# Patient Record
Sex: FEMALE | Race: WHITE | NOT HISPANIC OR LATINO
[De-identification: names, ages, dates, MRNs, and addresses within clinical notes are randomized per-mention and may not be internally consistent; named-entity substitution may affect disease eponyms.]

---

## 2017-02-28 ENCOUNTER — HOSPITAL ENCOUNTER (OUTPATIENT)
Dept: HOME HEALTH SERVICES | Facility: HOME HEALTHCARE | Age: 62
Setting detail: SPECIMEN
Discharge: HOME | End: 2017-02-28
Attending: INTERNAL MEDICINE | Admitting: INTERNAL MEDICINE

## 2017-02-28 LAB
CRP SERPL-MCNC: 0.58 MG/DL (ref 0–0.7)
ERYTHROCYTE [SEDIMENTATION RATE] IN BLOOD BY WESTERGREN METHOD: 54 MM/HR (ref 0–30)

## 2017-03-01 ENCOUNTER — CONVERSION ENCOUNTER (OUTPATIENT)
Dept: CARDIOLOGY | Facility: CLINIC | Age: 62
End: 2017-03-01

## 2017-03-01 LAB
ANA SER QL IA: NORMAL
CHROMATIN AB SERPL-ACNC: <20 [IU]/ML (ref 0–20)

## 2017-03-22 ENCOUNTER — HOSPITAL ENCOUNTER (OUTPATIENT)
Dept: NUCLEAR MEDICINE | Facility: HOSPITAL | Age: 62
Discharge: HOME | End: 2017-03-22
Attending: INTERNAL MEDICINE | Admitting: INTERNAL MEDICINE

## 2017-04-12 ENCOUNTER — HOSPITAL ENCOUNTER (OUTPATIENT)
Dept: SLEEP MEDICINE | Facility: HOSPITAL | Age: 62
Discharge: HOME | End: 2017-04-12
Attending: INTERNAL MEDICINE | Admitting: INTERNAL MEDICINE

## 2017-04-25 ENCOUNTER — HOSPITAL ENCOUNTER (OUTPATIENT)
Dept: SLEEP MEDICINE | Facility: HOSPITAL | Age: 62
Discharge: HOME | End: 2017-04-25
Attending: INTERNAL MEDICINE | Admitting: INTERNAL MEDICINE

## 2017-05-17 ENCOUNTER — CONVERSION ENCOUNTER (OUTPATIENT)
Dept: CARDIOLOGY | Facility: CLINIC | Age: 62
End: 2017-05-17

## 2017-06-18 ENCOUNTER — HOSPITAL ENCOUNTER (OUTPATIENT)
Dept: SLEEP MEDICINE | Facility: HOSPITAL | Age: 62
Discharge: HOME | End: 2017-06-18
Attending: INTERNAL MEDICINE | Admitting: INTERNAL MEDICINE

## 2018-01-04 ENCOUNTER — HOSPITAL ENCOUNTER (OUTPATIENT)
Dept: SLEEP MEDICINE | Facility: HOSPITAL | Age: 63
Discharge: HOME | End: 2018-01-04
Attending: INTERNAL MEDICINE | Admitting: INTERNAL MEDICINE

## 2018-02-22 ENCOUNTER — HOSPITAL ENCOUNTER (OUTPATIENT)
Dept: HOME HEALTH SERVICES | Facility: HOME HEALTHCARE | Age: 63
Setting detail: SPECIMEN
Discharge: HOME | End: 2018-02-22
Attending: INTERNAL MEDICINE | Admitting: INTERNAL MEDICINE

## 2018-06-19 ENCOUNTER — HOSPITAL ENCOUNTER (OUTPATIENT)
Dept: HOME HEALTH SERVICES | Facility: HOME HEALTHCARE | Age: 63
Setting detail: SPECIMEN
Discharge: HOME | End: 2018-06-19
Attending: PHYSICAL MEDICINE & REHABILITATION | Admitting: PHYSICAL MEDICINE & REHABILITATION

## 2018-06-19 LAB
ALBUMIN SERPL-MCNC: 3.9 G/DL (ref 3.5–4.8)
ALBUMIN/GLOB SERPL: 1.1 {RATIO} (ref 1–1.7)
ALP SERPL-CCNC: 59 IU/L (ref 32–91)
ALT SERPL-CCNC: 18 IU/L (ref 14–54)
ANION GAP SERPL CALC-SCNC: 12.1 MMOL/L (ref 10–20)
AST SERPL-CCNC: 18 IU/L (ref 15–41)
BASOPHILS # BLD AUTO: 0 10*3/UL (ref 0–0.2)
BASOPHILS NFR BLD AUTO: 1 % (ref 0–2)
BILIRUB SERPL-MCNC: 0.3 MG/DL (ref 0.3–1.2)
BUN SERPL-MCNC: 12 MG/DL (ref 8–20)
BUN/CREAT SERPL: 17.1 (ref 5.4–26.2)
CALCIUM SERPL-MCNC: 10 MG/DL (ref 8.9–10.3)
CHLORIDE SERPL-SCNC: 104 MMOL/L (ref 101–111)
CHOLEST SERPL-MCNC: 148 MG/DL
CHOLEST/HDLC SERPL: 4.1 {RATIO}
CONV CO2: 25 MMOL/L (ref 22–32)
CONV LDL CHOLESTEROL DIRECT: 86 MG/DL (ref 0–100)
CONV TOTAL PROTEIN: 7.5 G/DL (ref 6.1–7.9)
CREAT UR-MCNC: 0.7 MG/DL (ref 0.4–1)
DIFFERENTIAL METHOD BLD: (no result)
EOSINOPHIL # BLD AUTO: 0.1 10*3/UL (ref 0–0.3)
EOSINOPHIL # BLD AUTO: 2 % (ref 0–3)
ERYTHROCYTE [DISTWIDTH] IN BLOOD BY AUTOMATED COUNT: 13.1 % (ref 11.5–14.5)
GLOBULIN UR ELPH-MCNC: 3.6 G/DL (ref 2.5–3.8)
GLUCOSE SERPL-MCNC: 91 MG/DL (ref 65–99)
HCT VFR BLD AUTO: 39.9 % (ref 35–49)
HDLC SERPL-MCNC: 36 MG/DL
HGB BLD-MCNC: 13.2 G/DL (ref 12–15)
LDLC/HDLC SERPL: 2.4 {RATIO}
LIPID INTERPRETATION: ABNORMAL
LYMPHOCYTES # BLD AUTO: 2.5 10*3/UL (ref 0.8–4.8)
LYMPHOCYTES NFR BLD AUTO: 40 % (ref 18–42)
MCH RBC QN AUTO: 29.7 PG (ref 26–32)
MCHC RBC AUTO-ENTMCNC: 33 G/DL (ref 32–36)
MCV RBC AUTO: 89.8 FL (ref 80–94)
MONOCYTES # BLD AUTO: 0.7 10*3/UL (ref 0.1–1.3)
MONOCYTES NFR BLD AUTO: 12 % (ref 2–11)
NEUTROPHILS # BLD AUTO: 2.8 10*3/UL (ref 2.3–8.6)
NEUTROPHILS NFR BLD AUTO: 45 % (ref 50–75)
NRBC BLD AUTO-RTO: 0 /100{WBCS}
NRBC/RBC NFR BLD MANUAL: 0 10*3/UL
PLATELET # BLD AUTO: 192 10*3/UL (ref 150–450)
PMV BLD AUTO: 9.8 FL (ref 7.4–10.4)
POTASSIUM SERPL-SCNC: 4.1 MMOL/L (ref 3.6–5.1)
RBC # BLD AUTO: 4.44 10*6/UL (ref 4–5.4)
SODIUM SERPL-SCNC: 137 MMOL/L (ref 136–144)
TRIGL SERPL-MCNC: 150 MG/DL
VLDLC SERPL CALC-MCNC: 26 MG/DL
WBC # BLD AUTO: 6.2 10*3/UL (ref 4.5–11.5)

## 2019-06-03 VITALS
WEIGHT: 247 LBS | BODY MASS INDEX: 49.89 KG/M2 | BODY MASS INDEX: 49.8 KG/M2 | HEIGHT: 59 IN | DIASTOLIC BLOOD PRESSURE: 85 MMHG | HEART RATE: 60 BPM | SYSTOLIC BLOOD PRESSURE: 171 MMHG | HEIGHT: 59 IN | DIASTOLIC BLOOD PRESSURE: 82 MMHG | SYSTOLIC BLOOD PRESSURE: 187 MMHG | HEART RATE: 101 BPM

## 2023-10-17 ENCOUNTER — LAB REQUISITION (OUTPATIENT)
Dept: LAB | Facility: HOSPITAL | Age: 68
End: 2023-10-17
Payer: MEDICARE

## 2023-10-17 DIAGNOSIS — E11.51 TYPE 2 DIABETES MELLITUS WITH DIABETIC PERIPHERAL ANGIOPATHY WITHOUT GANGRENE: ICD-10-CM

## 2023-10-17 DIAGNOSIS — I50.23 ACUTE ON CHRONIC SYSTOLIC (CONGESTIVE) HEART FAILURE: ICD-10-CM

## 2023-10-17 DIAGNOSIS — I10 ESSENTIAL (PRIMARY) HYPERTENSION: ICD-10-CM

## 2023-10-17 LAB
ALBUMIN SERPL-MCNC: 3.6 G/DL (ref 3.5–5.2)
ALBUMIN/GLOB SERPL: 1.2 G/DL
ALP SERPL-CCNC: 92 U/L (ref 39–117)
ALT SERPL W P-5'-P-CCNC: 17 U/L (ref 1–33)
ANION GAP SERPL CALCULATED.3IONS-SCNC: 10 MMOL/L (ref 5–15)
AST SERPL-CCNC: 16 U/L (ref 1–32)
BILIRUB SERPL-MCNC: 0.5 MG/DL (ref 0–1.2)
BUN SERPL-MCNC: 15 MG/DL (ref 8–23)
BUN/CREAT SERPL: 14.3 (ref 7–25)
CALCIUM SPEC-SCNC: 9.3 MG/DL (ref 8.6–10.5)
CHLORIDE SERPL-SCNC: 103 MMOL/L (ref 98–107)
CO2 SERPL-SCNC: 28 MMOL/L (ref 22–29)
CREAT SERPL-MCNC: 1.05 MG/DL (ref 0.57–1)
EGFRCR SERPLBLD CKD-EPI 2021: 58 ML/MIN/1.73
GLOBULIN UR ELPH-MCNC: 3 GM/DL
GLUCOSE SERPL-MCNC: 132 MG/DL (ref 65–99)
POTASSIUM SERPL-SCNC: 4 MMOL/L (ref 3.5–5.2)
PROT SERPL-MCNC: 6.6 G/DL (ref 6–8.5)
SODIUM SERPL-SCNC: 141 MMOL/L (ref 136–145)

## 2023-10-17 PROCEDURE — 80053 COMPREHEN METABOLIC PANEL: CPT | Performed by: NURSE PRACTITIONER

## 2024-01-15 ENCOUNTER — LAB REQUISITION (OUTPATIENT)
Dept: LAB | Facility: HOSPITAL | Age: 69
End: 2024-01-15
Payer: MEDICARE

## 2024-01-15 DIAGNOSIS — Z00.00 ENCOUNTER FOR GENERAL ADULT MEDICAL EXAMINATION WITHOUT ABNORMAL FINDINGS: ICD-10-CM

## 2024-01-15 LAB
25(OH)D3 SERPL-MCNC: 18.6 NG/ML (ref 30–100)
ALBUMIN SERPL-MCNC: 3.4 G/DL (ref 3.5–5.2)
ALBUMIN/GLOB SERPL: 0.9 G/DL
ALP SERPL-CCNC: 99 U/L (ref 39–117)
ALT SERPL W P-5'-P-CCNC: 14 U/L (ref 1–33)
ANION GAP SERPL CALCULATED.3IONS-SCNC: 8 MMOL/L (ref 5–15)
AST SERPL-CCNC: 21 U/L (ref 1–32)
BILIRUB SERPL-MCNC: 0.7 MG/DL (ref 0–1.2)
BUN SERPL-MCNC: 16 MG/DL (ref 8–23)
BUN/CREAT SERPL: 20 (ref 7–25)
CALCIUM SPEC-SCNC: 10 MG/DL (ref 8.6–10.5)
CHLORIDE SERPL-SCNC: 106 MMOL/L (ref 98–107)
CO2 SERPL-SCNC: 32 MMOL/L (ref 22–29)
CREAT SERPL-MCNC: 0.8 MG/DL (ref 0.57–1)
DEPRECATED RDW RBC AUTO: 61.3 FL (ref 37–54)
EGFRCR SERPLBLD CKD-EPI 2021: 80.4 ML/MIN/1.73
ERYTHROCYTE [DISTWIDTH] IN BLOOD BY AUTOMATED COUNT: 17 % (ref 12.3–15.4)
FOLATE SERPL-MCNC: 2.69 NG/ML (ref 4.78–24.2)
GLOBULIN UR ELPH-MCNC: 3.6 GM/DL
GLUCOSE SERPL-MCNC: 93 MG/DL (ref 65–99)
HBA1C MFR BLD: 6 % (ref 4.8–5.6)
HCT VFR BLD AUTO: 43.6 % (ref 34–46.6)
HGB BLD-MCNC: 13.8 G/DL (ref 12–15.9)
MCH RBC QN AUTO: 30.6 PG (ref 26.6–33)
MCHC RBC AUTO-ENTMCNC: 31.7 G/DL (ref 31.5–35.7)
MCV RBC AUTO: 96.4 FL (ref 79–97)
PLATELET # BLD AUTO: 138 10*3/MM3 (ref 140–450)
PMV BLD AUTO: 9.3 FL (ref 6–12)
POTASSIUM SERPL-SCNC: 4.5 MMOL/L (ref 3.5–5.2)
PROT SERPL-MCNC: 7 G/DL (ref 6–8.5)
RBC # BLD AUTO: 4.52 10*6/MM3 (ref 3.77–5.28)
SODIUM SERPL-SCNC: 146 MMOL/L (ref 136–145)
WBC NRBC COR # BLD AUTO: 4.1 10*3/MM3 (ref 3.4–10.8)

## 2024-01-15 PROCEDURE — 82746 ASSAY OF FOLIC ACID SERUM: CPT | Performed by: NURSE PRACTITIONER

## 2024-01-15 PROCEDURE — 83036 HEMOGLOBIN GLYCOSYLATED A1C: CPT | Performed by: NURSE PRACTITIONER

## 2024-01-15 PROCEDURE — 85027 COMPLETE CBC AUTOMATED: CPT | Performed by: NURSE PRACTITIONER

## 2024-01-15 PROCEDURE — 80053 COMPREHEN METABOLIC PANEL: CPT | Performed by: NURSE PRACTITIONER

## 2024-01-15 PROCEDURE — 82306 VITAMIN D 25 HYDROXY: CPT | Performed by: NURSE PRACTITIONER

## 2024-06-30 ENCOUNTER — APPOINTMENT (OUTPATIENT)
Dept: GENERAL RADIOLOGY | Facility: HOSPITAL | Age: 69
End: 2024-06-30
Payer: MEDICARE

## 2024-06-30 ENCOUNTER — APPOINTMENT (OUTPATIENT)
Dept: CT IMAGING | Facility: HOSPITAL | Age: 69
End: 2024-06-30
Payer: MEDICARE

## 2024-06-30 ENCOUNTER — HOSPITAL ENCOUNTER (INPATIENT)
Facility: HOSPITAL | Age: 69
LOS: 23 days | Discharge: ANOTHER HOSPITAL | End: 2024-07-23
Attending: EMERGENCY MEDICINE | Admitting: INTERNAL MEDICINE
Payer: MEDICARE

## 2024-06-30 DIAGNOSIS — N17.9 ACUTE KIDNEY INJURY: ICD-10-CM

## 2024-06-30 DIAGNOSIS — J94.8 HYDROPNEUMOTHORAX: ICD-10-CM

## 2024-06-30 DIAGNOSIS — A41.9 SEPSIS, DUE TO UNSPECIFIED ORGANISM, UNSPECIFIED WHETHER ACUTE ORGAN DYSFUNCTION PRESENT: ICD-10-CM

## 2024-06-30 DIAGNOSIS — I48.91 ATRIAL FIBRILLATION WITH RAPID VENTRICULAR RESPONSE: Primary | ICD-10-CM

## 2024-06-30 DIAGNOSIS — N39.0 URINARY TRACT INFECTION WITHOUT HEMATURIA, SITE UNSPECIFIED: ICD-10-CM

## 2024-06-30 DIAGNOSIS — I50.9 ACUTE CONGESTIVE HEART FAILURE, UNSPECIFIED HEART FAILURE TYPE: ICD-10-CM

## 2024-06-30 DIAGNOSIS — J18.9 MULTIFOCAL PNEUMONIA: ICD-10-CM

## 2024-06-30 DIAGNOSIS — I82.413 ACUTE DEEP VEIN THROMBOSIS (DVT) OF FEMORAL VEIN OF BOTH LOWER EXTREMITIES: ICD-10-CM

## 2024-06-30 DIAGNOSIS — B34.8 RHINOVIRUS INFECTION: ICD-10-CM

## 2024-06-30 DIAGNOSIS — J44.9 CHRONIC OBSTRUCTIVE PULMONARY DISEASE, UNSPECIFIED COPD TYPE: Chronic | ICD-10-CM

## 2024-06-30 DIAGNOSIS — L03.116 BILATERAL LOWER LEG CELLULITIS: ICD-10-CM

## 2024-06-30 DIAGNOSIS — R10.9 ABDOMINAL PAIN: ICD-10-CM

## 2024-06-30 DIAGNOSIS — E87.5 HYPERKALEMIA: ICD-10-CM

## 2024-06-30 DIAGNOSIS — L03.115 BILATERAL LOWER LEG CELLULITIS: ICD-10-CM

## 2024-06-30 PROBLEM — R00.0 WIDE-COMPLEX TACHYCARDIA: Status: ACTIVE | Noted: 2024-06-30

## 2024-06-30 PROBLEM — I10 HYPERTENSION: Status: ACTIVE | Noted: 2017-03-01

## 2024-06-30 LAB
ALBUMIN SERPL-MCNC: 2.5 G/DL (ref 3.5–5.2)
ALBUMIN/GLOB SERPL: 0.7 G/DL
ALP SERPL-CCNC: 126 U/L (ref 39–117)
ALT SERPL W P-5'-P-CCNC: 18 U/L (ref 1–33)
ANION GAP SERPL CALCULATED.3IONS-SCNC: 10 MMOL/L (ref 10–20)
ANION GAP SERPL CALCULATED.3IONS-SCNC: 5.6 MMOL/L (ref 5–15)
AST SERPL-CCNC: 42 U/L (ref 1–32)
BACTERIA UR QL AUTO: ABNORMAL /HPF
BASOPHILS # BLD AUTO: 0.04 10*3/MM3 (ref 0–0.2)
BASOPHILS NFR BLD AUTO: 0.2 % (ref 0–1.5)
BILIRUB SERPL-MCNC: 1.6 MG/DL (ref 0–1.2)
BILIRUB UR QL STRIP: ABNORMAL
BUN BLDA-MCNC: 77 MG/DL (ref 8–26)
BUN SERPL-MCNC: 50 MG/DL (ref 8–23)
BUN/CREAT SERPL: 36.2 (ref 7–25)
CA-I BLDA-SCNC: 1.08 MMOL/L (ref 1.12–1.32)
CALCIUM SPEC-SCNC: 10.1 MG/DL (ref 8.6–10.5)
CHLORIDE BLDA-SCNC: 99 MMOL/L (ref 98–109)
CHLORIDE SERPL-SCNC: 101 MMOL/L (ref 98–107)
CLARITY UR: ABNORMAL
CO2 BLDA-SCNC: 27 MMOL/L (ref 24–29)
CO2 SERPL-SCNC: 24.4 MMOL/L (ref 22–29)
COLOR UR: ABNORMAL
CREAT BLDA-MCNC: 1.4 MG/DL (ref 0.6–1.3)
CREAT SERPL-MCNC: 1.38 MG/DL (ref 0.57–1)
D-LACTATE SERPL-SCNC: 2.4 MMOL/L (ref 0.5–2)
D-LACTATE SERPL-SCNC: 3.1 MMOL/L (ref 0.3–2)
DEPRECATED RDW RBC AUTO: 52.5 FL (ref 37–54)
EGFRCR SERPLBLD CKD-EPI 2021: 40.8 ML/MIN/1.73
EGFRCR SERPLBLD CKD-EPI 2021: 41.5 ML/MIN/1.73
EOSINOPHIL # BLD AUTO: 0.01 10*3/MM3 (ref 0–0.4)
EOSINOPHIL NFR BLD AUTO: 0.1 % (ref 0.3–6.2)
ERYTHROCYTE [DISTWIDTH] IN BLOOD BY AUTOMATED COUNT: 15.2 % (ref 12.3–15.4)
GLOBULIN UR ELPH-MCNC: 3.7 GM/DL
GLUCOSE BLDC GLUCOMTR-MCNC: 142 MG/DL (ref 70–105)
GLUCOSE BLDC GLUCOMTR-MCNC: 189 MG/DL (ref 70–105)
GLUCOSE SERPL-MCNC: 194 MG/DL (ref 65–99)
GLUCOSE UR STRIP-MCNC: NEGATIVE MG/DL
HCT VFR BLD AUTO: 49 % (ref 34–46.6)
HCT VFR BLDA CALC: 50 % (ref 38–51)
HGB BLD-MCNC: 15.9 G/DL (ref 12–15.9)
HGB BLDA-MCNC: 17 G/DL (ref 12–17)
HGB UR QL STRIP.AUTO: ABNORMAL
HOLD SPECIMEN: NORMAL
HYALINE CASTS UR QL AUTO: ABNORMAL /LPF
IMM GRANULOCYTES # BLD AUTO: 0.17 10*3/MM3 (ref 0–0.05)
IMM GRANULOCYTES NFR BLD AUTO: 1 % (ref 0–0.5)
KETONES UR QL STRIP: ABNORMAL
LEUKOCYTE ESTERASE UR QL STRIP.AUTO: ABNORMAL
LYMPHOCYTES # BLD AUTO: 1.31 10*3/MM3 (ref 0.7–3.1)
LYMPHOCYTES NFR BLD AUTO: 7.8 % (ref 19.6–45.3)
MCH RBC QN AUTO: 30.8 PG (ref 26.6–33)
MCHC RBC AUTO-ENTMCNC: 32.4 G/DL (ref 31.5–35.7)
MCV RBC AUTO: 95 FL (ref 79–97)
MONOCYTES # BLD AUTO: 1.23 10*3/MM3 (ref 0.1–0.9)
MONOCYTES NFR BLD AUTO: 7.3 % (ref 5–12)
NEUTROPHILS NFR BLD AUTO: 14.04 10*3/MM3 (ref 1.7–7)
NEUTROPHILS NFR BLD AUTO: 83.6 % (ref 42.7–76)
NITRITE UR QL STRIP: POSITIVE
NRBC BLD AUTO-RTO: 0.8 /100 WBC (ref 0–0.2)
NT-PROBNP SERPL-MCNC: ABNORMAL PG/ML (ref 0–900)
PH UR STRIP.AUTO: <=5 [PH] (ref 5–8)
PLATELET # BLD AUTO: 119 10*3/MM3 (ref 140–450)
PMV BLD AUTO: 12 FL (ref 6–12)
POTASSIUM BLDA-SCNC: 7.8 MMOL/L (ref 3.5–4.9)
POTASSIUM SERPL-SCNC: 6.5 MMOL/L (ref 3.5–5.2)
PROT SERPL-MCNC: 6.2 G/DL (ref 6–8.5)
PROT UR QL STRIP: ABNORMAL
RBC # BLD AUTO: 5.16 10*6/MM3 (ref 3.77–5.28)
RBC # UR STRIP: ABNORMAL /HPF
RBC MORPH BLD: NORMAL
REF LAB TEST METHOD: ABNORMAL
RENAL EPI CELLS #/AREA URNS HPF: ABNORMAL /HPF
SMALL PLATELETS BLD QL SMEAR: NORMAL
SODIUM BLD-SCNC: 127 MMOL/L (ref 138–146)
SODIUM SERPL-SCNC: 131 MMOL/L (ref 136–145)
SP GR UR STRIP: 1.02 (ref 1–1.03)
SQUAMOUS #/AREA URNS HPF: ABNORMAL /HPF
TROPONIN T SERPL HS-MCNC: 40 NG/L
UROBILINOGEN UR QL STRIP: ABNORMAL
WBC # UR STRIP: ABNORMAL /HPF
WBC MORPH BLD: NORMAL
WBC NRBC COR # BLD AUTO: 16.8 10*3/MM3 (ref 3.4–10.8)

## 2024-06-30 PROCEDURE — 25010000002 ENOXAPARIN PER 10 MG: Performed by: EMERGENCY MEDICINE

## 2024-06-30 PROCEDURE — 80047 BASIC METABLC PNL IONIZED CA: CPT

## 2024-06-30 PROCEDURE — P9612 CATHETERIZE FOR URINE SPEC: HCPCS

## 2024-06-30 PROCEDURE — 84484 ASSAY OF TROPONIN QUANT: CPT | Performed by: EMERGENCY MEDICINE

## 2024-06-30 PROCEDURE — 93005 ELECTROCARDIOGRAM TRACING: CPT

## 2024-06-30 PROCEDURE — 87186 SC STD MICRODIL/AGAR DIL: CPT | Performed by: NURSE PRACTITIONER

## 2024-06-30 PROCEDURE — 80053 COMPREHEN METABOLIC PANEL: CPT | Performed by: EMERGENCY MEDICINE

## 2024-06-30 PROCEDURE — 83605 ASSAY OF LACTIC ACID: CPT | Performed by: EMERGENCY MEDICINE

## 2024-06-30 PROCEDURE — 87086 URINE CULTURE/COLONY COUNT: CPT | Performed by: NURSE PRACTITIONER

## 2024-06-30 PROCEDURE — 85007 BL SMEAR W/DIFF WBC COUNT: CPT | Performed by: EMERGENCY MEDICINE

## 2024-06-30 PROCEDURE — 71045 X-RAY EXAM CHEST 1 VIEW: CPT

## 2024-06-30 PROCEDURE — 85025 COMPLETE CBC W/AUTO DIFF WBC: CPT | Performed by: EMERGENCY MEDICINE

## 2024-06-30 PROCEDURE — 81001 URINALYSIS AUTO W/SCOPE: CPT | Performed by: EMERGENCY MEDICINE

## 2024-06-30 PROCEDURE — 0202U NFCT DS 22 TRGT SARS-COV-2: CPT | Performed by: NURSE PRACTITIONER

## 2024-06-30 PROCEDURE — 87641 MR-STAPH DNA AMP PROBE: CPT | Performed by: INTERNAL MEDICINE

## 2024-06-30 PROCEDURE — 83880 ASSAY OF NATRIURETIC PEPTIDE: CPT | Performed by: EMERGENCY MEDICINE

## 2024-06-30 PROCEDURE — 36415 COLL VENOUS BLD VENIPUNCTURE: CPT

## 2024-06-30 PROCEDURE — 25010000002 FUROSEMIDE PER 20 MG: Performed by: EMERGENCY MEDICINE

## 2024-06-30 PROCEDURE — 25010000002 VANCOMYCIN HCL IN NACL 1.5-0.9 GM/500ML-% SOLUTION: Performed by: EMERGENCY MEDICINE

## 2024-06-30 PROCEDURE — 99291 CRITICAL CARE FIRST HOUR: CPT

## 2024-06-30 PROCEDURE — 87040 BLOOD CULTURE FOR BACTERIA: CPT | Performed by: EMERGENCY MEDICINE

## 2024-06-30 PROCEDURE — 87077 CULTURE AEROBIC IDENTIFY: CPT | Performed by: NURSE PRACTITIONER

## 2024-06-30 PROCEDURE — 25010000002 CEFEPIME PER 500 MG: Performed by: EMERGENCY MEDICINE

## 2024-06-30 PROCEDURE — 73700 CT LOWER EXTREMITY W/O DYE: CPT

## 2024-06-30 PROCEDURE — 82948 REAGENT STRIP/BLOOD GLUCOSE: CPT | Performed by: EMERGENCY MEDICINE

## 2024-06-30 PROCEDURE — 25010000002 CALCIUM GLUCONATE-NACL 1-0.675 GM/50ML-% SOLUTION: Performed by: EMERGENCY MEDICINE

## 2024-06-30 PROCEDURE — 85014 HEMATOCRIT: CPT

## 2024-06-30 PROCEDURE — 63710000001 INSULIN REGULAR HUMAN PER 5 UNITS: Performed by: EMERGENCY MEDICINE

## 2024-06-30 RX ORDER — DILTIAZEM HYDROCHLORIDE 5 MG/ML
20 INJECTION INTRAVENOUS ONCE
Status: COMPLETED | OUTPATIENT
Start: 2024-06-30 | End: 2024-06-30

## 2024-06-30 RX ORDER — POTASSIUM CHLORIDE 750 MG/1
1 CAPSULE, EXTENDED RELEASE ORAL EVERY 12 HOURS SCHEDULED
COMMUNITY
Start: 2024-03-18 | End: 2024-07-23 | Stop reason: HOSPADM

## 2024-06-30 RX ORDER — DILTIAZEM HCL/D5W 125 MG/125
5-15 PLASTIC BAG, INJECTION (ML) INTRAVENOUS
Status: DISCONTINUED | OUTPATIENT
Start: 2024-06-30 | End: 2024-07-01

## 2024-06-30 RX ORDER — ACETAMINOPHEN 650 MG/1
650 SUPPOSITORY RECTAL EVERY 4 HOURS PRN
Status: DISCONTINUED | OUTPATIENT
Start: 2024-06-30 | End: 2024-07-04

## 2024-06-30 RX ORDER — ONDANSETRON 2 MG/ML
4 INJECTION INTRAMUSCULAR; INTRAVENOUS EVERY 6 HOURS PRN
Status: DISCONTINUED | OUTPATIENT
Start: 2024-06-30 | End: 2024-07-23 | Stop reason: HOSPADM

## 2024-06-30 RX ORDER — METOPROLOL TARTRATE 50 MG/1
50 TABLET, FILM COATED ORAL DAILY
COMMUNITY
End: 2024-07-23 | Stop reason: HOSPADM

## 2024-06-30 RX ORDER — ENOXAPARIN SODIUM 150 MG/ML
1 INJECTION SUBCUTANEOUS ONCE
Status: COMPLETED | OUTPATIENT
Start: 2024-06-30 | End: 2024-06-30

## 2024-06-30 RX ORDER — FEXOFENADINE HYDROCHLORIDE 180 MG/1
180 TABLET, FILM COATED ORAL DAILY
COMMUNITY
Start: 2024-05-30

## 2024-06-30 RX ORDER — FUROSEMIDE 10 MG/ML
80 INJECTION INTRAMUSCULAR; INTRAVENOUS ONCE
Status: COMPLETED | OUTPATIENT
Start: 2024-06-30 | End: 2024-06-30

## 2024-06-30 RX ORDER — FUROSEMIDE 20 MG/1
20 TABLET ORAL DAILY
COMMUNITY
End: 2024-07-23 | Stop reason: HOSPADM

## 2024-06-30 RX ORDER — SODIUM CHLORIDE 0.9 % (FLUSH) 0.9 %
10 SYRINGE (ML) INJECTION AS NEEDED
Status: DISCONTINUED | OUTPATIENT
Start: 2024-06-30 | End: 2024-07-23 | Stop reason: HOSPADM

## 2024-06-30 RX ORDER — NITROGLYCERIN 0.4 MG/1
0.4 TABLET SUBLINGUAL
Status: DISCONTINUED | OUTPATIENT
Start: 2024-06-30 | End: 2024-07-01

## 2024-06-30 RX ORDER — SODIUM CHLORIDE 0.9 % (FLUSH) 0.9 %
10 SYRINGE (ML) INJECTION EVERY 12 HOURS SCHEDULED
Status: DISCONTINUED | OUTPATIENT
Start: 2024-06-30 | End: 2024-07-23 | Stop reason: HOSPADM

## 2024-06-30 RX ORDER — BUMETANIDE 1 MG/1
1 TABLET ORAL 3 TIMES DAILY
Status: ON HOLD | COMMUNITY
Start: 2024-05-30 | End: 2024-07-23

## 2024-06-30 RX ORDER — OMEPRAZOLE 20 MG/1
1 CAPSULE, DELAYED RELEASE ORAL DAILY
COMMUNITY
Start: 2024-03-18

## 2024-06-30 RX ORDER — VANCOMYCIN/0.9 % SOD CHLORIDE 1.5G/250ML
20 PLASTIC BAG, INJECTION (ML) INTRAVENOUS ONCE
Status: COMPLETED | OUTPATIENT
Start: 2024-06-30 | End: 2024-07-01

## 2024-06-30 RX ORDER — ERGOCALCIFEROL 1.25 MG/1
50000 CAPSULE ORAL 2 TIMES WEEKLY
COMMUNITY
Start: 2024-05-30

## 2024-06-30 RX ORDER — DOCUSATE SODIUM 100 MG/1
1 CAPSULE, LIQUID FILLED ORAL EVERY 12 HOURS SCHEDULED
COMMUNITY
Start: 2024-05-30

## 2024-06-30 RX ORDER — ACETAMINOPHEN 325 MG/1
650 TABLET ORAL EVERY 4 HOURS PRN
Status: DISCONTINUED | OUTPATIENT
Start: 2024-06-30 | End: 2024-07-04

## 2024-06-30 RX ORDER — DEXTROSE MONOHYDRATE 25 G/50ML
25 INJECTION, SOLUTION INTRAVENOUS ONCE
Status: COMPLETED | OUTPATIENT
Start: 2024-06-30 | End: 2024-06-30

## 2024-06-30 RX ORDER — MELOXICAM 7.5 MG/1
1 TABLET ORAL DAILY PRN
COMMUNITY
Start: 2024-03-18 | End: 2024-07-23 | Stop reason: HOSPADM

## 2024-06-30 RX ORDER — HYDROCODONE BITARTRATE AND ACETAMINOPHEN 10; 325 MG/1; MG/1
1 TABLET ORAL 3 TIMES DAILY
Status: ON HOLD | COMMUNITY
Start: 2024-05-30 | End: 2024-07-23

## 2024-06-30 RX ORDER — LISINOPRIL 30 MG/1
1 TABLET ORAL DAILY
COMMUNITY
Start: 2024-03-18 | End: 2024-07-23 | Stop reason: HOSPADM

## 2024-06-30 RX ORDER — OXYBUTYNIN CHLORIDE 10 MG/1
2 TABLET, EXTENDED RELEASE ORAL DAILY
COMMUNITY
Start: 2024-03-18

## 2024-06-30 RX ORDER — SODIUM CHLORIDE 9 MG/ML
40 INJECTION, SOLUTION INTRAVENOUS AS NEEDED
Status: DISCONTINUED | OUTPATIENT
Start: 2024-06-30 | End: 2024-07-23 | Stop reason: HOSPADM

## 2024-06-30 RX ORDER — ONDANSETRON 4 MG/1
4 TABLET, ORALLY DISINTEGRATING ORAL EVERY 6 HOURS PRN
Status: DISCONTINUED | OUTPATIENT
Start: 2024-06-30 | End: 2024-07-23 | Stop reason: HOSPADM

## 2024-06-30 RX ORDER — MONTELUKAST SODIUM 10 MG/1
10 TABLET ORAL
COMMUNITY

## 2024-06-30 RX ORDER — CALCIUM GLUCONATE 20 MG/ML
1000 INJECTION, SOLUTION INTRAVENOUS ONCE
Status: COMPLETED | OUTPATIENT
Start: 2024-06-30 | End: 2024-06-30

## 2024-06-30 RX ADMIN — Medication 1500 MG: at 22:31

## 2024-06-30 RX ADMIN — INSULIN HUMAN 5 UNITS: 100 INJECTION, SOLUTION PARENTERAL at 21:20

## 2024-06-30 RX ADMIN — FUROSEMIDE 80 MG: 10 INJECTION, SOLUTION INTRAMUSCULAR; INTRAVENOUS at 21:18

## 2024-06-30 RX ADMIN — CALCIUM GLUCONATE 1000 MG: 20 INJECTION, SOLUTION INTRAVENOUS at 21:21

## 2024-06-30 RX ADMIN — Medication 5 MG/HR: at 17:32

## 2024-06-30 RX ADMIN — CEFEPIME 2000 MG: 2 INJECTION, POWDER, FOR SOLUTION INTRAVENOUS at 18:50

## 2024-06-30 RX ADMIN — Medication 10 ML: at 23:33

## 2024-06-30 RX ADMIN — ENOXAPARIN SODIUM 105 MG: 120 INJECTION SUBCUTANEOUS at 21:30

## 2024-06-30 RX ADMIN — DEXTROSE MONOHYDRATE 25 G: 25 INJECTION, SOLUTION INTRAVENOUS at 21:20

## 2024-06-30 RX ADMIN — DILTIAZEM HYDROCHLORIDE 20 MG: 5 INJECTION, SOLUTION INTRAVENOUS at 17:33

## 2024-06-30 NOTE — Clinical Note
Level of Care: Critical Care [6]   Admitting Physician: DARRELL KEVIN [[id number]]   Attending Physician: DARRELL KEVIN [[id number]]

## 2024-06-30 NOTE — LETTER
EMS Transport Request  For use at [facility], [place], [place], [place], and [place] only   Patient Name: Ban Brennan : 1955   Weight:87.6 kg (193 lb 2 oz) Pick-up Location: 2117 BLS/ALS: BLS/ALS: BLS   Insurance: [employer] REPLACEMENT Auth End Date:    Pre-Cert #: Approved (Valid -) D/C Summary complete:    Destination: Other [place]   Contact Precautions: Droplet/Spore   Equipment (O2, Fluids, etc.): O2, settings 3L   Arrive By Date/Time: 24 WILL CALL once dc orders are in Stretcher/WC: Stretcher   CM Requesting: Dea Erwin RN     Office Phone: [phone]  Office Cell: [phone]   Ext: [phone]   Notes/Medical Necessity: High falls risk, 3L O2, Contact and droplet precautions for CR pseudomonas infection and rhinovirus, wounds on toes, unstageable wound sacrum.      ______________________________________________________________________    *Only 2 patient bags OR 1 carry-on size bag are permitted.  Wheelchairs and walkers CANNOT transported with the patient. Acknowledge: Yes

## 2024-07-01 ENCOUNTER — APPOINTMENT (OUTPATIENT)
Dept: CARDIOLOGY | Facility: HOSPITAL | Age: 69
End: 2024-07-01
Payer: MEDICARE

## 2024-07-01 ENCOUNTER — APPOINTMENT (OUTPATIENT)
Dept: CT IMAGING | Facility: HOSPITAL | Age: 69
End: 2024-07-01
Payer: MEDICARE

## 2024-07-01 ENCOUNTER — APPOINTMENT (OUTPATIENT)
Dept: ULTRASOUND IMAGING | Facility: HOSPITAL | Age: 69
End: 2024-07-01
Payer: MEDICARE

## 2024-07-01 PROBLEM — I45.10 RIGHT BUNDLE BRANCH BLOCK: Status: ACTIVE | Noted: 2024-07-01

## 2024-07-01 PROBLEM — A41.9 SEPSIS: Status: ACTIVE | Noted: 2024-07-01

## 2024-07-01 PROBLEM — G47.33 OSA (OBSTRUCTIVE SLEEP APNEA): Chronic | Status: ACTIVE | Noted: 2024-07-01

## 2024-07-01 PROBLEM — J18.9 MULTIFOCAL PNEUMONIA: Status: ACTIVE | Noted: 2024-07-01

## 2024-07-01 PROBLEM — Z99.81 CHRONIC RESPIRATORY FAILURE WITH HYPOXIA, ON HOME O2 THERAPY: Chronic | Status: ACTIVE | Noted: 2024-07-01

## 2024-07-01 PROBLEM — E87.5 ACUTE HYPERKALEMIA: Status: ACTIVE | Noted: 2024-07-01

## 2024-07-01 PROBLEM — J96.11 CHRONIC RESPIRATORY FAILURE WITH HYPOXIA, ON HOME O2 THERAPY: Chronic | Status: ACTIVE | Noted: 2024-07-01

## 2024-07-01 PROBLEM — B34.8 RHINOVIRUS INFECTION: Status: ACTIVE | Noted: 2024-07-01

## 2024-07-01 PROBLEM — L97.519 SKIN ULCER OF RIGHT GREAT TOE: Status: ACTIVE | Noted: 2024-07-01

## 2024-07-01 PROBLEM — N17.9 AKI (ACUTE KIDNEY INJURY): Status: ACTIVE | Noted: 2024-07-01

## 2024-07-01 PROBLEM — L97.529 SKIN ULCER OF LEFT GREAT TOE: Status: ACTIVE | Noted: 2024-07-01

## 2024-07-01 PROBLEM — I10 PRIMARY HYPERTENSION: Chronic | Status: ACTIVE | Noted: 2017-03-01

## 2024-07-01 PROBLEM — I50.9 ACUTE CHF: Status: ACTIVE | Noted: 2024-07-01

## 2024-07-01 PROBLEM — I82.403 ACUTE DEEP VEIN THROMBOSIS (DVT) OF BOTH LOWER EXTREMITIES: Status: ACTIVE | Noted: 2024-07-01

## 2024-07-01 PROBLEM — N39.0 ACUTE UTI: Status: ACTIVE | Noted: 2024-07-01

## 2024-07-01 LAB
ALBUMIN SERPL-MCNC: 2.5 G/DL (ref 3.5–5.2)
ALBUMIN/GLOB SERPL: 0.7 G/DL
ALP SERPL-CCNC: 115 U/L (ref 39–117)
ALT SERPL W P-5'-P-CCNC: 17 U/L (ref 1–33)
ANION GAP SERPL CALCULATED.3IONS-SCNC: 5.6 MMOL/L (ref 5–15)
AORTIC DIMENSIONLESS INDEX: 0.36 (DI)
AST SERPL-CCNC: 39 U/L (ref 1–32)
B PARAPERT DNA SPEC QL NAA+PROBE: NOT DETECTED
B PERT DNA SPEC QL NAA+PROBE: NOT DETECTED
BASOPHILS # BLD AUTO: 0.02 10*3/MM3 (ref 0–0.2)
BASOPHILS NFR BLD AUTO: 0.1 % (ref 0–1.5)
BH CV ECHO MEAS - AO MAX PG: 23.8 MMHG
BH CV ECHO MEAS - AO MEAN PG: 14 MMHG
BH CV ECHO MEAS - AO V2 MAX: 244 CM/SEC
BH CV ECHO MEAS - AO V2 VTI: 45.5 CM
BH CV ECHO MEAS - AVA(I,D): 1.2 CM2
BH CV ECHO MEAS - EDV(CUBED): 103.8 ML
BH CV ECHO MEAS - EDV(MOD-SP4): 93.4 ML
BH CV ECHO MEAS - EF(MOD-SP4): 16.7 %
BH CV ECHO MEAS - ESV(CUBED): 74.1 ML
BH CV ECHO MEAS - ESV(MOD-SP4): 77.8 ML
BH CV ECHO MEAS - FS: 10.6 %
BH CV ECHO MEAS - IVS/LVPW: 1.13 CM
BH CV ECHO MEAS - IVSD: 0.9 CM
BH CV ECHO MEAS - LA DIMENSION: 6.1 CM
BH CV ECHO MEAS - LV MASS(C)D: 132.3 GRAMS
BH CV ECHO MEAS - LV MAX PG: 3 MMHG
BH CV ECHO MEAS - LV MEAN PG: 1 MMHG
BH CV ECHO MEAS - LV V1 MAX: 87 CM/SEC
BH CV ECHO MEAS - LV V1 VTI: 14.4 CM
BH CV ECHO MEAS - LVIDD: 4.7 CM
BH CV ECHO MEAS - LVIDS: 4.2 CM
BH CV ECHO MEAS - LVOT AREA: 3.8 CM2
BH CV ECHO MEAS - LVOT DIAM: 2.2 CM
BH CV ECHO MEAS - LVPWD: 0.8 CM
BH CV ECHO MEAS - MED PEAK E' VEL: 5.4 CM/SEC
BH CV ECHO MEAS - MR MAX PG: 80.6 MMHG
BH CV ECHO MEAS - MR MAX VEL: 449 CM/SEC
BH CV ECHO MEAS - MR MEAN PG: 51 MMHG
BH CV ECHO MEAS - MR MEAN VEL: 340 CM/SEC
BH CV ECHO MEAS - MR VTI: 125 CM
BH CV ECHO MEAS - MV DEC SLOPE: 198 CM/SEC2
BH CV ECHO MEAS - MV DEC TIME: 0.51 SEC
BH CV ECHO MEAS - MV E MAX VEL: 145 CM/SEC
BH CV ECHO MEAS - MV MAX PG: 12 MMHG
BH CV ECHO MEAS - MV MEAN PG: 7 MMHG
BH CV ECHO MEAS - MV V2 VTI: 24.5 CM
BH CV ECHO MEAS - MVA(VTI): 2.23 CM2
BH CV ECHO MEAS - PA V2 MAX: 134 CM/SEC
BH CV ECHO MEAS - PI END-D VEL: 148 CM/SEC
BH CV ECHO MEAS - QP/QS: 0.18
BH CV ECHO MEAS - RAP SYSTOLE: 3 MMHG
BH CV ECHO MEAS - RV MAX PG: 1.63 MMHG
BH CV ECHO MEAS - RV V1 MAX: 63.9 CM/SEC
BH CV ECHO MEAS - RV V1 VTI: 8.9 CM
BH CV ECHO MEAS - RVDD: 2.5 CM
BH CV ECHO MEAS - RVOT DIAM: 1.2 CM
BH CV ECHO MEAS - RVSP: 28.4 MMHG
BH CV ECHO MEAS - SV(LVOT): 54.7 ML
BH CV ECHO MEAS - SV(MOD-SP4): 15.6 ML
BH CV ECHO MEAS - SV(RVOT): 10.1 ML
BH CV ECHO MEAS - TAPSE (>1.6): 2.14 CM
BH CV ECHO MEAS - TR MAX PG: 25.4 MMHG
BH CV ECHO MEAS - TR MAX VEL: 252 CM/SEC
BH CV LOW VAS LEFT DISTAL FEMORAL SPONT: 1
BH CV LOW VAS LEFT MID FEMORAL SPONT: 1
BH CV LOW VAS LEFT POPLITEAL SPONT: 1
BH CV LOW VAS LEFT POSTERIOR TIBIAL VESSEL: 1
BH CV LOW VAS LEFT PROXIMAL FEMORAL SPONT: 1
BH CV LOW VAS RIGHT COMMON FEMORAL SPONT: 1
BH CV LOW VAS RIGHT DISTAL FEMORAL SPONT: 1
BH CV LOW VAS RIGHT GREATER SAPH BK VESSEL: 1
BH CV LOW VAS RIGHT MID FEMORAL SPONT: 1
BH CV LOW VAS RIGHT POPLITEAL SPONT: 1
BH CV LOW VAS RIGHT PROXIMAL FEMORAL SPONT: 1
BH CV LOW VAS RIGHT SAPHENOFEMORAL JUNCTION SPONT: 1
BH CV LOWER VASCULAR LEFT COMMON FEMORAL AUGMENT: NORMAL
BH CV LOWER VASCULAR LEFT COMMON FEMORAL COMPETENT: NORMAL
BH CV LOWER VASCULAR LEFT COMMON FEMORAL COMPRESS: NORMAL
BH CV LOWER VASCULAR LEFT COMMON FEMORAL PHASIC: NORMAL
BH CV LOWER VASCULAR LEFT COMMON FEMORAL SPONT: NORMAL
BH CV LOWER VASCULAR LEFT DISTAL FEMORAL AUGMENT: NORMAL
BH CV LOWER VASCULAR LEFT DISTAL FEMORAL COMPETENT: NORMAL
BH CV LOWER VASCULAR LEFT DISTAL FEMORAL COMPRESS: NORMAL
BH CV LOWER VASCULAR LEFT DISTAL FEMORAL PHASIC: NORMAL
BH CV LOWER VASCULAR LEFT DISTAL FEMORAL SPONT: NORMAL
BH CV LOWER VASCULAR LEFT DISTAL FEMORAL THROMBUS: NORMAL
BH CV LOWER VASCULAR LEFT GREATER SAPH AK COMPRESS: NORMAL
BH CV LOWER VASCULAR LEFT GREATER SAPH BK COMPRESS: NORMAL
BH CV LOWER VASCULAR LEFT MID FEMORAL AUGMENT: NORMAL
BH CV LOWER VASCULAR LEFT MID FEMORAL COMPETENT: NORMAL
BH CV LOWER VASCULAR LEFT MID FEMORAL COMPRESS: NORMAL
BH CV LOWER VASCULAR LEFT MID FEMORAL PHASIC: NORMAL
BH CV LOWER VASCULAR LEFT MID FEMORAL SPONT: NORMAL
BH CV LOWER VASCULAR LEFT MID FEMORAL THROMBUS: NORMAL
BH CV LOWER VASCULAR LEFT POPLITEAL AUGMENT: NORMAL
BH CV LOWER VASCULAR LEFT POPLITEAL COMPETENT: NORMAL
BH CV LOWER VASCULAR LEFT POPLITEAL COMPRESS: NORMAL
BH CV LOWER VASCULAR LEFT POPLITEAL PHASIC: NORMAL
BH CV LOWER VASCULAR LEFT POPLITEAL SPONT: NORMAL
BH CV LOWER VASCULAR LEFT POPLITEAL THROMBUS: NORMAL
BH CV LOWER VASCULAR LEFT POSTERIOR TIBIAL COMPRESS: NORMAL
BH CV LOWER VASCULAR LEFT POSTERIOR TIBIAL THROMBUS: NORMAL
BH CV LOWER VASCULAR LEFT PROXIMAL FEMORAL AUGMENT: NORMAL
BH CV LOWER VASCULAR LEFT PROXIMAL FEMORAL COMPETENT: NORMAL
BH CV LOWER VASCULAR LEFT PROXIMAL FEMORAL COMPRESS: NORMAL
BH CV LOWER VASCULAR LEFT PROXIMAL FEMORAL PHASIC: NORMAL
BH CV LOWER VASCULAR LEFT PROXIMAL FEMORAL SPONT: NORMAL
BH CV LOWER VASCULAR LEFT PROXIMAL FEMORAL THROMBUS: NORMAL
BH CV LOWER VASCULAR LEFT SAPHENOFEMORAL JUNCTION COMPRESS: NORMAL
BH CV LOWER VASCULAR RIGHT COMMON FEMORAL AUGMENT: NORMAL
BH CV LOWER VASCULAR RIGHT COMMON FEMORAL COMPETENT: NORMAL
BH CV LOWER VASCULAR RIGHT COMMON FEMORAL COMPRESS: NORMAL
BH CV LOWER VASCULAR RIGHT COMMON FEMORAL PHASIC: NORMAL
BH CV LOWER VASCULAR RIGHT COMMON FEMORAL SPONT: NORMAL
BH CV LOWER VASCULAR RIGHT COMMON FEMORAL THROMBUS: NORMAL
BH CV LOWER VASCULAR RIGHT DISTAL FEMORAL AUGMENT: NORMAL
BH CV LOWER VASCULAR RIGHT DISTAL FEMORAL COMPETENT: NORMAL
BH CV LOWER VASCULAR RIGHT DISTAL FEMORAL COMPRESS: NORMAL
BH CV LOWER VASCULAR RIGHT DISTAL FEMORAL PHASIC: NORMAL
BH CV LOWER VASCULAR RIGHT DISTAL FEMORAL SPONT: NORMAL
BH CV LOWER VASCULAR RIGHT DISTAL FEMORAL THROMBUS: NORMAL
BH CV LOWER VASCULAR RIGHT EXTERNAL ILIAC AUGMENT: NORMAL
BH CV LOWER VASCULAR RIGHT EXTERNAL ILIAC COMPETENT: NORMAL
BH CV LOWER VASCULAR RIGHT EXTERNAL ILIAC COMPRESS: NORMAL
BH CV LOWER VASCULAR RIGHT EXTERNAL ILIAC PHASIC: NORMAL
BH CV LOWER VASCULAR RIGHT EXTERNAL ILIAC SPONT: NORMAL
BH CV LOWER VASCULAR RIGHT GREATER SAPH AK COMPRESS: NORMAL
BH CV LOWER VASCULAR RIGHT GREATER SAPH BK COMPRESS: NORMAL
BH CV LOWER VASCULAR RIGHT GREATER SAPH BK THROMBUS: NORMAL
BH CV LOWER VASCULAR RIGHT MID FEMORAL AUGMENT: NORMAL
BH CV LOWER VASCULAR RIGHT MID FEMORAL COMPETENT: NORMAL
BH CV LOWER VASCULAR RIGHT MID FEMORAL COMPRESS: NORMAL
BH CV LOWER VASCULAR RIGHT MID FEMORAL PHASIC: NORMAL
BH CV LOWER VASCULAR RIGHT MID FEMORAL SPONT: NORMAL
BH CV LOWER VASCULAR RIGHT MID FEMORAL THROMBUS: NORMAL
BH CV LOWER VASCULAR RIGHT POPLITEAL AUGMENT: NORMAL
BH CV LOWER VASCULAR RIGHT POPLITEAL COMPETENT: NORMAL
BH CV LOWER VASCULAR RIGHT POPLITEAL COMPRESS: NORMAL
BH CV LOWER VASCULAR RIGHT POPLITEAL PHASIC: NORMAL
BH CV LOWER VASCULAR RIGHT POPLITEAL SPONT: NORMAL
BH CV LOWER VASCULAR RIGHT POPLITEAL THROMBUS: NORMAL
BH CV LOWER VASCULAR RIGHT POSTERIOR TIBIAL COMPRESS: NORMAL
BH CV LOWER VASCULAR RIGHT PROXIMAL FEMORAL AUGMENT: NORMAL
BH CV LOWER VASCULAR RIGHT PROXIMAL FEMORAL COMPETENT: NORMAL
BH CV LOWER VASCULAR RIGHT PROXIMAL FEMORAL COMPRESS: NORMAL
BH CV LOWER VASCULAR RIGHT PROXIMAL FEMORAL PHASIC: NORMAL
BH CV LOWER VASCULAR RIGHT PROXIMAL FEMORAL SPONT: NORMAL
BH CV LOWER VASCULAR RIGHT PROXIMAL FEMORAL THROMBUS: NORMAL
BH CV LOWER VASCULAR RIGHT SAPHENOFEMORAL JUNCTION COMPRESS: NORMAL
BH CV LOWER VASCULAR RIGHT SAPHENOFEMORAL JUNCTION THROMBUS: NORMAL
BH CV POP FLUID COLLECT LEFT: 1
BH CV VAS POP FLUID COLLECTED: 1
BH CV VAS PRELIMINARY FINDINGS SCRIPTING: 1
BH CV XLRA - TDI S': 11 CM/SEC
BILIRUB SERPL-MCNC: 1.3 MG/DL (ref 0–1.2)
BUN SERPL-MCNC: 46 MG/DL (ref 8–23)
BUN/CREAT SERPL: 34.6 (ref 7–25)
C PNEUM DNA NPH QL NAA+NON-PROBE: NOT DETECTED
CA-I SERPL ISE-MCNC: 1.34 MMOL/L (ref 1.2–1.3)
CALCIUM SPEC-SCNC: 10 MG/DL (ref 8.6–10.5)
CHLORIDE SERPL-SCNC: 97 MMOL/L (ref 98–107)
CHOLEST SERPL-MCNC: 58 MG/DL (ref 0–200)
CO2 SERPL-SCNC: 31.4 MMOL/L (ref 22–29)
CREAT SERPL-MCNC: 1.33 MG/DL (ref 0.57–1)
D-LACTATE SERPL-SCNC: 2.2 MMOL/L (ref 0.5–2)
D-LACTATE SERPL-SCNC: 2.9 MMOL/L (ref 0.5–2)
D-LACTATE SERPL-SCNC: 3.1 MMOL/L (ref 0.5–2)
DEPRECATED RDW RBC AUTO: 52.2 FL (ref 37–54)
EGFRCR SERPLBLD CKD-EPI 2021: 43.4 ML/MIN/1.73
EOSINOPHIL # BLD AUTO: 0.04 10*3/MM3 (ref 0–0.4)
EOSINOPHIL NFR BLD AUTO: 0.3 % (ref 0.3–6.2)
ERYTHROCYTE [DISTWIDTH] IN BLOOD BY AUTOMATED COUNT: 15.5 % (ref 12.3–15.4)
FLUAV SUBTYP SPEC NAA+PROBE: NOT DETECTED
FLUBV RNA ISLT QL NAA+PROBE: NOT DETECTED
GEN 5 2HR TROPONIN T REFLEX: 36 NG/L
GLOBULIN UR ELPH-MCNC: 3.4 GM/DL
GLUCOSE BLDC GLUCOMTR-MCNC: 101 MG/DL (ref 70–105)
GLUCOSE BLDC GLUCOMTR-MCNC: 103 MG/DL (ref 70–105)
GLUCOSE BLDC GLUCOMTR-MCNC: 77 MG/DL (ref 70–105)
GLUCOSE BLDC GLUCOMTR-MCNC: 94 MG/DL (ref 70–105)
GLUCOSE SERPL-MCNC: 102 MG/DL (ref 65–99)
HADV DNA SPEC NAA+PROBE: NOT DETECTED
HBA1C MFR BLD: 6.14 % (ref 4.8–5.6)
HCOV 229E RNA SPEC QL NAA+PROBE: NOT DETECTED
HCOV HKU1 RNA SPEC QL NAA+PROBE: NOT DETECTED
HCOV NL63 RNA SPEC QL NAA+PROBE: NOT DETECTED
HCOV OC43 RNA SPEC QL NAA+PROBE: NOT DETECTED
HCT VFR BLD AUTO: 43.8 % (ref 34–46.6)
HDLC SERPL-MCNC: 13 MG/DL (ref 40–60)
HGB BLD-MCNC: 14.2 G/DL (ref 12–15.9)
HMPV RNA NPH QL NAA+NON-PROBE: NOT DETECTED
HPIV1 RNA ISLT QL NAA+PROBE: NOT DETECTED
HPIV2 RNA SPEC QL NAA+PROBE: NOT DETECTED
HPIV3 RNA NPH QL NAA+PROBE: NOT DETECTED
HPIV4 P GENE NPH QL NAA+PROBE: NOT DETECTED
IMM GRANULOCYTES # BLD AUTO: 0.11 10*3/MM3 (ref 0–0.05)
IMM GRANULOCYTES NFR BLD AUTO: 0.8 % (ref 0–0.5)
LDLC SERPL CALC-MCNC: 27 MG/DL (ref 0–100)
LDLC/HDLC SERPL: 2.14 {RATIO}
LYMPHOCYTES # BLD AUTO: 1.94 10*3/MM3 (ref 0.7–3.1)
LYMPHOCYTES NFR BLD AUTO: 14.1 % (ref 19.6–45.3)
M PNEUMO IGG SER IA-ACNC: NOT DETECTED
MAGNESIUM SERPL-MCNC: 2 MG/DL (ref 1.6–2.4)
MCH RBC QN AUTO: 30.7 PG (ref 26.6–33)
MCHC RBC AUTO-ENTMCNC: 32.4 G/DL (ref 31.5–35.7)
MCV RBC AUTO: 94.8 FL (ref 79–97)
MONOCYTES # BLD AUTO: 0.73 10*3/MM3 (ref 0.1–0.9)
MONOCYTES NFR BLD AUTO: 5.3 % (ref 5–12)
MRSA DNA SPEC QL NAA+PROBE: ABNORMAL
NEUTROPHILS NFR BLD AUTO: 10.96 10*3/MM3 (ref 1.7–7)
NEUTROPHILS NFR BLD AUTO: 79.4 % (ref 42.7–76)
NRBC BLD AUTO-RTO: 0.2 /100 WBC (ref 0–0.2)
PHOSPHATE SERPL-MCNC: 3.4 MG/DL (ref 2.5–4.5)
PLATELET # BLD AUTO: 104 10*3/MM3 (ref 140–450)
PMV BLD AUTO: 10.6 FL (ref 6–12)
POTASSIUM SERPL-SCNC: 4.2 MMOL/L (ref 3.5–5.2)
POTASSIUM SERPL-SCNC: 5.4 MMOL/L (ref 3.5–5.2)
PROCALCITONIN SERPL-MCNC: 0.48 NG/ML (ref 0–0.25)
PROT SERPL-MCNC: 5.9 G/DL (ref 6–8.5)
QT INTERVAL: 342 MS
QT INTERVAL: 365 MS
QTC INTERVAL: 478 MS
QTC INTERVAL: 528 MS
RBC # BLD AUTO: 4.62 10*6/MM3 (ref 3.77–5.28)
RHINOVIRUS RNA SPEC NAA+PROBE: DETECTED
RSV RNA NPH QL NAA+NON-PROBE: NOT DETECTED
SARS-COV-2 RNA NPH QL NAA+NON-PROBE: NOT DETECTED
SINUS: 2.6 CM
SODIUM SERPL-SCNC: 134 MMOL/L (ref 136–145)
TRIGL SERPL-MCNC: 86 MG/DL (ref 0–150)
TROPONIN T DELTA: -4 NG/L
TSH SERPL DL<=0.05 MIU/L-ACNC: 2.08 UIU/ML (ref 0.27–4.2)
VANCOMYCIN SERPL-MCNC: 14.3 MCG/ML (ref 5–40)
VLDLC SERPL-MCNC: 18 MG/DL (ref 5–40)
WBC NRBC COR # BLD AUTO: 13.8 10*3/MM3 (ref 3.4–10.8)

## 2024-07-01 PROCEDURE — 25010000002 VANCOMYCIN HCL IN NACL 1.5-0.9 GM/500ML-% SOLUTION: Performed by: INTERNAL MEDICINE

## 2024-07-01 PROCEDURE — 81241 F5 GENE: CPT | Performed by: NURSE PRACTITIONER

## 2024-07-01 PROCEDURE — 80053 COMPREHEN METABOLIC PANEL: CPT | Performed by: NURSE PRACTITIONER

## 2024-07-01 PROCEDURE — 71275 CT ANGIOGRAPHY CHEST: CPT

## 2024-07-01 PROCEDURE — 25010000002 AMIODARONE IN DEXTROSE 5% 360-4.14 MG/200ML-% SOLUTION: Performed by: NURSE PRACTITIONER

## 2024-07-01 PROCEDURE — 94761 N-INVAS EAR/PLS OXIMETRY MLT: CPT

## 2024-07-01 PROCEDURE — 83036 HEMOGLOBIN GLYCOSYLATED A1C: CPT | Performed by: INTERNAL MEDICINE

## 2024-07-01 PROCEDURE — 84443 ASSAY THYROID STIM HORMONE: CPT | Performed by: INTERNAL MEDICINE

## 2024-07-01 PROCEDURE — 94640 AIRWAY INHALATION TREATMENT: CPT

## 2024-07-01 PROCEDURE — 84484 ASSAY OF TROPONIN QUANT: CPT | Performed by: EMERGENCY MEDICINE

## 2024-07-01 PROCEDURE — 99222 1ST HOSP IP/OBS MODERATE 55: CPT | Performed by: STUDENT IN AN ORGANIZED HEALTH CARE EDUCATION/TRAINING PROGRAM

## 2024-07-01 PROCEDURE — 81240 F2 GENE: CPT | Performed by: NURSE PRACTITIONER

## 2024-07-01 PROCEDURE — 82948 REAGENT STRIP/BLOOD GLUCOSE: CPT | Performed by: NURSE PRACTITIONER

## 2024-07-01 PROCEDURE — 83735 ASSAY OF MAGNESIUM: CPT | Performed by: NURSE PRACTITIONER

## 2024-07-01 PROCEDURE — 87070 CULTURE OTHR SPECIMN AEROBIC: CPT | Performed by: INTERNAL MEDICINE

## 2024-07-01 PROCEDURE — 94799 UNLISTED PULMONARY SVC/PX: CPT

## 2024-07-01 PROCEDURE — 82948 REAGENT STRIP/BLOOD GLUCOSE: CPT

## 2024-07-01 PROCEDURE — 25010000002 CEFTRIAXONE PER 250 MG: Performed by: NURSE PRACTITIONER

## 2024-07-01 PROCEDURE — 25010000002 ENOXAPARIN PER 10 MG: Performed by: NURSE PRACTITIONER

## 2024-07-01 PROCEDURE — 85025 COMPLETE CBC W/AUTO DIFF WBC: CPT | Performed by: INTERNAL MEDICINE

## 2024-07-01 PROCEDURE — 84145 PROCALCITONIN (PCT): CPT | Performed by: INTERNAL MEDICINE

## 2024-07-01 PROCEDURE — 99221 1ST HOSP IP/OBS SF/LOW 40: CPT | Performed by: PODIATRIST

## 2024-07-01 PROCEDURE — 93970 EXTREMITY STUDY: CPT | Performed by: SURGERY

## 2024-07-01 PROCEDURE — 76856 US EXAM PELVIC COMPLETE: CPT

## 2024-07-01 PROCEDURE — 84132 ASSAY OF SERUM POTASSIUM: CPT | Performed by: INTERNAL MEDICINE

## 2024-07-01 PROCEDURE — 25010000002 FUROSEMIDE PER 20 MG: Performed by: NURSE PRACTITIONER

## 2024-07-01 PROCEDURE — 25510000001 IOPAMIDOL PER 1 ML: Performed by: INTERNAL MEDICINE

## 2024-07-01 PROCEDURE — 84100 ASSAY OF PHOSPHORUS: CPT | Performed by: NURSE PRACTITIONER

## 2024-07-01 PROCEDURE — 99223 1ST HOSP IP/OBS HIGH 75: CPT | Performed by: INTERNAL MEDICINE

## 2024-07-01 PROCEDURE — 82330 ASSAY OF CALCIUM: CPT | Performed by: INTERNAL MEDICINE

## 2024-07-01 PROCEDURE — 83605 ASSAY OF LACTIC ACID: CPT | Performed by: EMERGENCY MEDICINE

## 2024-07-01 PROCEDURE — 87205 SMEAR GRAM STAIN: CPT | Performed by: INTERNAL MEDICINE

## 2024-07-01 PROCEDURE — 80202 ASSAY OF VANCOMYCIN: CPT | Performed by: NURSE PRACTITIONER

## 2024-07-01 PROCEDURE — 93306 TTE W/DOPPLER COMPLETE: CPT

## 2024-07-01 PROCEDURE — 94664 DEMO&/EVAL PT USE INHALER: CPT

## 2024-07-01 PROCEDURE — 93970 EXTREMITY STUDY: CPT

## 2024-07-01 PROCEDURE — 80061 LIPID PANEL: CPT | Performed by: NURSE PRACTITIONER

## 2024-07-01 PROCEDURE — 93306 TTE W/DOPPLER COMPLETE: CPT | Performed by: INTERNAL MEDICINE

## 2024-07-01 PROCEDURE — 93005 ELECTROCARDIOGRAM TRACING: CPT | Performed by: NURSE PRACTITIONER

## 2024-07-01 PROCEDURE — 25010000002 CEFEPIME PER 500 MG: Performed by: NURSE PRACTITIONER

## 2024-07-01 PROCEDURE — 25010000002 BUMETANIDE PER 0.5 MG: Performed by: INTERNAL MEDICINE

## 2024-07-01 PROCEDURE — 74176 CT ABD & PELVIS W/O CONTRAST: CPT

## 2024-07-01 RX ORDER — AMIODARONE HYDROCHLORIDE 200 MG/1
200 TABLET ORAL EVERY 8 HOURS
Qty: 20 TABLET | Refills: 0 | Status: DISCONTINUED | OUTPATIENT
Start: 2024-07-02 | End: 2024-07-04

## 2024-07-01 RX ORDER — MIDODRINE HYDROCHLORIDE 5 MG/1
10 TABLET ORAL DAILY PRN
Status: DISCONTINUED | OUTPATIENT
Start: 2024-07-01 | End: 2024-07-01

## 2024-07-01 RX ORDER — AMIODARONE HYDROCHLORIDE 200 MG/1
200 TABLET ORAL ONCE
Qty: 1 TABLET | Refills: 0 | Status: COMPLETED | OUTPATIENT
Start: 2024-07-02 | End: 2024-07-02

## 2024-07-01 RX ORDER — FUROSEMIDE 10 MG/ML
40 INJECTION INTRAMUSCULAR; INTRAVENOUS ONCE
Status: COMPLETED | OUTPATIENT
Start: 2024-07-01 | End: 2024-07-01

## 2024-07-01 RX ORDER — OXYCODONE HYDROCHLORIDE 5 MG/1
5 TABLET ORAL EVERY 6 HOURS PRN
Status: DISCONTINUED | OUTPATIENT
Start: 2024-07-01 | End: 2024-07-04

## 2024-07-01 RX ORDER — AMIODARONE HYDROCHLORIDE 200 MG/1
200 TABLET ORAL EVERY 12 HOURS
Qty: 28 TABLET | Refills: 0 | Status: DISCONTINUED | OUTPATIENT
Start: 2024-07-09 | End: 2024-07-04

## 2024-07-01 RX ORDER — DEXTROSE MONOHYDRATE 25 G/50ML
25 INJECTION, SOLUTION INTRAVENOUS ONCE
Status: DISCONTINUED | OUTPATIENT
Start: 2024-07-01 | End: 2024-07-08

## 2024-07-01 RX ORDER — CALCIUM GLUCONATE 20 MG/ML
1000 INJECTION, SOLUTION INTRAVENOUS ONCE
Status: DISCONTINUED | OUTPATIENT
Start: 2024-07-01 | End: 2024-07-04

## 2024-07-01 RX ORDER — ENOXAPARIN SODIUM 100 MG/ML
40 INJECTION SUBCUTANEOUS EVERY 12 HOURS
Status: DISCONTINUED | OUTPATIENT
Start: 2024-07-01 | End: 2024-07-01

## 2024-07-01 RX ORDER — NITROGLYCERIN 0.4 MG/1
0.4 TABLET SUBLINGUAL
Status: DISCONTINUED | OUTPATIENT
Start: 2024-07-01 | End: 2024-07-23 | Stop reason: HOSPADM

## 2024-07-01 RX ORDER — AMIODARONE HYDROCHLORIDE 200 MG/1
200 TABLET ORAL DAILY
Status: DISCONTINUED | OUTPATIENT
Start: 2024-07-23 | End: 2024-07-04

## 2024-07-01 RX ORDER — ACETAMINOPHEN 650 MG
TABLET, EXTENDED RELEASE ORAL DAILY
Status: DISCONTINUED | OUTPATIENT
Start: 2024-07-01 | End: 2024-07-23 | Stop reason: HOSPADM

## 2024-07-01 RX ORDER — BUMETANIDE 0.25 MG/ML
2 INJECTION INTRAMUSCULAR; INTRAVENOUS EVERY 8 HOURS SCHEDULED
Status: DISCONTINUED | OUTPATIENT
Start: 2024-07-01 | End: 2024-07-03

## 2024-07-01 RX ORDER — VANCOMYCIN/0.9 % SOD CHLORIDE 1.5G/250ML
1500 PLASTIC BAG, INJECTION (ML) INTRAVENOUS ONCE
Status: COMPLETED | OUTPATIENT
Start: 2024-07-01 | End: 2024-07-01

## 2024-07-01 RX ORDER — BUMETANIDE 0.25 MG/ML
1 INJECTION INTRAMUSCULAR; INTRAVENOUS EVERY 8 HOURS SCHEDULED
Status: DISCONTINUED | OUTPATIENT
Start: 2024-07-01 | End: 2024-07-01

## 2024-07-01 RX ORDER — MIDODRINE HYDROCHLORIDE 5 MG/1
5 TABLET ORAL EVERY 8 HOURS
Status: DISCONTINUED | OUTPATIENT
Start: 2024-07-01 | End: 2024-07-02

## 2024-07-01 RX ORDER — ENOXAPARIN SODIUM 100 MG/ML
1 INJECTION SUBCUTANEOUS EVERY 12 HOURS
Status: DISCONTINUED | OUTPATIENT
Start: 2024-07-01 | End: 2024-07-03

## 2024-07-01 RX ADMIN — ALBUTEROL SULFATE 10 MG: 2.5 SOLUTION RESPIRATORY (INHALATION) at 08:24

## 2024-07-01 RX ADMIN — ENOXAPARIN SODIUM 100 MG: 100 INJECTION SUBCUTANEOUS at 11:11

## 2024-07-01 RX ADMIN — ENOXAPARIN SODIUM 100 MG: 100 INJECTION SUBCUTANEOUS at 21:31

## 2024-07-01 RX ADMIN — BUMETANIDE 2 MG: 0.25 INJECTION INTRAMUSCULAR; INTRAVENOUS at 21:30

## 2024-07-01 RX ADMIN — CEFTRIAXONE 1000 MG: 1 INJECTION, POWDER, FOR SOLUTION INTRAMUSCULAR; INTRAVENOUS at 16:33

## 2024-07-01 RX ADMIN — Medication 1500 MG: at 17:45

## 2024-07-01 RX ADMIN — AMIODARONE HYDROCHLORIDE 0.5 MG/MIN: 1.8 INJECTION, SOLUTION INTRAVENOUS at 06:05

## 2024-07-01 RX ADMIN — OXYCODONE 5 MG: 5 TABLET ORAL at 12:07

## 2024-07-01 RX ADMIN — MIDODRINE HYDROCHLORIDE 5 MG: 5 TABLET ORAL at 14:43

## 2024-07-01 RX ADMIN — FUROSEMIDE 40 MG: 10 INJECTION, SOLUTION INTRAMUSCULAR; INTRAVENOUS at 12:07

## 2024-07-01 RX ADMIN — SODIUM BICARBONATE 25 MEQ: 84 INJECTION INTRAVENOUS at 12:07

## 2024-07-01 RX ADMIN — AMIODARONE HYDROCHLORIDE 1 MG/MIN: 1.8 INJECTION, SOLUTION INTRAVENOUS at 01:01

## 2024-07-01 RX ADMIN — IOPAMIDOL 100 ML: 755 INJECTION, SOLUTION INTRAVENOUS at 10:04

## 2024-07-01 RX ADMIN — POVIDONE-IODINE: 10 SOLUTION TOPICAL at 19:21

## 2024-07-01 RX ADMIN — MIDODRINE HYDROCHLORIDE 5 MG: 5 TABLET ORAL at 21:31

## 2024-07-01 RX ADMIN — AMIODARONE HYDROCHLORIDE 0.5 MG/MIN: 1.8 INJECTION, SOLUTION INTRAVENOUS at 17:31

## 2024-07-01 RX ADMIN — ANTI-FUNGAL POWDER MICONAZOLE NITRATE TALC FREE 1 APPLICATION: 1.42 POWDER TOPICAL at 21:00

## 2024-07-01 RX ADMIN — SODIUM ZIRCONIUM CYCLOSILICATE 10 G: 10 POWDER, FOR SUSPENSION ORAL at 12:06

## 2024-07-01 RX ADMIN — Medication 10 ML: at 21:00

## 2024-07-01 RX ADMIN — MIDODRINE HYDROCHLORIDE 5 MG: 5 TABLET ORAL at 05:54

## 2024-07-01 RX ADMIN — ANTI-FUNGAL POWDER MICONAZOLE NITRATE TALC FREE 1 APPLICATION: 1.42 POWDER TOPICAL at 16:34

## 2024-07-01 RX ADMIN — BUMETANIDE 2 MG: 0.25 INJECTION INTRAMUSCULAR; INTRAVENOUS at 15:08

## 2024-07-01 RX ADMIN — CEFEPIME 2000 MG: 2 INJECTION, POWDER, FOR SOLUTION INTRAVENOUS at 06:04

## 2024-07-01 RX ADMIN — Medication 10 ML: at 12:19

## 2024-07-01 NOTE — CONSULTS
"07/01/24   Foot and Ankle Surgery - Consult  Provider: Dr. Jerzy Talley DPM  Location: [facility]    Subjective:  Ban Brennan is a 69 y.o. female.     Chief Complaint   Patient presents with    Shortness of Breath       Consulting Physician: Primary service    Reason for Consult: Bilateral foot wounds    HPI: Patient is a 69-year-old female with history of heart failure, hypertension, chronic bilateral lower extremity edema that was brought to the ED by EMS for shortness of breath.  I have been asked to see the patient regarding bilateral foot wounds.  Patient is unsure how long the wounds have been present.  She states that she has persistent swelling involving both lower extremities but has noticed increased redness which is new.  No family at bedside.  Patient is able to provide limited history.  She states that she does feel mildly improved since admission.  Patient denies any history of open wounds or infections involving her feet.  Patient does not see a podiatrist.    No Known Allergies    Past Medical History:   Diagnosis Date    Bilateral leg edema     Chronic respiratory failure with hypoxia, on home O2 therapy 07/01/2024    COPD (chronic obstructive pulmonary disease)     Morbid obesity with BMI of 40.0-44.9, adult 11/08/2010    Primary hypertension 03/01/2017    Pulmonary embolism     \"many years ago, was on warfarin\"    Sleep apnea        History reviewed. No pertinent surgical history.    History reviewed. No pertinent family history.    Social History     Socioeconomic History    Marital status:    Tobacco Use    Smoking status: Never    Smokeless tobacco: Never   Vaping Use    Vaping status: Never Used   Substance and Sexual Activity    Alcohol use: Never    Drug use: Never    Sexual activity: Defer          Current Facility-Administered Medications:     acetaminophen (TYLENOL) tablet 650 mg, 650 mg, Oral, Q4H PRN **OR** acetaminophen (TYLENOL) suppository 650 mg, 650 mg, " Rectal, Q4H PRN, Enedina Molina APRN    [] amiodarone 360 mg in 200 mL D5W infusion, 1 mg/min, Intravenous, Continuous, Last Rate: 33.3 mL/hr at 24 0223, 1 mg/min at 24 0223 **FOLLOWED BY** amiodarone 360 mg in 200 mL D5W infusion, 0.5 mg/min, Intravenous, Continuous, Last Rate: 16.67 mL/hr at 24 0617, 0.5 mg/min at 24 0617 **FOLLOWED BY** [START ON 2024] amiodarone (PACERONE) tablet 200 mg, 200 mg, Oral, Once **FOLLOWED BY** [START ON 2024] amiodarone (PACERONE) tablet 200 mg, 200 mg, Oral, Q8H **FOLLOWED BY** [START ON 2024] amiodarone (PACERONE) tablet 200 mg, 200 mg, Oral, Q12H **FOLLOWED BY** [START ON 2024] amiodarone (PACERONE) tablet 200 mg, 200 mg, Oral, Daily, Enedina Molina APRN    bumetanide (BUMEX) injection 2 mg, 2 mg, Intravenous, Q8H, Sesar Solorzano DO    calcium gluconate 1000 Mg/50ml 0.675% NaCl IV SOLN, 1,000 mg, Intravenous, Once, Day, MANJU Patel, Held at 24 1000    cefepime 2000 mg IVPB in 100 mL NS (MBP), 2,000 mg, Intravenous, Q12H, Enedina Molina APRN, 2,000 mg at 24 0604    dextrose (D50W) (25 g/50 mL) IV injection 25 g, 25 g, Intravenous, Once, Day, Toya RAMIREZ APRN    Enoxaparin Sodium (LOVENOX) syringe 100 mg, 1 mg/kg, Subcutaneous, Q12H, Sobeida Navarrete APRN, 100 mg at 24 1111    insulin regular (humuLIN R,novoLIN R) injection 10 Units, 10 Units, Intravenous, Once, Day, Toya G, APRN    midodrine (PROAMATINE) tablet 5 mg, 5 mg, Oral, Q8H, Enedina Molina, APRN, 5 mg at 24 0554    nitroglycerin (NITROSTAT) SL tablet 0.4 mg, 0.4 mg, Sublingual, Q5 Min PRN, Day, Toya RAMIREZ, APRN    ondansetron ODT (ZOFRAN-ODT) disintegrating tablet 4 mg, 4 mg, Oral, Q6H PRN **OR** ondansetron (ZOFRAN) injection 4 mg, 4 mg, Intravenous, Q6H PRN, Enedina Molina APRN    oxyCODONE (ROXICODONE) immediate release tablet 5 mg, 5 mg, Oral, Q6H PRN, Day, Toya RAMIREZ, APRN, 5 mg at 24 1207    Pharmacy to  "Dose Cefepime, , Does not apply, Continuous PRN, Enedina Molina APRN    Pharmacy to Dose enoxaparin (LOVENOX), , Does not apply, Continuous PRASHU, Enedina Molina APRN    Pharmacy to dose vancomycin, , Does not apply, Continuous PRN, Enedina Molina APRN    [COMPLETED] Insert Peripheral IV, , , Once **AND** sodium chloride 0.9 % flush 10 mL, 10 mL, Intravenous, PRN, Galo Sloorzano MD    sodium chloride 0.9 % flush 10 mL, 10 mL, Intravenous, Q12H, Enedina Molina APRN, 10 mL at 07/01/24 1219    sodium chloride 0.9 % flush 10 mL, 10 mL, Intravenous, PRN, Enedina Molina APRN    sodium chloride 0.9 % infusion 40 mL, 40 mL, Intravenous, PRN, Enedina Molina APRN    vancomycin IVPB 1500 mg in 0.9% NaCl (Premix) 500 mL, 1,500 mg, Intravenous, Once, Sesar Solorzano DO    Vancomycin Pharmacy Intermittent/Pulse Dosing, , Does not apply, PRN, Enedina Campa APRN    Review of Systems:  General: Denies fever, chills, fatigue, and weakness.  Eyes: Denies vision loss, blurry vision, and excessive redness.  ENT: Denies hearing issues and difficulty swallowing.  Cardiovascular: Denies palpitations, chest pain, or syncopal episodes.  Respiratory: Denies shortness of breath, wheezing, and coughing.  GI: Denies abdominal pain, nausea, and vomiting.   : Denies frequency, hematuria, and urgency.  Musculoskeletal: Denies muscle cramps, joint pains, and stiffness.  Derm: + Bilateral foot wounds  Neuro: Denies headaches, numbness, loss of coordination, and tremors.  Psych: Denies anxiety and depression.  Endocrine: Denies temperature intolerance and changes in appetite.  Heme: Denies bleeding disorders or abnormal bruising.     Objective   BP 99/63   Pulse 101   Temp 96.8 °F (36 °C) (Axillary)   Resp 9   Ht 147.3 cm (58\")   Wt 96.2 kg (212 lb)   SpO2 98%   BMI 44.31 kg/m²     Foot/Ankle Exam    GENERAL  Appearance:  appears stated age and obese  Orientation:  AAOx3  Affect:  " appropriate    VASCULAR     Right Foot Vascularity   Dorsalis pedis:  2+  Posterior tibial:  2+  Skin temperature:  warm  Edema grading:  3+ and pitting  CFT:  3  Pedal hair growth:  Absent     Left Foot Vascularity   Dorsalis pedis:  2+  Posterior tibial:  2+  Skin temperature:  warm  Edema grading:  3+ and pitting  CFT:  3  Pedal hair growth:  Absent     NEUROLOGIC     Right Foot Neurologic   Light touch sensation: normal  Hot/Cold sensation: normal  Achilles reflex:  2+     Left Foot Neurologic   Light touch sensation: normal  Hot/Cold sensation:  normal  Achilles reflex:  2+    MUSCULOSKELETAL     Right Foot Musculoskeletal   Ecchymosis:  none  Arch:  Normal     Left Foot Musculoskeletal   Ecchymosis:  none  Arch:  Normal    DERMATOLOGIC      Right Foot Dermatologic   Skin  Positive for erythema and wound. Negative for drainage and gangrene.      Left Foot Dermatologic   Skin  Positive for cellulitis and wound. Negative for gangrene.      Right foot additional comments: Range of motion and muscle strength testing deferred secondary to guarding.  Diffuse discomfort with palpation involving both lower extremities, left greater than right    Image:             Results from last 7 days   Lab Units 07/01/24  0910   WBC 10*3/mm3 13.80*   HEMOGLOBIN g/dL 14.2   HEMATOCRIT % 43.8   PLATELETS 10*3/mm3 104*       Assessment & Plan     Atrial fibrillation with RVR    Primary hypertension    Morbid obesity with BMI of 40.0-44.9, adult    Right bundle branch block    Acute deep vein thrombosis (DVT) of both lower extremities    ARABELLA (acute kidney injury)    Acute hyperkalemia    Sepsis    Acute UTI    Acute CHF    Rhinovirus infection    Multifocal pneumonia    Chronic respiratory failure with hypoxia, on home O2 therapy    Skin ulcer of right great toe    Skin ulcer of left great toe    SEDRICK (obstructive sleep apnea)  Cellulitis, bilateral lower extremity  Chronic venous hypertension with inflammation, bilateral    Patient  has been admitted for shortness of breath and found to have acute DVTs to bilateral lower extremities.  She has significant erythema involving the left lower extremity with mild to moderate erythema involving the right.  Patient does have superficial wounds involving both great toes.  Given the distribution and a bilateral and symmetrical orientation, I do feel that this is likely due to friction irritation and her underlying swelling.  No concerning features of infection or deep extension is present.  CT imaging was reviewed showing no underlying abscess or other concerning issues. Will defer dressing care to wound nurse.  Infectious disease service has been consulted for antibiotic management.  Continue supportive care and patient may follow-up with my nurse practitioner in 1 to 2 weeks after discharge for wound management.    Thank you for the consultation and allowing me to participate in this patient's care. Please call with any additional questions or concerns.     Note is dictated utilizing voice recognition software. Unfortunately this leads to occasional typographical errors. I apologize in advance if the situation occurs. If questions occur please do not hesitate to call our office.

## 2024-07-01 NOTE — NURSING NOTE
69-year-old female has a history of knee edema, CHF, hypertension, reports increasing shortness of breath over the last several days.  Patient proBNP 19,816.  Patient complains of discomfort to her legs with the slightest of movement or touch.  Patient with numerous wounds.  She does have lymphedema that extends well up into her thighs which is where the majority of the edema is being noted at this time.  Patient states that she has been getting wraps done by off.  There is a large ulceration to the right foot.  She states that this area started as a blister.  She is unsure of when these areas started but have been going on for a while.                          There is a large to the right great toe.  It is covered in eschar at this time.  It started as a blister and is reabsorbed.  There is some mild erythema noted to this into the entirety of the leg.  No warmth or induration is noted.  No exudate is noted.  The area is approximately 4-1/2 x 3-1/2 cm.    Patient has 2 open wounds to the left upper thigh.  The ulcerations are likely due to the volume overload and they are saturating through dry flow pads with serous exudate.  No odor is noted to the wounds.  The wounds were cleansed.  I placed calcium alginate dressing and covered with a silicone border foam.  Hopefully we can hold these for 1 day as she is saturating the bed with the exudate from it.  The wounds appear to be mostly pink.  There is some yellow slough noted.    Additionally to the right posterior thigh there is a mid dermal wound also draining copious amounts of serous exudate.  No overt symptoms of infection are noted.  No warmth erythema induration.  The wound is pale but pink again cleansed with normal saline.  Maxorb and a silicone border foam dressing was placed.  Patient also presenting with moisture associated dermatitis.  She has a yeast rash to her abdominal folds perineal sacral buttocks area.  There appears to be an old area to the left  of the sacrum which is currently closed but she does have some areas that are nonblanchable erythema and a very faint area of ecchymosis noted to the sacrum.  This would be a deep tissue pressure injury that was present on admission.  Because of the patient's moisture issues.  I would recommend removing the preventative silicone border foam dressing and treating the entire area with some miconazole powder and zinc oxide-based barrier cream.  Also will recommend placing patient on an agility offloading surface.  This will help control the moisture as well as offload if she is not tolerating position changes well at this time.  I would not recommend wrapping her legs at the time the fluid seems to be holding more in her thighs and wrapping her lower extremities.  Will only push more fluid to the areas that she has open wounds.  Will recommend painting the toe ulcer with Betadine and leaving open to air.  Recommend implementing pressure injury prevention strategies will follow as needed

## 2024-07-01 NOTE — CONSULTS
Infectious Diseases Consult Note    Referring Provider: Sesar Solorzano DO    Reason for Consultation: Cellulitis/panniculitis    Patient Care Team:  Rut Pierce APRN as PCP - General (Nurse Practitioner)    Chief complaint bilateral leg edema, erythema, pain, wounds, shortness of breath, weakness, body aches at admission    Subjective     History of present illness:      This is a 69-year-old female presents to the hospital on 6/30/2024 with complaints of worsening bilateral leg pain, redness, swelling, superficial wounds, shortness of breath, weakness and body aches.  Patient apparently has not been to the doctor or hospital in quite some time.  Was found to be in A-fib with RVR at admission.  Denies fever and chills, productive cough, GI symptoms or urinary symptoms.    Review of Systems   Review of Systems   Constitutional: Negative.  Positive for fatigue.   HENT: Negative.     Eyes: Negative.    Respiratory: Negative.  Positive for shortness of breath.    Cardiovascular: Negative.  Positive for leg swelling.   Gastrointestinal: Negative.    Endocrine: Negative.    Genitourinary: Negative.    Musculoskeletal: Negative.  Positive for myalgias.   Skin: Negative.  Positive for color change and wound.   Neurological: Negative.  Positive for weakness.   Psychiatric/Behavioral: Negative.     All other systems reviewed and are negative.      Medications  Medications Prior to Admission   Medication Sig Dispense Refill Last Dose    Allergy Relief 180 MG tablet Take 1 tablet by mouth Daily.   6/30/2024 at 1330    bumetanide (BUMEX) 1 MG tablet Take 1 tablet by mouth 3 times a day.   6/30/2024 at 1330    docusate sodium (COLACE) 100 MG capsule Take 1 capsule by mouth Every 12 (Twelve) Hours.       furosemide (LASIX) 20 MG tablet Take 1 tablet by mouth Daily.   6/30/2024 at 1330    HYDROcodone-acetaminophen (NORCO)  MG per tablet Take 1 tablet by mouth 3 times a day.       lisinopril (PRINIVIL,ZESTRIL)  "30 MG tablet Take 1 tablet by mouth Daily.   6/30/2024 at 1330    meloxicam (MOBIC) 7.5 MG tablet Take 1 tablet by mouth Daily As Needed.       metoprolol tartrate (LOPRESSOR) 50 MG tablet Take 1 tablet by mouth Daily.   6/30/2024 at 1330    montelukast (SINGULAIR) 10 MG tablet Take 1 tablet by mouth every night at bedtime.   6/29/2024    omeprazole (priLOSEC) 20 MG capsule Take 1 capsule by mouth Daily.   6/30/2024 at 1330    oxybutynin XL (DITROPAN-XL) 10 MG 24 hr tablet Take 2 tablets by mouth Daily.   6/30/2024 at 1330    potassium chloride (MICRO-K) 10 MEQ CR capsule Take 1 capsule by mouth Every 12 (Twelve) Hours.   6/30/2024 at 1330    vitamin D (ERGOCALCIFEROL) 1.25 MG (22896 UT) capsule capsule Take 1 capsule by mouth 2 (Two) Times a Week. Monday and Thursday.   6/27/2024       History  Past Medical History:   Diagnosis Date    Bilateral leg edema     Chronic respiratory failure with hypoxia, on home O2 therapy 07/01/2024    COPD (chronic obstructive pulmonary disease)     Morbid obesity with BMI of 40.0-44.9, adult 11/08/2010    Primary hypertension 03/01/2017    Pulmonary embolism     \"many years ago, was on warfarin\"    Sleep apnea      History reviewed. No pertinent surgical history.    Family History  History reviewed. No pertinent family history.    Social History   reports that she has never smoked. She has never used smokeless tobacco. She reports that she does not drink alcohol and does not use drugs.    Allergies  Patient has no known allergies.    Objective     Vital Signs   Vital Signs (last 24 hours)         06/30 0700  07/01 0659 07/01 0700  07/01 1430   Most Recent      Temp (°F) 97.2 -  98.6    96.2 -  96.8     96.8 (36) 07/01 1200    Heart Rate 100 -  151    98 -  111     102 07/01 1300    Resp 15 -  22    9 -  18     14 07/01 1300    BP 88/54 -  157/67    89/59 -  104/60     101/57 07/01 1300    SpO2 (%) 95 -  99    95 -  98     98 07/01 1300    Flow (L/min)   3    2 -  3     2 07/01 0845 "            Physical Exam:  Physical Exam  Vitals and nursing note reviewed.   Constitutional:       General: She is not in acute distress.     Appearance: She is well-developed. She is obese. She is ill-appearing. She is not diaphoretic.   HENT:      Head: Normocephalic and atraumatic.   Eyes:      General: No scleral icterus.     Extraocular Movements: Extraocular movements intact.      Conjunctiva/sclera: Conjunctivae normal.      Pupils: Pupils are equal, round, and reactive to light.   Cardiovascular:      Rate and Rhythm: Regular rhythm. Tachycardia present.      Heart sounds: Normal heart sounds, S1 normal and S2 normal. No murmur heard.  Pulmonary:      Effort: Pulmonary effort is normal. No respiratory distress.      Breath sounds: Normal breath sounds. No stridor. No wheezing or rales.   Chest:      Chest wall: No tenderness.   Abdominal:      General: Bowel sounds are normal. There is no distension.      Palpations: Abdomen is soft. There is no mass.      Tenderness: There is no abdominal tenderness. There is no guarding.   Musculoskeletal:         General: No swelling, tenderness or deformity.      Cervical back: Neck supple.      Right lower leg: Edema present.      Left lower leg: Edema present.   Skin:     General: Skin is warm and dry.      Coloration: Skin is not pale.      Findings: Erythema present. No bruising or rash.      Comments: Significant erythema to the left leg with tenderness-venous stasis changes      Vague erythema and tenderness to the lower abdomen    Superficial wounds to bilateral great toes-no signs of active infection   Neurological:      Mental Status: She is alert and oriented to person, place, and time.     Left                Right              Microbiology  Microbiology Results (last 10 days)       Procedure Component Value - Date/Time    Respiratory Panel PCR w/COVID-19(SARS-CoV-2) NIKKI/CHRISTY/RAMIRO/PAD/COR/FRANCIA In-House, NP Swab in UTM/VTM, 2 HR TAT - Swab, Nasopharynx  [[id number]]  (Abnormal) Collected: 06/30/24 2340    Lab Status: Final result Specimen: Swab from Nasopharynx Updated: 07/01/24 0055     ADENOVIRUS, PCR Not Detected     Coronavirus 229E Not Detected     Coronavirus HKU1 Not Detected     Coronavirus NL63 Not Detected     Coronavirus OC43 Not Detected     COVID19 Not Detected     Human Metapneumovirus Not Detected     Human Rhinovirus/Enterovirus Detected     Influenza A PCR Not Detected     Influenza B PCR Not Detected     Parainfluenza Virus 1 Not Detected     Parainfluenza Virus 2 Not Detected     Parainfluenza Virus 3 Not Detected     Parainfluenza Virus 4 Not Detected     RSV, PCR Not Detected     Bordetella pertussis pcr Not Detected     Bordetella parapertussis PCR Not Detected     Chlamydophila pneumoniae PCR Not Detected     Mycoplasma pneumo by PCR Not Detected    Narrative:      In the setting of a positive respiratory panel with a viral infection PLUS a negative procalcitonin without other underlying concern for bacterial infection, consider observing off antibiotics or discontinuation of antibiotics and continue supportive care. If the respiratory panel is positive for atypical bacterial infection (Bordetella pertussis, Chlamydophila pneumoniae, or Mycoplasma pneumoniae), consider antibiotic de-escalation to target atypical bacterial infection.    MRSA Screen, PCR (Inpatient) - Swab, Nares [[id number]]  (Abnormal) Collected: 06/30/24 2340    Lab Status: Final result Specimen: Swab from Nares Updated: 07/01/24 0125     MRSA PCR MRSA Detected    Narrative:      The negative predictive value of this diagnostic test is high and should only be used to consider de-escalating anti-MRSA therapy. A positive result may indicate colonization with MRSA and must be correlated clinically.            Laboratory  Results from last 7 days   Lab Units 07/01/24  0910   WBC 10*3/mm3 13.80*   HEMOGLOBIN g/dL 14.2   HEMATOCRIT % 43.8   PLATELETS 10*3/mm3 104*     Results from  last 7 days   Lab Units 07/01/24  0910   SODIUM mmol/L 134*   POTASSIUM mmol/L 5.4*   CHLORIDE mmol/L 97*   CO2 mmol/L 31.4*   BUN mg/dL 46*   CREATININE mg/dL 1.33*   GLUCOSE mg/dL 102*   CALCIUM mg/dL 10.0     Results from last 7 days   Lab Units 07/01/24  0910   SODIUM mmol/L 134*   POTASSIUM mmol/L 5.4*   CHLORIDE mmol/L 97*   CO2 mmol/L 31.4*   BUN mg/dL 46*   CREATININE mg/dL 1.33*   GLUCOSE mg/dL 102*   CALCIUM mg/dL 10.0                   Radiology  Imaging Results (Last 72 Hours)       Procedure Component Value Units Date/Time    US Pelvis Complete [[id number]] Collected: 07/01/24 1341     Updated: 07/01/24 1345    Narrative:      US PELVIS COMPLETE    Date of Exam: 7/1/2024 12:48 PM EDT    Indication: Pt with BLE DVT's, abnormal appearing uterus on CT, eval for fibroids vs Ca.    Comparison: CT abdomen pelvis 7/1/2024.    Technique: Transabdominal and transvaginal ultrasound evaluation of the pelvis was performed utilizing grayscale and color Doppler technique.  Doppler spectral analysis was performed.      Findings:  Uterus measures 11.7 x 8.2 x 6.4 cm. The myometrium is heterogeneous. What is thought to represent a subserosal fibroid projects exophytically from the uterine fundal region measuring 6.8 x 7.7 x 6.6 cm, containing a few coarse calcifications.    Endometrial bilayer thickness is 3 mm, within normal limits for patient of this stated age. No suspicious endometrial lesion is identified. No gross pelvic free fluid is identified. Neither the right ovary nor the left ovary could be visualized, likely   obscured by bowel gas.      Impression:      Impression:  Sonographic features suggest the presence of a large subserosal uterine fundal fibroid.        Electronically Signed: Ann Marie Maloney MD    7/1/2024 1:43 PM EDT    Workstation ID: [id number]    CT Abdomen Pelvis Without Contrast [[id number]] Collected: 07/01/24 1135     Updated: 07/01/24 1141    Narrative:      CT ABDOMEN PELVIS WO CONTRAST    Date  of Exam: 7/1/2024 11:30 AM EDT    Indication: abdominal pain, possible panniculitis.    Comparison: None available.    Technique: Axial CT images were obtained of the abdomen and pelvis without the administration of contrast. Sagittal and coronal reconstructions were performed.  Automated exposure control and iterative reconstruction methods were used.      Findings:  There is severe cardiomegaly. There is a moderately large right pleural effusion. There is a small left pleural effusion. There are bibasilar lung infiltrates with areas of atelectasis. There is some contrast in the renal collecting systems from CT   angiogram performed on today's date. There is a small right lower pole parapelvic renal cyst. There is no hydronephrosis. There is bilateral flank edema, greatest on the right. Partially filled bladder appears normal. There is a lobulated mass protruding   from the uterine fundus measuring approximately 8.4 x 5.9 cm measured on image #60 of series 2. There is no large or small bowel dilatation. There is no bowel wall thickening. The appendix is not identified. The abdominal aorta is mildly calcified and   is normal in size. The pancreas is atrophic. The spleen, liver, and adrenal glands appear normal in size. The gallbladder and biliary tree are nondilated.      Impression:      Impression:  Moderately large right pleural effusion and small left pleural effusion. Bibasilar infiltrates suggest atelectasis although associated pneumonia is not excluded.    Severe cardiomegaly.    Bilateral flank edema, greatest on the right.    Probable fibroid uterus. This could be confirmed with ultrasound.            Electronically Signed: Krystal Lee MD    7/1/2024 11:39 AM EDT    Workstation ID: [id number]    CT Angiogram Chest Pulmonary Embolism [[id number]] Collected: 07/01/24 1007     Updated: 07/01/24 1016    Narrative:      CT ANGIOGRAM CHEST PULMONARY EMBOLISM    Date of Exam: 7/1/2024 10:03 AM EDT    Indication:  BLE DVT's, new cardiac arrhythmia, eval for PE.    Comparison: Chest x-ray 6/30/2024    Technique: Axial CT images were obtained of the chest after the uneventful intravenous administration of iodinated contrast utilizing pulmonary embolism protocol.  Sagittal and coronal reconstructions were performed.  Automated exposure control and   iterative reconstruction methods were used.      Findings:  PULMONARY VASCULATURE: Pulmonary arteries are widely patent without evidence of embolus.Main pulmonary artery is normal in size. No evidence of right heart strain.    MEDIASTINUM: The heart is prominent in size. There is biatrial and left ventricular enlargement. No significant pericardial effusion. No evidence for aortic enlargement or dissection.  CORONARY ARTERIES: Mild calcified atherosclerotic disease.  LUNGS: There is bibasilar airspace disease with early consolidation noted. Some focal infiltrate is present in the right middle lung as well with scattered groundglass densities throughout there is a subpleural nodule in the posterior right apical region   measuring 4 mm (image 27 of series 4).  PLEURAL SPACE: There are mild bilateral pleural effusions. No evidence for pneumothorax.  LYMPH NODES: There are no pathologically enlarged lymph nodes.    UPPER ABDOMEN: There is bilateral flank edema.    OSSEOUS STRUCTURES: Appropriate for age with no acute process identified. There is multilevel thoracic spondylosis.      Impression:      Impression:    1. No evidence for pulmonary embolus.    2. Bibasilar airspace disease and right middle lung opacity compatible likely multifocal pneumonia with bilateral pleural effusions.    3. Cardiomegaly.        Electronically Signed: Shari Kaur MD    7/1/2024 10:14 AM EDT    Workstation ID: [id number]    CT Lower Extremity Bilateral Without Contrast [[id number]] Collected: 07/01/24 0149     Updated: 07/01/24 0155    Narrative:      CT LOWER EXTREMITY BILATERAL WO CONTRAST    Date of  Exam: 6/30/2024 10:48 PM EDT    Indication: BLE edema L>R, multiple ulcerations, erythema.    Comparison: None available.    Technique: Axial CT images were obtained of the bilateral lower extremities without contrast administration.  Sagittal and coronal reconstructions were performed.  Automated exposure control and iterative reconstruction methods were used.      Findings:  There is bilateral lower extremity soft tissue swelling greater on the left than the right. There is diffuse skin thickening bilaterally involving the thighs and to a lesser degree the lower legs. There is no evidence of an acute osseous abnormality.   There are advanced degenerative changes of both knees. There is no definite enlargement of the venous structures. There is no soft tissue gas. There is no well-formed abscess.      Impression:      Impression:  Bilateral lower extremity soft tissue swelling greater on the left than the right. There is no soft tissue gas or well-formed abscess.        Electronically Signed: Obed Sidhu MD    7/1/2024 1:53 AM EDT    Workstation ID: [id number]    XR Chest 1 View [[id number]] Collected: 06/30/24 1927     Updated: 06/30/24 1929    Narrative:      XR CHEST 1 VW    Date of Exam: 6/30/2024 7:06 PM EDT    Indication: Shortness of breath    Comparison: 6/4/2009    Findings: Vascular congestion. Small effusions. No pneumothorax. The clavicles are intact. No rib fractures.      Impression:      Vascular congestion with small effusions      Electronically Signed: Eamon Brock MD    6/30/2024 7:27 PM EDT    Workstation ID: [id number]            Cardiology      Results Review:  I have reviewed all clinical data, test, lab, and imaging results.       Schedule Meds  [START ON 7/2/2024] amiodarone, 200 mg, Oral, Once   Followed by  [START ON 7/2/2024] amiodarone, 200 mg, Oral, Q8H   Followed by  [START ON 7/9/2024] amiodarone, 200 mg, Oral, Q12H   Followed by  [START ON 7/23/2024] amiodarone, 200 mg, Oral,  Daily  bumetanide, 2 mg, Intravenous, Q8H  calcium gluconate, 1,000 mg, Intravenous, Once  cefepime, 2,000 mg, Intravenous, Q12H  dextrose, 25 g, Intravenous, Once  enoxaparin, 1 mg/kg, Subcutaneous, Q12H  insulin regular, 10 Units, Intravenous, Once  miconazole, 1 Application, Topical, Q12H  midodrine, 5 mg, Oral, Q8H  povidone-iodine, , Topical, Daily  sodium chloride, 10 mL, Intravenous, Q12H  vancomycin, 1,500 mg, Intravenous, Once        Infusion Meds  amiodarone, 0.5 mg/min, Last Rate: 0.5 mg/min (07/01/24 0617)  Pharmacy to Dose Cefepime,   Pharmacy to Dose enoxaparin (LOVENOX),   Pharmacy to dose vancomycin,         PRN Meds    acetaminophen **OR** acetaminophen    nitroglycerin    ondansetron ODT **OR** ondansetron    oxyCODONE    Pharmacy to Dose Cefepime    Pharmacy to Dose enoxaparin (LOVENOX)    Pharmacy to dose vancomycin    [COMPLETED] Insert Peripheral IV **AND** sodium chloride    sodium chloride    sodium chloride    Vancomycin Pharmacy Intermittent/Pulse Dosing      Assessment & Plan       Assessment    Left lower extremity cellulitis with erythema involving the left leg on the top of chronic venous stasis changes.  Patient has superficial wounds mostly involving the left great toe.    Chronic bilateral leg edema with chronic skin changes and superficial wounds.  Concerning for lymphedema.  Wound care also following.  Current Doppler does show acute right and left leg DVT    Lower mild panniculitis with some erythema and tenderness to the lower pannus/suprapubic area    Abnormal urinalysis with some bacteria and pyuria    Mild leukocytosis-likely related to the above infections .    Mild hypoxia with large right pleural effusion and small left pleural effusion.  Patient is currently on 2 L of oxygen by nasal cannula rhinovirus infection.  CT PE protocol was negative for PE    Acute kidney injury    COPD/sleep apnea    History of pulmonary embolism    Plan    Discontinue IV cefepime  Start IV  Rocephin 1 g every 24 hours empirically for UTI treatment  Continue IV vancomycin-asked pharmacy to monitor and dose  Blood cultures pending  Waiting on urine culture  Continue supportive care  A.m. labs  Keep legs above heart level is much as possible    The above note was transcribed for Dr. Colbert-physical exam and review of systems were performed by him      Keyana Perez, APRN  07/01/24  14:30 EDT    Note is dictated utilizing voice recognition software/Dragon

## 2024-07-01 NOTE — PROGRESS NOTES
"Pharmacy Antimicrobial Dosing Service    Subjective:  Ban Brennan is a 69 y.o.female admitted with Afib w/RVR. Pharmacy has been consulted to dose Vancomycin and Cefepime for possible SSTI.    PMH: chronic leg edema, CHF      Assessment/Plan    1. Day #2 Vancomycin: Pulse dosing d/t renal dysfxn.   Pt received load dose of 1500 mg x1 (~22 mg/kg DBW) in ER ~2230. Obtain random level 7/1 ~noon and redose when level expected to be < 20 mcg/mL.    2. Day #2 Cefepime: 2gm IV q12h for estCrCl 30-59 mL/min.    Will continue to monitor drug levels, renal function, culture and sensitivities, and patient clinical status.       Objective:  Relevant clinical data and objective history reviewed:  147.3 cm (58\")   96.4 kg (212 lb 8.4 oz)   Ideal body weight: 47.1 kg (103 lb 14.5 oz)  Adjusted ideal body weight: 66.8 kg (147 lb 5.7 oz)  Body mass index is 44.42 kg/m².        Results from last 7 days   Lab Units 06/30/24  2043 06/30/24  1858   CREATININE mg/dL 1.38* 1.40*     Estimated Creatinine Clearance: 40.6 mL/min (A) (by C-G formula based on SCr of 1.38 mg/dL (H)).  No intake/output data recorded.    Results from last 7 days   Lab Units 06/30/24  1732   WBC 10*3/mm3 16.80*     Temperature    06/30/24 1650 07/01/24 0012   Temp: 98.6 °F (37 °C) 97.2 °F (36.2 °C)     Baseline culture/source/susceptibility:  Microbiology Results (last 10 days)       Procedure Component Value - Date/Time    Respiratory Panel PCR w/COVID-19(SARS-CoV-2) NIKKI/CHRISTY/RAMIRO/PAD/COR/FRANCIA In-House, NP Swab in UTM/VTM, 2 HR TAT - Swab, Nasopharynx [[id number]]  (Abnormal) Collected: 06/30/24 2340    Lab Status: Final result Specimen: Swab from Nasopharynx Updated: 07/01/24 0055     ADENOVIRUS, PCR Not Detected     Coronavirus 229E Not Detected     Coronavirus HKU1 Not Detected     Coronavirus NL63 Not Detected     Coronavirus OC43 Not Detected     COVID19 Not Detected     Human Metapneumovirus Not Detected     Human Rhinovirus/Enterovirus Detected     " Influenza A PCR Not Detected     Influenza B PCR Not Detected     Parainfluenza Virus 1 Not Detected     Parainfluenza Virus 2 Not Detected     Parainfluenza Virus 3 Not Detected     Parainfluenza Virus 4 Not Detected     RSV, PCR Not Detected     Bordetella pertussis pcr Not Detected     Bordetella parapertussis PCR Not Detected     Chlamydophila pneumoniae PCR Not Detected     Mycoplasma pneumo by PCR Not Detected    Narrative:      In the setting of a positive respiratory panel with a viral infection PLUS a negative procalcitonin without other underlying concern for bacterial infection, consider observing off antibiotics or discontinuation of antibiotics and continue supportive care. If the respiratory panel is positive for atypical bacterial infection (Bordetella pertussis, Chlamydophila pneumoniae, or Mycoplasma pneumoniae), consider antibiotic de-escalation to target atypical bacterial infection.    MRSA Screen, PCR (Inpatient) - Swab, Nares [[id number]]  (Abnormal) Collected: 06/30/24 2340    Lab Status: Final result Specimen: Swab from Nares Updated: 07/01/24 0125     MRSA PCR MRSA Detected    Narrative:      The negative predictive value of this diagnostic test is high and should only be used to consider de-escalating anti-MRSA therapy. A positive result may indicate colonization with MRSA and must be correlated clinically.            Lupe Gibson, PharmD, BCPS  07/01/24 01:43 EDT

## 2024-07-01 NOTE — CONSULTS
Hematology/Oncology Inpatient Consultation    Patient name: Ban Brennan  : 1955  MRN: [id number]  Referring Provider: Dr. Solorzano  Reason for Consultation: Bilateral lower extremity DVT    Chief complaint: Shortness of breath    History of present illness:    Ban Brennan is a 69 y.o. female who presented to [facility] on 2024 with complaints of shortness of breath.  Past medical history of hypertension, heart failure, chronic bilateral lower extremity edema, remote history of pulmonary embolism treated with warfarin.  The patient was brought to the ED via EMS.  The patient's daughter reported that the patient does not normally go to the doctor and had not been in the hospital for years.  She was unsure of her full medical history but stated that her legs have been swollen for a long time and that she had not been mobile for approximately 1 year.  She reported that a home health nurse comes to the house and wraps her mother's legs twice a week.  EMS was called due to the worsening shortness of breath and the patient was found to have an EKG showing a wide-complex tachycardia with a heart rate in the 190s.  She was given amiodarone by EMS with rate improvement to the 160s.  Follow-up EKG in the ED at [facility] showed atrial fibrillation with RVR with a heart rate of 145.  Chest x-ray showed fluid overload with bilateral effusions.  The patient had a elevated white blood cell count of 16.8 and 4+ bacteria in her urine.  She was also noted to be hyperkalemic with a potassium of 6.5.  She was diagnosed with cellulitis and a urinary tract infection and initiated on IV antibiotics with cefepime and vancomycin.  She was admitted for further evaluation and treatment.    2024 bilateral venous Doppler ultrasound  -Acute right lower extremity DVT in the common femoral, proximal femoral, mid femoral, distal femoral, and popliteal  -Acute right lower extremity superficial thrombophlebitis  in the saphenofemoral junction and great saphenous  -Acute left lower extremity DVT in the proximal femoral, mid femoral, distal femoral, popliteal, and posterior tibial    7/1/2020 four 2D echocardiogram  -Left ventricular ejection fraction 31-35%  -Left ventricular diastolic dysfunction  -Left atrial cavity dilated  -Moderate aortic valve stenosis  -Moderate to severe mitral valve regurgitation  -Estimated right ventricular systolic pressure from tricuspid regurgitation is normal    7/1/2024 CT chest PE protocol  -No evidence for pulmonary embolus  -Bibasilar airspace disease with right middle lung opacity compatible likely multifocal pneumonia with bilateral pleural effusions  -Cardiomegaly    7/1/2024 CT of the abdomen and pelvis without contrast  -Moderately large right pleural effusion and small left pleural effusion; bibasilar infiltrates suggest atelectasis although associated pneumonia not excluded  -Severe cardiomegaly  -Bilateral flank edema  -Probable fibroid uterus    07/01/24  Hematology/Oncology was consulted for bilateral lower extremity DVT.    He/She  has a past medical history of Bilateral leg edema, Chronic respiratory failure with hypoxia, on home O2 therapy (07/01/2024), COPD (chronic obstructive pulmonary disease), Morbid obesity with BMI of 40.0-44.9, adult (11/08/2010), Primary hypertension (03/01/2017), Pulmonary embolism, and Sleep apnea.    PCP: Rut Pierce APRN    INTERVAL HISTORY: patient seen for initial evaluation, accompanied by multiple family members. She is on O2 supplementation. Appear weak and frail at this time. Patient reported having chronic deconditioning and limited ambulation at home. Her functional status appears to have gradually deteriorated over the last few months. She lives with her son and is dependent on others for most ADLs at this time.    Patient reported having history of PE about 40 years ago for which she was on Warfarin for short duration.  "    History:  Past Medical History:   Diagnosis Date    Bilateral leg edema     Chronic respiratory failure with hypoxia, on home O2 therapy 07/01/2024    COPD (chronic obstructive pulmonary disease)     Morbid obesity with BMI of 40.0-44.9, adult 11/08/2010    Primary hypertension 03/01/2017    Pulmonary embolism     \"many years ago, was on warfarin\"    Sleep apnea    , History reviewed. No pertinent surgical history., History reviewed. No pertinent family history.,   Social History     Tobacco Use    Smoking status: Never    Smokeless tobacco: Never   Vaping Use    Vaping status: Never Used   Substance Use Topics    Alcohol use: Never    Drug use: Never   ,   Medications Prior to Admission   Medication Sig Dispense Refill Last Dose    Allergy Relief 180 MG tablet Take 1 tablet by mouth Daily.   6/30/2024 at 1330    bumetanide (BUMEX) 1 MG tablet Take 1 tablet by mouth 3 times a day.   6/30/2024 at 1330    docusate sodium (COLACE) 100 MG capsule Take 1 capsule by mouth Every 12 (Twelve) Hours.       furosemide (LASIX) 20 MG tablet Take 1 tablet by mouth Daily.   6/30/2024 at 1330    HYDROcodone-acetaminophen (NORCO)  MG per tablet Take 1 tablet by mouth 3 times a day.       lisinopril (PRINIVIL,ZESTRIL) 30 MG tablet Take 1 tablet by mouth Daily.   6/30/2024 at 1330    meloxicam (MOBIC) 7.5 MG tablet Take 1 tablet by mouth Daily As Needed.       metoprolol tartrate (LOPRESSOR) 50 MG tablet Take 1 tablet by mouth Daily.   6/30/2024 at 1330    montelukast (SINGULAIR) 10 MG tablet Take 1 tablet by mouth every night at bedtime.   6/29/2024    omeprazole (priLOSEC) 20 MG capsule Take 1 capsule by mouth Daily.   6/30/2024 at 1330    oxybutynin XL (DITROPAN-XL) 10 MG 24 hr tablet Take 2 tablets by mouth Daily.   6/30/2024 at 1330    potassium chloride (MICRO-K) 10 MEQ CR capsule Take 1 capsule by mouth Every 12 (Twelve) Hours.   6/30/2024 at 1330    vitamin D (ERGOCALCIFEROL) 1.25 MG (59355 UT) capsule capsule Take 1 " capsule by mouth 2 (Two) Times a Week. Monday and Thursday.   6/27/2024   , Scheduled Meds:  [START ON 7/2/2024] amiodarone, 200 mg, Oral, Once   Followed by  [START ON 7/2/2024] amiodarone, 200 mg, Oral, Q8H   Followed by  [START ON 7/9/2024] amiodarone, 200 mg, Oral, Q12H   Followed by  [START ON 7/23/2024] amiodarone, 200 mg, Oral, Daily  bumetanide, 2 mg, Intravenous, Q8H  calcium gluconate, 1,000 mg, Intravenous, Once  cefTRIAXone, 1,000 mg, Intravenous, Q24H  dextrose, 25 g, Intravenous, Once  enoxaparin, 1 mg/kg, Subcutaneous, Q12H  insulin regular, 10 Units, Intravenous, Once  miconazole, 1 Application, Topical, Q12H  midodrine, 5 mg, Oral, Q8H  povidone-iodine, , Topical, Daily  sodium chloride, 10 mL, Intravenous, Q12H  vancomycin, 1,500 mg, Intravenous, Once    , Continuous Infusions:  amiodarone, 0.5 mg/min, Last Rate: 0.5 mg/min (07/01/24 0617)  Pharmacy to Dose Cefepime,   Pharmacy to Dose enoxaparin (LOVENOX),   Pharmacy to dose vancomycin,     , PRN Meds:    acetaminophen **OR** acetaminophen    nitroglycerin    ondansetron ODT **OR** ondansetron    oxyCODONE    Pharmacy to Dose Cefepime    Pharmacy to Dose enoxaparin (LOVENOX)    Pharmacy to dose vancomycin    [COMPLETED] Insert Peripheral IV **AND** sodium chloride    sodium chloride    sodium chloride    Vancomycin Pharmacy Intermittent/Pulse Dosing   Allergies:  Patient has no known allergies.    Subjective     ROS:  Review of Systems   Constitutional: Negative.  Positive for fatigue.   HENT: Negative.     Eyes: Negative.    Respiratory: Negative.     Cardiovascular: Negative.    Gastrointestinal: Negative.    Endocrine: Negative.    Genitourinary: Negative.    Musculoskeletal: Negative.  Positive for arthralgias, back pain, gait problem and myalgias.   Skin: Negative.  Positive for wound (Clean decubitus ulcers noted on medial aspect of bilateral feet).   Allergic/Immunologic: Negative.    Neurological: Negative.  Positive for weakness  "(bilateral leg weakness).   Hematological: Negative.    Psychiatric/Behavioral: Negative.          Objective   Vital Signs:   /67   Pulse 104   Temp 96.8 °F (36 °C) (Axillary)   Resp 14   Ht 147.3 cm (58\")   Wt 96.2 kg (212 lb)   SpO2 98%   BMI 44.31 kg/m²     Physical Exam: (performed by MD)  Physical Exam  Constitutional:       Appearance: Normal appearance. She is obese. She is ill-appearing.   HENT:      Head: Normocephalic and atraumatic.      Right Ear: External ear normal.      Left Ear: External ear normal.      Nose: Nose normal.      Mouth/Throat:      Mouth: Mucous membranes are moist.      Pharynx: Oropharynx is clear.   Eyes:      Extraocular Movements: Extraocular movements intact.      Conjunctiva/sclera: Conjunctivae normal.      Pupils: Pupils are equal, round, and reactive to light.   Cardiovascular:      Rate and Rhythm: Normal rate.      Pulses: Normal pulses.   Pulmonary:      Effort: Pulmonary effort is normal.   Abdominal:      General: Abdomen is flat.      Palpations: Abdomen is soft.   Musculoskeletal:         General: Normal range of motion.      Cervical back: Normal range of motion and neck supple.      Right lower leg: Edema present.      Left lower leg: Edema present.   Skin:     General: Skin is warm.      Findings: Lesion (Clean decubitus ulcer on medial aspect of bilateral feet) present.   Neurological:      General: No focal deficit present.      Mental Status: She is alert and oriented to person, place, and time.   Psychiatric:         Mood and Affect: Mood is depressed.         Speech: Speech is delayed.         Behavior: Behavior is withdrawn.         Thought Content: Thought content normal.         Judgment: Judgment normal.         Results Review:  Lab Results (last 48 hours)       Procedure Component Value Units Date/Time    Procalcitonin [[id number]] Collected: 07/01/24 0910    Specimen: Blood Updated: 07/01/24 1243    POC Glucose Q1H [[id number]]  (Normal) " Collected: 07/01/24 1156    Specimen: Blood Updated: 07/01/24 1158     Glucose 101 mg/dL      Comment: Serial Number: [id number]:  [id number]       STAT Lactic Acid, Reflex [[id number]]  (Abnormal) Collected: 07/01/24 0910    Specimen: Blood Updated: 07/01/24 0959     Lactate 2.2 mmol/L     High Sensitivity Troponin T 2Hr [[id number]]  (Abnormal) Collected: 07/01/24 0910    Specimen: Blood Updated: 07/01/24 0956     HS Troponin T 36 ng/L      Comment: Specimen hemolyzed.  Results may be falsely decreased.        Troponin T Delta -4 ng/L     Narrative:      High Sensitive Troponin T Reference Range:  <14.0 ng/L- Negative Female for AMI  <22.0 ng/L- Negative Male for AMI  >=14 - Abnormal Female indicating possible myocardial injury.  >=22 - Abnormal Male indicating possible myocardial injury.   Clinicians would have to utilize clinical acumen, EKG, Troponin, and serial changes to determine if it is an Acute Myocardial Infarction or myocardial injury due to an underlying chronic condition.         Comprehensive Metabolic Panel [[id number]]  (Abnormal) Collected: 07/01/24 0910    Specimen: Blood Updated: 07/01/24 0956     Glucose 102 mg/dL      BUN 46 mg/dL      Creatinine 1.33 mg/dL      Sodium 134 mmol/L      Potassium 5.4 mmol/L      Comment: Specimen hemolyzed.  Result may be falsely elevated.        Chloride 97 mmol/L      CO2 31.4 mmol/L      Calcium 10.0 mg/dL      Total Protein 5.9 g/dL      Albumin 2.5 g/dL      ALT (SGPT) 17 U/L      Comment: Specimen hemolyzed.  Result may  be falsely elevated.        AST (SGOT) 39 U/L      Comment: Specimen hemolyzed.  Result may be falsely elevated.        Alkaline Phosphatase 115 U/L      Total Bilirubin 1.3 mg/dL      Globulin 3.4 gm/dL      A/G Ratio 0.7 g/dL      BUN/Creatinine Ratio 34.6     Anion Gap 5.6 mmol/L      eGFR 43.4 mL/min/1.73     Narrative:      GFR Normal >60  Chronic Kidney Disease <60  Kidney Failure <15      Magnesium [[id number]]  (Normal)  Collected: 07/01/24 0910    Specimen: Blood Updated: 07/01/24 0956     Magnesium 2.0 mg/dL     Vancomycin, Random [[id number]]  (Normal) Collected: 07/01/24 0910    Specimen: Blood Updated: 07/01/24 0953     Vancomycin Random 14.30 mcg/mL     Narrative:      Therapeutic Ranges for Vancomycin    Vancomycin Random   5.0-40.0 mcg/mL  Vancomycin Trough   5.0-20.0 mcg/mL  Vancomycin Peak     20.0-40.0 mcg/mL    Phosphorus [[id number]]  (Normal) Collected: 07/01/24 0910    Specimen: Blood Updated: 07/01/24 0953     Phosphorus 3.4 mg/dL     TSH Rfx On Abnormal To Free T4 [[id number]]  (Normal) Collected: 07/01/24 0910    Specimen: Blood Updated: 07/01/24 0953     TSH 2.080 uIU/mL     Calcium, Ionized [[id number]]  (Abnormal) Collected: 07/01/24 0910    Specimen: Blood Updated: 07/01/24 0935     Ionized Calcium 1.34 mmol/L     CBC & Differential [[id number]]  (Abnormal) Collected: 07/01/24 0910    Specimen: Blood Updated: 07/01/24 0917    Narrative:      The following orders were created for panel order CBC & Differential.  Procedure                               Abnormality         Status                     ---------                               -----------         ------                     CBC Auto Differential[[id number]]        Abnormal            Final result                 Please view results for these tests on the individual orders.    CBC Auto Differential [[id number]]  (Abnormal) Collected: 07/01/24 0910    Specimen: Blood Updated: 07/01/24 0917     WBC 13.80 10*3/mm3      RBC 4.62 10*6/mm3      Hemoglobin 14.2 g/dL      Hematocrit 43.8 %      MCV 94.8 fL      MCH 30.7 pg      MCHC 32.4 g/dL      RDW 15.5 %      RDW-SD 52.2 fl      MPV 10.6 fL      Platelets 104 10*3/mm3      Neutrophil % 79.4 %      Lymphocyte % 14.1 %      Monocyte % 5.3 %      Eosinophil % 0.3 %      Basophil % 0.1 %      Immature Grans % 0.8 %      Neutrophils, Absolute 10.96 10*3/mm3      Lymphocytes, Absolute 1.94 10*3/mm3      Monocytes,  Absolute 0.73 10*3/mm3      Eosinophils, Absolute 0.04 10*3/mm3      Basophils, Absolute 0.02 10*3/mm3      Immature Grans, Absolute 0.11 10*3/mm3      nRBC 0.2 /100 WBC     Urine Culture - Urine, Urine, Catheter [[id number]] Collected: 06/30/24 1749    Specimen: Urine, Catheter Updated: 07/01/24 0827    POC Glucose Once [[id number]]  (Normal) Collected: 07/01/24 0752    Specimen: Blood Updated: 07/01/24 0753     Glucose 103 mg/dL      Comment: Serial Number: [id number]:  [id number]       MRSA Screen, PCR (Inpatient) - Swab, Nares [[id number]]  (Abnormal) Collected: 06/30/24 2340    Specimen: Swab from Nares Updated: 07/01/24 0125     MRSA PCR MRSA Detected    Narrative:      The negative predictive value of this diagnostic test is high and should only be used to consider de-escalating anti-MRSA therapy. A positive result may indicate colonization with MRSA and must be correlated clinically.    Respiratory Panel PCR w/COVID-19(SARS-CoV-2) NIKKI/CHRISTY/RAMIRO/PAD/COR/FRANCIA In-House, NP Swab in UTM/VTM, 2 HR TAT - Swab, Nasopharynx [[id number]]  (Abnormal) Collected: 06/30/24 2340    Specimen: Swab from Nasopharynx Updated: 07/01/24 0055     ADENOVIRUS, PCR Not Detected     Coronavirus 229E Not Detected     Coronavirus HKU1 Not Detected     Coronavirus NL63 Not Detected     Coronavirus OC43 Not Detected     COVID19 Not Detected     Human Metapneumovirus Not Detected     Human Rhinovirus/Enterovirus Detected     Influenza A PCR Not Detected     Influenza B PCR Not Detected     Parainfluenza Virus 1 Not Detected     Parainfluenza Virus 2 Not Detected     Parainfluenza Virus 3 Not Detected     Parainfluenza Virus 4 Not Detected     RSV, PCR Not Detected     Bordetella pertussis pcr Not Detected     Bordetella parapertussis PCR Not Detected     Chlamydophila pneumoniae PCR Not Detected     Mycoplasma pneumo by PCR Not Detected    Narrative:      In the setting of a positive respiratory panel with a viral infection PLUS a negative  procalcitonin without other underlying concern for bacterial infection, consider observing off antibiotics or discontinuation of antibiotics and continue supportive care. If the respiratory panel is positive for atypical bacterial infection (Bordetella pertussis, Chlamydophila pneumoniae, or Mycoplasma pneumoniae), consider antibiotic de-escalation to target atypical bacterial infection.    POC Glucose Q1H [[id number]]  (Abnormal) Collected: 06/30/24 2323    Specimen: Blood Updated: 06/30/24 2324     Glucose 142 mg/dL      Comment: Serial Number: [id number]:  [id number]       Comprehensive Metabolic Panel [[id number]]  (Abnormal) Collected: 06/30/24 2043    Specimen: Blood from Arm, Right Updated: 06/30/24 2111     Glucose 194 mg/dL      BUN 50 mg/dL      Creatinine 1.38 mg/dL      Sodium 131 mmol/L      Potassium 6.5 mmol/L      Comment: Slight hemolysis detected by analyzer. Result may be falsely elevated.        Chloride 101 mmol/L      CO2 24.4 mmol/L      Calcium 10.1 mg/dL      Total Protein 6.2 g/dL      Albumin 2.5 g/dL      ALT (SGPT) 18 U/L      AST (SGOT) 42 U/L      Comment: Slight hemolysis detected by analyzer. Result may be falsely elevated.        Alkaline Phosphatase 126 U/L      Total Bilirubin 1.6 mg/dL      Globulin 3.7 gm/dL      A/G Ratio 0.7 g/dL      BUN/Creatinine Ratio 36.2     Anion Gap 5.6 mmol/L      eGFR 41.5 mL/min/1.73     Narrative:      GFR Normal >60  Chronic Kidney Disease <60  Kidney Failure <15      STAT Lactic Acid, Reflex [[id number]]  (Abnormal) Collected: 06/30/24 2043    Specimen: Blood from Arm, Right Updated: 06/30/24 2111     Lactate 2.4 mmol/L     High Sensitivity Troponin T [[id number]]  (Abnormal) Collected: 06/30/24 2043    Specimen: Blood from Arm, Right Updated: 06/30/24 2109     HS Troponin T 40 ng/L     Narrative:      High Sensitive Troponin T Reference Range:  <14.0 ng/L- Negative Female for AMI  <22.0 ng/L- Negative Male for AMI  >=14 - Abnormal Female  indicating possible myocardial injury.  >=22 - Abnormal Male indicating possible myocardial injury.   Clinicians would have to utilize clinical acumen, EKG, Troponin, and serial changes to determine if it is an Acute Myocardial Infarction or myocardial injury due to an underlying chronic condition.         Extra Tubes [[id number]] Collected: 06/30/24 2043    Specimen: Blood from Arm, Right Updated: 06/30/24 2101    Narrative:      The following orders were created for panel order Extra Tubes.  Procedure                               Abnormality         Status                     ---------                               -----------         ------                     Gold Top - SST[[id number]]                                   Final result                 Please view results for these tests on the individual orders.    Gold Top - SST [[id number]] Collected: 06/30/24 2043    Specimen: Blood from Arm, Right Updated: 06/30/24 2101     Extra Tube Hold for add-ons.     Comment: Auto resulted.       Blood Culture - Blood, Arm, Right [[id number]] Collected: 06/30/24 1844    Specimen: Blood from Arm, Right Updated: 06/30/24 1904    POC CHEM 8 [[id number]]  (Abnormal) Collected: 06/30/24 1858    Specimen: Venous Blood Updated: 06/30/24 1902     Glucose 189 mg/dL      BUN 77 mg/dL      Creatinine 1.40 mg/dL      Sodium 127 mmol/L      POC Potassium 7.8 mmol/L      Chloride 99 mmol/L      Total CO2 27 mmol/L      Anion Gap 10.0 mmol/L      Comment: Serial Number: [id number]:  [id number]        Hemoglobin 17.0 g/dL      Hematocrit 50 %      Ionized Calcium 1.08 mmol/L      eGFR 40.8 mL/min/1.73     Urinalysis, Microscopic Only - Urine, Catheter [[id number]]  (Abnormal) Collected: 06/30/24 1749    Specimen: Urine, Catheter Updated: 06/30/24 1836     RBC, UA 11-20 /HPF      WBC, UA 11-20 /HPF      Bacteria, UA 3+ /HPF      Squamous Epithelial Cells, UA 3-6 /HPF      Renal Epithelial Cells, UA 0-2 /HPF      Hyaline Casts, UA 0-2 /LPF       Methodology Manual Light Microscopy    CBC & Differential [[id number]]  (Abnormal) Collected: 06/30/24 1732    Specimen: Blood Updated: 06/30/24 1825    Narrative:      The following orders were created for panel order CBC & Differential.  Procedure                               Abnormality         Status                     ---------                               -----------         ------                     CBC Auto Differential[[id number]]        Abnormal            Final result               Scan Slide[[id number]]                                       Final result                 Please view results for these tests on the individual orders.    CBC Auto Differential [[id number]]  (Abnormal) Collected: 06/30/24 1732    Specimen: Blood Updated: 06/30/24 1825     WBC 16.80 10*3/mm3      RBC 5.16 10*6/mm3      Hemoglobin 15.9 g/dL      Hematocrit 49.0 %      MCV 95.0 fL      MCH 30.8 pg      MCHC 32.4 g/dL      RDW 15.2 %      RDW-SD 52.5 fl      MPV 12.0 fL      Platelets 119 10*3/mm3      Neutrophil % 83.6 %      Lymphocyte % 7.8 %      Monocyte % 7.3 %      Eosinophil % 0.1 %      Basophil % 0.2 %      Immature Grans % 1.0 %      Neutrophils, Absolute 14.04 10*3/mm3      Lymphocytes, Absolute 1.31 10*3/mm3      Monocytes, Absolute 1.23 10*3/mm3      Eosinophils, Absolute 0.01 10*3/mm3      Basophils, Absolute 0.04 10*3/mm3      Immature Grans, Absolute 0.17 10*3/mm3      nRBC 0.8 /100 WBC     Scan Slide [[id number]] Collected: 06/30/24 1732    Specimen: Blood Updated: 06/30/24 1825     RBC Morphology Normal     WBC Morphology Normal     Platelet Estimate Decreased    BNP [[id number]]  (Abnormal) Collected: 06/30/24 1732    Specimen: Blood Updated: 06/30/24 1804     proBNP 19,860.0 pg/mL     Narrative:      This assay is used as an aid in the diagnosis of individuals suspected of having heart failure. It can be used as an aid in the diagnosis of acute decompensated heart failure (ADHF) in patients presenting with signs  and symptoms of ADHF to the emergency department (ED). In addition, NT-proBNP of <300 pg/mL indicates ADHF is not likely.    Age Range Result Interpretation  NT-proBNP Concentration (pg/mL:      <50             Positive            >450                   Gray                 300-450                    Negative             <300    50-75           Positive            >900                  Gray                300-900                  Negative            <300      >75             Positive            >1800                  Gray                300-1800                  Negative            <300    Blood Culture - Blood, Arm, Right [[id number]] Collected: 06/30/24 1757    Specimen: Blood from Arm, Right Updated: 06/30/24 1803    Urinalysis With Microscopic If Indicated (No Culture) - Urine, Catheter [[id number]]  (Abnormal) Collected: 06/30/24 1749    Specimen: Urine, Catheter Updated: 06/30/24 1802     Color, UA [place]     Comment: Any Substance that causes an abnormal urine color can alter the accuracy of the chemical reactions.        Appearance, UA Turbid     pH, UA <=5.0     Specific Gravity, UA 1.020     Glucose, UA Negative     Ketones, UA Trace     Bilirubin, UA Small (1+)     Comment: Confirmation testing is unavailable.  A serum bilirubin is recommended for further assessment.        Blood, UA Moderate (2+)     Protein, UA 30 mg/dL (1+)     Leuk Esterase, UA Moderate (2+)     Nitrite, UA Positive     Urobilinogen, UA 1.0 E.U./dL    POC Lactate [[id number]]  (Abnormal) Collected: 06/30/24 1737    Specimen: Blood Updated: 06/30/24 1739     Lactate 3.1 mmol/L      Comment: Serial Number: [id number]:  [id number]                Pending Results: Prothrombin gene mutation, factor V Leiden    Imaging Reviewed:   US Pelvis Complete    Result Date: 7/1/2024  Impression: Sonographic features suggest the presence of a large subserosal uterine fundal fibroid. Electronically Signed: Ann Marie Maloney MD  7/1/2024 1:43 PM EDT   Workstation ID: [id number]    CT Abdomen Pelvis Without Contrast    Result Date: 7/1/2024  Impression: Moderately large right pleural effusion and small left pleural effusion. Bibasilar infiltrates suggest atelectasis although associated pneumonia is not excluded. Severe cardiomegaly. Bilateral flank edema, greatest on the right. Probable fibroid uterus. This could be confirmed with ultrasound. Electronically Signed: Krystal Lee MD  7/1/2024 11:39 AM EDT  Workstation ID: [id number]    Adult Transthoracic Echo Complete w/ Color, Spectral and Contrast if Necessary Per Protocol    Addendum Date: 7/1/2024      Left ventricular ejection fraction appears to be 31 - 35%.   Left ventricular diastolic dysfunction is noted.   The left atrial cavity is dilated.   Moderate aortic valve stenosis is present. Mean/peak gradient of 14/24 mmHg.  Dimensionless index 0.36   Moderate to severe mitral valve regurgitation is present.   Estimated right ventricular systolic pressure from tricuspid regurgitation is normal (<35 mmHg).     CT Angiogram Chest Pulmonary Embolism    Result Date: 7/1/2024  Impression: 1. No evidence for pulmonary embolus. 2. Bibasilar airspace disease and right middle lung opacity compatible likely multifocal pneumonia with bilateral pleural effusions. 3. Cardiomegaly. Electronically Signed: Shari Kaur MD  7/1/2024 10:14 AM EDT  Workstation ID: [id number]    CT Lower Extremity Bilateral Without Contrast    Result Date: 7/1/2024  Impression: Bilateral lower extremity soft tissue swelling greater on the left than the right. There is no soft tissue gas or well-formed abscess. Electronically Signed: Obed Sidhu MD  7/1/2024 1:53 AM EDT  Workstation ID: [id number]    XR Chest 1 View    Result Date: 6/30/2024  Vascular congestion with small effusions Electronically Signed: Eamon Brock MD  6/30/2024 7:27 PM EDT  Workstation ID: [id number]          Assessment & Plan   ASSESSMENT    Bilateral lower extremity  DVT:  Remote history of pulmonary embolism  -Extensive acute right and left lower extremity DVT noted on presentation  -patient's chronic deconditioning and immobility may have been the risk factor for DVT.   -Negative CTA Chest for PE.  -Reportedly treated with warfarin for prior history of PE  -On Lovenox 1 mg/kg every 12 hours. This can be continued during hospitalization, patient can be transitioned to oral anticoagulation closer to discharge.  Will recommend Eliquis 10mg BID X 1 week followed by 5mg BID thereafter.  -Will need lifelong anticoagulation in my view due to persistent risk factors and extensive DVT as well as history of prior pulmonary embolism.  -Cardiology consulted and evaluating for possible mechanical thrombectomy  -Given history of pulmonary embolism and now DVTs will check for factor V Leiden and prothrombin gene mutation      Uterine mass  -Transvaginal ultrasound suggestive of presence of a large subserosal uterine fundal fibroid.   -Patient should be considered for CT Abdomen/Pelvis and GYN Evaluation.    Thrombocytopenia  -104,000, 119,000 at admission  -Baseline from January 2020 138,000  -Acute drop likely secondary to sepsis, rhinovirus  -Continue to monitor closely given initiation of anticoagulation  -Will check Hemolysis labs.    Sepsis  -UA suggestive of Acute cystitis, Urine culture consistent with GNR UTI.  -Respiratory panel positive for Rhinovirus.  -On antibiotic therapy with IV vancomycin and cefepime, management per primary team    Atrial fibrillation with RVR/paroxysmal atrial fibrillation  -Acute on chronic systolic heart failure with diastolic dysfunction, moderate aortic stenosis, severe mitral valve regurgitation  -On amiodarone drip, diuresis.  Management per cardiology.  Patient  on Lovenox for anticoagulation, Likely candidate for lifelong anticoagulation given Afib and extensive DVT.    Acute kidney injury/hyperkalemia  -Creatinine 1.33, EGFR 43.4  -Potassium  improved to 5.4 from 6.5  -Management per primary team    Electronically signed by MANJU Rivera, 07/01/24, 12:57 PM EDT.      I have obtained complete history and perform physical exam along with review of labs, imaging.  I have completed the majority and substantive portion of medical decision making.       Thank you for this consult. We will be happy to follow along with you.

## 2024-07-01 NOTE — ED PROVIDER NOTES
Subjective   History of Present Illness  Chief complaint shortness of breath    History of present illness 69-year-old female who has a history of chronic leg edema CHF hypertension reports some increasing shortness of breath the last few days.  Denies any fever chills or sweats or cough or congestion.  Nothing really seem to trigger make it better or worse she is got ongoing chronic leg edema with worsening swelling and redness.  The patient denies any recent hospitalization or antibiotic use.  She states she has a home health nurse that comes out and wrapped her legs twice a week.  No recent long car ride or plane ride she is chronically immobilized.  No chest pain neck arm jaw pain.  EMS was called they found the patient to have a heart rate in the 190s.  It was a wide-complex tachycardia was unsure if this was atrial fibrillation with a bundle branch block versus an ventricular tachycardia.  She was given 150 mg of amiodarone by EMS.  She did had her heart rate did come down to the 160s.  No other current complaints or associated symptoms.      Review of Systems   Constitutional:  Negative for chills and fever.   Respiratory:  Positive for shortness of breath. Negative for cough and chest tightness.    Cardiovascular:  Positive for leg swelling. Negative for chest pain and palpitations.   Gastrointestinal:  Negative for abdominal pain and vomiting.   Genitourinary:  Negative for difficulty urinating and dysuria.   Musculoskeletal:  Positive for arthralgias.   Skin:  Positive for wound.   Neurological:  Positive for weakness. Negative for dizziness and light-headedness.   Psychiatric/Behavioral:  Negative for confusion.        No past medical history on file.  Congestive heart failure hypertension chronic leg edema  No Known Allergies    No past surgical history on file.    No family history on file.    Social History     Socioeconomic History    Marital status:        No tobacco alcohol or  drugs  Medications Tylenol Flexeril hydrocodone lisinopril meloxicam metoprolol Singulair omeprazole oxybutynin potassium vitamin D2.  Patient wears 3 L of oxygen at home    Objective   Physical Exam  Constitutional this is a 69-year-old female chronically ill-appearing triage vital signs reviewed but she is in atrial fibrillation with a rate of 160 on the monitor.  Sats are good in the mid 90s on 3 L of oxygen.  HEENT unremarkable extraocular muscles are intact pupils equal round reactive no photophobia mouth is clear otherwise neck supple no adenopathy no JV no bruits back no cervical rest lumbar spine tenderness noted lungs have diffuse rales throughout but no retractions no use of accessory heart irregular tachycardic without murmur abdomen soft nontender good bowel sounds no pulsatile masses.  Extremities patient has significant edema to both legs left worse than right the left leg is red and hot fevers all the way from the top of the thigh down to the foot she has a healing blister to the left dorsum of the foot the foot is warm and dry good cap refill good and equal status pedis and posterior tibialis pulses on my exam.  There is no pain on portion exam or pain with passive stretching compartments will be somewhat soft throughout.  There is no bullae no necrotic areas.  The right leg has some erythema and warmth but not as bad as the left 1.  There is a healing wound to the back of the right big toe.  Foot is warm and dry with good pulses.  There is no redness or swelling to the perineum or buttock area allysine abscess is noted.  There is no crepitus or subcutaneous air.  Toes under including big toe dorsiflex plantarflex at difficulty.  Neurologic she is awake alert follows commands no Paci symmetry speech normal no focal weakness patient has normal mentation and carries on a conversation without difficulty  Procedures           ED Course      Results for orders placed or performed during the hospital  encounter of 06/30/24   Comprehensive Metabolic Panel    Specimen: Arm, Right; Blood   Result Value Ref Range    Glucose 194 (H) 65 - 99 mg/dL    BUN 50 (H) 8 - 23 mg/dL    Creatinine 1.38 (H) 0.57 - 1.00 mg/dL    Sodium 131 (L) 136 - 145 mmol/L    Potassium 6.5 (C) 3.5 - 5.2 mmol/L    Chloride 101 98 - 107 mmol/L    CO2 24.4 22.0 - 29.0 mmol/L    Calcium 10.1 8.6 - 10.5 mg/dL    Total Protein 6.2 6.0 - 8.5 g/dL    Albumin 2.5 (L) 3.5 - 5.2 g/dL    ALT (SGPT) 18 1 - 33 U/L    AST (SGOT) 42 (H) 1 - 32 U/L    Alkaline Phosphatase 126 (H) 39 - 117 U/L    Total Bilirubin 1.6 (H) 0.0 - 1.2 mg/dL    Globulin 3.7 gm/dL    A/G Ratio 0.7 g/dL    BUN/Creatinine Ratio 36.2 (H) 7.0 - 25.0    Anion Gap 5.6 5.0 - 15.0 mmol/L    eGFR 41.5 (L) >60.0 mL/min/1.73   Urinalysis With Microscopic If Indicated (No Culture) - Urine, Catheter    Specimen: Urine, Catheter   Result Value Ref Range    Color, UA Orange (A) Yellow, Straw    Appearance, UA Turbid (A) Clear    pH, UA <=5.0 5.0 - 8.0    Specific Gravity, UA 1.020 1.005 - 1.030    Glucose, UA Negative Negative    Ketones, UA Trace (A) Negative    Bilirubin, UA Small (1+) (A) Negative    Blood, UA Moderate (2+) (A) Negative    Protein, UA 30 mg/dL (1+) (A) Negative    Leuk Esterase, UA Moderate (2+) (A) Negative    Nitrite, UA Positive (A) Negative    Urobilinogen, UA 1.0 E.U./dL 0.2 - 1.0 E.U./dL   High Sensitivity Troponin T    Specimen: Arm, Right; Blood   Result Value Ref Range    HS Troponin T 40 (H) <14 ng/L   BNP    Specimen: Blood   Result Value Ref Range    proBNP 19,860.0 (H) 0.0 - 900.0 pg/mL   CBC Auto Differential    Specimen: Blood   Result Value Ref Range    WBC 16.80 (H) 3.40 - 10.80 10*3/mm3    RBC 5.16 3.77 - 5.28 10*6/mm3    Hemoglobin 15.9 12.0 - 15.9 g/dL    Hematocrit 49.0 (H) 34.0 - 46.6 %    MCV 95.0 79.0 - 97.0 fL    MCH 30.8 26.6 - 33.0 pg    MCHC 32.4 31.5 - 35.7 g/dL    RDW 15.2 12.3 - 15.4 %    RDW-SD 52.5 37.0 - 54.0 fl    MPV 12.0 6.0 - 12.0 fL     Platelets 119 (L) 140 - 450 10*3/mm3    Neutrophil % 83.6 (H) 42.7 - 76.0 %    Lymphocyte % 7.8 (L) 19.6 - 45.3 %    Monocyte % 7.3 5.0 - 12.0 %    Eosinophil % 0.1 (L) 0.3 - 6.2 %    Basophil % 0.2 0.0 - 1.5 %    Immature Grans % 1.0 (H) 0.0 - 0.5 %    Neutrophils, Absolute 14.04 (H) 1.70 - 7.00 10*3/mm3    Lymphocytes, Absolute 1.31 0.70 - 3.10 10*3/mm3    Monocytes, Absolute 1.23 (H) 0.10 - 0.90 10*3/mm3    Eosinophils, Absolute 0.01 0.00 - 0.40 10*3/mm3    Basophils, Absolute 0.04 0.00 - 0.20 10*3/mm3    Immature Grans, Absolute 0.17 (H) 0.00 - 0.05 10*3/mm3    nRBC 0.8 (H) 0.0 - 0.2 /100 WBC   STAT Lactic Acid, Reflex    Specimen: Arm, Right; Blood   Result Value Ref Range    Lactate 2.4 (C) 0.5 - 2.0 mmol/L   Scan Slide    Specimen: Blood   Result Value Ref Range    RBC Morphology Normal Normal    WBC Morphology Normal Normal    Platelet Estimate Decreased Normal   Urinalysis, Microscopic Only - Urine, Catheter    Specimen: Urine, Catheter   Result Value Ref Range    RBC, UA 11-20 (A) None Seen, 0-2 /HPF    WBC, UA 11-20 (A) None Seen, 0-2 /HPF    Bacteria, UA 3+ (A) None Seen /HPF    Squamous Epithelial Cells, UA 3-6 (A) None Seen, 0-2 /HPF    Renal Epithelial Cells, UA 0-2 0 - 2 /HPF    Hyaline Casts, UA 0-2 None Seen /LPF    Methodology Manual Light Microscopy    POC Lactate    Specimen: Blood   Result Value Ref Range    Lactate 3.1 (C) 0.3 - 2.0 mmol/L   POC CHEM 8    Specimen: Venous Blood   Result Value Ref Range    Glucose 189 (H) 70 - 105 mg/dL    BUN 77 (H) 8 - 26 mg/dL    Creatinine 1.40 (H) 0.60 - 1.30 mg/dL    Sodium 127 (L) 138 - 146 mmol/L    POC Potassium 7.8 (H) 3.5 - 4.9 mmol/L    Chloride 99 98 - 109 mmol/L    Total CO2 27 24 - 29 mmol/L    Anion Gap 10.0 10.0 - 20.0 mmol/L    Hemoglobin 17.0 12.0 - 17.0 g/dL    Hematocrit 50 38 - 51 %    Ionized Calcium 1.08 (L) 1.12 - 1.32 mmol/L    eGFR 40.8 (L) >60.0 mL/min/1.73   ECG 12 Lead Dyspnea   Result Value Ref Range    QT Interval 342 ms    QTC  Interval 528 ms   [facility]   Result Value Ref Range    Extra Tube Hold for add-ons.      XR Chest 1 View    Result Date: 6/30/2024  Vascular congestion with small effusions Electronically Signed: Eamon Brock MD  6/30/2024 7:27 PM EDT  Workstation ID: [id number]   Medications   sodium chloride 0.9 % flush 10 mL (has no administration in time range)   dilTIAZem (CARDIZEM) 125 mg in 125 mL D5W infusion (5 mg/hr Intravenous Currently Infusing 6/30/24 2228)   vancomycin IVPB 1500 mg in 0.9% NaCl (Premix) 500 mL (has no administration in time range)   nitroglycerin (NITROSTAT) SL tablet 0.4 mg (has no administration in time range)   sodium chloride 0.9 % flush 10 mL (has no administration in time range)   sodium chloride 0.9 % flush 10 mL (has no administration in time range)   sodium chloride 0.9 % infusion 40 mL (has no administration in time range)   acetaminophen (TYLENOL) tablet 650 mg (has no administration in time range)     Or   acetaminophen (TYLENOL) suppository 650 mg (has no administration in time range)   ondansetron ODT (ZOFRAN-ODT) disintegrating tablet 4 mg (has no administration in time range)     Or   ondansetron (ZOFRAN) injection 4 mg (has no administration in time range)   Pharmacy to dose vancomycin (has no administration in time range)   Pharmacy to Dose Cefepime (has no administration in time range)   dilTIAZem (CARDIZEM) injection 20 mg (20 mg Intravenous Given 6/30/24 1733)   cefepime 2000 mg IVPB in 100 mL NS (MBP) (0 mg Intravenous Stopped 6/30/24 2009)   furosemide (LASIX) injection 80 mg (80 mg Intravenous Given 6/30/24 2118)   calcium gluconate 1000 Mg/50ml 0.675% NaCl IV SOLN (0 mg Intravenous Stopped 6/30/24 2141)   insulin regular (humuLIN R,novoLIN R) injection 5 Units (5 Units Intravenous Given 6/30/24 2120)   dextrose (D50W) (25 g/50 mL) IV injection 25 g (25 g Intravenous Given 6/30/24 2120)   Enoxaparin Sodium (LOVENOX) syringe 105 mg (105 mg Subcutaneous Given 6/30/24 2130)                                        KG my interpretation wide-complex tachycardia right bundle branch block rate of 145 consistent with atrial fibrillation with rapid ventricular response QTc of 520 abnormal EKG with nothing old to compare with       Medical Decision Making  Medical decision making.  IV established monitor placed my review atrial relation rate of 160 on the monitor.  EKG obtained interpretation wide-complex tachycardia right bundle branch block rate of 145 consistent with A-fib with rapid ventricular response abnormal EKG with nothing old to compare with.  Chest x-ray my independent review shows overload fluid effusions CHF..  Labs obtained my independent review cath urine sample moderate leukocyte positive nitrate 4+ bacteria 20 white cells that was cultured.  Lactic acid obtained was 3.1 proBNP was 19,860.  White count was 16.8 hemoglobin 15.9 urine and blood cultures pending.  Chemistries were drawn multiple times as a lab Telling us hemolysis initially potassium reported as 7.8 BUN is 77 creatinine 1.4 but eventually we were able to get a blood draw that only had slight hemolysis and the potassium was 6.5 BUN of 50 and a creatinine of 1.38.  The patient had triggered on the sepsis protocol unable to give her a fluid bolus because she is already in congestive heart failure but lactate was less than 4 and she has not really been hypotensive blood pressures been stable.  She had been given Cardizem 20 mg IV and placed on drip at 5 mg an hour and adjusted appropriately to bring her heart rate down to 90s still in atrial fibrillation.  She had been given cefepime and vancomycin for sepsis for cellulitis and UTI.  Hyperkalemia was treated as well with calcium gluconate insulin and D50 congestive heart failure treated with 80 Lasix IV.  Patient repeat exam is resting comfortably she is mentating well she is current on a conversation had some rales throughout the lungs heart rate in the 90s atrial  fibrillation abdomen was soft without tenderness good bowel sounds throughout good cap refill good skin turgor.  And in no distress.  We talked about the findings and she was made aware of 1.  She was given Lovenox 1 mg/kg subcutaneously for A-fib.  She will go the ICU I talked to the ICU nurse practitioner and case was discussed.  I do not see any evidence here that suggest pneumonia necrotizing fasciitis Frankie's gangrene intra-abdominal process meningitis encephalitis acute myocardial infarction DVT pulmonary embolism aortic dissection although not a complete list of all possibilities.  Stable otherwise unremarkable ER course improved critical care 30 minutes    Problems Addressed:  Acute congestive heart failure, unspecified heart failure type: complicated acute illness or injury  Acute kidney injury: complicated acute illness or injury  Atrial fibrillation with rapid ventricular response: complicated acute illness or injury  Bilateral lower leg cellulitis: complicated acute illness or injury  Hyperkalemia: complicated acute illness or injury  Sepsis, due to unspecified organism, unspecified whether acute organ dysfunction present: complicated acute illness or injury  Urinary tract infection without hematuria, site unspecified: complicated acute illness or injury    Amount and/or Complexity of Data Reviewed  Labs: ordered. Decision-making details documented in ED Course.  Radiology: ordered and independent interpretation performed. Decision-making details documented in ED Course.  ECG/medicine tests: ordered and independent interpretation performed. Decision-making details documented in ED Course.    Risk  Drug therapy requiring intensive monitoring for toxicity.  Decision regarding hospitalization.        Final diagnoses:   Atrial fibrillation with rapid ventricular response   Acute congestive heart failure, unspecified heart failure type   Sepsis, due to unspecified organism, unspecified whether acute organ  dysfunction present   Urinary tract infection without hematuria, site unspecified   Acute kidney injury   Bilateral lower leg cellulitis   Hyperkalemia       ED Disposition  ED Disposition       ED Disposition   Decision to Admit    Condition   --    Comment   Level of Care: Critical Care [6]   Diagnosis: Wide-complex tachycardia [[id number]]   Admitting Physician: DARRELL SOLORZANO [[id number]]   Attending Physician: DARRELL SOLORZANO [[id number]]   Certification: I Certify That Inpatient Hospital Services Are Medically Necessary For Greater Than 2 Midnights                 No follow-up provider specified.       Medication List      No changes were made to your prescriptions during this visit.            Galo Solorzano MD  06/30/24 [id number]

## 2024-07-01 NOTE — H&P
Critical Care History and Physical     Ban Brennan : 1955 MRN:[id number] LOS:0 ROOM:      Reason for admission: Atrial fibrillation with RVR     Assessment / Plan     Atrial fibrillation with RVR, new onset  Initial rhythm reported as wide-complex tachycardia with max heart rate 190s  Patient started on Cardizem in ED, switched to amiodarone due to borderline hypotension  Likely precipitated by hyperkalemia  Will check troponin  ECHO pending    Severe Sepsis: ( WBC>12 or <4 or >10% bands, HR>90, and Lactic acid>2 )  Urinary tract infection  Cellulitis bilateral lower extremities  Blood cultures pending  Ua + UTI; culture pending  RVP pending  MRSA swab pending  Fluid resuscitation contraindicated  Borderline hypotensive  Started on cefepime and vancomycin  CT bilateral lower extremities pending  Target MAP of 65     Fluid overload  Acute on chronic congestive heart failure, history unknown  Family reported history of CHF  BNP noted at 19,860  No history in chart or care everywhere, EF% unknown  Resume home furosemide  Echo pending  Avoid fluid overload    Acute kidney injury  Hyperkalemia  Creatinine 1.38, baseline unknown  Initial potassium 6.5; treated with sling/D50, calcium gluconate, and Lasix  IV fluids contraindicated  Avoid nephrotoxic agents  Avoid hypotension  Consider nephrology consult if no improvement    Essential hypertension  Hold home lisinopril and metoprolol as patient is borderline hypotensive  Resume when clinically appropriate    Nutrition:   NPO Diet NPO Type: Strict NPO     VTE Prophylaxis:  Pharmacologic VTE prophylaxis orders are present.         History of Present illness     Ban Brennan is a 69 y.o. female with known PMH of hypertension, heart failure, chronic bilateral lower extremity edema who presented to the hospital for creasing shortness of breath, and was admitted with a principal diagnosis of Atrial fibrillation with RVR.  Patient is alert however  lethargic and attempts to participate with HPI however she is a poor historian.  Supplemental information for HPI taken from conversation with daughter at bedside who states patient does not go to the doctor normally and has not been in the hospital for years.  She is unsure of what her full medical history is at this time but states that her legs have been swollen for a long time and that she has not been up and moving for approximately 1 year.  Patient has a home health nurse that comes out and wraps her legs twice a week.  On assessment patient's legs are both extremely red and swollen, left > right.  Patient's daughter states that redness of her legs is new however she is not sure how long it has been this bad.  EMS was called when she had worsening shortness of breath and found the patient with a wide complex tachycardia with heart rate of 190s.  She was given 150 mg of amiodarone by EMS and heart rate improved to 160s.  She is found to have a right bundle branch block however it is uncertain due to her limited history if this is chronic.    ED course: On arrival to emergency department EKG showed atrial fibrillation with RVR, heart rate 145.  CXR showed fluid overload with bilateral effusions.  Lactic acid was 3.1 and BNP 19,860.  Patient had a white count of 16.8 and 4+ bacteria in her urine.  Also noted to be hyperkalemic with a potassium of 6.5 in which she was treated with insulin D50, calcium gluconate, and Lasix.  She was started on cefepime and vancomycin for cellulitis of her legs and UTI.    ACP: Patient is alert with some intermittent confusion.  Her daughter is at bedside and is her decision-maker along with her brother.  Daughter states patient is a full code.    Patient was seen and examined on 06/30/24 at 21:22 EDT .    Subjective / Review of systems     Review of Systems   Constitutional:  Negative for chills and fever.   HENT:  Negative for congestion and sore throat.    Respiratory:  Positive  for shortness of breath. Negative for cough.    Cardiovascular:  Positive for leg swelling. Negative for chest pain and palpitations.   Gastrointestinal:  Negative for abdominal pain and nausea.   Endocrine: Negative for heat intolerance and polyuria.   Genitourinary:  Negative for dysuria and urgency.   Musculoskeletal:  Positive for myalgias. Negative for arthralgias.   Skin:  Negative for rash and wound.   Neurological:  Positive for weakness. Negative for numbness.   Psychiatric/Behavioral:  Negative for suicidal ideas. The patient is not nervous/anxious.         Past Medical/Surgical/Social/Family History & Allergies     No past medical history on file.   No past surgical history on file.   Social History     Socioeconomic History    Marital status:       No family history on file.   No Known Allergies   Social Determinants of Health     Tobacco Use: Not on file (12/17/2017)   Alcohol Use: Not on file   Financial Resource Strain: Not on file   Food Insecurity: Not on file   Transportation Needs: Not on file   Physical Activity: Not on file   Stress: Not on file   Social Connections: Unknown (10/11/2023)    Family and Community Support     Help with Day-to-Day Activities: Not on file     Lonely or Isolated: Not on file   Interpersonal Safety: Not At Risk (6/30/2024)    Abuse Screen     Unsafe at Home or Work/School: no     Feels Threatened by Someone?: no     Does Anyone Keep You from Contacting Others or Doint Things Outside the Home?: no     Physical Sign of Abuse Present: no   Depression: Not on file   Housing Stability: Unknown (10/11/2023)    Housing Stability     Current Living Arrangements: Not on file     Potentially Unsafe Housing Conditions: Not on file   Utilities: Not on file   Health Literacy: Unknown (10/11/2023)    Education     Help with school or training?: Not on file     Preferred Language: Not on file   Employment: Unknown (10/11/2023)    Employment     Do you want help finding or  keeping work or a job?: Not on file   Disabilities: Unknown (10/11/2023)    Disabilities     Concentrating, Remembering, or Making Decisions Difficulty: Not on file     Doing Errands Independently Difficulty: Not on file        Home Medications     Prior to Admission medications    Not on File        Objective / Physical Exam     Vital signs:  Temp: 98.6 °F (37 °C)  BP: 114/65  Heart Rate: 104  Resp: 22  SpO2: 95 %  Weight: 108 kg (239 lb)    Admission Weight: Weight: 108 kg (239 lb)    Physical Exam  Constitutional:       Appearance: Normal appearance. She is morbidly obese. She is toxic-appearing.   HENT:      Head: Normocephalic.      Nose: Nose normal.      Mouth/Throat:      Mouth: Mucous membranes are moist.   Eyes:      Extraocular Movements: Extraocular movements intact.      Pupils: Pupils are equal, round, and reactive to light.   Cardiovascular:      Rate and Rhythm: Tachycardia present. Rhythm irregular.      Pulses: Normal pulses.      Heart sounds: Normal heart sounds.   Pulmonary:      Effort: Pulmonary effort is normal.      Breath sounds: Normal breath sounds. Decreased air movement present.   Abdominal:      General: Bowel sounds are normal.      Palpations: Abdomen is soft.   Musculoskeletal:         General: Normal range of motion.      Right lower le+      Left lower le+   Skin:     General: Skin is warm.      Coloration: Skin is pale.      Findings: Erythema present.      Comments: Severe erythema to bilateral lower extremities   Neurological:      Mental Status: She is easily aroused. She is lethargic and disoriented.   Psychiatric:         Attention and Perception: She is inattentive.         Mood and Affect: Mood normal.         Behavior: Behavior normal.        Labs     Results from last 7 days   Lab Units 24  1858 24  1732   WBC 10*3/mm3  --  16.80*   HEMATOCRIT %  --  49.0*   HEMATOCRIT POC % 50  --    PLATELETS 10*3/mm3  --  119*      Results from last 7 days   Lab  Units 06/30/24 2043 06/30/24  1858   SODIUM mmol/L 131*  --    POTASSIUM mmol/L 6.5*  --    CHLORIDE mmol/L 101  --    CO2 mmol/L 24.4  --    BUN mg/dL 50*  --    CREATININE mg/dL 1.38* 1.40*        Imaging     Chest X ray: My independent assessment showed vascular congestion, bilateral small effusions    EKG: My independent evaluation showed atrial fibrillation, RBBB, no ST -T changes    Current Medications     Scheduled Meds:  calcium gluconate, 1,000 mg, Intravenous, Once  enoxaparin, 1 mg/kg, Subcutaneous, Once  sodium chloride, 10 mL, Intravenous, Q12H  vancomycin, 20 mg/kg (Adjusted), Intravenous, Once         Continuous Infusions:  dilTIAZem, 5-15 mg/hr, Last Rate: 5 mg/hr (06/30/24 2008)       Patient continues to be critically ill, remains at risk of clinical deterioration or death and needed high complexity decision making. I have spent a total of 45 minutes providing critical care services to this patient including but not limited to: review of labs/ microbiology/imaging/medications, serial monitoring of vital signs, review of other consultant's notes, review of events in the last 24 hrs, monitoring input/output, review of treatment plan with bedside nurse, RT and other treatment team, management of life support and nutrition needs. I also spoke with patient daughter about the plan of care and answered all questions.    Time spent in performing separately billable procedures and updating family is not included in the critical care time.       MANJU Isaacs   Critical Care  06/30/24   21:22 EDT

## 2024-07-01 NOTE — ED NOTES
Provider at bedside while unwrapping pt legs. Left leg red and warm, wound on great toe. Right lower leg has redness and a wound on the great toe.

## 2024-07-01 NOTE — CONSULTS
Initial spiritual care visit. Pt in room alone watching TV. She said to  that if this was the time for God to call her home, she was ready.  inquired if that was always the case, and she said no. Her greatest desire, at this time and according God's will, is that she lives long enough for her grandchildren to graduate high school. She understands that may not happen and seems to be content with whatever happens.  doesn't believe her to be in denial, but to be realistic about her health and all the implications concerning it. Cardiology practitioner arrived and  prayed before he left. She was thankful for the visit. No other needs at this time.

## 2024-07-01 NOTE — CONSULTS
Referring Provider: Sesar Solorzano DO    Reason for Consultation: A-fib with RVR      Patient Care Team:  Rut Pierce APRN as PCP - General (Nurse Practitioner)      SUBJECTIVE     Chief Complaint: Shortness of breath    History of present illness:  Ban Brennan is a 69 y.o. female with hypertension, bilateral leg edema, COPD, chronic hypoxemic respiratory failure and obstructive sleep apnea presented to the hospital with worsening shortness of breath.  In the ER she was noted to be in atrial fibrillation with rapid ventricular rate.  She also had elevated high-sensitivity troponin of 40 and elevated proBNP of 19,860.  Chest x-ray showed congestion.  She also had hyperkalemia with potassium 6.5, acute kidney injury, lactic acidosis, leukocytosis, thrombocytopenia with possible UTI and rhinovirus infection.  She was started on Cardizem drip, given IV Lasix, Lokelma.    The patient is a poor historian. She states she hasn't seen a cardiologist in several years. She states she had blood clots in her lungs several years ago. She states it was unprovoked, but she does not recall having a thrombophilia panel done. She states she was on warfarin, but taken off it after several months. She states she lives at home alone. She states family goes to the grocery store for her as she no longer drives. She is noted to  have wounds on both great toes. She states the right foot ulcer appeared approximately 1 month ago. She states the left toe ulcer started in the past week.       Review of systems:    Constitutional: + weakness.  No fever, rigors, chills   Eyes: No vision changes, eye pain   ENT/oropharynx: No difficulty swallowing, sore throat, epistaxis, changes in hearing   Cardiovascular: No chest pain, chest tightness, palpitations, paroxysmal nocturnal dyspnea, orthopnea, diaphoresis, dizziness / syncopal episode   Respiratory: + shortness of breath, dyspnea on exertion, cough.  No wheezing,  "hemoptysis   Gastrointestinal: No abdominal pain, nausea, vomiting, diarrhea, bloody stools   Genitourinary: No hematuria, dysuria   Neurological: No headache, tremors, numbness, one-sided weakness    Musculoskeletal: + myalgias.  No cramps, joint pain, joint swelling   Integument: + edema, bilateral great toe wounds        Personal History:      Past Medical History:   Diagnosis Date    Bilateral leg edema     Chronic respiratory failure with hypoxia, on home O2 therapy 07/01/2024    COPD (chronic obstructive pulmonary disease)     Morbid obesity with BMI of 40.0-44.9, adult 11/08/2010    Primary hypertension 03/01/2017    Pulmonary embolism     \"many years ago, was on warfarin\"    Sleep apnea        History reviewed. No pertinent surgical history.    History reviewed. No pertinent family history.    Social History     Tobacco Use    Smoking status: Never    Smokeless tobacco: Never   Vaping Use    Vaping status: Never Used   Substance Use Topics    Alcohol use: Never    Drug use: Never        Home meds:  Prior to Admission medications    Medication Sig Start Date End Date Taking? Authorizing Provider   Allergy Relief 180 MG tablet Take 1 tablet by mouth Daily. 5/30/24  Yes Chadwick Johnson MD   bumetanide (BUMEX) 1 MG tablet Take 1 tablet by mouth 3 times a day. 5/30/24  Yes Chadwick Johnson MD   docusate sodium (COLACE) 100 MG capsule Take 1 capsule by mouth Every 12 (Twelve) Hours. 5/30/24  Yes Chadwick Johnson MD   furosemide (LASIX) 20 MG tablet Take 1 tablet by mouth Daily.   Yes Chadwick Johnson MD   HYDROcodone-acetaminophen (NORCO)  MG per tablet Take 1 tablet by mouth 3 times a day. 5/30/24  Yes Chadwick Johnson MD   lisinopril (PRINIVIL,ZESTRIL) 30 MG tablet Take 1 tablet by mouth Daily. 3/18/24  Yes Chadwick Johnson MD   meloxicam (MOBIC) 7.5 MG tablet Take 1 tablet by mouth Daily As Needed. 3/18/24  Yes Chadwick Johnson MD   metoprolol tartrate (LOPRESSOR) 50 MG " tablet Take 1 tablet by mouth Daily.   Yes Chadwick Johnson MD   montelukast (SINGULAIR) 10 MG tablet Take 1 tablet by mouth every night at bedtime.   Yes Chadwick Johnson MD   omeprazole (priLOSEC) 20 MG capsule Take 1 capsule by mouth Daily. 3/18/24  Yes Chadwick Johnson MD   oxybutynin XL (DITROPAN-XL) 10 MG 24 hr tablet Take 2 tablets by mouth Daily. 3/18/24  Yes Chadwick Johnson MD   potassium chloride (MICRO-K) 10 MEQ CR capsule Take 1 capsule by mouth Every 12 (Twelve) Hours. 3/18/24  Yes Chadwick Johnson MD   vitamin D (ERGOCALCIFEROL) 1.25 MG (04092 UT) capsule capsule Take 1 capsule by mouth 2 (Two) Times a Week. Monday and Thursday. 5/30/24  Yes Chadwick Johnson MD       Allergies:     Patient has no known allergies.    Scheduled Meds:[START ON 7/2/2024] amiodarone, 200 mg, Oral, Once   Followed by  [START ON 7/2/2024] amiodarone, 200 mg, Oral, Q8H   Followed by  [START ON 7/9/2024] amiodarone, 200 mg, Oral, Q12H   Followed by  [START ON 7/23/2024] amiodarone, 200 mg, Oral, Daily  bumetanide, 2 mg, Intravenous, Q8H  calcium gluconate, 1,000 mg, Intravenous, Once  cefepime, 2,000 mg, Intravenous, Q12H  dextrose, 25 g, Intravenous, Once  enoxaparin, 1 mg/kg, Subcutaneous, Q12H  insulin regular, 10 Units, Intravenous, Once  midodrine, 5 mg, Oral, Q8H  sodium chloride, 10 mL, Intravenous, Q12H  vancomycin, 1,500 mg, Intravenous, Once      Continuous Infusions:amiodarone, 0.5 mg/min, Last Rate: 0.5 mg/min (07/01/24 0617)  Pharmacy to Dose Cefepime,   Pharmacy to Dose enoxaparin (LOVENOX),   Pharmacy to dose vancomycin,       PRN Meds:  acetaminophen **OR** acetaminophen    nitroglycerin    ondansetron ODT **OR** ondansetron    oxyCODONE    Pharmacy to Dose Cefepime    Pharmacy to Dose enoxaparin (LOVENOX)    Pharmacy to dose vancomycin    [COMPLETED] Insert Peripheral IV **AND** sodium chloride    sodium chloride    sodium chloride    Vancomycin Pharmacy Intermittent/Pulse  "Dosing      OBJECTIVE    Vital Signs  Vitals:    07/01/24 0824 07/01/24 0830 07/01/24 0832 07/01/24 1200   BP:   99/63    BP Location:       Patient Position:       Pulse: 98 101     Resp: 18 18  9   Temp:    96.8 °F (36 °C)   TempSrc:    Axillary   SpO2: 97% 98%     Weight:   96.2 kg (212 lb)    Height:   147.3 cm (58\")        Flowsheet Rows      Flowsheet Row First Filed Value   Admission Height 147.3 cm (58\") Documented at 06/30/2024 1650   Admission Weight 108 kg (239 lb) Documented at 06/30/2024 1650              Intake/Output Summary (Last 24 hours) at 7/1/2024 1250  Last data filed at 7/1/2024 0845  Gross per 24 hour   Intake 1062 ml   Output 1550 ml   Net -488 ml        Telemetry: Atrial fibrillation with rapid ventricular rate    Physical Exam:  The patient is alert, oriented and in no distress.  Morbidly obese  Vital signs as noted above.  Head and neck revealed no carotid bruits or jugular venous distention.  No thyromegaly or lymphadenopathy is present  Lungs with bibasilar Rales and diminished at bases.  No wheezing.  On 2 L of oxygen  Heart: Irregularly irregular rhythm.  Normal first and second heart sounds.  No precordial rub is present.  No gallop is present.  Abdomen: Soft and nontender.    Extremities with good peripheral pulses with bilateral lower extremity edema  Skin: Warm.  Right great toe ulceration with necrotic tissue.  Left great toe ulceration.    Musculoskeletal system is grossly normal.  CNS grossly normal.       Results Review:  I have personally reviewed the results from the time of this admission to 7/1/2024 12:50 EDT and agree with these findings:  [x]  Laboratory  [x]  Microbiology  [x]  Radiology  [x]  EKG/Telemetry   [x]  Cardiology/Vascular   []  Pathology  [x]  Old records  []  Other:    Most notable findings include:     Lab Results (last 24 hours)       Procedure Component Value Units Date/Time    Procalcitonin [[id number]] Collected: 07/01/24 0910    Specimen: Blood Updated: " 07/01/24 1243    POC Glucose Q1H [[id number]]  (Normal) Collected: 07/01/24 1156    Specimen: Blood Updated: 07/01/24 1158     Glucose 101 mg/dL      Comment: Serial Number: [id number]:  [id number]       STAT Lactic Acid, Reflex [[id number]]  (Abnormal) Collected: 07/01/24 0910    Specimen: Blood Updated: 07/01/24 0959     Lactate 2.2 mmol/L     High Sensitivity Troponin T 2Hr [[id number]]  (Abnormal) Collected: 07/01/24 0910    Specimen: Blood Updated: 07/01/24 0956     HS Troponin T 36 ng/L      Comment: Specimen hemolyzed.  Results may be falsely decreased.        Troponin T Delta -4 ng/L     Narrative:      High Sensitive Troponin T Reference Range:  <14.0 ng/L- Negative Female for AMI  <22.0 ng/L- Negative Male for AMI  >=14 - Abnormal Female indicating possible myocardial injury.  >=22 - Abnormal Male indicating possible myocardial injury.   Clinicians would have to utilize clinical acumen, EKG, Troponin, and serial changes to determine if it is an Acute Myocardial Infarction or myocardial injury due to an underlying chronic condition.         Comprehensive Metabolic Panel [[id number]]  (Abnormal) Collected: 07/01/24 0910    Specimen: Blood Updated: 07/01/24 0956     Glucose 102 mg/dL      BUN 46 mg/dL      Creatinine 1.33 mg/dL      Sodium 134 mmol/L      Potassium 5.4 mmol/L      Comment: Specimen hemolyzed.  Result may be falsely elevated.        Chloride 97 mmol/L      CO2 31.4 mmol/L      Calcium 10.0 mg/dL      Total Protein 5.9 g/dL      Albumin 2.5 g/dL      ALT (SGPT) 17 U/L      Comment: Specimen hemolyzed.  Result may  be falsely elevated.        AST (SGOT) 39 U/L      Comment: Specimen hemolyzed.  Result may be falsely elevated.        Alkaline Phosphatase 115 U/L      Total Bilirubin 1.3 mg/dL      Globulin 3.4 gm/dL      A/G Ratio 0.7 g/dL      BUN/Creatinine Ratio 34.6     Anion Gap 5.6 mmol/L      eGFR 43.4 mL/min/1.73     Narrative:      GFR Normal >60  Chronic Kidney Disease <60  Kidney  Failure <15      Magnesium [[id number]]  (Normal) Collected: 07/01/24 0910    Specimen: Blood Updated: 07/01/24 0956     Magnesium 2.0 mg/dL     Vancomycin, Random [[id number]]  (Normal) Collected: 07/01/24 0910    Specimen: Blood Updated: 07/01/24 0953     Vancomycin Random 14.30 mcg/mL     Narrative:      Therapeutic Ranges for Vancomycin    Vancomycin Random   5.0-40.0 mcg/mL  Vancomycin Trough   5.0-20.0 mcg/mL  Vancomycin Peak     20.0-40.0 mcg/mL    Phosphorus [[id number]]  (Normal) Collected: 07/01/24 0910    Specimen: Blood Updated: 07/01/24 0953     Phosphorus 3.4 mg/dL     TSH Rfx On Abnormal To Free T4 [[id number]]  (Normal) Collected: 07/01/24 0910    Specimen: Blood Updated: 07/01/24 0953     TSH 2.080 uIU/mL     Calcium, Ionized [[id number]]  (Abnormal) Collected: 07/01/24 0910    Specimen: Blood Updated: 07/01/24 0935     Ionized Calcium 1.34 mmol/L     CBC & Differential [[id number]]  (Abnormal) Collected: 07/01/24 0910    Specimen: Blood Updated: 07/01/24 0917    Narrative:      The following orders were created for panel order CBC & Differential.  Procedure                               Abnormality         Status                     ---------                               -----------         ------                     CBC Auto Differential[[id number]]        Abnormal            Final result                 Please view results for these tests on the individual orders.    CBC Auto Differential [[id number]]  (Abnormal) Collected: 07/01/24 0910    Specimen: Blood Updated: 07/01/24 0917     WBC 13.80 10*3/mm3      RBC 4.62 10*6/mm3      Hemoglobin 14.2 g/dL      Hematocrit 43.8 %      MCV 94.8 fL      MCH 30.7 pg      MCHC 32.4 g/dL      RDW 15.5 %      RDW-SD 52.2 fl      MPV 10.6 fL      Platelets 104 10*3/mm3      Neutrophil % 79.4 %      Lymphocyte % 14.1 %      Monocyte % 5.3 %      Eosinophil % 0.3 %      Basophil % 0.1 %      Immature Grans % 0.8 %      Neutrophils, Absolute 10.96 10*3/mm3       Lymphocytes, Absolute 1.94 10*3/mm3      Monocytes, Absolute 0.73 10*3/mm3      Eosinophils, Absolute 0.04 10*3/mm3      Basophils, Absolute 0.02 10*3/mm3      Immature Grans, Absolute 0.11 10*3/mm3      nRBC 0.2 /100 WBC     Urine Culture - Urine, Urine, Catheter [[id number]] Collected: 06/30/24 1749    Specimen: Urine, Catheter Updated: 07/01/24 0827    POC Glucose Once [[id number]]  (Normal) Collected: 07/01/24 0752    Specimen: Blood Updated: 07/01/24 0753     Glucose 103 mg/dL      Comment: Serial Number: [id number]:  [id number]       MRSA Screen, PCR (Inpatient) - Swab, Nares [[id number]]  (Abnormal) Collected: 06/30/24 2340    Specimen: Swab from Nares Updated: 07/01/24 0125     MRSA PCR MRSA Detected    Narrative:      The negative predictive value of this diagnostic test is high and should only be used to consider de-escalating anti-MRSA therapy. A positive result may indicate colonization with MRSA and must be correlated clinically.    Respiratory Panel PCR w/COVID-19(SARS-CoV-2) NIKKI/CHRISTY/RAMIRO/PAD/COR/FRANCIA In-House, NP Swab in UTM/VTM, 2 HR TAT - Swab, Nasopharynx [[id number]]  (Abnormal) Collected: 06/30/24 2340    Specimen: Swab from Nasopharynx Updated: 07/01/24 0055     ADENOVIRUS, PCR Not Detected     Coronavirus 229E Not Detected     Coronavirus HKU1 Not Detected     Coronavirus NL63 Not Detected     Coronavirus OC43 Not Detected     COVID19 Not Detected     Human Metapneumovirus Not Detected     Human Rhinovirus/Enterovirus Detected     Influenza A PCR Not Detected     Influenza B PCR Not Detected     Parainfluenza Virus 1 Not Detected     Parainfluenza Virus 2 Not Detected     Parainfluenza Virus 3 Not Detected     Parainfluenza Virus 4 Not Detected     RSV, PCR Not Detected     Bordetella pertussis pcr Not Detected     Bordetella parapertussis PCR Not Detected     Chlamydophila pneumoniae PCR Not Detected     Mycoplasma pneumo by PCR Not Detected    Narrative:      In the setting of a positive  respiratory panel with a viral infection PLUS a negative procalcitonin without other underlying concern for bacterial infection, consider observing off antibiotics or discontinuation of antibiotics and continue supportive care. If the respiratory panel is positive for atypical bacterial infection (Bordetella pertussis, Chlamydophila pneumoniae, or Mycoplasma pneumoniae), consider antibiotic de-escalation to target atypical bacterial infection.    POC Glucose Q1H [[id number]]  (Abnormal) Collected: 06/30/24 2323    Specimen: Blood Updated: 06/30/24 2324     Glucose 142 mg/dL      Comment: Serial Number: [id number]:  [id number]       Comprehensive Metabolic Panel [[id number]]  (Abnormal) Collected: 06/30/24 2043    Specimen: Blood from Arm, Right Updated: 06/30/24 2111     Glucose 194 mg/dL      BUN 50 mg/dL      Creatinine 1.38 mg/dL      Sodium 131 mmol/L      Potassium 6.5 mmol/L      Comment: Slight hemolysis detected by analyzer. Result may be falsely elevated.        Chloride 101 mmol/L      CO2 24.4 mmol/L      Calcium 10.1 mg/dL      Total Protein 6.2 g/dL      Albumin 2.5 g/dL      ALT (SGPT) 18 U/L      AST (SGOT) 42 U/L      Comment: Slight hemolysis detected by analyzer. Result may be falsely elevated.        Alkaline Phosphatase 126 U/L      Total Bilirubin 1.6 mg/dL      Globulin 3.7 gm/dL      A/G Ratio 0.7 g/dL      BUN/Creatinine Ratio 36.2     Anion Gap 5.6 mmol/L      eGFR 41.5 mL/min/1.73     Narrative:      GFR Normal >60  Chronic Kidney Disease <60  Kidney Failure <15      STAT Lactic Acid, Reflex [[id number]]  (Abnormal) Collected: 06/30/24 2043    Specimen: Blood from Arm, Right Updated: 06/30/24 2111     Lactate 2.4 mmol/L     High Sensitivity Troponin T [[id number]]  (Abnormal) Collected: 06/30/24 2043    Specimen: Blood from Arm, Right Updated: 06/30/24 2109     HS Troponin T 40 ng/L     Narrative:      High Sensitive Troponin T Reference Range:  <14.0 ng/L- Negative Female for AMI  <22.0  ng/L- Negative Male for AMI  >=14 - Abnormal Female indicating possible myocardial injury.  >=22 - Abnormal Male indicating possible myocardial injury.   Clinicians would have to utilize clinical acumen, EKG, Troponin, and serial changes to determine if it is an Acute Myocardial Infarction or myocardial injury due to an underlying chronic condition.         Extra Tubes [[id number]] Collected: 06/30/24 2043    Specimen: Blood from Arm, Right Updated: 06/30/24 2101    Narrative:      The following orders were created for panel order Extra Tubes.  Procedure                               Abnormality         Status                     ---------                               -----------         ------                     Gold Top - SST[[id number]]                                   Final result                 Please view results for these tests on the individual orders.    Gold Top - SST [[id number]] Collected: 06/30/24 2043    Specimen: Blood from Arm, Right Updated: 06/30/24 2101     Extra Tube Hold for add-ons.     Comment: Auto resulted.       Blood Culture - Blood, Arm, Right [[id number]] Collected: 06/30/24 1844    Specimen: Blood from Arm, Right Updated: 06/30/24 1904    POC CHEM 8 [[id number]]  (Abnormal) Collected: 06/30/24 1858    Specimen: Venous Blood Updated: 06/30/24 1902     Glucose 189 mg/dL      BUN 77 mg/dL      Creatinine 1.40 mg/dL      Sodium 127 mmol/L      POC Potassium 7.8 mmol/L      Chloride 99 mmol/L      Total CO2 27 mmol/L      Anion Gap 10.0 mmol/L      Comment: Serial Number: [id number]:  [id number]        Hemoglobin 17.0 g/dL      Hematocrit 50 %      Ionized Calcium 1.08 mmol/L      eGFR 40.8 mL/min/1.73     Urinalysis, Microscopic Only - Urine, Catheter [[id number]]  (Abnormal) Collected: 06/30/24 1749    Specimen: Urine, Catheter Updated: 06/30/24 1836     RBC, UA 11-20 /HPF      WBC, UA 11-20 /HPF      Bacteria, UA 3+ /HPF      Squamous Epithelial Cells, UA 3-6 /HPF      Renal Epithelial  Cells, UA 0-2 /HPF      Hyaline Casts, UA 0-2 /LPF      Methodology Manual Light Microscopy    CBC & Differential [[id number]]  (Abnormal) Collected: 06/30/24 1732    Specimen: Blood Updated: 06/30/24 1825    Narrative:      The following orders were created for panel order CBC & Differential.  Procedure                               Abnormality         Status                     ---------                               -----------         ------                     CBC Auto Differential[[id number]]        Abnormal            Final result               Scan Slide[[id number]]                                       Final result                 Please view results for these tests on the individual orders.    CBC Auto Differential [[id number]]  (Abnormal) Collected: 06/30/24 1732    Specimen: Blood Updated: 06/30/24 1825     WBC 16.80 10*3/mm3      RBC 5.16 10*6/mm3      Hemoglobin 15.9 g/dL      Hematocrit 49.0 %      MCV 95.0 fL      MCH 30.8 pg      MCHC 32.4 g/dL      RDW 15.2 %      RDW-SD 52.5 fl      MPV 12.0 fL      Platelets 119 10*3/mm3      Neutrophil % 83.6 %      Lymphocyte % 7.8 %      Monocyte % 7.3 %      Eosinophil % 0.1 %      Basophil % 0.2 %      Immature Grans % 1.0 %      Neutrophils, Absolute 14.04 10*3/mm3      Lymphocytes, Absolute 1.31 10*3/mm3      Monocytes, Absolute 1.23 10*3/mm3      Eosinophils, Absolute 0.01 10*3/mm3      Basophils, Absolute 0.04 10*3/mm3      Immature Grans, Absolute 0.17 10*3/mm3      nRBC 0.8 /100 WBC     Scan Slide [[id number]] Collected: 06/30/24 1732    Specimen: Blood Updated: 06/30/24 1825     RBC Morphology Normal     WBC Morphology Normal     Platelet Estimate Decreased    BNP [[id number]]  (Abnormal) Collected: 06/30/24 1732    Specimen: Blood Updated: 06/30/24 1804     proBNP 19,860.0 pg/mL     Narrative:      This assay is used as an aid in the diagnosis of individuals suspected of having heart failure. It can be used as an aid in the diagnosis of acute decompensated  heart failure (ADHF) in patients presenting with signs and symptoms of ADHF to the emergency department (ED). In addition, NT-proBNP of <300 pg/mL indicates ADHF is not likely.    Age Range Result Interpretation  NT-proBNP Concentration (pg/mL:      <50             Positive            >450                   Gray                 300-450                    Negative             <300    50-75           Positive            >900                  Gray                300-900                  Negative            <300      >75             Positive            >1800                  Gray                300-1800                  Negative            <300    Blood Culture - Blood, Arm, Right [[id number]] Collected: 06/30/24 1757    Specimen: Blood from Arm, Right Updated: 06/30/24 1803    Urinalysis With Microscopic If Indicated (No Culture) - Urine, Catheter [[id number]]  (Abnormal) Collected: 06/30/24 1749    Specimen: Urine, Catheter Updated: 06/30/24 1802     Color, UA [place]     Comment: Any Substance that causes an abnormal urine color can alter the accuracy of the chemical reactions.        Appearance, UA Turbid     pH, UA <=5.0     Specific Gravity, UA 1.020     Glucose, UA Negative     Ketones, UA Trace     Bilirubin, UA Small (1+)     Comment: Confirmation testing is unavailable.  A serum bilirubin is recommended for further assessment.        Blood, UA Moderate (2+)     Protein, UA 30 mg/dL (1+)     Leuk Esterase, UA Moderate (2+)     Nitrite, UA Positive     Urobilinogen, UA 1.0 E.U./dL    POC Lactate [[id number]]  (Abnormal) Collected: 06/30/24 1737    Specimen: Blood Updated: 06/30/24 1739     Lactate 3.1 mmol/L      Comment: Serial Number: [id number]:  [id number]               Imaging Results (Last 24 Hours)       Procedure Component Value Units Date/Time    US Non-ob Transvaginal [[id number]] Resulted: 07/01/24 1248     Updated: 07/01/24 1248    CT Abdomen Pelvis Without Contrast [[id number]] Collected: 07/01/24  1135     Updated: 07/01/24 1141    Narrative:      CT ABDOMEN PELVIS WO CONTRAST    Date of Exam: 7/1/2024 11:30 AM EDT    Indication: abdominal pain, possible panniculitis.    Comparison: None available.    Technique: Axial CT images were obtained of the abdomen and pelvis without the administration of contrast. Sagittal and coronal reconstructions were performed.  Automated exposure control and iterative reconstruction methods were used.      Findings:  There is severe cardiomegaly. There is a moderately large right pleural effusion. There is a small left pleural effusion. There are bibasilar lung infiltrates with areas of atelectasis. There is some contrast in the renal collecting systems from CT   angiogram performed on today's date. There is a small right lower pole parapelvic renal cyst. There is no hydronephrosis. There is bilateral flank edema, greatest on the right. Partially filled bladder appears normal. There is a lobulated mass protruding   from the uterine fundus measuring approximately 8.4 x 5.9 cm measured on image #60 of series 2. There is no large or small bowel dilatation. There is no bowel wall thickening. The appendix is not identified. The abdominal aorta is mildly calcified and   is normal in size. The pancreas is atrophic. The spleen, liver, and adrenal glands appear normal in size. The gallbladder and biliary tree are nondilated.      Impression:      Impression:  Moderately large right pleural effusion and small left pleural effusion. Bibasilar infiltrates suggest atelectasis although associated pneumonia is not excluded.    Severe cardiomegaly.    Bilateral flank edema, greatest on the right.    Probable fibroid uterus. This could be confirmed with ultrasound.            Electronically Signed: Krystal Lee MD    7/1/2024 11:39 AM EDT    Workstation ID: [id number]    CT Angiogram Chest Pulmonary Embolism [[id number]] Collected: 07/01/24 1007     Updated: 07/01/24 1016    Narrative:      CT  ANGIOGRAM CHEST PULMONARY EMBOLISM    Date of Exam: 7/1/2024 10:03 AM EDT    Indication: BLE DVT's, new cardiac arrhythmia, eval for PE.    Comparison: Chest x-ray 6/30/2024    Technique: Axial CT images were obtained of the chest after the uneventful intravenous administration of iodinated contrast utilizing pulmonary embolism protocol.  Sagittal and coronal reconstructions were performed.  Automated exposure control and   iterative reconstruction methods were used.      Findings:  PULMONARY VASCULATURE: Pulmonary arteries are widely patent without evidence of embolus.Main pulmonary artery is normal in size. No evidence of right heart strain.    MEDIASTINUM: The heart is prominent in size. There is biatrial and left ventricular enlargement. No significant pericardial effusion. No evidence for aortic enlargement or dissection.  CORONARY ARTERIES: Mild calcified atherosclerotic disease.  LUNGS: There is bibasilar airspace disease with early consolidation noted. Some focal infiltrate is present in the right middle lung as well with scattered groundglass densities throughout there is a subpleural nodule in the posterior right apical region   measuring 4 mm (image 27 of series 4).  PLEURAL SPACE: There are mild bilateral pleural effusions. No evidence for pneumothorax.  LYMPH NODES: There are no pathologically enlarged lymph nodes.    UPPER ABDOMEN: There is bilateral flank edema.    OSSEOUS STRUCTURES: Appropriate for age with no acute process identified. There is multilevel thoracic spondylosis.      Impression:      Impression:    1. No evidence for pulmonary embolus.    2. Bibasilar airspace disease and right middle lung opacity compatible likely multifocal pneumonia with bilateral pleural effusions.    3. Cardiomegaly.        Electronically Signed: Shari Kaur MD    7/1/2024 10:14 AM EDT    Workstation ID: [id number]    CT Lower Extremity Bilateral Without Contrast [[id number]] Collected: 07/01/24 0149      Updated: 07/01/24 0155    Narrative:      CT LOWER EXTREMITY BILATERAL WO CONTRAST    Date of Exam: 6/30/2024 10:48 PM EDT    Indication: BLE edema L>R, multiple ulcerations, erythema.    Comparison: None available.    Technique: Axial CT images were obtained of the bilateral lower extremities without contrast administration.  Sagittal and coronal reconstructions were performed.  Automated exposure control and iterative reconstruction methods were used.      Findings:  There is bilateral lower extremity soft tissue swelling greater on the left than the right. There is diffuse skin thickening bilaterally involving the thighs and to a lesser degree the lower legs. There is no evidence of an acute osseous abnormality.   There are advanced degenerative changes of both knees. There is no definite enlargement of the venous structures. There is no soft tissue gas. There is no well-formed abscess.      Impression:      Impression:  Bilateral lower extremity soft tissue swelling greater on the left than the right. There is no soft tissue gas or well-formed abscess.        Electronically Signed: Obed Sidhu MD    7/1/2024 1:53 AM EDT    Workstation ID: [id number]    XR Chest 1 View [[id number]] Collected: 06/30/24 1927     Updated: 06/30/24 1929    Narrative:      XR CHEST 1 VW    Date of Exam: 6/30/2024 7:06 PM EDT    Indication: Shortness of breath    Comparison: 6/4/2009    Findings: Vascular congestion. Small effusions. No pneumothorax. The clavicles are intact. No rib fractures.      Impression:      Vascular congestion with small effusions      Electronically Signed: Eamno Brock MD    6/30/2024 7:27 PM EDT    Workstation ID: [id number]            LAB RESULTS (LAST 7 DAYS)    CBC  Results from last 7 days   Lab Units 07/01/24  0910 06/30/24  1858 06/30/24  1732   WBC 10*3/mm3 13.80*  --  16.80*   RBC 10*6/mm3 4.62  --  5.16   HEMOGLOBIN g/dL 14.2  --  15.9   HEMOGLOBIN, POC g/dL  --  17.0  --    HEMATOCRIT % 43.8  --  49.0*    HEMATOCRIT POC %  --  50  --    MCV fL 94.8  --  95.0   PLATELETS 10*3/mm3 104*  --  119*       BMP  Results from last 7 days   Lab Units 07/01/24 0910 06/30/24 2043 06/30/24  1858   SODIUM mmol/L 134* 131*  --    POTASSIUM mmol/L 5.4* 6.5*  --    CHLORIDE mmol/L 97* 101  --    CO2 mmol/L 31.4* 24.4  --    BUN mg/dL 46* 50*  --    CREATININE mg/dL 1.33* 1.38* 1.40*   GLUCOSE mg/dL 102* 194*  --    MAGNESIUM mg/dL 2.0  --   --    PHOSPHORUS mg/dL 3.4  --   --        CMP   Results from last 7 days   Lab Units 07/01/24 0910 06/30/24 2043 06/30/24  1858   SODIUM mmol/L 134* 131*  --    POTASSIUM mmol/L 5.4* 6.5*  --    CHLORIDE mmol/L 97* 101  --    CO2 mmol/L 31.4* 24.4  --    BUN mg/dL 46* 50*  --    CREATININE mg/dL 1.33* 1.38* 1.40*   GLUCOSE mg/dL 102* 194*  --    ALBUMIN g/dL 2.5* 2.5*  --    BILIRUBIN mg/dL 1.3* 1.6*  --    ALK PHOS U/L 115 126*  --    AST (SGOT) U/L 39* 42*  --    ALT (SGPT) U/L 17 18  --        BNP        TROPONIN  Results from last 7 days   Lab Units 07/01/24  0910   HSTROP T ng/L 36*       CoAg        Creatinine Clearance  Estimated Creatinine Clearance: 42 mL/min (A) (by C-G formula based on SCr of 1.33 mg/dL (H)).    ABG          Radiology  CT Abdomen Pelvis Without Contrast    Result Date: 7/1/2024  Impression: Moderately large right pleural effusion and small left pleural effusion. Bibasilar infiltrates suggest atelectasis although associated pneumonia is not excluded. Severe cardiomegaly. Bilateral flank edema, greatest on the right. Probable fibroid uterus. This could be confirmed with ultrasound. Electronically Signed: Krystal Lee MD  7/1/2024 11:39 AM EDT  Workstation ID: [id number]    Adult Transthoracic Echo Complete w/ Color, Spectral and Contrast if Necessary Per Protocol    Addendum Date: 7/1/2024      Left ventricular ejection fraction appears to be 31 - 35%.   Left ventricular diastolic dysfunction is noted.   The left atrial cavity is dilated.   Moderate aortic valve  stenosis is present. Mean/peak gradient of 14/24 mmHg.  Dimensionless index 0.36   Moderate to severe mitral valve regurgitation is present.   Estimated right ventricular systolic pressure from tricuspid regurgitation is normal (<35 mmHg).     CT Angiogram Chest Pulmonary Embolism    Result Date: 7/1/2024  Impression: 1. No evidence for pulmonary embolus. 2. Bibasilar airspace disease and right middle lung opacity compatible likely multifocal pneumonia with bilateral pleural effusions. 3. Cardiomegaly. Electronically Signed: Shari Kaur MD  7/1/2024 10:14 AM EDT  Workstation ID: [id number]    CT Lower Extremity Bilateral Without Contrast    Result Date: 7/1/2024  Impression: Bilateral lower extremity soft tissue swelling greater on the left than the right. There is no soft tissue gas or well-formed abscess. Electronically Signed: Obed Sidhu MD  7/1/2024 1:53 AM EDT  Workstation ID: [id number]    XR Chest 1 View    Result Date: 6/30/2024  Vascular congestion with small effusions Electronically Signed: Eamon Brock MD  6/30/2024 7:27 PM EDT  Workstation ID: [id number]       EKG  I personally viewed and interpreted the patient's EKG/Telemetry data:  ECG 12 Lead Drug Monitoring; Amiodarone   Preliminary Result   HEART GBQY=116  bpm   RR Msljkfwt=961  ms   VA Interval=  ms   P Horizontal Axis=  deg   P Front Axis=  deg   QRSD Crecvepw=703  ms   QT Mxiksjqz=676  ms   QCvK=470  ms   QRS Axis=-80  deg   T Wave Axis=102  deg   - ABNORMAL ECG -   Atrial fibrillation   Right bundle branch block   Inferior infarct, old   Anterolateral infarct, age indeterminate   Date and Time of Study:2024-07-01 04:42:13      ECG 12 Lead Dyspnea   Final Result   HEART ARYB=415  bpm   RR Flzkupah=803  ms   VA Interval=  ms   P Horizontal Axis=  deg   P Front Axis=  deg   QRSD Afuxedav=379  ms   QT Ohmmlnkr=510  ms   YErF=440  ms   QRS Axis=-101  deg   T Wave Axis=43  deg   - ABNORMAL ECG -   Atrial fibrillation   Right bundle branch block   ST  elevation secondary to IVCD   Electronically Signed By: Jeffery Kwon (RAMIRO) 2024-07-01 07:37:02   Date and Time of Study:2024-06-30 16:58:43      Telemetry Scan   Final Result      Telemetry Scan   Final Result      Telemetry Scan   Final Result      Telemetry Scan   Final Result      ECG 12 Lead Electrolyte Imbalance    (Results Pending)         Echocardiogram:    Results for orders placed during the hospital encounter of 06/30/24    Adult Transthoracic Echo Complete w/ Color, Spectral and Contrast if Necessary Per Protocol    Interpretation Summary    Left ventricular ejection fraction appears to be 31 - 35%.    Left ventricular diastolic dysfunction is noted.    The left atrial cavity is dilated.    Moderate aortic valve stenosis is present. Mean/peak gradient of 14/24 mmHg.  Dimensionless index 0.36    Moderate to severe mitral valve regurgitation is present.    Estimated right ventricular systolic pressure from tricuspid regurgitation is normal (<35 mmHg).        Stress Test:        Cardiac Catheterization:  No results found for this or any previous visit.        Other:      ASSESSMENT & PLAN:    Principal Problem:    Atrial fibrillation with RVR  Active Problems:    Primary hypertension    Morbid obesity with BMI of 40.0-44.9, adult    Right bundle branch block    Acute deep vein thrombosis (DVT) of both lower extremities    ARABELLA (acute kidney injury)    Acute hyperkalemia    Sepsis    Acute UTI    Acute CHF    Rhinovirus infection    Multifocal pneumonia    Chronic respiratory failure with hypoxia, on home O2 therapy    Skin ulcer of right great toe    Skin ulcer of left great toe    SEDRICK (obstructive sleep apnea)      Atrial fibrillation with RVR  New onset A-fib RVR  We will correct hyperkalemia  Discontinue Cardizem drip  Start amiodarone  Start Toprol-XL  Echocardiogram shows EF of 31 to 35%  [id number] score is 7  Currently on Lovenox  Start oral anticoagulation with Xarelto or Eliquis    Acute systolic  CHF  Newly diagnosed  Echocardiogram shows EF of 31 to 35%, moderate aortic stenosis and moderate to severe mitral regurgitation.  Started on IV diuretics.  Monitor I's and O's and replace electrolytes as needed.  We will initiate and uptitrate GDMT as tolerated.  Currently blood pressure is low.  Plan to start Toprol-XL  Plan to repeat echocardiogram after 3 months of completing GDMT    Acute deep vein thrombosis (DVT) of both lower extremities  Extensive DVT in bilateral lower extremities involving femoral, popliteal and posterior tibial.  Continue anticoagulation  She will need lifelong anticoagulation    ARABELLA (acute kidney injury)  Presented with creatinine of 1.4.  Expected to improve with diuretics  Closely monitor renal function while on IV diuresis    Acute hyperkalemia  Hyperkalemia of 6.5  Received Lokelma  Potassium is down to 5.4  Closely monitor electrolytes while on diuretics    Sepsis / UTI / Multifocal pneumonia  Multifactorial secondary to UTI and pneumonia with possible cellulitis  Currently on antibiotics per primary team  Lactic acidosis is improving.    Rhinovirus infection  Provide supportive care    Chronic respiratory failure with hypoxia, on home O2 therapy  Currently requiring 2 L of oxygen via nasal cannula which is her baseline    Skin ulcer of right great toe  Skin ulcer of left great toe  Podiatry has been consulted  Check A1c    Primary hypertension, chronic  Currently hypotensive likely secondary to sepsis  Started on midodrine  We will try to initiate Toprol-XL    Morbid obesity with BMI of 40.0-44.9, adult  BMI is 44.  She weighs 212 pounds  Lifestyle modifications discussed with the patient  Obtain a lipid panel and A1c    SEDRICK (obstructive sleep apnea)  Encouraged compliance with CPAP      Austin Brooks MD  07/01/24  12:50 EDT

## 2024-07-01 NOTE — PROGRESS NOTES
"Pharmacy Antimicrobial Dosing Service    Subjective:  Ban Brennan is a 69 y.o.female admitted with Afib w/RVR. Pharmacy has been consulted to dose Vancomycin and Cefepime for possible SSTI.    PMH: chronic leg edema, CHF      Assessment/Plan    1. Day #2 Vancomycin: Pulse dosing d/t renal dysfxn. Random vancomycin level ~11 hours post vancomycin 1500 mg (~15 mg/kg) IV x1 = 14.3 mcg/mL. Will give another vancomycin 1500 mg IV x1 and obtain random with AM labs. Plan to re-dose when level is expected to be <20 mcg/mL. Uncertain of baseline renal function, but in January SCr was 0.8 mg/dL.     2. Day #2 Cefepime: 2gm IV q12h for estCrCl 30-59 mL/min.    Will continue to monitor drug levels, renal function, culture and sensitivities, and patient clinical status.       Objective:  Relevant clinical data and objective history reviewed:  147.3 cm (58\")   96.2 kg (212 lb)   Ideal body weight: 47.1 kg (103 lb 14.5 oz)  Adjusted ideal body weight: 66.7 kg (147 lb 2.3 oz)  Body mass index is 44.31 kg/m².    Results from last 7 days   Lab Units 07/01/24  0910   VANCOMYCIN RM mcg/mL 14.30     Results from last 7 days   Lab Units 07/01/24  0910 06/30/24  2043 06/30/24  1858   CREATININE mg/dL 1.33* 1.38* 1.40*     Estimated Creatinine Clearance: 42 mL/min (A) (by C-G formula based on SCr of 1.33 mg/dL (H)).  I/O last 3 completed shifts:  In: 898 [P.O.:40; I.V.:858]  Out: 1100 [Urine:1100]    Results from last 7 days   Lab Units 07/01/24  0910 06/30/24  1732   WBC 10*3/mm3 13.80* 16.80*     Temperature    07/01/24 0012 07/01/24 0343 07/01/24 0800   Temp: 97.2 °F (36.2 °C) 97.2 °F (36.2 °C) 96.2 °F (35.7 °C)     Baseline culture/source/susceptibility:  Microbiology Results (last 10 days)       Procedure Component Value - Date/Time    Respiratory Panel PCR w/COVID-19(SARS-CoV-2) NIKKI/CHRISTY/RAMIRO/PAD/COR/FRANCIA In-House, NP Swab in UTM/VTM, 2 HR TAT - Swab, Nasopharynx [[id number]]  (Abnormal) Collected: 06/30/24 3668    Lab Status: " Final result Specimen: Swab from Nasopharynx Updated: 07/01/24 0055     ADENOVIRUS, PCR Not Detected     Coronavirus 229E Not Detected     Coronavirus HKU1 Not Detected     Coronavirus NL63 Not Detected     Coronavirus OC43 Not Detected     COVID19 Not Detected     Human Metapneumovirus Not Detected     Human Rhinovirus/Enterovirus Detected     Influenza A PCR Not Detected     Influenza B PCR Not Detected     Parainfluenza Virus 1 Not Detected     Parainfluenza Virus 2 Not Detected     Parainfluenza Virus 3 Not Detected     Parainfluenza Virus 4 Not Detected     RSV, PCR Not Detected     Bordetella pertussis pcr Not Detected     Bordetella parapertussis PCR Not Detected     Chlamydophila pneumoniae PCR Not Detected     Mycoplasma pneumo by PCR Not Detected    Narrative:      In the setting of a positive respiratory panel with a viral infection PLUS a negative procalcitonin without other underlying concern for bacterial infection, consider observing off antibiotics or discontinuation of antibiotics and continue supportive care. If the respiratory panel is positive for atypical bacterial infection (Bordetella pertussis, Chlamydophila pneumoniae, or Mycoplasma pneumoniae), consider antibiotic de-escalation to target atypical bacterial infection.    MRSA Screen, PCR (Inpatient) - Swab, Nares [[id number]]  (Abnormal) Collected: 06/30/24 2340    Lab Status: Final result Specimen: Swab from Nares Updated: 07/01/24 0125     MRSA PCR MRSA Detected    Narrative:      The negative predictive value of this diagnostic test is high and should only be used to consider de-escalating anti-MRSA therapy. A positive result may indicate colonization with MRSA and must be correlated clinically.            Jes Harper, Brea  07/01/24 12:20 EDT

## 2024-07-01 NOTE — PLAN OF CARE
Goal Outcome Evaluation:                                      Patient remains on 2LNC. Amio gtt. AOx4, follows commands. External cath colected around 1625cc, multiple incont episdoes. No BM on shift.     Wound, Hem/Oc, Cardio, ID all consulted/visited. CTs performed.    Remains ICU level patient.

## 2024-07-01 NOTE — CASE MANAGEMENT/SOCIAL WORK
Discharge Planning Assessment   Vince     Patient Name: Ban Brennan  MRN: [id number]  Today's Date: 7/1/2024    Admit Date: 6/30/2024    Plan: DC Plan: Pending clinical course and PT/OT evals. From home with family. Watch for AC plan. Current with [facility] for Home O2@3L nc. Will need transport with Medicaid at DC.   Discharge Needs Assessment       Row Name 07/01/24 1559       Living Environment    People in Home child(samreen), adult    Name(s) of People in Home Son Jose Brennan    Current Living Arrangements home    Potentially Unsafe Housing Conditions none    In the past 12 months has the electric, gas, oil, or water company threatened to shut off services in your home? No    Primary Care Provided by child(samreen);self    Provides Primary Care For no one, unable/limited ability to care for self    Family Caregiver if Needed child(samreen), adult    Family Caregiver Names Son Jose Brennan and Daughter Chelsi Valle    Quality of Family Relationships helpful;involved;supportive    Able to Return to Prior Arrangements yes       Resource/Environmental Concerns    Resource/Environmental Concerns none    Transportation Concerns none       Transportation Needs    In the past 12 months, has lack of transportation kept you from medical appointments or from getting medications? no    In the past 12 months, has lack of transportation kept you from meetings, work, or from getting things needed for daily living? No       Food Insecurity    Within the past 12 months, you worried that your food would run out before you got the money to buy more. Never true    Within the past 12 months, the food you bought just didn't last and you didn't have money to get more. Never true       Transition Planning    Patient/Family Anticipates Transition to home with family;home with help/services    Patient/Family Anticipated Services at Transition home health care    Transportation Anticipated health plan transportation       Discharge  Needs Assessment    Readmission Within the Last 30 Days no previous admission in last 30 days    Current Outpatient/Agency/Support Group homecare agency    Equipment Currently Used at Home cpap;oxygen;walker, rolling    Concerns to be Addressed discharge planning    Anticipated Changes Related to Illness none    Equipment Needed After Discharge none    Outpatient/Agency/Support Group Needs homecare agency    Discharge Facility/Level of Care Needs home with home health    Provided Post Acute Provider List? N/A    Provided Post Acute Provider Quality & Resource List? N/A    Patient's Choice of Community Agency(s) awaiting recommendations from therapy    Current Discharge Risk chronically ill;physical impairment                   Discharge Plan       Row Name 07/01/24 1601       Plan    Plan DC Plan: Pending clinical course and PT/OT evals. From home with family. Watch for AC plan. Current with [facility] for Home O2@3L nc. [place] need transport with Medicaid at [place].    Patient/Family in Agreement with Plan yes    Provided Post Acute Provider List? N/A    Provided Post Acute Provider Quality & Resource List? N/A    Plan Comments CM spoke with patient at bedside to discuss admission assessment and discharge planning. Patient confirms PCP and pharmacy. Patient has declined meds to bed program at this time. Patient denies any difficulty affording medications at this time. Patient would like to have home health services at disharge if recommended. Patien states she has had HH in the past, but cannot remember services used. No record in Epic available. Patient reports she is Independent at baseline, but does not drive. Patient will require Medicaid transportation at [place].CM spoke with intensivist, nursing, and NP to obtain clinical upates in morning rounds. Dr. Solorzano reports patient has Bilateral occlusive DVT's in LE's. CT PE chest done and PE ruled out this morning. Hem/Onc consult placed.CM will continue to follow for any  further needs and adjust discharge plan accordingly. DC Barriers: Cardiac monitoring, O2@3L nc, Amiodarone gtt, IV abx/Electrolytes/Lasix/Insulin/Bicarb push, monitor K+ and Lactic acid levels.               Expected Discharge Date and Time       Expected Discharge Date Expected Discharge Time    Jul 5, 2024            Demographic Summary       Row Name 07/01/24 1558       General Information    Admission Type inpatient    Arrived From emergency department;home    Required Notices Provided Important Message from Medicare    Referral Source admission list    Reason for Consult discharge planning    Preferred Language English       Contact Information    Permission Granted to Share Info With                    Functional Status       Row Name 07/01/24 1558       Functional Status    Usual Activity Tolerance moderate    Current Activity Tolerance moderate       Physical Activity    On average, how many days per week do you engage in moderate to strenuous exercise (like a brisk walk)? 0 days    On average, how many minutes do you engage in exercise at this level? 0 min    Number of minutes of exercise per week 0       Functional Status, IADL    Medications independent    Meal Preparation independent    Housekeeping independent    Laundry independent    Shopping independent       Mental Status    General Appearance WDL WDL       Mental Status Summary    Recent Changes in Mental Status/Cognitive Functioning no changes       Employment/    Employment Status disabled           Current or Previous  Service none                 Met with patient in room wearing PPE: mask, gloves, gown.      Maintain distance greater than six feet and spent less than fifteen minutes in the room.      Maria Victoria Montero RN     Office Phone: [phone]  Office Cell:     [phone]

## 2024-07-01 NOTE — PROGRESS NOTES
Critical Care Progress Note   Ban Brennan : 1955 MRN:[id number] LOS:1     Principal Problem: Atrial fibrillation with RVR     Reason for follow up: All the medical problems listed below    Summary     Ban Brennan is a 69 y.o. female with known PMH of hypertension, heart failure, chronic bilateral lower extremity edema who presented to the hospital for creasing shortness of breath, and was admitted with a principal diagnosis of Atrial fibrillation with RVR.  Patient is alert however lethargic and attempts to participate with HPI however she is a poor historian.  Supplemental information for HPI taken from conversation with daughter at bedside who states patient does not go to the doctor normally and has not been in the hospital for years.  She is unsure of what her full medical history is at this time but states that her legs have been swollen for a long time and that she has not been up and moving for approximately 1 year.  Patient has a home health nurse that comes out and wraps her legs twice a week.  On assessment patient's legs are both extremely red and swollen, left > right.  Patient's daughter states that redness of her legs is new however she is not sure how long it has been this bad.  EMS was called when she had worsening shortness of breath and found the patient with a wide complex tachycardia with heart rate of 190s.  She was given 150 mg of amiodarone by EMS and heart rate improved to 160s.  She is found to have a right bundle branch block however it is uncertain due to her limited history if this is chronic.     ED course: On arrival to emergency department EKG showed atrial fibrillation with RVR, heart rate 145.  CXR showed fluid overload with bilateral effusions.  Lactic acid was 3.1 and BNP 19,860.  Patient had a white count of 16.8 and 4+ bacteria in her urine.  Also noted to be hyperkalemic with a potassium of 6.5 in which she was treated with insulin D50, calcium gluconate,  and Lasix.  She was started on cefepime and vancomycin for cellulitis of her legs and UTI.     ACP: Patient is alert with some intermittent confusion.  Her daughter is at bedside and is her decision-maker along with her brother.  Daughter states patient is a full code.       Significant events     07/01/24 : Patient afebrile, heart rates are now in the low 100s to 110s, remains in atrial fibrillation, respirations within normal limits, BP is borderline at 99/63 mmHg, O2 sats 98% on 3 L nasal cannula.  She has had a total of 1.5 L of urine output since arrival, net -488 mL.  Given the large pleural effusion on chest x-ray, elevated BNP, and evidence of significant systolic heart failure and valvulopathy and echocardiogram, have started Bumex 2 mg IV push every 8 hours.  If this does not improve the pleural effusion, will consider proceeding with thoracentesis in the next day or so.  Review of her most recent chemistry panel shows that the potassium has improved to 5.4 with pharmacotherapeutic treatment.  I expect this will decrease even more with the Bumex.  Will need to monitor her creatinine closely as we diurese her.  I am going to repeat her chemistry panel at 4 PM today and go from there.  Her lactate is down to 2.2.  White blood cell count has decreased from 16.8-13.8, left neutrophilic shift on differential.  Urinalysis is consistent with acute cystitis with hematuria.  MRSA PCR screen was positive.  Respiratory viral PCR panel was positive for rhinovirus.  Bilateral lower extremity venous Dopplers were noted for bilateral lower extremity significant DVTs.  Have consulted cardiology to consider NRA versus need for IVC filter given the clot burden.  CTA chest was able to rule out pulmonary embolism.  Patient does have a large right pleural effusion as noted.  CT abdomen pelvis noted for masslike change on the uterus.  Given this, her acute bilateral lower extremity DVTs which is fairly uncommon, need to rule  out cancerous mass and therefore will get a transvaginal ultrasound for better view.  Patient has definite bilateral lower extremity venous stasis disease and definite acute left lower extremity cellulitis.  There is also a wound with fluctuance on the left foot which appears to be infected.  Have ordered for consult with podiatry.  In addition to the cellulitis, she appears to also possibly have some early panniculitis.  Continue the vancomycin and cefepime for now.  Have consulted ID.  Have ordered a baseline procalcitonin and hemoglobin A1c.  The possible pneumonia changes on the CT may be secondary to just the rhinovirus; however, will get an NT suction sputum to rule out bacterial superinfection.    Assessment / Plan     BLE DVT  Hx of PE, unprovoked  On treatment-dose lovenox  Consult cardiology for possible [place] vs IVC filter given degree of clot burden  Given hx of previous unprovoked PE, will consult hematology  Given mass on uterus, get TV U/S to R/O Ca causing hypercoagulable state        Atrial fibrillation with RVR  PAF  Chest pain  Acute on chronic systolic heart failure  Diastolic dysfxn  Moderate AS  Severe MR  Chronic lower extremity lymphedema  Fluid overload  Large right pleural effusion  Rate somewhat better controlled  Cardiology has started amio gtt  TTE from 7/1/2024 shows an ejection fraction low at 30 to 35%, diastolic dysfunction noted, moderate aortic valve stenosis, severe mitral regurgitation  TSH wnl  Bumex 2 mg IVP q8h        Severe sepsis  Acute cystitis with hematuria  LLE cellulitis  L foot wound, appears infected  Putative panniculitis  MRSA PCR +  Rhinovirus +  Putative multifocal PNA  Source of sepsis is multiple  Continue IV vanc and cefepime for now  Consult ID  Consult podiatry  Check HbA1c  Check baseline PCT  NT suctioned sputum ordered      Uterine mass  TV U/S ordered to eval for possible Ca, given BLE DVT's       Acute kidney injury  Hyperkalemia, improved  Creatinine  1.38, baseline unknown  Initial potassium 6.5; treated with sling/D50, calcium gluconate, and Lasix  IV fluids contraindicated  Avoid nephrotoxic agents  Avoid hypotension  Consider nephrology consult if no improvement       Essential hypertension  Hold home lisinopril and metoprolol as patient is borderline hypotensive  Resume when clinically appropriate        Critical Care Time:  45 mins.        Code status:   Code Status (Patient has no pulse and is not breathing): CPR (Attempt to Resuscitate)  Medical Interventions (Patient has pulse or is breathing): Full Support       Nutrition: NPO Diet NPO Type: Strict NPO   Patient isn't on Tube Feeding    VTE Prophylaxis:  Pharmacologic VTE prophylaxis orders are present.       Subjective / Review of systems     Review of Systems   Patient complains of chest pain, abdominal pain, some shortness of breath, bilateral lower extremity leg pain.  She denies any fevers, chills, sweats, nausea, vomiting, or diarrhea.  Objective / Physical Exam   Vital signs:  Temp: 96.2 °F (35.7 °C)  BP: 99/63  Heart Rate: 101  Resp: 18  SpO2: 98 %  Weight: 96.2 kg (212 lb)    Admission Weight: Weight: 108 kg (239 lb)  Current Weight: Weight: 96.2 kg (212 lb)    Input/Output in last 24 hours:    Intake/Output Summary (Last 24 hours) at 7/1/2024 1157  Last data filed at 7/1/2024 0845  Gross per 24 hour   Intake 1062 ml   Output 1550 ml   Net -488 ml      Physical Exam     GEN: Middle-aged, morbidly obese woman who appears much older than stated age.  Acute on chronically ill-appearing.  Malnourished appearing.  Sitting up in bed on nasal cannula.  NEURO:  Brainstem reflexes intact.  No obvious focal deficit.  Moves all 4 ext.  HEENT:  N/AT.  PERRL.  MMM.  Oropharynx non-erythematous.  No drainage from the eyes/ears/nose.  No conjunctival petechiae.  No oral thrush.  Auditory and visual acuity grossly wnl.  Voice normal.  NECK:  Supple, NT, trachea midline.  No meningismus.  No ROM limitation.  No  torticollis.  No JVD.  No thyromegaly.    CHEST/LUNGS:  Breath sounds are diminished but clear and equal bilaterally.  No w/r/r.  Chest excursion equal bilaterally.    CARDIOVASCULAR: Tachycardic, irregularly irregular rhythm w/ systolic ejection and holosystolic murmur noted.  GI:  Abdomen is obese, exquisitely tender to even light palpation in the suprapubic area and bilateral lower quadrants.  +BS.    :  Normal external genitalia.  No trimble cath in place.  EXTREMITIES: Bilateral lower extremities are noted for chronic venous stasis changes, left lower extremity is hyperemic with increased warmth and much tender to palpation on the right.  The right great toe has a old wound that appears to be not currently infected.  The left foot has a fluctuant area that does appear to be infected.  SKIN: As per above.  LYMPHATICS/HEME:  No overt LAD or abnormal bruising.  + lymphedema.  MSK:  Normal ROM.  No joint abnormalities noted.  Strength is 5/5 and equal in BUE and BLE's.  PSYCH:  Pleasant.  A&Ox 3.  Normal mood, flat affect.  Responds appropriately to commands and appears to comprehend instructions.        Radiology and Labs     Results from last 7 days   Lab Units 07/01/24  0910 06/30/24  1858 06/30/24  1732   WBC 10*3/mm3 13.80*  --  16.80*   HEMATOCRIT % 43.8  --  49.0*   HEMATOCRIT POC %  --  50  --    PLATELETS 10*3/mm3 104*  --  119*      Results from last 7 days   Lab Units 07/01/24  0910 06/30/24 2043 06/30/24  1858   SODIUM mmol/L 134* 131*  --    POTASSIUM mmol/L 5.4* 6.5*  --    CHLORIDE mmol/L 97* 101  --    CO2 mmol/L 31.4* 24.4  --    BUN mg/dL 46* 50*  --    CREATININE mg/dL 1.33* 1.38* 1.40*      Current medications   Scheduled Meds: [START ON 7/2/2024] amiodarone, 200 mg, Oral, Once   Followed by  [START ON 7/2/2024] amiodarone, 200 mg, Oral, Q8H   Followed by  [START ON 7/9/2024] amiodarone, 200 mg, Oral, Q12H   Followed by  [START ON 7/23/2024] amiodarone, 200 mg, Oral, Daily  calcium gluconate,  1,000 mg, Intravenous, Once  cefepime, 2,000 mg, Intravenous, Q12H  dextrose, 25 g, Intravenous, Once  enoxaparin, 1 mg/kg, Subcutaneous, Q12H  furosemide, 40 mg, Intravenous, Once  insulin regular, 10 Units, Intravenous, Once  midodrine, 5 mg, Oral, Q8H  sodium bicarbonate, 50 mEq, Intravenous, Once  sodium chloride, 10 mL, Intravenous, Q12H  sodium zirconium cyclosilicate, 10 g, Oral, Once      Continuous Infusions: amiodarone, 0.5 mg/min, Last Rate: 0.5 mg/min (07/01/24 0617)  dilTIAZem, 5-15 mg/hr, Last Rate: 5 mg/hr (06/30/24 2771)  Pharmacy to Dose Cefepime,   Pharmacy to Dose enoxaparin (LOVENOX),   Pharmacy to dose vancomycin,         Plan discussed with RN. Reviewed all other data in the last 24 hours, including but not limited to vitals, labs, microbiology, imaging and pertinent notes from other providers.     Sesar Solorzano DO   Critical Care  07/01/24   11:57 EDT

## 2024-07-02 ENCOUNTER — APPOINTMENT (OUTPATIENT)
Dept: GENERAL RADIOLOGY | Facility: HOSPITAL | Age: 69
End: 2024-07-02
Payer: MEDICARE

## 2024-07-02 LAB
ALBUMIN SERPL-MCNC: 2.3 G/DL (ref 3.5–5.2)
ALBUMIN/GLOB SERPL: 0.7 G/DL
ALP SERPL-CCNC: 109 U/L (ref 39–117)
ALT SERPL W P-5'-P-CCNC: 14 U/L (ref 1–33)
ANION GAP SERPL CALCULATED.3IONS-SCNC: 7.5 MMOL/L (ref 5–15)
AST SERPL-CCNC: 24 U/L (ref 1–32)
BASOPHILS # BLD AUTO: 0.01 10*3/MM3 (ref 0–0.2)
BASOPHILS NFR BLD AUTO: 0.1 % (ref 0–1.5)
BILIRUB SERPL-MCNC: 0.9 MG/DL (ref 0–1.2)
BUN SERPL-MCNC: 43 MG/DL (ref 8–23)
BUN/CREAT SERPL: 37.7 (ref 7–25)
CALCIUM SPEC-SCNC: 9.4 MG/DL (ref 8.6–10.5)
CHLORIDE SERPL-SCNC: 96 MMOL/L (ref 98–107)
CO2 SERPL-SCNC: 34.5 MMOL/L (ref 22–29)
CREAT SERPL-MCNC: 1.14 MG/DL (ref 0.57–1)
D-LACTATE SERPL-SCNC: 2.3 MMOL/L (ref 0.5–2)
DEPRECATED RDW RBC AUTO: 53.2 FL (ref 37–54)
EGFRCR SERPLBLD CKD-EPI 2021: 52.2 ML/MIN/1.73
EOSINOPHIL # BLD AUTO: 0.04 10*3/MM3 (ref 0–0.4)
EOSINOPHIL NFR BLD AUTO: 0.4 % (ref 0.3–6.2)
ERYTHROCYTE [DISTWIDTH] IN BLOOD BY AUTOMATED COUNT: 15.6 % (ref 12.3–15.4)
GLOBULIN UR ELPH-MCNC: 3.3 GM/DL
GLUCOSE SERPL-MCNC: 82 MG/DL (ref 65–99)
HCT VFR BLD AUTO: 42.9 % (ref 34–46.6)
HGB BLD-MCNC: 13.8 G/DL (ref 12–15.9)
IMM GRANULOCYTES # BLD AUTO: 0.05 10*3/MM3 (ref 0–0.05)
IMM GRANULOCYTES NFR BLD AUTO: 0.5 % (ref 0–0.5)
LDH SERPL-CCNC: 306 U/L (ref 135–214)
LYMPHOCYTES # BLD AUTO: 0.88 10*3/MM3 (ref 0.7–3.1)
LYMPHOCYTES NFR BLD AUTO: 8.4 % (ref 19.6–45.3)
MAGNESIUM SERPL-MCNC: 1.7 MG/DL (ref 1.6–2.4)
MCH RBC QN AUTO: 30.8 PG (ref 26.6–33)
MCHC RBC AUTO-ENTMCNC: 32.2 G/DL (ref 31.5–35.7)
MCV RBC AUTO: 95.8 FL (ref 79–97)
MONOCYTES # BLD AUTO: 0.85 10*3/MM3 (ref 0.1–0.9)
MONOCYTES NFR BLD AUTO: 8.1 % (ref 5–12)
NEUTROPHILS NFR BLD AUTO: 8.7 10*3/MM3 (ref 1.7–7)
NEUTROPHILS NFR BLD AUTO: 82.5 % (ref 42.7–76)
NRBC BLD AUTO-RTO: 0 /100 WBC (ref 0–0.2)
PHOSPHATE SERPL-MCNC: 4.6 MG/DL (ref 2.5–4.5)
PLATELET # BLD AUTO: 92 10*3/MM3 (ref 140–450)
PMV BLD AUTO: 10.5 FL (ref 6–12)
POTASSIUM SERPL-SCNC: 3.3 MMOL/L (ref 3.5–5.2)
POTASSIUM SERPL-SCNC: 3.5 MMOL/L (ref 3.5–5.2)
PROT SERPL-MCNC: 5.6 G/DL (ref 6–8.5)
RBC # BLD AUTO: 4.48 10*6/MM3 (ref 3.77–5.28)
RETICS # AUTO: 0.12 10*6/MM3 (ref 0.02–0.13)
RETICS/RBC NFR AUTO: 2.7 % (ref 0.7–1.9)
SODIUM SERPL-SCNC: 138 MMOL/L (ref 136–145)
VANCOMYCIN SERPL-MCNC: 22.4 MCG/ML (ref 5–40)
WBC NRBC COR # BLD AUTO: 10.53 10*3/MM3 (ref 3.4–10.8)

## 2024-07-02 PROCEDURE — 25010000002 ENOXAPARIN PER 10 MG: Performed by: NURSE PRACTITIONER

## 2024-07-02 PROCEDURE — 85045 AUTOMATED RETICULOCYTE COUNT: CPT | Performed by: NURSE PRACTITIONER

## 2024-07-02 PROCEDURE — 83735 ASSAY OF MAGNESIUM: CPT | Performed by: NURSE PRACTITIONER

## 2024-07-02 PROCEDURE — 84100 ASSAY OF PHOSPHORUS: CPT | Performed by: NURSE PRACTITIONER

## 2024-07-02 PROCEDURE — 99232 SBSQ HOSP IP/OBS MODERATE 35: CPT | Performed by: PODIATRIST

## 2024-07-02 PROCEDURE — 83615 LACTATE (LD) (LDH) ENZYME: CPT | Performed by: NURSE PRACTITIONER

## 2024-07-02 PROCEDURE — 25010000002 VANCOMYCIN 1 G RECONSTITUTED SOLUTION 1 EACH VIAL: Performed by: INTERNAL MEDICINE

## 2024-07-02 PROCEDURE — 83605 ASSAY OF LACTIC ACID: CPT | Performed by: EMERGENCY MEDICINE

## 2024-07-02 PROCEDURE — 25010000002 BUMETANIDE PER 0.5 MG: Performed by: INTERNAL MEDICINE

## 2024-07-02 PROCEDURE — 83010 ASSAY OF HAPTOGLOBIN QUANT: CPT | Performed by: INTERNAL MEDICINE

## 2024-07-02 PROCEDURE — 80202 ASSAY OF VANCOMYCIN: CPT | Performed by: INTERNAL MEDICINE

## 2024-07-02 PROCEDURE — 84132 ASSAY OF SERUM POTASSIUM: CPT | Performed by: NURSE PRACTITIONER

## 2024-07-02 PROCEDURE — 25010000002 CEFTRIAXONE PER 250 MG: Performed by: NURSE PRACTITIONER

## 2024-07-02 PROCEDURE — 97165 OT EVAL LOW COMPLEX 30 MIN: CPT

## 2024-07-02 PROCEDURE — 93005 ELECTROCARDIOGRAM TRACING: CPT | Performed by: NURSE PRACTITIONER

## 2024-07-02 PROCEDURE — 25810000003 SODIUM CHLORIDE 0.9 % SOLUTION 250 ML FLEX CONT: Performed by: INTERNAL MEDICINE

## 2024-07-02 PROCEDURE — 97530 THERAPEUTIC ACTIVITIES: CPT

## 2024-07-02 PROCEDURE — 80053 COMPREHEN METABOLIC PANEL: CPT | Performed by: NURSE PRACTITIONER

## 2024-07-02 PROCEDURE — 93010 ELECTROCARDIOGRAM REPORT: CPT | Performed by: INTERNAL MEDICINE

## 2024-07-02 PROCEDURE — 71045 X-RAY EXAM CHEST 1 VIEW: CPT

## 2024-07-02 PROCEDURE — 99233 SBSQ HOSP IP/OBS HIGH 50: CPT | Performed by: INTERNAL MEDICINE

## 2024-07-02 PROCEDURE — 85025 COMPLETE CBC W/AUTO DIFF WBC: CPT | Performed by: NURSE PRACTITIONER

## 2024-07-02 PROCEDURE — 99232 SBSQ HOSP IP/OBS MODERATE 35: CPT | Performed by: STUDENT IN AN ORGANIZED HEALTH CARE EDUCATION/TRAINING PROGRAM

## 2024-07-02 RX ORDER — POTASSIUM CHLORIDE 20 MEQ/1
40 TABLET, EXTENDED RELEASE ORAL EVERY 4 HOURS
Status: COMPLETED | OUTPATIENT
Start: 2024-07-02 | End: 2024-07-03

## 2024-07-02 RX ORDER — OXYCODONE HYDROCHLORIDE 5 MG/1
10 TABLET ORAL 3 TIMES DAILY
Status: DISCONTINUED | OUTPATIENT
Start: 2024-07-02 | End: 2024-07-04

## 2024-07-02 RX ORDER — PANTOPRAZOLE SODIUM 40 MG/1
40 TABLET, DELAYED RELEASE ORAL
Status: DISCONTINUED | OUTPATIENT
Start: 2024-07-03 | End: 2024-07-04

## 2024-07-02 RX ORDER — MIDODRINE HYDROCHLORIDE 5 MG/1
10 TABLET ORAL EVERY 8 HOURS
Status: DISCONTINUED | OUTPATIENT
Start: 2024-07-02 | End: 2024-07-04

## 2024-07-02 RX ORDER — METOPROLOL SUCCINATE 25 MG/1
25 TABLET, EXTENDED RELEASE ORAL
Status: DISCONTINUED | OUTPATIENT
Start: 2024-07-02 | End: 2024-07-05

## 2024-07-02 RX ORDER — DOCUSATE SODIUM 100 MG/1
100 CAPSULE, LIQUID FILLED ORAL 2 TIMES DAILY
Status: DISCONTINUED | OUTPATIENT
Start: 2024-07-02 | End: 2024-07-04

## 2024-07-02 RX ORDER — MIDODRINE HYDROCHLORIDE 5 MG/1
10 TABLET ORAL ONCE
Status: COMPLETED | OUTPATIENT
Start: 2024-07-02 | End: 2024-07-02

## 2024-07-02 RX ADMIN — BUMETANIDE 2 MG: 0.25 INJECTION INTRAMUSCULAR; INTRAVENOUS at 23:21

## 2024-07-02 RX ADMIN — Medication 10 ML: at 08:58

## 2024-07-02 RX ADMIN — MIDODRINE HYDROCHLORIDE 10 MG: 5 TABLET ORAL at 23:21

## 2024-07-02 RX ADMIN — BUMETANIDE 2 MG: 0.25 INJECTION INTRAMUSCULAR; INTRAVENOUS at 13:44

## 2024-07-02 RX ADMIN — AMIODARONE HYDROCHLORIDE 200 MG: 200 TABLET ORAL at 16:54

## 2024-07-02 RX ADMIN — ANTI-FUNGAL POWDER MICONAZOLE NITRATE TALC FREE 1 APPLICATION: 1.42 POWDER TOPICAL at 23:22

## 2024-07-02 RX ADMIN — OXYCODONE 10 MG: 5 TABLET ORAL at 12:18

## 2024-07-02 RX ADMIN — POTASSIUM CHLORIDE 40 MEQ: 1500 TABLET, EXTENDED RELEASE ORAL at 23:21

## 2024-07-02 RX ADMIN — ENOXAPARIN SODIUM 100 MG: 100 INJECTION SUBCUTANEOUS at 12:35

## 2024-07-02 RX ADMIN — CEFTRIAXONE 1000 MG: 1 INJECTION, POWDER, FOR SOLUTION INTRAMUSCULAR; INTRAVENOUS at 14:50

## 2024-07-02 RX ADMIN — BUMETANIDE 2 MG: 0.25 INJECTION INTRAMUSCULAR; INTRAVENOUS at 05:43

## 2024-07-02 RX ADMIN — OXYCODONE 5 MG: 5 TABLET ORAL at 00:02

## 2024-07-02 RX ADMIN — OXYCODONE 10 MG: 5 TABLET ORAL at 20:33

## 2024-07-02 RX ADMIN — MIDODRINE HYDROCHLORIDE 10 MG: 5 TABLET ORAL at 05:43

## 2024-07-02 RX ADMIN — MIDODRINE HYDROCHLORIDE 10 MG: 5 TABLET ORAL at 01:30

## 2024-07-02 RX ADMIN — VANCOMYCIN HYDROCHLORIDE 1000 MG: 1 INJECTION, POWDER, LYOPHILIZED, FOR SOLUTION INTRAVENOUS at 16:53

## 2024-07-02 RX ADMIN — ANTI-FUNGAL POWDER MICONAZOLE NITRATE TALC FREE 1 APPLICATION: 1.42 POWDER TOPICAL at 09:01

## 2024-07-02 RX ADMIN — MIDODRINE HYDROCHLORIDE 10 MG: 5 TABLET ORAL at 13:44

## 2024-07-02 RX ADMIN — AMIODARONE HYDROCHLORIDE 200 MG: 200 TABLET ORAL at 00:02

## 2024-07-02 RX ADMIN — AMIODARONE HYDROCHLORIDE 200 MG: 200 TABLET ORAL at 23:32

## 2024-07-02 RX ADMIN — DOCUSATE SODIUM 100 MG: 100 CAPSULE, LIQUID FILLED ORAL at 23:21

## 2024-07-02 RX ADMIN — POVIDONE-IODINE: 10 SOLUTION TOPICAL at 09:01

## 2024-07-02 RX ADMIN — METOPROLOL SUCCINATE 25 MG: 25 TABLET, EXTENDED RELEASE ORAL at 12:18

## 2024-07-02 RX ADMIN — Medication 10 ML: at 23:22

## 2024-07-02 RX ADMIN — ENOXAPARIN SODIUM 100 MG: 100 INJECTION SUBCUTANEOUS at 23:21

## 2024-07-02 RX ADMIN — AMIODARONE HYDROCHLORIDE 200 MG: 200 TABLET ORAL at 08:57

## 2024-07-02 NOTE — PROGRESS NOTES
"Referring Provider: Sesar Solorzano DO    Reason for follow-up: Atrial fibrillation     Patient Care Team:  Rut Pierce APRN as PCP - General (Nurse Practitioner)      SUBJECTIVE  Laying comfortably in bed.  Denies any chest pain or shortness of breath.     ROS  Review of all systems negative except as indicated.    Since I have last seen, the patient has been without any chest discomfort, shortness of breath, palpitations, dizziness or syncope.  Denies having any headache, abdominal pain, nausea, vomiting, diarrhea, constipation, loss of weight or loss of appetite.  Denies having any excessive bruising, hematuria or blood in the stool.        Personal History:    Past Medical History:   Diagnosis Date    Bilateral leg edema     Chronic respiratory failure with hypoxia, on home O2 therapy 07/01/2024    COPD (chronic obstructive pulmonary disease)     Morbid obesity with BMI of 40.0-44.9, adult 11/08/2010    Primary hypertension 03/01/2017    Pulmonary embolism     \"many years ago, was on warfarin\"    Sleep apnea        History reviewed. No pertinent surgical history.    History reviewed. No pertinent family history.    Social History     Tobacco Use    Smoking status: Never    Smokeless tobacco: Never   Vaping Use    Vaping status: Never Used   Substance Use Topics    Alcohol use: Never    Drug use: Never        Home meds:  Prior to Admission medications    Medication Sig Start Date End Date Taking? Authorizing Provider   Allergy Relief 180 MG tablet Take 1 tablet by mouth Daily. 5/30/24  Yes ProviderChadwick MD   bumetanide (BUMEX) 1 MG tablet Take 1 tablet by mouth 3 times a day. 5/30/24  Yes ProviderChadwick MD   docusate sodium (COLACE) 100 MG capsule Take 1 capsule by mouth Every 12 (Twelve) Hours. 5/30/24  Yes ProviderChadwick MD   furosemide (LASIX) 20 MG tablet Take 1 tablet by mouth Daily.   Yes ProviderChadwick MD   HYDROcodone-acetaminophen (NORCO)  MG per tablet " Take 1 tablet by mouth 3 times a day. 5/30/24  Yes Chadwick Johnson MD   lisinopril (PRINIVIL,ZESTRIL) 30 MG tablet Take 1 tablet by mouth Daily. 3/18/24  Yes Chadwick Johnson MD   meloxicam (MOBIC) 7.5 MG tablet Take 1 tablet by mouth Daily As Needed. 3/18/24  Yes Chadwick Johnson MD   metoprolol tartrate (LOPRESSOR) 50 MG tablet Take 1 tablet by mouth Daily.   Yes Chadwick Johnson MD   montelukast (SINGULAIR) 10 MG tablet Take 1 tablet by mouth every night at bedtime.   Yes Chadwick Johnson MD   omeprazole (priLOSEC) 20 MG capsule Take 1 capsule by mouth Daily. 3/18/24  Yes Chadwick Johnson MD   oxybutynin XL (DITROPAN-XL) 10 MG 24 hr tablet Take 2 tablets by mouth Daily. 3/18/24  Yes Chadwick Johnson MD   potassium chloride (MICRO-K) 10 MEQ CR capsule Take 1 capsule by mouth Every 12 (Twelve) Hours. 3/18/24  Yes Chadwick Johnson MD   vitamin D (ERGOCALCIFEROL) 1.25 MG (78654 UT) capsule capsule Take 1 capsule by mouth 2 (Two) Times a Week. Monday and Thursday. 5/30/24  Yes Chadwick Johnson MD       Allergies:  Patient has no known allergies.    Scheduled Meds:amiodarone, 200 mg, Oral, Q8H   Followed by  [START ON 7/9/2024] amiodarone, 200 mg, Oral, Q12H   Followed by  [START ON 7/23/2024] amiodarone, 200 mg, Oral, Daily  bumetanide, 2 mg, Intravenous, Q8H  calcium gluconate, 1,000 mg, Intravenous, Once  cefTRIAXone, 1,000 mg, Intravenous, Q24H  dextrose, 25 g, Intravenous, Once  enoxaparin, 1 mg/kg, Subcutaneous, Q12H  insulin regular, 10 Units, Intravenous, Once  miconazole, 1 Application, Topical, Q12H  midodrine, 10 mg, Oral, Q8H  povidone-iodine, , Topical, Daily  sodium chloride, 10 mL, Intravenous, Q12H      Continuous Infusions:Pharmacy to Dose Cefepime,   Pharmacy to Dose enoxaparin (LOVENOX),   Pharmacy to dose vancomycin,       PRN Meds:.  acetaminophen **OR** acetaminophen    nitroglycerin    ondansetron ODT **OR** ondansetron    oxyCODONE    Pharmacy to  "Dose Cefepime    Pharmacy to Dose enoxaparin (LOVENOX)    Pharmacy to dose vancomycin    [COMPLETED] Insert Peripheral IV **AND** sodium chloride    sodium chloride    sodium chloride    Vancomycin Pharmacy Intermittent/Pulse Dosing      OBJECTIVE    Vital Signs  Vitals:    07/02/24 0400 07/02/24 0500 07/02/24 0543 07/02/24 0600   BP: 101/51 102/51 102/51 107/57   BP Location: Right arm      Patient Position: Lying      Pulse: 91 97  96   Resp: 10      Temp: 96.2 °F (35.7 °C)      TempSrc: Axillary      SpO2: 97% 96%  94%   Weight:    92.3 kg (203 lb 7.8 oz)   Height:           Flowsheet Rows      Flowsheet Row First Filed Value   Admission Height 147.3 cm (58\") Documented at 06/30/2024 1650   Admission Weight 108 kg (239 lb) Documented at 06/30/2024 1650              Intake/Output Summary (Last 24 hours) at 7/2/2024 0712  Last data filed at 7/2/2024 0600  Gross per 24 hour   Intake 1218 ml   Output 3075 ml   Net -1857 ml          Telemetry: Atrial fibrillation with controlled heart rate in the 80s and 90s    Physical Exam:  The patient is alert, oriented and in no distress.  Morbidly obese  Vital signs as noted above.  Head and neck revealed no carotid bruits or jugular venous distention.  No thyromegaly or lymphadenopathy is present  Lungs with bibasilar Rales and diminished at bases.  No wheezing.  On 2 L of oxygen  Heart: Irregularly irregular rhythm.  Normal first and second heart sounds.  No precordial rub is present.  No gallop is present.  Abdomen: Soft and nontender.    Extremities with good peripheral pulses with bilateral lower extremity edema  Skin: Warm.  Right great toe ulceration with necrotic tissue.  Left great toe ulceration.    Musculoskeletal system is grossly normal.  CNS grossly normal.          Results Review:  I have personally reviewed the results from the time of this admission to 7/2/2024 07:12 EDT and agree with these findings:  []  Laboratory  []  Microbiology  []  Radiology  []  " EKG/Telemetry   []  Cardiology/Vascular   []  Pathology  []  Old records  []  Other:    Most notable findings include:    Lab Results (last 24 hours)       Procedure Component Value Units Date/Time    Respiratory Culture - Sputum, NT Suction [[id number]] Collected: 07/01/24 1507    Specimen: Sputum from NT Suction Updated: 07/02/24 0635     Gram Stain Few (2+) Epithelial cells per low power field      Rare (1+) WBCs per low power field      Few (2+) Mixed bacterial morphotypes seen on Gram Stain    Phosphorus [[id number]]  (Abnormal) Collected: 07/02/24 0329    Specimen: Blood Updated: 07/02/24 0411     Phosphorus 4.6 mg/dL     Magnesium [[id number]]  (Normal) Collected: 07/02/24 0329    Specimen: Blood Updated: 07/02/24 0410     Magnesium 1.7 mg/dL     Comprehensive Metabolic Panel [[id number]]  (Abnormal) Collected: 07/02/24 0329    Specimen: Blood Updated: 07/02/24 0410     Glucose 82 mg/dL      BUN 43 mg/dL      Creatinine 1.14 mg/dL      Sodium 138 mmol/L      Potassium 3.5 mmol/L      Comment: Slight hemolysis detected by analyzer. Result may be falsely elevated.        Chloride 96 mmol/L      CO2 34.5 mmol/L      Calcium 9.4 mg/dL      Total Protein 5.6 g/dL      Albumin 2.3 g/dL      ALT (SGPT) 14 U/L      AST (SGOT) 24 U/L      Alkaline Phosphatase 109 U/L      Total Bilirubin 0.9 mg/dL      Globulin 3.3 gm/dL      A/G Ratio 0.7 g/dL      BUN/Creatinine Ratio 37.7     Anion Gap 7.5 mmol/L      eGFR 52.2 mL/min/1.73     Narrative:      GFR Normal >60  Chronic Kidney Disease <60  Kidney Failure <15      STAT Lactic Acid, Reflex [[id number]]  (Abnormal) Collected: 07/02/24 0329    Specimen: Blood Updated: 07/02/24 0409     Lactate 2.3 mmol/L     Vancomycin, Random [[id number]]  (Normal) Collected: 07/02/24 0329    Specimen: Blood Updated: 07/02/24 0407     Vancomycin Random 22.40 mcg/mL     Narrative:      Therapeutic Ranges for Vancomycin    Vancomycin Random   5.0-40.0 mcg/mL  Vancomycin Trough   5.0-20.0  mcg/mL  Vancomycin Peak     20.0-40.0 mcg/mL    CBC & Differential [[id number]]  (Abnormal) Collected: 07/02/24 0329    Specimen: Blood Updated: 07/02/24 0340    Narrative:      The following orders were created for panel order CBC & Differential.  Procedure                               Abnormality         Status                     ---------                               -----------         ------                     CBC Auto Differential[[id number]]        Abnormal            Final result               Scan Slide[[id number]]                                                                    Please view results for these tests on the individual orders.    CBC Auto Differential [[id number]]  (Abnormal) Collected: 07/02/24 0329    Specimen: Blood Updated: 07/02/24 0340     WBC 10.53 10*3/mm3      RBC 4.48 10*6/mm3      Hemoglobin 13.8 g/dL      Hematocrit 42.9 %      MCV 95.8 fL      MCH 30.8 pg      MCHC 32.2 g/dL      RDW 15.6 %      RDW-SD 53.2 fl      MPV 10.5 fL      Platelets 92 10*3/mm3      Neutrophil % 82.5 %      Lymphocyte % 8.4 %      Monocyte % 8.1 %      Eosinophil % 0.4 %      Basophil % 0.1 %      Immature Grans % 0.5 %      Neutrophils, Absolute 8.70 10*3/mm3      Lymphocytes, Absolute 0.88 10*3/mm3      Monocytes, Absolute 0.85 10*3/mm3      Eosinophils, Absolute 0.04 10*3/mm3      Basophils, Absolute 0.01 10*3/mm3      Immature Grans, Absolute 0.05 10*3/mm3      nRBC 0.0 /100 WBC     STAT Lactic Acid, Reflex [[id number]]  (Abnormal) Collected: 07/01/24 2222    Specimen: Blood Updated: 07/01/24 2307     Lactate 2.9 mmol/L     POC Glucose Once [[id number]]  (Normal) Collected: 07/01/24 2227    Specimen: Blood Updated: 07/01/24 2229     Glucose 94 mg/dL      Comment: Serial Number: [id number]:  [id number]       Blood Culture - Blood, Arm, Right [[id number]]  (Normal) Collected: 06/30/24 1844    Specimen: Blood from Arm, Right Updated: 07/01/24 1915     Blood Culture No growth at 24 hours    Narrative:       Less than seven (7) mL's of blood was collected.  Insufficient quantity may yield false negative results.    Blood Culture - Blood, Arm, Right [[id number]]  (Normal) Collected: 06/30/24 1757    Specimen: Blood from Arm, Right Updated: 07/01/24 1815     Blood Culture No growth at 24 hours    Narrative:      Less than seven (7) mL's of blood was collected.  Insufficient quantity may yield false negative results.    Potassium [[id number]]  (Normal) Collected: 07/01/24 1633    Specimen: Blood Updated: 07/01/24 1715     Potassium 4.2 mmol/L      Comment: Specimen hemolyzed.  Result may be falsely elevated.       STAT Lactic Acid, Reflex [[id number]]  (Abnormal) Collected: 07/01/24 1633    Specimen: Blood Updated: 07/01/24 1714     Lactate 3.1 mmol/L     Factor 5 Leiden [[id number]] Collected: 07/01/24 1646    Specimen: Blood Updated: 07/01/24 1649    Factor II, DNA Analysis [[id number]] Collected: 07/01/24 1646    Specimen: Blood Updated: 07/01/24 1649    POC Glucose Once [[id number]]  (Normal) Collected: 07/01/24 1624    Specimen: Blood Updated: 07/01/24 1629     Glucose 77 mg/dL      Comment: Serial Number: [id number]:  [id number]       Hemoglobin A1c [[id number]]  (Abnormal) Collected: 07/01/24 0910    Specimen: Blood Updated: 07/01/24 1325     Hemoglobin A1C 6.14 %     Lipid Panel [[id number]]  (Abnormal) Collected: 07/01/24 0910    Specimen: Blood Updated: 07/01/24 1324     Total Cholesterol 58 mg/dL      Triglycerides 86 mg/dL      HDL Cholesterol 13 mg/dL      LDL Cholesterol  27 mg/dL      VLDL Cholesterol 18 mg/dL      LDL/HDL Ratio 2.14    Narrative:      Cholesterol Reference Ranges  (U.S. Department of Health and Human Services ATP III Classifications)    Desirable          <200 mg/dL  Borderline High    200-239 mg/dL  High Risk          >240 mg/dL      Triglyceride Reference Ranges  (U.S. Department of Health and Human Services ATP III Classifications)    Normal           <150 mg/dL  Borderline High   "150-199 mg/dL  High             200-499 mg/dL  Very High        >500 mg/dL    HDL Reference Ranges  (U.S. Department of Health and Human Services ATP III Classifications)    Low     <40 mg/dl (major risk factor for CHD)  High    >60 mg/dl ('negative' risk factor for CHD)        LDL Reference Ranges  (U.S. Department of Health and Human Services ATP III Classifications)    Optimal          <100 mg/dL  Near Optimal     100-129 mg/dL  Borderline High  130-159 mg/dL  High             160-189 mg/dL  Very High        >189 mg/dL    Procalcitonin [[id number]]  (Abnormal) Collected: 07/01/24 0910    Specimen: Blood Updated: 07/01/24 1308     Procalcitonin 0.48 ng/mL     Narrative:      As a Marker for Sepsis (Non-Neonates):    1. <0.5 ng/mL represents a low risk of severe sepsis and/or septic shock.  2. >2 ng/mL represents a high risk of severe sepsis and/or septic shock.    As a Marker for Lower Respiratory Tract Infections that require antibiotic therapy:    PCT on Admission    Antibiotic Therapy       6-12 Hrs later    >0.5                Strongly Recommended  >0.25 - <0.5        Recommended   0.1 - 0.25          Discouraged              Remeasure/reassess PCT  <0.1                Strongly Discouraged     Remeasure/reassess PCT    As 28 day mortality risk marker: \"Change in Procalcitonin Result\" (>80% or <=80%) if Day 0 (or Day 1) and Day 4 values are available. Refer to http://www.[employer]-pct-calculator.com    Change in PCT <=80%  A decrease of PCT levels below or equal to 80% defines a positive change in PCT test result representing a higher risk for 28-day all-cause mortality of patients diagnosed with severe sepsis for septic shock.    Change in PCT >80%  A decrease of PCT levels of more than 80% defines a negative change in PCT result representing a lower risk for 28-day all-cause mortality of patients diagnosed with severe sepsis or septic shock.       POC Glucose Q1H [[id number]]  (Normal) Collected: 07/01/24 1156    " Specimen: Blood Updated: 07/01/24 1158     Glucose 101 mg/dL      Comment: Serial Number: [id number]:  [id number]       STAT Lactic Acid, Reflex [[id number]]  (Abnormal) Collected: 07/01/24 0910    Specimen: Blood Updated: 07/01/24 0959     Lactate 2.2 mmol/L     High Sensitivity Troponin T 2Hr [[id number]]  (Abnormal) Collected: 07/01/24 0910    Specimen: Blood Updated: 07/01/24 0956     HS Troponin T 36 ng/L      Comment: Specimen hemolyzed.  Results may be falsely decreased.        Troponin T Delta -4 ng/L     Narrative:      High Sensitive Troponin T Reference Range:  <14.0 ng/L- Negative Female for AMI  <22.0 ng/L- Negative Male for AMI  >=14 - Abnormal Female indicating possible myocardial injury.  >=22 - Abnormal Male indicating possible myocardial injury.   Clinicians would have to utilize clinical acumen, EKG, Troponin, and serial changes to determine if it is an Acute Myocardial Infarction or myocardial injury due to an underlying chronic condition.         Comprehensive Metabolic Panel [[id number]]  (Abnormal) Collected: 07/01/24 0910    Specimen: Blood Updated: 07/01/24 0956     Glucose 102 mg/dL      BUN 46 mg/dL      Creatinine 1.33 mg/dL      Sodium 134 mmol/L      Potassium 5.4 mmol/L      Comment: Specimen hemolyzed.  Result may be falsely elevated.        Chloride 97 mmol/L      CO2 31.4 mmol/L      Calcium 10.0 mg/dL      Total Protein 5.9 g/dL      Albumin 2.5 g/dL      ALT (SGPT) 17 U/L      Comment: Specimen hemolyzed.  Result may  be falsely elevated.        AST (SGOT) 39 U/L      Comment: Specimen hemolyzed.  Result may be falsely elevated.        Alkaline Phosphatase 115 U/L      Total Bilirubin 1.3 mg/dL      Globulin 3.4 gm/dL      A/G Ratio 0.7 g/dL      BUN/Creatinine Ratio 34.6     Anion Gap 5.6 mmol/L      eGFR 43.4 mL/min/1.73     Narrative:      GFR Normal >60  Chronic Kidney Disease <60  Kidney Failure <15      Magnesium [[id number]]  (Normal) Collected: 07/01/24 0910    Specimen:  Blood Updated: 07/01/24 0956     Magnesium 2.0 mg/dL     Vancomycin, Random [[id number]]  (Normal) Collected: 07/01/24 0910    Specimen: Blood Updated: 07/01/24 0953     Vancomycin Random 14.30 mcg/mL     Narrative:      Therapeutic Ranges for Vancomycin    Vancomycin Random   5.0-40.0 mcg/mL  Vancomycin Trough   5.0-20.0 mcg/mL  Vancomycin Peak     20.0-40.0 mcg/mL    Phosphorus [[id number]]  (Normal) Collected: 07/01/24 0910    Specimen: Blood Updated: 07/01/24 0953     Phosphorus 3.4 mg/dL     TSH Rfx On Abnormal To Free T4 [[id number]]  (Normal) Collected: 07/01/24 0910    Specimen: Blood Updated: 07/01/24 0953     TSH 2.080 uIU/mL     Calcium, Ionized [[id number]]  (Abnormal) Collected: 07/01/24 0910    Specimen: Blood Updated: 07/01/24 0935     Ionized Calcium 1.34 mmol/L     CBC & Differential [[id number]]  (Abnormal) Collected: 07/01/24 0910    Specimen: Blood Updated: 07/01/24 0917    Narrative:      The following orders were created for panel order CBC & Differential.  Procedure                               Abnormality         Status                     ---------                               -----------         ------                     CBC Auto Differential[[id number]]        Abnormal            Final result                 Please view results for these tests on the individual orders.    CBC Auto Differential [[id number]]  (Abnormal) Collected: 07/01/24 0910    Specimen: Blood Updated: 07/01/24 0917     WBC 13.80 10*3/mm3      RBC 4.62 10*6/mm3      Hemoglobin 14.2 g/dL      Hematocrit 43.8 %      MCV 94.8 fL      MCH 30.7 pg      MCHC 32.4 g/dL      RDW 15.5 %      RDW-SD 52.2 fl      MPV 10.6 fL      Platelets 104 10*3/mm3      Neutrophil % 79.4 %      Lymphocyte % 14.1 %      Monocyte % 5.3 %      Eosinophil % 0.3 %      Basophil % 0.1 %      Immature Grans % 0.8 %      Neutrophils, Absolute 10.96 10*3/mm3      Lymphocytes, Absolute 1.94 10*3/mm3      Monocytes, Absolute 0.73 10*3/mm3      Eosinophils,  Absolute 0.04 10*3/mm3      Basophils, Absolute 0.02 10*3/mm3      Immature Grans, Absolute 0.11 10*3/mm3      nRBC 0.2 /100 WBC     Urine Culture - Urine, Urine, Catheter [[id number]] Collected: 06/30/24 1749    Specimen: Urine, Catheter Updated: 07/01/24 0827    POC Glucose Once [[id number]]  (Normal) Collected: 07/01/24 0752    Specimen: Blood Updated: 07/01/24 0753     Glucose 103 mg/dL      Comment: Serial Number: [id number]:  [id number]               Imaging Results (Last 24 Hours)       Procedure Component Value Units Date/Time    US Pelvis Complete [[id number]] Collected: 07/01/24 1341     Updated: 07/01/24 1345    Narrative:      US PELVIS COMPLETE    Date of Exam: 7/1/2024 12:48 PM EDT    Indication: Pt with BLE DVT's, abnormal appearing uterus on CT, eval for fibroids vs Ca.    Comparison: CT abdomen pelvis 7/1/2024.    Technique: Transabdominal and transvaginal ultrasound evaluation of the pelvis was performed utilizing grayscale and color Doppler technique.  Doppler spectral analysis was performed.      Findings:  Uterus measures 11.7 x 8.2 x 6.4 cm. The myometrium is heterogeneous. What is thought to represent a subserosal fibroid projects exophytically from the uterine fundal region measuring 6.8 x 7.7 x 6.6 cm, containing a few coarse calcifications.    Endometrial bilayer thickness is 3 mm, within normal limits for patient of this stated age. No suspicious endometrial lesion is identified. No gross pelvic free fluid is identified. Neither the right ovary nor the left ovary could be visualized, likely   obscured by bowel gas.      Impression:      Impression:  Sonographic features suggest the presence of a large subserosal uterine fundal fibroid.        Electronically Signed: Ann Marie Maloney MD    7/1/2024 1:43 PM EDT    Workstation ID: [id number]    CT Abdomen Pelvis Without Contrast [[id number]] Collected: 07/01/24 1135     Updated: 07/01/24 1141    Narrative:      CT ABDOMEN PELVIS WO  CONTRAST    Date of Exam: 7/1/2024 11:30 AM EDT    Indication: abdominal pain, possible panniculitis.    Comparison: None available.    Technique: Axial CT images were obtained of the abdomen and pelvis without the administration of contrast. Sagittal and coronal reconstructions were performed.  Automated exposure control and iterative reconstruction methods were used.      Findings:  There is severe cardiomegaly. There is a moderately large right pleural effusion. There is a small left pleural effusion. There are bibasilar lung infiltrates with areas of atelectasis. There is some contrast in the renal collecting systems from CT   angiogram performed on today's date. There is a small right lower pole parapelvic renal cyst. There is no hydronephrosis. There is bilateral flank edema, greatest on the right. Partially filled bladder appears normal. There is a lobulated mass protruding   from the uterine fundus measuring approximately 8.4 x 5.9 cm measured on image #60 of series 2. There is no large or small bowel dilatation. There is no bowel wall thickening. The appendix is not identified. The abdominal aorta is mildly calcified and   is normal in size. The pancreas is atrophic. The spleen, liver, and adrenal glands appear normal in size. The gallbladder and biliary tree are nondilated.      Impression:      Impression:  Moderately large right pleural effusion and small left pleural effusion. Bibasilar infiltrates suggest atelectasis although associated pneumonia is not excluded.    Severe cardiomegaly.    Bilateral flank edema, greatest on the right.    Probable fibroid uterus. This could be confirmed with ultrasound.            Electronically Signed: Krystal Lee MD    7/1/2024 11:39 AM EDT    Workstation ID: [id number]    CT Angiogram Chest Pulmonary Embolism [[id number]] Collected: 07/01/24 1007     Updated: 07/01/24 1016    Narrative:      CT ANGIOGRAM CHEST PULMONARY EMBOLISM    Date of Exam: 7/1/2024 10:03 AM  EDT    Indication: BLE DVT's, new cardiac arrhythmia, eval for PE.    Comparison: Chest x-ray 6/30/2024    Technique: Axial CT images were obtained of the chest after the uneventful intravenous administration of iodinated contrast utilizing pulmonary embolism protocol.  Sagittal and coronal reconstructions were performed.  Automated exposure control and   iterative reconstruction methods were used.      Findings:  PULMONARY VASCULATURE: Pulmonary arteries are widely patent without evidence of embolus.Main pulmonary artery is normal in size. No evidence of right heart strain.    MEDIASTINUM: The heart is prominent in size. There is biatrial and left ventricular enlargement. No significant pericardial effusion. No evidence for aortic enlargement or dissection.  CORONARY ARTERIES: Mild calcified atherosclerotic disease.  LUNGS: There is bibasilar airspace disease with early consolidation noted. Some focal infiltrate is present in the right middle lung as well with scattered groundglass densities throughout there is a subpleural nodule in the posterior right apical region   measuring 4 mm (image 27 of series 4).  PLEURAL SPACE: There are mild bilateral pleural effusions. No evidence for pneumothorax.  LYMPH NODES: There are no pathologically enlarged lymph nodes.    UPPER ABDOMEN: There is bilateral flank edema.    OSSEOUS STRUCTURES: Appropriate for age with no acute process identified. There is multilevel thoracic spondylosis.      Impression:      Impression:    1. No evidence for pulmonary embolus.    2. Bibasilar airspace disease and right middle lung opacity compatible likely multifocal pneumonia with bilateral pleural effusions.    3. Cardiomegaly.        Electronically Signed: Shari Kaur MD    7/1/2024 10:14 AM EDT    Workstation ID: [id number]            LAB RESULTS (LAST 7 DAYS)    CBC  Results from last 7 days   Lab Units 07/02/24  0329 07/01/24  0910 06/30/24  1858 06/30/24  1732   WBC 10*3/mm3 10.53  13.80*  --  16.80*   RBC 10*6/mm3 4.48 4.62  --  5.16   HEMOGLOBIN g/dL 13.8 14.2  --  15.9   HEMOGLOBIN, POC g/dL  --   --  17.0  --    HEMATOCRIT % 42.9 43.8  --  49.0*   HEMATOCRIT POC %  --   --  50  --    MCV fL 95.8 94.8  --  95.0   PLATELETS 10*3/mm3 92* 104*  --  119*       BMP  Results from last 7 days   Lab Units 07/02/24 0329 07/01/24 1633 07/01/24 0910 06/30/24 2043 06/30/24  1858   SODIUM mmol/L 138  --  134* 131*  --    POTASSIUM mmol/L 3.5 4.2 5.4* 6.5*  --    CHLORIDE mmol/L 96*  --  97* 101  --    CO2 mmol/L 34.5*  --  31.4* 24.4  --    BUN mg/dL 43*  --  46* 50*  --    CREATININE mg/dL 1.14*  --  1.33* 1.38* 1.40*   GLUCOSE mg/dL 82  --  102* 194*  --    MAGNESIUM mg/dL 1.7  --  2.0  --   --    PHOSPHORUS mg/dL 4.6*  --  3.4  --   --        CMP   Results from last 7 days   Lab Units 07/02/24 0329 07/01/24 1633 07/01/24 0910 06/30/24 2043 06/30/24  1858   SODIUM mmol/L 138  --  134* 131*  --    POTASSIUM mmol/L 3.5 4.2 5.4* 6.5*  --    CHLORIDE mmol/L 96*  --  97* 101  --    CO2 mmol/L 34.5*  --  31.4* 24.4  --    BUN mg/dL 43*  --  46* 50*  --    CREATININE mg/dL 1.14*  --  1.33* 1.38* 1.40*   GLUCOSE mg/dL 82  --  102* 194*  --    ALBUMIN g/dL 2.3*  --  2.5* 2.5*  --    BILIRUBIN mg/dL 0.9  --  1.3* 1.6*  --    ALK PHOS U/L 109  --  115 126*  --    AST (SGOT) U/L 24  --  39* 42*  --    ALT (SGPT) U/L 14  --  17 18  --        BNP        TROPONIN  Results from last 7 days   Lab Units 07/01/24  0910   HSTROP T ng/L 36*       CoAg        Creatinine Clearance  Estimated Creatinine Clearance: 47.9 mL/min (A) (by C-G formula based on SCr of 1.14 mg/dL (H)).    ABG        Radiology  US Pelvis Complete    Result Date: 7/1/2024  Impression: Sonographic features suggest the presence of a large subserosal uterine fundal fibroid. Electronically Signed: Ann Marie Maloney MD  7/1/2024 1:43 PM EDT  Workstation ID: [id number]    CT Abdomen Pelvis Without Contrast    Result Date: 7/1/2024  Impression: Moderately  large right pleural effusion and small left pleural effusion. Bibasilar infiltrates suggest atelectasis although associated pneumonia is not excluded. Severe cardiomegaly. Bilateral flank edema, greatest on the right. Probable fibroid uterus. This could be confirmed with ultrasound. Electronically Signed: Krystal Lee MD  7/1/2024 11:39 AM EDT  Workstation ID: [id number]    Adult Transthoracic Echo Complete w/ Color, Spectral and Contrast if Necessary Per Protocol    Addendum Date: 7/1/2024      Left ventricular ejection fraction appears to be 31 - 35%.   Left ventricular diastolic dysfunction is noted.   The left atrial cavity is dilated.   Moderate aortic valve stenosis is present. Mean/peak gradient of 14/24 mmHg.  Dimensionless index 0.36   Moderate to severe mitral valve regurgitation is present.   Estimated right ventricular systolic pressure from tricuspid regurgitation is normal (<35 mmHg).     CT Angiogram Chest Pulmonary Embolism    Result Date: 7/1/2024  Impression: 1. No evidence for pulmonary embolus. 2. Bibasilar airspace disease and right middle lung opacity compatible likely multifocal pneumonia with bilateral pleural effusions. 3. Cardiomegaly. Electronically Signed: Shari Kaur MD  7/1/2024 10:14 AM EDT  Workstation ID: [id number]    CT Lower Extremity Bilateral Without Contrast    Result Date: 7/1/2024  Impression: Bilateral lower extremity soft tissue swelling greater on the left than the right. There is no soft tissue gas or well-formed abscess. Electronically Signed: Obed Sidhu MD  7/1/2024 1:53 AM EDT  Workstation ID: [id number]    XR Chest 1 View    Result Date: 6/30/2024  Vascular congestion with small effusions Electronically Signed: Eamon Brock MD  6/30/2024 7:27 PM EDT  Workstation ID: [id number]       EKG  I personally viewed and interpreted the patient's EKG/Telemetry data:  ECG 12 Lead Drug Monitoring; Amiodarone   Final Result   HEART OELH=196  bpm   RR Qidlahvb=648  ms   ND  Interval=  ms   P Horizontal Axis=  deg   P Front Axis=  deg   QRSD Hgtzqrvo=024  ms   QT Mmigqapy=178  ms   QRpP=776  ms   QRS Axis=-80  deg   T Wave Axis=102  deg   - ABNORMAL ECG -   Atrial fibrillation   Right bundle branch block   Inferior  infarct, old   Anterolateral  infarct, age indeterminate   When compared with ECG of 30-Jun-2024 16:58:43,   Significant rate decrease   New or worsened ischemia or infarction   Electronically Signed By: Austin Brooks ([facility]) 2024-07-01 13:11:59   Date and Time of Study:2024-07-01 04:42:13      ECG 12 Lead Dyspnea   Final Result   HEART SOXU=952  bpm   RR Hpwukhae=451  ms   MN Interval=  ms   P Horizontal Axis=  deg   P Front Axis=  deg   QRSD Cwgzciyx=813  ms   QT Mpzylscd=070  ms   ULcN=330  ms   QRS Axis=-101  deg   T Wave Axis=43  deg   - ABNORMAL ECG -   Atrial fibrillation   Right bundle branch block   ST elevation secondary to IVCD   Electronically Signed By: Jeffery Kwon ([facility]) 2024-07-01 07:37:02   Date and Time of Study:2024-06-30 16:58:43      Telemetry Scan   Final Result      Telemetry Scan   Final Result      Telemetry Scan   Final Result      Telemetry Scan   Final Result      Telemetry Scan   Final Result      Telemetry Scan   Final Result      Telemetry Scan   Final Result      Telemetry Scan   Final Result      Telemetry Scan   Final Result      ECG 12 Lead Electrolyte Imbalance    (Results Pending)   ECG 12 Lead Drug Monitoring; Amiodarone    (Results Pending)         Echocardiogram:    Results for orders placed during the hospital encounter of 06/30/24    Adult Transthoracic Echo Complete w/ Color, Spectral and Contrast if Necessary Per Protocol    Interpretation Summary    Left ventricular ejection fraction appears to be 31 - 35%.    Left ventricular diastolic dysfunction is noted.    The left atrial cavity is dilated.    Moderate aortic valve stenosis is present. Mean/peak gradient of 14/24 mmHg.  Dimensionless index 0.36    Moderate to severe mitral  valve regurgitation is present.    Estimated right ventricular systolic pressure from tricuspid regurgitation is normal (<35 mmHg).        Stress Test:         Cardiac Catheterization:  No results found for this or any previous visit.         Other:         ASSESSMENT & PLAN:    Principal Problem:    Atrial fibrillation with RVR  Active Problems:    Primary hypertension    Morbid obesity with BMI of 40.0-44.9, adult    Right bundle branch block    Acute deep vein thrombosis (DVT) of both lower extremities    ARABELLA (acute kidney injury)    Acute hyperkalemia    Sepsis    Acute UTI    Acute CHF    Rhinovirus infection    Multifocal pneumonia    Chronic respiratory failure with hypoxia, on home O2 therapy    Skin ulcer of right great toe    Skin ulcer of left great toe    SEDRICK (obstructive sleep apnea)    Atrial fibrillation with RVR  New onset A-fib RVR  Electrolytes have been corrected  Cardizem drip has been discontinued  On amiodarone protocol  Start Toprol-XL  Echocardiogram shows EF of 31 to 35%  [id number] score is 7  Currently on Lovenox  Start oral anticoagulation with Xarelto or Eliquis     Acute systolic CHF  Newly diagnosed  Echocardiogram shows EF of 31 to 35%, moderate aortic stenosis and moderate to severe mitral regurgitation.  Continue IV diuretics  Monitor I's and O's and replace electrolytes as needed.  We will initiate and uptitrate GDMT as tolerated.  Currently blood pressure is low.  Starting Toprol-XL today  Plan to repeat echocardiogram after 3 months of completing GDMT     Acute deep vein thrombosis (DVT) of both lower extremities  Extensive DVT in bilateral lower extremities involving femoral, popliteal and posterior tibial.  Continue anticoagulation  She will need lifelong anticoagulation    Thrombocytopenia  Platelets are 92  Closely monitor while on anticoagulation     ARABELLA (acute kidney injury)  Presented with creatinine of 1.4.  Creatinine is 1.14 today  Closely monitor renal function while  on IV diuresis     Acute hyperkalemia  Hyperkalemia of 6.5  Received Lokelma  Potassium is down to 3.5 now  Closely monitor electrolytes while on diuretics     Sepsis / UTI / Multifocal pneumonia  Multifactorial secondary to UTI and pneumonia with possible cellulitis  Currently on antibiotics per primary team  Lactic acidosis is improving.  2.3 now     Rhinovirus infection  Provide supportive care     Chronic respiratory failure with hypoxia, on home O2 therapy  Currently requiring 2 L of oxygen via nasal cannula which is her baseline     Skin ulcer of right great toe  Skin ulcer of left great toe  Podiatry has been consulted  A1c is 6.2     Primary hypertension, chronic  Currently hypotensive likely secondary to sepsis  Started on midodrine  We will try to initiate Toprol-XL     Morbid obesity with BMI of 40.0-44.9, adult  BMI is 44.  She weighs 212 pounds  Lifestyle modifications discussed with the patient  LDL 27, HDL 13, triglyceride 86 and total cholesterol 58.  No indication for statin     SEDRICK (obstructive sleep apnea)  Encouraged compliance with CPAP      Austin Brooks MD  07/02/24  07:12 EDT

## 2024-07-02 NOTE — PLAN OF CARE
Goal Outcome Evaluation:      Patient a&ox4, pleasant, VSS on 3L NC, HR low 100s in afib.  Tolerating diet with good urine output.  PCU overflow.

## 2024-07-02 NOTE — CASE MANAGEMENT/SOCIAL WORK
Continued Stay Note   Vince     Patient Name: Ban Brennan  MRN: [id number]  Today's Date: 7/2/2024    Admit Date: 6/30/2024    Plan: DC Plan: Pending clinical course and PT/OT evals. From home with family. Watch for AC plan. Current with [place] for Home O2@3L nc. Will need transport with Medicaid at DC.   Discharge Plan       Row Name 07/02/24 1401       Plan    Plan DC Plan: Pending clinical course and PT/OT evals. From home with family. Watch for AC plan. Current with [facility] for Home O2@3L nc. Will need transport with Medicaid at [place].    Patient/Family in Agreement with Plan yes    Provided Post Acute Provider List? N/A    Provided Post Acute Provider Quality & Resource List? N/A    Plan Comments CM spoke with intensivist, nursing, and NP to obtain clinical upates in morning rounds. Currently no plan for intervention by Cardiology for DVT's but lifelong anticoagulation. CM reached out to OP pharmacy to obtain cost analysis for Xarelto and Eliquis. Technician Chong reports patient will have $0 copay for either medication. CM informed intensivist, cardiologist, and Hem/Onc of cost analysis. Nursing reports soft blood pressures and patient reporting uncontrolled pain. Medication regimen adjusted per intensivist.CM will continue to follow for any further needs and adjust discharge plan accordingly. DC Barriers: Cardiac monitoring, O2@3L nc, Amiodarone gtt, IV abx/Bumex, elevated Lactic, pain control, and monitor labs.               Expected Discharge Date and Time       Expected Discharge Date Expected Discharge Time    Jul 4, 2024           Phone communication or documentation only- no physical contact with patient or family.      Maria Victoria Montero RN     Office Phone: [phone]  Office Cell:     [phone]

## 2024-07-02 NOTE — CONSULTS
"Heart Failure Program  Nurse Navigator  Discharge Planning    Patient Name:Ban Brennan  :1955  Cardiologist:Todd  Current Admission Date: 2024   Previous Admission:    Admission frequency:   admissions in 6 months    Heart Failure history per record:    Symptoms on admission:c/co soa, swelling to legs past month, soa , orthopnea. Pt states sleeps in a recliner.        Admission Weight:  Flowsheet Rows      Flowsheet Row First Filed Value   Admission Height 147.3 cm (58\") Documented at 2024 1650   Admission Weight 108 kg (239 lb) Documented at 2024 1650              Current Home Medications:  Prior to Admission medications    Medication Sig Start Date End Date Taking? Authorizing Provider   Allergy Relief 180 MG tablet Take 1 tablet by mouth Daily. 24  Yes ProviderChadwick MD   bumetanide (BUMEX) 1 MG tablet Take 1 tablet by mouth 3 times a day. 24  Yes ProviderChadwick MD   docusate sodium (COLACE) 100 MG capsule Take 1 capsule by mouth Every 12 (Twelve) Hours. 24  Yes Provider, MD Chadwick   furosemide (LASIX) 20 MG tablet Take 1 tablet by mouth Daily.   Yes Provider, Historical, MD   HYDROcodone-acetaminophen (NORCO)  MG per tablet Take 1 tablet by mouth 3 times a day. 24  Yes Provider, MD Chadwick   lisinopril (PRINIVIL,ZESTRIL) 30 MG tablet Take 1 tablet by mouth Daily. 3/18/24  Yes ProviderChadwick MD   meloxicam (MOBIC) 7.5 MG tablet Take 1 tablet by mouth Daily As Needed. 3/18/24  Yes Provider, MD Chadwick   metoprolol tartrate (LOPRESSOR) 50 MG tablet Take 1 tablet by mouth Daily.   Yes Provider, Historical, MD   montelukast (SINGULAIR) 10 MG tablet Take 1 tablet by mouth every night at bedtime.   Yes Provider, Historical, MD   omeprazole (priLOSEC) 20 MG capsule Take 1 capsule by mouth Daily. 3/18/24  Yes Chadwick Johnson MD   oxybutynin XL (DITROPAN-XL) 10 MG 24 hr tablet Take 2 tablets by mouth Daily. 3/18/24  Yes " Provider, MD Chadwick   potassium chloride (MICRO-K) 10 MEQ CR capsule Take 1 capsule by mouth Every 12 (Twelve) Hours. 3/18/24  Yes Chadwick Johnson MD   vitamin D (ERGOCALCIFEROL) 1.25 MG (22879 UT) capsule capsule Take 1 capsule by mouth 2 (Two) Times a Week. Monday and Thursday. 5/30/24  Yes ProviderChadwick MD       Social history:   Pt advises lives at home, pt daughter checks on her. Pt request her daughter to listen to education via speaker phone.     Smoking status:     Diagnostics Testing:  proBNP level: 19860    Echocardiogram:Results for orders placed during the hospital encounter of 06/30/24    Adult Transthoracic Echo Complete w/ Color, Spectral and Contrast if Necessary Per Protocol    Interpretation Summary    Left ventricular ejection fraction appears to be 31 - 35%.    Left ventricular diastolic dysfunction is noted.    The left atrial cavity is dilated.    Moderate aortic valve stenosis is present. Mean/peak gradient of 14/24 mmHg.  Dimensionless index 0.36    Moderate to severe mitral valve regurgitation is present.    Estimated right ventricular systolic pressure from tricuspid regurgitation is normal (<35 mmHg).        Patient Assessment:   Pt lying in bed, resp even and unlabored, noted edema lower legs 1-2+ bilaterally. Pt no soa with conversation.     Current O2: 2L NC  Home O2:      Education provided to patient:  yes- Heart Failure disease education  ye -Symptom identification/management  yes -Daily Weights  yes- Diet education  yes- Fluid restriction (if ordered)  yes- Activity education  yes- Medication education  na- Smoking cessation  yes- Follow-up Appointments  yes-Provided information on how to access [facility] My HF Guide/Heart Failure Interactive workbook    Acceptance of learning: acceptance, cooperative  Pt daughter on speaker phone with education per pt request    Heart Failure education interactive teaching session time: 45 minutes    GWTG: EF 31-35%    Identified  needs/barriers:   Volume status, GDMT - toprol xl, needs 7 day follow=up and cardiology follow-up. Pending further with sepsis and afib dx    Intervention:   Education, HF clinic information provided

## 2024-07-02 NOTE — PROGRESS NOTES
Hematology/Oncology Inpatient Progress Note    PATIENT NAME: Ban Brennan  : 1955  MRN: [id number]    CHIEF COMPLAINT: Shortness of breath    HISTORY OF PRESENT ILLNESS:    Ban Brennan is a 69 y.o. female who presented to [facility] on 2024 with complaints of shortness of breath.  Past medical history of hypertension, heart failure, chronic bilateral lower extremity edema, remote history of pulmonary embolism treated with warfarin.  The patient was brought to the ED via EMS.  The patient's daughter reported that the patient does not normally go to the doctor and had not been in the hospital for years.  She was unsure of her full medical history but stated that her legs have been swollen for a long time and that she had not been mobile for approximately 1 year.  She reported that a home health nurse comes to the house and wraps her mother's legs twice a week.  EMS was called due to the worsening shortness of breath and the patient was found to have an EKG showing a wide-complex tachycardia with a heart rate in the 190s.  She was given amiodarone by EMS with rate improvement to the 160s.  Follow-up EKG in the ED at [facility] showed atrial fibrillation with RVR with a heart rate of 145.  Chest x-ray showed fluid overload with bilateral effusions.  The patient had a elevated white blood cell count of 16.8 and 4+ bacteria in her urine.  She was also noted to be hyperkalemic with a potassium of 6.5.  She was diagnosed with cellulitis and a urinary tract infection and initiated on IV antibiotics with cefepime and vancomycin.  She was admitted for further evaluation and treatment.     2024 bilateral venous Doppler ultrasound  -Acute right lower extremity DVT in the common femoral, proximal femoral, mid femoral, distal femoral, and popliteal  -Acute right lower extremity superficial thrombophlebitis in the saphenofemoral junction and great saphenous  -Acute left lower extremity DVT in  the proximal femoral, mid femoral, distal femoral, popliteal, and posterior tibial     7/1/2020 four 2D echocardiogram  -Left ventricular ejection fraction 31-35%  -Left ventricular diastolic dysfunction  -Left atrial cavity dilated  -Moderate aortic valve stenosis  -Moderate to severe mitral valve regurgitation  -Estimated right ventricular systolic pressure from tricuspid regurgitation is normal     7/1/2024 CT chest PE protocol  -No evidence for pulmonary embolus  -Bibasilar airspace disease with right middle lung opacity compatible likely multifocal pneumonia with bilateral pleural effusions  -Cardiomegaly     7/1/2024 CT of the abdomen and pelvis without contrast  -Moderately large right pleural effusion and small left pleural effusion; bibasilar infiltrates suggest atelectasis although associated pneumonia not excluded  -Severe cardiomegaly  -Bilateral flank edema  -Probable fibroid uterus     07/01/24  Hematology/Oncology was consulted for bilateral lower extremity DVT.     He/She  has a past medical history of Bilateral leg edema, Chronic respiratory failure with hypoxia, on home O2 therapy (07/01/2024), COPD (chronic obstructive pulmonary disease), Morbid obesity with BMI of 40.0-44.9, adult (11/08/2010), Primary hypertension (03/01/2017), Pulmonary embolism, and Sleep apnea.     PCP: Rut Pierce APRN  Subjective   Patient seen today. Clinically stable. No new complaints reported.      ROS:  Review of Systems   Constitutional:  Positive for fatigue.   HENT: Negative.     Eyes: Negative.    Respiratory: Negative.     Cardiovascular: Negative.    Gastrointestinal: Negative.    Endocrine: Negative.    Genitourinary: Negative.    Musculoskeletal:  Positive for arthralgias, back pain, gait problem and myalgias.   Skin:  Positive for wound (Clean decubitus ulcers noted on medial aspect of bilateral feet).   Allergic/Immunologic: Negative.    Neurological:  Positive for weakness (Bilateral leg weakness).  "  Hematological: Negative.    Psychiatric/Behavioral: Negative.          MEDICATIONS:    Scheduled Meds:  amiodarone, 200 mg, Oral, Q8H   Followed by  [START ON 7/9/2024] amiodarone, 200 mg, Oral, Q12H   Followed by  [START ON 7/23/2024] amiodarone, 200 mg, Oral, Daily  bumetanide, 2 mg, Intravenous, Q8H  calcium gluconate, 1,000 mg, Intravenous, Once  cefTRIAXone, 1,000 mg, Intravenous, Q24H  dextrose, 25 g, Intravenous, Once  enoxaparin, 1 mg/kg, Subcutaneous, Q12H  insulin regular, 10 Units, Intravenous, Once  metoprolol succinate XL, 25 mg, Oral, Q24H  miconazole, 1 Application, Topical, Q12H  midodrine, 10 mg, Oral, Q8H  oxyCODONE, 10 mg, Oral, TID  povidone-iodine, , Topical, Daily  sodium chloride, 10 mL, Intravenous, Q12H  vancomycin, 1,000 mg, Intravenous, Once       Continuous Infusions:  Pharmacy to Dose Cefepime,   Pharmacy to Dose enoxaparin (LOVENOX),   Pharmacy to dose vancomycin,        PRN Meds:    acetaminophen **OR** acetaminophen    nitroglycerin    ondansetron ODT **OR** ondansetron    oxyCODONE    Pharmacy to Dose Cefepime    Pharmacy to Dose enoxaparin (LOVENOX)    Pharmacy to dose vancomycin    [COMPLETED] Insert Peripheral IV **AND** sodium chloride    sodium chloride    sodium chloride    Vancomycin Pharmacy Intermittent/Pulse Dosing     ALLERGIES:  No Known Allergies    Objective    VITALS:   BP 90/56   Pulse 100   Temp 97.6 °F (36.4 °C) (Axillary)   Resp 27   Ht 147.3 cm (58\")   Wt 92.3 kg (203 lb 7.8 oz)   SpO2 98%   BMI 42.53 kg/m²     PHYSICAL EXAM: (performed by MD)  Physical Exam  Constitutional:       Appearance: She is normal weight. She is ill-appearing.   HENT:      Head: Normocephalic and atraumatic.      Right Ear: External ear normal.      Left Ear: External ear normal.      Nose: Nose normal.      Mouth/Throat:      Mouth: Mucous membranes are moist.      Pharynx: Oropharynx is clear.   Eyes:      Extraocular Movements: Extraocular movements intact.      " Conjunctiva/sclera: Conjunctivae normal.      Pupils: Pupils are equal, round, and reactive to light.   Cardiovascular:      Rate and Rhythm: Normal rate.      Pulses: Normal pulses.   Pulmonary:      Effort: Pulmonary effort is normal.   Abdominal:      General: Abdomen is flat.      Palpations: Abdomen is soft.   Musculoskeletal:         General: Normal range of motion.      Cervical back: Normal range of motion and neck supple.      Right lower leg: Edema present.      Left lower leg: Edema present.   Skin:     General: Skin is warm.   Neurological:      Mental Status: She is alert.   Psychiatric:         Mood and Affect: Mood normal. Mood is depressed.         Speech: Speech is delayed.         Behavior: Behavior normal.         Thought Content: Thought content normal.         Judgment: Judgment normal.           RECENT LABS:  Lab Results (last 24 hours)       Procedure Component Value Units Date/Time    Reticulocytes [[id number]]  (Abnormal) Collected: 07/02/24 1458    Specimen: Blood Updated: 07/02/24 1504     Reticulocyte % 2.70 %      Reticulocyte Absolute 0.1237 10*6/mm3     Haptoglobin [[id number]] Collected: 07/02/24 1458    Specimen: Blood Updated: 07/02/24 1501    Lactate Dehydrogenase [[id number]] Collected: 07/02/24 1458    Specimen: Blood Updated: 07/02/24 1501    Urine Culture - Urine, Urine, Catheter [[id number]]  (Abnormal) Collected: 06/30/24 1749    Specimen: Urine, Catheter Updated: 07/02/24 1000     Urine Culture >100,000 CFU/mL Gram Negative Bacilli    Narrative:      Colonization of the urinary tract without infection is common. Treatment is discouraged unless the patient is symptomatic, pregnant, or undergoing an invasive urologic procedure.    Respiratory Culture - Sputum, NT Suction [[id number]] Collected: 07/01/24 1507    Specimen: Sputum from NT Suction Updated: 07/02/24 0950     Respiratory Culture Scant growth (1+) The culture consists of normal respiratory shorty. This is a preliminary  report; final report to follow.     Gram Stain Few (2+) Epithelial cells per low power field      Rare (1+) WBCs per low power field      Few (2+) Mixed bacterial morphotypes seen on Gram Stain    Phosphorus [[id number]]  (Abnormal) Collected: 07/02/24 0329    Specimen: Blood Updated: 07/02/24 0411     Phosphorus 4.6 mg/dL     Magnesium [[id number]]  (Normal) Collected: 07/02/24 0329    Specimen: Blood Updated: 07/02/24 0410     Magnesium 1.7 mg/dL     Comprehensive Metabolic Panel [[id number]]  (Abnormal) Collected: 07/02/24 0329    Specimen: Blood Updated: 07/02/24 0410     Glucose 82 mg/dL      BUN 43 mg/dL      Creatinine 1.14 mg/dL      Sodium 138 mmol/L      Potassium 3.5 mmol/L      Comment: Slight hemolysis detected by analyzer. Result may be falsely elevated.        Chloride 96 mmol/L      CO2 34.5 mmol/L      Calcium 9.4 mg/dL      Total Protein 5.6 g/dL      Albumin 2.3 g/dL      ALT (SGPT) 14 U/L      AST (SGOT) 24 U/L      Alkaline Phosphatase 109 U/L      Total Bilirubin 0.9 mg/dL      Globulin 3.3 gm/dL      A/G Ratio 0.7 g/dL      BUN/Creatinine Ratio 37.7     Anion Gap 7.5 mmol/L      eGFR 52.2 mL/min/1.73     Narrative:      GFR Normal >60  Chronic Kidney Disease <60  Kidney Failure <15      STAT Lactic Acid, Reflex [[id number]]  (Abnormal) Collected: 07/02/24 0329    Specimen: Blood Updated: 07/02/24 0409     Lactate 2.3 mmol/L     Vancomycin, Random [[id number]]  (Normal) Collected: 07/02/24 0329    Specimen: Blood Updated: 07/02/24 0407     Vancomycin Random 22.40 mcg/mL     Narrative:      Therapeutic Ranges for Vancomycin    Vancomycin Random   5.0-40.0 mcg/mL  Vancomycin Trough   5.0-20.0 mcg/mL  Vancomycin Peak     20.0-40.0 mcg/mL    CBC & Differential [[id number]]  (Abnormal) Collected: 07/02/24 0329    Specimen: Blood Updated: 07/02/24 0340    Narrative:      The following orders were created for panel order CBC & Differential.  Procedure                               Abnormality         Status                      ---------                               -----------         ------                     CBC Auto Differential[[id number]]        Abnormal            Final result               Scan Slide[[id number]]                                                                    Please view results for these tests on the individual orders.    CBC Auto Differential [[id number]]  (Abnormal) Collected: 07/02/24 0329    Specimen: Blood Updated: 07/02/24 0340     WBC 10.53 10*3/mm3      RBC 4.48 10*6/mm3      Hemoglobin 13.8 g/dL      Hematocrit 42.9 %      MCV 95.8 fL      MCH 30.8 pg      MCHC 32.2 g/dL      RDW 15.6 %      RDW-SD 53.2 fl      MPV 10.5 fL      Platelets 92 10*3/mm3      Neutrophil % 82.5 %      Lymphocyte % 8.4 %      Monocyte % 8.1 %      Eosinophil % 0.4 %      Basophil % 0.1 %      Immature Grans % 0.5 %      Neutrophils, Absolute 8.70 10*3/mm3      Lymphocytes, Absolute 0.88 10*3/mm3      Monocytes, Absolute 0.85 10*3/mm3      Eosinophils, Absolute 0.04 10*3/mm3      Basophils, Absolute 0.01 10*3/mm3      Immature Grans, Absolute 0.05 10*3/mm3      nRBC 0.0 /100 WBC     STAT Lactic Acid, Reflex [[id number]]  (Abnormal) Collected: 07/01/24 2222    Specimen: Blood Updated: 07/01/24 2307     Lactate 2.9 mmol/L     POC Glucose Once [[id number]]  (Normal) Collected: 07/01/24 2227    Specimen: Blood Updated: 07/01/24 2229     Glucose 94 mg/dL      Comment: Serial Number: [id number]:  [id number]       Blood Culture - Blood, Arm, Right [[id number]]  (Normal) Collected: 06/30/24 1844    Specimen: Blood from Arm, Right Updated: 07/01/24 1915     Blood Culture No growth at 24 hours    Narrative:      Less than seven (7) mL's of blood was collected.  Insufficient quantity may yield false negative results.    Blood Culture - Blood, Arm, Right [[id number]]  (Normal) Collected: 06/30/24 1757    Specimen: Blood from Arm, Right Updated: 07/01/24 1815     Blood Culture No growth at 24 hours    Narrative:       Less than seven (7) mL's of blood was collected.  Insufficient quantity may yield false negative results.    Potassium [[id number]]  (Normal) Collected: 07/01/24 1633    Specimen: Blood Updated: 07/01/24 1715     Potassium 4.2 mmol/L      Comment: Specimen hemolyzed.  Result may be falsely elevated.       STAT Lactic Acid, Reflex [[id number]]  (Abnormal) Collected: 07/01/24 1633    Specimen: Blood Updated: 07/01/24 1714     Lactate 3.1 mmol/L     Factor 5 Leiden [[id number]] Collected: 07/01/24 1646    Specimen: Blood Updated: 07/01/24 1649    Factor II, DNA Analysis [[id number]] Collected: 07/01/24 1646    Specimen: Blood Updated: 07/01/24 1649    POC Glucose Once [[id number]]  (Normal) Collected: 07/01/24 1624    Specimen: Blood Updated: 07/01/24 1629     Glucose 77 mg/dL      Comment: Serial Number: [id number]:  [id number]               PENDING RESULTS: PTG, FVL, LDH, Retic, Hapto    IMAGING REVIEWED:  XR Chest 1 View    Result Date: 7/2/2024  Impression: Some improvement in bibasilar lung infiltrates with small effusions. Continued cardiomegaly. Electronically Signed: Krystal Lee MD  7/2/2024 12:46 PM EDT  Workstation ID: [id number]    US Pelvis Complete    Result Date: 7/1/2024  Impression: Sonographic features suggest the presence of a large subserosal uterine fundal fibroid. Electronically Signed: Ann Marie Maloney MD  7/1/2024 1:43 PM EDT  Workstation ID: [id number]    CT Abdomen Pelvis Without Contrast    Result Date: 7/1/2024  Impression: Moderately large right pleural effusion and small left pleural effusion. Bibasilar infiltrates suggest atelectasis although associated pneumonia is not excluded. Severe cardiomegaly. Bilateral flank edema, greatest on the right. Probable fibroid uterus. This could be confirmed with ultrasound. Electronically Signed: Krystal Lee MD  7/1/2024 11:39 AM EDT  Workstation ID: [id number]    Adult Transthoracic Echo Complete w/ Color, Spectral and Contrast if Necessary Per  Protocol    Addendum Date: 7/1/2024      Left ventricular ejection fraction appears to be 31 - 35%.   Left ventricular diastolic dysfunction is noted.   The left atrial cavity is dilated.   Moderate aortic valve stenosis is present. Mean/peak gradient of 14/24 mmHg.  Dimensionless index 0.36   Moderate to severe mitral valve regurgitation is present.   Estimated right ventricular systolic pressure from tricuspid regurgitation is normal (<35 mmHg).     CT Angiogram Chest Pulmonary Embolism    Result Date: 7/1/2024  Impression: 1. No evidence for pulmonary embolus. 2. Bibasilar airspace disease and right middle lung opacity compatible likely multifocal pneumonia with bilateral pleural effusions. 3. Cardiomegaly. Electronically Signed: Shari Kaur MD  7/1/2024 10:14 AM EDT  Workstation ID: [id number]    CT Lower Extremity Bilateral Without Contrast    Result Date: 7/1/2024  Impression: Bilateral lower extremity soft tissue swelling greater on the left than the right. There is no soft tissue gas or well-formed abscess. Electronically Signed: Obed Sidhu MD  7/1/2024 1:53 AM EDT  Workstation ID: [id number]    XR Chest 1 View    Result Date: 6/30/2024  Vascular congestion with small effusions Electronically Signed: Eamon Brock MD  6/30/2024 7:27 PM EDT  Workstation ID: [id number]     Assessment & Plan     Bilateral lower extremity DVT:  Remote history of pulmonary embolism  -Extensive acute right and left lower extremity DVT noted on presentation  -patient's chronic deconditioning and immobility may have been the risk factor for DVT.   -Negative CTA Chest for PE.  -Reportedly treated with warfarin for prior history of PE  -On Lovenox 1 mg/kg every 12 hours. This can be continued during hospitalization, patient can be transitioned to oral anticoagulation closer to discharge.  Will recommend Eliquis 10mg BID X 1 week followed by 5mg BID thereafter.  -Will need lifelong anticoagulation in my view due to persistent risk  factors and extensive DVT as well as history of prior pulmonary embolism.  -Cardiology consulted and evaluating for possible mechanical thrombectomy  -Given history of pulmonary embolism and now DVTs will check for factor V Leiden and prothrombin gene mutation, this is pending.        Uterine mass  -Transvaginal ultrasound suggestive of presence of a large subserosal uterine fundal fibroid.   -CT abdomen/pelvis showed presence of a lobulated mass protruding   from the uterine fundus measuring approximately 8.4 x 5.9 cm  -this is concerning for fibroid vs malignancy.  Will recommend GYN Evaluation for the same.  -No vaginal bleeding reported.     Thrombocytopenia  - 92,000, 119,000 at admission  -Baseline from January 2020 138,000  -Acute drop likely secondary to sepsis, rhinovirus  -Continue to monitor closely given initiation of anticoagulation  -Will check Hemolysis labs.     Sepsis  -UA suggestive of Acute cystitis, Urine culture consistent with GNR UTI.  -Respiratory panel positive for Rhinovirus.  -On antibiotic therapy with IV vancomycin and cefepime, management per primary team     Atrial fibrillation with RVR/paroxysmal atrial fibrillation  Acute on chronic systolic heart failure with diastolic dysfunction,   moderate aortic stenosis, severe mitral valve regurgitation  -On amiodarone drip, diuresis.  Management per cardiology.  Patient  on Lovenox for anticoagulation, Likely candidate for lifelong anticoagulation given Afib and extensive DVT.     Acute kidney injury/hyperkalemia  -Creatinine 1.33, EGFR 43.4.  Continues to improve EGFR 52 point  -Potassium improved to 5.4 from 6.5.  Now normalized at 3.5.  -Management per primary team      Pleural Effusion:  Likely secondary to CHF and suspected pneumonia.  -management per primary.

## 2024-07-02 NOTE — THERAPY EVALUATION
"Patient Name: Ban Brennan  : 1955    MRN: [id number]                              Today's Date: 2024       Admit Date: 2024    Visit Dx:     ICD-10-CM ICD-9-CM   1. Atrial fibrillation with rapid ventricular response  I48.91 427.31   2. Acute congestive heart failure, unspecified heart failure type  I50.9 428.0   3. Sepsis, due to unspecified organism, unspecified whether acute organ dysfunction present  A41.9 038.9     995.91   4. Urinary tract infection without hematuria, site unspecified  N39.0 599.0   5. Acute kidney injury  N17.9 584.9   6. Bilateral lower leg cellulitis  L03.116 682.6    L03.115    7. Hyperkalemia  E87.5 276.7     Patient Active Problem List   Diagnosis    Primary hypertension    Morbid obesity with BMI of 40.0-44.9, adult    Atrial fibrillation with RVR    Right bundle branch block    Acute deep vein thrombosis (DVT) of both lower extremities    ARABELLA (acute kidney injury)    Acute hyperkalemia    Sepsis    Acute UTI    Acute CHF    Rhinovirus infection    Multifocal pneumonia    Chronic respiratory failure with hypoxia, on home O2 therapy    Skin ulcer of right great toe    Skin ulcer of left great toe    SEDRICK (obstructive sleep apnea)     Past Medical History:   Diagnosis Date    Bilateral leg edema     Chronic respiratory failure with hypoxia, on home O2 therapy 2024    COPD (chronic obstructive pulmonary disease)     Morbid obesity with BMI of 40.0-44.9, adult 2010    Primary hypertension 2017    Pulmonary embolism     \"many years ago, was on warfarin\"    Sleep apnea      History reviewed. No pertinent surgical history.   General Information       Row Name 24 1715          OT Time and Intention    Document Type evaluation  -MP     Mode of Treatment individual therapy  -MP       Row Name 24 1715          General Information    Patient Profile Reviewed yes  -MP     Prior Level of Function independent:;ADL's  -MP       Row Name 24 1715 "          Living Environment    People in Home child(samreen), adult  -MP       Row Name 07/02/24 1715          Cognition    Orientation Status (Cognition) oriented x 3  -MP       Row Name 07/02/24 1715          Safety Issues, Functional Mobility    Impairments Affecting Function (Mobility) balance;endurance/activity tolerance;strength;pain  -MP               User Key  (r) = Recorded By, (t) = Taken By, (c) = Cosigned By      Initials Name Provider Type    Antolin Shelby OT Occupational Therapist                     Mobility/ADL's       Row Name 07/02/24 1716          Bed Mobility    Bed Mobility bed mobility (all) activities  -MP     All Activities, [place] (Bed Mobility) maximum assist (25% patient effort);1 person assist  -MP       Row Name 07/02/24 1716          Activities of Daily Living    BADL Assessment/Intervention toileting  -MP       Row Name 07/02/24 1716          Toileting Assessment/Training    [place] Level (Toileting) toileting skills;maximum assist (25% patient effort)  -MP               User Key  (r) = Recorded By, (t) = Taken By, (c) = Cosigned By      Initials Name Provider Type    Antolin Shelby OT Occupational Therapist                   Obj/Interventions       Row Name 07/02/24 1716          Range of Motion Comprehensive    Comment, General Range of Motion B shoulder AROM impacted 25-50%  -MP       Row Name 07/02/24 1716          Strength Comprehensive (MMT)    Comment, General Manual Muscle Testing (MMT) Assessment BUE 4-/5  -MP               User Key  (r) = Recorded By, (t) = Taken By, (c) = Cosigned By      Initials Name Provider Type    Antolin Shelby OT Occupational Therapist                   Goals/Plan       Row Name 07/02/24 1720          Bed Mobility Goal 1 (OT)    Activity/Assistive Device (Bed Mobility Goal 1, OT) bed mobility activities, all  -MP     [place] Level/Cues Needed (Bed Mobility Goal 1, OT) minimum assist (75% or more patient effort)   -MP     Time Frame (Bed Mobility Goal 1, OT) long term goal (LTG);2 weeks  -MP       Row Name 07/02/24 1720          Transfer Goal 1 (OT)    Activity/Assistive Device (Transfer Goal 1, OT) sit-to-stand/stand-to-sit;toilet  -MP     [place] Level/Cues Needed (Transfer Goal 1, OT) moderate assist (50-74% patient effort)  -MP     Time Frame (Transfer Goal 1, OT) long term goal (LTG);2 weeks  -MP       Row Name 07/02/24 1720          Dressing Goal 1 (OT)    Activity/Device (Dressing Goal 1, OT) lower body dressing  -MP     [place]/Cues Needed (Dressing Goal 1, OT) moderate assist (50-74% patient effort)  -MP     Time Frame (Dressing Goal 1, OT) long term goal (LTG);2 weeks  -MP               User Key  (r) = Recorded By, (t) = Taken By, (c) = Cosigned By      Initials Name Provider Type    MP Antolin Lopez, OT Occupational Therapist                   Clinical Impression       Row Name 07/02/24 1717          Pain Assessment    Pretreatment Pain Rating 3/10  -MP     Posttreatment Pain Rating 5/10  -MP     Pain Location - Side/Orientation Bilateral  -MP     Pain Location - foot  -MP       Row Name 07/02/24 1717          Plan of Care Review    Plan of Care Reviewed With patient  -MP     Progress no change  -MP     Outcome Evaluation 69 y.o. female with known PMH of hypertension, heart failure, chronic bilateral lower extremity edema who presented to the hospital for creasing shortness of breath, and was admitted with a principal diagnosis of Atrial fibrillation with RVR.She was started on cefepime and vancomycin for cellulitis of her legs and UTI. Pt. states she lives at home w/ son and requires ADL support/assist, has had decline in mobility and ADL independence in the last few months. Pt. typically able to ambulate to and from bathroom and toilet self unassisted. Pt. 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 Pt. w/ painful BLEs, unable  to WB this date. Pt. not safe for d/c home at this time and will require SNF placement at d/c to address aforementioned deficits.  -MP       Row Name 07/02/24 1717          Therapy Assessment/Plan (OT)    Rehab Potential (OT) good, to achieve stated therapy goals  -MP     Criteria for Skilled Therapeutic Interventions Met (OT) yes;meets criteria;skilled treatment is necessary  -MP     Therapy Frequency (OT) 3 times/wk  -MP       Row Name 07/02/24 1717          Therapy Plan Review/Discharge Plan (OT)    Anticipated Discharge Disposition (OT) skilled nursing facility  -MP       Row Name 07/02/24 1717          Vital Signs    Pre Patient Position Supine  -MP     Intra Patient Position Side Lying  -MP     Post Patient Position Supine  -MP       Row Name 07/02/24 1717          Positioning and Restraints    Pre-Treatment Position in bed  -MP     Post Treatment Position bed  -MP     In Bed supine;call light within reach;encouraged to call for assist;exit alarm on  -MP               User Key  (r) = Recorded By, (t) = Taken By, (c) = Cosigned By      Initials Name Provider Type    Antolin Shelby OT Occupational Therapist                   Outcome Measures    No documentation.                   Occupational Therapy Education       Title: PT OT SLP Therapies (In Progress)       Topic: Occupational Therapy (In Progress)       Point: ADL training (Not Started)       Description:   Instruct learner(s) on proper safety adaptation and remediation techniques during self care or transfers.   Instruct in proper use of assistive devices.                  Learner Progress:  Not documented in this visit.              Point: Home exercise program (Not Started)       Description:   Instruct learner(s) on appropriate technique for monitoring, assisting and/or progressing therapeutic exercises/activities.                  Learner Progress:  Not documented in this visit.              Point: Precautions (Not Started)       Description:    Instruct learner(s) on prescribed precautions during self-care and functional transfers.                  Learner Progress:  Not documented in this visit.              Point: Body mechanics (Done)       Description:   Instruct learner(s) on proper positioning and spine alignment during self-care, functional mobility activities and/or exercises.                  Learning Progress Summary             Patient Acceptance, E,TB, VU by  at 7/2/2024 1720                                         User Key       Initials Effective Dates Name Provider Type Discipline     06/16/21 -  Antolin Lopez OT Occupational Therapist OT                  OT Recommendation and Plan  Therapy Frequency (OT): 3 times/wk  Plan of Care Review  Plan of Care Reviewed With: patient  Progress: no change  Outcome Evaluation: 69 y.o. female with known PMH of hypertension, heart failure, chronic bilateral lower extremity edema who presented to the hospital for creasing shortness of breath, and was admitted with a principal diagnosis of Atrial fibrillation with RVR.She was started on cefepime and vancomycin for cellulitis of her legs and UTI. Pt. states she lives at home w/ son and requires ADL support/assist, has had decline in mobility and ADL independence in the last few months. Pt. typically able to ambulate to and from bathroom and toilet self unassisted. Pt. 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 Pt. w/ painful BLEs, unable to WB this date. Pt. not safe for d/c home at this time and will require SNF placement at d/c to address aforementioned deficits.     Time Calculation:         Time Calculation- OT       Row Name 07/02/24 1721             Time Calculation- OT    OT Start Time 1555  -      OT Stop Time 1627  -      OT Time Calculation (min) 32 min  -      Total Timed Code Minutes- OT 8 minute(s)  -      OT Received On 07/02/24  -      OT - Next  Appointment 07/04/24  -DEMIAN      OT Goal Re-Cert Due Date 07/16/24  -DEMIAN                User Key  (r) = Recorded By, (t) = Taken By, (c) = Cosigned By      Initials Name Provider Type    Antolin Shelby OT Occupational Therapist                  Therapy Charges for Today       Code Description Service Date Service Provider Modifiers Qty    59687508234  OT EVAL LOW COMPLEXITY 4 7/2/2024 Antolin Lopez OT GO 1    [id number]  OT THERAPEUTIC ACT EA 15 MIN 7/2/2024 Antolin Lopez OT GO 1                 Antolin Lopez OT  7/2/2024

## 2024-07-02 NOTE — PROGRESS NOTES
"07/02/24   Foot and Ankle Surgery - Inpatient Follow-up  Provider: Dr. Jerzy Talley DPM  Location: [facility]    Chief Complaint: Bilateral lower extremity swelling; left lower extremity cellulitis    Subjective:  Ban Brennan is a 69 y.o. female.     Patient appears to be doing well.  She has noticed improvement to her legs since yesterday.  No family at bedside but patient has her daughter on the phone during encounter    No Known Allergies    No current facility-administered medications on file prior to encounter.     Current Outpatient Medications on File Prior to Encounter   Medication Sig Dispense Refill    Allergy Relief 180 MG tablet Take 1 tablet by mouth Daily.      bumetanide (BUMEX) 1 MG tablet Take 1 tablet by mouth 3 times a day.      docusate sodium (COLACE) 100 MG capsule Take 1 capsule by mouth Every 12 (Twelve) Hours.      furosemide (LASIX) 20 MG tablet Take 1 tablet by mouth Daily.      HYDROcodone-acetaminophen (NORCO)  MG per tablet Take 1 tablet by mouth 3 times a day.      lisinopril (PRINIVIL,ZESTRIL) 30 MG tablet Take 1 tablet by mouth Daily.      meloxicam (MOBIC) 7.5 MG tablet Take 1 tablet by mouth Daily As Needed.      metoprolol tartrate (LOPRESSOR) 50 MG tablet Take 1 tablet by mouth Daily.      montelukast (SINGULAIR) 10 MG tablet Take 1 tablet by mouth every night at bedtime.      omeprazole (priLOSEC) 20 MG capsule Take 1 capsule by mouth Daily.      oxybutynin XL (DITROPAN-XL) 10 MG 24 hr tablet Take 2 tablets by mouth Daily.      potassium chloride (MICRO-K) 10 MEQ CR capsule Take 1 capsule by mouth Every 12 (Twelve) Hours.      vitamin D (ERGOCALCIFEROL) 1.25 MG (91235 UT) capsule capsule Take 1 capsule by mouth 2 (Two) Times a Week. Monday and Thursday.         Objective   /60   Pulse 108   Temp 97.6 °F (36.4 °C) (Axillary)   Resp 27   Ht 147.3 cm (58\")   Wt 92.3 kg (203 lb 7.8 oz)   SpO2 95%   BMI 42.53 kg/m²     GENERAL  Appearance:  " appears stated age and obese  Orientation:  AAOx3  Affect:  appropriate     VASCULAR      Right Foot Vascularity   Dorsalis pedis:  2+  Posterior tibial:  2+  Skin temperature:  warm  Edema grading:  3+ and pitting  CFT:  3  Pedal hair growth:  Absent      Left Foot Vascularity   Dorsalis pedis:  2+  Posterior tibial:  2+  Skin temperature:  warm  Edema grading:  3+ and pitting  CFT:  3  Pedal hair growth:  Absent     NEUROLOGIC      Right Foot Neurologic   Light touch sensation: normal  Hot/Cold sensation: normal  Achilles reflex:  2+      Left Foot Neurologic   Light touch sensation: normal  Hot/Cold sensation:  normal  Achilles reflex:  2+     MUSCULOSKELETAL      Right Foot Musculoskeletal   Ecchymosis:  none  Arch:  Normal      Left Foot Musculoskeletal   Ecchymosis:  none  Arch:  Normal     DERMATOLOGIC       Right Foot Dermatologic   Skin  Positive for erythema and wound. Negative for drainage and gangrene.       Left Foot Dermatologic   Skin  Positive for cellulitis and wound. Negative for gangrene.      Right foot additional comments: Range of motion and muscle strength testing deferred secondary to guarding.  Diffuse discomfort with palpation involving both lower extremities, left greater than right    7/2/24: Decreased erythema and edema to both lower extremities left greater than right.  Wounds to the great toes are stable and superficial.      Results from last 7 days   Lab Units 07/02/24  0329   WBC 10*3/mm3 10.53   HEMOGLOBIN g/dL 13.8   HEMATOCRIT % 42.9   PLATELETS 10*3/mm3 92*       Assessment & Plan       Atrial fibrillation with RVR    Primary hypertension    Morbid obesity with BMI of 40.0-44.9, adult    Right bundle branch block    Acute deep vein thrombosis (DVT) of both lower extremities    ARABELLA (acute kidney injury)    Acute hyperkalemia    Sepsis    Acute UTI    Acute CHF    Rhinovirus infection    Multifocal pneumonia    Chronic respiratory failure with hypoxia, on home O2 therapy    Skin  ulcer of right great toe    Skin ulcer of left great toe    SEDRICK (obstructive sleep apnea)      I reviewed the patient's situation with the daughter over the phone.  Symptoms have improved since yesterday.  No operative plans or further workup required regarding her toes.  I will defer wound care to wound nurse.  Case reviewed with infectious disease service.  Patient is to follow-up in the wound care center or with my nurse practitioner 1 to 2 weeks after discharge.  Will sign off at this time.    Note is dictated utilizing voice recognition software. Unfortunately this leads to occasional typographical errors. I apologize in advance if the situation occurs. If questions occur please do not hesitate to call our office.

## 2024-07-02 NOTE — PLAN OF CARE
Goal Outcome Evaluation:  Plan of Care Reviewed With: patient        Progress: no change  Outcome Evaluation: 69 y.o. female with known PMH of hypertension, heart failure, chronic bilateral lower extremity edema who presented to the hospital for creasing shortness of breath, and was admitted with a principal diagnosis of Atrial fibrillation with RVR.She was started on cefepime and vancomycin for cellulitis of her legs and UTI. Pt. states she lives at home w/ son and requires ADL support/assist, has had decline in mobility and ADL independence in the last few months. Pt. typically able to ambulate to and from bathroom and toilet self unassisted. Pt. 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 Pt. w/ painful BLEs, unable to WB this date. Pt. not safe for d/c home at this time and will require SNF placement at d/c to address aforementioned deficits.      Anticipated Discharge Disposition (OT): skilled nursing facility

## 2024-07-02 NOTE — PROGRESS NOTES
"Pharmacy Antimicrobial Dosing Service    Subjective:  Ban Brennan is a 69 y.o.female admitted with Afib w/RVR. Pharmacy has been consulted to dose Vancomycin and Cefepime for possible SSTI.    PMH: chronic leg edema, CHF      Assessment/Plan    1. Day #3 Vancomycin: Pulse dosing d/t renal dysfxn. Random vancomycin level ~10 hours post vancomycin 1500 mg (~16 mg/kg TBW) IV x1 = 22.4 mcg/mL. Patient continues to have adequate UOP while on Bumex. Scr is improving, will continue with pulse dosing for another 24 hrs. Will re-dose with vanc 1 g (~15 mg/kg AdjBW) IV x1 at 16:00. Obtain random level with AM labs. Potentially switch to scheduled regimen if renal function has stabilized.     2. Day #2 Ceftriaxone: 1 g IV q24h.    Will continue to monitor drug levels, renal function, culture and sensitivities, and patient clinical status.       Objective:  Relevant clinical data and objective history reviewed:  147.3 cm (58\")   92.3 kg (203 lb 7.8 oz)   Ideal body weight: 47.1 kg (103 lb 14.5 oz)  Adjusted ideal body weight: 65.2 kg (143 lb 11.8 oz)  Body mass index is 42.53 kg/m².    Results from last 7 days   Lab Units 07/02/24  0329 07/01/24  0910   VANCOMYCIN RM mcg/mL 22.40 14.30     Results from last 7 days   Lab Units 07/02/24  0329 07/01/24  0910 06/30/24  2043   CREATININE mg/dL 1.14* 1.33* 1.38*     Estimated Creatinine Clearance: 47.9 mL/min (A) (by C-G formula based on SCr of 1.14 mg/dL (H)).  I/O last 3 completed shifts:  In: 2116 [P.O.:160; I.V.:1956]  Out: 4175 [Urine:4175]    Results from last 7 days   Lab Units 07/02/24  0329 07/01/24  0910 06/30/24  1732   WBC 10*3/mm3 10.53 13.80* 16.80*     Temperature    07/02/24 0000 07/02/24 0400 07/02/24 0755   Temp: 96.3 °F (35.7 °C) 96.2 °F (35.7 °C) 96.3 °F (35.7 °C)     Baseline culture/source/susceptibility:  Microbiology Results (last 10 days)       Procedure Component Value - Date/Time    Respiratory Culture - Sputum, NT Suction [[id number]] Collected: " 07/01/24 1507    Lab Status: Preliminary result Specimen: Sputum from NT Suction Updated: 07/02/24 0950     Respiratory Culture Scant growth (1+) The culture consists of normal respiratory shorty. This is a preliminary report; final report to follow.     Gram Stain Few (2+) Epithelial cells per low power field      Rare (1+) WBCs per low power field      Few (2+) Mixed bacterial morphotypes seen on Gram Stain    Respiratory Panel PCR w/COVID-19(SARS-CoV-2) NIKKI/CHRISTY/RAMIRO/PAD/COR/FRANCIA In-House, NP Swab in UTM/VTM, 2 HR TAT - Swab, Nasopharynx [[id number]]  (Abnormal) Collected: 06/30/24 2340    Lab Status: Final result Specimen: Swab from Nasopharynx Updated: 07/01/24 0055     ADENOVIRUS, PCR Not Detected     Coronavirus 229E Not Detected     Coronavirus HKU1 Not Detected     Coronavirus NL63 Not Detected     Coronavirus OC43 Not Detected     COVID19 Not Detected     Human Metapneumovirus Not Detected     Human Rhinovirus/Enterovirus Detected     Influenza A PCR Not Detected     Influenza B PCR Not Detected     Parainfluenza Virus 1 Not Detected     Parainfluenza Virus 2 Not Detected     Parainfluenza Virus 3 Not Detected     Parainfluenza Virus 4 Not Detected     RSV, PCR Not Detected     Bordetella pertussis pcr Not Detected     Bordetella parapertussis PCR Not Detected     Chlamydophila pneumoniae PCR Not Detected     Mycoplasma pneumo by PCR Not Detected    Narrative:      In the setting of a positive respiratory panel with a viral infection PLUS a negative procalcitonin without other underlying concern for bacterial infection, consider observing off antibiotics or discontinuation of antibiotics and continue supportive care. If the respiratory panel is positive for atypical bacterial infection (Bordetella pertussis, Chlamydophila pneumoniae, or Mycoplasma pneumoniae), consider antibiotic de-escalation to target atypical bacterial infection.    MRSA Screen, PCR (Inpatient) - Swab, Nares [[id number]]  (Abnormal)  Collected: 06/30/24 2340    Lab Status: Final result Specimen: Swab from Nares Updated: 07/01/24 0125     MRSA PCR MRSA Detected    Narrative:      The negative predictive value of this diagnostic test is high and should only be used to consider de-escalating anti-MRSA therapy. A positive result may indicate colonization with MRSA and must be correlated clinically.    Blood Culture - Blood, Arm, Right [[id number]]  (Normal) Collected: 06/30/24 1844    Lab Status: Preliminary result Specimen: Blood from Arm, Right Updated: 07/01/24 1915     Blood Culture No growth at 24 hours    Narrative:      Less than seven (7) mL's of blood was collected.  Insufficient quantity may yield false negative results.    Blood Culture - Blood, Arm, Right [[id number]]  (Normal) Collected: 06/30/24 1757    Lab Status: Preliminary result Specimen: Blood from Arm, Right Updated: 07/01/24 1815     Blood Culture No growth at 24 hours    Narrative:      Less than seven (7) mL's of blood was collected.  Insufficient quantity may yield false negative results.    Urine Culture - Urine, Urine, Catheter [[id number]]  (Abnormal) Collected: 06/30/24 1749    Lab Status: Preliminary result Specimen: Urine, Catheter Updated: 07/02/24 1000     Urine Culture >100,000 CFU/mL Gram Negative Bacilli    Narrative:      Colonization of the urinary tract without infection is common. Treatment is discouraged unless the patient is symptomatic, pregnant, or undergoing an invasive urologic procedure.            Ning Loyola RPH  07/02/24 10:07 EDT

## 2024-07-02 NOTE — PROGRESS NOTES
Critical Care Progress Note   Ban Brennan : 1955 MRN:[id number] LOS:2     Principal Problem: Atrial fibrillation with RVR     Reason for follow up: All the medical problems listed below    Summary     Ban Brennan is a 69 y.o. female with known PMH of hypertension, heart failure, chronic bilateral lower extremity edema who presented to the hospital for creasing shortness of breath, and was admitted with a principal diagnosis of Atrial fibrillation with RVR.  Patient is alert however lethargic and attempts to participate with HPI however she is a poor historian.  Supplemental information for HPI taken from conversation with daughter at bedside who states patient does not go to the doctor normally and has not been in the hospital for years.  She is unsure of what her full medical history is at this time but states that her legs have been swollen for a long time and that she has not been up and moving for approximately 1 year.  Patient has a home health nurse that comes out and wraps her legs twice a week.  On assessment patient's legs are both extremely red and swollen, left > right.  Patient's daughter states that redness of her legs is new however she is not sure how long it has been this bad.  EMS was called when she had worsening shortness of breath and found the patient with a wide complex tachycardia with heart rate of 190s.  She was given 150 mg of amiodarone by EMS and heart rate improved to 160s.  She is found to have a right bundle branch block however it is uncertain due to her limited history if this is chronic.     ED course: On arrival to emergency department EKG showed atrial fibrillation with RVR, heart rate 145.  CXR showed fluid overload with bilateral effusions.  Lactic acid was 3.1 and BNP 19,860.  Patient had a white count of 16.8 and 4+ bacteria in her urine.  Also noted to be hyperkalemic with a potassium of 6.5 in which she was treated with insulin D50, calcium gluconate,  and Lasix.  She was started on cefepime and vancomycin for cellulitis of her legs and UTI.     ACP: Patient is alert with some intermittent confusion.  Her daughter is at bedside and is her decision-maker along with her brother.  Daughter states patient is a full code.       Significant events     07/02/24 : Patient remains afebrile, heart rates continue in the low 100s, remainder vital signs are within normal limits and are stable.  Patient has had 3.1 L of urine output over the last 24 hours and is net -1.9 L for the admission. Chemistry panel shows the creatinine has improved to 1.14, albumin remains very low at 2.3.  Lactate has nearly normalized down to 2.3.  CBC is unremarkable.  Pelvic ultrasound suggested that the possible mass on the uterus was suggestive of large subserosal uterine fibroid. PCT was not significantly elevated.  TSH was wnl.  A1c was minimally elevated at 6.14.  Pt stable to downgrade to PCU at this time.    Additionally:  CXR reviewed.  Pleural effusions improving.  No indication for thoracentesis at this time.    Assessment / Plan     BLE DVT  Hx of PE, unprovoked  On treatment-dose lovenox  Cardiology following  Hematology following        Atrial fibrillation with RVR  PAF  Chest pain  Acute on chronic systolic heart failure  Diastolic dysfxn  Moderate AS  Severe MR  Chronic lower extremity lymphedema  Fluid overload  Large right pleural effusion  Rate somewhat better controlled  Cardiology has started amio gtt  TTE from 7/1/2024 shows an ejection fraction low at 30 to 35%, diastolic dysfunction noted, moderate aortic valve stenosis, severe mitral regurgitation  TSH wnl  Continue Bumex 2 mg IVP q8h  Repeat CXR pending        Severe sepsis  Acute cystitis with hematuria  LLE cellulitis  L foot wound, appears infected  Putative panniculitis  MRSA PCR +  Rhinovirus +  Putative multifocal PNA  Source of sepsis is multiple  ID following--vanc and Rocephin  Consult podiatry  HbA1c  6%  PCT not  "significantly elevated  NT suctioned sputum Cx only with normal shorty      Uterine mass  Pelvic U/S suggesting fibroid       Acute kidney injury  Hyperkalemia, improved  Creatinine 1.38, baseline unknown  Initial potassium 6.5; treated with sling/D50, calcium gluconate, and Lasix  IV fluids contraindicated  Avoid nephrotoxic agents  Avoid hypotension  Consider nephrology consult if no improvement       Essential hypertension  Hold home lisinopril and metoprolol as patient is borderline hypotensive  Resume when clinically appropriate      Other:  PT, OT consulted      Critical Care Time:  34 mins.        Code status:   Code Status (Patient has no pulse and is not breathing): CPR (Attempt to Resuscitate)  Medical Interventions (Patient has pulse or is breathing): Full Support       Nutrition: Diet: Cardiac, Diabetic, Fluid Restriction (240 mL/tray); Healthy Heart (2-3 Na+); Consistent Carbohydrate; 1500 mL/day; Fluid Consistency: Thin (IDDSI 0)   Patient isn't on Tube Feeding    VTE Prophylaxis:  Pharmacologic VTE prophylaxis orders are present.       Subjective / Review of systems     Review of Systems   Patient states she feels \"a little better\" today.  She still has pain in her legs and lower abdomen.  Would like to get out of bed and sit in the chair, possibly do some exercises.  No CP.  SOB is improved today.  She denies any fevers, chills, sweats, nausea, vomiting, or diarrhea.  Objective / Physical Exam   Vital signs:  Temp: 96.3 °F (35.7 °C)  BP: 102/70  Heart Rate: 106  Resp: 16  SpO2: 95 %  Weight: 92.3 kg (203 lb 7.8 oz)    Admission Weight: Weight: 108 kg (239 lb)  Current Weight: Weight: 92.3 kg (203 lb 7.8 oz)    Input/Output in last 24 hours:    Intake/Output Summary (Last 24 hours) at 7/2/2024 1029  Last data filed at 7/2/2024 0948  Gross per 24 hour   Intake 1174 ml   Output 2625 ml   Net -1451 ml      Physical Exam     GEN: Very pleasant woman.  Sitting up in bed on nasal cannula. NAD.  NEURO:  " Brainstem reflexes intact.  No obvious focal deficit.  Moves all 4 ext.  HEENT:  N/AT.  PERRL.  MMM.  Oropharynx non-erythematous.  No drainage from the eyes/ears/nose.  No conjunctival petechiae.  No oral thrush.  Auditory and visual acuity grossly wnl.  Voice normal.  NECK:  Supple, NT, trachea midline.  No meningismus.  No ROM limitation.  No torticollis.  No JVD.  No thyromegaly.    CHEST/LUNGS:  Breath sounds are diminished but clear and equal bilaterally.  No w/r/r.  Chest excursion equal bilaterally.    CARDIOVASCULAR: Tachycardic, irregularly irregular rhythm w/ systolic ejection and holosystolic murmur noted.  GI:  Abdomen is obese, exquisitely tender to even light palpation in the suprapubic area and bilateral lower quadrants.  +BS.    :  Normal external genitalia.  No trimble cath in place.  EXTREMITIES: Bilateral lower extremities are noted for chronic venous stasis changes, left lower extremity is hyperemic with increased warmth and much tender to palpation on the right.  The right great toe has a old wound that appears to be not currently infected.  The left foot has a fluctuant area.  SKIN: As per above.  LYMPHATICS/HEME:  No overt LAD or abnormal bruising.  + lymphedema.  MSK:  Normal ROM.  No joint abnormalities noted.  Strength is 5/5 and equal in BUE and BLE's.  PSYCH:  Pleasant.  A&Ox 3.  Normal mood, flat affect.  Responds appropriately to commands and appears to comprehend instructions.        Radiology and Labs     Results from last 7 days   Lab Units 07/02/24  0329 07/01/24  0910 06/30/24  1858 06/30/24  1732   WBC 10*3/mm3 10.53 13.80*  --  16.80*   HEMATOCRIT % 42.9 43.8  --  49.0*   HEMATOCRIT POC %  --   --  50  --    PLATELETS 10*3/mm3 92* 104*  --  119*      Results from last 7 days   Lab Units 07/02/24  0329 07/01/24  1633 07/01/24  0910 06/30/24  2043 06/30/24  1858   SODIUM mmol/L 138  --  134* 131*  --    POTASSIUM mmol/L 3.5 4.2 5.4* 6.5*  --    CHLORIDE mmol/L 96*  --  97* 101  --     CO2 mmol/L 34.5*  --  31.4* 24.4  --    BUN mg/dL 43*  --  46* 50*  --    CREATININE mg/dL 1.14*  --  1.33* 1.38* 1.40*      Current medications   Scheduled Meds: amiodarone, 200 mg, Oral, Q8H   Followed by  [START ON 7/9/2024] amiodarone, 200 mg, Oral, Q12H   Followed by  [START ON 7/23/2024] amiodarone, 200 mg, Oral, Daily  bumetanide, 2 mg, Intravenous, Q8H  calcium gluconate, 1,000 mg, Intravenous, Once  cefTRIAXone, 1,000 mg, Intravenous, Q24H  dextrose, 25 g, Intravenous, Once  enoxaparin, 1 mg/kg, Subcutaneous, Q12H  insulin regular, 10 Units, Intravenous, Once  metoprolol succinate XL, 25 mg, Oral, Q24H  miconazole, 1 Application, Topical, Q12H  midodrine, 10 mg, Oral, Q8H  oxyCODONE, 10 mg, Oral, TID  povidone-iodine, , Topical, Daily  sodium chloride, 10 mL, Intravenous, Q12H      Continuous Infusions: Pharmacy to Dose Cefepime,   Pharmacy to Dose enoxaparin (LOVENOX),   Pharmacy to dose vancomycin,         Plan discussed with RN. Reviewed all other data in the last 24 hours, including but not limited to vitals, labs, microbiology, imaging and pertinent notes from other providers.     Sesar Solorzano DO   Critical Care  07/02/24   10:29 EDT

## 2024-07-02 NOTE — PROGRESS NOTES
I have reviewed the whole chart.  Patient was admitted with A-fib with RVR and also pneumonia UTI DVT, cellulitis, acute on chronic systolic CHF with EF of 35%, ARABELLA, RBBB, rhinovirus infection, multifocal pneumonia, morbid obesity and possible SEDRICK, chronic lower extremity edema and ulceration which is infected, thrombocytopenia and acute on chronic respiratory failure.  Will follow this patient closely in hospital

## 2024-07-02 NOTE — PROGRESS NOTES
Infectious Diseases Progress Note      LOS: 2 days   Patient Care Team:  Rut Pierce APRN as PCP - General (Nurse Practitioner)    Chief Complaint: Bilateral leg edema, erythema, pain and wounds.  Shortness of breath, weakness    Subjective       The patient has been afebrile for the last 24 hours.  The patient is on 3 L of oxygen by nasal cannula, hemodynamically stable, and is tolerating antimicrobial therapy.      Review of Systems:   Review of Systems   Constitutional:  Positive for fatigue.   HENT: Negative.     Eyes: Negative.    Respiratory:  Positive for shortness of breath.    Cardiovascular:  Positive for leg swelling.   Gastrointestinal: Negative.    Endocrine: Negative.    Genitourinary: Negative.    Musculoskeletal: Negative.    Skin:  Positive for color change and wound.   Neurological:  Positive for weakness.   Psychiatric/Behavioral: Negative.     All other systems reviewed and are negative.       Objective     Vital Signs  Temp:  [96.2 °F (35.7 °C)-97.6 °F (36.4 °C)] 97 °F (36.1 °C)  Heart Rate:  [] 102  Resp:  [10-27] 15  BP: ()/(41-72) 92/60    Physical Exam:  Physical Exam  Vitals and nursing note reviewed.   Constitutional:       General: She is not in acute distress.     Appearance: She is well-developed. She is obese. She is ill-appearing. She is not diaphoretic.   HENT:      Head: Normocephalic and atraumatic.   Eyes:      General: No scleral icterus.     Extraocular Movements: Extraocular movements intact.      Conjunctiva/sclera: Conjunctivae normal.      Pupils: Pupils are equal, round, and reactive to light.   Cardiovascular:      Rate and Rhythm: Regular rhythm. Tachycardia present.      Heart sounds: Normal heart sounds, S1 normal and S2 normal. No murmur heard.  Pulmonary:      Effort: Pulmonary effort is normal. No respiratory distress.      Breath sounds: Normal breath sounds. No stridor. No wheezing or rales.   Chest:      Chest wall: No tenderness.   Abdominal:       General: Bowel sounds are normal. There is no distension.      Palpations: Abdomen is soft. There is no mass.      Tenderness: There is no abdominal tenderness. There is no guarding.   Musculoskeletal:         General: No swelling, tenderness or deformity. Normal range of motion.      Cervical back: Neck supple.      Right lower leg: Edema present.      Left lower leg: Edema present.   Skin:     General: Skin is warm and dry.      Coloration: Skin is not pale.      Findings: No bruising, erythema or rash.      Comments: ignificant erythema to the left leg with tenderness-venous stasis changes        Vague erythema and tenderness to the lower abdomen     Superficial wounds to bilateral great toes-no signs of active infection      Neurological:      Mental Status: She is alert and oriented to person, place, and time.          Results Review:    I have reviewed all clinical data, test, lab, and imaging results.     Radiology  XR Chest 1 View    Result Date: 7/2/2024  XR CHEST 1 VW Date of Exam: 7/2/2024 11:57 AM EDT Indication: Pleural effusion, hypoxia Comparison: 6/30/2024. Findings: There is continued cardiomegaly with pulmonary vascular congestion. Bibasilar lung infiltrates with small effusions appear somewhat improved. Exam is somewhat limited by portable technique and obesity.     Impression: Some improvement in bibasilar lung infiltrates with small effusions. Continued cardiomegaly. Electronically Signed: Krystal Lee MD  7/2/2024 12:46 PM EDT  Workstation ID: [id number]     Cardiology    Laboratory    Results from last 7 days   Lab Units 07/02/24  0329 07/01/24  0910 06/30/24  1858 06/30/24  1732   WBC 10*3/mm3 10.53 13.80*  --  16.80*   HEMOGLOBIN g/dL 13.8 14.2  --  15.9   HEMOGLOBIN, POC g/dL  --   --  17.0  --    HEMATOCRIT % 42.9 43.8  --  49.0*   HEMATOCRIT POC %  --   --  50  --    PLATELETS 10*3/mm3 92* 104*  --  119*     Results from last 7 days   Lab Units 07/02/24  0329 07/01/24  3445  07/01/24  0910 06/30/24  2043 06/30/24  1858   SODIUM mmol/L 138  --  134* 131*  --    POTASSIUM mmol/L 3.5 4.2 5.4* 6.5*  --    CHLORIDE mmol/L 96*  --  97* 101  --    CO2 mmol/L 34.5*  --  31.4* 24.4  --    BUN mg/dL 43*  --  46* 50*  --    CREATININE mg/dL 1.14*  --  1.33* 1.38* 1.40*   GLUCOSE mg/dL 82  --  102* 194*  --    ALBUMIN g/dL 2.3*  --  2.5* 2.5*  --    BILIRUBIN mg/dL 0.9  --  1.3* 1.6*  --    ALK PHOS U/L 109  --  115 126*  --    AST (SGOT) U/L 24  --  39* 42*  --    ALT (SGPT) U/L 14  --  17 18  --    CALCIUM mg/dL 9.4  --  10.0 10.1  --                  Microbiology   Microbiology Results (last 10 days)       Procedure Component Value - Date/Time    Respiratory Culture - Sputum, NT Suction [[id number]] Collected: 07/01/24 1507    Lab Status: Preliminary result Specimen: Sputum from NT Suction Updated: 07/02/24 0950     Respiratory Culture Scant growth (1+) The culture consists of normal respiratory shorty. This is a preliminary report; final report to follow.     Gram Stain Few (2+) Epithelial cells per low power field      Rare (1+) WBCs per low power field      Few (2+) Mixed bacterial morphotypes seen on Gram Stain    Respiratory Panel PCR w/COVID-19(SARS-CoV-2) NIKKI/CHRISTY/RAMIRO/PAD/COR/FRANCIA In-House, NP Swab in UTM/VTM, 2 HR TAT - Swab, Nasopharynx [[id number]]  (Abnormal) Collected: 06/30/24 2340    Lab Status: Final result Specimen: Swab from Nasopharynx Updated: 07/01/24 0055     ADENOVIRUS, PCR Not Detected     Coronavirus 229E Not Detected     Coronavirus HKU1 Not Detected     Coronavirus NL63 Not Detected     Coronavirus OC43 Not Detected     COVID19 Not Detected     Human Metapneumovirus Not Detected     Human Rhinovirus/Enterovirus Detected     Influenza A PCR Not Detected     Influenza B PCR Not Detected     Parainfluenza Virus 1 Not Detected     Parainfluenza Virus 2 Not Detected     Parainfluenza Virus 3 Not Detected     Parainfluenza Virus 4 Not Detected     RSV, PCR Not Detected      Bordetella pertussis pcr Not Detected     Bordetella parapertussis PCR Not Detected     Chlamydophila pneumoniae PCR Not Detected     Mycoplasma pneumo by PCR Not Detected    Narrative:      In the setting of a positive respiratory panel with a viral infection PLUS a negative procalcitonin without other underlying concern for bacterial infection, consider observing off antibiotics or discontinuation of antibiotics and continue supportive care. If the respiratory panel is positive for atypical bacterial infection (Bordetella pertussis, Chlamydophila pneumoniae, or Mycoplasma pneumoniae), consider antibiotic de-escalation to target atypical bacterial infection.    MRSA Screen, PCR (Inpatient) - Swab, Nares [[id number]]  (Abnormal) Collected: 06/30/24 2340    Lab Status: Final result Specimen: Swab from Nares Updated: 07/01/24 0125     MRSA PCR MRSA Detected    Narrative:      The negative predictive value of this diagnostic test is high and should only be used to consider de-escalating anti-MRSA therapy. A positive result may indicate colonization with MRSA and must be correlated clinically.    Blood Culture - Blood, Arm, Right [[id number]]  (Normal) Collected: 06/30/24 1844    Lab Status: Preliminary result Specimen: Blood from Arm, Right Updated: 07/01/24 1915     Blood Culture No growth at 24 hours    Narrative:      Less than seven (7) mL's of blood was collected.  Insufficient quantity may yield false negative results.    Blood Culture - Blood, Arm, Right [[id number]]  (Normal) Collected: 06/30/24 1757    Lab Status: Preliminary result Specimen: Blood from Arm, Right Updated: 07/01/24 1815     Blood Culture No growth at 24 hours    Narrative:      Less than seven (7) mL's of blood was collected.  Insufficient quantity may yield false negative results.    Urine Culture - Urine, Urine, Catheter [[id number]]  (Abnormal) Collected: 06/30/24 1749    Lab Status: Preliminary result Specimen: Urine, Catheter Updated:  07/02/24 1000     Urine Culture >100,000 CFU/mL Gram Negative Bacilli    Narrative:      Colonization of the urinary tract without infection is common. Treatment is discouraged unless the patient is symptomatic, pregnant, or undergoing an invasive urologic procedure.            Medication Review:       Schedule Meds  amiodarone, 200 mg, Oral, Q8H   Followed by  [START ON 7/9/2024] amiodarone, 200 mg, Oral, Q12H   Followed by  [START ON 7/23/2024] amiodarone, 200 mg, Oral, Daily  bumetanide, 2 mg, Intravenous, Q8H  calcium gluconate, 1,000 mg, Intravenous, Once  cefTRIAXone, 1,000 mg, Intravenous, Q24H  dextrose, 25 g, Intravenous, Once  enoxaparin, 1 mg/kg, Subcutaneous, Q12H  insulin regular, 10 Units, Intravenous, Once  metoprolol succinate XL, 25 mg, Oral, Q24H  miconazole, 1 Application, Topical, Q12H  midodrine, 10 mg, Oral, Q8H  oxyCODONE, 10 mg, Oral, TID  [START ON 7/3/2024] pantoprazole, 40 mg, Oral, Q AM  povidone-iodine, , Topical, Daily  sodium chloride, 10 mL, Intravenous, Q12H  vancomycin, 1,000 mg, Intravenous, Once        Infusion Meds  Pharmacy to Dose Cefepime,   Pharmacy to Dose enoxaparin (LOVENOX),   Pharmacy to dose vancomycin,         PRN Meds    acetaminophen **OR** acetaminophen    nitroglycerin    ondansetron ODT **OR** ondansetron    oxyCODONE    Pharmacy to Dose Cefepime    Pharmacy to Dose enoxaparin (LOVENOX)    Pharmacy to dose vancomycin    [COMPLETED] Insert Peripheral IV **AND** sodium chloride    sodium chloride    sodium chloride    Vancomycin Pharmacy Intermittent/Pulse Dosing        Assessment & Plan       Antimicrobial Therapy   1.  IV Rocephin        2.  IV vancomycin        3.        4.        5.             Assessment     Left lower extremity cellulitis with erythema involving the left leg on the top of chronic venous stasis changes.  Patient has superficial wounds mostly involving the left great toe.     Chronic bilateral leg edema with chronic skin changes and superficial  wounds.  Concerning for lymphedema.  Wound care also following.  Current Doppler does show acute right and left leg DVT     Lower mild panniculitis with some erythema and tenderness to the lower pannus/suprapubic area     Abnormal urinalysis with some bacteria and pyuria.  Cultures growing gram-negative bacilli     Mild leukocytosis-likely related to the above infections .     Mild hypoxia with large right pleural effusion and small left pleural effusion.  Patient is currently on 2 L of oxygen by nasal cannula rhinovirus infection.  CT PE protocol was negative for PE     Acute kidney injury     COPD/sleep apnea     History of pulmonary embolism     Plan     Continue IV Rocephin 1 g every 24 hours empirically for UTI treatment waiting on culture results  Continue IV vancomycin-asked pharmacy to monitor and dose  Waiting on urine culture  Continue supportive care       The above note was transcribed for Dr. Colbert-physical exam and review of systems were performed by him        Keyana Perez, APRN  07/02/24  16:34 EDT    Note is dictated utilizing voice recognition software/Dragon

## 2024-07-03 ENCOUNTER — APPOINTMENT (OUTPATIENT)
Dept: GENERAL RADIOLOGY | Facility: HOSPITAL | Age: 69
End: 2024-07-03
Payer: MEDICARE

## 2024-07-03 PROBLEM — I50.21 ACUTE SYSTOLIC CONGESTIVE HEART FAILURE: Status: ACTIVE | Noted: 2024-07-01

## 2024-07-03 PROBLEM — I50.21 ACUTE HFREF (HEART FAILURE WITH REDUCED EJECTION FRACTION): Status: ACTIVE | Noted: 2024-07-03

## 2024-07-03 PROBLEM — I34.0 MITRAL REGURGITATION: Status: ACTIVE | Noted: 2024-07-03

## 2024-07-03 PROBLEM — I35.0 AORTIC STENOSIS: Status: ACTIVE | Noted: 2024-07-03

## 2024-07-03 LAB
ALBUMIN SERPL-MCNC: 2.5 G/DL (ref 3.5–5.2)
ALBUMIN/GLOB SERPL: 0.6 G/DL
ALP SERPL-CCNC: 130 U/L (ref 39–117)
ALT SERPL W P-5'-P-CCNC: 16 U/L (ref 1–33)
ANION GAP SERPL CALCULATED.3IONS-SCNC: 7.1 MMOL/L (ref 5–15)
AST SERPL-CCNC: 30 U/L (ref 1–32)
BACTERIA SPEC AEROBE CULT: ABNORMAL
BACTERIA SPEC RESP CULT: NORMAL
BASOPHILS # BLD AUTO: 0.02 10*3/MM3 (ref 0–0.2)
BASOPHILS NFR BLD AUTO: 0.2 % (ref 0–1.5)
BILIRUB SERPL-MCNC: 1 MG/DL (ref 0–1.2)
BUN SERPL-MCNC: 45 MG/DL (ref 8–23)
BUN/CREAT SERPL: 34.4 (ref 7–25)
CALCIUM SPEC-SCNC: 9.2 MG/DL (ref 8.6–10.5)
CHLORIDE SERPL-SCNC: 95 MMOL/L (ref 98–107)
CO2 SERPL-SCNC: 36.9 MMOL/L (ref 22–29)
CREAT SERPL-MCNC: 1.31 MG/DL (ref 0.57–1)
DEPRECATED RDW RBC AUTO: 56.4 FL (ref 37–54)
EGFRCR SERPLBLD CKD-EPI 2021: 44.2 ML/MIN/1.73
EOSINOPHIL # BLD AUTO: 0.05 10*3/MM3 (ref 0–0.4)
EOSINOPHIL NFR BLD AUTO: 0.5 % (ref 0.3–6.2)
ERYTHROCYTE [DISTWIDTH] IN BLOOD BY AUTOMATED COUNT: 15.8 % (ref 12.3–15.4)
F5 GENE MUT ANL BLD/T: NORMAL
FACTOR II, DNA ANALYSIS: NORMAL
GLOBULIN UR ELPH-MCNC: 3.9 GM/DL
GLUCOSE SERPL-MCNC: 107 MG/DL (ref 65–99)
GRAM STN SPEC: NORMAL
HAPTOGLOB SERPL-MCNC: 110 MG/DL (ref 30–200)
HCT VFR BLD AUTO: 46 % (ref 34–46.6)
HGB BLD-MCNC: 14.4 G/DL (ref 12–15.9)
IMM GRANULOCYTES # BLD AUTO: 0.06 10*3/MM3 (ref 0–0.05)
IMM GRANULOCYTES NFR BLD AUTO: 0.6 % (ref 0–0.5)
LYMPHOCYTES # BLD AUTO: 1.19 10*3/MM3 (ref 0.7–3.1)
LYMPHOCYTES NFR BLD AUTO: 11.3 % (ref 19.6–45.3)
MAGNESIUM SERPL-MCNC: 1.8 MG/DL (ref 1.6–2.4)
MCH RBC QN AUTO: 31.1 PG (ref 26.6–33)
MCHC RBC AUTO-ENTMCNC: 31.3 G/DL (ref 31.5–35.7)
MCV RBC AUTO: 99.4 FL (ref 79–97)
MONOCYTES # BLD AUTO: 1.12 10*3/MM3 (ref 0.1–0.9)
MONOCYTES NFR BLD AUTO: 10.6 % (ref 5–12)
NEUTROPHILS NFR BLD AUTO: 76.8 % (ref 42.7–76)
NEUTROPHILS NFR BLD AUTO: 8.09 10*3/MM3 (ref 1.7–7)
NRBC BLD AUTO-RTO: 0.2 /100 WBC (ref 0–0.2)
PHOSPHATE SERPL-MCNC: 4.8 MG/DL (ref 2.5–4.5)
PLATELET # BLD AUTO: 104 10*3/MM3 (ref 140–450)
PMV BLD AUTO: 10.1 FL (ref 6–12)
POTASSIUM SERPL-SCNC: 3.5 MMOL/L (ref 3.5–5.2)
POTASSIUM SERPL-SCNC: 5.2 MMOL/L (ref 3.5–5.2)
PROT SERPL-MCNC: 6.4 G/DL (ref 6–8.5)
RBC # BLD AUTO: 4.63 10*6/MM3 (ref 3.77–5.28)
SODIUM SERPL-SCNC: 139 MMOL/L (ref 136–145)
VANCOMYCIN SERPL-MCNC: 22.4 MCG/ML (ref 5–40)
WBC NRBC COR # BLD AUTO: 10.53 10*3/MM3 (ref 3.4–10.8)

## 2024-07-03 PROCEDURE — 97112 NEUROMUSCULAR REEDUCATION: CPT

## 2024-07-03 PROCEDURE — 93005 ELECTROCARDIOGRAM TRACING: CPT | Performed by: NURSE PRACTITIONER

## 2024-07-03 PROCEDURE — 97535 SELF CARE MNGMENT TRAINING: CPT

## 2024-07-03 PROCEDURE — 93005 ELECTROCARDIOGRAM TRACING: CPT | Performed by: INTERNAL MEDICINE

## 2024-07-03 PROCEDURE — 71045 X-RAY EXAM CHEST 1 VIEW: CPT

## 2024-07-03 PROCEDURE — 80202 ASSAY OF VANCOMYCIN: CPT | Performed by: INTERNAL MEDICINE

## 2024-07-03 PROCEDURE — 99233 SBSQ HOSP IP/OBS HIGH 50: CPT | Performed by: INTERNAL MEDICINE

## 2024-07-03 PROCEDURE — 97162 PT EVAL MOD COMPLEX 30 MIN: CPT

## 2024-07-03 PROCEDURE — 80053 COMPREHEN METABOLIC PANEL: CPT | Performed by: NURSE PRACTITIONER

## 2024-07-03 PROCEDURE — 84100 ASSAY OF PHOSPHORUS: CPT | Performed by: NURSE PRACTITIONER

## 2024-07-03 PROCEDURE — 97530 THERAPEUTIC ACTIVITIES: CPT

## 2024-07-03 PROCEDURE — 83735 ASSAY OF MAGNESIUM: CPT | Performed by: NURSE PRACTITIONER

## 2024-07-03 PROCEDURE — 99232 SBSQ HOSP IP/OBS MODERATE 35: CPT | Performed by: STUDENT IN AN ORGANIZED HEALTH CARE EDUCATION/TRAINING PROGRAM

## 2024-07-03 PROCEDURE — 85025 COMPLETE CBC W/AUTO DIFF WBC: CPT | Performed by: NURSE PRACTITIONER

## 2024-07-03 PROCEDURE — 25010000002 BUMETANIDE PER 0.5 MG: Performed by: INTERNAL MEDICINE

## 2024-07-03 PROCEDURE — 97110 THERAPEUTIC EXERCISES: CPT

## 2024-07-03 PROCEDURE — 84132 ASSAY OF SERUM POTASSIUM: CPT | Performed by: STUDENT IN AN ORGANIZED HEALTH CARE EDUCATION/TRAINING PROGRAM

## 2024-07-03 RX ORDER — BUMETANIDE 1 MG/1
1 TABLET ORAL
Status: DISCONTINUED | OUTPATIENT
Start: 2024-07-04 | End: 2024-07-04

## 2024-07-03 RX ORDER — DOXYCYCLINE 100 MG/1
100 CAPSULE ORAL EVERY 12 HOURS SCHEDULED
Status: DISCONTINUED | OUTPATIENT
Start: 2024-07-03 | End: 2024-07-04

## 2024-07-03 RX ORDER — POTASSIUM CHLORIDE 20 MEQ/1
40 TABLET, EXTENDED RELEASE ORAL EVERY 4 HOURS
Qty: 4 TABLET | Refills: 0 | Status: COMPLETED | OUTPATIENT
Start: 2024-07-03 | End: 2024-07-03

## 2024-07-03 RX ORDER — CEPHALEXIN 500 MG/1
500 CAPSULE ORAL EVERY 8 HOURS SCHEDULED
Status: DISCONTINUED | OUTPATIENT
Start: 2024-07-03 | End: 2024-07-04

## 2024-07-03 RX ADMIN — Medication 10 ML: at 21:14

## 2024-07-03 RX ADMIN — ANTI-FUNGAL POWDER MICONAZOLE NITRATE TALC FREE 1 APPLICATION: 1.42 POWDER TOPICAL at 09:30

## 2024-07-03 RX ADMIN — DOCUSATE SODIUM 100 MG: 100 CAPSULE, LIQUID FILLED ORAL at 09:29

## 2024-07-03 RX ADMIN — POVIDONE-IODINE: 10 SOLUTION TOPICAL at 09:30

## 2024-07-03 RX ADMIN — MIDODRINE HYDROCHLORIDE 10 MG: 5 TABLET ORAL at 14:06

## 2024-07-03 RX ADMIN — POTASSIUM CHLORIDE 40 MEQ: 1500 TABLET, EXTENDED RELEASE ORAL at 09:29

## 2024-07-03 RX ADMIN — OXYCODONE 10 MG: 5 TABLET ORAL at 09:29

## 2024-07-03 RX ADMIN — DOCUSATE SODIUM 100 MG: 100 CAPSULE, LIQUID FILLED ORAL at 21:11

## 2024-07-03 RX ADMIN — AMIODARONE HYDROCHLORIDE 200 MG: 200 TABLET ORAL at 17:48

## 2024-07-03 RX ADMIN — BUMETANIDE 2 MG: 0.25 INJECTION INTRAMUSCULAR; INTRAVENOUS at 06:27

## 2024-07-03 RX ADMIN — RIVAROXABAN 15 MG: 15 TABLET, FILM COATED ORAL at 17:48

## 2024-07-03 RX ADMIN — RIVAROXABAN 15 MG: 15 TABLET, FILM COATED ORAL at 09:37

## 2024-07-03 RX ADMIN — CEPHALEXIN 500 MG: 500 CAPSULE ORAL at 21:10

## 2024-07-03 RX ADMIN — PANTOPRAZOLE SODIUM 40 MG: 40 TABLET, DELAYED RELEASE ORAL at 06:28

## 2024-07-03 RX ADMIN — MIDODRINE HYDROCHLORIDE 10 MG: 5 TABLET ORAL at 06:28

## 2024-07-03 RX ADMIN — METOPROLOL SUCCINATE 25 MG: 25 TABLET, EXTENDED RELEASE ORAL at 09:29

## 2024-07-03 RX ADMIN — AMIODARONE HYDROCHLORIDE 200 MG: 200 TABLET ORAL at 09:29

## 2024-07-03 RX ADMIN — CEPHALEXIN 500 MG: 500 CAPSULE ORAL at 14:06

## 2024-07-03 RX ADMIN — POTASSIUM CHLORIDE 40 MEQ: 1500 TABLET, EXTENDED RELEASE ORAL at 03:55

## 2024-07-03 RX ADMIN — Medication 10 ML: at 09:31

## 2024-07-03 RX ADMIN — OXYCODONE 10 MG: 5 TABLET ORAL at 21:11

## 2024-07-03 RX ADMIN — OXYCODONE 10 MG: 5 TABLET ORAL at 14:06

## 2024-07-03 RX ADMIN — ANTI-FUNGAL POWDER MICONAZOLE NITRATE TALC FREE 1 APPLICATION: 1.42 POWDER TOPICAL at 21:15

## 2024-07-03 RX ADMIN — POTASSIUM CHLORIDE 40 MEQ: 1500 TABLET, EXTENDED RELEASE ORAL at 14:13

## 2024-07-03 RX ADMIN — DOXYCYCLINE 100 MG: 100 CAPSULE ORAL at 21:17

## 2024-07-03 RX ADMIN — MIDODRINE HYDROCHLORIDE 10 MG: 5 TABLET ORAL at 21:11

## 2024-07-03 RX ADMIN — DOXYCYCLINE 100 MG: 100 CAPSULE ORAL at 14:06

## 2024-07-03 NOTE — PROGRESS NOTES
Infectious Diseases Progress Note      LOS: 3 days   Patient Care Team:  Rut Pierce APRN as PCP - General (Nurse Practitioner)  Austin Brooks MD as Cardiologist (Cardiology)    Chief Complaint: Bilateral leg edema, erythema, pain and wounds.  Shortness of breath, weakness    Subjective       The patient has been afebrile for the last 24 hours.  The patient is on 3 L of oxygen by nasal cannula, hemodynamically stable, and is tolerating antimicrobial therapy.  No new complaints      Review of Systems:   Review of Systems   Constitutional:  Positive for fatigue.   HENT: Negative.     Eyes: Negative.    Respiratory:  Positive for shortness of breath.    Cardiovascular:  Positive for leg swelling.   Gastrointestinal: Negative.    Endocrine: Negative.    Genitourinary: Negative.    Musculoskeletal: Negative.    Skin:  Positive for color change and wound.   Neurological:  Positive for weakness.   Psychiatric/Behavioral: Negative.     All other systems reviewed and are negative.       Objective     Vital Signs  Temp:  [97.5 °F (36.4 °C)-98.1 °F (36.7 °C)] 98.1 °F (36.7 °C)  Heart Rate:  [] 104  Resp:  [10-21] 21  BP: ()/(51-92) 106/52    Physical Exam:  Physical Exam  Vitals and nursing note reviewed.   Constitutional:       General: She is not in acute distress.     Appearance: She is well-developed. She is obese. She is ill-appearing. She is not diaphoretic.   HENT:      Head: Normocephalic and atraumatic.   Eyes:      General: No scleral icterus.     Extraocular Movements: Extraocular movements intact.      Conjunctiva/sclera: Conjunctivae normal.      Pupils: Pupils are equal, round, and reactive to light.   Cardiovascular:      Rate and Rhythm: Regular rhythm. Tachycardia present.      Heart sounds: Normal heart sounds, S1 normal and S2 normal. No murmur heard.  Pulmonary:      Effort: Pulmonary effort is normal. No respiratory distress.      Breath sounds: Normal breath sounds. No stridor. No  wheezing or rales.   Chest:      Chest wall: No tenderness.   Abdominal:      General: Bowel sounds are normal. There is no distension.      Palpations: Abdomen is soft. There is no mass.      Tenderness: There is no abdominal tenderness. There is no guarding.   Musculoskeletal:         General: No swelling, tenderness or deformity. Normal range of motion.      Cervical back: Neck supple.      Right lower leg: Edema present.      Left lower leg: Edema present.   Skin:     General: Skin is warm and dry.      Coloration: Skin is not pale.      Findings: No bruising, erythema or rash.      Comments: ignificant erythema to the left leg with tenderness-venous stasis changes        Vague erythema and tenderness to the lower abdomen     Superficial wounds to bilateral great toes-no signs of active infection      Neurological:      Mental Status: She is alert and oriented to person, place, and time.          Results Review:    I have reviewed all clinical data, test, lab, and imaging results.     Radiology  XR Chest 1 View    Result Date: 7/3/2024  XR CHEST 1 VW Date of Exam: 7/3/2024 5:58 AM EDT Indication: Pleural effusion, hypoxia Comparison: 7/2/2024 Findings: There is continued severe cardiomegaly with pulmonary vascular congestion. Bibasilar lung infiltrates with small effusions appear similar. Exam is somewhat limited by portable technique and obesity.     Impression: No appreciable change in bibasilar lung infiltrates with small effusions. Continued cardiomegaly. Electronically Signed: Krystal Lee MD  7/3/2024 7:57 AM EDT  Workstation ID: [id number]     Cardiology    Laboratory    Results from last 7 days   Lab Units 07/03/24  0635 07/02/24  0329 07/01/24  0910 06/30/24  1858 06/30/24  1732   WBC 10*3/mm3 10.53 10.53 13.80*  --  16.80*   HEMOGLOBIN g/dL 14.4 13.8 14.2  --  15.9   HEMOGLOBIN, POC g/dL  --   --   --  17.0  --    HEMATOCRIT % 46.0 42.9 43.8  --  49.0*   HEMATOCRIT POC %  --   --   --  50  --     PLATELETS 10*3/mm3 104* 92* 104*  --  119*     Results from last 7 days   Lab Units 07/03/24  0635 07/02/24  2042 07/02/24  0329 07/01/24  1633 07/01/24  0910 06/30/24  2043 06/30/24  1858   SODIUM mmol/L 139  --  138  --  134* 131*  --    POTASSIUM mmol/L 3.5 3.3* 3.5 4.2 5.4* 6.5*  --    CHLORIDE mmol/L 95*  --  96*  --  97* 101  --    CO2 mmol/L 36.9*  --  34.5*  --  31.4* 24.4  --    BUN mg/dL 45*  --  43*  --  46* 50*  --    CREATININE mg/dL 1.31*  --  1.14*  --  1.33* 1.38* 1.40*   GLUCOSE mg/dL 107*  --  82  --  102* 194*  --    ALBUMIN g/dL 2.5*  --  2.3*  --  2.5* 2.5*  --    BILIRUBIN mg/dL 1.0  --  0.9  --  1.3* 1.6*  --    ALK PHOS U/L 130*  --  109  --  115 126*  --    AST (SGOT) U/L 30  --  24  --  39* 42*  --    ALT (SGPT) U/L 16  --  14  --  17 18  --    CALCIUM mg/dL 9.2  --  9.4  --  10.0 10.1  --                  Microbiology   Microbiology Results (last 10 days)       Procedure Component Value - Date/Time    Respiratory Culture - Sputum, NT Suction [[id number]] Collected: 07/01/24 1507    Lab Status: Final result Specimen: Sputum from NT Suction Updated: 07/03/24 1047     Respiratory Culture Light growth (2+) Normal respiratory shorty. No S. aureus or Pseudomonas aeruginosa detected. Final report.     Gram Stain Few (2+) Epithelial cells per low power field      Rare (1+) WBCs per low power field      Few (2+) Mixed bacterial morphotypes seen on Gram Stain    Respiratory Panel PCR w/COVID-19(SARS-CoV-2) NIKKI/CHRISTY/RAMIRO/PAD/COR/FRANCIA In-House, NP Swab in UTM/VTM, 2 HR TAT - Swab, Nasopharynx [[id number]]  (Abnormal) Collected: 06/30/24 2340    Lab Status: Final result Specimen: Swab from Nasopharynx Updated: 07/01/24 0055     ADENOVIRUS, PCR Not Detected     Coronavirus 229E Not Detected     Coronavirus HKU1 Not Detected     Coronavirus NL63 Not Detected     Coronavirus OC43 Not Detected     COVID19 Not Detected     Human Metapneumovirus Not Detected     Human Rhinovirus/Enterovirus Detected      Influenza A PCR Not Detected     Influenza B PCR Not Detected     Parainfluenza Virus 1 Not Detected     Parainfluenza Virus 2 Not Detected     Parainfluenza Virus 3 Not Detected     Parainfluenza Virus 4 Not Detected     RSV, PCR Not Detected     Bordetella pertussis pcr Not Detected     Bordetella parapertussis PCR Not Detected     Chlamydophila pneumoniae PCR Not Detected     Mycoplasma pneumo by PCR Not Detected    Narrative:      In the setting of a positive respiratory panel with a viral infection PLUS a negative procalcitonin without other underlying concern for bacterial infection, consider observing off antibiotics or discontinuation of antibiotics and continue supportive care. If the respiratory panel is positive for atypical bacterial infection (Bordetella pertussis, Chlamydophila pneumoniae, or Mycoplasma pneumoniae), consider antibiotic de-escalation to target atypical bacterial infection.    MRSA Screen, PCR (Inpatient) - Swab, Nares [[id number]]  (Abnormal) Collected: 06/30/24 2340    Lab Status: Final result Specimen: Swab from Nares Updated: 07/01/24 0125     MRSA PCR MRSA Detected    Narrative:      The negative predictive value of this diagnostic test is high and should only be used to consider de-escalating anti-MRSA therapy. A positive result may indicate colonization with MRSA and must be correlated clinically.    Blood Culture - Blood, Arm, Right [[id number]]  (Normal) Collected: 06/30/24 1844    Lab Status: Preliminary result Specimen: Blood from Arm, Right Updated: 07/02/24 1915     Blood Culture No growth at 2 days    Narrative:      Less than seven (7) mL's of blood was collected.  Insufficient quantity may yield false negative results.    Blood Culture - Blood, Arm, Right [[id number]]  (Normal) Collected: 06/30/24 1757    Lab Status: Preliminary result Specimen: Blood from Arm, Right Updated: 07/02/24 1815     Blood Culture No growth at 2 days    Narrative:      Less than seven (7) mL's of  blood was collected.  Insufficient quantity may yield false negative results.    Urine Culture - Urine, Urine, Catheter [[id number]]  (Abnormal)  (Susceptibility) Collected: 06/30/24 1533    Lab Status: Final result Specimen: Urine, Catheter Updated: 07/03/24 0916     Urine Culture >100,000 CFU/mL Escherichia coli    Narrative:      Colonization of the urinary tract without infection is common. Treatment is discouraged unless the patient is symptomatic, pregnant, or undergoing an invasive urologic procedure.    Susceptibility        Escherichia coli      SIL      Amoxicillin + Clavulanate Susceptible      Ampicillin Resistant      Ampicillin + Sulbactam Susceptible      Cefazolin Susceptible      Cefepime Susceptible      Ceftazidime Susceptible      Ceftriaxone Susceptible      Gentamicin Susceptible      Levofloxacin Susceptible      Nitrofurantoin Intermediate      Piperacillin + Tazobactam Susceptible      Trimethoprim + Sulfamethoxazole Susceptible                                   Medication Review:       Schedule Meds  amiodarone, 200 mg, Oral, Q8H   Followed by  [START ON 7/9/2024] amiodarone, 200 mg, Oral, Q12H   Followed by  [START ON 7/23/2024] amiodarone, 200 mg, Oral, Daily  [START ON 7/4/2024] bumetanide, 1 mg, Oral, BID  calcium gluconate, 1,000 mg, Intravenous, Once  cephalexin, 500 mg, Oral, Q8H  dextrose, 25 g, Intravenous, Once  docusate sodium, 100 mg, Oral, BID  doxycycline, 100 mg, Oral, Q12H  insulin regular, 10 Units, Intravenous, Once  metoprolol succinate XL, 25 mg, Oral, Q24H  miconazole, 1 Application, Topical, Q12H  midodrine, 10 mg, Oral, Q8H  oxyCODONE, 10 mg, Oral, TID  pantoprazole, 40 mg, Oral, Q AM  povidone-iodine, , Topical, Daily  rivaroxaban, 15 mg, Oral, BID With Meals   Followed by  [START ON 7/24/2024] rivaroxaban, 20 mg, Oral, Daily With Dinner  sodium chloride, 10 mL, Intravenous, Q12H        Infusion Meds         PRN Meds    acetaminophen **OR** acetaminophen    Calcium  Replacement - Follow Nurse / BPA Driven Protocol    Magnesium Standard Dose Replacement - Follow Nurse / BPA Driven Protocol    nitroglycerin    ondansetron ODT **OR** ondansetron    oxyCODONE    Phosphorus Replacement - Follow Nurse / BPA Driven Protocol    Potassium Replacement - Follow Nurse / BPA Driven Protocol    [COMPLETED] Insert Peripheral IV **AND** sodium chloride    sodium chloride    sodium chloride        Assessment & Plan       Antimicrobial Therapy   1.  IV Rocephin        2.  IV vancomycin        3.  P.o. Keflex        4.  P.o. doxycycline        5.             Assessment     Left lower extremity cellulitis with erythema involving the left leg on the top of chronic venous stasis changes.  Patient has superficial wounds mostly involving the left great toe.     Chronic bilateral leg edema with chronic skin changes and superficial wounds.  Concerning for lymphedema.  Wound care also following.  Current Doppler does show acute right and left leg DVT     Lower mild panniculitis with some erythema and tenderness to the lower pannus/suprapubic area     Abnormal urinalysis with some bacteria and pyuria.  Cultures growing E. coli    Rhinovirus infection     Mild leukocytosis-likely related to the above infections .     Mild hypoxia with large right pleural effusion and small left pleural effusion.  Patient is currently on 2 L of oxygen by nasal cannula rhinovirus infection.  CT PE protocol was negative for PE     Acute kidney injury     COPD/sleep apnea     History of pulmonary embolism     Plan     Discontinue IV Rocephin and IV vancomycin  Start p.o. doxycycline 100 mg twice daily for 7 days  Start p.o. Keflex 500 mg 3 times daily for 7 days  Continue supportive care  Not much more to add from infectious disease standpoint-we will sign off at this time-please call with any questions.     The above note was transcribed for Dr. Colbert-physical exam and review of systems were performed by him        Keyana PETER  MANJU Perez  07/03/24  16:08 EDT    Note is dictated utilizing voice recognition software/Dragon

## 2024-07-03 NOTE — THERAPY TREATMENT NOTE
Subjective: Pt agreeable to therapeutic plan of care.  Cognition: oriented to Person, Place, Time, and Situation     Objective:      Bed Mobility: Mod-A and Assist x 2 sit to supine, Mod-Max Ax2 for sit to supine.  Functional Transfers: N/A or Not attempted.     Balance: sitting EOB, supported, static, and with UE support Mod-A  Functional Ambulation: N/A or Not attempted.     Grooming: Dependent   ADL Position: edge of bed sitting  ADL Comments: Pt with weeping on BLEs noted to worsen when sitting EOB. She was dependent for hygiene of lower legs, cleansing them from the seated position.     Seated EOB, pt noted to have posterior lean causing her to slide forward into an unsafe position. Pt verbally cued to lean forward at the waist to improved weight displacement over JOE for increased safety. Noted at times to go completely limp for a very brief moment of time prior to spontaneous recovery, however VSS and patient never lost consciousness. Pt cued for hand placement and weight shifting throughout sitting EOB.     Vitals: Tachycardic - HR elevated to 128 with sitting EOB     Pain: 5 VAS  Location: BLEs  Interventions for pain: Repositioned, Increased Activity, and Therapeutic Presence  Education: Provided education on the importance of mobility in the acute care setting, Verbal/Tactile Cues, ADL training, and Transfer Training        Assessment: Ban Brennan presents with ADL impairments affecting function including balance, endurance / activity tolerance, pain, and strength. Demonstrated functioning below baseline abilities indicate the need for continued skilled intervention while inpatient. Tolerating session today without incident. Pt remains far from reported baseline, with assist x2 required for bed mobility. Unable to progress to standing nor mobilizing this date. Will continue to follow and progress as tolerated.      Plan/Recommendations:   Moderate Intensity Therapy recommended post-acute care. This  "is recommended as therapy feels the patient would require 3-4 days per week and wouldn't tolerate \"3 hour daily\" rehab intensity. SNF would be the preferred choice. If the patient does not agree to SNF, arrange HH or OP depending on home bound status. If patient is medically complex, consider LTACH.. Pt requires no DME at discharge.      Pt desires Skilled Rehab placement at discharge. Pt cooperative; agreeable to therapeutic recommendations and plan of care.      Modified Zach: N/A = No pre-op stroke/TIA     Post-Tx Position: Supine with HOB Elevated, Alarms activated, and Call light and personal items within reach  PPE: gloves and surgical mask      "

## 2024-07-03 NOTE — CASE MANAGEMENT/SOCIAL WORK
Social Work Assessment  [facility]     Patient Name: Ban Brennan  MRN: [id number]  Today's Date: 7/3/2024    Admit Date: 6/30/2024             Demographic Summary       Row Name 07/03/24 1221       General Information    Reason for Consult health care directive    General Information Comments LSW met with Pt at bedside. Pt wants to make her daughter, Chelsi Angeles HCR. Pt stated even while A&Ox4 it was ok to call daughter regarding medical treatments/care. LSW and pt completed ACP. LSW faxed to HIM and put a copy in hard chart. LSW provided originial and a copy for pt's daughter at bedside.             Chele Bauer, LUZMARIAW, MSW    Phone: [phone]  Fax: [phone]  Email: [email or web address]

## 2024-07-03 NOTE — SIGNIFICANT NOTE
Case Management/Social Work    Patient Name:  Ban Brennan  YOB: 1955  MRN: [id number]  Admit Date:  6/30/2024 07/03/24 1342   Post Acute Pre-Cert Documentation   Request Submitted by Facility - Type: Post Acute   Post-Acute Authorization Type Submitted: SNF   Date Post Acute Pre-Cert Inititated per Facility 07/03/24   Verification from Payer Yes   Date Post Acute Pre-Cert Completed 07/03/24   Accepting Facility [facility]   Hospital Discharge Date Requested 07/04/24   All Clinicals Submitted? Yes   Had Accepting Facility at Time of Submission Yes   Response Received from Insurance? Approval   Response Communicated to: Accepting Facility Liaison   Authorization Number: [id number]   Post Acute Pre-Cert Initiated Comment MARIA LUISA sifuentested precert for [facility] via [employer] on 7/3/2024. Pre Auth ID #  [id number] and was auto approved for 7/4/24 - 7/10/2024. CM informed Ronni RUBI CMA, MD, and nursing of approval.   New Pre-Cert Needed? No           Electronically signed by:  Maria Victoria Montero RN  07/03/24 13:42 EDT    Office Phone: [phone]  Office Cell:     [phone]

## 2024-07-03 NOTE — PLAN OF CARE
Goal Outcome Evaluation:  Plan of Care Reviewed With: patient           Outcome Evaluation: Ban Brennan is a 69 y.o. female with PMH of HTN, Heart failure and chronic BLE edema who presents with Afib and RVR.  Pt reports that her son lives with her in a H with no RTU.  She reports that she is typically independent with mobility for short distances and sleeps in a recliner. She states she is normally independent with ADLs.  At the time of PT evaluation, she is A&O x 4. She has weeping edema in BLEs with open areas on RLE.  Her BLE ROM is limited by edema. She performs bed mobility with mod A 2.  She requires mod A 1 to maintain sitting EOB with episodes of weakness throughout session resulting in decreased ability to hold arms and trunk against gravity. She exhibits forward head and decreased lumbar lordosis.  She was instructed in and performed cervicoaxial extension.  She has increased pain with BLE weight bearing and is not safe to attempt standing at this time.  She is well below her baseline and would benefit from SNF at discharge.      Anticipated Discharge Disposition (PT): skilled nursing facility

## 2024-07-03 NOTE — PROGRESS NOTES
"Pharmacy Antimicrobial Dosing Service    Subjective:  Ban Brennan is a 69 y.o.female admitted with Afib w/RVR. Pharmacy has been consulted to dose Vancomycin and Cefepime for possible SSTI.    PMH: chronic leg edema, CHF      Assessment/Plan    1. Day #4 Vancomycin: Pulse dosing d/t renal dysfxn. Random vancomycin level ~13 hours post vancomycin 1,000 mg (~16 mg/kg AdjBW) IV x1 = 22.4 mcg/mL. Patient continues to have adequate UOP. Scr increased from yesterday, will continue with pulse dosing for another 24 hrs. Will re-dose with vanc 750 mg (~12 mg/kg AdjBW) IV x1 at 17:00. Obtain random level with AM labs. Potentially switch to scheduled regimen if renal function has stabilized.     2. Day #3 Ceftriaxone: 1 g IV q24h.    Will continue to monitor drug levels, renal function, culture and sensitivities, and patient clinical status.       Objective:  Relevant clinical data and objective history reviewed:  147.3 cm (58\")   90.2 kg (198 lb 13.7 oz)   Ideal body weight: 47.1 kg (103 lb 14.5 oz)  Adjusted ideal body weight: 64.4 kg (141 lb 14.2 oz)  Body mass index is 41.56 kg/m².    Results from last 7 days   Lab Units 07/03/24  0635 07/02/24  0329 07/01/24  0910   VANCOMYCIN RM mcg/mL 22.40 22.40 14.30     Results from last 7 days   Lab Units 07/03/24  0635 07/02/24  0329 07/01/24  0910   CREATININE mg/dL 1.31* 1.14* 1.33*     Estimated Creatinine Clearance: 41.1 mL/min (A) (by C-G formula based on SCr of 1.31 mg/dL (H)).  I/O last 3 completed shifts:  In: 2988 [P.O.:1780; I.V.:1208]  Out: 3250 [Urine:3250]    Results from last 7 days   Lab Units 07/03/24  0635 07/02/24  0329 07/01/24  0910   WBC 10*3/mm3 10.53 10.53 13.80*     Temperature    07/03/24 0032 07/03/24 0400 07/03/24 0747   Temp: 97.5 °F (36.4 °C) 97.5 °F (36.4 °C) 97.8 °F (36.6 °C)     Baseline culture/source/susceptibility:  Microbiology Results (last 10 days)       Procedure Component Value - Date/Time    Respiratory Culture - Sputum, NT Suction " [[id number]] Collected: 07/01/24 1507    Lab Status: Final result Specimen: Sputum from NT Suction Updated: 07/03/24 1047     Respiratory Culture Light growth (2+) Normal respiratory shorty. No S. aureus or Pseudomonas aeruginosa detected. Final report.     Gram Stain Few (2+) Epithelial cells per low power field      Rare (1+) WBCs per low power field      Few (2+) Mixed bacterial morphotypes seen on Gram Stain    Respiratory Panel PCR w/COVID-19(SARS-CoV-2) NIKKI/CHRISTY/RAMIRO/PAD/COR/FRANCIA In-House, NP Swab in UTM/VTM, 2 HR TAT - Swab, Nasopharynx [[id number]]  (Abnormal) Collected: 06/30/24 2340    Lab Status: Final result Specimen: Swab from Nasopharynx Updated: 07/01/24 0055     ADENOVIRUS, PCR Not Detected     Coronavirus 229E Not Detected     Coronavirus HKU1 Not Detected     Coronavirus NL63 Not Detected     Coronavirus OC43 Not Detected     COVID19 Not Detected     Human Metapneumovirus Not Detected     Human Rhinovirus/Enterovirus Detected     Influenza A PCR Not Detected     Influenza B PCR Not Detected     Parainfluenza Virus 1 Not Detected     Parainfluenza Virus 2 Not Detected     Parainfluenza Virus 3 Not Detected     Parainfluenza Virus 4 Not Detected     RSV, PCR Not Detected     Bordetella pertussis pcr Not Detected     Bordetella parapertussis PCR Not Detected     Chlamydophila pneumoniae PCR Not Detected     Mycoplasma pneumo by PCR Not Detected    Narrative:      In the setting of a positive respiratory panel with a viral infection PLUS a negative procalcitonin without other underlying concern for bacterial infection, consider observing off antibiotics or discontinuation of antibiotics and continue supportive care. If the respiratory panel is positive for atypical bacterial infection (Bordetella pertussis, Chlamydophila pneumoniae, or Mycoplasma pneumoniae), consider antibiotic de-escalation to target atypical bacterial infection.    MRSA Screen, PCR (Inpatient) - Swab, Nares [[id number]]  (Abnormal)  Collected: 06/30/24 2340    Lab Status: Final result Specimen: Swab from Nares Updated: 07/01/24 0125     MRSA PCR MRSA Detected    Narrative:      The negative predictive value of this diagnostic test is high and should only be used to consider de-escalating anti-MRSA therapy. A positive result may indicate colonization with MRSA and must be correlated clinically.    Blood Culture - Blood, Arm, Right [[id number]]  (Normal) Collected: 06/30/24 1844    Lab Status: Preliminary result Specimen: Blood from Arm, Right Updated: 07/02/24 1915     Blood Culture No growth at 2 days    Narrative:      Less than seven (7) mL's of blood was collected.  Insufficient quantity may yield false negative results.    Blood Culture - Blood, Arm, Right [[id number]]  (Normal) Collected: 06/30/24 1757    Lab Status: Preliminary result Specimen: Blood from Arm, Right Updated: 07/02/24 1815     Blood Culture No growth at 2 days    Narrative:      Less than seven (7) mL's of blood was collected.  Insufficient quantity may yield false negative results.    Urine Culture - Urine, Urine, Catheter [[id number]]  (Abnormal)  (Susceptibility) Collected: 06/30/24 1749    Lab Status: Final result Specimen: Urine, Catheter Updated: 07/03/24 0916     Urine Culture >100,000 CFU/mL Escherichia coli    Narrative:      Colonization of the urinary tract without infection is common. Treatment is discouraged unless the patient is symptomatic, pregnant, or undergoing an invasive urologic procedure.    Susceptibility        Escherichia coli      SIL      Amoxicillin + Clavulanate 4 ug/ml Susceptible      Ampicillin >=32 ug/ml Resistant      Ampicillin + Sulbactam 4 ug/ml Susceptible      Cefazolin <=4 ug/ml Susceptible      Cefepime <=1 ug/ml Susceptible      Ceftazidime <=1 ug/ml Susceptible      Ceftriaxone <=1 ug/ml Susceptible      Gentamicin <=1 ug/ml Susceptible      Levofloxacin <=0.12 ug/ml Susceptible      Nitrofurantoin 64 ug/ml Intermediate       Piperacillin + Tazobactam <=4 ug/ml Susceptible      Trimethoprim + Sulfamethoxazole <=20 ug/ml Susceptible                                   Richie Carlin, Pharmacy Intern  07/03/24 12:00 EDT

## 2024-07-03 NOTE — PROGRESS NOTES
Hematology/Oncology Inpatient Progress Note    PATIENT NAME: Ban Brennan  : 1955  MRN: [id number]    CHIEF COMPLAINT: Shortness of breath    HISTORY OF PRESENT ILLNESS:    Ban Brennan is a 69 y.o. female who presented to [facility] on 2024 with complaints of shortness of breath.  Past medical history of hypertension, heart failure, chronic bilateral lower extremity edema, remote history of pulmonary embolism treated with warfarin.  The patient was brought to the ED via EMS.  The patient's daughter reported that the patient does not normally go to the doctor and had not been in the hospital for years.  She was unsure of her full medical history but stated that her legs have been swollen for a long time and that she had not been mobile for approximately 1 year.  She reported that a home health nurse comes to the house and wraps her mother's legs twice a week.  EMS was called due to the worsening shortness of breath and the patient was found to have an EKG showing a wide-complex tachycardia with a heart rate in the 190s.  She was given amiodarone by EMS with rate improvement to the 160s.  Follow-up EKG in the ED at [facility] showed atrial fibrillation with RVR with a heart rate of 145.  Chest x-ray showed fluid overload with bilateral effusions.  The patient had a elevated white blood cell count of 16.8 and 4+ bacteria in her urine.  She was also noted to be hyperkalemic with a potassium of 6.5.  She was diagnosed with cellulitis and a urinary tract infection and initiated on IV antibiotics with cefepime and vancomycin.  She was admitted for further evaluation and treatment.     2024 bilateral venous Doppler ultrasound  -Acute right lower extremity DVT in the common femoral, proximal femoral, mid femoral, distal femoral, and popliteal  -Acute right lower extremity superficial thrombophlebitis in the saphenofemoral junction and great saphenous  -Acute left lower extremity DVT in  the proximal femoral, mid femoral, distal femoral, popliteal, and posterior tibial     7/1/2020 four 2D echocardiogram  -Left ventricular ejection fraction 31-35%  -Left ventricular diastolic dysfunction  -Left atrial cavity dilated  -Moderate aortic valve stenosis  -Moderate to severe mitral valve regurgitation  -Estimated right ventricular systolic pressure from tricuspid regurgitation is normal     7/1/2024 CT chest PE protocol  -No evidence for pulmonary embolus  -Bibasilar airspace disease with right middle lung opacity compatible likely multifocal pneumonia with bilateral pleural effusions  -Cardiomegaly     7/1/2024 CT of the abdomen and pelvis without contrast  -Moderately large right pleural effusion and small left pleural effusion; bibasilar infiltrates suggest atelectasis although associated pneumonia not excluded  -Severe cardiomegaly  -Bilateral flank edema  -Probable fibroid uterus     07/01/24  Hematology/Oncology was consulted for bilateral lower extremity DVT.     He/She  has a past medical history of Bilateral leg edema, Chronic respiratory failure with hypoxia, on home O2 therapy (07/01/2024), COPD (chronic obstructive pulmonary disease), Morbid obesity with BMI of 40.0-44.9, adult (11/08/2010), Primary hypertension (03/01/2017), Pulmonary embolism, and Sleep apnea.     PCP: Rut Pierce APRN  Subjective   Patient seen today. No new complaints reported. Tentative plan for discharge to rehab.      ROS:  Review of Systems   Constitutional:  Positive for fatigue.   HENT: Negative.     Eyes: Negative.    Respiratory: Negative.     Cardiovascular: Negative.    Gastrointestinal: Negative.    Endocrine: Negative.    Genitourinary: Negative.    Musculoskeletal:  Positive for arthralgias, back pain, gait problem and myalgias.   Skin:  Positive for wound (Clean decubitus ulcers noted on medial aspect of bilateral feet).   Allergic/Immunologic: Negative.    Neurological:  Positive for weakness  "(Bilateral leg weakness).   Hematological: Negative.    Psychiatric/Behavioral: Negative.          MEDICATIONS:    Scheduled Meds:  amiodarone, 200 mg, Oral, Q8H   Followed by  [START ON 7/9/2024] amiodarone, 200 mg, Oral, Q12H   Followed by  [START ON 7/23/2024] amiodarone, 200 mg, Oral, Daily  [START ON 7/4/2024] bumetanide, 1 mg, Oral, BID  calcium gluconate, 1,000 mg, Intravenous, Once  cefTRIAXone, 1,000 mg, Intravenous, Q24H  dextrose, 25 g, Intravenous, Once  docusate sodium, 100 mg, Oral, BID  insulin regular, 10 Units, Intravenous, Once  metoprolol succinate XL, 25 mg, Oral, Q24H  miconazole, 1 Application, Topical, Q12H  midodrine, 10 mg, Oral, Q8H  oxyCODONE, 10 mg, Oral, TID  pantoprazole, 40 mg, Oral, Q AM  potassium chloride ER, 40 mEq, Oral, Q4H  povidone-iodine, , Topical, Daily  rivaroxaban, 15 mg, Oral, BID With Meals   Followed by  [START ON 7/24/2024] rivaroxaban, 20 mg, Oral, Daily With Dinner  sodium chloride, 10 mL, Intravenous, Q12H       Continuous Infusions:  Pharmacy to Dose Cefepime,   Pharmacy to Dose enoxaparin (LOVENOX),   Pharmacy to dose vancomycin,        PRN Meds:    acetaminophen **OR** acetaminophen    Calcium Replacement - Follow Nurse / BPA Driven Protocol    Magnesium Standard Dose Replacement - Follow Nurse / BPA Driven Protocol    nitroglycerin    ondansetron ODT **OR** ondansetron    oxyCODONE    Pharmacy to Dose Cefepime    Pharmacy to Dose enoxaparin (LOVENOX)    Pharmacy to dose vancomycin    Phosphorus Replacement - Follow Nurse / BPA Driven Protocol    Potassium Replacement - Follow Nurse / BPA Driven Protocol    [COMPLETED] Insert Peripheral IV **AND** sodium chloride    sodium chloride    sodium chloride    Vancomycin Pharmacy Intermittent/Pulse Dosing     ALLERGIES:  No Known Allergies    Objective    VITALS:   BP 95/69   Pulse 102   Temp 97.8 °F (36.6 °C) (Oral)   Resp 10   Ht 147.3 cm (58\")   Wt 90.2 kg (198 lb 13.7 oz)   SpO2 96%   BMI 41.56 kg/m² "     PHYSICAL EXAM: (performed by MD)  Physical Exam  Constitutional:       Appearance: She is normal weight. She is ill-appearing.   HENT:      Head: Normocephalic and atraumatic.      Right Ear: External ear normal.      Left Ear: External ear normal.      Nose: Nose normal.      Mouth/Throat:      Mouth: Mucous membranes are moist.      Pharynx: Oropharynx is clear.   Eyes:      Extraocular Movements: Extraocular movements intact.      Conjunctiva/sclera: Conjunctivae normal.      Pupils: Pupils are equal, round, and reactive to light.   Cardiovascular:      Rate and Rhythm: Normal rate.      Pulses: Normal pulses.   Pulmonary:      Effort: Pulmonary effort is normal.   Abdominal:      General: Abdomen is flat.      Palpations: Abdomen is soft.   Musculoskeletal:         General: Normal range of motion.      Cervical back: Normal range of motion and neck supple.      Right lower leg: Edema present.      Left lower leg: Edema present.   Skin:     General: Skin is warm.   Neurological:      Mental Status: She is alert.   Psychiatric:         Mood and Affect: Mood normal. Mood is depressed.         Speech: Speech is delayed.         Behavior: Behavior normal.         Thought Content: Thought content normal.         Judgment: Judgment normal.           RECENT LABS:  Lab Results (last 24 hours)       Procedure Component Value Units Date/Time    Haptoglobin [[id number]]  (Normal) Collected: 07/02/24 2328    Specimen: Blood Updated: 07/03/24 1058     Haptoglobin 110 mg/dL      Comment: Specimen hemolyzed. Results may be affected.       Respiratory Culture - Sputum, NT Suction [[id number]] Collected: 07/01/24 1507    Specimen: Sputum from NT Suction Updated: 07/03/24 1047     Respiratory Culture Light growth (2+) Normal respiratory shorty. No S. aureus or Pseudomonas aeruginosa detected. Final report.     Gram Stain Few (2+) Epithelial cells per low power field      Rare (1+) WBCs per low power field      Few (2+) Mixed  bacterial morphotypes seen on Gram Stain    Urine Culture - Urine, Urine, Catheter [[id number]]  (Abnormal)  (Susceptibility) Collected: 06/30/24 1749    Specimen: Urine, Catheter Updated: 07/03/24 0916     Urine Culture >100,000 CFU/mL Escherichia coli    Narrative:      Colonization of the urinary tract without infection is common. Treatment is discouraged unless the patient is symptomatic, pregnant, or undergoing an invasive urologic procedure.    Susceptibility        Escherichia coli      SIL      Amoxicillin + Clavulanate Susceptible      Ampicillin Resistant      Ampicillin + Sulbactam Susceptible      Cefazolin Susceptible      Cefepime Susceptible      Ceftazidime Susceptible      Ceftriaxone Susceptible      Gentamicin Susceptible      Levofloxacin Susceptible      Nitrofurantoin Intermediate      Piperacillin + Tazobactam Susceptible      Trimethoprim + Sulfamethoxazole Susceptible                           Magnesium [[id number]]  (Normal) Collected: 07/03/24 0635    Specimen: Blood Updated: 07/03/24 0716     Magnesium 1.8 mg/dL     Comprehensive Metabolic Panel [[id number]]  (Abnormal) Collected: 07/03/24 0635    Specimen: Blood Updated: 07/03/24 0716     Glucose 107 mg/dL      BUN 45 mg/dL      Creatinine 1.31 mg/dL      Sodium 139 mmol/L      Potassium 3.5 mmol/L      Comment: Slight hemolysis detected by analyzer. Result may be falsely elevated.        Chloride 95 mmol/L      CO2 36.9 mmol/L      Calcium 9.2 mg/dL      Total Protein 6.4 g/dL      Albumin 2.5 g/dL      ALT (SGPT) 16 U/L      AST (SGOT) 30 U/L      Alkaline Phosphatase 130 U/L      Total Bilirubin 1.0 mg/dL      Globulin 3.9 gm/dL      A/G Ratio 0.6 g/dL      BUN/Creatinine Ratio 34.4     Anion Gap 7.1 mmol/L      eGFR 44.2 mL/min/1.73     Narrative:      GFR Normal >60  Chronic Kidney Disease <60  Kidney Failure <15      Factor II, DNA Analysis [[id number]]  (Normal) Collected: 07/01/24 1646    Specimen: Blood Updated: 07/03/24 0715      Factor II, DNA Analysis Normal    Narrative:      A point mutation ([id number]) in the factor II (prothrombin) gene is the second most common cause of inherited thrombophilia.  The Factor II or Prothrombin mutation refers to the G to A transition at nucleotide in the untranslated region of the gene and is associated with increased plasma levels of prothrombin.    The incidence of this mutation in the [place]  population is about 2% and in the  population it is approximately 0.5%. This mutation is rare in the  and  population. Being heterozygous for a prothrombin mutation increases the risk for developing venous thrombosis about 2 to 3 times above the general population risk. Being homozygous for the prothrombin gene mutation increases the relative risk for venous thrombosis further, although it is not yet known how much further the risk is increased. In women heterozygous for the prothrombin gene mutation, the use of estrogen containing oral contraceptives increases the relative risk of venous thrombosis about 16 times and the risk of developing cerebral thrombosis is also significantly increased. In pregnancy, the prothrombin gene mutation increases risk for venous thrombosis and may increase risk for stillbirth, placental abruption, pre-eclampsia and fetal growth restriction.     The expression of Factor II thrombophilia is impacted by coexisting genetic thrombophilic disorders, acquired thrombophilic disorders (eg, malignancy, hyperhomocysteinemia, high factor VIII levels), and circumstances including: pregnancy, oral contraceptive use, hormone replacement therapy, selective estrogen receptor modulators, travel, central venous catheters, surgery, and organ transplantation.    In order to derive the most meaningful benefit from this testing, it may be necessary that the results and subsequent options from these complex genetic tests be discussed with patients by a trained  genetic professional.    Factor 5 Leiden [[id number]]  (Normal) Collected: 07/01/24 1646    Specimen: Blood Updated: 07/03/24 0715     Factor V Leiden Normal    Narrative:      Factor V Leiden is a specific mutation (R506Q) in the factor V gene that is associated with an increased risk of venous thrombosis.  Factor V Leiden is more resistant to inactivation by activated protein C.  Factor V Leiden refers to the G to A transition at nucleotide position 1691 of the Factor V gene, resulting in the substitution of the amino acid arginine by glutamine in the Factor V protein, causing resistance to cleavage by Activated Protein C (APC).    As a result, factor V persists in the circulation leading to a mild hyper-coagulable state.  The Leiden mutation accounts for 90% - 95% of APC resistance.  Factor V Leiden has been reported in patients with deep vein thrombosis, pulmonary embolus, central retinal vein occlusion, cerebral sinus thrombosis and hepatic vein thrombosis.  Other risk factors to be considered in the workup for venous thrombosis include the [id number] mutation in the factor II (prothrombin) gene, protein S and C deficiency, and antithrombin deficiencies. Anticardiolipin antibody and lupus anticoagulant analysis may be appropriate for certain patients, as well as homocysteine levels.    The expression of Factor V Leiden thrombophilia is impacted by coexisting genetic thrombophilic disorders, acquired thrombophilic disorders (malignancy, hyperhomocysteinemia, high factor VIII levels), and circumstances including: pregnancy, oral contraceptive use, hormone replacement therapy, selective estrogen receptor modulators, travel, central venous catheters, surgery, transplantation and advanced age.    In order to derive the most meaningful benefit from this testing, it may be necessary that the results and subsequent options from these complex genetic tests be discussed with patients by a trained genetic professional.     Phosphorus [[id number]]  (Abnormal) Collected: 07/03/24 0635    Specimen: Blood Updated: 07/03/24 0705     Phosphorus 4.8 mg/dL     Vancomycin, Random [[id number]]  (Normal) Collected: 07/03/24 0635    Specimen: Blood Updated: 07/03/24 0705     Vancomycin Random 22.40 mcg/mL     Narrative:      Therapeutic Ranges for Vancomycin    Vancomycin Random   5.0-40.0 mcg/mL  Vancomycin Trough   5.0-20.0 mcg/mL  Vancomycin Peak     20.0-40.0 mcg/mL    CBC & Differential [[id number]]  (Abnormal) Collected: 07/03/24 0635    Specimen: Blood Updated: 07/03/24 0640    Narrative:      The following orders were created for panel order CBC & Differential.  Procedure                               Abnormality         Status                     ---------                               -----------         ------                     CBC Auto Differential[[id number]]        Abnormal            Final result                 Please view results for these tests on the individual orders.    CBC Auto Differential [[id number]]  (Abnormal) Collected: 07/03/24 0635    Specimen: Blood Updated: 07/03/24 0640     WBC 10.53 10*3/mm3      RBC 4.63 10*6/mm3      Hemoglobin 14.4 g/dL      Hematocrit 46.0 %      MCV 99.4 fL      MCH 31.1 pg      MCHC 31.3 g/dL      RDW 15.8 %      RDW-SD 56.4 fl      MPV 10.1 fL      Platelets 104 10*3/mm3      Neutrophil % 76.8 %      Lymphocyte % 11.3 %      Monocyte % 10.6 %      Eosinophil % 0.5 %      Basophil % 0.2 %      Immature Grans % 0.6 %      Neutrophils, Absolute 8.09 10*3/mm3      Lymphocytes, Absolute 1.19 10*3/mm3      Monocytes, Absolute 1.12 10*3/mm3      Eosinophils, Absolute 0.05 10*3/mm3      Basophils, Absolute 0.02 10*3/mm3      Immature Grans, Absolute 0.06 10*3/mm3      nRBC 0.2 /100 WBC     Potassium [[id number]]  (Abnormal) Collected: 07/02/24 2042    Specimen: Blood Updated: 07/02/24 2110     Potassium 3.3 mmol/L      Comment: Specimen hemolyzed.  Result may be falsely elevated.       Blood Culture -  Blood, Arm, Right [[id number]]  (Normal) Collected: 06/30/24 1844    Specimen: Blood from Arm, Right Updated: 07/02/24 1915     Blood Culture No growth at 2 days    Narrative:      Less than seven (7) mL's of blood was collected.  Insufficient quantity may yield false negative results.    Blood Culture - Blood, Arm, Right [[id number]]  (Normal) Collected: 06/30/24 1757    Specimen: Blood from Arm, Right Updated: 07/02/24 1815     Blood Culture No growth at 2 days    Narrative:      Less than seven (7) mL's of blood was collected.  Insufficient quantity may yield false negative results.    Lactate Dehydrogenase [[id number]]  (Abnormal) Collected: 07/02/24 1458    Specimen: Blood Updated: 07/02/24 1527      U/L      Comment: Specimen hemolyzed.  Results may be affected.       Reticulocytes [[id number]]  (Abnormal) Collected: 07/02/24 1458    Specimen: Blood Updated: 07/02/24 1504     Reticulocyte % 2.70 %      Reticulocyte Absolute 0.1237 10*6/mm3             PENDING RESULTS: PTG, FVL, LDH, Retic, Hapto    IMAGING REVIEWED:  XR Chest 1 View    Result Date: 7/3/2024  Impression: No appreciable change in bibasilar lung infiltrates with small effusions. Continued cardiomegaly. Electronically Signed: Krystal Lee MD  7/3/2024 7:57 AM EDT  Workstation ID: [id number]    XR Chest 1 View    Result Date: 7/2/2024  Impression: Some improvement in bibasilar lung infiltrates with small effusions. Continued cardiomegaly. Electronically Signed: Krystal Lee MD  7/2/2024 12:46 PM EDT  Workstation ID: [id number]    US Pelvis Complete    Result Date: 7/1/2024  Impression: Sonographic features suggest the presence of a large subserosal uterine fundal fibroid. Electronically Signed: Ann Marie Maloney MD  7/1/2024 1:43 PM EDT  Workstation ID: [id number]     Assessment & Plan     Bilateral lower extremity DVT:  Remote history of pulmonary embolism  -Extensive acute right and left lower extremity DVT noted on presentation  -patient's  chronic deconditioning and immobility may have been the risk factor for DVT.   -Negative CTA Chest for PE.  -Reportedly treated with warfarin for prior history of PE  -On Lovenox 1 mg/kg every 12 hours. This can be continued during hospitalization, patient can be transitioned to oral anticoagulation closer to discharge.  Will recommend Eliquis 10mg BID X 1 week followed by 5mg BID thereafter.  -Will need lifelong anticoagulation in my view due to persistent risk factors and extensive DVT as well as history of prior pulmonary embolism.  -Cardiology consulted and evaluating for possible mechanical thrombectomy  -factor V Leiden and prothrombin gene mutation reported negative. Will defer remainder of thrombophilia workup.      Uterine mass  -Transvaginal ultrasound suggestive of presence of a large subserosal uterine fundal fibroid.   -CT abdomen/pelvis showed presence of a lobulated mass protruding   from the uterine fundus measuring approximately 8.4 x 5.9 cm  -this is concerning for fibroid vs malignancy.  Will recommend GYN Evaluation for the same.  -No vaginal bleeding reported.     Thrombocytopenia  - 92,000, 119,000 at admission  -Baseline from January 2020 138,000  -Acute drop likely secondary to sepsis, rhinovirus  -Continue to monitor closely given initiation of anticoagulation  -Hemolysis labs reported negative.     Sepsis  -UA suggestive of Acute cystitis, Urine culture consistent with GNR UTI.  -Respiratory panel positive for Rhinovirus.  -On antibiotic therapy with IV vancomycin and cefepime, management per primary team     Atrial fibrillation with RVR/paroxysmal atrial fibrillation  Acute on chronic systolic heart failure with diastolic dysfunction,   moderate aortic stenosis, severe mitral valve regurgitation  -On amiodarone drip, diuresis.  Management per cardiology.  Patient  on Lovenox for anticoagulation, Likely candidate for lifelong anticoagulation given Afib and extensive DVT.     Acute kidney  injury/hyperkalemia  -Creatinine 1.33, EGFR 43.4.  Continues to improve EGFR 52 point  -Potassium improved to 5.4 from 6.5.  Now normalized at 3.5.  -Management per primary team      Pleural Effusion:  Likely secondary to CHF and suspected pneumonia.  -management per primary.    Patient was offered a followup appt at [facility] in 3-4 weeks after discharge. She Is agreeable to It.     Thanks for this consult, Please reach out for any further clinical questions/concerns.

## 2024-07-03 NOTE — DISCHARGE PLACEMENT REQUEST
"Ban Jones (69 y.o. Female)       Date of Birth   1955    Social Security Number       Address   [address] [place] [address] [place] [address]    Home Phone   [phone]    MRN   [id number]       Tenriism   None    Marital Status                               Admission Date   6/30/24    Admission Type   Emergency    Admitting Provider   Sesar Solorzano DO    Attending Provider   Boom Figueroa MD    Department, Room/Bed   [facility]ARD[facility] UNIT, 3120/1       Discharge Date       Discharge Disposition       Discharge Destination                                 Attending Provider: Boom Figueroa MD    Allergies: No Known Allergies    Isolation: Droplet, Contact   Infection: Rhinovirus  (07/01/24), MRSA (07/01/24)   Code Status: CPR    Ht: 147.3 cm (58\")   Wt: 90.2 kg (198 lb 13.7 oz)    Admission Cmt: None   Principal Problem: Atrial fibrillation with RVR [I48.91]                   Active Insurance as of 6/30/2024       Primary Coverage       Payor Plan Insurance Group Employer/Plan Group    ANTHEM MEDICARE REPLACEMENT ANTHEM MEDICARE ADVANTAGE [id number]       Payor Plan Address Payor Plan Phone Number Payor Plan Fax Number Effective Dates    [address] [phone]  5/1/2024 - None Entered    [facility] [phone]         Subscriber Name Subscriber Birth Date Member ID       BAN JONES 1955 [id number]               Secondary Coverage       Payor Plan Insurance Group Employer/Plan Group    [place]        Payor Plan Address Payor Plan Phone Number Payor Plan Fax Number Effective Dates    [address]   10/15/2023 - None Entered    [place] [address]         Subscriber Name Subscriber Birth Date Member ID       BAN JONES 1955 [id number]                     Emergency Contacts            No emergency contacts on file.                 History & Physical        Enedina Molina, APRASHU at " 24       Attestation signed by Sesar Solorzano DO at [phone]    I have reviewed this documentation and agree.                    Critical Care History and Physical     Ban Brennan : 1955 MRN:[id number] LOS:0 ROOM:      Reason for admission: Atrial fibrillation with RVR     Assessment / Plan     Atrial fibrillation with RVR, new onset  Initial rhythm reported as wide-complex tachycardia with max heart rate 190s  Patient started on Cardizem in ED, switched to amiodarone due to borderline hypotension  Likely precipitated by hyperkalemia  Will check troponin  ECHO pending    Severe Sepsis: ( WBC>12 or <4 or >10% bands, HR>90, and Lactic acid>2 )  Urinary tract infection  Cellulitis bilateral lower extremities  Blood cultures pending  Ua + UTI; culture pending  RVP pending  MRSA swab pending  Fluid resuscitation contraindicated  Borderline hypotensive  Started on cefepime and vancomycin  CT bilateral lower extremities pending  Target MAP of 65     Fluid overload  Acute on chronic congestive heart failure, history unknown  Family reported history of CHF  BNP noted at 19,860  No history in chart or care everywhere, EF% unknown  Resume home furosemide  Echo pending  Avoid fluid overload    Acute kidney injury  Hyperkalemia  Creatinine 1.38, baseline unknown  Initial potassium 6.5; treated with sling/D50, calcium gluconate, and Lasix  IV fluids contraindicated  Avoid nephrotoxic agents  Avoid hypotension  Consider nephrology consult if no improvement    Essential hypertension  Hold home lisinopril and metoprolol as patient is borderline hypotensive  Resume when clinically appropriate    Nutrition:   NPO Diet NPO Type: Strict NPO     VTE Prophylaxis:  Pharmacologic VTE prophylaxis orders are present.         History of Present illness     Ban Brennan is a 69 y.o. female with known PMH of hypertension, heart failure, chronic bilateral lower extremity edema who presented to the  hospital for creasing shortness of breath, and was admitted with a principal diagnosis of Atrial fibrillation with RVR.  Patient is alert however lethargic and attempts to participate with HPI however she is a poor historian.  Supplemental information for HPI taken from conversation with daughter at bedside who states patient does not go to the doctor normally and has not been in the hospital for years.  She is unsure of what her full medical history is at this time but states that her legs have been swollen for a long time and that she has not been up and moving for approximately 1 year.  Patient has a home health nurse that comes out and wraps her legs twice a week.  On assessment patient's legs are both extremely red and swollen, left > right.  Patient's daughter states that redness of her legs is new however she is not sure how long it has been this bad.  EMS was called when she had worsening shortness of breath and found the patient with a wide complex tachycardia with heart rate of 190s.  She was given 150 mg of amiodarone by EMS and heart rate improved to 160s.  She is found to have a right bundle branch block however it is uncertain due to her limited history if this is chronic.    ED course: On arrival to emergency department EKG showed atrial fibrillation with RVR, heart rate 145.  CXR showed fluid overload with bilateral effusions.  Lactic acid was 3.1 and BNP 19,860.  Patient had a white count of 16.8 and 4+ bacteria in her urine.  Also noted to be hyperkalemic with a potassium of 6.5 in which she was treated with insulin D50, calcium gluconate, and Lasix.  She was started on cefepime and vancomycin for cellulitis of her legs and UTI.    ACP: Patient is alert with some intermittent confusion.  Her daughter is at bedside and is her decision-maker along with her brother.  Daughter states patient is a full code.    Patient was seen and examined on 06/30/24 at 21:22 EDT .    Subjective / Review of systems      Review of Systems   Constitutional:  Negative for chills and fever.   HENT:  Negative for congestion and sore throat.    Respiratory:  Positive for shortness of breath. Negative for cough.    Cardiovascular:  Positive for leg swelling. Negative for chest pain and palpitations.   Gastrointestinal:  Negative for abdominal pain and nausea.   Endocrine: Negative for heat intolerance and polyuria.   Genitourinary:  Negative for dysuria and urgency.   Musculoskeletal:  Positive for myalgias. Negative for arthralgias.   Skin:  Negative for rash and wound.   Neurological:  Positive for weakness. Negative for numbness.   Psychiatric/Behavioral:  Negative for suicidal ideas. The patient is not nervous/anxious.         Past Medical/Surgical/Social/Family History & Allergies     No past medical history on file.   No past surgical history on file.   Social History     Socioeconomic History    Marital status:       No family history on file.   No Known Allergies   Social Determinants of Health     Tobacco Use: Not on file (12/17/2017)   Alcohol Use: Not on file   Financial Resource Strain: Not on file   Food Insecurity: Not on file   Transportation Needs: Not on file   Physical Activity: Not on file   Stress: Not on file   Social Connections: Unknown (10/11/2023)    Family and Community Support     Help with Day-to-Day Activities: Not on file     Lonely or Isolated: Not on file   Interpersonal Safety: Not At Risk (6/30/2024)    Abuse Screen     Unsafe at Home or Work/School: no     Feels Threatened by Someone?: no     Does Anyone Keep You from Contacting Others or Doint Things Outside the Home?: no     Physical Sign of Abuse Present: no   Depression: Not on file   Housing Stability: Unknown (10/11/2023)    Housing Stability     Current Living Arrangements: Not on file     Potentially Unsafe Housing Conditions: Not on file   Utilities: Not on file   Health Literacy: Unknown (10/11/2023)    Education     Help with  school or training?: Not on file     Preferred Language: Not on file   Employment: Unknown (10/11/2023)    Employment     Do you want help finding or keeping work or a job?: Not on file   Disabilities: Unknown (10/11/2023)    Disabilities     Concentrating, Remembering, or Making Decisions Difficulty: Not on file     Doing Errands Independently Difficulty: Not on file        Home Medications     Prior to Admission medications    Not on File        Objective / Physical Exam     Vital signs:  Temp: 98.6 °F (37 °C)  BP: 114/65  Heart Rate: 104  Resp: 22  SpO2: 95 %  Weight: 108 kg (239 lb)    Admission Weight: Weight: 108 kg (239 lb)    Physical Exam  Constitutional:       Appearance: Normal appearance. She is morbidly obese. She is toxic-appearing.   HENT:      Head: Normocephalic.      Nose: Nose normal.      Mouth/Throat:      Mouth: Mucous membranes are moist.   Eyes:      Extraocular Movements: Extraocular movements intact.      Pupils: Pupils are equal, round, and reactive to light.   Cardiovascular:      Rate and Rhythm: Tachycardia present. Rhythm irregular.      Pulses: Normal pulses.      Heart sounds: Normal heart sounds.   Pulmonary:      Effort: Pulmonary effort is normal.      Breath sounds: Normal breath sounds. Decreased air movement present.   Abdominal:      General: Bowel sounds are normal.      Palpations: Abdomen is soft.   Musculoskeletal:         General: Normal range of motion.      Right lower le+      Left lower le+   Skin:     General: Skin is warm.      Coloration: Skin is pale.      Findings: Erythema present.      Comments: Severe erythema to bilateral lower extremities   Neurological:      Mental Status: She is easily aroused. She is lethargic and disoriented.   Psychiatric:         Attention and Perception: She is inattentive.         Mood and Affect: Mood normal.         Behavior: Behavior normal.        Labs     Results from last 7 days   Lab Units 24  3598 24  8373    WBC 10*3/mm3  --  16.80*   HEMATOCRIT %  --  49.0*   HEMATOCRIT POC % 50  --    PLATELETS 10*3/mm3  --  119*      Results from last 7 days   Lab Units 06/30/24 2043 06/30/24  1858   SODIUM mmol/L 131*  --    POTASSIUM mmol/L 6.5*  --    CHLORIDE mmol/L 101  --    CO2 mmol/L 24.4  --    BUN mg/dL 50*  --    CREATININE mg/dL 1.38* 1.40*        Imaging     Chest X ray: My independent assessment showed vascular congestion, bilateral small effusions    EKG: My independent evaluation showed atrial fibrillation, RBBB, no ST -T changes    Current Medications     Scheduled Meds:  calcium gluconate, 1,000 mg, Intravenous, Once  enoxaparin, 1 mg/kg, Subcutaneous, Once  sodium chloride, 10 mL, Intravenous, Q12H  vancomycin, 20 mg/kg (Adjusted), Intravenous, Once         Continuous Infusions:  dilTIAZem, 5-15 mg/hr, Last Rate: 5 mg/hr (06/30/24 2008)       Patient continues to be critically ill, remains at risk of clinical deterioration or death and needed high complexity decision making. I have spent a total of 45 minutes providing critical care services to this patient including but not limited to: review of labs/ microbiology/imaging/medications, serial monitoring of vital signs, review of other consultant's notes, review of events in the last 24 hrs, monitoring input/output, review of treatment plan with bedside nurse, RT and other treatment team, management of life support and nutrition needs. I also spoke with patient daughter about the plan of care and answered all questions.    Time spent in performing separately billable procedures and updating family is not included in the critical care time.       MANJU Isaacs   Critical Care  06/30/24   21:22 EDT      Electronically signed by Sesar Solorzano DO at 07/01/24 0268

## 2024-07-03 NOTE — PROGRESS NOTES
[facility] MEDICINE SERVICE  DAILY PROGRESS NOTE    NAME: Ban Brennan  : 1955  MRN: [id number]      LOS: 3 days     PROVIDER OF SERVICE: Boom Figueroa MD    Chief Complaint: Atrial fibrillation with RVR  Ban Brennan is a 69 y.o. female with known PMH of hypertension, heart failure, chronic bilateral lower extremity edema who presented to the hospital for creasing shortness of breath, and was admitted with a principal diagnosis of Atrial fibrillation with RVR.   Subjective:     Interval History:  History taken from: patient  Patient Complaints: Feeling better but has occasional generalized body Pain  Patient Denies: Nausea or vomiting    Review of Systems:   Review of Systems  14 point review of system unremarkable except mentioned above  Objective:     Vital Signs  Temp:  [97 °F (36.1 °C)-97.8 °F (36.6 °C)] 97.8 °F (36.6 °C)  Heart Rate:  [] 96  Resp:  [10-27] 10  BP: ()/(51-82) 122/67  Flow (L/min):  [3] 3   Body mass index is 41.56 kg/m².    Physical Exam  Physical Exam  HENT:      Head: Atraumatic.      Mouth/Throat:      Mouth: Mucous membranes are moist.   Eyes:      Pupils: Pupils are equal, round, and reactive to light.   Cardiovascular:      Rate and Rhythm: Normal rate and regular rhythm.   Pulmonary:      Effort: Pulmonary effort is normal.   Abdominal:      General: Bowel sounds are normal.      Palpations: Abdomen is soft.   Musculoskeletal:      Cervical back: Neck supple.      Right lower leg: Edema present.      Left lower leg: Edema present.   Skin:     Comments: Rt foot ulceration    Neurological:      General: No focal deficit present.      Mental Status: She is alert and oriented to person, place, and time.   Psychiatric:         Mood and Affect: Mood normal.         Scheduled Meds   amiodarone, 200 mg, Oral, Q8H   Followed by  [START ON 2024] amiodarone, 200 mg, Oral, Q12H   Followed by  [START ON 2024] amiodarone, 200 mg, Oral, Daily  [START ON  7/4/2024] bumetanide, 1 mg, Oral, BID  calcium gluconate, 1,000 mg, Intravenous, Once  cefTRIAXone, 1,000 mg, Intravenous, Q24H  dextrose, 25 g, Intravenous, Once  docusate sodium, 100 mg, Oral, BID  insulin regular, 10 Units, Intravenous, Once  metoprolol succinate XL, 25 mg, Oral, Q24H  miconazole, 1 Application, Topical, Q12H  midodrine, 10 mg, Oral, Q8H  oxyCODONE, 10 mg, Oral, TID  pantoprazole, 40 mg, Oral, Q AM  potassium chloride ER, 40 mEq, Oral, Q4H  povidone-iodine, , Topical, Daily  rivaroxaban, 15 mg, Oral, BID With Meals   Followed by  [START ON 7/24/2024] rivaroxaban, 20 mg, Oral, Daily With Dinner  sodium chloride, 10 mL, Intravenous, Q12H       PRN Meds     acetaminophen **OR** acetaminophen    Calcium Replacement - Follow Nurse / BPA Driven Protocol    Magnesium Standard Dose Replacement - Follow Nurse / BPA Driven Protocol    nitroglycerin    ondansetron ODT **OR** ondansetron    oxyCODONE    Pharmacy to Dose Cefepime    Pharmacy to Dose enoxaparin (LOVENOX)    Pharmacy to dose vancomycin    Phosphorus Replacement - Follow Nurse / BPA Driven Protocol    Potassium Replacement - Follow Nurse / BPA Driven Protocol    [COMPLETED] Insert Peripheral IV **AND** sodium chloride    sodium chloride    sodium chloride    Vancomycin Pharmacy Intermittent/Pulse Dosing   Infusions  Pharmacy to Dose Cefepime,   Pharmacy to Dose enoxaparin (LOVENOX),   Pharmacy to dose vancomycin,           Diagnostic Data    Results from last 7 days   Lab Units 07/03/24  0635   WBC 10*3/mm3 10.53   HEMOGLOBIN g/dL 14.4   HEMATOCRIT % 46.0   PLATELETS 10*3/mm3 104*   GLUCOSE mg/dL 107*   CREATININE mg/dL 1.31*   BUN mg/dL 45*   SODIUM mmol/L 139   POTASSIUM mmol/L 3.5   AST (SGOT) U/L 30   ALT (SGPT) U/L 16   ALK PHOS U/L 130*   BILIRUBIN mg/dL 1.0   ANION GAP mmol/L 7.1       XR Chest 1 View    Result Date: 7/3/2024  Impression: No appreciable change in bibasilar lung infiltrates with small effusions. Continued cardiomegaly.  Electronically Signed: Krystal Lee MD  7/3/2024 7:57 AM EDT  Workstation ID: [id number]    XR Chest 1 View    Result Date: 7/2/2024  Impression: Some improvement in bibasilar lung infiltrates with small effusions. Continued cardiomegaly. Electronically Signed: Krystal Lee MD  7/2/2024 12:46 PM EDT  Workstation ID: [id number]    US Pelvis Complete    Result Date: 7/1/2024  Impression: Sonographic features suggest the presence of a large subserosal uterine fundal fibroid. Electronically Signed: Ann Marie Maloney MD  7/1/2024 1:43 PM EDT  Workstation ID: [id number]    CT Abdomen Pelvis Without Contrast    Result Date: 7/1/2024  Impression: Moderately large right pleural effusion and small left pleural effusion. Bibasilar infiltrates suggest atelectasis although associated pneumonia is not excluded. Severe cardiomegaly. Bilateral flank edema, greatest on the right. Probable fibroid uterus. This could be confirmed with ultrasound. Electronically Signed: Krystal eLe MD  7/1/2024 11:39 AM EDT  Workstation ID: [id number]    CT Angiogram Chest Pulmonary Embolism    Result Date: 7/1/2024  Impression: 1. No evidence for pulmonary embolus. 2. Bibasilar airspace disease and right middle lung opacity compatible likely multifocal pneumonia with bilateral pleural effusions. 3. Cardiomegaly. Electronically Signed: Shari Kaur MD  7/1/2024 10:14 AM EDT  Workstation ID: [id number]       I reviewed the patient's new clinical results.    Assessment/Plan:     Active and Resolved Problems  BLE DVT  Hx of PE, unprovoked  On treatment-dose lovenox  Cardiology following  Hematology following           Atrial fibrillation with RVR  PAF  Chest pain  Acute on chronic systolic heart failure  Diastolic dysfxn  Moderate AS  Severe MR  Chronic lower extremity lymphedema  Fluid overload  Large right pleural effusion  Rate somewhat better controlled  Cardiology has started amio gtt  TTE from 7/1/2024 shows an ejection fraction low at 30 to 35%,  diastolic dysfunction noted, moderate aortic valve stenosis, severe mitral regurgitation  TSH wnl  Continue Bumex 2 mg IVP q8h  Repeat CXR pending           Severe sepsis  Acute cystitis with hematuria  LLE cellulitis  L foot wound, appears infected  Putative panniculitis  MRSA PCR +  Rhinovirus +  Putative multifocal PNA  Source of sepsis is multiple  ID following--vanc and Rocephin, awaiting final culture results  Consult podiatry  HbA1c  6%  PCT not significantly elevated  NT suctioned sputum Cx only with normal shorty        Uterine mass  Pelvic U/S suggesting fibroid        Acute kidney injury  Hyperkalemia, improved  Creatinine 1.38, baseline unknown  Initial potassium 6.5; treated with sling/D50, calcium gluconate, and Lasix  IV fluids contraindicated  Avoid nephrotoxic agents  Avoid hypotension  Consider nephrology consult if no improvement        Essential hypertension  Hold home lisinopril and metoprolol as patient is borderline hypotensive  Resume when clinically appropriate           VTE Prophylaxis:  Pharmacologic VTE prophylaxis orders are present.         Code status is   Code Status and Medical Interventions:   Ordered at: 06/30/24 2120     Code Status (Patient has no pulse and is not breathing):    CPR (Attempt to Resuscitate)     Medical Interventions (Patient has pulse or is breathing):    Full Support       Plan for disposition:SNF  in 1-2 days final culture result    Time: 35 minutes    Signature: Electronically signed by Boom Figueroa MD, 07/03/24, 09:26 EDT.  [facility] Hospitalist Team

## 2024-07-03 NOTE — THERAPY EVALUATION
"Patient Name: Ban Brennan  : 1955    MRN: [id number]                              Today's Date: 7/3/2024       Admit Date: 2024    Visit Dx:     ICD-10-CM ICD-9-CM   1. Atrial fibrillation with rapid ventricular response  I48.91 427.31   2. Acute congestive heart failure, unspecified heart failure type  I50.9 428.0   3. Sepsis, due to unspecified organism, unspecified whether acute organ dysfunction present  A41.9 038.9     995.91   4. Urinary tract infection without hematuria, site unspecified  N39.0 599.0   5. Acute kidney injury  N17.9 584.9   6. Bilateral lower leg cellulitis  L03.116 682.6    L03.115    7. Hyperkalemia  E87.5 276.7     Patient Active Problem List   Diagnosis    Primary hypertension    Morbid obesity with BMI of 40.0-44.9, adult    Atrial fibrillation with RVR    Right bundle branch block    Acute deep vein thrombosis (DVT) of both lower extremities    ARABELLA (acute kidney injury)    Acute hyperkalemia    Sepsis    Acute UTI    Acute CHF    Rhinovirus infection    Multifocal pneumonia    Chronic respiratory failure with hypoxia, on home O2 therapy    Skin ulcer of right great toe    Skin ulcer of left great toe    SEDRICK (obstructive sleep apnea)     Past Medical History:   Diagnosis Date    Bilateral leg edema     Chronic respiratory failure with hypoxia, on home O2 therapy 2024    COPD (chronic obstructive pulmonary disease)     Morbid obesity with BMI of 40.0-44.9, adult 2010    Primary hypertension 2017    Pulmonary embolism     \"many years ago, was on warfarin\"    Sleep apnea      History reviewed. No pertinent surgical history.   General Information       Row Name 24 1041          Physical Therapy Time and Intention    Document Type evaluation  -CL     Mode of Treatment physical therapy  -CL       Row Name 24 1041          General Information    Patient Profile Reviewed yes  -CL     Prior Level of Function independent:;all household mobility  " Sleeps in recliner  -CL       Row Name 07/03/24 1041          Living Environment    People in Home child(samreen), adult  -CL     Name(s) of People in Home Son: Jose  -CL       Row Name 07/03/24 1041          Home Main Entrance    Number of Stairs, Main Entrance none  -CL       Row Name 07/03/24 1041          Stairs Within Home, Primary    Number of Stairs, Within Home, Primary none  -CL       Row Name 07/03/24 1041          Cognition    Orientation Status (Cognition) oriented x 4  -CL       Row Name 07/03/24 1041          Safety Issues, Functional Mobility    Impairments Affecting Function (Mobility) endurance/activity tolerance;pain;strength;range of motion (ROM)  -CL               User Key  (r) = Recorded By, (t) = Taken By, (c) = Cosigned By      Initials Name Provider Type    Jerica Vargas, PT Physical Therapist                   Mobility       Row Name 07/03/24 1043          Bed Mobility    Bed Mobility bed mobility (all) activities  -CL     All Activities, [place] (Bed Mobility) 2 person assist;moderate assist (50% patient effort)  -CL     Assistive Device (Bed Mobility) draw sheet  -CL     Comment, (Bed Mobility) Sat EOB with mod A 1 to maintain balance  -CL       Row Name 07/03/24 1043          Bed-Chair Transfer    Bed-Chair [place] (Transfers) not tested  -CL     Comment, (Bed-Chair Transfer) Pt with decreased trunk control and pain with wt bearing BLEs  -CL       Row Name 07/03/24 1043          Sit-Stand Transfer    Sit-Stand [place] (Transfers) not tested  -CL               User Key  (r) = Recorded By, (t) = Taken By, (c) = Cosigned By      Initials Name Provider Type    Jerica Vargas, PT Physical Therapist                   Obj/Interventions       Row Name 07/03/24 1043          Range of Motion Comprehensive    General Range of Motion lower extremity range of motion deficits identified  -CL     Comment, General Range of Motion BLE ROM limited by edema and pain. Pt with open  area R thigh, blackened discoloration R great toe; weeping edema BLEs and edema in pannus  -CL       Row Name 07/03/24 1043          Strength Comprehensive (MMT)    Comment, General Manual Muscle Testing (MMT) Assessment Unable to fully assess BLEs; pt exhibits periods of weakness where arms, legs and trunk become limp/unable to sustain control against gravity  -CL       Row Name 07/03/24 1043          Motor Skills    Motor Skills functional endurance  -       Row Name 07/03/24 1043          Balance    Balance Assessment sitting static balance;sitting dynamic balance  -CL     Static Sitting Balance moderate assist  -CL     Dynamic Sitting Balance moderate assist;2-person assist  -CL       Row Name 07/03/24 1043          Sensory Assessment (Somatosensory)    Sensory Assessment (Somatosensory) sensation intact  -CL               User Key  (r) = Recorded By, (t) = Taken By, (c) = Cosigned By      Initials Name Provider Type    CL Jerica Parada, PT Physical Therapist                   Goals/Plan       [facility] Name 07/03/24 1054          Bed Mobility Goal 1 (PT)    Activity/Assistive Device (Bed Mobility Goal 1, PT) bed mobility activities, all  -CL     [place] Level/Cues Needed (Bed Mobility Goal 1, PT) minimum assist (75% or more patient effort)  -CL     Time Frame (Bed Mobility Goal 1, PT) long term goal (LTG);2 weeks  -CL       Row Name 07/03/24 1054          Transfer Goal 1 (PT)    Activity/Assistive Device (Transfer Goal 1, PT) sit-to-stand/stand-to-sit;bed-to-chair/chair-to-bed  -CL     [place] Level/Cues Needed (Transfer Goal 1, PT) minimum assist (75% or more patient effort)  -CL     Time Frame (Transfer Goal 1, PT) long term goal (LTG);2 weeks  -CL       Row Name 07/03/24 1054          Gait Training Goal 1 (PT)    Activity/Assistive Device (Gait Training Goal 1, PT) gait (walking locomotion);assistive device use  -CL     [place] Level (Gait Training Goal 1, PT) minimum assist (75% or more  patient effort)  -CL     Distance (Gait Training Goal 1, PT) 25  -CL     Time Frame (Gait Training Goal 1, PT) long term goal (LTG);2 weeks  -CL       Row Name 07/03/24 1054          Therapy Assessment/Plan (PT)    Planned Therapy Interventions (PT) balance training;neuromuscular re-education;bed mobility training;gait training;patient/family education;postural re-education;ROM (range of motion);strengthening;transfer training  -CL               User Key  (r) = Recorded By, (t) = Taken By, (c) = Cosigned By      Initials Name Provider Type    CL Jerica Parada, PT Physical Therapist                   Clinical Impression       Row Name 07/03/24 1046          Pain    Pretreatment Pain Rating 4/10  -CL     Posttreatment Pain Rating 4/10  -CL     Pain Location - Side/Orientation Bilateral  -CL     Pain Intervention(s) Repositioned  -CL       Row Name 07/03/24 1046          Plan of Care Review    Plan of Care Reviewed With patient  -CL     Outcome Evaluation Ban Brennan is a 69 y.o. female with PMH of HTN, Heart failure and chronic BLE edema who presents with Afib and RVR.  Pt reports that her son lives with her in a [facility] with no TRU.  She reports that she is typically independent with mobility for short distances and sleeps in a recliner. She states she is normally independent with ADLs.  At the time of PT evaluation, she is A&O x 4. She has weeping edema in BLEs with open areas on RLE.  Her BLE ROM is limited by edema. She performs bed mobility with mod A 2.  She requires mod A 1 to maintain sitting EOB with episodes of weakness throughout session resulting in decreased ability to hold arms and trunk against gravity. She exhibits forward head and decreased lumbar lordosis.  She was instructed in and performed cervicoaxial extension.  She has increased pain with BLE weight bearing and is not safe to attempt standing at this time.  She is well below her baseline and would benefit from SNF at discharge.  -CL        Row Name 07/03/24 1046          Therapy Assessment/Plan (PT)    Rehab Potential (PT) good, to achieve stated therapy goals  -CL     Criteria for Skilled Interventions Met (PT) yes;skilled treatment is necessary  -CL     Therapy Frequency (PT) 3 times/wk  -CL     Predicted Duration of Therapy Intervention (PT) Until discharge  -CL       Row Name 07/03/24 1046          Vital Signs    Pre Systolic BP Rehab 113  -CL     Pre Treatment Diastolic BP 86  -CL     Pretreatment Heart Rate (beats/min) 98  -CL     Pretreatment Resp Rate (breaths/min) 11  -CL     Pre SpO2 (%) 88  -CL     O2 Delivery Pre Treatment supplemental O2  2L  -CL       Row Name 07/03/24 1046          Positioning and Restraints    Pre-Treatment Position in bed  -CL     Post Treatment Position bed  -CL     In Bed supine;notified nsg;call light within reach;exit alarm on  -CL               User Key  (r) = Recorded By, (t) = Taken By, (c) = Cosigned By      Initials Name Provider Type    Jerica Vargas PT Physical Therapist                   Outcome Measures       Row Name 07/03/24 1055          How much help from another person do you currently need...    Turning from your back to your side while in flat bed without using bedrails? 2  -CL     Moving from lying on back to sitting on the side of a flat bed without bedrails? 2  -CL     Moving to and from a bed to a chair (including a wheelchair)? 1  -CL     Standing up from a chair using your arms (e.g., wheelchair, bedside chair)? 1  -CL     Climbing 3-5 steps with a railing? 1  -CL     To walk in hospital room? 1  -CL     AM-PAC 6 Clicks Score (PT) 8  -CL     Highest Level of Mobility Goal 3 --> Sit at edge of bed  -CL               User Key  (r) = Recorded By, (t) = Taken By, (c) = Cosigned By      Initials Name Provider Type    Jerica Vargas PT Physical Therapist                                 Physical Therapy Education       Title: PT OT SLP Therapies (In Progress)       Topic:  Physical Therapy (Done)       Point: Mobility training (Done)       Learning Progress Summary             Patient Acceptance, E,TB, VU by CL at 7/3/2024 1056                         Point: Home exercise program (Done)       Learning Progress Summary             Patient Acceptance, E,TB, VU by CL at 7/3/2024 1056                         Point: Body mechanics (Done)       Learning Progress Summary             Patient Acceptance, E,TB, VU by CL at 7/3/2024 1056                                         User Key       Initials Effective Dates Name Provider Type Discipline     03/02/22 -  Jerica Parada, PT Physical Therapist PT                  PT Recommendation and Plan  Planned Therapy Interventions (PT): balance training, neuromuscular re-education, bed mobility training, gait training, patient/family education, postural re-education, ROM (range of motion), strengthening, transfer training  Plan of Care Reviewed With: patient  Outcome Evaluation: Ban Brennan is a 69 y.o. female with PMH of HTN, Heart failure and chronic BLE edema who presents with Afib and RVR.  Pt reports that her son lives with her in a [facility] with no TRU.  She reports that she is typically independent with mobility for short distances and sleeps in a recliner. She states she is normally independent with ADLs.  At the time of PT evaluation, she is A&O x 4. She has weeping edema in BLEs with open areas on RLE.  Her BLE ROM is limited by edema. She performs bed mobility with mod A 2.  She requires mod A 1 to maintain sitting EOB with episodes of weakness throughout session resulting in decreased ability to hold arms and trunk against gravity. She exhibits forward head and decreased lumbar lordosis.  She was instructed in and performed cervicoaxial extension.  She has increased pain with BLE weight bearing and is not safe to attempt standing at this time.  She is well below her baseline and would benefit from SNF at discharge.     Time  Calculation:   PT Evaluation Complexity  History, PT Evaluation Complexity: 3 or more personal factors and/or comorbidities  Examination of Body Systems (PT Eval Complexity): total of 3 or more elements  Clinical Presentation (PT Evaluation Complexity): evolving  Clinical Decision Making (PT Evaluation Complexity): moderate complexity  Overall Complexity (PT Evaluation Complexity): moderate complexity     PT Charges       Row Name 07/03/24 1056             Time Calculation    Start Time 0825  -CL      Stop Time 0856  -CL      Time Calculation (min) 31 min  -CL      PT Received On 07/03/24  -CL      PT - Next Appointment 07/05/24  -CL      PT Goal Re-Cert Due Date 07/17/24  -CL         Time Calculation- PT    Total Timed Code Minutes- PT 10 minute(s)  -CL                User Key  (r) = Recorded By, (t) = Taken By, (c) = Cosigned By      Initials Name Provider Type    CL Jerica Parada, PT Physical Therapist                  Therapy Charges for Today       Code Description Service Date Service Provider Modifiers Qty    25498820527 HC PT EVAL MOD COMPLEXITY 4 7/3/2024 Jerica Parada, PT GP 1    06637843987 HC PT THER PROC EA 15 MIN 7/3/2024 Jerica Parada, PT GP 1            PT G-Codes  AM-PAC 6 Clicks Score (PT): 8  PT Discharge Summary  Anticipated Discharge Disposition (PT): skilled nursing facility    Jerica Parada PT  7/3/2024

## 2024-07-03 NOTE — PROGRESS NOTES
"Referring Provider: Boom Figueroa MD    Reason for follow-up: Atrial fibrillation     Patient Care Team:  Rut Pierce APRN as PCP - General (Nurse Practitioner)      SUBJECTIVE  Laying comfortably in bed.  Denies any chest pain or shortness of breath.     ROS  Review of all systems negative except as indicated.    Since I have last seen, the patient has been without any chest discomfort, shortness of breath, palpitations, dizziness or syncope.  Denies having any headache, abdominal pain, nausea, vomiting, diarrhea, constipation, loss of weight or loss of appetite.  Denies having any excessive bruising, hematuria or blood in the stool.        Personal History:    Past Medical History:   Diagnosis Date    Bilateral leg edema     Chronic respiratory failure with hypoxia, on home O2 therapy 07/01/2024    COPD (chronic obstructive pulmonary disease)     Morbid obesity with BMI of 40.0-44.9, adult 11/08/2010    Primary hypertension 03/01/2017    Pulmonary embolism     \"many years ago, was on warfarin\"    Sleep apnea        History reviewed. No pertinent surgical history.    History reviewed. No pertinent family history.    Social History     Tobacco Use    Smoking status: Never    Smokeless tobacco: Never   Vaping Use    Vaping status: Never Used   Substance Use Topics    Alcohol use: Never    Drug use: Never        Home meds:  Prior to Admission medications    Medication Sig Start Date End Date Taking? Authorizing Provider   Allergy Relief 180 MG tablet Take 1 tablet by mouth Daily. 5/30/24  Yes Chadwick Johnson MD   bumetanide (BUMEX) 1 MG tablet Take 1 tablet by mouth 3 times a day. 5/30/24  Yes Chadwick Johnson MD   docusate sodium (COLACE) 100 MG capsule Take 1 capsule by mouth Every 12 (Twelve) Hours. 5/30/24  Yes Chadwick Johnson MD   furosemide (LASIX) 20 MG tablet Take 1 tablet by mouth Daily.   Yes Chadwick Johnson MD   HYDROcodone-acetaminophen (NORCO)  MG per tablet Take 1 " tablet by mouth 3 times a day. 5/30/24  Yes Chadwick Johnson MD   lisinopril (PRINIVIL,ZESTRIL) 30 MG tablet Take 1 tablet by mouth Daily. 3/18/24  Yes Chadwick Johnson MD   meloxicam (MOBIC) 7.5 MG tablet Take 1 tablet by mouth Daily As Needed. 3/18/24  Yes Chadwick Johnson MD   metoprolol tartrate (LOPRESSOR) 50 MG tablet Take 1 tablet by mouth Daily.   Yes Chadwick Johnson MD   montelukast (SINGULAIR) 10 MG tablet Take 1 tablet by mouth every night at bedtime.   Yes Chadwick Johnson MD   omeprazole (priLOSEC) 20 MG capsule Take 1 capsule by mouth Daily. 3/18/24  Yes Chadwick Johnson MD   oxybutynin XL (DITROPAN-XL) 10 MG 24 hr tablet Take 2 tablets by mouth Daily. 3/18/24  Yes Chadwick Johnson MD   potassium chloride (MICRO-K) 10 MEQ CR capsule Take 1 capsule by mouth Every 12 (Twelve) Hours. 3/18/24  Yes Chadwick Johnson MD   vitamin D (ERGOCALCIFEROL) 1.25 MG (55954 UT) capsule capsule Take 1 capsule by mouth 2 (Two) Times a Week. Monday and Thursday. 5/30/24  Yes Chadwick Johnson MD       Allergies:  Patient has no known allergies.    Scheduled Meds:amiodarone, 200 mg, Oral, Q8H   Followed by  [START ON 7/9/2024] amiodarone, 200 mg, Oral, Q12H   Followed by  [START ON 7/23/2024] amiodarone, 200 mg, Oral, Daily  bumetanide, 2 mg, Intravenous, Q8H  calcium gluconate, 1,000 mg, Intravenous, Once  cefTRIAXone, 1,000 mg, Intravenous, Q24H  dextrose, 25 g, Intravenous, Once  docusate sodium, 100 mg, Oral, BID  enoxaparin, 1 mg/kg, Subcutaneous, Q12H  insulin regular, 10 Units, Intravenous, Once  metoprolol succinate XL, 25 mg, Oral, Q24H  miconazole, 1 Application, Topical, Q12H  midodrine, 10 mg, Oral, Q8H  oxyCODONE, 10 mg, Oral, TID  pantoprazole, 40 mg, Oral, Q AM  povidone-iodine, , Topical, Daily  sodium chloride, 10 mL, Intravenous, Q12H      Continuous Infusions:Pharmacy to Dose Cefepime,   Pharmacy to Dose enoxaparin (LOVENOX),   Pharmacy to dose vancomycin,  "      PRN Meds:.  acetaminophen **OR** acetaminophen    Calcium Replacement - Follow Nurse / BPA Driven Protocol    Magnesium Standard Dose Replacement - Follow Nurse / BPA Driven Protocol    nitroglycerin    ondansetron ODT **OR** ondansetron    oxyCODONE    Pharmacy to Dose Cefepime    Pharmacy to Dose enoxaparin (LOVENOX)    Pharmacy to dose vancomycin    Phosphorus Replacement - Follow Nurse / BPA Driven Protocol    Potassium Replacement - Follow Nurse / BPA Driven Protocol    [COMPLETED] Insert Peripheral IV **AND** sodium chloride    sodium chloride    sodium chloride    Vancomycin Pharmacy Intermittent/Pulse Dosing      OBJECTIVE    Vital Signs  Vitals:    07/03/24 0400 07/03/24 0500 07/03/24 0544 07/03/24 0627   BP: 112/76 (!) 89/67  133/82   BP Location: Right arm      Patient Position: Lying      Pulse: 99 104  99   Resp: 13      Temp: 97.5 °F (36.4 °C)      TempSrc: Oral      SpO2: 91% 96%     Weight:   90.2 kg (198 lb 13.7 oz)    Height:           Flowsheet Rows      Flowsheet Row First Filed Value   Admission Height 147.3 cm (58\") Documented at 06/30/2024 1650   Admission Weight 108 kg (239 lb) Documented at 06/30/2024 1650              Intake/Output Summary (Last 24 hours) at 7/3/2024 0715  Last data filed at 7/3/2024 0600  Gross per 24 hour   Intake 1934 ml   Output 1850 ml   Net 84 ml          Telemetry: Atrial fibrillation with controlled heart rate in the 80s and 90s    Physical Exam:  The patient is alert, oriented and in no distress.  Morbidly obese  Vital signs as noted above.  Head and neck revealed no carotid bruits or jugular venous distention.  No thyromegaly or lymphadenopathy is present  Lungs with bibasilar Rales and diminished at bases.  No wheezing.  On 2 L of oxygen  Heart: Irregularly irregular rhythm.  Normal first and second heart sounds.  No precordial rub is present.  No gallop is present.  Abdomen: Soft and nontender.    Extremities with good peripheral pulses with bilateral " lower extremity edema  Skin: Warm.  Right great toe ulceration with necrotic tissue.  Left great toe ulceration.    Musculoskeletal system is grossly normal.  CNS grossly normal.          Results Review:  I have personally reviewed the results from the time of this admission to 7/3/2024 07:15 EDT and agree with these findings:  []  Laboratory  []  Microbiology  []  Radiology  []  EKG/Telemetry   []  Cardiology/Vascular   []  Pathology  []  Old records  []  Other:    Most notable findings include:    Lab Results (last 24 hours)       Procedure Component Value Units Date/Time    Factor 5 Leiden [[id number]]  (Normal) Collected: 07/01/24 1646    Specimen: Blood Updated: 07/03/24 0715     Factor V Leiden Normal    Narrative:      Factor V Leiden is a specific mutation (R506Q) in the factor V gene that is associated with an increased risk of venous thrombosis.  Factor V Leiden is more resistant to inactivation by activated protein C.  Factor V Leiden refers to the G to A transition at nucleotide position 1691 of the Factor V gene, resulting in the substitution of the amino acid arginine by glutamine in the Factor V protein, causing resistance to cleavage by Activated Protein C (APC).    As a result, factor V persists in the circulation leading to a mild hyper-coagulable state.  The Leiden mutation accounts for 90% - 95% of APC resistance.  Factor V Leiden has been reported in patients with deep vein thrombosis, pulmonary embolus, central retinal vein occlusion, cerebral sinus thrombosis and hepatic vein thrombosis.  Other risk factors to be considered in the workup for venous thrombosis include the [id number] mutation in the factor II (prothrombin) gene, protein S and C deficiency, and antithrombin deficiencies. Anticardiolipin antibody and lupus anticoagulant analysis may be appropriate for certain patients, as well as homocysteine levels.    The expression of Factor V Leiden thrombophilia is impacted by coexisting genetic  thrombophilic disorders, acquired thrombophilic disorders (malignancy, hyperhomocysteinemia, high factor VIII levels), and circumstances including: pregnancy, oral contraceptive use, hormone replacement therapy, selective estrogen receptor modulators, travel, central venous catheters, surgery, transplantation and advanced age.    In order to derive the most meaningful benefit from this testing, it may be necessary that the results and subsequent options from these complex genetic tests be discussed with patients by a trained genetic professional.    Phosphorus [[id number]]  (Abnormal) Collected: 07/03/24 0635    Specimen: Blood Updated: 07/03/24 0705     Phosphorus 4.8 mg/dL     Vancomycin, Random [[id number]]  (Normal) Collected: 07/03/24 0635    Specimen: Blood Updated: 07/03/24 0705     Vancomycin Random 22.40 mcg/mL     Narrative:      Therapeutic Ranges for Vancomycin    Vancomycin Random   5.0-40.0 mcg/mL  Vancomycin Trough   5.0-20.0 mcg/mL  Vancomycin Peak     20.0-40.0 mcg/mL    CBC & Differential [[id number]]  (Abnormal) Collected: 07/03/24 0635    Specimen: Blood Updated: 07/03/24 0640    Narrative:      The following orders were created for panel order CBC & Differential.  Procedure                               Abnormality         Status                     ---------                               -----------         ------                     CBC Auto Differential[[id number]]        Abnormal            Final result                 Please view results for these tests on the individual orders.    CBC Auto Differential [[id number]]  (Abnormal) Collected: 07/03/24 0635    Specimen: Blood Updated: 07/03/24 0640     WBC 10.53 10*3/mm3      RBC 4.63 10*6/mm3      Hemoglobin 14.4 g/dL      Hematocrit 46.0 %      MCV 99.4 fL      MCH 31.1 pg      MCHC 31.3 g/dL      RDW 15.8 %      RDW-SD 56.4 fl      MPV 10.1 fL      Platelets 104 10*3/mm3      Neutrophil % 76.8 %      Lymphocyte % 11.3 %      Monocyte % 10.6 %       Eosinophil % 0.5 %      Basophil % 0.2 %      Immature Grans % 0.6 %      Neutrophils, Absolute 8.09 10*3/mm3      Lymphocytes, Absolute 1.19 10*3/mm3      Monocytes, Absolute 1.12 10*3/mm3      Eosinophils, Absolute 0.05 10*3/mm3      Basophils, Absolute 0.02 10*3/mm3      Immature Grans, Absolute 0.06 10*3/mm3      nRBC 0.2 /100 WBC     Magnesium [[id number]] Collected: 07/03/24 0635    Specimen: Blood Updated: 07/03/24 0638    Comprehensive Metabolic Panel [[id number]] Collected: 07/03/24 0635    Specimen: Blood Updated: 07/03/24 0638    Haptoglobin [[id number]] Collected: 07/02/24 2328    Specimen: Blood Updated: 07/02/24 2332    Potassium [[id number]]  (Abnormal) Collected: 07/02/24 2042    Specimen: Blood Updated: 07/02/24 2110     Potassium 3.3 mmol/L      Comment: Specimen hemolyzed.  Result may be falsely elevated.       Blood Culture - Blood, Arm, Right [[id number]]  (Normal) Collected: 06/30/24 1844    Specimen: Blood from Arm, Right Updated: 07/02/24 1915     Blood Culture No growth at 2 days    Narrative:      Less than seven (7) mL's of blood was collected.  Insufficient quantity may yield false negative results.    Blood Culture - Blood, Arm, Right [[id number]]  (Normal) Collected: 06/30/24 1757    Specimen: Blood from Arm, Right Updated: 07/02/24 1815     Blood Culture No growth at 2 days    Narrative:      Less than seven (7) mL's of blood was collected.  Insufficient quantity may yield false negative results.    Lactate Dehydrogenase [[id number]]  (Abnormal) Collected: 07/02/24 1458    Specimen: Blood Updated: 07/02/24 1527      U/L      Comment: Specimen hemolyzed.  Results may be affected.       Reticulocytes [[id number]]  (Abnormal) Collected: 07/02/24 1458    Specimen: Blood Updated: 07/02/24 1504     Reticulocyte % 2.70 %      Reticulocyte Absolute 0.1237 10*6/mm3     Urine Culture - Urine, Urine, Catheter [[id number]]  (Abnormal) Collected: 06/30/24 1479    Specimen: Urine, Catheter Updated:  07/02/24 1000     Urine Culture >100,000 CFU/mL Gram Negative Bacilli    Narrative:      Colonization of the urinary tract without infection is common. Treatment is discouraged unless the patient is symptomatic, pregnant, or undergoing an invasive urologic procedure.    Respiratory Culture - Sputum, NT Suction [[id number]] Collected: 07/01/24 1507    Specimen: Sputum from NT Suction Updated: 07/02/24 0950     Respiratory Culture Scant growth (1+) The culture consists of normal respiratory shorty. This is a preliminary report; final report to follow.     Gram Stain Few (2+) Epithelial cells per low power field      Rare (1+) WBCs per low power field      Few (2+) Mixed bacterial morphotypes seen on Gram Stain            Imaging Results (Last 24 Hours)       Procedure Component Value Units Date/Time    XR Chest 1 View [[id number]] Resulted: 07/03/24 0610     Updated: 07/03/24 0611    XR Chest 1 View [[id number]] Collected: 07/02/24 1244     Updated: 07/02/24 1248    Narrative:      XR CHEST 1 VW    Date of Exam: 7/2/2024 11:57 AM EDT    Indication: Pleural effusion, hypoxia    Comparison: 6/30/2024.    Findings:  There is continued cardiomegaly with pulmonary vascular congestion. Bibasilar lung infiltrates with small effusions appear somewhat improved. Exam is somewhat limited by portable technique and obesity.      Impression:      Impression:  Some improvement in bibasilar lung infiltrates with small effusions. Continued cardiomegaly.      Electronically Signed: Krystal Lee MD    7/2/2024 12:46 PM EDT    Workstation ID: [id number]            LAB RESULTS (LAST 7 DAYS)    CBC  Results from last 7 days   Lab Units 07/03/24  0635 07/02/24  0329 07/01/24  0910 06/30/24  1858 06/30/24  1732   WBC 10*3/mm3 10.53 10.53 13.80*  --  16.80*   RBC 10*6/mm3 4.63 4.48 4.62  --  5.16   HEMOGLOBIN g/dL 14.4 13.8 14.2  --  15.9   HEMOGLOBIN, POC g/dL  --   --   --  17.0  --    HEMATOCRIT % 46.0 42.9 43.8  --  49.0*   HEMATOCRIT POC %   --   --   --  50  --    MCV fL 99.4* 95.8 94.8  --  95.0   PLATELETS 10*3/mm3 104* 92* 104*  --  119*       BMP  Results from last 7 days   Lab Units 07/03/24 0635 07/02/24 2042 07/02/24 0329 07/01/24 1633 07/01/24 0910 06/30/24 2043 06/30/24  1858   SODIUM mmol/L  --   --  138  --  134* 131*  --    POTASSIUM mmol/L  --  3.3* 3.5 4.2 5.4* 6.5*  --    CHLORIDE mmol/L  --   --  96*  --  97* 101  --    CO2 mmol/L  --   --  34.5*  --  31.4* 24.4  --    BUN mg/dL  --   --  43*  --  46* 50*  --    CREATININE mg/dL  --   --  1.14*  --  1.33* 1.38* 1.40*   GLUCOSE mg/dL  --   --  82  --  102* 194*  --    MAGNESIUM mg/dL  --   --  1.7  --  2.0  --   --    PHOSPHORUS mg/dL 4.8*  --  4.6*  --  3.4  --   --        CMP   Results from last 7 days   Lab Units 07/02/24 2042 07/02/24 0329 07/01/24 1633 07/01/24 0910 06/30/24 2043 06/30/24  1858   SODIUM mmol/L  --  138  --  134* 131*  --    POTASSIUM mmol/L 3.3* 3.5 4.2 5.4* 6.5*  --    CHLORIDE mmol/L  --  96*  --  97* 101  --    CO2 mmol/L  --  34.5*  --  31.4* 24.4  --    BUN mg/dL  --  43*  --  46* 50*  --    CREATININE mg/dL  --  1.14*  --  1.33* 1.38* 1.40*   GLUCOSE mg/dL  --  82  --  102* 194*  --    ALBUMIN g/dL  --  2.3*  --  2.5* 2.5*  --    BILIRUBIN mg/dL  --  0.9  --  1.3* 1.6*  --    ALK PHOS U/L  --  109  --  115 126*  --    AST (SGOT) U/L  --  24  --  39* 42*  --    ALT (SGPT) U/L  --  14  --  17 18  --        BNP        TROPONIN  Results from last 7 days   Lab Units 07/01/24  0910   HSTROP T ng/L 36*       CoAg        Creatinine Clearance  Estimated Creatinine Clearance: 47.3 mL/min (A) (by C-G formula based on SCr of 1.14 mg/dL (H)).    ABG        Radiology  XR Chest 1 View    Result Date: 7/2/2024  Impression: Some improvement in bibasilar lung infiltrates with small effusions. Continued cardiomegaly. Electronically Signed: Krystal Lee MD  7/2/2024 12:46 PM EDT  Workstation ID: [id number]    US Pelvis Complete    Result Date: 7/1/2024  Impression:  Sonographic features suggest the presence of a large subserosal uterine fundal fibroid. Electronically Signed: Ann Marie Maloney MD  7/1/2024 1:43 PM EDT  Workstation ID: [id number]    CT Abdomen Pelvis Without Contrast    Result Date: 7/1/2024  Impression: Moderately large right pleural effusion and small left pleural effusion. Bibasilar infiltrates suggest atelectasis although associated pneumonia is not excluded. Severe cardiomegaly. Bilateral flank edema, greatest on the right. Probable fibroid uterus. This could be confirmed with ultrasound. Electronically Signed: Krystal Lee MD  7/1/2024 11:39 AM EDT  Workstation ID: [id number]    Adult Transthoracic Echo Complete w/ Color, Spectral and Contrast if Necessary Per Protocol    Addendum Date: 7/1/2024      Left ventricular ejection fraction appears to be 31 - 35%.   Left ventricular diastolic dysfunction is noted.   The left atrial cavity is dilated.   Moderate aortic valve stenosis is present. Mean/peak gradient of 14/24 mmHg.  Dimensionless index 0.36   Moderate to severe mitral valve regurgitation is present.   Estimated right ventricular systolic pressure from tricuspid regurgitation is normal (<35 mmHg).     CT Angiogram Chest Pulmonary Embolism    Result Date: 7/1/2024  Impression: 1. No evidence for pulmonary embolus. 2. Bibasilar airspace disease and right middle lung opacity compatible likely multifocal pneumonia with bilateral pleural effusions. 3. Cardiomegaly. Electronically Signed: Shari Kaur MD  7/1/2024 10:14 AM EDT  Workstation ID: [id number]       EKG  I personally viewed and interpreted the patient's EKG/Telemetry data:  ECG 12 Lead Drug Monitoring; Amiodarone   Preliminary Result   HEART RMTD=748  bpm   RR Podlccbk=138  ms   PA Interval=  ms   P Horizontal Axis=  deg   P Front Axis=  deg   QRSD Qatitars=768  ms   QT Dlschkjs=753  ms   VXxI=755  ms   QRS Axis=-90  deg   T Wave Axis=74  deg   - ABNORMAL ECG -   Atrial fibrillation   Right bundle  branch block   ST elevation secondary to IVCD   Date and Time of Study:2024-07-03 04:02:32      ECG 12 Lead Drug Monitoring; Amiodarone   Preliminary Result   HEART MXCW=176  bpm   RR Wewxymdu=738  ms   MN Interval=  ms   P Horizontal Axis=  deg   P Front Axis=  deg   QRSD Aoztnrdc=082  ms   QT Rwcreisq=384  ms   DBxI=860  ms   QRS Axis=-93  deg   T Wave Axis=45  deg   - ABNORMAL ECG -   Atrial fibrillation   Right bundle branch block   Inferior infarct, old   When compared with ECG of 01-Jul-2024 04:42:13,   Nonspecific significant change   Date and Time of Study:2024-07-02 15:12:56      ECG 12 Lead Drug Monitoring; Amiodarone   Final Result   HEART PETA=589  bpm   RR Ykqoosbk=098  ms   MN Interval=  ms   P Horizontal Axis=  deg   P Front Axis=  deg   QRSD Kievnjei=388  ms   QT Pwxsasoe=413  ms   GMwI=242  ms   QRS Axis=-80  deg   T Wave Axis=102  deg   - ABNORMAL ECG -   Atrial fibrillation   Right bundle branch block   Inferior  infarct, old   Anterolateral  infarct, age indeterminate   When compared with ECG of 30-Jun-2024 16:58:43,   Significant rate decrease   New or worsened ischemia or infarction   Electronically Signed By: Austin Brooks ([facility]) 2024-07-01 13:11:59   Date and Time of Study:2024-07-01 04:42:13      ECG 12 Lead Dyspnea   Final Result   HEART BLGL=754  bpm   RR Gpvtxitb=954  ms   MN Interval=  ms   P Horizontal Axis=  deg   P Front Axis=  deg   QRSD Aivtrnth=063  ms   QT Onxkkpkx=241  ms   NMcF=848  ms   QRS Axis=-101  deg   T Wave Axis=43  deg   - ABNORMAL ECG -   Atrial fibrillation   Right bundle branch block   ST elevation secondary to IVCD   Electronically Signed By: Jeffery Kwon ([facility]) 2024-07-01 07:37:02   Date and Time of Study:2024-06-30 16:58:43      Telemetry Scan   Final Result      Telemetry Scan   Final Result      Telemetry Scan   Final Result      Telemetry Scan   Final Result      Telemetry Scan   Final Result      Telemetry Scan   Final Result      Telemetry Scan   Final Result       Telemetry Scan   Final Result      Telemetry Scan   Final Result      Telemetry Scan   Final Result      Telemetry Scan   Final Result      Telemetry Scan   Final Result      Telemetry Scan   Final Result      Telemetry Scan   Final Result      Telemetry Scan   Final Result      ECG 12 Lead Electrolyte Imbalance    (Results Pending)         Echocardiogram:    Results for orders placed during the hospital encounter of 06/30/24    Adult Transthoracic Echo Complete w/ Color, Spectral and Contrast if Necessary Per Protocol    Interpretation Summary    Left ventricular ejection fraction appears to be 31 - 35%.    Left ventricular diastolic dysfunction is noted.    The left atrial cavity is dilated.    Moderate aortic valve stenosis is present. Mean/peak gradient of 14/24 mmHg.  Dimensionless index 0.36    Moderate to severe mitral valve regurgitation is present.    Estimated right ventricular systolic pressure from tricuspid regurgitation is normal (<35 mmHg).        Stress Test:         Cardiac Catheterization:  No results found for this or any previous visit.         Other:         ASSESSMENT & PLAN:    Principal Problem:    Atrial fibrillation with RVR  Active Problems:    Primary hypertension    Morbid obesity with BMI of 40.0-44.9, adult    Right bundle branch block    Acute deep vein thrombosis (DVT) of both lower extremities    ARABELLA (acute kidney injury)    Acute hyperkalemia    Sepsis    Acute UTI    Acute CHF    Rhinovirus infection    Multifocal pneumonia    Chronic respiratory failure with hypoxia, on home O2 therapy    Skin ulcer of right great toe    Skin ulcer of left great toe    SEDRICK (obstructive sleep apnea)    Atrial fibrillation with RVR  New onset /newly diagnosed A-fib RVR  Electrolytes have been corrected  Cardizem drip has been discontinued  Continue amiodarone  Added Toprol-XL  Echocardiogram shows EF of 31 to 35%  [id number] score is 7  Currently on Lovenox  Will start Xarelto today      Acute systolic CHF  Newly diagnosed  Echocardiogram shows EF of 31 to 35%, moderate aortic stenosis and moderate to severe mitral regurgitation.  Currently on IV diuretics.  Renal function is worsening.  We will switch to oral diuretics  Monitor I's and O's and replace electrolytes as needed.  We will initiate and uptitrate GDMT as tolerated.  Currently blood pressure is low requiring midodrine  Continue Toprol-XL  Plan to repeat echocardiogram after 3 months of completing GDMT     Acute deep vein thrombosis (DVT) of both lower extremities  Extensive DVT in bilateral lower extremities involving femoral, popliteal and posterior tibial.  Continue anticoagulation  She will need lifelong anticoagulation    Thrombocytopenia  Platelets are 104 now  Closely monitor while on anticoagulation     ARABELLA (acute kidney injury)  Presented with creatinine of 1.4.  Creatinine is worsening again 1.3 now  Closely monitor renal function while on diuretics     Acute hyperkalemia  Hyperkalemia of 6.5  Received Lokelma  Potassium is down to 3.5 now  Closely monitor electrolytes while on diuretics     Sepsis / UTI / Multifocal pneumonia  Multifactorial secondary to UTI and pneumonia with possible cellulitis  Currently on antibiotics per primary team  Lactic acidosis has improved     Rhinovirus infection  Provide supportive care     Chronic respiratory failure with hypoxia, on home O2 therapy  Currently requiring 2 L of oxygen via nasal cannula which is her baseline     Skin ulcer of right great toe  Skin ulcer of left great toe  Podiatry has been consulted  A1c is 6.2     Primary hypertension, chronic  Currently hypotensive likely secondary to sepsis  Started on midodrine  We will try to initiate Toprol-XL     Morbid obesity with BMI of 40.0-44.9, adult  BMI is 41.5.  She weighs 198 pounds  Lifestyle modifications discussed with the patient  LDL 27, HDL 13, triglyceride 86 and total cholesterol 58.  No indication for statin     SEDRICK  (obstructive sleep apnea)  Encouraged compliance with CPAP      Austin Brooks MD  07/03/24  07:15 EDT

## 2024-07-03 NOTE — PLAN OF CARE
"Assessment: Ban Brennan presents with ADL impairments affecting function including balance, endurance / activity tolerance, pain, and strength. Demonstrated functioning below baseline abilities indicate the need for continued skilled intervention while inpatient. Tolerating session today without incident. Pt remains far from reported baseline, with assist x2 required for bed mobility. Unable to progress to standing nor mobilizing this date. Will continue to follow and progress as tolerated.      Plan/Recommendations:   Moderate Intensity Therapy recommended post-acute care. This is recommended as therapy feels the patient would require 3-4 days per week and wouldn't tolerate \"3 hour daily\" rehab intensity. SNF would be the preferred choice. If the patient does not agree to SNF, arrange HH or OP depending on home bound status. If patient is medically complex, consider LTACH.. Pt requires no DME at discharge.      Pt desires Skilled Rehab placement at discharge. Pt cooperative; agreeable to therapeutic recommendations and plan of care.        "

## 2024-07-03 NOTE — DISCHARGE PLACEMENT REQUEST
"Ban Jones (69 y.o. Female)       Date of Birth   1955    Social Security Number       Address   [address] [place] [address] [place] [address]    Home Phone   [phone]    MRN   [id number]       Confucianist   None    Marital Status                               Admission Date   6/30/24    Admission Type   Emergency    Admitting Provider   Sesar Solorzano DO    Attending Provider   Boom Figueroa MD    Department, Room/Bed   [facility] CARDIOVASCULAR CARE UNIT, 3120/1       Discharge Date       Discharge Disposition       Discharge Destination                                 Attending Provider: Boom Figueroa MD    Allergies: No Known Allergies    Isolation: Droplet, Contact   Infection: Rhinovirus  (07/01/24), MRSA (07/01/24)   Code Status: CPR    Ht: 147.3 cm (58\")   Wt: 90.2 kg (198 lb 13.7 oz)    Admission Cmt: None   Principal Problem: Atrial fibrillation with RVR [I48.91]                   Active Insurance as of 6/30/2024       Primary Coverage       Payor Plan Insurance Group Employer/Plan Group    ANTHEM MEDICARE REPLACEMENT ANTHEM MEDICARE ADVANTAGE [id number]       Payor Plan Address Payor Plan Phone Number Payor Plan Fax Number Effective Dates    [address] [phone]  5/1/2024 - None Entered    [facility] [address]         Subscriber Name Subscriber Birth Date Member ID       BAN JONES 1955 [id number]               Secondary Coverage       Payor Plan Insurance Group Employer/Plan Group    [place]        Payor Plan Address Payor Plan Phone Number Payor Plan Fax Number Effective Dates    [address]   10/15/2023 - None Entered    [place] [address]         Subscriber Name Subscriber Birth Date Member ID       BAN JONES 1955 [id number]                     Emergency Contacts        (Rel.) Home Phone Work Phone Mobile Phone    Chelsi Valle (Daughter) -- -- [phone]    " Jose Brennan (Son) -- -- [phone]                 Physical Therapy Notes (all)        Jerica Parada, PT at 24 1056  Version 1 of          Goal Outcome Evaluation:  Plan of Care Reviewed With: patient           Outcome Evaluation: Ban Brennan is a 69 y.o. female with PMH of HTN, Heart failure and chronic BLE edema who presents with Afib and RVR.  Pt reports that her son lives with her in a H with no TRU.  She reports that she is typically independent with mobility for short distances and sleeps in a recliner. She states she is normally independent with ADLs.  At the time of PT evaluation, she is A&O x 4. She has weeping edema in BLEs with open areas on RLE.  Her BLE ROM is limited by edema. She performs bed mobility with mod A 2.  She requires mod A 1 to maintain sitting EOB with episodes of weakness throughout session resulting in decreased ability to hold arms and trunk against gravity. She exhibits forward head and decreased lumbar lordosis.  She was instructed in and performed cervicoaxial extension.  She has increased pain with BLE weight bearing and is not safe to attempt standing at this time.  She is well below her baseline and would benefit from SNF at discharge.      Anticipated Discharge Disposition (PT): skilled nursing facility                          Electronically signed by Jerica Parada, PT at 24 1056       Jerica Parada, PT at 24 1058  Version 1 of          Patient Name: Ban Brennan  : 1955    MRN: [id number]                              Today's Date: 7/3/2024       Admit Date: 2024    Visit Dx:     ICD-10-CM ICD-9-CM   1. Atrial fibrillation with rapid ventricular response  I48.91 427.31   2. Acute congestive heart failure, unspecified heart failure type  I50.9 428.0   3. Sepsis, due to unspecified organism, unspecified whether acute organ dysfunction present  A41.9 038.9     995.91   4. Urinary tract infection without  "hematuria, site unspecified  N39.0 599.0   5. Acute kidney injury  N17.9 584.9   6. Bilateral lower leg cellulitis  L03.116 682.6    L03.115    7. Hyperkalemia  E87.5 276.7     Patient Active Problem List   Diagnosis    Primary hypertension    Morbid obesity with BMI of 40.0-44.9, adult    Atrial fibrillation with RVR    Right bundle branch block    Acute deep vein thrombosis (DVT) of both lower extremities    ARABELLA (acute kidney injury)    Acute hyperkalemia    Sepsis    Acute UTI    Acute CHF    Rhinovirus infection    Multifocal pneumonia    Chronic respiratory failure with hypoxia, on home O2 therapy    Skin ulcer of right great toe    Skin ulcer of left great toe    SEDRICK (obstructive sleep apnea)     Past Medical History:   Diagnosis Date    Bilateral leg edema     Chronic respiratory failure with hypoxia, on home O2 therapy 07/01/2024    COPD (chronic obstructive pulmonary disease)     Morbid obesity with BMI of 40.0-44.9, adult 11/08/2010    Primary hypertension 03/01/2017    Pulmonary embolism     \"many years ago, was on warfarin\"    Sleep apnea      History reviewed. No pertinent surgical history.   General Information       Row Name 07/03/24 1041          Physical Therapy Time and Intention    Document Type evaluation  -CL     Mode of Treatment physical therapy  -CL       Row Name 07/03/24 1041          General Information    Patient Profile Reviewed yes  -CL     Prior Level of Function independent:;all household mobility  Sleeps in recliner  -CL       Row Name 07/03/24 1041          Living Environment    People in Home child(samreen), adult  -CL     Name(s) of People in Home Son: Jose  -CL       Row Name 07/03/24 1041          Home Main Entrance    Number of Stairs, Main Entrance none  -CL       Row Name 07/03/24 1041          Stairs Within Home, Primary    Number of Stairs, Within Home, Primary none  -CL       Row Name 07/03/24 1041          Cognition    Orientation Status (Cognition) oriented x 4  -CL       " Row Name 07/03/24 1041          Safety Issues, Functional Mobility    Impairments Affecting Function (Mobility) endurance/activity tolerance;pain;strength;range of motion (ROM)  -CL               User Key  (r) = Recorded By, (t) = Taken By, (c) = Cosigned By      Initials Name Provider Type    Jerica Vargas, PT Physical Therapist                   Mobility       Row Name 07/03/24 1043          Bed Mobility    Bed Mobility bed mobility (all) activities  -CL     All Activities, [place] (Bed Mobility) 2 person assist;moderate assist (50% patient effort)  -CL     Assistive Device (Bed Mobility) draw sheet  -CL     Comment, (Bed Mobility) Sat EOB with mod A 1 to maintain balance  -CL       Row Name 07/03/24 1043          Bed-Chair Transfer    Bed-Chair [place] (Transfers) not tested  -CL     Comment, (Bed-Chair Transfer) Pt with decreased trunk control and pain with wt bearing BLEs  -CL       Row Name 07/03/24 1043          Sit-Stand Transfer    Sit-Stand [place] (Transfers) not tested  -CL               User Key  (r) = Recorded By, (t) = Taken By, (c) = Cosigned By      Initials Name Provider Type    CL Jerica Parada, PT Physical Therapist                   Obj/Interventions       Row Name 07/03/24 1043          Range of Motion Comprehensive    General Range of Motion lower extremity range of motion deficits identified  -CL     Comment, General Range of Motion BLE ROM limited by edema and pain. Pt with open area R thigh, blackened discoloration R great toe; weeping edema BLEs and edema in pannus  -CL       Row Name 07/03/24 1043          Strength Comprehensive (MMT)    Comment, General Manual Muscle Testing (MMT) Assessment Unable to fully assess BLEs; pt exhibits periods of weakness where arms, legs and trunk become limp/unable to sustain control against gravity  -CL       Row Name 07/03/24 1043          Motor Skills    Motor Skills functional endurance  -CL       Row Name 07/03/24 1043           Balance    Balance Assessment sitting static balance;sitting dynamic balance  -CL     Static Sitting Balance moderate assist  -CL     Dynamic Sitting Balance moderate assist;2-person assist  -CL       Row Name 07/03/24 1043          Sensory Assessment (Somatosensory)    Sensory Assessment (Somatosensory) sensation intact  -CL               User Key  (r) = Recorded By, (t) = Taken By, (c) = Cosigned By      Initials Name Provider Type    CL Jerica Parada, PT Physical Therapist                   Goals/Plan       Row Name 07/03/24 1054          Bed Mobility Goal 1 (PT)    Activity/Assistive Device (Bed Mobility Goal 1, PT) bed mobility activities, all  -CL     Bath Level/Cues Needed (Bed Mobility Goal 1, PT) minimum assist (75% or more patient effort)  -CL     Time Frame (Bed Mobility Goal 1, PT) long term goal (LTG);2 weeks  -CL       Row Name 07/03/24 1054          Transfer Goal 1 (PT)    Activity/Assistive Device (Transfer Goal 1, PT) sit-to-stand/stand-to-sit;bed-to-chair/chair-to-bed  -CL     [place]ath Level/Cues Needed (Transfer Goal 1, PT) minimum assist (75% or more patient effort)  -CL     Time Frame (Transfer Goal 1, PT) long term goal (LTG);2 weeks  -CL       Row Name 07/03/24 1054          Gait Training Goal 1 (PT)    Activity/Assistive Device (Gait Training Goal 1, PT) gait (walking locomotion);assistive device use  -CL     Bath Level (Gait Training Goal 1, PT) minimum assist (75% or more patient effort)  -CL     Distance (Gait Training Goal 1, PT) 25  -CL     Time Frame (Gait Training Goal 1, PT) long term goal (LTG);2 weeks  -CL       Row Name 07/03/24 1054          Therapy Assessment/Plan (PT)    Planned Therapy Interventions (PT) balance training;neuromuscular re-education;bed mobility training;gait training;patient/family education;postural re-education;ROM (range of motion);strengthening;transfer training  -CL               User Key  (r) = Recorded By, (t) = Taken By,  (c) = Cosigned By      Initials Name Provider Type    CL Jerica Parada, PT Physical Therapist                   Clinical Impression       Row Name 07/03/24 1046          Pain    Pretreatment Pain Rating 4/10  -CL     Posttreatment Pain Rating 4/10  -CL     Pain Location - Side/Orientation Bilateral  -CL     Pain Intervention(s) Repositioned  -CL       Row Name 07/03/24 1046          Plan of Care Review    Plan of Care Reviewed With patient  -CL     Outcome Evaluation Ban Brennan is a 69 y.o. female with PMH of HTN, Heart failure and chronic BLE edema who presents with Afib and RVR.  Pt reports that her son lives with her in a H with no TRU.  She reports that she is typically independent with mobility for short distances and sleeps in a recliner. She states she is normally independent with ADLs.  At the time of PT evaluation, she is A&O x 4. She has weeping edema in BLEs with open areas on RLE.  Her BLE ROM is limited by edema. She performs bed mobility with mod A 2.  She requires mod A 1 to maintain sitting EOB with episodes of weakness throughout session resulting in decreased ability to hold arms and trunk against gravity. She exhibits forward head and decreased lumbar lordosis.  She was instructed in and performed cervicoaxial extension.  She has increased pain with BLE weight bearing and is not safe to attempt standing at this time.  She is well below her baseline and would benefit from SNF at discharge.  -CL       Row Name 07/03/24 1046          Therapy Assessment/Plan (PT)    Rehab Potential (PT) good, to achieve stated therapy goals  -CL     Criteria for Skilled Interventions Met (PT) yes;skilled treatment is necessary  -CL     Therapy Frequency (PT) 3 times/wk  -CL     Predicted Duration of Therapy Intervention (PT) Until discharge  -CL       Row Name 07/03/24 1046          Vital Signs    Pre Systolic BP Rehab 113  -CL     Pre Treatment Diastolic BP 86  -CL     Pretreatment Heart Rate  (beats/min) 98  -CL     Pretreatment Resp Rate (breaths/min) 11  -CL     Pre SpO2 (%) 88  -CL     O2 Delivery Pre Treatment supplemental O2  2L  -CL       Row Name 07/03/24 1046          Positioning and Restraints    Pre-Treatment Position in bed  -CL     Post Treatment Position bed  -CL     In Bed supine;notified nsg;call light within reach;exit alarm on  -CL               User Key  (r) = Recorded By, (t) = Taken By, (c) = Cosigned By      Initials Name Provider Type    Jerica Vargas PT Physical Therapist                   Outcome Measures       Row Name 07/03/24 1055          How much help from another person do you currently need...    Turning from your back to your side while in flat bed without using bedrails? 2  -CL     Moving from lying on back to sitting on the side of a flat bed without bedrails? 2  -CL     Moving to and from a bed to a chair (including a wheelchair)? 1  -CL     Standing up from a chair using your arms (e.g., wheelchair, bedside chair)? 1  -CL     Climbing 3-5 steps with a railing? 1  -CL     To walk in hospital room? 1  -CL     AM-PAC 6 Clicks Score (PT) 8  -CL     Highest Level of Mobility Goal 3 --> Sit at edge of bed  -CL               User Key  (r) = Recorded By, (t) = Taken By, (c) = Cosigned By      Initials Name Provider Type    Jerica Vargas PT Physical Therapist                                 Physical Therapy Education       Title: PT OT SLP Therapies (In Progress)       Topic: Physical Therapy (Done)       Point: Mobility training (Done)       Learning Progress Summary             Patient Acceptance, E,TB, VU by CL at 7/3/2024 1056                         Point: Home exercise program (Done)       Learning Progress Summary             Patient Acceptance, E,TB, VU by CL at 7/3/2024 1056                         Point: Body mechanics (Done)       Learning Progress Summary             Patient Acceptance, E,TB, VU by CL at 7/3/2024 1056                                          User Key       Initials Effective Dates Name Provider Type Discipline    CL 03/02/22 -  Jerica Parada, PT Physical Therapist PT                  PT Recommendation and Plan  Planned Therapy Interventions (PT): balance training, neuromuscular re-education, bed mobility training, gait training, patient/family education, postural re-education, ROM (range of motion), strengthening, transfer training  Plan of Care Reviewed With: patient  Outcome Evaluation: Ban Brennan is a 69 y.o. female with PMH of HTN, Heart failure and chronic BLE edema who presents with Afib and RVR.  Pt reports that her son lives with her in a [facility] with no TRU.  She reports that she is typically independent with mobility for short distances and sleeps in a recliner. She states she is normally independent with ADLs.  At the time of PT evaluation, she is A&O x 4. She has weeping edema in BLEs with open areas on RLE.  Her BLE ROM is limited by edema. She performs bed mobility with mod A 2.  She requires mod A 1 to maintain sitting EOB with episodes of weakness throughout session resulting in decreased ability to hold arms and trunk against gravity. She exhibits forward head and decreased lumbar lordosis.  She was instructed in and performed cervicoaxial extension.  She has increased pain with BLE weight bearing and is not safe to attempt standing at this time.  She is well below her baseline and would benefit from SNF at discharge.     Time Calculation:   PT Evaluation Complexity  History, PT Evaluation Complexity: 3 or more personal factors and/or comorbidities  Examination of Body Systems (PT Eval Complexity): total of 3 or more elements  Clinical Presentation (PT Evaluation Complexity): evolving  Clinical Decision Making (PT Evaluation Complexity): moderate complexity  Overall Complexity (PT Evaluation Complexity): moderate complexity     PT Charges       Row Name 07/03/24 1058             Time Calculation    Start Time 0168   "-CL      Stop Time 0856  -CL      Time Calculation (min) 31 min  -CL      PT Received On 07/03/24  -CL      PT - Next Appointment 07/05/24  -CL      PT Goal Re-Cert Due Date 07/17/24  -CL         Time Calculation- PT    Total Timed Code Minutes- PT 10 minute(s)  -CL                User Key  (r) = Recorded By, (t) = Taken By, (c) = Cosigned By      Initials Name Provider Type    CL Jerica Parada, PT Physical Therapist                  Therapy Charges for Today       Code Description Service Date Service Provider Modifiers Qty    60797222536 HC PT EVAL MOD COMPLEXITY 4 7/3/2024 Jerica Parada, PT GP 1    44652271659 HC PT THER PROC EA 15 MIN 7/3/2024 Jerica Parada, PT GP 1            PT G-Codes  AM-PAC 6 Clicks Score (PT): 8  PT Discharge Summary  Anticipated Discharge Disposition (PT): skilled nursing facility    Jerica Parada PT  7/3/2024      Electronically signed by Jerica Parada PT at 07/03/24 1058          Occupational Therapy Notes (all)        Ke Lee, OT at 07/03/24 1303          Assessment: Ban Brennan presents with ADL impairments affecting function including balance, endurance / activity tolerance, pain, and strength. Demonstrated functioning below baseline abilities indicate the need for continued skilled intervention while inpatient. Tolerating session today without incident. Pt remains far from reported baseline, with assist x2 required for bed mobility. Unable to progress to standing nor mobilizing this date. Will continue to follow and progress as tolerated.      Plan/Recommendations:   Moderate Intensity Therapy recommended post-acute care. This is recommended as therapy feels the patient would require 3-4 days per week and wouldn't tolerate \"3 hour daily\" rehab intensity. SNF would be the preferred choice. If the patient does not agree to SNF, arrange HH or OP depending on home bound status. If patient is medically complex, consider LTACH.. Pt requires no DME at " discharge.      Pt desires Skilled Rehab placement at discharge. Pt cooperative; agreeable to therapeutic recommendations and plan of care.          Electronically signed by Ke Lee OT at 07/03/24 1303       Ke Lee OT at 07/03/24 1033          Subjective: Pt agreeable to therapeutic plan of care.  Cognition: oriented to Person, Place, Time, and Situation     Objective:      Bed Mobility: Mod-A and Assist x 2 sit to supine, Mod-Max Ax2 for sit to supine.  Functional Transfers: N/A or Not attempted.     Balance: sitting EOB, supported, static, and with UE support Mod-A  Functional Ambulation: N/A or Not attempted.     Grooming: Dependent   ADL Position: edge of bed sitting  ADL Comments: Pt with weeping on BLEs noted to worsen when sitting EOB. She was dependent for hygiene of lower legs, cleansing them from the seated position.     Seated EOB, pt noted to have posterior lean causing her to slide forward into an unsafe position. Pt verbally cued to lean forward at the waist to improved weight displacement over JOE for increased safety. Noted at times to go completely limp for a very brief moment of time prior to spontaneous recovery, however VSS and patient never lost consciousness. Pt cued for hand placement and weight shifting throughout sitting EOB.     Vitals: Tachycardic - HR elevated to 128 with sitting EOB     Pain: 5 VAS  Location: BLEs  Interventions for pain: Repositioned, Increased Activity, and Therapeutic Presence  Education: Provided education on the importance of mobility in the acute care setting, Verbal/Tactile Cues, ADL training, and Transfer Training        Assessment: Ban Brennan presents with ADL impairments affecting function including balance, endurance / activity tolerance, pain, and strength. Demonstrated functioning below baseline abilities indicate the need for continued skilled intervention while inpatient. Tolerating session today without incident. Pt remains far from  "reported baseline, with assist x2 required for bed mobility. Unable to progress to standing nor mobilizing this date. Will continue to follow and progress as tolerated.      Plan/Recommendations:   Moderate Intensity Therapy recommended post-acute care. This is recommended as therapy feels the patient would require 3-4 days per week and wouldn't tolerate \"3 hour daily\" rehab intensity. SNF would be the preferred choice. If the patient does not agree to SNF, arrange HH or OP depending on home bound status. If patient is medically complex, consider LTACH.. Pt requires no DME at discharge.      Pt desires Skilled Rehab placement at discharge. Pt cooperative; agreeable to therapeutic recommendations and plan of care.      Modified [place]: N/A = No pre-op stroke/TIA     Post-Tx Position: Supine with HOB Elevated, Alarms activated, and Call light and personal items within reach  PPE: gloves and surgical mask        Electronically signed by Ke Lee, OT at 24 1302       Antolin Lopez, OT at 24 1722          Patient Name: Ban Brennan  : 1955    MRN: [id number]                              Today's Date: 2024       Admit Date: 2024    Visit Dx:     ICD-10-CM ICD-9-CM   1. Atrial fibrillation with rapid ventricular response  I48.91 427.31   2. Acute congestive heart failure, unspecified heart failure type  I50.9 428.0   3. Sepsis, due to unspecified organism, unspecified whether acute organ dysfunction present  A41.9 038.9     995.91   4. Urinary tract infection without hematuria, site unspecified  N39.0 599.0   5. Acute kidney injury  N17.9 584.9   6. Bilateral lower leg cellulitis  L03.116 682.6    L03.115    7. Hyperkalemia  E87.5 276.7     Patient Active Problem List   Diagnosis    Primary hypertension    Morbid obesity with BMI of 40.0-44.9, adult    Atrial fibrillation with RVR    Right bundle branch block    Acute deep vein thrombosis (DVT) of both lower extremities    ARABELLA " "(acute kidney injury)    Acute hyperkalemia    Sepsis    Acute UTI    Acute CHF    Rhinovirus infection    Multifocal pneumonia    Chronic respiratory failure with hypoxia, on home O2 therapy    Skin ulcer of right great toe    Skin ulcer of left great toe    SEDRICK (obstructive sleep apnea)     Past Medical History:   Diagnosis Date    Bilateral leg edema     Chronic respiratory failure with hypoxia, on home O2 therapy 07/01/2024    COPD (chronic obstructive pulmonary disease)     Morbid obesity with BMI of 40.0-44.9, adult 11/08/2010    Primary hypertension 03/01/2017    Pulmonary embolism     \"many years ago, was on warfarin\"    Sleep apnea      History reviewed. No pertinent surgical history.   General Information       Row Name 07/02/24 1715          OT Time and Intention    Document Type evaluation  -MP     Mode of Treatment individual therapy  -MP       Row Name 07/02/24 1715          General Information    Patient Profile Reviewed yes  -MP     Prior Level of Function independent:;ADL's  -       Row Name 07/02/24 1715          Living Environment    People in Home child(samreen), adult  -       Row Name 07/02/24 1715          Cognition    Orientation Status (Cognition) oriented x 3  -MP       Row Name 07/02/24 1715          Safety Issues, Functional Mobility    Impairments Affecting Function (Mobility) balance;endurance/activity tolerance;strength;pain  -MP               User Key  (r) = Recorded By, (t) = Taken By, (c) = Cosigned By      Initials Name Provider Type    MP Antolin Lopez OT Occupational Therapist                     Mobility/ADL's       Row Name 07/02/24 1716          Bed Mobility    Bed Mobility bed mobility (all) activities  -MP     All Activities, [place] (Bed Mobility) maximum assist (25% patient effort);1 person assist  -MP       Row Name 07/02/24 1716          Activities of Daily Living    BADL Assessment/Intervention toileting  -       Row Name 07/02/24 1716          Toileting " Assessment/Training    [place] Level (Toileting) toileting skills;maximum assist (25% patient effort)  -MP               User Key  (r) = Recorded By, (t) = Taken By, (c) = Cosigned By      Initials Name Provider Type    Antolin Shelby OT Occupational Therapist                   Obj/Interventions       Row Name 07/02/24 1716          Range of Motion Comprehensive    Comment, General Range of Motion B shoulder AROM impacted 25-50%  -MP       Row Name 07/02/24 1716          Strength Comprehensive (MMT)    Comment, General Manual Muscle Testing (MMT) Assessment BUE 4-/5  -MP               User Key  (r) = Recorded By, (t) = Taken By, (c) = Cosigned By      Initials Name Provider Type    Antolin Shelby OT Occupational Therapist                   Goals/Plan       Row Name 07/02/24 1720          Bed Mobility Goal 1 (OT)    Activity/Assistive Device (Bed Mobility Goal 1, OT) bed mobility activities, all  -MP     Arnett Level/Cues Needed (Bed Mobility Goal 1, OT) minimum assist (75% or more patient effort)  -MP     Time Frame (Bed Mobility Goal 1, OT) long term goal (LTG);2 weeks  -MP       Row Name 07/02/24 1720          Transfer Goal 1 (OT)    Activity/Assistive Device (Transfer Goal 1, OT) sit-to-stand/stand-to-sit;toilet  -MP     Arnett Level/Cues Needed (Transfer Goal 1, OT) moderate assist (50-74% patient effort)  -MP     Time Frame (Transfer Goal 1, OT) long term goal (LTG);2 weeks  -MP       Row Name 07/02/24 1720          Dressing Goal 1 (OT)    Activity/Device (Dressing Goal 1, OT) lower body dressing  -MP     Arnett/Cues Needed (Dressing Goal 1, OT) moderate assist (50-74% patient effort)  -MP     Time Frame (Dressing Goal 1, OT) long term goal (LTG);2 weeks  -MP               User Key  (r) = Recorded By, (t) = Taken By, (c) = Cosigned By      Initials Name Provider Type    Antolin Shelby OT Occupational Therapist                   Clinical Impression       Row Name  07/02/24 1717          Pain Assessment    Pretreatment Pain Rating 3/10  -MP     Posttreatment Pain Rating 5/10  -MP     Pain Location - Side/Orientation Bilateral  -MP     Pain Location - foot  -MP       Row Name 07/02/24 1717          Plan of Care Review    Plan of Care Reviewed With patient  -MP     Progress no change  -MP     Outcome Evaluation 69 y.o. female with known PMH of hypertension, heart failure, chronic bilateral lower extremity edema who presented to the hospital for creasing shortness of breath, and was admitted with a principal diagnosis of Atrial fibrillation with RVR.She was started on cefepime and vancomycin for cellulitis of her legs and UTI. Pt. states she lives at home w/ son and requires ADL support/assist, has had decline in mobility and ADL independence in the last few months. Pt. typically able to ambulate to and from bathroom and toilet self unassisted. Pt. 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 Pt. w/ painful BLEs, unable to WB this date. Pt. not safe for d/c home at this time and will require SNF placement at d/c to address aforementioned deficits.  -MP       Row Name 07/02/24 1717          Therapy Assessment/Plan (OT)    Rehab Potential (OT) good, to achieve stated therapy goals  -     Criteria for Skilled Therapeutic Interventions Met (OT) yes;meets criteria;skilled treatment is necessary  -MP     Therapy Frequency (OT) 3 times/wk  -MP       Row Name 07/02/24 1717          Therapy Plan Review/Discharge Plan (OT)    Anticipated Discharge Disposition (OT) skilled nursing facility  -MP       Row Name 07/02/24 1717          Vital Signs    Pre Patient Position Supine  -MP     Intra Patient Position Side Lying  -MP     Post Patient Position Supine  -MP       Row Name 07/02/24 1717          Positioning and Restraints    Pre-Treatment Position in bed  -MP     Post Treatment Position bed  -MP     In Bed supine;call  light within reach;encouraged to call for assist;exit alarm on  -MP               User Key  (r) = Recorded By, (t) = Taken By, (c) = Cosigned By      Initials Name Provider Type    Antolin Shelby OT Occupational Therapist                   Outcome Measures    No documentation.                   Occupational Therapy Education       Title: PT OT SLP Therapies (In Progress)       Topic: Occupational Therapy (In Progress)       Point: ADL training (Not Started)       Description:   Instruct learner(s) on proper safety adaptation and remediation techniques during self care or transfers.   Instruct in proper use of assistive devices.                  Learner Progress:  Not documented in this visit.              Point: Home exercise program (Not Started)       Description:   Instruct learner(s) on appropriate technique for monitoring, assisting and/or progressing therapeutic exercises/activities.                  Learner Progress:  Not documented in this visit.              Point: Precautions (Not Started)       Description:   Instruct learner(s) on prescribed precautions during self-care and functional transfers.                  Learner Progress:  Not documented in this visit.              Point: Body mechanics (Done)       Description:   Instruct learner(s) on proper positioning and spine alignment during self-care, functional mobility activities and/or exercises.                  Learning Progress Summary             Patient Acceptance, E,TB, VU by DEMIAN at 7/2/2024 1720                                         User Key       Initials Effective Dates Name Provider Type Discipline     06/16/21 -  Antolin Lopez OT Occupational Therapist OT                  OT Recommendation and Plan  Therapy Frequency (OT): 3 times/wk  Plan of Care Review  Plan of Care Reviewed With: patient  Progress: no change  Outcome Evaluation: 69 y.o. female with known PMH of hypertension, heart failure, chronic bilateral lower  extremity edema who presented to the hospital for creasing shortness of breath, and was admitted with a principal diagnosis of Atrial fibrillation with RVR.She was started on cefepime and vancomycin for cellulitis of her legs and UTI. Pt. states she lives at home w/ son and requires ADL support/assist, has had decline in mobility and ADL independence in the last few months. Pt. typically able to ambulate to and from bathroom and toilet self unassisted. Pt. 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 Pt. w/ painful BLEs, unable to WB this date. Pt. not safe for d/c home at this time and will require SNF placement at d/c to address aforementioned deficits.     Time Calculation:         Time Calculation- OT       Row Name 07/02/24 1721             Time Calculation- OT    OT Start Time 1555  -MP      OT Stop Time 1627  -      OT Time Calculation (min) 32 min  -      Total Timed Code Minutes- OT 8 minute(s)  -      OT Received On 07/02/24  -      OT - Next Appointment 07/04/24  -      OT Goal Re-Cert Due Date 07/16/24  -                User Key  (r) = Recorded By, (t) = Taken By, (c) = Cosigned By      Initials Name Provider Type    Antolin Shelby OT Occupational Therapist                  Therapy Charges for Today       Code Description Service Date Service Provider Modifiers Qty    [id number]  OT EVAL LOW COMPLEXITY 4 7/2/2024 Antolin Lopez OT GO 1    [id number]  OT THERAPEUTIC ACT EA 15 MIN 7/2/2024 Antolin Lopez OT GO 1                 Antolin Lopez OT  7/2/2024    Electronically signed by Antolin Lopez OT at 07/02/24 1722       Antolin Lopez OT at 07/02/24 1721          Goal Outcome Evaluation:  Plan of Care Reviewed With: patient        Progress: no change  Outcome Evaluation: 69 y.o. female with known PMH of hypertension, heart failure, chronic bilateral lower extremity edema who presented  to the hospital for creasing shortness of breath, and was admitted with a principal diagnosis of Atrial fibrillation with RVR.She was started on cefepime and vancomycin for cellulitis of her legs and UTI. Pt. states she lives at home w/ son and requires ADL support/assist, has had decline in mobility and ADL independence in the last few months. Pt. typically able to ambulate to and from bathroom and toilet self unassisted. Pt. 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 Pt. w/ painful BLEs, unable to WB this date. Pt. not safe for d/c home at this time and will require SNF placement at d/c to address aforementioned deficits.      Anticipated Discharge Disposition (OT): skilled nursing facility                          Electronically signed by Antolin Lopez OT at 07/02/24 7189       Speech Language Pathology Notes (all)    No notes exist for this encounter.

## 2024-07-03 NOTE — DISCHARGE PLACEMENT REQUEST
"Ban Jones (69 y.o. Female)       Date of Birth   1955    Social Security Number       Address   [address] [place] [address] [place] [address]    Home Phone   [phone]    MRN   [id number]       Rastafari   None    Marital Status                               Admission Date   6/30/24    Admission Type   Emergency    Admitting Provider   Sesar Solorzano DO    Attending Provider   Boom Figueroa MD    Department, Room/Bed   [facility] CARDIOVASCULAR CARE UNIT, 3120/1       Discharge Date       Discharge Disposition       Discharge Destination                                 Attending Provider: Boom Figueroa MD    Allergies: No Known Allergies    Isolation: Droplet, Contact   Infection: Rhinovirus  (07/01/24), MRSA (07/01/24)   Code Status: CPR    Ht: 147.3 cm (58\")   Wt: 90.2 kg (198 lb 13.7 oz)    Admission Cmt: None   Principal Problem: Atrial fibrillation with RVR [I48.91]                   Active Insurance as of 6/30/2024       Primary Coverage       Payor Plan Insurance Group Employer/Plan Group    ANTHEM MEDICARE REPLACEMENT ANTHEM MEDICARE ADVANTAGE [id number]       Payor Plan Address Payor Plan Phone Number Payor Plan Fax Number Effective Dates    [address] [phone]  5/1/2024 - None Entered    [facility] [id number]         Subscriber Name Subscriber Birth Date Member ID       BAN JONES 1955 [id number]               Secondary Coverage       Payor Plan Insurance Group Employer/Plan Group    [place]        Payor Plan Address Payor Plan Phone Number Payor Plan Fax Number Effective Dates    [address]   10/15/2023 - None Entered    [place] [address]         Subscriber Name Subscriber Birth Date Member ID       BAN JONES 1955 [id number]                     Emergency Contacts        (Rel.) Home Phone Work Phone Mobile Phone    Chelsi Valle (Daughter) -- -- [phone]    " Jose Brennan (Son) -- -- [phone]                 History & Physical        Enedina Molina APRN at 24       Attestation signed by Sesar Solorzano DO at [phone]    I have reviewed this documentation and agree.                    Critical Care History and Physical     Ban Brennan : 1955 MRN:[id number] LOS:0 ROOM:      Reason for admission: Atrial fibrillation with RVR     Assessment / Plan     Atrial fibrillation with RVR, new onset  Initial rhythm reported as wide-complex tachycardia with max heart rate 190s  Patient started on Cardizem in ED, switched to amiodarone due to borderline hypotension  Likely precipitated by hyperkalemia  Will check troponin  ECHO pending    Severe Sepsis: ( WBC>12 or <4 or >10% bands, HR>90, and Lactic acid>2 )  Urinary tract infection  Cellulitis bilateral lower extremities  Blood cultures pending  Ua + UTI; culture pending  RVP pending  MRSA swab pending  Fluid resuscitation contraindicated  Borderline hypotensive  Started on cefepime and vancomycin  CT bilateral lower extremities pending  Target MAP of 65     Fluid overload  Acute on chronic congestive heart failure, history unknown  Family reported history of CHF  BNP noted at 19,860  No history in chart or care everywhere, EF% unknown  Resume home furosemide  Echo pending  Avoid fluid overload    Acute kidney injury  Hyperkalemia  Creatinine 1.38, baseline unknown  Initial potassium 6.5; treated with sling/D50, calcium gluconate, and Lasix  IV fluids contraindicated  Avoid nephrotoxic agents  Avoid hypotension  Consider nephrology consult if no improvement    Essential hypertension  Hold home lisinopril and metoprolol as patient is borderline hypotensive  Resume when clinically appropriate    Nutrition:   NPO Diet NPO Type: Strict NPO     VTE Prophylaxis:  Pharmacologic VTE prophylaxis orders are present.         History of Present illness     Ban Brennan is a 69 y.o.  female with known PMH of hypertension, heart failure, chronic bilateral lower extremity edema who presented to the hospital for creasing shortness of breath, and was admitted with a principal diagnosis of Atrial fibrillation with RVR.  Patient is alert however lethargic and attempts to participate with HPI however she is a poor historian.  Supplemental information for HPI taken from conversation with daughter at bedside who states patient does not go to the doctor normally and has not been in the hospital for years.  She is unsure of what her full medical history is at this time but states that her legs have been swollen for a long time and that she has not been up and moving for approximately 1 year.  Patient has a home health nurse that comes out and wraps her legs twice a week.  On assessment patient's legs are both extremely red and swollen, left > right.  Patient's daughter states that redness of her legs is new however she is not sure how long it has been this bad.  EMS was called when she had worsening shortness of breath and found the patient with a wide complex tachycardia with heart rate of 190s.  She was given 150 mg of amiodarone by EMS and heart rate improved to 160s.  She is found to have a right bundle branch block however it is uncertain due to her limited history if this is chronic.    ED course: On arrival to emergency department EKG showed atrial fibrillation with RVR, heart rate 145.  CXR showed fluid overload with bilateral effusions.  Lactic acid was 3.1 and BNP 19,860.  Patient had a white count of 16.8 and 4+ bacteria in her urine.  Also noted to be hyperkalemic with a potassium of 6.5 in which she was treated with insulin D50, calcium gluconate, and Lasix.  She was started on cefepime and vancomycin for cellulitis of her legs and UTI.    ACP: Patient is alert with some intermittent confusion.  Her daughter is at bedside and is her decision-maker along with her brother.  Daughter states  patient is a full code.    Patient was seen and examined on 06/30/24 at 21:22 EDT .    Subjective / Review of systems     Review of Systems   Constitutional:  Negative for chills and fever.   HENT:  Negative for congestion and sore throat.    Respiratory:  Positive for shortness of breath. Negative for cough.    Cardiovascular:  Positive for leg swelling. Negative for chest pain and palpitations.   Gastrointestinal:  Negative for abdominal pain and nausea.   Endocrine: Negative for heat intolerance and polyuria.   Genitourinary:  Negative for dysuria and urgency.   Musculoskeletal:  Positive for myalgias. Negative for arthralgias.   Skin:  Negative for rash and wound.   Neurological:  Positive for weakness. Negative for numbness.   Psychiatric/Behavioral:  Negative for suicidal ideas. The patient is not nervous/anxious.         Past Medical/Surgical/Social/Family History & Allergies     No past medical history on file.   No past surgical history on file.   Social History     Socioeconomic History    Marital status:       No family history on file.   No Known Allergies   Social Determinants of Health     Tobacco Use: Not on file (12/17/2017)   Alcohol Use: Not on file   Financial Resource Strain: Not on file   Food Insecurity: Not on file   Transportation Needs: Not on file   Physical Activity: Not on file   Stress: Not on file   Social Connections: Unknown (10/11/2023)    Family and Community Support     Help with Day-to-Day Activities: Not on file     Lonely or Isolated: Not on file   Interpersonal Safety: Not At Risk (6/30/2024)    Abuse Screen     Unsafe at Home or Work/School: no     Feels Threatened by Someone?: no     Does Anyone Keep You from Contacting Others or Doint Things Outside the Home?: no     Physical Sign of Abuse Present: no   Depression: Not on file   Housing Stability: Unknown (10/11/2023)    Housing Stability     Current Living Arrangements: Not on file     Potentially Unsafe Housing  Conditions: Not on file   Utilities: Not on file   Health Literacy: Unknown (10/11/2023)    Education     Help with school or training?: Not on file     Preferred Language: Not on file   Employment: Unknown (10/11/2023)    Employment     Do you want help finding or keeping work or a job?: Not on file   Disabilities: Unknown (10/11/2023)    Disabilities     Concentrating, Remembering, or Making Decisions Difficulty: Not on file     Doing Errands Independently Difficulty: Not on file        Home Medications     Prior to Admission medications    Not on File        Objective / Physical Exam     Vital signs:  Temp: 98.6 °F (37 °C)  BP: 114/65  Heart Rate: 104  Resp: 22  SpO2: 95 %  Weight: 108 kg (239 lb)    Admission Weight: Weight: 108 kg (239 lb)    Physical Exam  Constitutional:       Appearance: Normal appearance. She is morbidly obese. She is toxic-appearing.   HENT:      Head: Normocephalic.      Nose: Nose normal.      Mouth/Throat:      Mouth: Mucous membranes are moist.   Eyes:      Extraocular Movements: Extraocular movements intact.      Pupils: Pupils are equal, round, and reactive to light.   Cardiovascular:      Rate and Rhythm: Tachycardia present. Rhythm irregular.      Pulses: Normal pulses.      Heart sounds: Normal heart sounds.   Pulmonary:      Effort: Pulmonary effort is normal.      Breath sounds: Normal breath sounds. Decreased air movement present.   Abdominal:      General: Bowel sounds are normal.      Palpations: Abdomen is soft.   Musculoskeletal:         General: Normal range of motion.      Right lower le+      Left lower le+   Skin:     General: Skin is warm.      Coloration: Skin is pale.      Findings: Erythema present.      Comments: Severe erythema to bilateral lower extremities   Neurological:      Mental Status: She is easily aroused. She is lethargic and disoriented.   Psychiatric:         Attention and Perception: She is inattentive.         Mood and Affect: Mood normal.          Behavior: Behavior normal.        Labs     Results from last 7 days   Lab Units 06/30/24  1858 06/30/24  1732   WBC 10*3/mm3  --  16.80*   HEMATOCRIT %  --  49.0*   HEMATOCRIT POC % 50  --    PLATELETS 10*3/mm3  --  119*      Results from last 7 days   Lab Units 06/30/24  2043 06/30/24  1858   SODIUM mmol/L 131*  --    POTASSIUM mmol/L 6.5*  --    CHLORIDE mmol/L 101  --    CO2 mmol/L 24.4  --    BUN mg/dL 50*  --    CREATININE mg/dL 1.38* 1.40*        Imaging     Chest X ray: My independent assessment showed vascular congestion, bilateral small effusions    EKG: My independent evaluation showed atrial fibrillation, RBBB, no ST -T changes    Current Medications     Scheduled Meds:  calcium gluconate, 1,000 mg, Intravenous, Once  enoxaparin, 1 mg/kg, Subcutaneous, Once  sodium chloride, 10 mL, Intravenous, Q12H  vancomycin, 20 mg/kg (Adjusted), Intravenous, Once         Continuous Infusions:  dilTIAZem, 5-15 mg/hr, Last Rate: 5 mg/hr (06/30/24 2008)       Patient continues to be critically ill, remains at risk of clinical deterioration or death and needed high complexity decision making. I have spent a total of 45 minutes providing critical care services to this patient including but not limited to: review of labs/ microbiology/imaging/medications, serial monitoring of vital signs, review of other consultant's notes, review of events in the last 24 hrs, monitoring input/output, review of treatment plan with bedside nurse, RT and other treatment team, management of life support and nutrition needs. I also spoke with patient daughter about the plan of care and answered all questions.    Time spent in performing separately billable procedures and updating family is not included in the critical care time.       MANJU Isaacs   Critical Care  06/30/24   21:22 EDT      Electronically signed by Sesar Solorzano DO at 07/01/24 5400       Operative/Procedure Notes (all)    No notes of this type exist  "for this encounter.          Physician Progress Notes (last 48 hours)        Austin Brooks MD at 07/03/24 0715          Referring Provider: Boom Figueroa MD    Reason for follow-up: Atrial fibrillation     Patient Care Team:  Rut Pierce APRN as PCP - General (Nurse Practitioner)      SUBJECTIVE  Laying comfortably in bed.  Denies any chest pain or shortness of breath.     ROS  Review of all systems negative except as indicated.    Since I have last seen, the patient has been without any chest discomfort, shortness of breath, palpitations, dizziness or syncope.  Denies having any headache, abdominal pain, nausea, vomiting, diarrhea, constipation, loss of weight or loss of appetite.  Denies having any excessive bruising, hematuria or blood in the stool.        Personal History:    Past Medical History:   Diagnosis Date    Bilateral leg edema     Chronic respiratory failure with hypoxia, on home O2 therapy 07/01/2024    COPD (chronic obstructive pulmonary disease)     Morbid obesity with BMI of 40.0-44.9, adult 11/08/2010    Primary hypertension 03/01/2017    Pulmonary embolism     \"many years ago, was on warfarin\"    Sleep apnea        History reviewed. No pertinent surgical history.    History reviewed. No pertinent family history.    Social History     Tobacco Use    Smoking status: Never    Smokeless tobacco: Never   Vaping Use    Vaping status: Never Used   Substance Use Topics    Alcohol use: Never    Drug use: Never        Home meds:  Prior to Admission medications    Medication Sig Start Date End Date Taking? Authorizing Provider   Allergy Relief 180 MG tablet Take 1 tablet by mouth Daily. 5/30/24  Yes Chadwick Johnson MD   bumetanide (BUMEX) 1 MG tablet Take 1 tablet by mouth 3 times a day. 5/30/24  Yes Chadwick Johnson MD   docusate sodium (COLACE) 100 MG capsule Take 1 capsule by mouth Every 12 (Twelve) Hours. 5/30/24  Yes Chadwick Johnson MD   furosemide (LASIX) 20 MG tablet Take 1 " tablet by mouth Daily.   Yes Chadwick Johnson MD   HYDROcodone-acetaminophen (NORCO)  MG per tablet Take 1 tablet by mouth 3 times a day. 5/30/24  Yes Chadwick Johnson MD   lisinopril (PRINIVIL,ZESTRIL) 30 MG tablet Take 1 tablet by mouth Daily. 3/18/24  Yes Provider, Historical, MD   meloxicam (MOBIC) 7.5 MG tablet Take 1 tablet by mouth Daily As Needed. 3/18/24  Yes Chadwick Johnson MD   metoprolol tartrate (LOPRESSOR) 50 MG tablet Take 1 tablet by mouth Daily.   Yes Chadwick Johnson MD   montelukast (SINGULAIR) 10 MG tablet Take 1 tablet by mouth every night at bedtime.   Yes Chadwick Johnson MD   omeprazole (priLOSEC) 20 MG capsule Take 1 capsule by mouth Daily. 3/18/24  Yes Provider, Historical, MD   oxybutynin XL (DITROPAN-XL) 10 MG 24 hr tablet Take 2 tablets by mouth Daily. 3/18/24  Yes Chadwick Johnson MD   potassium chloride (MICRO-K) 10 MEQ CR capsule Take 1 capsule by mouth Every 12 (Twelve) Hours. 3/18/24  Yes Chadwick Johnson MD   vitamin D (ERGOCALCIFEROL) 1.25 MG (79150 UT) capsule capsule Take 1 capsule by mouth 2 (Two) Times a Week. Monday and Thursday. 5/30/24  Yes Chadwcik Johnson MD       Allergies:  Patient has no known allergies.    Scheduled Meds:amiodarone, 200 mg, Oral, Q8H   Followed by  [START ON 7/9/2024] amiodarone, 200 mg, Oral, Q12H   Followed by  [START ON 7/23/2024] amiodarone, 200 mg, Oral, Daily  bumetanide, 2 mg, Intravenous, Q8H  calcium gluconate, 1,000 mg, Intravenous, Once  cefTRIAXone, 1,000 mg, Intravenous, Q24H  dextrose, 25 g, Intravenous, Once  docusate sodium, 100 mg, Oral, BID  enoxaparin, 1 mg/kg, Subcutaneous, Q12H  insulin regular, 10 Units, Intravenous, Once  metoprolol succinate XL, 25 mg, Oral, Q24H  miconazole, 1 Application, Topical, Q12H  midodrine, 10 mg, Oral, Q8H  oxyCODONE, 10 mg, Oral, TID  pantoprazole, 40 mg, Oral, Q AM  povidone-iodine, , Topical, Daily  sodium chloride, 10 mL, Intravenous,  "Q12H      Continuous Infusions:Pharmacy to Dose Cefepime,   Pharmacy to Dose enoxaparin (LOVENOX),   Pharmacy to dose vancomycin,       PRN Meds:.  acetaminophen **OR** acetaminophen    Calcium Replacement - Follow Nurse / BPA Driven Protocol    Magnesium Standard Dose Replacement - Follow Nurse / BPA Driven Protocol    nitroglycerin    ondansetron ODT **OR** ondansetron    oxyCODONE    Pharmacy to Dose Cefepime    Pharmacy to Dose enoxaparin (LOVENOX)    Pharmacy to dose vancomycin    Phosphorus Replacement - Follow Nurse / BPA Driven Protocol    Potassium Replacement - Follow Nurse / BPA Driven Protocol    [COMPLETED] Insert Peripheral IV **AND** sodium chloride    sodium chloride    sodium chloride    Vancomycin Pharmacy Intermittent/Pulse Dosing      OBJECTIVE    Vital Signs  Vitals:    07/03/24 0400 07/03/24 0500 07/03/24 0544 07/03/24 0627   BP: 112/76 (!) 89/67  133/82   BP Location: Right arm      Patient Position: Lying      Pulse: 99 104  99   Resp: 13      Temp: 97.5 °F (36.4 °C)      TempSrc: Oral      SpO2: 91% 96%     Weight:   90.2 kg (198 lb 13.7 oz)    Height:           Flowsheet Rows      Flowsheet Row First Filed Value   Admission Height 147.3 cm (58\") Documented at 06/30/2024 1650   Admission Weight 108 kg (239 lb) Documented at 06/30/2024 1650              Intake/Output Summary (Last 24 hours) at 7/3/2024 0715  Last data filed at 7/3/2024 0600  Gross per 24 hour   Intake 1934 ml   Output 1850 ml   Net 84 ml          Telemetry: Atrial fibrillation with controlled heart rate in the 80s and 90s    Physical Exam:  The patient is alert, oriented and in no distress.  Morbidly obese  Vital signs as noted above.  Head and neck revealed no carotid bruits or jugular venous distention.  No thyromegaly or lymphadenopathy is present  Lungs with bibasilar Rales and diminished at bases.  No wheezing.  On 2 L of oxygen  Heart: Irregularly irregular rhythm.  Normal first and second heart sounds.  No precordial " rub is present.  No gallop is present.  Abdomen: Soft and nontender.    Extremities with good peripheral pulses with bilateral lower extremity edema  Skin: Warm.  Right great toe ulceration with necrotic tissue.  Left great toe ulceration.    Musculoskeletal system is grossly normal.  CNS grossly normal.          Results Review:  I have personally reviewed the results from the time of this admission to 7/3/2024 07:15 EDT and agree with these findings:  []  Laboratory  []  Microbiology  []  Radiology  []  EKG/Telemetry   []  Cardiology/Vascular   []  Pathology  []  Old records  []  Other:    Most notable findings include:    Lab Results (last 24 hours)       Procedure Component Value Units Date/Time    Factor 5 Leiden [[id number]]  (Normal) Collected: 07/01/24 1646    Specimen: Blood Updated: 07/03/24 0715     Factor V Leiden Normal    Narrative:      Factor V Leiden is a specific mutation (R506Q) in the factor V gene that is associated with an increased risk of venous thrombosis.  Factor V Leiden is more resistant to inactivation by activated protein C.  Factor V Leiden refers to the G to A transition at nucleotide position 1691 of the Factor V gene, resulting in the substitution of the amino acid arginine by glutamine in the Factor V protein, causing resistance to cleavage by Activated Protein C (APC).    As a result, factor V persists in the circulation leading to a mild hyper-coagulable state.  The Leiden mutation accounts for 90% - 95% of APC resistance.  Factor V Leiden has been reported in patients with deep vein thrombosis, pulmonary embolus, central retinal vein occlusion, cerebral sinus thrombosis and hepatic vein thrombosis.  Other risk factors to be considered in the workup for venous thrombosis include the [id number] mutation in the factor II (prothrombin) gene, protein S and C deficiency, and antithrombin deficiencies. Anticardiolipin antibody and lupus anticoagulant analysis may be appropriate for certain  patients, as well as homocysteine levels.    The expression of Factor V Leiden thrombophilia is impacted by coexisting genetic thrombophilic disorders, acquired thrombophilic disorders (malignancy, hyperhomocysteinemia, high factor VIII levels), and circumstances including: pregnancy, oral contraceptive use, hormone replacement therapy, selective estrogen receptor modulators, travel, central venous catheters, surgery, transplantation and advanced age.    In order to derive the most meaningful benefit from this testing, it may be necessary that the results and subsequent options from these complex genetic tests be discussed with patients by a trained genetic professional.    Phosphorus [[id number]]  (Abnormal) Collected: 07/03/24 0635    Specimen: Blood Updated: 07/03/24 0705     Phosphorus 4.8 mg/dL     Vancomycin, Random [[id number]]  (Normal) Collected: 07/03/24 0635    Specimen: Blood Updated: 07/03/24 0705     Vancomycin Random 22.40 mcg/mL     Narrative:      Therapeutic Ranges for Vancomycin    Vancomycin Random   5.0-40.0 mcg/mL  Vancomycin Trough   5.0-20.0 mcg/mL  Vancomycin Peak     20.0-40.0 mcg/mL    CBC & Differential [[id number]]  (Abnormal) Collected: 07/03/24 0635    Specimen: Blood Updated: 07/03/24 0640    Narrative:      The following orders were created for panel order CBC & Differential.  Procedure                               Abnormality         Status                     ---------                               -----------         ------                     CBC Auto Differential[[id number]]        Abnormal            Final result                 Please view results for these tests on the individual orders.    CBC Auto Differential [[id number]]  (Abnormal) Collected: 07/03/24 0635    Specimen: Blood Updated: 07/03/24 0640     WBC 10.53 10*3/mm3      RBC 4.63 10*6/mm3      Hemoglobin 14.4 g/dL      Hematocrit 46.0 %      MCV 99.4 fL      MCH 31.1 pg      MCHC 31.3 g/dL      RDW 15.8 %      RDW-SD  56.4 fl      MPV 10.1 fL      Platelets 104 10*3/mm3      Neutrophil % 76.8 %      Lymphocyte % 11.3 %      Monocyte % 10.6 %      Eosinophil % 0.5 %      Basophil % 0.2 %      Immature Grans % 0.6 %      Neutrophils, Absolute 8.09 10*3/mm3      Lymphocytes, Absolute 1.19 10*3/mm3      Monocytes, Absolute 1.12 10*3/mm3      Eosinophils, Absolute 0.05 10*3/mm3      Basophils, Absolute 0.02 10*3/mm3      Immature Grans, Absolute 0.06 10*3/mm3      nRBC 0.2 /100 WBC     Magnesium [[id number]] Collected: 07/03/24 0635    Specimen: Blood Updated: 07/03/24 0638    Comprehensive Metabolic Panel [[id number]] Collected: 07/03/24 0635    Specimen: Blood Updated: 07/03/24 0638    Haptoglobin [[id number]] Collected: 07/02/24 2328    Specimen: Blood Updated: 07/02/24 2332    Potassium [[id number]]  (Abnormal) Collected: 07/02/24 2042    Specimen: Blood Updated: 07/02/24 2110     Potassium 3.3 mmol/L      Comment: Specimen hemolyzed.  Result may be falsely elevated.       Blood Culture - Blood, Arm, Right [[id number]]  (Normal) Collected: 06/30/24 1844    Specimen: Blood from Arm, Right Updated: 07/02/24 1915     Blood Culture No growth at 2 days    Narrative:      Less than seven (7) mL's of blood was collected.  Insufficient quantity may yield false negative results.    Blood Culture - Blood, Arm, Right [[id number]]  (Normal) Collected: 06/30/24 1757    Specimen: Blood from Arm, Right Updated: 07/02/24 1815     Blood Culture No growth at 2 days    Narrative:      Less than seven (7) mL's of blood was collected.  Insufficient quantity may yield false negative results.    Lactate Dehydrogenase [[id number]]  (Abnormal) Collected: 07/02/24 1458    Specimen: Blood Updated: 07/02/24 1527      U/L      Comment: Specimen hemolyzed.  Results may be affected.       Reticulocytes [[id number]]  (Abnormal) Collected: 07/02/24 1458    Specimen: Blood Updated: 07/02/24 1504     Reticulocyte % 2.70 %      Reticulocyte Absolute 0.1237 10*6/mm3      Urine Culture - Urine, Urine, Catheter [[id number]]  (Abnormal) Collected: 06/30/24 1749    Specimen: Urine, Catheter Updated: 07/02/24 1000     Urine Culture >100,000 CFU/mL Gram Negative Bacilli    Narrative:      Colonization of the urinary tract without infection is common. Treatment is discouraged unless the patient is symptomatic, pregnant, or undergoing an invasive urologic procedure.    Respiratory Culture - Sputum, NT Suction [[id number]] Collected: 07/01/24 1507    Specimen: Sputum from NT Suction Updated: 07/02/24 0950     Respiratory Culture Scant growth (1+) The culture consists of normal respiratory shorty. This is a preliminary report; final report to follow.     Gram Stain Few (2+) Epithelial cells per low power field      Rare (1+) WBCs per low power field      Few (2+) Mixed bacterial morphotypes seen on Gram Stain            Imaging Results (Last 24 Hours)       Procedure Component Value Units Date/Time    XR Chest 1 View [[id number]] Resulted: 07/03/24 0610     Updated: 07/03/24 0611    XR Chest 1 View [[id number]] Collected: 07/02/24 1244     Updated: 07/02/24 1248    Narrative:      XR CHEST 1 VW    Date of Exam: 7/2/2024 11:57 AM EDT    Indication: Pleural effusion, hypoxia    Comparison: 6/30/2024.    Findings:  There is continued cardiomegaly with pulmonary vascular congestion. Bibasilar lung infiltrates with small effusions appear somewhat improved. Exam is somewhat limited by portable technique and obesity.      Impression:      Impression:  Some improvement in bibasilar lung infiltrates with small effusions. Continued cardiomegaly.      Electronically Signed: Krystal Lee MD    7/2/2024 12:46 PM EDT    Workstation ID: [id number]            LAB RESULTS (LAST 7 DAYS)    CBC  Results from last 7 days   Lab Units 07/03/24  0635 07/02/24  0329 07/01/24  0910 06/30/24  1858 06/30/24  1732   WBC 10*3/mm3 10.53 10.53 13.80*  --  16.80*   RBC 10*6/mm3 4.63 4.48 4.62  --  5.16   HEMOGLOBIN g/dL 14.4  13.8 14.2  --  15.9   HEMOGLOBIN, POC g/dL  --   --   --  17.0  --    HEMATOCRIT % 46.0 42.9 43.8  --  49.0*   HEMATOCRIT POC %  --   --   --  50  --    MCV fL 99.4* 95.8 94.8  --  95.0   PLATELETS 10*3/mm3 104* 92* 104*  --  119*       BMP  Results from last 7 days   Lab Units 07/03/24 0635 07/02/24 2042 07/02/24 0329 07/01/24 1633 07/01/24 0910 06/30/24 2043 06/30/24  1858   SODIUM mmol/L  --   --  138  --  134* 131*  --    POTASSIUM mmol/L  --  3.3* 3.5 4.2 5.4* 6.5*  --    CHLORIDE mmol/L  --   --  96*  --  97* 101  --    CO2 mmol/L  --   --  34.5*  --  31.4* 24.4  --    BUN mg/dL  --   --  43*  --  46* 50*  --    CREATININE mg/dL  --   --  1.14*  --  1.33* 1.38* 1.40*   GLUCOSE mg/dL  --   --  82  --  102* 194*  --    MAGNESIUM mg/dL  --   --  1.7  --  2.0  --   --    PHOSPHORUS mg/dL 4.8*  --  4.6*  --  3.4  --   --        CMP   Results from last 7 days   Lab Units 07/02/24 2042 07/02/24 0329 07/01/24 1633 07/01/24 0910 06/30/24 2043 06/30/24  1858   SODIUM mmol/L  --  138  --  134* 131*  --    POTASSIUM mmol/L 3.3* 3.5 4.2 5.4* 6.5*  --    CHLORIDE mmol/L  --  96*  --  97* 101  --    CO2 mmol/L  --  34.5*  --  31.4* 24.4  --    BUN mg/dL  --  43*  --  46* 50*  --    CREATININE mg/dL  --  1.14*  --  1.33* 1.38* 1.40*   GLUCOSE mg/dL  --  82  --  102* 194*  --    ALBUMIN g/dL  --  2.3*  --  2.5* 2.5*  --    BILIRUBIN mg/dL  --  0.9  --  1.3* 1.6*  --    ALK PHOS U/L  --  109  --  115 126*  --    AST (SGOT) U/L  --  24  --  39* 42*  --    ALT (SGPT) U/L  --  14  --  17 18  --        BNP        TROPONIN  Results from last 7 days   Lab Units 07/01/24  0910   HSTROP T ng/L 36*       CoAg        Creatinine Clearance  Estimated Creatinine Clearance: 47.3 mL/min (A) (by C-G formula based on SCr of 1.14 mg/dL (H)).    ABG        Radiology  XR Chest 1 View    Result Date: 7/2/2024  Impression: Some improvement in bibasilar lung infiltrates with small effusions. Continued cardiomegaly. Electronically Signed:  Krystal Lee MD  7/2/2024 12:46 PM EDT  Workstation ID: [id number]    US Pelvis Complete    Result Date: 7/1/2024  Impression: Sonographic features suggest the presence of a large subserosal uterine fundal fibroid. Electronically Signed: Ann Marie Maloney MD  7/1/2024 1:43 PM EDT  Workstation ID: [id number]    CT Abdomen Pelvis Without Contrast    Result Date: 7/1/2024  Impression: Moderately large right pleural effusion and small left pleural effusion. Bibasilar infiltrates suggest atelectasis although associated pneumonia is not excluded. Severe cardiomegaly. Bilateral flank edema, greatest on the right. Probable fibroid uterus. This could be confirmed with ultrasound. Electronically Signed: Krystal Lee MD  7/1/2024 11:39 AM EDT  Workstation ID: [id number]    Adult Transthoracic Echo Complete w/ Color, Spectral and Contrast if Necessary Per Protocol    Addendum Date: 7/1/2024      Left ventricular ejection fraction appears to be 31 - 35%.   Left ventricular diastolic dysfunction is noted.   The left atrial cavity is dilated.   Moderate aortic valve stenosis is present. Mean/peak gradient of 14/24 mmHg.  Dimensionless index 0.36   Moderate to severe mitral valve regurgitation is present.   Estimated right ventricular systolic pressure from tricuspid regurgitation is normal (<35 mmHg).     CT Angiogram Chest Pulmonary Embolism    Result Date: 7/1/2024  Impression: 1. No evidence for pulmonary embolus. 2. Bibasilar airspace disease and right middle lung opacity compatible likely multifocal pneumonia with bilateral pleural effusions. 3. Cardiomegaly. Electronically Signed: Shari Kaur MD  7/1/2024 10:14 AM EDT  Workstation ID: [id number]       EKG  I personally viewed and interpreted the patient's EKG/Telemetry data:  ECG 12 Lead Drug Monitoring; Amiodarone   Preliminary Result   HEART ZBWA=138  bpm   RR Akogwktd=385  ms   WV Interval=  ms   P Horizontal Axis=  deg   P Front Axis=  deg   QRSD Qgqrsfna=217  ms   QT  Ntqdhhta=337  ms   SZdW=534  ms   QRS Axis=-90  deg   T Wave Axis=74  deg   - ABNORMAL ECG -   Atrial fibrillation   Right bundle branch block   ST elevation secondary to IVCD   Date and Time of Study:2024-07-03 04:02:32      ECG 12 Lead Drug Monitoring; Amiodarone   Preliminary Result   HEART YCAQ=013  bpm   RR Ttsqtcvf=331  ms   OK Interval=  ms   P Horizontal Axis=  deg   P Front Axis=  deg   QRSD Sxcmbmtl=640  ms   QT Gpysgons=360  ms   ZZaI=415  ms   QRS Axis=-93  deg   T Wave Axis=45  deg   - ABNORMAL ECG -   Atrial fibrillation   Right bundle branch block   Inferior infarct, old   When compared with ECG of 01-Jul-2024 04:42:13,   Nonspecific significant change   Date and Time of Study:2024-07-02 15:12:56      ECG 12 Lead Drug Monitoring; Amiodarone   Final Result   HEART ZPKH=691  bpm   RR Nyexgidr=267  ms   OK Interval=  ms   P Horizontal Axis=  deg   P Front Axis=  deg   QRSD Ybirqker=377  ms   QT Dhygrcsg=078  ms   TXqF=436  ms   QRS Axis=-80  deg   T Wave Axis=102  deg   - ABNORMAL ECG -   Atrial fibrillation   Right bundle branch block   Inferior  infarct, old   Anterolateral  infarct, age indeterminate   When compared with ECG of 30-Jun-2024 16:58:43,   Significant rate decrease   New or worsened ischemia or infarction   Electronically Signed By: Austin Brooks ([facility]) 2024-07-01 13:11:59   Date and Time of Study:2024-07-01 04:42:13      ECG 12 Lead Dyspnea   Final Result   HEART TRWY=146  bpm   RR Tbcmfetq=042  ms   OK Interval=  ms   P Horizontal Axis=  deg   P Front Axis=  deg   QRSD Bfrvumfc=946  ms   QT Bpcezcbd=753  ms   HGjB=797  ms   QRS Axis=-101  deg   T Wave Axis=43  deg   - ABNORMAL ECG -   Atrial fibrillation   Right bundle branch block   ST elevation secondary to IVCD   Electronically Signed By: Jeffery Kwon ([facility]) 2024-07-01 07:37:02   Date and Time of Study:2024-06-30 16:58:43      Telemetry Scan   Final Result      Telemetry Scan   Final Result      Telemetry Scan   Final Result       Telemetry Scan   Final Result      Telemetry Scan   Final Result      Telemetry Scan   Final Result      Telemetry Scan   Final Result      Telemetry Scan   Final Result      Telemetry Scan   Final Result      Telemetry Scan   Final Result      Telemetry Scan   Final Result      Telemetry Scan   Final Result      Telemetry Scan   Final Result      Telemetry Scan   Final Result      Telemetry Scan   Final Result      ECG 12 Lead Electrolyte Imbalance    (Results Pending)         Echocardiogram:    Results for orders placed during the hospital encounter of 06/30/24    Adult Transthoracic Echo Complete w/ Color, Spectral and Contrast if Necessary Per Protocol    Interpretation Summary    Left ventricular ejection fraction appears to be 31 - 35%.    Left ventricular diastolic dysfunction is noted.    The left atrial cavity is dilated.    Moderate aortic valve stenosis is present. Mean/peak gradient of 14/24 mmHg.  Dimensionless index 0.36    Moderate to severe mitral valve regurgitation is present.    Estimated right ventricular systolic pressure from tricuspid regurgitation is normal (<35 mmHg).        Stress Test:         Cardiac Catheterization:  No results found for this or any previous visit.         Other:         ASSESSMENT & PLAN:    Principal Problem:    Atrial fibrillation with RVR  Active Problems:    Primary hypertension    Morbid obesity with BMI of 40.0-44.9, adult    Right bundle branch block    Acute deep vein thrombosis (DVT) of both lower extremities    ARABELLA (acute kidney injury)    Acute hyperkalemia    Sepsis    Acute UTI    Acute CHF    Rhinovirus infection    Multifocal pneumonia    Chronic respiratory failure with hypoxia, on home O2 therapy    Skin ulcer of right great toe    Skin ulcer of left great toe    SEDRICK (obstructive sleep apnea)    Atrial fibrillation with RVR  New onset /newly diagnosed A-fib RVR  Electrolytes have been corrected  Cardizem drip has been discontinued  Continue  amiodarone  Added Toprol-XL  Echocardiogram shows EF of 31 to 35%  [id number] score is 7  Currently on Lovenox  Will start Xarelto today     Acute systolic CHF  Newly diagnosed  Echocardiogram shows EF of 31 to 35%, moderate aortic stenosis and moderate to severe mitral regurgitation.  Currently on IV diuretics.  Renal function is worsening.  We will switch to oral diuretics  Monitor I's and O's and replace electrolytes as needed.  We will initiate and uptitrate GDMT as tolerated.  Currently blood pressure is low requiring midodrine  Continue Toprol-XL  Plan to repeat echocardiogram after 3 months of completing GDMT     Acute deep vein thrombosis (DVT) of both lower extremities  Extensive DVT in bilateral lower extremities involving femoral, popliteal and posterior tibial.  Continue anticoagulation  She will need lifelong anticoagulation    Thrombocytopenia  Platelets are 104 now  Closely monitor while on anticoagulation     ARABELLA (acute kidney injury)  Presented with creatinine of 1.4.  Creatinine is worsening again 1.3 now  Closely monitor renal function while on diuretics     Acute hyperkalemia  Hyperkalemia of 6.5  Received Lokelma  Potassium is down to 3.5 now  Closely monitor electrolytes while on diuretics     Sepsis / UTI / Multifocal pneumonia  Multifactorial secondary to UTI and pneumonia with possible cellulitis  Currently on antibiotics per primary team  Lactic acidosis has improved     Rhinovirus infection  Provide supportive care     Chronic respiratory failure with hypoxia, on home O2 therapy  Currently requiring 2 L of oxygen via nasal cannula which is her baseline     Skin ulcer of right great toe  Skin ulcer of left great toe  Podiatry has been consulted  A1c is 6.2     Primary hypertension, chronic  Currently hypotensive likely secondary to sepsis  Started on midodrine  We will try to initiate Toprol-XL     Morbid obesity with BMI of 40.0-44.9, adult  BMI is 41.5.  She weighs 198 pounds  Lifestyle  modifications discussed with the patient  LDL 27, HDL 13, triglyceride 86 and total cholesterol 58.  No indication for statin     SEDRICK (obstructive sleep apnea)  Encouraged compliance with CPAP      Austin Brooks MD  07/03/24  07:15 EDT                  Electronically signed by Austin Brooks MD at 07/03/24 0818       Romeo Colbert MD at 07/02/24 5514          Infectious Diseases Progress Note      LOS: 2 days   Patient Care Team:  Rut Pierce APRN as PCP - General (Nurse Practitioner)    Chief Complaint: Bilateral leg edema, erythema, pain and wounds.  Shortness of breath, weakness    Subjective       The patient has been afebrile for the last 24 hours.  The patient is on 3 L of oxygen by nasal cannula, hemodynamically stable, and is tolerating antimicrobial therapy.      Review of Systems:   Review of Systems   Constitutional:  Positive for fatigue.   HENT: Negative.     Eyes: Negative.    Respiratory:  Positive for shortness of breath.    Cardiovascular:  Positive for leg swelling.   Gastrointestinal: Negative.    Endocrine: Negative.    Genitourinary: Negative.    Musculoskeletal: Negative.    Skin:  Positive for color change and wound.   Neurological:  Positive for weakness.   Psychiatric/Behavioral: Negative.     All other systems reviewed and are negative.       Objective     Vital Signs  Temp:  [96.2 °F (35.7 °C)-97.6 °F (36.4 °C)] 97 °F (36.1 °C)  Heart Rate:  [] 102  Resp:  [10-27] 15  BP: ()/(41-72) 92/60    Physical Exam:  Physical Exam  Vitals and nursing note reviewed.   Constitutional:       General: She is not in acute distress.     Appearance: She is well-developed. She is obese. She is ill-appearing. She is not diaphoretic.   HENT:      Head: Normocephalic and atraumatic.   Eyes:      General: No scleral icterus.     Extraocular Movements: Extraocular movements intact.      Conjunctiva/sclera: Conjunctivae normal.      Pupils: Pupils are equal, round, and reactive to light.    Cardiovascular:      Rate and Rhythm: Regular rhythm. Tachycardia present.      Heart sounds: Normal heart sounds, S1 normal and S2 normal. No murmur heard.  Pulmonary:      Effort: Pulmonary effort is normal. No respiratory distress.      Breath sounds: Normal breath sounds. No stridor. No wheezing or rales.   Chest:      Chest wall: No tenderness.   Abdominal:      General: Bowel sounds are normal. There is no distension.      Palpations: Abdomen is soft. There is no mass.      Tenderness: There is no abdominal tenderness. There is no guarding.   Musculoskeletal:         General: No swelling, tenderness or deformity. Normal range of motion.      Cervical back: Neck supple.      Right lower leg: Edema present.      Left lower leg: Edema present.   Skin:     General: Skin is warm and dry.      Coloration: Skin is not pale.      Findings: No bruising, erythema or rash.      Comments: ignificant erythema to the left leg with tenderness-venous stasis changes        Vague erythema and tenderness to the lower abdomen     Superficial wounds to bilateral great toes-no signs of active infection      Neurological:      Mental Status: She is alert and oriented to person, place, and time.          Results Review:    I have reviewed all clinical data, test, lab, and imaging results.     Radiology  XR Chest 1 View    Result Date: 7/2/2024  XR CHEST 1 VW Date of Exam: 7/2/2024 11:57 AM EDT Indication: Pleural effusion, hypoxia Comparison: 6/30/2024. Findings: There is continued cardiomegaly with pulmonary vascular congestion. Bibasilar lung infiltrates with small effusions appear somewhat improved. Exam is somewhat limited by portable technique and obesity.     Impression: Some improvement in bibasilar lung infiltrates with small effusions. Continued cardiomegaly. Electronically Signed: Krystal Lee MD  7/2/2024 12:46 PM EDT  Workstation ID: [id number]     Cardiology    Laboratory    Results from last 7 days   Lab Units  07/02/24  0329 07/01/24  0910 06/30/24  1858 06/30/24  1732   WBC 10*3/mm3 10.53 13.80*  --  16.80*   HEMOGLOBIN g/dL 13.8 14.2  --  15.9   HEMOGLOBIN, POC g/dL  --   --  17.0  --    HEMATOCRIT % 42.9 43.8  --  49.0*   HEMATOCRIT POC %  --   --  50  --    PLATELETS 10*3/mm3 92* 104*  --  119*     Results from last 7 days   Lab Units 07/02/24  0329 07/01/24  1633 07/01/24  0910 06/30/24  2043 06/30/24  1858   SODIUM mmol/L 138  --  134* 131*  --    POTASSIUM mmol/L 3.5 4.2 5.4* 6.5*  --    CHLORIDE mmol/L 96*  --  97* 101  --    CO2 mmol/L 34.5*  --  31.4* 24.4  --    BUN mg/dL 43*  --  46* 50*  --    CREATININE mg/dL 1.14*  --  1.33* 1.38* 1.40*   GLUCOSE mg/dL 82  --  102* 194*  --    ALBUMIN g/dL 2.3*  --  2.5* 2.5*  --    BILIRUBIN mg/dL 0.9  --  1.3* 1.6*  --    ALK PHOS U/L 109  --  115 126*  --    AST (SGOT) U/L 24  --  39* 42*  --    ALT (SGPT) U/L 14  --  17 18  --    CALCIUM mg/dL 9.4  --  10.0 10.1  --                  Microbiology   Microbiology Results (last 10 days)       Procedure Component Value - Date/Time    Respiratory Culture - Sputum, NT Suction [[id number]] Collected: 07/01/24 1507    Lab Status: Preliminary result Specimen: Sputum from NT Suction Updated: 07/02/24 0950     Respiratory Culture Scant growth (1+) The culture consists of normal respiratory shorty. This is a preliminary report; final report to follow.     Gram Stain Few (2+) Epithelial cells per low power field      Rare (1+) WBCs per low power field      Few (2+) Mixed bacterial morphotypes seen on Gram Stain    Respiratory Panel PCR w/COVID-19(SARS-CoV-2) NIKKI/CHRISTY/RAMIRO/PAD/COR/FRANCIA In-House, NP Swab in UTM/VTM, 2 HR TAT - Swab, Nasopharynx [[id number]]  (Abnormal) Collected: 06/30/24 2340    Lab Status: Final result Specimen: Swab from Nasopharynx Updated: 07/01/24 0055     ADENOVIRUS, PCR Not Detected     Coronavirus 229E Not Detected     Coronavirus HKU1 Not Detected     Coronavirus NL63 Not Detected     Coronavirus OC43 Not Detected      COVID19 Not Detected     Human Metapneumovirus Not Detected     Human Rhinovirus/Enterovirus Detected     Influenza A PCR Not Detected     Influenza B PCR Not Detected     Parainfluenza Virus 1 Not Detected     Parainfluenza Virus 2 Not Detected     Parainfluenza Virus 3 Not Detected     Parainfluenza Virus 4 Not Detected     RSV, PCR Not Detected     Bordetella pertussis pcr Not Detected     Bordetella parapertussis PCR Not Detected     Chlamydophila pneumoniae PCR Not Detected     Mycoplasma pneumo by PCR Not Detected    Narrative:      In the setting of a positive respiratory panel with a viral infection PLUS a negative procalcitonin without other underlying concern for bacterial infection, consider observing off antibiotics or discontinuation of antibiotics and continue supportive care. If the respiratory panel is positive for atypical bacterial infection (Bordetella pertussis, Chlamydophila pneumoniae, or Mycoplasma pneumoniae), consider antibiotic de-escalation to target atypical bacterial infection.    MRSA Screen, PCR (Inpatient) - Swab, Nares [[id number]]  (Abnormal) Collected: 06/30/24 2340    Lab Status: Final result Specimen: Swab from Nares Updated: 07/01/24 0125     MRSA PCR MRSA Detected    Narrative:      The negative predictive value of this diagnostic test is high and should only be used to consider de-escalating anti-MRSA therapy. A positive result may indicate colonization with MRSA and must be correlated clinically.    Blood Culture - Blood, Arm, Right [[id number]]  (Normal) Collected: 06/30/24 1844    Lab Status: Preliminary result Specimen: Blood from Arm, Right Updated: 07/01/24 1915     Blood Culture No growth at 24 hours    Narrative:      Less than seven (7) mL's of blood was collected.  Insufficient quantity may yield false negative results.    Blood Culture - Blood, Arm, Right [[id number]]  (Normal) Collected: 06/30/24 1757    Lab Status: Preliminary result Specimen: Blood from Arm,  Right Updated: 07/01/24 1815     Blood Culture No growth at 24 hours    Narrative:      Less than seven (7) mL's of blood was collected.  Insufficient quantity may yield false negative results.    Urine Culture - Urine, Urine, Catheter [[id number]]  (Abnormal) Collected: 06/30/24 1749    Lab Status: Preliminary result Specimen: Urine, Catheter Updated: 07/02/24 1000     Urine Culture >100,000 CFU/mL Gram Negative Bacilli    Narrative:      Colonization of the urinary tract without infection is common. Treatment is discouraged unless the patient is symptomatic, pregnant, or undergoing an invasive urologic procedure.            Medication Review:       Schedule Meds  amiodarone, 200 mg, Oral, Q8H   Followed by  [START ON 7/9/2024] amiodarone, 200 mg, Oral, Q12H   Followed by  [START ON 7/23/2024] amiodarone, 200 mg, Oral, Daily  bumetanide, 2 mg, Intravenous, Q8H  calcium gluconate, 1,000 mg, Intravenous, Once  cefTRIAXone, 1,000 mg, Intravenous, Q24H  dextrose, 25 g, Intravenous, Once  enoxaparin, 1 mg/kg, Subcutaneous, Q12H  insulin regular, 10 Units, Intravenous, Once  metoprolol succinate XL, 25 mg, Oral, Q24H  miconazole, 1 Application, Topical, Q12H  midodrine, 10 mg, Oral, Q8H  oxyCODONE, 10 mg, Oral, TID  [START ON 7/3/2024] pantoprazole, 40 mg, Oral, Q AM  povidone-iodine, , Topical, Daily  sodium chloride, 10 mL, Intravenous, Q12H  vancomycin, 1,000 mg, Intravenous, Once        Infusion Meds  Pharmacy to Dose Cefepime,   Pharmacy to Dose enoxaparin (LOVENOX),   Pharmacy to dose vancomycin,         PRN Meds    acetaminophen **OR** acetaminophen    nitroglycerin    ondansetron ODT **OR** ondansetron    oxyCODONE    Pharmacy to Dose Cefepime    Pharmacy to Dose enoxaparin (LOVENOX)    Pharmacy to dose vancomycin    [COMPLETED] Insert Peripheral IV **AND** sodium chloride    sodium chloride    sodium chloride    Vancomycin Pharmacy Intermittent/Pulse Dosing        Assessment & Plan       Antimicrobial Therapy    1.  IV Rocephin        2.  IV vancomycin        3.        4.        5.             Assessment     Left lower extremity cellulitis with erythema involving the left leg on the top of chronic venous stasis changes.  Patient has superficial wounds mostly involving the left great toe.     Chronic bilateral leg edema with chronic skin changes and superficial wounds.  Concerning for lymphedema.  Wound care also following.  Current Doppler does show acute right and left leg DVT     Lower mild panniculitis with some erythema and tenderness to the lower pannus/suprapubic area     Abnormal urinalysis with some bacteria and pyuria.  Cultures growing gram-negative bacilli     Mild leukocytosis-likely related to the above infections .     Mild hypoxia with large right pleural effusion and small left pleural effusion.  Patient is currently on 2 L of oxygen by nasal cannula rhinovirus infection.  CT PE protocol was negative for PE     Acute kidney injury     COPD/sleep apnea     History of pulmonary embolism     Plan     Continue IV Rocephin 1 g every 24 hours empirically for UTI treatment waiting on culture results  Continue IV vancomycin-asked pharmacy to monitor and dose  Waiting on urine culture  Continue supportive care       The above note was transcribed for Dr. Colbert-physical exam and review of systems were performed by him        MANJU Goodrich  07/02/24  16:34 EDT    Note is dictated utilizing voice recognition software/Dragon    Electronically signed by Romeo Colbert MD at 07/03/24 0887       Dahlia Baez MD at 07/02/24 4028          I have reviewed the whole chart.  Patient was admitted with A-fib with RVR and also pneumonia UTI DVT, cellulitis, acute on chronic systolic CHF with EF of 35%, ARABELLA, RBBB, rhinovirus infection, multifocal pneumonia, morbid obesity and possible SEDRICK, chronic lower extremity edema and ulceration which is infected, thrombocytopenia and acute on chronic respiratory failure.   Will follow this patient closely in hospital    Electronically signed by Dahlia Baez MD at 07/02/24 1340       ALEKSANDRA Talley DPM at 07/02/24 1241          07/02/24   Foot and Ankle Surgery - Inpatient Follow-up  Provider: Dr. Jerzy Talley DPM  Location: [facility]    Chief Complaint: Bilateral lower extremity swelling; left lower extremity cellulitis    Subjective:  Ban Brennan is a 69 y.o. female.     Patient appears to be doing well.  She has noticed improvement to her legs since yesterday.  No family at bedside but patient has her daughter on the phone during encounter    No Known Allergies    No current facility-administered medications on file prior to encounter.     Current Outpatient Medications on File Prior to Encounter   Medication Sig Dispense Refill    Allergy Relief 180 MG tablet Take 1 tablet by mouth Daily.      bumetanide (BUMEX) 1 MG tablet Take 1 tablet by mouth 3 times a day.      docusate sodium (COLACE) 100 MG capsule Take 1 capsule by mouth Every 12 (Twelve) Hours.      furosemide (LASIX) 20 MG tablet Take 1 tablet by mouth Daily.      HYDROcodone-acetaminophen (NORCO)  MG per tablet Take 1 tablet by mouth 3 times a day.      lisinopril (PRINIVIL,ZESTRIL) 30 MG tablet Take 1 tablet by mouth Daily.      meloxicam (MOBIC) 7.5 MG tablet Take 1 tablet by mouth Daily As Needed.      metoprolol tartrate (LOPRESSOR) 50 MG tablet Take 1 tablet by mouth Daily.      montelukast (SINGULAIR) 10 MG tablet Take 1 tablet by mouth every night at bedtime.      omeprazole (priLOSEC) 20 MG capsule Take 1 capsule by mouth Daily.      oxybutynin XL (DITROPAN-XL) 10 MG 24 hr tablet Take 2 tablets by mouth Daily.      potassium chloride (MICRO-K) 10 MEQ CR capsule Take 1 capsule by mouth Every 12 (Twelve) Hours.      vitamin D (ERGOCALCIFEROL) 1.25 MG (19890 UT) capsule capsule Take 1 capsule by mouth 2 (Two) Times a Week. Monday and Thursday.         Objective   /60    "Pulse 108   Temp 97.6 °F (36.4 °C) (Axillary)   Resp 27   Ht 147.3 cm (58\")   Wt 92.3 kg (203 lb 7.8 oz)   SpO2 95%   BMI 42.53 kg/m²     GENERAL  Appearance:  appears stated age and obese  Orientation:  AAOx3  Affect:  appropriate     VASCULAR      Right Foot Vascularity   Dorsalis pedis:  2+  Posterior tibial:  2+  Skin temperature:  warm  Edema grading:  3+ and pitting  CFT:  3  Pedal hair growth:  Absent      Left Foot Vascularity   Dorsalis pedis:  2+  Posterior tibial:  2+  Skin temperature:  warm  Edema grading:  3+ and pitting  CFT:  3  Pedal hair growth:  Absent     NEUROLOGIC      Right Foot Neurologic   Light touch sensation: normal  Hot/Cold sensation: normal  Achilles reflex:  2+      Left Foot Neurologic   Light touch sensation: normal  Hot/Cold sensation:  normal  Achilles reflex:  2+     MUSCULOSKELETAL      Right Foot Musculoskeletal   Ecchymosis:  none  Arch:  Normal      Left Foot Musculoskeletal   Ecchymosis:  none  Arch:  Normal     DERMATOLOGIC       Right Foot Dermatologic   Skin  Positive for erythema and wound. Negative for drainage and gangrene.       Left Foot Dermatologic   Skin  Positive for cellulitis and wound. Negative for gangrene.      Right foot additional comments: Range of motion and muscle strength testing deferred secondary to guarding.  Diffuse discomfort with palpation involving both lower extremities, left greater than right    7/2/24: Decreased erythema and edema to both lower extremities left greater than right.  Wounds to the great toes are stable and superficial.      Results from last 7 days   Lab Units 07/02/24  0329   WBC 10*3/mm3 10.53   HEMOGLOBIN g/dL 13.8   HEMATOCRIT % 42.9   PLATELETS 10*3/mm3 92*       Assessment & Plan       Atrial fibrillation with RVR    Primary hypertension    Morbid obesity with BMI of 40.0-44.9, adult    Right bundle branch block    Acute deep vein thrombosis (DVT) of both lower extremities    ARABELLA (acute kidney injury)    Acute " hyperkalemia    Sepsis    Acute UTI    Acute CHF    Rhinovirus infection    Multifocal pneumonia    Chronic respiratory failure with hypoxia, on home O2 therapy    Skin ulcer of right great toe    Skin ulcer of left great toe    SEDRICK (obstructive sleep apnea)      I reviewed the patient's situation with the daughter over the phone.  Symptoms have improved since yesterday.  No operative plans or further workup required regarding her toes.  I will defer wound care to wound nurse.  Case reviewed with infectious disease service.  Patient is to follow-up in the wound care center or with my nurse practitioner 1 to 2 weeks after discharge.  Will sign off at this time.    Note is dictated utilizing voice recognition software. Unfortunately this leads to occasional typographical errors. I apologize in advance if the situation occurs. If questions occur please do not hesitate to call our office.      Electronically signed by ALEKSANDRA Talley DPM at 24 0631       Sesar Solorzano DO at 24 1029            Critical Care Progress Note   Ban Brennan : 1955 MRN:[id number] LOS:2     Principal Problem: Atrial fibrillation with RVR     Reason for follow up: All the medical problems listed below    Summary     Ban Brennan is a 69 y.o. female with known PMH of hypertension, heart failure, chronic bilateral lower extremity edema who presented to the hospital for creasing shortness of breath, and was admitted with a principal diagnosis of Atrial fibrillation with RVR.  Patient is alert however lethargic and attempts to participate with HPI however she is a poor historian.  Supplemental information for HPI taken from conversation with daughter at bedside who states patient does not go to the doctor normally and has not been in the hospital for years.  She is unsure of what her full medical history is at this time but states that her legs have been swollen for a long time and that she has not been up  and moving for approximately 1 year.  Patient has a home health nurse that comes out and wraps her legs twice a week.  On assessment patient's legs are both extremely red and swollen, left > right.  Patient's daughter states that redness of her legs is new however she is not sure how long it has been this bad.  EMS was called when she had worsening shortness of breath and found the patient with a wide complex tachycardia with heart rate of 190s.  She was given 150 mg of amiodarone by EMS and heart rate improved to 160s.  She is found to have a right bundle branch block however it is uncertain due to her limited history if this is chronic.     ED course: On arrival to emergency department EKG showed atrial fibrillation with RVR, heart rate 145.  CXR showed fluid overload with bilateral effusions.  Lactic acid was 3.1 and BNP 19,860.  Patient had a white count of 16.8 and 4+ bacteria in her urine.  Also noted to be hyperkalemic with a potassium of 6.5 in which she was treated with insulin D50, calcium gluconate, and Lasix.  She was started on cefepime and vancomycin for cellulitis of her legs and UTI.     ACP: Patient is alert with some intermittent confusion.  Her daughter is at bedside and is her decision-maker along with her brother.  Daughter states patient is a full code.       Significant events     07/02/24 : Patient remains afebrile, heart rates continue in the low 100s, remainder vital signs are within normal limits and are stable.  Patient has had 3.1 L of urine output over the last 24 hours and is net -1.9 L for the admission. Chemistry panel shows the creatinine has improved to 1.14, albumin remains very low at 2.3.  Lactate has nearly normalized down to 2.3.  CBC is unremarkable.  Pelvic ultrasound suggested that the possible mass on the uterus was suggestive of large subserosal uterine fibroid. PCT was not significantly elevated.  TSH was wnl.  A1c was minimally elevated at 6.14.  Pt stable to downgrade  to PCU at this time.    Additionally:  CXR reviewed.  Pleural effusions improving.  No indication for thoracentesis at this time.    Assessment / Plan     BLE DVT  Hx of PE, unprovoked  On treatment-dose lovenox  Cardiology following  Hematology following        Atrial fibrillation with RVR  PAF  Chest pain  Acute on chronic systolic heart failure  Diastolic dysfxn  Moderate AS  Severe MR  Chronic lower extremity lymphedema  Fluid overload  Large right pleural effusion  Rate somewhat better controlled  Cardiology has started amio gtt  TTE from 7/1/2024 shows an ejection fraction low at 30 to 35%, diastolic dysfunction noted, moderate aortic valve stenosis, severe mitral regurgitation  TSH wnl  Continue Bumex 2 mg IVP q8h  Repeat CXR pending        Severe sepsis  Acute cystitis with hematuria  LLE cellulitis  L foot wound, appears infected  Putative panniculitis  MRSA PCR +  Rhinovirus +  Putative multifocal PNA  Source of sepsis is multiple  ID following--vanc and Rocephin  Consult podiatry  HbA1c  6%  PCT not significantly elevated  NT suctioned sputum Cx only with normal shorty      Uterine mass  Pelvic U/S suggesting fibroid       Acute kidney injury  Hyperkalemia, improved  Creatinine 1.38, baseline unknown  Initial potassium 6.5; treated with sling/D50, calcium gluconate, and Lasix  IV fluids contraindicated  Avoid nephrotoxic agents  Avoid hypotension  Consider nephrology consult if no improvement       Essential hypertension  Hold home lisinopril and metoprolol as patient is borderline hypotensive  Resume when clinically appropriate      Other:  PT, OT consulted      Critical Care Time:  34 mins.        Code status:   Code Status (Patient has no pulse and is not breathing): CPR (Attempt to Resuscitate)  Medical Interventions (Patient has pulse or is breathing): Full Support       Nutrition: Diet: Cardiac, Diabetic, Fluid Restriction (240 mL/tray); Healthy Heart (2-3 Na+); Consistent Carbohydrate; 1500 mL/day;  "Fluid Consistency: Thin (IDDSI 0)   Patient isn't on Tube Feeding    VTE Prophylaxis:  Pharmacologic VTE prophylaxis orders are present.       Subjective / Review of systems     Review of Systems   Patient states she feels \"a little better\" today.  She still has pain in her legs and lower abdomen.  Would like to get out of bed and sit in the chair, possibly do some exercises.  No CP.  SOB is improved today.  She denies any fevers, chills, sweats, nausea, vomiting, or diarrhea.  Objective / Physical Exam   Vital signs:  Temp: 96.3 °F (35.7 °C)  BP: 102/70  Heart Rate: 106  Resp: 16  SpO2: 95 %  Weight: 92.3 kg (203 lb 7.8 oz)    Admission Weight: Weight: 108 kg (239 lb)  Current Weight: Weight: 92.3 kg (203 lb 7.8 oz)    Input/Output in last 24 hours:    Intake/Output Summary (Last 24 hours) at 7/2/2024 1029  Last data filed at 7/2/2024 0948  Gross per 24 hour   Intake 1174 ml   Output 2625 ml   Net -1451 ml      Physical Exam     GEN: Very pleasant woman.  Sitting up in bed on nasal cannula. NAD.  NEURO:  Brainstem reflexes intact.  No obvious focal deficit.  Moves all 4 ext.  HEENT:  N/AT.  PERRL.  MMM.  Oropharynx non-erythematous.  No drainage from the eyes/ears/nose.  No conjunctival petechiae.  No oral thrush.  Auditory and visual acuity grossly wnl.  Voice normal.  NECK:  Supple, NT, trachea midline.  No meningismus.  No ROM limitation.  No torticollis.  No JVD.  No thyromegaly.    CHEST/LUNGS:  Breath sounds are diminished but clear and equal bilaterally.  No w/r/r.  Chest excursion equal bilaterally.    CARDIOVASCULAR: Tachycardic, irregularly irregular rhythm w/ systolic ejection and holosystolic murmur noted.  GI:  Abdomen is obese, exquisitely tender to even light palpation in the suprapubic area and bilateral lower quadrants.  +BS.    :  Normal external genitalia.  No trimble cath in place.  EXTREMITIES: Bilateral lower extremities are noted for chronic venous stasis changes, left lower extremity is " hyperemic with increased warmth and much tender to palpation on the right.  The right great toe has a old wound that appears to be not currently infected.  The left foot has a fluctuant area.  SKIN: As per above.  LYMPHATICS/HEME:  No overt LAD or abnormal bruising.  + lymphedema.  MSK:  Normal ROM.  No joint abnormalities noted.  Strength is 5/5 and equal in BUE and BLE's.  PSYCH:  Pleasant.  A&Ox 3.  Normal mood, flat affect.  Responds appropriately to commands and appears to comprehend instructions.        Radiology and Labs     Results from last 7 days   Lab Units 07/02/24  0329 07/01/24  0910 06/30/24  1858 06/30/24  1732   WBC 10*3/mm3 10.53 13.80*  --  16.80*   HEMATOCRIT % 42.9 43.8  --  49.0*   HEMATOCRIT POC %  --   --  50  --    PLATELETS 10*3/mm3 92* 104*  --  119*      Results from last 7 days   Lab Units 07/02/24  0329 07/01/24  1633 07/01/24  0910 06/30/24  2043 06/30/24  1858   SODIUM mmol/L 138  --  134* 131*  --    POTASSIUM mmol/L 3.5 4.2 5.4* 6.5*  --    CHLORIDE mmol/L 96*  --  97* 101  --    CO2 mmol/L 34.5*  --  31.4* 24.4  --    BUN mg/dL 43*  --  46* 50*  --    CREATININE mg/dL 1.14*  --  1.33* 1.38* 1.40*      Current medications   Scheduled Meds: amiodarone, 200 mg, Oral, Q8H   Followed by  [START ON 7/9/2024] amiodarone, 200 mg, Oral, Q12H   Followed by  [START ON 7/23/2024] amiodarone, 200 mg, Oral, Daily  bumetanide, 2 mg, Intravenous, Q8H  calcium gluconate, 1,000 mg, Intravenous, Once  cefTRIAXone, 1,000 mg, Intravenous, Q24H  dextrose, 25 g, Intravenous, Once  enoxaparin, 1 mg/kg, Subcutaneous, Q12H  insulin regular, 10 Units, Intravenous, Once  metoprolol succinate XL, 25 mg, Oral, Q24H  miconazole, 1 Application, Topical, Q12H  midodrine, 10 mg, Oral, Q8H  oxyCODONE, 10 mg, Oral, TID  povidone-iodine, , Topical, Daily  sodium chloride, 10 mL, Intravenous, Q12H      Continuous Infusions: Pharmacy to Dose Cefepime,   Pharmacy to Dose enoxaparin (LOVENOX),   Pharmacy to dose  vancomycin,         Plan discussed with RN. Reviewed all other data in the last 24 hours, including but not limited to vitals, labs, microbiology, imaging and pertinent notes from other providers.     Sesar Solorzano DO   Critical Care  24   10:29 EDT       Electronically signed by Sesar Solorzano DO at 24 1237       Pk Macdonald MD at 24 0825                Hematology/Oncology Inpatient Progress Note    PATIENT NAME: Ban Brennan  : 1955  MRN: [id number]    CHIEF COMPLAINT: Shortness of breath    HISTORY OF PRESENT ILLNESS:    Ban Brennan is a 69 y.o. female who presented to [facility] on 2024 with complaints of shortness of breath.  Past medical history of hypertension, heart failure, chronic bilateral lower extremity edema, remote history of pulmonary embolism treated with warfarin.  The patient was brought to the ED via EMS.  The patient's daughter reported that the patient does not normally go to the doctor and had not been in the hospital for years.  She was unsure of her full medical history but stated that her legs have been swollen for a long time and that she had not been mobile for approximately 1 year.  She reported that a home health nurse comes to the house and wraps her mother's legs twice a week.  EMS was called due to the worsening shortness of breath and the patient was found to have an EKG showing a wide-complex tachycardia with a heart rate in the 190s.  She was given amiodarone by EMS with rate improvement to the 160s.  Follow-up EKG in the ED at [facility] showed atrial fibrillation with RVR with a heart rate of 145.  Chest x-ray showed fluid overload with bilateral effusions.  The patient had a elevated white blood cell count of 16.8 and 4+ bacteria in her urine.  She was also noted to be hyperkalemic with a potassium of 6.5.  She was diagnosed with cellulitis and a urinary tract infection and initiated on IV antibiotics with  cefepime and vancomycin.  She was admitted for further evaluation and treatment.     7/1/2024 bilateral venous Doppler ultrasound  -Acute right lower extremity DVT in the common femoral, proximal femoral, mid femoral, distal femoral, and popliteal  -Acute right lower extremity superficial thrombophlebitis in the saphenofemoral junction and great saphenous  -Acute left lower extremity DVT in the proximal femoral, mid femoral, distal femoral, popliteal, and posterior tibial     7/1/2020 four 2D echocardiogram  -Left ventricular ejection fraction 31-35%  -Left ventricular diastolic dysfunction  -Left atrial cavity dilated  -Moderate aortic valve stenosis  -Moderate to severe mitral valve regurgitation  -Estimated right ventricular systolic pressure from tricuspid regurgitation is normal     7/1/2024 CT chest PE protocol  -No evidence for pulmonary embolus  -Bibasilar airspace disease with right middle lung opacity compatible likely multifocal pneumonia with bilateral pleural effusions  -Cardiomegaly     7/1/2024 CT of the abdomen and pelvis without contrast  -Moderately large right pleural effusion and small left pleural effusion; bibasilar infiltrates suggest atelectasis although associated pneumonia not excluded  -Severe cardiomegaly  -Bilateral flank edema  -Probable fibroid uterus     07/01/24  Hematology/Oncology was consulted for bilateral lower extremity DVT.     He/She  has a past medical history of Bilateral leg edema, Chronic respiratory failure with hypoxia, on home O2 therapy (07/01/2024), COPD (chronic obstructive pulmonary disease), Morbid obesity with BMI of 40.0-44.9, adult (11/08/2010), Primary hypertension (03/01/2017), Pulmonary embolism, and Sleep apnea.     PCP: Rut Pierce APRN  Subjective   Patient seen today. Clinically stable. No new complaints reported.      ROS:  Review of Systems   Constitutional:  Positive for fatigue.   HENT: Negative.     Eyes: Negative.    Respiratory: Negative.  "    Cardiovascular: Negative.    Gastrointestinal: Negative.    Endocrine: Negative.    Genitourinary: Negative.    Musculoskeletal:  Positive for arthralgias, back pain, gait problem and myalgias.   Skin:  Positive for wound (Clean decubitus ulcers noted on medial aspect of bilateral feet).   Allergic/Immunologic: Negative.    Neurological:  Positive for weakness (Bilateral leg weakness).   Hematological: Negative.    Psychiatric/Behavioral: Negative.          MEDICATIONS:    Scheduled Meds:  amiodarone, 200 mg, Oral, Q8H   Followed by  [START ON 7/9/2024] amiodarone, 200 mg, Oral, Q12H   Followed by  [START ON 7/23/2024] amiodarone, 200 mg, Oral, Daily  bumetanide, 2 mg, Intravenous, Q8H  calcium gluconate, 1,000 mg, Intravenous, Once  cefTRIAXone, 1,000 mg, Intravenous, Q24H  dextrose, 25 g, Intravenous, Once  enoxaparin, 1 mg/kg, Subcutaneous, Q12H  insulin regular, 10 Units, Intravenous, Once  metoprolol succinate XL, 25 mg, Oral, Q24H  miconazole, 1 Application, Topical, Q12H  midodrine, 10 mg, Oral, Q8H  oxyCODONE, 10 mg, Oral, TID  povidone-iodine, , Topical, Daily  sodium chloride, 10 mL, Intravenous, Q12H  vancomycin, 1,000 mg, Intravenous, Once       Continuous Infusions:  Pharmacy to Dose Cefepime,   Pharmacy to Dose enoxaparin (LOVENOX),   Pharmacy to dose vancomycin,        PRN Meds:    acetaminophen **OR** acetaminophen    nitroglycerin    ondansetron ODT **OR** ondansetron    oxyCODONE    Pharmacy to Dose Cefepime    Pharmacy to Dose enoxaparin (LOVENOX)    Pharmacy to dose vancomycin    [COMPLETED] Insert Peripheral IV **AND** sodium chloride    sodium chloride    sodium chloride    Vancomycin Pharmacy Intermittent/Pulse Dosing     ALLERGIES:  No Known Allergies    Objective    VITALS:   BP 90/56   Pulse 100   Temp 97.6 °F (36.4 °C) (Axillary)   Resp 27   Ht 147.3 cm (58\")   Wt 92.3 kg (203 lb 7.8 oz)   SpO2 98%   BMI 42.53 kg/m²     PHYSICAL EXAM: (performed by MD)  Physical " Exam  Constitutional:       Appearance: She is normal weight. She is ill-appearing.   HENT:      Head: Normocephalic and atraumatic.      Right Ear: External ear normal.      Left Ear: External ear normal.      Nose: Nose normal.      Mouth/Throat:      Mouth: Mucous membranes are moist.      Pharynx: Oropharynx is clear.   Eyes:      Extraocular Movements: Extraocular movements intact.      Conjunctiva/sclera: Conjunctivae normal.      Pupils: Pupils are equal, round, and reactive to light.   Cardiovascular:      Rate and Rhythm: Normal rate.      Pulses: Normal pulses.   Pulmonary:      Effort: Pulmonary effort is normal.   Abdominal:      General: Abdomen is flat.      Palpations: Abdomen is soft.   Musculoskeletal:         General: Normal range of motion.      Cervical back: Normal range of motion and neck supple.      Right lower leg: Edema present.      Left lower leg: Edema present.   Skin:     General: Skin is warm.   Neurological:      Mental Status: She is alert.   Psychiatric:         Mood and Affect: Mood normal. Mood is depressed.         Speech: Speech is delayed.         Behavior: Behavior normal.         Thought Content: Thought content normal.         Judgment: Judgment normal.           RECENT LABS:  Lab Results (last 24 hours)       Procedure Component Value Units Date/Time    Reticulocytes [[id number]]  (Abnormal) Collected: 07/02/24 1458    Specimen: Blood Updated: 07/02/24 1504     Reticulocyte % 2.70 %      Reticulocyte Absolute 0.1237 10*6/mm3     Haptoglobin [[id number]] Collected: 07/02/24 1458    Specimen: Blood Updated: 07/02/24 1501    Lactate Dehydrogenase [[id number]] Collected: 07/02/24 1458    Specimen: Blood Updated: 07/02/24 1501    Urine Culture - Urine, Urine, Catheter [[id number]]  (Abnormal) Collected: 06/30/24 1749    Specimen: Urine, Catheter Updated: 07/02/24 1000     Urine Culture >100,000 CFU/mL Gram Negative Bacilli    Narrative:      Colonization of the urinary tract without  infection is common. Treatment is discouraged unless the patient is symptomatic, pregnant, or undergoing an invasive urologic procedure.    Respiratory Culture - Sputum, NT Suction [[id number]] Collected: 07/01/24 1507    Specimen: Sputum from NT Suction Updated: 07/02/24 0950     Respiratory Culture Scant growth (1+) The culture consists of normal respiratory shorty. This is a preliminary report; final report to follow.     Gram Stain Few (2+) Epithelial cells per low power field      Rare (1+) WBCs per low power field      Few (2+) Mixed bacterial morphotypes seen on Gram Stain    Phosphorus [[id number]]  (Abnormal) Collected: 07/02/24 0329    Specimen: Blood Updated: 07/02/24 0411     Phosphorus 4.6 mg/dL     Magnesium [[id number]]  (Normal) Collected: 07/02/24 0329    Specimen: Blood Updated: 07/02/24 0410     Magnesium 1.7 mg/dL     Comprehensive Metabolic Panel [[id number]]  (Abnormal) Collected: 07/02/24 0329    Specimen: Blood Updated: 07/02/24 0410     Glucose 82 mg/dL      BUN 43 mg/dL      Creatinine 1.14 mg/dL      Sodium 138 mmol/L      Potassium 3.5 mmol/L      Comment: Slight hemolysis detected by analyzer. Result may be falsely elevated.        Chloride 96 mmol/L      CO2 34.5 mmol/L      Calcium 9.4 mg/dL      Total Protein 5.6 g/dL      Albumin 2.3 g/dL      ALT (SGPT) 14 U/L      AST (SGOT) 24 U/L      Alkaline Phosphatase 109 U/L      Total Bilirubin 0.9 mg/dL      Globulin 3.3 gm/dL      A/G Ratio 0.7 g/dL      BUN/Creatinine Ratio 37.7     Anion Gap 7.5 mmol/L      eGFR 52.2 mL/min/1.73     Narrative:      GFR Normal >60  Chronic Kidney Disease <60  Kidney Failure <15      STAT Lactic Acid, Reflex [[id number]]  (Abnormal) Collected: 07/02/24 0329    Specimen: Blood Updated: 07/02/24 0409     Lactate 2.3 mmol/L     Vancomycin, Random [[id number]]  (Normal) Collected: 07/02/24 0329    Specimen: Blood Updated: 07/02/24 0407     Vancomycin Random 22.40 mcg/mL     Narrative:      Therapeutic Ranges for  Vancomycin    Vancomycin Random   5.0-40.0 mcg/mL  Vancomycin Trough   5.0-20.0 mcg/mL  Vancomycin Peak     20.0-40.0 mcg/mL    CBC & Differential [[id number]]  (Abnormal) Collected: 07/02/24 0329    Specimen: Blood Updated: 07/02/24 0340    Narrative:      The following orders were created for panel order CBC & Differential.  Procedure                               Abnormality         Status                     ---------                               -----------         ------                     CBC Auto Differential[[id number]]        Abnormal            Final result               Scan Slide[[id number]]                                                                    Please view results for these tests on the individual orders.    CBC Auto Differential [[id number]]  (Abnormal) Collected: 07/02/24 0329    Specimen: Blood Updated: 07/02/24 0340     WBC 10.53 10*3/mm3      RBC 4.48 10*6/mm3      Hemoglobin 13.8 g/dL      Hematocrit 42.9 %      MCV 95.8 fL      MCH 30.8 pg      MCHC 32.2 g/dL      RDW 15.6 %      RDW-SD 53.2 fl      MPV 10.5 fL      Platelets 92 10*3/mm3      Neutrophil % 82.5 %      Lymphocyte % 8.4 %      Monocyte % 8.1 %      Eosinophil % 0.4 %      Basophil % 0.1 %      Immature Grans % 0.5 %      Neutrophils, Absolute 8.70 10*3/mm3      Lymphocytes, Absolute 0.88 10*3/mm3      Monocytes, Absolute 0.85 10*3/mm3      Eosinophils, Absolute 0.04 10*3/mm3      Basophils, Absolute 0.01 10*3/mm3      Immature Grans, Absolute 0.05 10*3/mm3      nRBC 0.0 /100 WBC     STAT Lactic Acid, Reflex [[id number]]  (Abnormal) Collected: 07/01/24 2222    Specimen: Blood Updated: 07/01/24 2307     Lactate 2.9 mmol/L     POC Glucose Once [[id number]]  (Normal) Collected: 07/01/24 2227    Specimen: Blood Updated: 07/01/24 2229     Glucose 94 mg/dL      Comment: Serial Number: [id number]:  [id number]       Blood Culture - Blood, Arm, Right [[id number]]  (Normal) Collected: 06/30/24 1844    Specimen: Blood from Arm,  Right Updated: 07/01/24 1915     Blood Culture No growth at 24 hours    Narrative:      Less than seven (7) mL's of blood was collected.  Insufficient quantity may yield false negative results.    Blood Culture - Blood, Arm, Right [[id number]]  (Normal) Collected: 06/30/24 1757    Specimen: Blood from Arm, Right Updated: 07/01/24 1815     Blood Culture No growth at 24 hours    Narrative:      Less than seven (7) mL's of blood was collected.  Insufficient quantity may yield false negative results.    Potassium [[id number]]  (Normal) Collected: 07/01/24 1633    Specimen: Blood Updated: 07/01/24 1715     Potassium 4.2 mmol/L      Comment: Specimen hemolyzed.  Result may be falsely elevated.       STAT Lactic Acid, Reflex [[id number]]  (Abnormal) Collected: 07/01/24 1633    Specimen: Blood Updated: 07/01/24 1714     Lactate 3.1 mmol/L     Factor 5 Leiden [[id number]] Collected: 07/01/24 1646    Specimen: Blood Updated: 07/01/24 1649    Factor II, DNA Analysis [[id number]] Collected: 07/01/24 1646    Specimen: Blood Updated: 07/01/24 1649    POC Glucose Once [[id number]]  (Normal) Collected: 07/01/24 1624    Specimen: Blood Updated: 07/01/24 1629     Glucose 77 mg/dL      Comment: Serial Number: [id number]:  [id number]               PENDING RESULTS: PTG, FVL, LDH, Retic, Hapto    IMAGING REVIEWED:  XR Chest 1 View    Result Date: 7/2/2024  Impression: Some improvement in bibasilar lung infiltrates with small effusions. Continued cardiomegaly. Electronically Signed: Krystal Lee MD  7/2/2024 12:46 PM EDT  Workstation ID: [id number]    US Pelvis Complete    Result Date: 7/1/2024  Impression: Sonographic features suggest the presence of a large subserosal uterine fundal fibroid. Electronically Signed: Ann Marie Maloney MD  7/1/2024 1:43 PM EDT  Workstation ID: [id number]    CT Abdomen Pelvis Without Contrast    Result Date: 7/1/2024  Impression: Moderately large right pleural effusion and small left pleural effusion. Bibasilar  infiltrates suggest atelectasis although associated pneumonia is not excluded. Severe cardiomegaly. Bilateral flank edema, greatest on the right. Probable fibroid uterus. This could be confirmed with ultrasound. Electronically Signed: Krystal Lee MD  7/1/2024 11:39 AM EDT  Workstation ID: [id number]    Adult Transthoracic Echo Complete w/ Color, Spectral and Contrast if Necessary Per Protocol    Addendum Date: 7/1/2024      Left ventricular ejection fraction appears to be 31 - 35%.   Left ventricular diastolic dysfunction is noted.   The left atrial cavity is dilated.   Moderate aortic valve stenosis is present. Mean/peak gradient of 14/24 mmHg.  Dimensionless index 0.36   Moderate to severe mitral valve regurgitation is present.   Estimated right ventricular systolic pressure from tricuspid regurgitation is normal (<35 mmHg).     CT Angiogram Chest Pulmonary Embolism    Result Date: 7/1/2024  Impression: 1. No evidence for pulmonary embolus. 2. Bibasilar airspace disease and right middle lung opacity compatible likely multifocal pneumonia with bilateral pleural effusions. 3. Cardiomegaly. Electronically Signed: Shari Kaur MD  7/1/2024 10:14 AM EDT  Workstation ID: [id number]    CT Lower Extremity Bilateral Without Contrast    Result Date: 7/1/2024  Impression: Bilateral lower extremity soft tissue swelling greater on the left than the right. There is no soft tissue gas or well-formed abscess. Electronically Signed: Obed Siduh MD  7/1/2024 1:53 AM EDT  Workstation ID: [id number]    XR Chest 1 View    Result Date: 6/30/2024  Vascular congestion with small effusions Electronically Signed: Eamon rBock MD  6/30/2024 7:27 PM EDT  Workstation ID: [id number]     Assessment & Plan     Bilateral lower extremity DVT:  Remote history of pulmonary embolism  -Extensive acute right and left lower extremity DVT noted on presentation  -patient's chronic deconditioning and immobility may have been the risk factor for DVT.    -Negative CTA Chest for PE.  -Reportedly treated with warfarin for prior history of PE  -On Lovenox 1 mg/kg every 12 hours. This can be continued during hospitalization, patient can be transitioned to oral anticoagulation closer to discharge.  Will recommend Eliquis 10mg BID X 1 week followed by 5mg BID thereafter.  -Will need lifelong anticoagulation in my view due to persistent risk factors and extensive DVT as well as history of prior pulmonary embolism.  -Cardiology consulted and evaluating for possible mechanical thrombectomy  -Given history of pulmonary embolism and now DVTs will check for factor V Leiden and prothrombin gene mutation, this is pending.        Uterine mass  -Transvaginal ultrasound suggestive of presence of a large subserosal uterine fundal fibroid.   -CT abdomen/pelvis showed presence of a lobulated mass protruding   from the uterine fundus measuring approximately 8.4 x 5.9 cm  -this is concerning for fibroid vs malignancy.  Will recommend GYN Evaluation for the same.  -No vaginal bleeding reported.     Thrombocytopenia  - 92,000, 119,000 at admission  -Baseline from January 2020 138,000  -Acute drop likely secondary to sepsis, rhinovirus  -Continue to monitor closely given initiation of anticoagulation  -Will check Hemolysis labs.     Sepsis  -UA suggestive of Acute cystitis, Urine culture consistent with GNR UTI.  -Respiratory panel positive for Rhinovirus.  -On antibiotic therapy with IV vancomycin and cefepime, management per primary team     Atrial fibrillation with RVR/paroxysmal atrial fibrillation  Acute on chronic systolic heart failure with diastolic dysfunction,   moderate aortic stenosis, severe mitral valve regurgitation  -On amiodarone drip, diuresis.  Management per cardiology.  Patient  on Lovenox for anticoagulation, Likely candidate for lifelong anticoagulation given Afib and extensive DVT.     Acute kidney injury/hyperkalemia  -Creatinine 1.33, EGFR 43.4.  Continues to  "improve EGFR 52 point  -Potassium improved to 5.4 from 6.5.  Now normalized at 3.5.  -Management per primary team     Pleural Effusion:  Likely secondary to CHF and suspected pneumonia.  -management per primary.          Electronically signed by Pk Macdonald MD at 07/03/24 0809       Austin Brooks MD at 07/02/24 0711          Referring Provider: Sesar Solorzano DO    Reason for follow-up: Atrial fibrillation     Patient Care Team:  Rut Pierce APRN as PCP - General (Nurse Practitioner)      SUBJECTIVE  Laying comfortably in bed.  Denies any chest pain or shortness of breath.     ROS  Review of all systems negative except as indicated.    Since I have last seen, the patient has been without any chest discomfort, shortness of breath, palpitations, dizziness or syncope.  Denies having any headache, abdominal pain, nausea, vomiting, diarrhea, constipation, loss of weight or loss of appetite.  Denies having any excessive bruising, hematuria or blood in the stool.        Personal History:    Past Medical History:   Diagnosis Date    Bilateral leg edema     Chronic respiratory failure with hypoxia, on home O2 therapy 07/01/2024    COPD (chronic obstructive pulmonary disease)     Morbid obesity with BMI of 40.0-44.9, adult 11/08/2010    Primary hypertension 03/01/2017    Pulmonary embolism     \"many years ago, was on warfarin\"    Sleep apnea        History reviewed. No pertinent surgical history.    History reviewed. No pertinent family history.    Social History     Tobacco Use    Smoking status: Never    Smokeless tobacco: Never   Vaping Use    Vaping status: Never Used   Substance Use Topics    Alcohol use: Never    Drug use: Never        Home meds:  Prior to Admission medications    Medication Sig Start Date End Date Taking? Authorizing Provider   Allergy Relief 180 MG tablet Take 1 tablet by mouth Daily. 5/30/24  Yes Provider, MD Chadwick   bumetanide (BUMEX) 1 MG tablet Take 1 tablet by " mouth 3 times a day. 5/30/24  Yes Chadwick Johnson MD   docusate sodium (COLACE) 100 MG capsule Take 1 capsule by mouth Every 12 (Twelve) Hours. 5/30/24  Yes Provider, Historical, MD   furosemide (LASIX) 20 MG tablet Take 1 tablet by mouth Daily.   Yes Chadwick Johnson MD   HYDROcodone-acetaminophen (NORCO)  MG per tablet Take 1 tablet by mouth 3 times a day. 5/30/24  Yes Provider, Historical, MD   lisinopril (PRINIVIL,ZESTRIL) 30 MG tablet Take 1 tablet by mouth Daily. 3/18/24  Yes Provider, Historical, MD   meloxicam (MOBIC) 7.5 MG tablet Take 1 tablet by mouth Daily As Needed. 3/18/24  Yes Provider, Historical, MD   metoprolol tartrate (LOPRESSOR) 50 MG tablet Take 1 tablet by mouth Daily.   Yes Chadwick Johnson MD   montelukast (SINGULAIR) 10 MG tablet Take 1 tablet by mouth every night at bedtime.   Yes Chadwick Johnson MD   omeprazole (priLOSEC) 20 MG capsule Take 1 capsule by mouth Daily. 3/18/24  Yes Provider, Historical, MD   oxybutynin XL (DITROPAN-XL) 10 MG 24 hr tablet Take 2 tablets by mouth Daily. 3/18/24  Yes Provider, Historical, MD   potassium chloride (MICRO-K) 10 MEQ CR capsule Take 1 capsule by mouth Every 12 (Twelve) Hours. 3/18/24  Yes Provider, Historical, MD   vitamin D (ERGOCALCIFEROL) 1.25 MG (80113 UT) capsule capsule Take 1 capsule by mouth 2 (Two) Times a Week. Monday and Thursday. 5/30/24  Yes Chadwick Johnson MD       Allergies:  Patient has no known allergies.    Scheduled Meds:amiodarone, 200 mg, Oral, Q8H   Followed by  [START ON 7/9/2024] amiodarone, 200 mg, Oral, Q12H   Followed by  [START ON 7/23/2024] amiodarone, 200 mg, Oral, Daily  bumetanide, 2 mg, Intravenous, Q8H  calcium gluconate, 1,000 mg, Intravenous, Once  cefTRIAXone, 1,000 mg, Intravenous, Q24H  dextrose, 25 g, Intravenous, Once  enoxaparin, 1 mg/kg, Subcutaneous, Q12H  insulin regular, 10 Units, Intravenous, Once  miconazole, 1 Application, Topical, Q12H  midodrine, 10 mg, Oral,  "Q8H  povidone-iodine, , Topical, Daily  sodium chloride, 10 mL, Intravenous, Q12H      Continuous Infusions:Pharmacy to Dose Cefepime,   Pharmacy to Dose enoxaparin (LOVENOX),   Pharmacy to dose vancomycin,       PRN Meds:.  acetaminophen **OR** acetaminophen    nitroglycerin    ondansetron ODT **OR** ondansetron    oxyCODONE    Pharmacy to Dose Cefepime    Pharmacy to Dose enoxaparin (LOVENOX)    Pharmacy to dose vancomycin    [COMPLETED] Insert Peripheral IV **AND** sodium chloride    sodium chloride    sodium chloride    Vancomycin Pharmacy Intermittent/Pulse Dosing      OBJECTIVE    Vital Signs  Vitals:    07/02/24 0400 07/02/24 0500 07/02/24 0543 07/02/24 0600   BP: 101/51 102/51 102/51 107/57   BP Location: Right arm      Patient Position: Lying      Pulse: 91 97  96   Resp: 10      Temp: 96.2 °F (35.7 °C)      TempSrc: Axillary      SpO2: 97% 96%  94%   Weight:    92.3 kg (203 lb 7.8 oz)   Height:           Flowsheet Rows      Flowsheet Row First Filed Value   Admission Height 147.3 cm (58\") Documented at 06/30/2024 1650   Admission Weight 108 kg (239 lb) Documented at 06/30/2024 1650              Intake/Output Summary (Last 24 hours) at 7/2/2024 0712  Last data filed at 7/2/2024 0600  Gross per 24 hour   Intake 1218 ml   Output 3075 ml   Net -1857 ml          Telemetry: Atrial fibrillation with controlled heart rate in the 80s and 90s    Physical Exam:  The patient is alert, oriented and in no distress.  Morbidly obese  Vital signs as noted above.  Head and neck revealed no carotid bruits or jugular venous distention.  No thyromegaly or lymphadenopathy is present  Lungs with bibasilar Rales and diminished at bases.  No wheezing.  On 2 L of oxygen  Heart: Irregularly irregular rhythm.  Normal first and second heart sounds.  No precordial rub is present.  No gallop is present.  Abdomen: Soft and nontender.    Extremities with good peripheral pulses with bilateral lower extremity edema  Skin: Warm.  Right great " toe ulceration with necrotic tissue.  Left great toe ulceration.    Musculoskeletal system is grossly normal.  CNS grossly normal.          Results Review:  I have personally reviewed the results from the time of this admission to 7/2/2024 07:12 EDT and agree with these findings:  []  Laboratory  []  Microbiology  []  Radiology  []  EKG/Telemetry   []  Cardiology/Vascular   []  Pathology  []  Old records  []  Other:    Most notable findings include:    Lab Results (last 24 hours)       Procedure Component Value Units Date/Time    Respiratory Culture - Sputum, NT Suction [[id number]] Collected: 07/01/24 1507    Specimen: Sputum from NT Suction Updated: 07/02/24 0635     Gram Stain Few (2+) Epithelial cells per low power field      Rare (1+) WBCs per low power field      Few (2+) Mixed bacterial morphotypes seen on Gram Stain    Phosphorus [[id number]]  (Abnormal) Collected: 07/02/24 0329    Specimen: Blood Updated: 07/02/24 0411     Phosphorus 4.6 mg/dL     Magnesium [[id number]]  (Normal) Collected: 07/02/24 0329    Specimen: Blood Updated: 07/02/24 0410     Magnesium 1.7 mg/dL     Comprehensive Metabolic Panel [[id number]]  (Abnormal) Collected: 07/02/24 0329    Specimen: Blood Updated: 07/02/24 0410     Glucose 82 mg/dL      BUN 43 mg/dL      Creatinine 1.14 mg/dL      Sodium 138 mmol/L      Potassium 3.5 mmol/L      Comment: Slight hemolysis detected by analyzer. Result may be falsely elevated.        Chloride 96 mmol/L      CO2 34.5 mmol/L      Calcium 9.4 mg/dL      Total Protein 5.6 g/dL      Albumin 2.3 g/dL      ALT (SGPT) 14 U/L      AST (SGOT) 24 U/L      Alkaline Phosphatase 109 U/L      Total Bilirubin 0.9 mg/dL      Globulin 3.3 gm/dL      A/G Ratio 0.7 g/dL      BUN/Creatinine Ratio 37.7     Anion Gap 7.5 mmol/L      eGFR 52.2 mL/min/1.73     Narrative:      GFR Normal >60  Chronic Kidney Disease <60  Kidney Failure <15      STAT Lactic Acid, Reflex [[id number]]  (Abnormal) Collected: 07/02/24 0329     Specimen: Blood Updated: 07/02/24 0409     Lactate 2.3 mmol/L     Vancomycin, Random [[id number]]  (Normal) Collected: 07/02/24 0329    Specimen: Blood Updated: 07/02/24 0407     Vancomycin Random 22.40 mcg/mL     Narrative:      Therapeutic Ranges for Vancomycin    Vancomycin Random   5.0-40.0 mcg/mL  Vancomycin Trough   5.0-20.0 mcg/mL  Vancomycin Peak     20.0-40.0 mcg/mL    CBC & Differential [[id number]]  (Abnormal) Collected: 07/02/24 0329    Specimen: Blood Updated: 07/02/24 0340    Narrative:      The following orders were created for panel order CBC & Differential.  Procedure                               Abnormality         Status                     ---------                               -----------         ------                     CBC Auto Differential[[id number]]        Abnormal            Final result               Scan Slide[[id number]]                                                                    Please view results for these tests on the individual orders.    CBC Auto Differential [[id number]]  (Abnormal) Collected: 07/02/24 0329    Specimen: Blood Updated: 07/02/24 0340     WBC 10.53 10*3/mm3      RBC 4.48 10*6/mm3      Hemoglobin 13.8 g/dL      Hematocrit 42.9 %      MCV 95.8 fL      MCH 30.8 pg      MCHC 32.2 g/dL      RDW 15.6 %      RDW-SD 53.2 fl      MPV 10.5 fL      Platelets 92 10*3/mm3      Neutrophil % 82.5 %      Lymphocyte % 8.4 %      Monocyte % 8.1 %      Eosinophil % 0.4 %      Basophil % 0.1 %      Immature Grans % 0.5 %      Neutrophils, Absolute 8.70 10*3/mm3      Lymphocytes, Absolute 0.88 10*3/mm3      Monocytes, Absolute 0.85 10*3/mm3      Eosinophils, Absolute 0.04 10*3/mm3      Basophils, Absolute 0.01 10*3/mm3      Immature Grans, Absolute 0.05 10*3/mm3      nRBC 0.0 /100 WBC     STAT Lactic Acid, Reflex [[id number]]  (Abnormal) Collected: 07/01/24 2222    Specimen: Blood Updated: 07/01/24 2307     Lactate 2.9 mmol/L     POC Glucose Once [[id number]]  (Normal) Collected:  07/01/24 2227    Specimen: Blood Updated: 07/01/24 2229     Glucose 94 mg/dL      Comment: Serial Number: [id number]:  [id number]       Blood Culture - Blood, Arm, Right [[id number]]  (Normal) Collected: 06/30/24 1844    Specimen: Blood from Arm, Right Updated: 07/01/24 1915     Blood Culture No growth at 24 hours    Narrative:      Less than seven (7) mL's of blood was collected.  Insufficient quantity may yield false negative results.    Blood Culture - Blood, Arm, Right [[id number]]  (Normal) Collected: 06/30/24 1757    Specimen: Blood from Arm, Right Updated: 07/01/24 1815     Blood Culture No growth at 24 hours    Narrative:      Less than seven (7) mL's of blood was collected.  Insufficient quantity may yield false negative results.    Potassium [[id number]]  (Normal) Collected: 07/01/24 1633    Specimen: Blood Updated: 07/01/24 1715     Potassium 4.2 mmol/L      Comment: Specimen hemolyzed.  Result may be falsely elevated.       STAT Lactic Acid, Reflex [[id number]]  (Abnormal) Collected: 07/01/24 1633    Specimen: Blood Updated: 07/01/24 1714     Lactate 3.1 mmol/L     Factor 5 Leiden [[id number]] Collected: 07/01/24 1646    Specimen: Blood Updated: 07/01/24 1649    Factor II, DNA Analysis [[id number]] Collected: 07/01/24 1646    Specimen: Blood Updated: 07/01/24 1649    POC Glucose Once [[id number]]  (Normal) Collected: 07/01/24 1624    Specimen: Blood Updated: 07/01/24 1629     Glucose 77 mg/dL      Comment: Serial Number: [id number]:  [id number]       Hemoglobin A1c [[id number]]  (Abnormal) Collected: 07/01/24 0910    Specimen: Blood Updated: 07/01/24 1325     Hemoglobin A1C 6.14 %     Lipid Panel [[id number]]  (Abnormal) Collected: 07/01/24 0910    Specimen: Blood Updated: 07/01/24 1324     Total Cholesterol 58 mg/dL      Triglycerides 86 mg/dL      HDL Cholesterol 13 mg/dL      LDL Cholesterol  27 mg/dL      VLDL Cholesterol 18 mg/dL      LDL/HDL Ratio 2.14    Narrative:      Cholesterol Reference  "Ranges  ([place] Department of Health and Human Services ATP III Classifications)    Desirable          <200 mg/dL  Borderline High    200-239 mg/dL  High Risk          >240 mg/dL      Triglyceride Reference Ranges  (U.S. Department of Health and Human Services ATP III Classifications)    Normal           <150 mg/dL  Borderline High  150-199 mg/dL  High             200-499 mg/dL  Very High        >500 mg/dL    HDL Reference Ranges  (U.S. Department of Health and Human Services ATP III Classifications)    Low     <40 mg/dl (major risk factor for CHD)  High    >60 mg/dl ('negative' risk factor for CHD)        LDL Reference Ranges  (U.S. Department of Health and Human Services ATP III Classifications)    Optimal          <100 mg/dL  Near Optimal     100-129 mg/dL  Borderline High  130-159 mg/dL  High             160-189 mg/dL  Very High        >189 mg/dL    Procalcitonin [[id number]]  (Abnormal) Collected: 07/01/24 0910    Specimen: Blood Updated: 07/01/24 1308     Procalcitonin 0.48 ng/mL     Narrative:      As a Marker for Sepsis (Non-Neonates):    1. <0.5 ng/mL represents a low risk of severe sepsis and/or septic shock.  2. >2 ng/mL represents a high risk of severe sepsis and/or septic shock.    As a Marker for Lower Respiratory Tract Infections that require antibiotic therapy:    PCT on Admission    Antibiotic Therapy       6-12 Hrs later    >0.5                Strongly Recommended  >0.25 - <0.5        Recommended   0.1 - 0.25          Discouraged              Remeasure/reassess PCT  <0.1                Strongly Discouraged     Remeasure/reassess PCT    As 28 day mortality risk marker: \"Change in Procalcitonin Result\" (>80% or <=80%) if Day 0 (or Day 1) and Day 4 values are available. Refer to http://www.[employer]-pct-calculator.com    Change in PCT <=80%  A decrease of PCT levels below or equal to 80% defines a positive change in PCT test result representing a higher risk for 28-day all-cause mortality of patients " diagnosed with severe sepsis for septic shock.    Change in PCT >80%  A decrease of PCT levels of more than 80% defines a negative change in PCT result representing a lower risk for 28-day all-cause mortality of patients diagnosed with severe sepsis or septic shock.       POC Glucose Q1H [[id number]]  (Normal) Collected: 07/01/24 1156    Specimen: Blood Updated: 07/01/24 1158     Glucose 101 mg/dL      Comment: Serial Number: [id number]:  [id number]       STAT Lactic Acid, Reflex [[id number]]  (Abnormal) Collected: 07/01/24 0910    Specimen: Blood Updated: 07/01/24 0959     Lactate 2.2 mmol/L     High Sensitivity Troponin T 2Hr [[id number]]  (Abnormal) Collected: 07/01/24 0910    Specimen: Blood Updated: 07/01/24 0956     HS Troponin T 36 ng/L      Comment: Specimen hemolyzed.  Results may be falsely decreased.        Troponin T Delta -4 ng/L     Narrative:      High Sensitive Troponin T Reference Range:  <14.0 ng/L- Negative Female for AMI  <22.0 ng/L- Negative Male for AMI  >=14 - Abnormal Female indicating possible myocardial injury.  >=22 - Abnormal Male indicating possible myocardial injury.   Clinicians would have to utilize clinical acumen, EKG, Troponin, and serial changes to determine if it is an Acute Myocardial Infarction or myocardial injury due to an underlying chronic condition.         Comprehensive Metabolic Panel [[id number]]  (Abnormal) Collected: 07/01/24 0910    Specimen: Blood Updated: 07/01/24 0956     Glucose 102 mg/dL      BUN 46 mg/dL      Creatinine 1.33 mg/dL      Sodium 134 mmol/L      Potassium 5.4 mmol/L      Comment: Specimen hemolyzed.  Result may be falsely elevated.        Chloride 97 mmol/L      CO2 31.4 mmol/L      Calcium 10.0 mg/dL      Total Protein 5.9 g/dL      Albumin 2.5 g/dL      ALT (SGPT) 17 U/L      Comment: Specimen hemolyzed.  Result may  be falsely elevated.        AST (SGOT) 39 U/L      Comment: Specimen hemolyzed.  Result may be falsely elevated.        Alkaline  Phosphatase 115 U/L      Total Bilirubin 1.3 mg/dL      Globulin 3.4 gm/dL      A/G Ratio 0.7 g/dL      BUN/Creatinine Ratio 34.6     Anion Gap 5.6 mmol/L      eGFR 43.4 mL/min/1.73     Narrative:      GFR Normal >60  Chronic Kidney Disease <60  Kidney Failure <15      Magnesium [[id number]]  (Normal) Collected: 07/01/24 0910    Specimen: Blood Updated: 07/01/24 0956     Magnesium 2.0 mg/dL     Vancomycin, Random [[id number]]  (Normal) Collected: 07/01/24 0910    Specimen: Blood Updated: 07/01/24 0953     Vancomycin Random 14.30 mcg/mL     Narrative:      Therapeutic Ranges for Vancomycin    Vancomycin Random   5.0-40.0 mcg/mL  Vancomycin Trough   5.0-20.0 mcg/mL  Vancomycin Peak     20.0-40.0 mcg/mL    Phosphorus [[id number]]  (Normal) Collected: 07/01/24 0910    Specimen: Blood Updated: 07/01/24 0953     Phosphorus 3.4 mg/dL     TSH Rfx On Abnormal To Free T4 [[id number]]  (Normal) Collected: 07/01/24 0910    Specimen: Blood Updated: 07/01/24 0953     TSH 2.080 uIU/mL     Calcium, Ionized [[id number]]  (Abnormal) Collected: 07/01/24 0910    Specimen: Blood Updated: 07/01/24 0935     Ionized Calcium 1.34 mmol/L     CBC & Differential [[id number]]  (Abnormal) Collected: 07/01/24 0910    Specimen: Blood Updated: 07/01/24 0917    Narrative:      The following orders were created for panel order CBC & Differential.  Procedure                               Abnormality         Status                     ---------                               -----------         ------                     CBC Auto Differential[[id number]]        Abnormal            Final result                 Please view results for these tests on the individual orders.    CBC Auto Differential [[id number]]  (Abnormal) Collected: 07/01/24 0910    Specimen: Blood Updated: 07/01/24 0917     WBC 13.80 10*3/mm3      RBC 4.62 10*6/mm3      Hemoglobin 14.2 g/dL      Hematocrit 43.8 %      MCV 94.8 fL      MCH 30.7 pg      MCHC 32.4 g/dL      RDW 15.5 %      RDW-SD  52.2 fl      MPV 10.6 fL      Platelets 104 10*3/mm3      Neutrophil % 79.4 %      Lymphocyte % 14.1 %      Monocyte % 5.3 %      Eosinophil % 0.3 %      Basophil % 0.1 %      Immature Grans % 0.8 %      Neutrophils, Absolute 10.96 10*3/mm3      Lymphocytes, Absolute 1.94 10*3/mm3      Monocytes, Absolute 0.73 10*3/mm3      Eosinophils, Absolute 0.04 10*3/mm3      Basophils, Absolute 0.02 10*3/mm3      Immature Grans, Absolute 0.11 10*3/mm3      nRBC 0.2 /100 WBC     Urine Culture - Urine, Urine, Catheter [[id number]] Collected: 06/30/24 1749    Specimen: Urine, Catheter Updated: 07/01/24 0827    POC Glucose Once [[id number]]  (Normal) Collected: 07/01/24 0752    Specimen: Blood Updated: 07/01/24 0753     Glucose 103 mg/dL      Comment: Serial Number: [id number]:  [id number]               Imaging Results (Last 24 Hours)       Procedure Component Value Units Date/Time    US Pelvis Complete [[id number]] Collected: 07/01/24 1341     Updated: 07/01/24 1345    Narrative:      US PELVIS COMPLETE    Date of Exam: 7/1/2024 12:48 PM EDT    Indication: Pt with BLE DVT's, abnormal appearing uterus on CT, eval for fibroids vs Ca.    Comparison: CT abdomen pelvis 7/1/2024.    Technique: Transabdominal and transvaginal ultrasound evaluation of the pelvis was performed utilizing grayscale and color Doppler technique.  Doppler spectral analysis was performed.      Findings:  Uterus measures 11.7 x 8.2 x 6.4 cm. The myometrium is heterogeneous. What is thought to represent a subserosal fibroid projects exophytically from the uterine fundal region measuring 6.8 x 7.7 x 6.6 cm, containing a few coarse calcifications.    Endometrial bilayer thickness is 3 mm, within normal limits for patient of this stated age. No suspicious endometrial lesion is identified. No gross pelvic free fluid is identified. Neither the right ovary nor the left ovary could be visualized, likely   obscured by bowel gas.      Impression:       Impression:  Sonographic features suggest the presence of a large subserosal uterine fundal fibroid.        Electronically Signed: Ann Marie Maloney MD    7/1/2024 1:43 PM EDT    Workstation ID: [id number]    CT Abdomen Pelvis Without Contrast [[id number]] Collected: 07/01/24 1135     Updated: 07/01/24 1141    Narrative:      CT ABDOMEN PELVIS WO CONTRAST    Date of Exam: 7/1/2024 11:30 AM EDT    Indication: abdominal pain, possible panniculitis.    Comparison: None available.    Technique: Axial CT images were obtained of the abdomen and pelvis without the administration of contrast. Sagittal and coronal reconstructions were performed.  Automated exposure control and iterative reconstruction methods were used.      Findings:  There is severe cardiomegaly. There is a moderately large right pleural effusion. There is a small left pleural effusion. There are bibasilar lung infiltrates with areas of atelectasis. There is some contrast in the renal collecting systems from CT   angiogram performed on today's date. There is a small right lower pole parapelvic renal cyst. There is no hydronephrosis. There is bilateral flank edema, greatest on the right. Partially filled bladder appears normal. There is a lobulated mass protruding   from the uterine fundus measuring approximately 8.4 x 5.9 cm measured on image #60 of series 2. There is no large or small bowel dilatation. There is no bowel wall thickening. The appendix is not identified. The abdominal aorta is mildly calcified and   is normal in size. The pancreas is atrophic. The spleen, liver, and adrenal glands appear normal in size. The gallbladder and biliary tree are nondilated.      Impression:      Impression:  Moderately large right pleural effusion and small left pleural effusion. Bibasilar infiltrates suggest atelectasis although associated pneumonia is not excluded.    Severe cardiomegaly.    Bilateral flank edema, greatest on the right.    Probable fibroid uterus.  This could be confirmed with ultrasound.            Electronically Signed: Krystal Lee MD    7/1/2024 11:39 AM EDT    Workstation ID: [id number]    CT Angiogram Chest Pulmonary Embolism [[id number]] Collected: 07/01/24 1007     Updated: 07/01/24 1016    Narrative:      CT ANGIOGRAM CHEST PULMONARY EMBOLISM    Date of Exam: 7/1/2024 10:03 AM EDT    Indication: BLE DVT's, new cardiac arrhythmia, eval for PE.    Comparison: Chest x-ray 6/30/2024    Technique: Axial CT images were obtained of the chest after the uneventful intravenous administration of iodinated contrast utilizing pulmonary embolism protocol.  Sagittal and coronal reconstructions were performed.  Automated exposure control and   iterative reconstruction methods were used.      Findings:  PULMONARY VASCULATURE: Pulmonary arteries are widely patent without evidence of embolus.Main pulmonary artery is normal in size. No evidence of right heart strain.    MEDIASTINUM: The heart is prominent in size. There is biatrial and left ventricular enlargement. No significant pericardial effusion. No evidence for aortic enlargement or dissection.  CORONARY ARTERIES: Mild calcified atherosclerotic disease.  LUNGS: There is bibasilar airspace disease with early consolidation noted. Some focal infiltrate is present in the right middle lung as well with scattered groundglass densities throughout there is a subpleural nodule in the posterior right apical region   measuring 4 mm (image 27 of series 4).  PLEURAL SPACE: There are mild bilateral pleural effusions. No evidence for pneumothorax.  LYMPH NODES: There are no pathologically enlarged lymph nodes.    UPPER ABDOMEN: There is bilateral flank edema.    OSSEOUS STRUCTURES: Appropriate for age with no acute process identified. There is multilevel thoracic spondylosis.      Impression:      Impression:    1. No evidence for pulmonary embolus.    2. Bibasilar airspace disease and right middle lung opacity compatible likely  multifocal pneumonia with bilateral pleural effusions.    3. Cardiomegaly.        Electronically Signed: Shari Kaur MD    7/1/2024 10:14 AM EDT    Workstation ID: [id number]            LAB RESULTS (LAST 7 DAYS)    CBC  Results from last 7 days   Lab Units 07/02/24 0329 07/01/24 0910 06/30/24 1858 06/30/24  1732   WBC 10*3/mm3 10.53 13.80*  --  16.80*   RBC 10*6/mm3 4.48 4.62  --  5.16   HEMOGLOBIN g/dL 13.8 14.2  --  15.9   HEMOGLOBIN, POC g/dL  --   --  17.0  --    HEMATOCRIT % 42.9 43.8  --  49.0*   HEMATOCRIT POC %  --   --  50  --    MCV fL 95.8 94.8  --  95.0   PLATELETS 10*3/mm3 92* 104*  --  119*       BMP  Results from last 7 days   Lab Units 07/02/24 0329 07/01/24 1633 07/01/24 0910 06/30/24 2043 06/30/24 1858   SODIUM mmol/L 138  --  134* 131*  --    POTASSIUM mmol/L 3.5 4.2 5.4* 6.5*  --    CHLORIDE mmol/L 96*  --  97* 101  --    CO2 mmol/L 34.5*  --  31.4* 24.4  --    BUN mg/dL 43*  --  46* 50*  --    CREATININE mg/dL 1.14*  --  1.33* 1.38* 1.40*   GLUCOSE mg/dL 82  --  102* 194*  --    MAGNESIUM mg/dL 1.7  --  2.0  --   --    PHOSPHORUS mg/dL 4.6*  --  3.4  --   --        CMP   Results from last 7 days   Lab Units 07/02/24 0329 07/01/24 1633 07/01/24 0910 06/30/24 2043 06/30/24  1858   SODIUM mmol/L 138  --  134* 131*  --    POTASSIUM mmol/L 3.5 4.2 5.4* 6.5*  --    CHLORIDE mmol/L 96*  --  97* 101  --    CO2 mmol/L 34.5*  --  31.4* 24.4  --    BUN mg/dL 43*  --  46* 50*  --    CREATININE mg/dL 1.14*  --  1.33* 1.38* 1.40*   GLUCOSE mg/dL 82  --  102* 194*  --    ALBUMIN g/dL 2.3*  --  2.5* 2.5*  --    BILIRUBIN mg/dL 0.9  --  1.3* 1.6*  --    ALK PHOS U/L 109  --  115 126*  --    AST (SGOT) U/L 24  --  39* 42*  --    ALT (SGPT) U/L 14  --  17 18  --        BNP        TROPONIN  Results from last 7 days   Lab Units 07/01/24  0910   HSTROP T ng/L 36*       CoAg        Creatinine Clearance  Estimated Creatinine Clearance: 47.9 mL/min (A) (by C-G formula based on SCr of 1.14 mg/dL  (H)).    ABG        Radiology  US Pelvis Complete    Result Date: 7/1/2024  Impression: Sonographic features suggest the presence of a large subserosal uterine fundal fibroid. Electronically Signed: Ann Marie Maloney MD  7/1/2024 1:43 PM EDT  Workstation ID: [id number]    CT Abdomen Pelvis Without Contrast    Result Date: 7/1/2024  Impression: Moderately large right pleural effusion and small left pleural effusion. Bibasilar infiltrates suggest atelectasis although associated pneumonia is not excluded. Severe cardiomegaly. Bilateral flank edema, greatest on the right. Probable fibroid uterus. This could be confirmed with ultrasound. Electronically Signed: Krystal Lee MD  7/1/2024 11:39 AM EDT  Workstation ID: [id number]    Adult Transthoracic Echo Complete w/ Color, Spectral and Contrast if Necessary Per Protocol    Addendum Date: 7/1/2024      Left ventricular ejection fraction appears to be 31 - 35%.   Left ventricular diastolic dysfunction is noted.   The left atrial cavity is dilated.   Moderate aortic valve stenosis is present. Mean/peak gradient of 14/24 mmHg.  Dimensionless index 0.36   Moderate to severe mitral valve regurgitation is present.   Estimated right ventricular systolic pressure from tricuspid regurgitation is normal (<35 mmHg).     CT Angiogram Chest Pulmonary Embolism    Result Date: 7/1/2024  Impression: 1. No evidence for pulmonary embolus. 2. Bibasilar airspace disease and right middle lung opacity compatible likely multifocal pneumonia with bilateral pleural effusions. 3. Cardiomegaly. Electronically Signed: Shari Kaur MD  7/1/2024 10:14 AM EDT  Workstation ID: [id number]    CT Lower Extremity Bilateral Without Contrast    Result Date: 7/1/2024  Impression: Bilateral lower extremity soft tissue swelling greater on the left than the right. There is no soft tissue gas or well-formed abscess. Electronically Signed: Obed Sidhu MD  7/1/2024 1:53 AM EDT  Workstation ID: [id number]    XR Chest 1  View    Result Date: 6/30/2024  Vascular congestion with small effusions Electronically Signed: Eamon Brock MD  6/30/2024 7:27 PM EDT  Workstation ID: [id number]       EKG  I personally viewed and interpreted the patient's EKG/Telemetry data:  ECG 12 Lead Drug Monitoring; Amiodarone   Final Result   HEART ZFUH=552  bpm   RR Abzzagct=057  ms   CO Interval=  ms   P Horizontal Axis=  deg   P Front Axis=  deg   QRSD Xutmdzmr=983  ms   QT Wcrarhmf=060  ms   CFtP=397  ms   QRS Axis=-80  deg   T Wave Axis=102  deg   - ABNORMAL ECG -   Atrial fibrillation   Right bundle branch block   Inferior  infarct, old   Anterolateral  infarct, age indeterminate   When compared with ECG of 30-Jun-2024 16:58:43,   Significant rate decrease   New or worsened ischemia or infarction   Electronically Signed By: Austin Brooks ([facility]) 2024-07-01 13:11:59   Date and Time of Study:2024-07-01 04:42:13      ECG 12 Lead Dyspnea   Final Result   HEART AQRO=942  bpm   RR Vhcofvdc=847  ms   CO Interval=  ms   P Horizontal Axis=  deg   P Front Axis=  deg   QRSD Gkkrxsyt=031  ms   QT Kfafmlpw=301  ms   XLhI=398  ms   QRS Axis=-101  deg   T Wave Axis=43  deg   - ABNORMAL ECG -   Atrial fibrillation   Right bundle branch block   ST elevation secondary to IVCD   Electronically Signed By: Jeffery Kwon ([facility]) 2024-07-01 07:37:02   Date and Time of Study:2024-06-30 16:58:43      Telemetry Scan   Final Result      Telemetry Scan   Final Result      Telemetry Scan   Final Result      Telemetry Scan   Final Result      Telemetry Scan   Final Result      Telemetry Scan   Final Result      Telemetry Scan   Final Result      Telemetry Scan   Final Result      Telemetry Scan   Final Result      ECG 12 Lead Electrolyte Imbalance    (Results Pending)   ECG 12 Lead Drug Monitoring; Amiodarone    (Results Pending)         Echocardiogram:    Results for orders placed during the hospital encounter of 06/30/24    Adult Transthoracic Echo Complete w/ Color, Spectral and  Contrast if Necessary Per Protocol    Interpretation Summary    Left ventricular ejection fraction appears to be 31 - 35%.    Left ventricular diastolic dysfunction is noted.    The left atrial cavity is dilated.    Moderate aortic valve stenosis is present. Mean/peak gradient of 14/24 mmHg.  Dimensionless index 0.36    Moderate to severe mitral valve regurgitation is present.    Estimated right ventricular systolic pressure from tricuspid regurgitation is normal (<35 mmHg).        Stress Test:         Cardiac Catheterization:  No results found for this or any previous visit.         Other:         ASSESSMENT & PLAN:    Principal Problem:    Atrial fibrillation with RVR  Active Problems:    Primary hypertension    Morbid obesity with BMI of 40.0-44.9, adult    Right bundle branch block    Acute deep vein thrombosis (DVT) of both lower extremities    ARABELLA (acute kidney injury)    Acute hyperkalemia    Sepsis    Acute UTI    Acute CHF    Rhinovirus infection    Multifocal pneumonia    Chronic respiratory failure with hypoxia, on home O2 therapy    Skin ulcer of right great toe    Skin ulcer of left great toe    SEDRICK (obstructive sleep apnea)    Atrial fibrillation with RVR  New onset A-fib RVR  Electrolytes have been corrected  Cardizem drip has been discontinued  On amiodarone protocol  Start Toprol-XL  Echocardiogram shows EF of 31 to 35%  IVY0AQ1-QCZn score is 7  Currently on Lovenox  Start oral anticoagulation with Xarelto or Eliquis     Acute systolic CHF  Newly diagnosed  Echocardiogram shows EF of 31 to 35%, moderate aortic stenosis and moderate to severe mitral regurgitation.  Continue IV diuretics  Monitor I's and O's and replace electrolytes as needed.  We will initiate and uptitrate GDMT as tolerated.  Currently blood pressure is low.  Starting Toprol-XL today  Plan to repeat echocardiogram after 3 months of completing GDMT     Acute deep vein thrombosis (DVT) of both lower extremities  Extensive DVT in  bilateral lower extremities involving femoral, popliteal and posterior tibial.  Continue anticoagulation  She will need lifelong anticoagulation    Thrombocytopenia  Platelets are 92  Closely monitor while on anticoagulation     ARABELLA (acute kidney injury)  Presented with creatinine of 1.4.  Creatinine is 1.14 today  Closely monitor renal function while on IV diuresis     Acute hyperkalemia  Hyperkalemia of 6.5  Received Lokelma  Potassium is down to 3.5 now  Closely monitor electrolytes while on diuretics     Sepsis / UTI / Multifocal pneumonia  Multifactorial secondary to UTI and pneumonia with possible cellulitis  Currently on antibiotics per primary team  Lactic acidosis is improving.  2.3 now     Rhinovirus infection  Provide supportive care     Chronic respiratory failure with hypoxia, on home O2 therapy  Currently requiring 2 L of oxygen via nasal cannula which is her baseline     Skin ulcer of right great toe  Skin ulcer of left great toe  Podiatry has been consulted  A1c is 6.2     Primary hypertension, chronic  Currently hypotensive likely secondary to sepsis  Started on midodrine  We will try to initiate Toprol-XL     Morbid obesity with BMI of 40.0-44.9, adult  BMI is 44.  She weighs 212 pounds  Lifestyle modifications discussed with the patient  LDL 27, HDL 13, triglyceride 86 and total cholesterol 58.  No indication for statin     SEDRICK (obstructive sleep apnea)  Encouraged compliance with CPAP      Austin Brooks MD  07/02/24  07:12 EDT                  Electronically signed by Austin Brooks MD at 07/02/24 0952          Consult Notes (last 48 hours)        Gonzales Balderrama at 07/01/24 1521        Consult Orders    1. Inpatient Spiritual Care Consult [[id number]] ordered by Sesar Solorzano DO at 06/30/24 2338                 Initial spiritual care visit. Pt in room alone watching TV. She said to  that if this was the time for God to call her home, she was ready.  inquired if that was  always the case, and she said no. Her greatest desire, at this time and according God's will, is that she lives long enough for her grandchildren to graduate high school. She understands that may not happen and seems to be content with whatever happens.  doesn't believe her to be in denial, but to be realistic about her health and all the implications concerning it. Cardiology practitioner arrived and  prayed before he left. She was thankful for the visit. No other needs at this time.       Electronically signed by Gonzales Balderrama at 07/01/24 1529       Romeo Colbert MD at 07/01/24 1429        Consult Orders    1. Inpatient Infectious Diseases Consult [[id number]] ordered by Toya Pradhan APRN at 07/01/24 1048                 Infectious Diseases Consult Note    Referring Provider: Sesar Solorzano DO    Reason for Consultation: Cellulitis/panniculitis    Patient Care Team:  Rut Pierce APRN as PCP - General (Nurse Practitioner)    Chief complaint bilateral leg edema, erythema, pain, wounds, shortness of breath, weakness, body aches at admission    Subjective     History of present illness:      This is a 69-year-old female presents to the hospital on 6/30/2024 with complaints of worsening bilateral leg pain, redness, swelling, superficial wounds, shortness of breath, weakness and body aches.  Patient apparently has not been to the doctor or hospital in quite some time.  Was found to be in A-fib with RVR at admission.  Denies fever and chills, productive cough, GI symptoms or urinary symptoms.    Review of Systems   Review of Systems   Constitutional: Negative.  Positive for fatigue.   HENT: Negative.     Eyes: Negative.    Respiratory: Negative.  Positive for shortness of breath.    Cardiovascular: Negative.  Positive for leg swelling.   Gastrointestinal: Negative.    Endocrine: Negative.    Genitourinary: Negative.    Musculoskeletal: Negative.  Positive for myalgias.   Skin:  "Negative.  Positive for color change and wound.   Neurological: Negative.  Positive for weakness.   Psychiatric/Behavioral: Negative.     All other systems reviewed and are negative.      Medications  Medications Prior to Admission   Medication Sig Dispense Refill Last Dose    Allergy Relief 180 MG tablet Take 1 tablet by mouth Daily.   6/30/2024 at 1330    bumetanide (BUMEX) 1 MG tablet Take 1 tablet by mouth 3 times a day.   6/30/2024 at 1330    docusate sodium (COLACE) 100 MG capsule Take 1 capsule by mouth Every 12 (Twelve) Hours.       furosemide (LASIX) 20 MG tablet Take 1 tablet by mouth Daily.   6/30/2024 at 1330    HYDROcodone-acetaminophen (NORCO)  MG per tablet Take 1 tablet by mouth 3 times a day.       lisinopril (PRINIVIL,ZESTRIL) 30 MG tablet Take 1 tablet by mouth Daily.   6/30/2024 at 1330    meloxicam (MOBIC) 7.5 MG tablet Take 1 tablet by mouth Daily As Needed.       metoprolol tartrate (LOPRESSOR) 50 MG tablet Take 1 tablet by mouth Daily.   6/30/2024 at 1330    montelukast (SINGULAIR) 10 MG tablet Take 1 tablet by mouth every night at bedtime.   6/29/2024    omeprazole (priLOSEC) 20 MG capsule Take 1 capsule by mouth Daily.   6/30/2024 at 1330    oxybutynin XL (DITROPAN-XL) 10 MG 24 hr tablet Take 2 tablets by mouth Daily.   6/30/2024 at 1330    potassium chloride (MICRO-K) 10 MEQ CR capsule Take 1 capsule by mouth Every 12 (Twelve) Hours.   6/30/2024 at 1330    vitamin D (ERGOCALCIFEROL) 1.25 MG (25512 UT) capsule capsule Take 1 capsule by mouth 2 (Two) Times a Week. Monday and Thursday.   6/27/2024       History  Past Medical History:   Diagnosis Date    Bilateral leg edema     Chronic respiratory failure with hypoxia, on home O2 therapy 07/01/2024    COPD (chronic obstructive pulmonary disease)     Morbid obesity with BMI of 40.0-44.9, adult 11/08/2010    Primary hypertension 03/01/2017    Pulmonary embolism     \"many years ago, was on warfarin\"    Sleep apnea      History reviewed. No " pertinent surgical history.    Family History  History reviewed. No pertinent family history.    Social History   reports that she has never smoked. She has never used smokeless tobacco. She reports that she does not drink alcohol and does not use drugs.    Allergies  Patient has no known allergies.    Objective     Vital Signs   Vital Signs (last 24 hours)         06/30 0700  07/01 0659 07/01 0700  07/01 1430   Most Recent      Temp (°F) 97.2 -  98.6    96.2 -  96.8     96.8 (36) 07/01 1200    Heart Rate 100 -  151    98 -  111     102 07/01 1300    Resp 15 -  22    9 -  18     14 07/01 1300    BP 88/54 -  157/67    89/59 -  104/60     101/57 07/01 1300    SpO2 (%) 95 -  99    95 -  98     98 07/01 1300    Flow (L/min)   3    2 -  3     2 07/01 0845            Physical Exam:  Physical Exam  Vitals and nursing note reviewed.   Constitutional:       General: She is not in acute distress.     Appearance: She is well-developed. She is obese. She is ill-appearing. She is not diaphoretic.   HENT:      Head: Normocephalic and atraumatic.   Eyes:      General: No scleral icterus.     Extraocular Movements: Extraocular movements intact.      Conjunctiva/sclera: Conjunctivae normal.      Pupils: Pupils are equal, round, and reactive to light.   Cardiovascular:      Rate and Rhythm: Regular rhythm. Tachycardia present.      Heart sounds: Normal heart sounds, S1 normal and S2 normal. No murmur heard.  Pulmonary:      Effort: Pulmonary effort is normal. No respiratory distress.      Breath sounds: Normal breath sounds. No stridor. No wheezing or rales.   Chest:      Chest wall: No tenderness.   Abdominal:      General: Bowel sounds are normal. There is no distension.      Palpations: Abdomen is soft. There is no mass.      Tenderness: There is no abdominal tenderness. There is no guarding.   Musculoskeletal:         General: No swelling, tenderness or deformity.      Cervical back: Neck supple.      Right lower leg: Edema  present.      Left lower leg: Edema present.   Skin:     General: Skin is warm and dry.      Coloration: Skin is not pale.      Findings: Erythema present. No bruising or rash.      Comments: Significant erythema to the left leg with tenderness-venous stasis changes      Vague erythema and tenderness to the lower abdomen    Superficial wounds to bilateral great toes-no signs of active infection   Neurological:      Mental Status: She is alert and oriented to person, place, and time.     Left                Right              Microbiology  Microbiology Results (last 10 days)       Procedure Component Value - Date/Time    Respiratory Panel PCR w/COVID-19(SARS-CoV-2) NIKKI/CHRISTY/RAMIRO/PAD/COR/FRANCIA In-House, NP Swab in UTM/VTM, 2 HR TAT - Swab, Nasopharynx [[id number]]  (Abnormal) Collected: 06/30/24 2340    Lab Status: Final result Specimen: Swab from Nasopharynx Updated: 07/01/24 0055     ADENOVIRUS, PCR Not Detected     Coronavirus 229E Not Detected     Coronavirus HKU1 Not Detected     Coronavirus NL63 Not Detected     Coronavirus OC43 Not Detected     COVID19 Not Detected     Human Metapneumovirus Not Detected     Human Rhinovirus/Enterovirus Detected     Influenza A PCR Not Detected     Influenza B PCR Not Detected     Parainfluenza Virus 1 Not Detected     Parainfluenza Virus 2 Not Detected     Parainfluenza Virus 3 Not Detected     Parainfluenza Virus 4 Not Detected     RSV, PCR Not Detected     Bordetella pertussis pcr Not Detected     Bordetella parapertussis PCR Not Detected     Chlamydophila pneumoniae PCR Not Detected     Mycoplasma pneumo by PCR Not Detected    Narrative:      In the setting of a positive respiratory panel with a viral infection PLUS a negative procalcitonin without other underlying concern for bacterial infection, consider observing off antibiotics or discontinuation of antibiotics and continue supportive care. If the respiratory panel is positive for atypical bacterial infection (Bordetella  pertussis, Chlamydophila pneumoniae, or Mycoplasma pneumoniae), consider antibiotic de-escalation to target atypical bacterial infection.    MRSA Screen, PCR (Inpatient) - Swab, Nares [[id number]]  (Abnormal) Collected: 06/30/24 2340    Lab Status: Final result Specimen: Swab from Nares Updated: 07/01/24 0125     MRSA PCR MRSA Detected    Narrative:      The negative predictive value of this diagnostic test is high and should only be used to consider de-escalating anti-MRSA therapy. A positive result may indicate colonization with MRSA and must be correlated clinically.            Laboratory  Results from last 7 days   Lab Units 07/01/24  0910   WBC 10*3/mm3 13.80*   HEMOGLOBIN g/dL 14.2   HEMATOCRIT % 43.8   PLATELETS 10*3/mm3 104*     Results from last 7 days   Lab Units 07/01/24  0910   SODIUM mmol/L 134*   POTASSIUM mmol/L 5.4*   CHLORIDE mmol/L 97*   CO2 mmol/L 31.4*   BUN mg/dL 46*   CREATININE mg/dL 1.33*   GLUCOSE mg/dL 102*   CALCIUM mg/dL 10.0     Results from last 7 days   Lab Units 07/01/24  0910   SODIUM mmol/L 134*   POTASSIUM mmol/L 5.4*   CHLORIDE mmol/L 97*   CO2 mmol/L 31.4*   BUN mg/dL 46*   CREATININE mg/dL 1.33*   GLUCOSE mg/dL 102*   CALCIUM mg/dL 10.0                   Radiology  Imaging Results (Last 72 Hours)       Procedure Component Value Units Date/Time    US Pelvis Complete [[id number]] Collected: 07/01/24 1341     Updated: 07/01/24 1345    Narrative:      US PELVIS COMPLETE    Date of Exam: 7/1/2024 12:48 PM EDT    Indication: Pt with BLE DVT's, abnormal appearing uterus on CT, eval for fibroids vs Ca.    Comparison: CT abdomen pelvis 7/1/2024.    Technique: Transabdominal and transvaginal ultrasound evaluation of the pelvis was performed utilizing grayscale and color Doppler technique.  Doppler spectral analysis was performed.      Findings:  Uterus measures 11.7 x 8.2 x 6.4 cm. The myometrium is heterogeneous. What is thought to represent a subserosal fibroid projects exophytically from  the uterine fundal region measuring 6.8 x 7.7 x 6.6 cm, containing a few coarse calcifications.    Endometrial bilayer thickness is 3 mm, within normal limits for patient of this stated age. No suspicious endometrial lesion is identified. No gross pelvic free fluid is identified. Neither the right ovary nor the left ovary could be visualized, likely   obscured by bowel gas.      Impression:      Impression:  Sonographic features suggest the presence of a large subserosal uterine fundal fibroid.        Electronically Signed: Ann Marie Maloney MD    7/1/2024 1:43 PM EDT    Workstation ID: [id number]    CT Abdomen Pelvis Without Contrast [[id number]] Collected: 07/01/24 1135     Updated: 07/01/24 1141    Narrative:      CT ABDOMEN PELVIS WO CONTRAST    Date of Exam: 7/1/2024 11:30 AM EDT    Indication: abdominal pain, possible panniculitis.    Comparison: None available.    Technique: Axial CT images were obtained of the abdomen and pelvis without the administration of contrast. Sagittal and coronal reconstructions were performed.  Automated exposure control and iterative reconstruction methods were used.      Findings:  There is severe cardiomegaly. There is a moderately large right pleural effusion. There is a small left pleural effusion. There are bibasilar lung infiltrates with areas of atelectasis. There is some contrast in the renal collecting systems from CT   angiogram performed on today's date. There is a small right lower pole parapelvic renal cyst. There is no hydronephrosis. There is bilateral flank edema, greatest on the right. Partially filled bladder appears normal. There is a lobulated mass protruding   from the uterine fundus measuring approximately 8.4 x 5.9 cm measured on image #60 of series 2. There is no large or small bowel dilatation. There is no bowel wall thickening. The appendix is not identified. The abdominal aorta is mildly calcified and   is normal in size. The pancreas is atrophic. The spleen,  liver, and adrenal glands appear normal in size. The gallbladder and biliary tree are nondilated.      Impression:      Impression:  Moderately large right pleural effusion and small left pleural effusion. Bibasilar infiltrates suggest atelectasis although associated pneumonia is not excluded.    Severe cardiomegaly.    Bilateral flank edema, greatest on the right.    Probable fibroid uterus. This could be confirmed with ultrasound.            Electronically Signed: Krystal Lee MD    7/1/2024 11:39 AM EDT    Workstation ID: [id number]    CT Angiogram Chest Pulmonary Embolism [[id number]] Collected: 07/01/24 1007     Updated: 07/01/24 1016    Narrative:      CT ANGIOGRAM CHEST PULMONARY EMBOLISM    Date of Exam: 7/1/2024 10:03 AM EDT    Indication: BLE DVT's, new cardiac arrhythmia, eval for PE.    Comparison: Chest x-ray 6/30/2024    Technique: Axial CT images were obtained of the chest after the uneventful intravenous administration of iodinated contrast utilizing pulmonary embolism protocol.  Sagittal and coronal reconstructions were performed.  Automated exposure control and   iterative reconstruction methods were used.      Findings:  PULMONARY VASCULATURE: Pulmonary arteries are widely patent without evidence of embolus.Main pulmonary artery is normal in size. No evidence of right heart strain.    MEDIASTINUM: The heart is prominent in size. There is biatrial and left ventricular enlargement. No significant pericardial effusion. No evidence for aortic enlargement or dissection.  CORONARY ARTERIES: Mild calcified atherosclerotic disease.  LUNGS: There is bibasilar airspace disease with early consolidation noted. Some focal infiltrate is present in the right middle lung as well with scattered groundglass densities throughout there is a subpleural nodule in the posterior right apical region   measuring 4 mm (image 27 of series 4).  PLEURAL SPACE: There are mild bilateral pleural effusions. No evidence for  pneumothorax.  LYMPH NODES: There are no pathologically enlarged lymph nodes.    UPPER ABDOMEN: There is bilateral flank edema.    OSSEOUS STRUCTURES: Appropriate for age with no acute process identified. There is multilevel thoracic spondylosis.      Impression:      Impression:    1. No evidence for pulmonary embolus.    2. Bibasilar airspace disease and right middle lung opacity compatible likely multifocal pneumonia with bilateral pleural effusions.    3. Cardiomegaly.        Electronically Signed: Shari Kaur MD    7/1/2024 10:14 AM EDT    Workstation ID: [id number]    CT Lower Extremity Bilateral Without Contrast [[id number]] Collected: 07/01/24 0149     Updated: 07/01/24 0155    Narrative:      CT LOWER EXTREMITY BILATERAL WO CONTRAST    Date of Exam: 6/30/2024 10:48 PM EDT    Indication: BLE edema L>R, multiple ulcerations, erythema.    Comparison: None available.    Technique: Axial CT images were obtained of the bilateral lower extremities without contrast administration.  Sagittal and coronal reconstructions were performed.  Automated exposure control and iterative reconstruction methods were used.      Findings:  There is bilateral lower extremity soft tissue swelling greater on the left than the right. There is diffuse skin thickening bilaterally involving the thighs and to a lesser degree the lower legs. There is no evidence of an acute osseous abnormality.   There are advanced degenerative changes of both knees. There is no definite enlargement of the venous structures. There is no soft tissue gas. There is no well-formed abscess.      Impression:      Impression:  Bilateral lower extremity soft tissue swelling greater on the left than the right. There is no soft tissue gas or well-formed abscess.        Electronically Signed: Obed Sidhu MD    7/1/2024 1:53 AM EDT    Workstation ID: [id number]    XR Chest 1 View [[id number]] Collected: 06/30/24 1927     Updated: 06/30/24 1929    Narrative:      XR  CHEST 1 VW    Date of Exam: 6/30/2024 7:06 PM EDT    Indication: Shortness of breath    Comparison: 6/4/2009    Findings: Vascular congestion. Small effusions. No pneumothorax. The clavicles are intact. No rib fractures.      Impression:      Vascular congestion with small effusions      Electronically Signed: Eamon Brock MD    6/30/2024 7:27 PM EDT    Workstation ID: [id number]            Cardiology      Results Review:  I have reviewed all clinical data, test, lab, and imaging results.       Schedule Meds  [START ON 7/2/2024] amiodarone, 200 mg, Oral, Once   Followed by  [START ON 7/2/2024] amiodarone, 200 mg, Oral, Q8H   Followed by  [START ON 7/9/2024] amiodarone, 200 mg, Oral, Q12H   Followed by  [START ON 7/23/2024] amiodarone, 200 mg, Oral, Daily  bumetanide, 2 mg, Intravenous, Q8H  calcium gluconate, 1,000 mg, Intravenous, Once  cefepime, 2,000 mg, Intravenous, Q12H  dextrose, 25 g, Intravenous, Once  enoxaparin, 1 mg/kg, Subcutaneous, Q12H  insulin regular, 10 Units, Intravenous, Once  miconazole, 1 Application, Topical, Q12H  midodrine, 5 mg, Oral, Q8H  povidone-iodine, , Topical, Daily  sodium chloride, 10 mL, Intravenous, Q12H  vancomycin, 1,500 mg, Intravenous, Once        Infusion Meds  amiodarone, 0.5 mg/min, Last Rate: 0.5 mg/min (07/01/24 0617)  Pharmacy to Dose Cefepime,   Pharmacy to Dose enoxaparin (LOVENOX),   Pharmacy to dose vancomycin,         PRN Meds    acetaminophen **OR** acetaminophen    nitroglycerin    ondansetron ODT **OR** ondansetron    oxyCODONE    Pharmacy to Dose Cefepime    Pharmacy to Dose enoxaparin (LOVENOX)    Pharmacy to dose vancomycin    [COMPLETED] Insert Peripheral IV **AND** sodium chloride    sodium chloride    sodium chloride    Vancomycin Pharmacy Intermittent/Pulse Dosing      Assessment & Plan       Assessment    Left lower extremity cellulitis with erythema involving the left leg on the top of chronic venous stasis changes.  Patient has superficial wounds mostly  involving the left great toe.    Chronic bilateral leg edema with chronic skin changes and superficial wounds.  Concerning for lymphedema.  Wound care also following.  Current Doppler does show acute right and left leg DVT    Lower mild panniculitis with some erythema and tenderness to the lower pannus/suprapubic area    Abnormal urinalysis with some bacteria and pyuria    Mild leukocytosis-likely related to the above infections .    Mild hypoxia with large right pleural effusion and small left pleural effusion.  Patient is currently on 2 L of oxygen by nasal cannula rhinovirus infection.  CT PE protocol was negative for PE    Acute kidney injury    COPD/sleep apnea    History of pulmonary embolism    Plan    Discontinue IV cefepime  Start IV Rocephin 1 g every 24 hours empirically for UTI treatment  Continue IV vancomycin-asked pharmacy to monitor and dose  Blood cultures pending  Waiting on urine culture  Continue supportive care  A.m. labs  Keep legs above heart level is much as possible    The above note was transcribed for Dr. Colbert-physical exam and review of systems were performed by him      Keyana Perez, MANJU  07/01/24  14:30 EDT    Note is dictated utilizing voice recognition software/Dragon    Electronically signed by Romeo Colbert MD at 07/02/24 3392

## 2024-07-03 NOTE — CASE MANAGEMENT/SOCIAL WORK
Continued Stay Note   Vince     Patient Name: Ban Brennan  MRN: [id number]  Today's Date: 7/3/2024    Admit Date: 6/30/2024    Plan: DC Plan: [facility] accepted and following. Precert auto approved 7/3/2024. Good 7/4/2024 through 7/10/2024. PASRR approved. Will need EMS transport at DC.   Discharge Plan       Row Name 07/03/24 1343       Plan    Plan DC Plan: [facility] accepted and following. Precert auto approved 7/3/2024. Good 7/4/2024 through 7/10/2024. PASRR approved. Will need EMS transport at DC.    Patient/Family in Agreement with Plan yes    Provided Post Acute Provider List? Yes    Post Acute Provider List Inpatient Rehab    Provided Post Acute Provider Quality & Resource List? Yes    Post Acute Provider Quality and Resource List Inpatient Rehab    Delivered To Patient;Support Person    Support Person Lillian Valle    Method of Delivery In person;Telephone    Plan Comments CM spoke with Hospitalist and nursing for morning rounds and reviewed chart for clinical updates. Plan for patien to DC in 1-2 days. PT/OT recommeding SNF at DC. CM spoke with patient at bedside to discuss DC planning. CM provided options for SNF via [facility] in [facility]. Patient contacted daughter and placed her on speaker phone for decision. Both agreed on [facility] for placement. CM obtained daughter Chelsi [employer] contact information and updated in Epic. Patient also expressed that her daughter should be her decision maker if needed. CM requested  to see for HCS completion. CM placed referral in [facility] for [facility] and elsi Bañuelos notified. Edda confirmed acceptance and bed availability. CM requested PASRR completion from  and it was approved.CM sumbitted precert for [facility] via [employer] on 7/3/2024. Pre Auth ID #  [id number] and was auto approved for 7/4/24 - 7/10/2024. CM informed Ronni RUBI CMA, MD, and nursing of approval.CM will continue to follow for any further  needs and adjust discharge plan accordingly. DC Barriers: Cardiac monitoring, IV Electrolytes, and monitor Renal Function.               Expected Discharge Date and Time       Expected Discharge Date Expected Discharge Time    Jul 4, 2024           Met with patient in room wearing PPE: mask,  gloves, gown.      Maintain distance greater than six feet and spent less than fifteen minutes in the room.      Maria Victoria Montero RN     Office Phone: [phone]  Office Cell:     [phone]

## 2024-07-03 NOTE — THERAPY EVALUATION
Subjective: Pt agreeable to therapeutic plan of care.  Cognition: oriented to Person, Place, Time, and Situation    Objective:     Bed Mobility: Mod-A and Assist x 2 sit to supine, Mod-Max Ax2 for sit to supine.  Functional Transfers: N/A or Not attempted.     Balance: sitting EOB, supported, static, and with UE support Mod-A  Functional Ambulation: N/A or Not attempted.    Grooming: Dependent   ADL Position: edge of bed sitting  ADL Comments: Pt with weeping on BLEs noted to worsen when sitting EOB. She was dependent for hygiene of lower legs, cleansing them from the seated position.    Seated EOB, pt noted to have posterior lean causing her to slide forward into an unsafe position. Pt verbally cued to lean forward at the waist to improved weight displacement over JOE for increased safety. Noted at times to go completely limp for a very brief moment of time prior to spontaneous recovery, however VSS and patient never lost consciousness. Pt cued for hand placement and weight shifting throughout sitting EOB.    Vitals: Tachycardic - HR elevated to 128 with sitting EOB    Pain: 5 VAS  Location: BLEs  Interventions for pain: Repositioned, Increased Activity, and Therapeutic Presence  Education: Provided education on the importance of mobility in the acute care setting, Verbal/Tactile Cues, ADL training, and Transfer Training      Assessment: Ban Brennan presents with ADL impairments affecting function including balance, endurance / activity tolerance, pain, and strength. Demonstrated functioning below baseline abilities indicate the need for continued skilled intervention while inpatient. Tolerating session today without incident. Pt remains far from reported baseline, with assist x2 required for bed mobility. Unable to progress to standing nor mobilizing this date. Will continue to follow and progress as tolerated.     Plan/Recommendations:   Moderate Intensity Therapy recommended post-acute care. This is  "recommended as therapy feels the patient would require 3-4 days per week and wouldn't tolerate \"3 hour daily\" rehab intensity. SNF would be the preferred choice. If the patient does not agree to SNF, arrange HH or OP depending on home bound status. If patient is medically complex, consider LTACH.. Pt requires no DME at discharge.     Pt desires Skilled Rehab placement at discharge. Pt cooperative; agreeable to therapeutic recommendations and plan of care.     Modified Carver: N/A = No pre-op stroke/TIA    Post-Tx Position: Supine with HOB Elevated, Alarms activated, and Call light and personal items within reach  PPE: gloves and surgical mask    "

## 2024-07-04 ENCOUNTER — APPOINTMENT (OUTPATIENT)
Dept: GENERAL RADIOLOGY | Facility: HOSPITAL | Age: 69
End: 2024-07-04
Payer: MEDICARE

## 2024-07-04 ENCOUNTER — APPOINTMENT (OUTPATIENT)
Dept: ULTRASOUND IMAGING | Facility: HOSPITAL | Age: 69
End: 2024-07-04
Payer: MEDICARE

## 2024-07-04 LAB
ALBUMIN SERPL-MCNC: 2.6 G/DL (ref 3.5–5.2)
ALBUMIN/GLOB SERPL: 0.7 G/DL
ALP SERPL-CCNC: 138 U/L (ref 39–117)
ALT SERPL W P-5'-P-CCNC: 17 U/L (ref 1–33)
ANION GAP SERPL CALCULATED.3IONS-SCNC: 8.1 MMOL/L (ref 5–15)
ANION GAP SERPL CALCULATED.3IONS-SCNC: 8.6 MMOL/L (ref 5–15)
AST SERPL-CCNC: 38 U/L (ref 1–32)
BASOPHILS # BLD AUTO: 0.02 10*3/MM3 (ref 0–0.2)
BASOPHILS NFR BLD AUTO: 0.2 % (ref 0–1.5)
BILIRUB SERPL-MCNC: 1.3 MG/DL (ref 0–1.2)
BUN SERPL-MCNC: 54 MG/DL (ref 8–23)
BUN SERPL-MCNC: 60 MG/DL (ref 8–23)
BUN/CREAT SERPL: 30.5 (ref 7–25)
BUN/CREAT SERPL: 30.8 (ref 7–25)
CALCIUM SPEC-SCNC: 9.5 MG/DL (ref 8.6–10.5)
CALCIUM SPEC-SCNC: 9.7 MG/DL (ref 8.6–10.5)
CHLORIDE SERPL-SCNC: 94 MMOL/L (ref 98–107)
CHLORIDE SERPL-SCNC: 95 MMOL/L (ref 98–107)
CO2 SERPL-SCNC: 33.4 MMOL/L (ref 22–29)
CO2 SERPL-SCNC: 33.9 MMOL/L (ref 22–29)
CREAT SERPL-MCNC: 1.77 MG/DL (ref 0.57–1)
CREAT SERPL-MCNC: 1.95 MG/DL (ref 0.57–1)
DEPRECATED RDW RBC AUTO: 56.4 FL (ref 37–54)
EGFRCR SERPLBLD CKD-EPI 2021: 27.4 ML/MIN/1.73
EGFRCR SERPLBLD CKD-EPI 2021: 30.8 ML/MIN/1.73
EOSINOPHIL # BLD AUTO: 0.01 10*3/MM3 (ref 0–0.4)
EOSINOPHIL NFR BLD AUTO: 0.1 % (ref 0.3–6.2)
ERYTHROCYTE [DISTWIDTH] IN BLOOD BY AUTOMATED COUNT: 15.9 % (ref 12.3–15.4)
GLOBULIN UR ELPH-MCNC: 4 GM/DL
GLUCOSE BLDC GLUCOMTR-MCNC: 150 MG/DL (ref 70–105)
GLUCOSE BLDC GLUCOMTR-MCNC: 81 MG/DL (ref 70–105)
GLUCOSE SERPL-MCNC: 101 MG/DL (ref 65–99)
GLUCOSE SERPL-MCNC: 108 MG/DL (ref 65–99)
HCT VFR BLD AUTO: 47.3 % (ref 34–46.6)
HGB BLD-MCNC: 14.7 G/DL (ref 12–15.9)
IMM GRANULOCYTES # BLD AUTO: 0.09 10*3/MM3 (ref 0–0.05)
IMM GRANULOCYTES NFR BLD AUTO: 0.9 % (ref 0–0.5)
LYMPHOCYTES # BLD AUTO: 0.93 10*3/MM3 (ref 0.7–3.1)
LYMPHOCYTES NFR BLD AUTO: 9.5 % (ref 19.6–45.3)
MAGNESIUM SERPL-MCNC: 1.9 MG/DL (ref 1.6–2.4)
MCH RBC QN AUTO: 30.6 PG (ref 26.6–33)
MCHC RBC AUTO-ENTMCNC: 31.1 G/DL (ref 31.5–35.7)
MCV RBC AUTO: 98.5 FL (ref 79–97)
MONOCYTES # BLD AUTO: 1.16 10*3/MM3 (ref 0.1–0.9)
MONOCYTES NFR BLD AUTO: 11.9 % (ref 5–12)
NEUTROPHILS NFR BLD AUTO: 7.55 10*3/MM3 (ref 1.7–7)
NEUTROPHILS NFR BLD AUTO: 77.4 % (ref 42.7–76)
NRBC BLD AUTO-RTO: 0.8 /100 WBC (ref 0–0.2)
NT-PROBNP SERPL-MCNC: ABNORMAL PG/ML (ref 0–900)
PHOSPHATE SERPL-MCNC: 5.8 MG/DL (ref 2.5–4.5)
PLATELET # BLD AUTO: 123 10*3/MM3 (ref 140–450)
PMV BLD AUTO: 10.7 FL (ref 6–12)
POTASSIUM SERPL-SCNC: 5.4 MMOL/L (ref 3.5–5.2)
POTASSIUM SERPL-SCNC: 5.9 MMOL/L (ref 3.5–5.2)
POTASSIUM SERPL-SCNC: 6 MMOL/L (ref 3.5–5.2)
PROT ?TM UR-MCNC: 25.9 MG/DL
PROT SERPL-MCNC: 6.6 G/DL (ref 6–8.5)
RBC # BLD AUTO: 4.8 10*6/MM3 (ref 3.77–5.28)
SODIUM SERPL-SCNC: 136 MMOL/L (ref 136–145)
SODIUM SERPL-SCNC: 137 MMOL/L (ref 136–145)
SODIUM UR-SCNC: <20 MMOL/L
WBC NRBC COR # BLD AUTO: 9.76 10*3/MM3 (ref 3.4–10.8)

## 2024-07-04 PROCEDURE — 25010000002 CALCIUM GLUCONATE-NACL 1-0.675 GM/50ML-% SOLUTION: Performed by: INTERNAL MEDICINE

## 2024-07-04 PROCEDURE — 93010 ELECTROCARDIOGRAM REPORT: CPT | Performed by: INTERNAL MEDICINE

## 2024-07-04 PROCEDURE — 74018 RADEX ABDOMEN 1 VIEW: CPT

## 2024-07-04 PROCEDURE — 82948 REAGENT STRIP/BLOOD GLUCOSE: CPT

## 2024-07-04 PROCEDURE — 83880 ASSAY OF NATRIURETIC PEPTIDE: CPT | Performed by: INTERNAL MEDICINE

## 2024-07-04 PROCEDURE — 93005 ELECTROCARDIOGRAM TRACING: CPT | Performed by: NURSE PRACTITIONER

## 2024-07-04 PROCEDURE — 25010000002 DIGOXIN PER 500 MCG: Performed by: NURSE PRACTITIONER

## 2024-07-04 PROCEDURE — 84132 ASSAY OF SERUM POTASSIUM: CPT | Performed by: INTERNAL MEDICINE

## 2024-07-04 PROCEDURE — 76775 US EXAM ABDO BACK WALL LIM: CPT

## 2024-07-04 PROCEDURE — 84100 ASSAY OF PHOSPHORUS: CPT | Performed by: NURSE PRACTITIONER

## 2024-07-04 PROCEDURE — 94799 UNLISTED PULMONARY SVC/PX: CPT

## 2024-07-04 PROCEDURE — 94761 N-INVAS EAR/PLS OXIMETRY MLT: CPT

## 2024-07-04 PROCEDURE — 80053 COMPREHEN METABOLIC PANEL: CPT | Performed by: NURSE PRACTITIONER

## 2024-07-04 PROCEDURE — 25010000002 BUMETANIDE PER 0.5 MG: Performed by: INTERNAL MEDICINE

## 2024-07-04 PROCEDURE — 83735 ASSAY OF MAGNESIUM: CPT | Performed by: NURSE PRACTITIONER

## 2024-07-04 PROCEDURE — 99233 SBSQ HOSP IP/OBS HIGH 50: CPT | Performed by: NURSE PRACTITIONER

## 2024-07-04 PROCEDURE — 85025 COMPLETE CBC W/AUTO DIFF WBC: CPT | Performed by: NURSE PRACTITIONER

## 2024-07-04 PROCEDURE — 84300 ASSAY OF URINE SODIUM: CPT | Performed by: INTERNAL MEDICINE

## 2024-07-04 PROCEDURE — 93005 ELECTROCARDIOGRAM TRACING: CPT | Performed by: STUDENT IN AN ORGANIZED HEALTH CARE EDUCATION/TRAINING PROGRAM

## 2024-07-04 PROCEDURE — 63710000001 INSULIN REGULAR HUMAN PER 5 UNITS: Performed by: INTERNAL MEDICINE

## 2024-07-04 PROCEDURE — 84156 ASSAY OF PROTEIN URINE: CPT | Performed by: INTERNAL MEDICINE

## 2024-07-04 PROCEDURE — 71045 X-RAY EXAM CHEST 1 VIEW: CPT

## 2024-07-04 PROCEDURE — 82570 ASSAY OF URINE CREATININE: CPT | Performed by: INTERNAL MEDICINE

## 2024-07-04 RX ORDER — ACETAMINOPHEN 325 MG/1
650 TABLET ORAL EVERY 4 HOURS PRN
Status: DISCONTINUED | OUTPATIENT
Start: 2024-07-04 | End: 2024-07-22

## 2024-07-04 RX ORDER — IPRATROPIUM BROMIDE AND ALBUTEROL SULFATE 2.5; .5 MG/3ML; MG/3ML
3 SOLUTION RESPIRATORY (INHALATION) EVERY 4 HOURS PRN
Status: DISCONTINUED | OUTPATIENT
Start: 2024-07-04 | End: 2024-07-23 | Stop reason: HOSPADM

## 2024-07-04 RX ORDER — DIGOXIN 0.25 MG/ML
250 INJECTION INTRAMUSCULAR; INTRAVENOUS ONCE
Status: COMPLETED | OUTPATIENT
Start: 2024-07-04 | End: 2024-07-04

## 2024-07-04 RX ORDER — MIDODRINE HYDROCHLORIDE 5 MG/1
10 TABLET ORAL EVERY 8 HOURS
Status: DISCONTINUED | OUTPATIENT
Start: 2024-07-05 | End: 2024-07-08

## 2024-07-04 RX ORDER — OXYCODONE HYDROCHLORIDE 5 MG/1
5 TABLET ORAL EVERY 6 HOURS PRN
Status: DISCONTINUED | OUTPATIENT
Start: 2024-07-04 | End: 2024-07-05

## 2024-07-04 RX ORDER — AMIODARONE HYDROCHLORIDE 200 MG/1
200 TABLET ORAL EVERY 8 HOURS SCHEDULED
Qty: 12 TABLET | Refills: 0 | Status: COMPLETED | OUTPATIENT
Start: 2024-07-05 | End: 2024-07-08

## 2024-07-04 RX ORDER — BUMETANIDE 0.25 MG/ML
2 INJECTION INTRAMUSCULAR; INTRAVENOUS ONCE
Status: COMPLETED | OUTPATIENT
Start: 2024-07-04 | End: 2024-07-04

## 2024-07-04 RX ORDER — DOXYCYCLINE 100 MG/1
100 CAPSULE ORAL EVERY 12 HOURS SCHEDULED
Status: COMPLETED | OUTPATIENT
Start: 2024-07-05 | End: 2024-07-09

## 2024-07-04 RX ORDER — ACETAMINOPHEN 650 MG/1
650 SUPPOSITORY RECTAL EVERY 4 HOURS PRN
Status: DISCONTINUED | OUTPATIENT
Start: 2024-07-04 | End: 2024-07-22

## 2024-07-04 RX ORDER — AMIODARONE HYDROCHLORIDE 200 MG/1
200 TABLET ORAL EVERY 12 HOURS
Qty: 28 TABLET | Refills: 0 | Status: COMPLETED | OUTPATIENT
Start: 2024-07-09 | End: 2024-07-22

## 2024-07-04 RX ORDER — BUMETANIDE 1 MG/1
1 TABLET ORAL
Status: DISCONTINUED | OUTPATIENT
Start: 2024-07-05 | End: 2024-07-05

## 2024-07-04 RX ORDER — METHYLPREDNISOLONE SODIUM SUCCINATE 125 MG/2ML
125 INJECTION, POWDER, LYOPHILIZED, FOR SOLUTION INTRAMUSCULAR; INTRAVENOUS ONCE
Status: COMPLETED | OUTPATIENT
Start: 2024-07-05 | End: 2024-07-05

## 2024-07-04 RX ORDER — OXYCODONE HYDROCHLORIDE 5 MG/1
10 TABLET ORAL 3 TIMES DAILY
Status: DISCONTINUED | OUTPATIENT
Start: 2024-07-05 | End: 2024-07-05

## 2024-07-04 RX ORDER — DEXTROSE MONOHYDRATE 25 G/50ML
50 INJECTION, SOLUTION INTRAVENOUS ONCE
Status: COMPLETED | OUTPATIENT
Start: 2024-07-04 | End: 2024-07-04

## 2024-07-04 RX ORDER — CALCIUM GLUCONATE 20 MG/ML
1000 INJECTION, SOLUTION INTRAVENOUS ONCE
Status: COMPLETED | OUTPATIENT
Start: 2024-07-04 | End: 2024-07-04

## 2024-07-04 RX ORDER — AMIODARONE HYDROCHLORIDE 200 MG/1
200 TABLET ORAL DAILY
Status: DISCONTINUED | OUTPATIENT
Start: 2024-07-23 | End: 2024-07-23 | Stop reason: HOSPADM

## 2024-07-04 RX ORDER — CEPHALEXIN 500 MG/1
500 CAPSULE ORAL EVERY 8 HOURS SCHEDULED
Status: COMPLETED | OUTPATIENT
Start: 2024-07-05 | End: 2024-07-10

## 2024-07-04 RX ORDER — NOREPINEPHRINE BITARTRATE 0.03 MG/ML
.02-.3 INJECTION, SOLUTION INTRAVENOUS
Status: DISCONTINUED | OUTPATIENT
Start: 2024-07-04 | End: 2024-07-04

## 2024-07-04 RX ORDER — PANTOPRAZOLE SODIUM 40 MG/10ML
40 INJECTION, POWDER, LYOPHILIZED, FOR SOLUTION INTRAVENOUS
Status: DISCONTINUED | OUTPATIENT
Start: 2024-07-05 | End: 2024-07-06

## 2024-07-04 RX ADMIN — BUMETANIDE 2 MG: 0.25 INJECTION INTRAMUSCULAR; INTRAVENOUS at 20:09

## 2024-07-04 RX ADMIN — DOXYCYCLINE 100 MG: 100 CAPSULE ORAL at 20:10

## 2024-07-04 RX ADMIN — SODIUM ZIRCONIUM CYCLOSILICATE 10 G: 10 POWDER, FOR SUSPENSION ORAL at 20:09

## 2024-07-04 RX ADMIN — MIDODRINE HYDROCHLORIDE 10 MG: 5 TABLET ORAL at 06:17

## 2024-07-04 RX ADMIN — Medication 10 ML: at 20:11

## 2024-07-04 RX ADMIN — AMIODARONE HYDROCHLORIDE 200 MG: 200 TABLET ORAL at 09:59

## 2024-07-04 RX ADMIN — Medication 10 ML: at 10:51

## 2024-07-04 RX ADMIN — MIDODRINE HYDROCHLORIDE 10 MG: 5 TABLET ORAL at 22:27

## 2024-07-04 RX ADMIN — BUMETANIDE 1 MG: 1 TABLET ORAL at 18:42

## 2024-07-04 RX ADMIN — AMIODARONE HYDROCHLORIDE 200 MG: 200 TABLET ORAL at 15:12

## 2024-07-04 RX ADMIN — ANTI-FUNGAL POWDER MICONAZOLE NITRATE TALC FREE 1 APPLICATION: 1.42 POWDER TOPICAL at 20:14

## 2024-07-04 RX ADMIN — CEPHALEXIN 500 MG: 500 CAPSULE ORAL at 22:27

## 2024-07-04 RX ADMIN — CALCIUM GLUCONATE 1000 MG: 20 INJECTION, SOLUTION INTRAVENOUS at 20:09

## 2024-07-04 RX ADMIN — RIVAROXABAN 15 MG: 15 TABLET, FILM COATED ORAL at 18:41

## 2024-07-04 RX ADMIN — PANTOPRAZOLE SODIUM 40 MG: 40 TABLET, DELAYED RELEASE ORAL at 06:17

## 2024-07-04 RX ADMIN — OXYCODONE 10 MG: 5 TABLET ORAL at 15:12

## 2024-07-04 RX ADMIN — RIVAROXABAN 15 MG: 15 TABLET, FILM COATED ORAL at 09:59

## 2024-07-04 RX ADMIN — DOXYCYCLINE 100 MG: 100 CAPSULE ORAL at 10:21

## 2024-07-04 RX ADMIN — OXYCODONE 10 MG: 5 TABLET ORAL at 10:21

## 2024-07-04 RX ADMIN — ANTI-FUNGAL POWDER MICONAZOLE NITRATE TALC FREE 1 APPLICATION: 1.42 POWDER TOPICAL at 15:14

## 2024-07-04 RX ADMIN — SODIUM ZIRCONIUM CYCLOSILICATE 10 G: 10 POWDER, FOR SUSPENSION ORAL at 10:22

## 2024-07-04 RX ADMIN — POVIDONE-IODINE: 10 SOLUTION TOPICAL at 10:44

## 2024-07-04 RX ADMIN — DOCUSATE SODIUM 100 MG: 100 CAPSULE, LIQUID FILLED ORAL at 10:21

## 2024-07-04 RX ADMIN — CEPHALEXIN 500 MG: 500 CAPSULE ORAL at 15:12

## 2024-07-04 RX ADMIN — CEPHALEXIN 500 MG: 500 CAPSULE ORAL at 06:17

## 2024-07-04 RX ADMIN — BUMETANIDE 1 MG: 1 TABLET ORAL at 10:00

## 2024-07-04 RX ADMIN — DIGOXIN 250 MCG: 0.25 INJECTION INTRAMUSCULAR; INTRAVENOUS at 10:06

## 2024-07-04 RX ADMIN — INSULIN HUMAN 10 UNITS: 100 INJECTION, SOLUTION PARENTERAL at 20:09

## 2024-07-04 RX ADMIN — SODIUM ZIRCONIUM CYCLOSILICATE 10 G: 10 POWDER, FOR SUSPENSION ORAL at 15:12

## 2024-07-04 RX ADMIN — METOPROLOL SUCCINATE 25 MG: 25 TABLET, EXTENDED RELEASE ORAL at 09:59

## 2024-07-04 RX ADMIN — MIDODRINE HYDROCHLORIDE 10 MG: 5 TABLET ORAL at 15:12

## 2024-07-04 RX ADMIN — DEXTROSE MONOHYDRATE 50 ML: 25 INJECTION, SOLUTION INTRAVENOUS at 20:09

## 2024-07-04 RX ADMIN — DOCUSATE SODIUM 100 MG: 100 CAPSULE, LIQUID FILLED ORAL at 20:12

## 2024-07-04 NOTE — PROGRESS NOTES
"Referring Provider: Boom Figueroa MD    Reason for follow-up: atrial fibrillation     Patient Care Team:  Rut Pierce APRN as PCP - General (Nurse Practitioner)  Austin Brooks MD as Cardiologist (Cardiology)      SUBJECTIVE    The patient is drowsy, but easily aroused and oriented. She denies any current complaints. She is currently on oxygen at 5 liters per nasal cannula. Her amiodarone was reportedly held yesterday due to prolonged QT interval. Her telemetry shows atrial fibrillation with heart rate 110s-120s.      ROS  Review of all systems negative except as indicated.    Since I have last seen, the patient has been without any chest discomfort, shortness of breath, palpitations, dizziness or syncope.  Denies having any headache, abdominal pain, nausea, vomiting, diarrhea, constipation, loss of weight or loss of appetite.  Denies having any excessive bruising, hematuria or blood in the stool.        Personal History:    Past Medical History:   Diagnosis Date    Bilateral leg edema     Chronic respiratory failure with hypoxia, on home O2 therapy 07/01/2024    COPD (chronic obstructive pulmonary disease)     Morbid obesity with BMI of 40.0-44.9, adult 11/08/2010    Primary hypertension 03/01/2017    Pulmonary embolism     \"many years ago, was on warfarin\"    Sleep apnea        History reviewed. No pertinent surgical history.    History reviewed. No pertinent family history.    Social History     Tobacco Use    Smoking status: Never    Smokeless tobacco: Never   Vaping Use    Vaping status: Never Used   Substance Use Topics    Alcohol use: Never    Drug use: Never        Home meds:  Prior to Admission medications    Medication Sig Start Date End Date Taking? Authorizing Provider   Allergy Relief 180 MG tablet Take 1 tablet by mouth Daily. 5/30/24  Yes Chadwick Johnson MD   bumetanide (BUMEX) 1 MG tablet Take 1 tablet by mouth 3 times a day. 5/30/24  Yes ProviderChadwick MD   docusate sodium " (COLACE) 100 MG capsule Take 1 capsule by mouth Every 12 (Twelve) Hours. 5/30/24  Yes Chadwick Johnson MD   furosemide (LASIX) 20 MG tablet Take 1 tablet by mouth Daily.   Yes Chadwick Johnson MD   HYDROcodone-acetaminophen (NORCO)  MG per tablet Take 1 tablet by mouth 3 times a day. 5/30/24  Yes Chadwick Johnson MD   lisinopril (PRINIVIL,ZESTRIL) 30 MG tablet Take 1 tablet by mouth Daily. 3/18/24  Yes Chadwick Johnson MD   meloxicam (MOBIC) 7.5 MG tablet Take 1 tablet by mouth Daily As Needed. 3/18/24  Yes Chadwick Johnson MD   metoprolol tartrate (LOPRESSOR) 50 MG tablet Take 1 tablet by mouth Daily.   Yes Chadwick Johnson MD   montelukast (SINGULAIR) 10 MG tablet Take 1 tablet by mouth every night at bedtime.   Yes Chadwick Johnson MD   omeprazole (priLOSEC) 20 MG capsule Take 1 capsule by mouth Daily. 3/18/24  Yes Chadwick Johnson MD   oxybutynin XL (DITROPAN-XL) 10 MG 24 hr tablet Take 2 tablets by mouth Daily. 3/18/24  Yes Chadwick Johnson MD   potassium chloride (MICRO-K) 10 MEQ CR capsule Take 1 capsule by mouth Every 12 (Twelve) Hours. 3/18/24  Yes Chadwick Johnson MD   vitamin D (ERGOCALCIFEROL) 1.25 MG (68865 UT) capsule capsule Take 1 capsule by mouth 2 (Two) Times a Week. Monday and Thursday. 5/30/24  Yes Chadwick Johnson MD       Allergies:  Patient has no known allergies.    Scheduled Meds:amiodarone, 200 mg, Oral, Q8H   Followed by  [START ON 7/9/2024] amiodarone, 200 mg, Oral, Q12H   Followed by  [START ON 7/23/2024] amiodarone, 200 mg, Oral, Daily  bumetanide, 1 mg, Oral, BID  calcium gluconate, 1,000 mg, Intravenous, Once  cephalexin, 500 mg, Oral, Q8H  dextrose, 25 g, Intravenous, Once  docusate sodium, 100 mg, Oral, BID  doxycycline, 100 mg, Oral, Q12H  insulin regular, 10 Units, Intravenous, Once  metoprolol succinate XL, 25 mg, Oral, Q24H  miconazole, 1 Application, Topical, Q12H  midodrine, 10 mg, Oral, Q8H  oxyCODONE, 10 mg, Oral,  "TID  pantoprazole, 40 mg, Oral, Q AM  povidone-iodine, , Topical, Daily  rivaroxaban, 15 mg, Oral, BID With Meals   Followed by  [START ON 7/24/2024] rivaroxaban, 20 mg, Oral, Daily With Dinner  sodium chloride, 10 mL, Intravenous, Q12H  sodium zirconium cyclosilicate, 10 g, Oral, TID      Continuous Infusions:   PRN Meds:.  acetaminophen **OR** acetaminophen    Calcium Replacement - Follow Nurse / BPA Driven Protocol    Magnesium Standard Dose Replacement - Follow Nurse / BPA Driven Protocol    nitroglycerin    ondansetron ODT **OR** ondansetron    oxyCODONE    Phosphorus Replacement - Follow Nurse / BPA Driven Protocol    Potassium Replacement - Follow Nurse / BPA Driven Protocol    [COMPLETED] Insert Peripheral IV **AND** sodium chloride    sodium chloride    sodium chloride      OBJECTIVE    Vital Signs  Vitals:    07/04/24 0500 07/04/24 0532 07/04/24 0600 07/04/24 0746   BP: 117/70   120/68   BP Location:    Right arm   Patient Position:    Lying   Pulse: 118 112 114 (!) 122   Resp:    14   Temp:    97.8 °F (36.6 °C)   TempSrc:    Oral   SpO2: 94% 96% 95% 96%   Weight:  90.9 kg (200 lb 6.4 oz)     Height:           Flowsheet Rows      Flowsheet Row First Filed Value   Admission Height 147.3 cm (58\") Documented at 06/30/2024 1650   Admission Weight 108 kg (239 lb) Documented at 06/30/2024 1650              Intake/Output Summary (Last 24 hours) at 7/4/2024 0757  Last data filed at 7/4/2024 0700  Gross per 24 hour   Intake 720 ml   Output 300 ml   Net 420 ml          Telemetry:  atrial fibrillation    Physical Exam:  The patient is drowsy, but easily aroused and oriented. Morbidly obese.   Vital signs as noted above.  Head and neck revealed no carotid bruits or jugular venous distention.    Lungs with coarse rhonchi and diminished bases. On oxygen at 5 liters per nasal cannula.   Heart:  rhythm irregularly irregular.  Normal first and second heart sounds.  No precordial rub is present.  No gallop is " present.  Abdomen soft and nontender.   Extremities with good peripheral pulses with BLE edema.  Skin warm. BLE erythema.  Ulcerations on both great toes.   Musculoskeletal system is grossly normal.  CNS grossly normal.       Results Review:  I have personally reviewed the results from the time of this admission to 7/4/2024 07:57 EDT and agree with these findings:  [x]  Laboratory  [x]  Microbiology  [x]  Radiology  [x]  EKG/Telemetry   [x]  Cardiology/Vascular   []  Pathology  [x]  Old records  []  Other:    Most notable findings include:    Lab Results (last 24 hours)       Procedure Component Value Units Date/Time    Magnesium [[id number]]  (Normal) Collected: 07/04/24 0414    Specimen: Blood Updated: 07/04/24 0458     Magnesium 1.9 mg/dL     Comprehensive Metabolic Panel [[id number]]  (Abnormal) Collected: 07/04/24 0414    Specimen: Blood Updated: 07/04/24 0458     Glucose 108 mg/dL      BUN 54 mg/dL      Creatinine 1.77 mg/dL      Sodium 136 mmol/L      Potassium 5.9 mmol/L      Comment: Slight hemolysis detected by analyzer. Result may be falsely elevated.        Chloride 94 mmol/L      CO2 33.4 mmol/L      Calcium 9.7 mg/dL      Total Protein 6.6 g/dL      Albumin 2.6 g/dL      ALT (SGPT) 17 U/L      AST (SGOT) 38 U/L      Comment: Slight hemolysis detected by analyzer. Result may be falsely elevated.        Alkaline Phosphatase 138 U/L      Total Bilirubin 1.3 mg/dL      Globulin 4.0 gm/dL      A/G Ratio 0.7 g/dL      BUN/Creatinine Ratio 30.5     Anion Gap 8.6 mmol/L      eGFR 30.8 mL/min/1.73     Narrative:      GFR Normal >60  Chronic Kidney Disease <60  Kidney Failure <15      Phosphorus [[id number]]  (Abnormal) Collected: 07/04/24 0414    Specimen: Blood Updated: 07/04/24 0453     Phosphorus 5.8 mg/dL     CBC & Differential [[id number]]  (Abnormal) Collected: 07/04/24 0414    Specimen: Blood Updated: 07/04/24 0426    Narrative:      The following orders were created for panel order CBC &  Differential.  Procedure                               Abnormality         Status                     ---------                               -----------         ------                     CBC Auto Differential[[id number]]        Abnormal            Final result               Scan Slide[[id number]]                                                                    Please view results for these tests on the individual orders.    CBC Auto Differential [[id number]]  (Abnormal) Collected: 07/04/24 0414    Specimen: Blood Updated: 07/04/24 0426     WBC 9.76 10*3/mm3      RBC 4.80 10*6/mm3      Hemoglobin 14.7 g/dL      Hematocrit 47.3 %      MCV 98.5 fL      MCH 30.6 pg      MCHC 31.1 g/dL      RDW 15.9 %      RDW-SD 56.4 fl      MPV 10.7 fL      Platelets 123 10*3/mm3      Neutrophil % 77.4 %      Lymphocyte % 9.5 %      Monocyte % 11.9 %      Eosinophil % 0.1 %      Basophil % 0.2 %      Immature Grans % 0.9 %      Neutrophils, Absolute 7.55 10*3/mm3      Lymphocytes, Absolute 0.93 10*3/mm3      Monocytes, Absolute 1.16 10*3/mm3      Eosinophils, Absolute 0.01 10*3/mm3      Basophils, Absolute 0.02 10*3/mm3      Immature Grans, Absolute 0.09 10*3/mm3      nRBC 0.8 /100 WBC     Potassium [[id number]]  (Normal) Collected: 07/03/24 2001    Specimen: Blood Updated: 07/03/24 2034     Potassium 5.2 mmol/L      Comment: Specimen hemolyzed.  Result may be falsely elevated.       Blood Culture - Blood, Arm, Right [[id number]]  (Normal) Collected: 06/30/24 1844    Specimen: Blood from Arm, Right Updated: 07/03/24 1915     Blood Culture No growth at 3 days    Narrative:      Less than seven (7) mL's of blood was collected.  Insufficient quantity may yield false negative results.    Blood Culture - Blood, Arm, Right [[id number]]  (Normal) Collected: 06/30/24 1757    Specimen: Blood from Arm, Right Updated: 07/03/24 1815     Blood Culture No growth at 3 days    Narrative:      Less than seven (7) mL's of blood was collected.   Insufficient quantity may yield false negative results.    Haptoglobin [[id number]]  (Normal) Collected: 07/02/24 2328    Specimen: Blood Updated: 07/03/24 1058     Haptoglobin 110 mg/dL      Comment: Specimen hemolyzed. Results may be affected.       Respiratory Culture - Sputum, NT Suction [[id number]] Collected: 07/01/24 1507    Specimen: Sputum from NT Suction Updated: 07/03/24 1047     Respiratory Culture Light growth (2+) Normal respiratory shorty. No S. aureus or Pseudomonas aeruginosa detected. Final report.     Gram Stain Few (2+) Epithelial cells per low power field      Rare (1+) WBCs per low power field      Few (2+) Mixed bacterial morphotypes seen on Gram Stain    Urine Culture - Urine, Urine, Catheter [[id number]]  (Abnormal)  (Susceptibility) Collected: 06/30/24 1749    Specimen: Urine, Catheter Updated: 07/03/24 0916     Urine Culture >100,000 CFU/mL Escherichia coli    Narrative:      Colonization of the urinary tract without infection is common. Treatment is discouraged unless the patient is symptomatic, pregnant, or undergoing an invasive urologic procedure.    Susceptibility        Escherichia coli      SIL      Amoxicillin + Clavulanate Susceptible      Ampicillin Resistant      Ampicillin + Sulbactam Susceptible      Cefazolin Susceptible      Cefepime Susceptible      Ceftazidime Susceptible      Ceftriaxone Susceptible      Gentamicin Susceptible      Levofloxacin Susceptible      Nitrofurantoin Intermediate      Piperacillin + Tazobactam Susceptible      Trimethoprim + Sulfamethoxazole Susceptible                                   Imaging Results (Last 24 Hours)       Procedure Component Value Units Date/Time    XR Chest 1 View [[id number]] Collected: 07/04/24 0233     Updated: 07/04/24 0236    Narrative:      XR CHEST 1 VW    Date of Exam: 7/4/2024 1:31 AM EDT    Indication: Pleural effusion, hypoxia    Comparison: Chest radiograph 7/3/2024    Findings:  There is marked cardiomegaly. There is  moderate pulmonary vascular congestion. There are moderate size bilateral pleural effusions greater on the left than the right. There is bilateral basilar airspace disease which could be atelectasis or pneumonia.   There is no evidence of pneumothorax.      Impression:      Impression:  Cardiomegaly with pulmonary vascular congestion and bilateral pleural effusions. Bibasilar airspace disease could be atelectasis or pneumonia.        Electronically Signed: Obed Sidhu MD    7/4/2024 2:34 AM EDT    Workstation ID: [id number]    XR Chest 1 View [[id number]] Collected: 07/03/24 0756     Updated: 07/03/24 0759    Narrative:      XR CHEST 1 VW    Date of Exam: 7/3/2024 5:58 AM EDT    Indication: Pleural effusion, hypoxia    Comparison: 7/2/2024  Findings:  There is continued severe cardiomegaly with pulmonary vascular congestion. Bibasilar lung infiltrates with small effusions appear similar. Exam is somewhat limited by portable technique and obesity.      Impression:      Impression:  No appreciable change in bibasilar lung infiltrates with small effusions. Continued cardiomegaly.      Electronically Signed: Krystal Lee MD    7/3/2024 7:57 AM EDT    Workstation ID: [id number]            LAB RESULTS (LAST 7 DAYS)    CBC  Results from last 7 days   Lab Units 07/04/24 0414 07/03/24  0635 07/02/24  0329 07/01/24  0910 06/30/24  1858 06/30/24  1732   WBC 10*3/mm3 9.76 10.53 10.53 13.80*  --  16.80*   RBC 10*6/mm3 4.80 4.63 4.48 4.62  --  5.16   HEMOGLOBIN g/dL 14.7 14.4 13.8 14.2  --  15.9   HEMOGLOBIN, POC g/dL  --   --   --   --  17.0  --    HEMATOCRIT % 47.3* 46.0 42.9 43.8  --  49.0*   HEMATOCRIT POC %  --   --   --   --  50  --    MCV fL 98.5* 99.4* 95.8 94.8  --  95.0   PLATELETS 10*3/mm3 123* 104* 92* 104*  --  119*       BMP  Results from last 7 days   Lab Units 07/04/24 0414 07/03/24 2001 07/03/24  0635 07/02/24  2042 07/02/24  0329 07/01/24  1633 07/01/24  0910 06/30/24  2043 06/30/24  2043 06/30/24  1858    SODIUM mmol/L 136  --  139  --  138  --  134*  --  131*  --    POTASSIUM mmol/L 5.9* 5.2 3.5 3.3* 3.5 4.2 5.4*   < > 6.5*  --    CHLORIDE mmol/L 94*  --  95*  --  96*  --  97*  --  101  --    CO2 mmol/L 33.4*  --  36.9*  --  34.5*  --  31.4*  --  24.4  --    BUN mg/dL 54*  --  45*  --  43*  --  46*  --  50*  --    CREATININE mg/dL 1.77*  --  1.31*  --  1.14*  --  1.33*  --  1.38* 1.40*   GLUCOSE mg/dL 108*  --  107*  --  82  --  102*  --  194*  --    MAGNESIUM mg/dL 1.9  --  1.8  --  1.7  --  2.0  --   --   --    PHOSPHORUS mg/dL 5.8*  --  4.8*  --  4.6*  --  3.4  --   --   --     < > = values in this interval not displayed.       CMP   Results from last 7 days   Lab Units 07/04/24  0414 07/03/24 2001 07/03/24  0635 07/02/24 2042 07/02/24  0329 07/01/24  1633 07/01/24  0910 06/30/24 2043 06/30/24  2043 06/30/24  1858   SODIUM mmol/L 136  --  139  --  138  --  134*  --  131*  --    POTASSIUM mmol/L 5.9* 5.2 3.5 3.3* 3.5 4.2 5.4*   < > 6.5*  --    CHLORIDE mmol/L 94*  --  95*  --  96*  --  97*  --  101  --    CO2 mmol/L 33.4*  --  36.9*  --  34.5*  --  31.4*  --  24.4  --    BUN mg/dL 54*  --  45*  --  43*  --  46*  --  50*  --    CREATININE mg/dL 1.77*  --  1.31*  --  1.14*  --  1.33*  --  1.38* 1.40*   GLUCOSE mg/dL 108*  --  107*  --  82  --  102*  --  194*  --    ALBUMIN g/dL 2.6*  --  2.5*  --  2.3*  --  2.5*  --  2.5*  --    BILIRUBIN mg/dL 1.3*  --  1.0  --  0.9  --  1.3*  --  1.6*  --    ALK PHOS U/L 138*  --  130*  --  109  --  115  --  126*  --    AST (SGOT) U/L 38*  --  30  --  24  --  39*  --  42*  --    ALT (SGPT) U/L 17  --  16  --  14  --  17  --  18  --     < > = values in this interval not displayed.       BNP        TROPONIN  Results from last 7 days   Lab Units 07/01/24  0910   HSTROP T ng/L 36*       CoAg        Creatinine Clearance  Estimated Creatinine Clearance: 30.6 mL/min (A) (by C-G formula based on SCr of 1.77 mg/dL (H)).    ABG        Radiology  XR Chest 1 View    Result Date:  7/4/2024  Impression: Cardiomegaly with pulmonary vascular congestion and bilateral pleural effusions. Bibasilar airspace disease could be atelectasis or pneumonia. Electronically Signed: Obed Sidhu MD  7/4/2024 2:34 AM EDT  Workstation ID: [id number]    XR Chest 1 View    Result Date: 7/3/2024  Impression: No appreciable change in bibasilar lung infiltrates with small effusions. Continued cardiomegaly. Electronically Signed: Krystal Lee MD  7/3/2024 7:57 AM EDT  Workstation ID: [id number]    XR Chest 1 View    Result Date: 7/2/2024  Impression: Some improvement in bibasilar lung infiltrates with small effusions. Continued cardiomegaly. Electronically Signed: Krystal Lee MD  7/2/2024 12:46 PM EDT  Workstation ID: [id number]       EKG  I personally viewed and interpreted the patient's EKG/Telemetry data:  ECG 12 Lead Drug Monitoring; Amiodarone   Preliminary Result   HEART EGRN=859  bpm   RR Ylrconms=031  ms   HI Interval=  ms   P Horizontal Axis=  deg   P Front Axis=  deg   QRSD Upcfwytm=808  ms   QT Fogybrcb=213  ms   PHrB=338  ms   QRS Axis=-90  deg   T Wave Axis=36  deg   - ABNORMAL ECG -   Atrial fibrillation   Right bundle branch block   ST elevation secondary to IVCD   Date and Time of Study:2024-07-04 04:20:51      ECG 12 Lead Chest Pain   Preliminary Result   HEART CUGK=474  bpm   RR Tejdleej=996  ms   HI Interval=  ms   P Horizontal Axis=  deg   P Front Axis=  deg   QRSD Bgjgqfqa=115  ms   QT Xevsfgkj=674  ms   YChD=690  ms   QRS Axis=-98  deg   T Wave Axis=37  deg   - ABNORMAL ECG -   Atrial fibrillation   Right bundle branch block   Anterolateral infarct, old   Date and Time of Study:2024-07-03 17:38:52      ECG 12 Lead Drug Monitoring; Amiodarone   Preliminary Result   HEART YCED=425  bpm   RR Ngqbqoeb=760  ms   HI Interval=  ms   P Horizontal Axis=  deg   P Front Axis=  deg   QRSD Pztcmhqf=399  ms   QT Djdzgitm=118  ms   PUcH=203  ms   QRS Axis=-90  deg   T Wave Axis=74  deg   - ABNORMAL ECG -    Atrial fibrillation   Right bundle branch block   ST elevation secondary to IVCD   Date and Time of Study:2024-07-03 04:02:32      ECG 12 Lead Drug Monitoring; Amiodarone   Preliminary Result   HEART AKVD=219  bpm   RR Ienunznd=829  ms   OH Interval=  ms   P Horizontal Axis=  deg   P Front Axis=  deg   QRSD Pshqcsil=806  ms   QT Eczjxcej=925  ms   YGzP=432  ms   QRS Axis=-93  deg   T Wave Axis=45  deg   - ABNORMAL ECG -   Atrial fibrillation   Right bundle branch block   Inferior infarct, old   When compared with ECG of 01-Jul-2024 04:42:13,   Nonspecific significant change   Date and Time of Study:2024-07-02 15:12:56      ECG 12 Lead Drug Monitoring; Amiodarone   Final Result   HEART WKTQ=708  bpm   RR Ltjdwknd=601  ms   OH Interval=  ms   P Horizontal Axis=  deg   P Front Axis=  deg   QRSD Hzsqblpx=015  ms   QT Byaxufqe=480  ms   DAfN=821  ms   QRS Axis=-80  deg   T Wave Axis=102  deg   - ABNORMAL ECG -   Atrial fibrillation   Right bundle branch block   Inferior  infarct, old   Anterolateral  infarct, age indeterminate   When compared with ECG of 30-Jun-2024 16:58:43,   Significant rate decrease   New or worsened ischemia or infarction   Electronically Signed By: Austin Brooks ([facility]) 2024-07-01 13:11:59   Date and Time of Study:2024-07-01 04:42:13      ECG 12 Lead Dyspnea   Final Result   HEART DFZU=594  bpm   RR Smanmsea=123  ms   OH Interval=  ms   P Horizontal Axis=  deg   P Front Axis=  deg   QRSD Jlnlsgrz=596  ms   QT Tpbcrpxb=517  ms   ECmL=622  ms   QRS Axis=-101  deg   T Wave Axis=43  deg   - ABNORMAL ECG -   Atrial fibrillation   Right bundle branch block   ST elevation secondary to IVCD   Electronically Signed By: Jeffery Kwon ([facility]) 2024-07-01 07:37:02   Date and Time of Study:2024-06-30 16:58:43      Telemetry Scan   Final Result      Telemetry Scan   Final Result      Telemetry Scan   Final Result      Telemetry Scan   Final Result      Telemetry Scan   Final Result      Telemetry Scan   Final  Result      Telemetry Scan   Final Result      Telemetry Scan   Final Result      Telemetry Scan   Final Result      Telemetry Scan   Final Result      Telemetry Scan   Final Result      Telemetry Scan   Final Result      Telemetry Scan   Final Result      Telemetry Scan   Final Result      Telemetry Scan   Final Result      Telemetry Scan   Final Result      Telemetry Scan   Final Result      Telemetry Scan   Final Result      Telemetry Scan   Final Result      Telemetry Scan   Final Result      Telemetry Scan   Final Result      ECG 12 Lead Electrolyte Imbalance    (Results Pending)   ECG 12 Lead Electrolyte Imbalance    (Results Pending)         Echocardiogram:    Results for orders placed during the hospital encounter of 06/30/24    Adult Transthoracic Echo Complete w/ Color, Spectral and Contrast if Necessary Per Protocol    Interpretation Summary    Left ventricular ejection fraction appears to be 31 - 35%.    Left ventricular diastolic dysfunction is noted.    The left atrial cavity is dilated.    Moderate aortic valve stenosis is present. Mean/peak gradient of 14/24 mmHg.  Dimensionless index 0.36    Moderate to severe mitral valve regurgitation is present.    Estimated right ventricular systolic pressure from tricuspid regurgitation is normal (<35 mmHg).        Stress Test:         Cardiac Catheterization:  No results found for this or any previous visit.         Other:         ASSESSMENT & PLAN:    Principal Problem:    Atrial fibrillation with RVR  Active Problems:    Primary hypertension    Morbid obesity with BMI of 40.0-44.9, adult    Right bundle branch block    Acute deep vein thrombosis (DVT) of both lower extremities    ARABELLA (acute kidney injury)    Acute hyperkalemia    Sepsis    Acute UTI    Acute systolic congestive heart failure    Rhinovirus infection    Multifocal pneumonia    Chronic respiratory failure with hypoxia, on home O2 therapy    Skin ulcer of right great toe    Skin ulcer of left  great toe    SEDRICK (obstructive sleep apnea)    Aortic stenosis    Mitral regurgitation    Acute HFrEF (heart failure with reduced ejection fraction)      Atrial fibrillation with RVR  Newly diagnosed  Electrolytes have been corrected  Rate was controlled with amiodarone and Toprol XL, but amiodarone held yesterday due to prolonged QT interval of 547.  Patient is now tachycardic with heart rate 110s-120s.  ECG today shows QTc of 525, which is back to baseline of 528 prior to starting amiodarone.   Tachycardia also likely being driven by midodrine.   Will give digoxin 250 mcg IV x 1 dose  Restart oral amiodarone  Continue Toprol-XL  [id number] score is 7  Continue Xarelto      Acute systolic CHF  Newly diagnosed  Echocardiogram shows EF of 31 to 35%, moderate aortic stenosis and moderate to severe mitral regurgitation.  Renal function worsened with IV diuresis, so switching to oral   Continue Bumex and Toprol XL  Monitor I's and O's and replace electrolytes as needed.  We will initiate and uptitrate GDMT as tolerated.  Plan to repeat echocardiogram after 3 months of completing GDMT     Acute deep vein thrombosis (DVT) of both lower extremities  Extensive DVT in bilateral lower extremities involving femoral, popliteal and posterior tibial.  Continue anticoagulation as above  She will need lifelong anticoagulation      Thrombocytopenia  Platelets improving   Closely monitor while on anticoagulation     ARABELLA (acute kidney injury)  Presented with creatinine of 1.4.  Creatinine 1.77 today   Closely monitor renal function while on diuretics  Nephrology consulted      Acute hyperkalemia  Treated and resolved, but potassium back up to 5.9 today  Receiving [facility]  Nephrology consulted      Sepsis / UTI / Multifocal pneumonia / Rhinovirus infection  Multifactorial secondary to UTI and pneumonia with possible cellulitis  Continue empiric antibiotics      Acute on Chronic respiratory failure with hypoxia, on home O2  therapy  Baseline 2 L of oxygen via nasal cannula   Currently on oxygen at 5 liters per nasal cannula  Diuresis and antibiotics as above     Skin ulcer of right great toe  Skin ulcer of left great toe  Podiatry following   A1c is 6.2%     Primary hypertension, chronic  Currently hypotensive likely secondary to sepsis  Requiring midodrine 10 mg every 8 hours  Closely monitor blood pressure      Morbid obesity with BMI of 40.0-44.9, adult  BMI is 41.88  Lifestyle modifications recommended   LDL 27, HDL 13, triglyceride 86 and total cholesterol 58.  No indication for statin     SEDRICK (obstructive sleep apnea)  Encouraged compliance with CPAP      Electronically signed by MANJU Kennedy, 07/04/24, 11:10 AM EDT.

## 2024-07-04 NOTE — CONSULTS
INITIAL CONSULT NOTE      Patient Name: Ban Brennan  : 1955  MRN: [id number]  Primary Care Physician: Rut Pierce APRN  Date of admission: 2024    Patient Care Team:  Rut Pierce APRN as PCP - General (Nurse Practitioner)  Austin Brooks MD as Cardiologist (Cardiology)        Reason for Consult:       Acute kidney injury, hyperkalemia    Subjective   History of Present Illness:   Chief Complaint:   Chief Complaint   Patient presents with    Shortness of Breath     HISTORY:  Ban Brennan is a 69 y.o. female with past medical history of hypertension, leg edema and CHF.  She originally presented to the ED on  with worsening lower extremity edema and redness, as well as some worsening shortness of breath.  On arrival, she was noted to be in AFib with RVR, with heart rate 160.  Initial workup includes chest x-ray, which showed vascular congestion and small effusions.  The patient was started on Cardizem drip.  She is also started on IV antibiotics for sepsis, cellulitis and UTI.  She was admitted to the ICU, and Infectious Disease, Podiatry and Cardiology are following.  Oncology has also been consulted for bilateral lower extremity DVT and thrombocytopenia. Echocardiogram from 2024 revealed ejection fraction of 31 35% and diastolic dysfunction, moderate AS and moderate to severe MR.     Additional complication includes worsening renal function and hyperkalemia.  She has not previously been followed by Nephrology, and baseline creatinine seems to be around 0.8-1.0.  Initial creatinine 1.4. At that time, she was on IV diuretics, Bumex 2mg IV every 8 hours. Her creatinine improved and was as low as 1.1 on . However, by 7/3, her creatinine increased to 1.31 and today it is 1.77. IV Bumex was discontinued yesterday with last dose yesterday morning. She has been started on Bumex 1mg PO BID with first dose this am. Blood pressure has been on the soft side, and she is  "on Midodrine.     She is also noted to be hyperkalemic with initial potassium 6.5.  It then improved and was actually mildly low to where she received some supplementation on 7/2 and 7/3. Today's potassium is 5.9. The patient has been started on Lokelma 10g TID as of this am. Of note, home medications include Bumex 1 mg 3 times daily, also on Lasix, lisinopril, Meloxicam and potassium twice daily.  Urinalysis is grossly abnormal.  Current antibiotic regimen includes cephalexin and p.o. doxycycline. Had been on Vanc; noted random Vanc level on 7/3 was 22.4.  Renal services are requested for acute kidney injury and electrolyte abnormality       Review of systems:    ROS was otherwise negative except as mentioned in the [place].       Personal History:     Past Medical History:   Past Medical History:   Diagnosis Date    Bilateral leg edema     Chronic respiratory failure with hypoxia, on home O2 therapy 07/01/2024    COPD (chronic obstructive pulmonary disease)     Morbid obesity with BMI of 40.0-44.9, adult 11/08/2010    Primary hypertension 03/01/2017    Pulmonary embolism     \"many years ago, was on warfarin\"    Sleep apnea        Surgical History:    History reviewed. No pertinent surgical history.    Family History: family history is not on file. Otherwise pertinent FHx was reviewed and unremarkable.     Social History:  reports that she has never smoked. She has never used smokeless tobacco. She reports that she does not drink alcohol and does not use drugs.    Medications:  Prior to Admission medications    Medication Sig Start Date End Date Taking? Authorizing Provider   Allergy Relief 180 MG tablet Take 1 tablet by mouth Daily. 5/30/24  Yes Chadwick Johnson MD   bumetanide (BUMEX) 1 MG tablet Take 1 tablet by mouth 3 times a day. 5/30/24  Yes Chadwick Johnson MD   docusate sodium (COLACE) 100 MG capsule Take 1 capsule by mouth Every 12 (Twelve) Hours. 5/30/24  Yes Chadwick Johnson MD   furosemide " (LASIX) 20 MG tablet Take 1 tablet by mouth Daily.   Yes Chadwick Johnson MD   HYDROcodone-acetaminophen (NORCO)  MG per tablet Take 1 tablet by mouth 3 times a day. 5/30/24  Yes Chadwick Johnson MD   lisinopril (PRINIVIL,ZESTRIL) 30 MG tablet Take 1 tablet by mouth Daily. 3/18/24  Yes Chadwick Johnson MD   meloxicam (MOBIC) 7.5 MG tablet Take 1 tablet by mouth Daily As Needed. 3/18/24  Yes Chadwick Johnson MD   metoprolol tartrate (LOPRESSOR) 50 MG tablet Take 1 tablet by mouth Daily.   Yes Chadwick Johnson MD   montelukast (SINGULAIR) 10 MG tablet Take 1 tablet by mouth every night at bedtime.   Yes Chadwick Johnson MD   omeprazole (priLOSEC) 20 MG capsule Take 1 capsule by mouth Daily. 3/18/24  Yes Provider, Historical, MD   oxybutynin XL (DITROPAN-XL) 10 MG 24 hr tablet Take 2 tablets by mouth Daily. 3/18/24  Yes Chadwick Johnson MD   potassium chloride (MICRO-K) 10 MEQ CR capsule Take 1 capsule by mouth Every 12 (Twelve) Hours. 3/18/24  Yes Chadwick Johnson MD   vitamin D (ERGOCALCIFEROL) 1.25 MG (99220 UT) capsule capsule Take 1 capsule by mouth 2 (Two) Times a Week. Monday and Thursday. 5/30/24  Yes Chadwick Johnson MD     Scheduled Meds:amiodarone, 200 mg, Oral, Q8H   Followed by  [START ON 7/9/2024] amiodarone, 200 mg, Oral, Q12H   Followed by  [START ON 7/23/2024] amiodarone, 200 mg, Oral, Daily  bumetanide, 1 mg, Oral, BID  calcium gluconate, 1,000 mg, Intravenous, Once  cephalexin, 500 mg, Oral, Q8H  dextrose, 25 g, Intravenous, Once  docusate sodium, 100 mg, Oral, BID  doxycycline, 100 mg, Oral, Q12H  insulin regular, 10 Units, Intravenous, Once  metoprolol succinate XL, 25 mg, Oral, Q24H  miconazole, 1 Application, Topical, Q12H  midodrine, 10 mg, Oral, Q8H  oxyCODONE, 10 mg, Oral, TID  pantoprazole, 40 mg, Oral, Q AM  povidone-iodine, , Topical, Daily  rivaroxaban, 15 mg, Oral, BID With Meals   Followed by  [START ON 7/24/2024] rivaroxaban, 20 mg,  Oral, Daily With Dinner  sodium chloride, 10 mL, Intravenous, Q12H  sodium zirconium cyclosilicate, 10 g, Oral, TID      Continuous Infusions:   PRN Meds:  acetaminophen **OR** acetaminophen    Calcium Replacement - Follow Nurse / BPA Driven Protocol    Magnesium Standard Dose Replacement - Follow Nurse / BPA Driven Protocol    nitroglycerin    ondansetron ODT **OR** ondansetron    oxyCODONE    Phosphorus Replacement - Follow Nurse / BPA Driven Protocol    Potassium Replacement - Follow Nurse / BPA Driven Protocol    [COMPLETED] Insert Peripheral IV **AND** sodium chloride    sodium chloride    sodium chloride  Allergies:  No Known Allergies    Objective   Exam:     Vital Signs  Temp:  [97.5 °F (36.4 °C)-98.1 °F (36.7 °C)] 97.8 °F (36.6 °C)  Heart Rate:  [100-122] 122  Resp:  [9-21] 14  BP: ()/(52-92) 120/68  SpO2:  [91 %-97 %] 96 %  on  Flow (L/min):  [3-5] 4;   Device (Oxygen Therapy): nasal cannula  Body mass index is 41.88 kg/m².  EXAM  General:  69 yr old  female in no acute distress.    Head:      Normocephalic and atraumatic.    Eyes:      PERRL/EOM intact, conjunctivae and sclerae clear without nystagmus.    Neck:      No masses, thyromegaly,  trachea central   Lungs:    Clear bilaterally to auscultation.    Heart:      Regular rate and rhythm, no murmur no gallop  Abd:        Soft, nontender, not distended, bowel sounds positive, no shifting dullness.  Msk:        No deformity or scoliosis noted of thoracic or lumbar spine.    Pulses:   Pulses normal in all 4 extremities.    Extremities:        No cyanosis or clubbing--+ + edema.    Neuro:    No focal deficits.   alert oriented x3  Skin:       Intact without lesions or rashes.    Psych:    Alert and cooperative; normal mood and affect; normal attention span       Results Review:  I have personally reviewed most recent Data :  BMP @LABRCNTIP(creatinine:10)  CBC    Results from last 7 days   Lab Units 07/04/24  0414 07/03/24  0635 07/02/24  0329  07/01/24 0910 06/30/24 1858 06/30/24  1732   WBC 10*3/mm3 9.76 10.53 10.53 13.80*  --  16.80*   HEMOGLOBIN g/dL 14.7 14.4 13.8 14.2  --  15.9   HEMOGLOBIN, POC g/dL  --   --   --   --  17.0  --    PLATELETS 10*3/mm3 123* 104* 92* 104*  --  119*     CMP   Results from last 7 days   Lab Units 07/04/24  0414 07/03/24 2001 07/03/24 0635 07/02/24 2042 07/02/24 0329 07/01/24  1633 07/01/24 0910 06/30/24 2043 06/30/24 2043 06/30/24  1858   SODIUM mmol/L 136  --  139  --  138  --  134*  --  131*  --    POTASSIUM mmol/L 5.9* 5.2 3.5 3.3* 3.5 4.2 5.4*   < > 6.5*  --    CHLORIDE mmol/L 94*  --  95*  --  96*  --  97*  --  101  --    CO2 mmol/L 33.4*  --  36.9*  --  34.5*  --  31.4*  --  24.4  --    BUN mg/dL 54*  --  45*  --  43*  --  46*  --  50*  --    CREATININE mg/dL 1.77*  --  1.31*  --  1.14*  --  1.33*  --  1.38* 1.40*   GLUCOSE mg/dL 108*  --  107*  --  82  --  102*  --  194*  --    ALBUMIN g/dL 2.6*  --  2.5*  --  2.3*  --  2.5*  --  2.5*  --    BILIRUBIN mg/dL 1.3*  --  1.0  --  0.9  --  1.3*  --  1.6*  --    ALK PHOS U/L 138*  --  130*  --  109  --  115  --  126*  --    AST (SGOT) U/L 38*  --  30  --  24  --  39*  --  42*  --    ALT (SGPT) U/L 17  --  16  --  14  --  17  --  18  --     < > = values in this interval not displayed.     ABG      XR Chest 1 View    Result Date: 7/4/2024  Impression: Cardiomegaly with pulmonary vascular congestion and bilateral pleural effusions. Bibasilar airspace disease could be atelectasis or pneumonia. Electronically Signed: Obed Sidhu MD  7/4/2024 2:34 AM EDT  Workstation ID: [id number]    XR Chest 1 View    Result Date: 7/3/2024  Impression: No appreciable change in bibasilar lung infiltrates with small effusions. Continued cardiomegaly. Electronically Signed: Krystal Lee MD  7/3/2024 7:57 AM EDT  Workstation ID: [id number]    XR Chest 1 View    Result Date: 7/2/2024  Impression: Some improvement in bibasilar lung infiltrates with small effusions. Continued cardiomegaly.  Electronically Signed: Krystal Lee MD  7/2/2024 12:46 PM EDT  Workstation ID: [id number]    US Pelvis Complete    Result Date: 7/1/2024  Impression: Sonographic features suggest the presence of a large subserosal uterine fundal fibroid. Electronically Signed: Ann Marie Maloney MD  7/1/2024 1:43 PM EDT  Workstation ID: [id number]    CT Abdomen Pelvis Without Contrast    Result Date: 7/1/2024  Impression: Moderately large right pleural effusion and small left pleural effusion. Bibasilar infiltrates suggest atelectasis although associated pneumonia is not excluded. Severe cardiomegaly. Bilateral flank edema, greatest on the right. Probable fibroid uterus. This could be confirmed with ultrasound. Electronically Signed: Krystal Lee MD  7/1/2024 11:39 AM EDT  Workstation ID: [id number]    Adult Transthoracic Echo Complete w/ Color, Spectral and Contrast if Necessary Per Protocol    Addendum Date: 7/1/2024      Left ventricular ejection fraction appears to be 31 - 35%.   Left ventricular diastolic dysfunction is noted.   The left atrial cavity is dilated.   Moderate aortic valve stenosis is present. Mean/peak gradient of 14/24 mmHg.  Dimensionless index 0.36   Moderate to severe mitral valve regurgitation is present.   Estimated right ventricular systolic pressure from tricuspid regurgitation is normal (<35 mmHg).     CT Angiogram Chest Pulmonary Embolism    Result Date: 7/1/2024  Impression: 1. No evidence for pulmonary embolus. 2. Bibasilar airspace disease and right middle lung opacity compatible likely multifocal pneumonia with bilateral pleural effusions. 3. Cardiomegaly. Electronically Signed: Shari Kaur MD  7/1/2024 10:14 AM EDT  Workstation ID: [id number]    CT Lower Extremity Bilateral Without Contrast    Result Date: 7/1/2024  Impression: Bilateral lower extremity soft tissue swelling greater on the left than the right. There is no soft tissue gas or well-formed abscess. Electronically Signed: Obed Sidhu MD   7/1/2024 1:53 AM EDT  Workstation ID: [id number]    XR Chest 1 View    Result Date: 6/30/2024  Vascular congestion with small effusions Electronically Signed: Eamon Brock MD  6/30/2024 7:27 PM EDT  Workstation ID: [id number]     Results for orders placed during the hospital encounter of 06/30/24    Adult Transthoracic Echo Complete w/ Color, Spectral and Contrast if Necessary Per Protocol    Interpretation Summary    Left ventricular ejection fraction appears to be 31 - 35%.    Left ventricular diastolic dysfunction is noted.    The left atrial cavity is dilated.    Moderate aortic valve stenosis is present. Mean/peak gradient of 14/24 mmHg.  Dimensionless index 0.36    Moderate to severe mitral valve regurgitation is present.    Estimated right ventricular systolic pressure from tricuspid regurgitation is normal (<35 mmHg).        Assessment & Plan   Assessment and Plan:         Atrial fibrillation with RVR    Primary hypertension    Morbid obesity with BMI of 40.0-44.9, adult    Right bundle branch block    Acute deep vein thrombosis (DVT) of both lower extremities    ARABELLA (acute kidney injury)    Acute hyperkalemia    Sepsis    Acute UTI    Acute systolic congestive heart failure    Rhinovirus infection    Multifocal pneumonia    Chronic respiratory failure with hypoxia, on home O2 therapy    Skin ulcer of right great toe    Skin ulcer of left great toe    SEDRICK (obstructive sleep apnea)    Aortic stenosis    Mitral regurgitation    Acute HFrEF (heart failure with reduced ejection fraction)    ASSESSMENT:  Acute kidney injury, with baseline creatinine roughly 0.8-1.0mg/dL  Hyperkalemia  A-fib with RVR  Acute on chronic heart failure, with EF 31-35%, diastolic dysfunction, moderate AS and moderate to severe MR as per echo from 6/30/24  Bilateral DVT  Severe sepsis/LLE cellulitis, acute cystitis  Multifocal pneumonia    PLAN :     Renal function worsened today. Suspect she may initially have had some acute cardiorenal  component, now most likely has some ATN secondary to sepsis, elevated Vanc level (was 22.4 on 7/3), and some prolonged prerenal state with hemodynamic insults, arrhythmias.  Will check comprehensive urine studies and renal US  Request nurse to place Chun for strict I/Os  Hyperkalemia, secondary to worsening ARABELLA and recent potassium supplements  Agree with Lokelma 10g TID. Repeat labs this afternoon. Nursing to call if K>5  Adjust to low K diet  Volume reviewed. IV Bumex stopped yesterday. Starting on Bumex 1mg PO BID with first dose this am, chest xray this am with moderate pulmonary vascular congestion and bilateral pleural effsions.   Repeat chest xray, check BNP  Continue current diuretic regimen for now. Reassess/readjust diuretics in the am pending above workup  Continue Midodrine. Start Levophed if MAP <70  Antibiotics as per ID. Now off Vanc, agree. On PO Doxy and Cephalexin  Check labs daily. Will follow closely    Thank you for this consultation      MANJU [facility]  7/4/2024  08:11 EDT

## 2024-07-04 NOTE — PROGRESS NOTES
[facility] MEDICINE SERVICE  DAILY PROGRESS NOTE    NAME: Ban Brennan  : 1955  MRN: [id number]      LOS: 4 days     PROVIDER OF SERVICE: Boom Figueroa MD    Chief Complaint: Atrial fibrillation with RVR  Ban Brennan is a 69 y.o. female with known PMH of hypertension, heart failure, chronic bilateral lower extremity edema who presented to the hospital for creasing shortness of breath, and was admitted with a principal diagnosis of Atrial fibrillation with RVR.   Subjective:     Interval History:  History taken from: patient  Patient Complaints: Feels weak and tired, elevated potassium noted given Lokelma antibiotic transition to p.o.  Patient Denies: Nausea or vomiting    Review of Systems:   Review of Systems  14 point review of system unremarkable except mentioned above  Objective:     Vital Signs  Temp:  [97.2 °F (36.2 °C)-98.1 °F (36.7 °C)] 97.2 °F (36.2 °C)  Heart Rate:  [101-122] 111  Resp:  [9-21] 11  BP: ()/(52-81) 101/62  Flow (L/min):  [3-5] 4   Body mass index is 41.88 kg/m².    Physical Exam  Physical Exam  HENT:      Head: Atraumatic.      Mouth/Throat:      Mouth: Mucous membranes are moist.   Eyes:      Pupils: Pupils are equal, round, and reactive to light.   Cardiovascular:      Rate and Rhythm: Normal rate and regular rhythm.   Pulmonary:      Effort: Pulmonary effort is normal.   Abdominal:      General: Bowel sounds are normal.      Palpations: Abdomen is soft.   Musculoskeletal:      Cervical back: Neck supple.      Right lower leg: Edema present.      Left lower leg: Edema present.   Skin:     Comments: Rt foot ulceration    Neurological:      General: No focal deficit present.      Mental Status: She is alert and oriented to person, place, and time.   Psychiatric:         Mood and Affect: Mood normal.         Scheduled Meds   amiodarone, 200 mg, Oral, Q8H   Followed by  [START ON 2024] amiodarone, 200 mg, Oral, Q12H   Followed by  [START ON 2024]  amiodarone, 200 mg, Oral, Daily  bumetanide, 1 mg, Oral, BID  calcium gluconate, 1,000 mg, Intravenous, Once  cephalexin, 500 mg, Oral, Q8H  dextrose, 25 g, Intravenous, Once  docusate sodium, 100 mg, Oral, BID  doxycycline, 100 mg, Oral, Q12H  insulin regular, 10 Units, Intravenous, Once  metoprolol succinate XL, 25 mg, Oral, Q24H  miconazole, 1 Application, Topical, Q12H  midodrine, 10 mg, Oral, Q8H  oxyCODONE, 10 mg, Oral, TID  pantoprazole, 40 mg, Oral, Q AM  povidone-iodine, , Topical, Daily  rivaroxaban, 15 mg, Oral, BID With Meals   Followed by  [START ON 7/24/2024] rivaroxaban, 20 mg, Oral, Daily With Dinner  sodium chloride, 10 mL, Intravenous, Q12H  sodium zirconium cyclosilicate, 10 g, Oral, TID       PRN Meds     acetaminophen **OR** acetaminophen    Calcium Replacement - Follow Nurse / BPA Driven Protocol    Magnesium Standard Dose Replacement - Follow Nurse / BPA Driven Protocol    nitroglycerin    ondansetron ODT **OR** ondansetron    oxyCODONE    Phosphorus Replacement - Follow Nurse / BPA Driven Protocol    Potassium Replacement - Follow Nurse / BPA Driven Protocol    [COMPLETED] Insert Peripheral IV **AND** sodium chloride    sodium chloride    sodium chloride   Infusions           Diagnostic Data    Results from last 7 days   Lab Units 07/04/24  0414   WBC 10*3/mm3 9.76   HEMOGLOBIN g/dL 14.7   HEMATOCRIT % 47.3*   PLATELETS 10*3/mm3 123*   GLUCOSE mg/dL 108*   CREATININE mg/dL 1.77*   BUN mg/dL 54*   SODIUM mmol/L 136   POTASSIUM mmol/L 5.9*   AST (SGOT) U/L 38*   ALT (SGPT) U/L 17   ALK PHOS U/L 138*   BILIRUBIN mg/dL 1.3*   ANION GAP mmol/L 8.6       XR Chest 1 View    Result Date: 7/4/2024  Impression: Cardiomegaly with pulmonary vascular congestion and bilateral pleural effusions. Bibasilar airspace disease could be atelectasis or pneumonia. Electronically Signed: Obed Sidhu MD  7/4/2024 2:34 AM EDT  Workstation ID: [id number]    XR Chest 1 View    Result Date: 7/3/2024  Impression: No  appreciable change in bibasilar lung infiltrates with small effusions. Continued cardiomegaly. Electronically Signed: Krystal Lee MD  7/3/2024 7:57 AM EDT  Workstation ID: [id number]       I reviewed the patient's new clinical results.    Assessment/Plan:     Active and Resolved Problems  BLE DVT  Hx of PE, unprovoked  On Xarelto  Cardiology following  Hematology following           Atrial fibrillation with RVR  PAF  Chest pain  Acute on chronic systolic heart failure  Diastolic dysfxn  Moderate AS  Severe MR  Chronic lower extremity lymphedema  Fluid overload  Large right pleural effusion  Cardiology has started amio gtt addition to p.o.  -Continue to monitor QTc now to baseline  Status post IV digoxin x 1  Continue Xarelto  TTE from 7/1/2024 shows an ejection fraction low at 30 to 35%, diastolic dysfunction noted, moderate aortic valve stenosis, severe mitral regurgitation         Severe sepsis  Acute cystitis with hematuria  LLE cellulitis  L foot wound, appears infected  Putative panniculitis  MRSA PCR +  Rhinovirus +  Putative multifocal PNA  Source of sepsis is multiple  ID following--s/p vanc and Rocephin, now on Keflex and doxycycline    Dietary consult appreciated continue wound care  HbA1c  6%          Uterine mass  Pelvic U/S suggesting fibroid        Acute kidney injury  Hyperkalemia, improved  Creatinine 1.38, baseline unknown  Initial potassium 6.5; treated with sling/D50, calcium gluconate, and Lasix  IV fluids contraindicated  Avoid nephrotoxic agents  Avoid hypotension  Consider nephrology consult if no improvement        Essential hypertension  Hold home lisinopril and metoprolol as patient is borderline hypotensive  Resume when clinically appropriate           VTE Prophylaxis:  Pharmacologic VTE prophylaxis orders are present.         Code status is   Code Status and Medical Interventions:   Ordered at: 06/30/24 2120     Code Status (Patient has no pulse and is not breathing):    CPR (Attempt to  Resuscitate)     Medical Interventions (Patient has pulse or is breathing):    Full Support       Plan for disposition:SNF  in 1-2 days pending improvement in heart rate    Time: 35 minutes    Signature: Electronically signed by Boom Figueroa MD, 07/04/24, 13:58 EDT.  [facility] Hospitalist Team

## 2024-07-05 ENCOUNTER — APPOINTMENT (OUTPATIENT)
Dept: GENERAL RADIOLOGY | Facility: HOSPITAL | Age: 69
End: 2024-07-05
Payer: MEDICARE

## 2024-07-05 PROBLEM — J44.9 COPD (CHRONIC OBSTRUCTIVE PULMONARY DISEASE): Chronic | Status: ACTIVE | Noted: 2024-07-05

## 2024-07-05 LAB
ALBUMIN SERPL-MCNC: 2.5 G/DL (ref 3.5–5.2)
ALBUMIN/GLOB SERPL: 0.6 G/DL
ALP SERPL-CCNC: 129 U/L (ref 39–117)
ALT SERPL W P-5'-P-CCNC: 16 U/L (ref 1–33)
ANION GAP SERPL CALCULATED.3IONS-SCNC: 6 MMOL/L (ref 5–15)
ANION GAP SERPL CALCULATED.3IONS-SCNC: 8.4 MMOL/L (ref 5–15)
ARTERIAL PATENCY WRIST A: ABNORMAL
ARTERIAL PATENCY WRIST A: POSITIVE
AST SERPL-CCNC: 28 U/L (ref 1–32)
ATMOSPHERIC PRESS: ABNORMAL MM[HG]
BACTERIA SPEC AEROBE CULT: NORMAL
BACTERIA SPEC AEROBE CULT: NORMAL
BASE EXCESS BLDA CALC-SCNC: 4.4 MMOL/L (ref 0–3)
BASE EXCESS BLDA CALC-SCNC: 4.5 MMOL/L (ref 0–3)
BASE EXCESS BLDA CALC-SCNC: 4.6 MMOL/L (ref 0–3)
BASE EXCESS BLDA CALC-SCNC: 6.5 MMOL/L (ref 0–3)
BASOPHILS # BLD AUTO: 0.02 10*3/MM3 (ref 0–0.2)
BASOPHILS NFR BLD AUTO: 0.2 % (ref 0–1.5)
BDY SITE: ABNORMAL
BILIRUB SERPL-MCNC: 1.6 MG/DL (ref 0–1.2)
BUN SERPL-MCNC: 65 MG/DL (ref 8–23)
BUN SERPL-MCNC: 67 MG/DL (ref 8–23)
BUN/CREAT SERPL: 34.6 (ref 7–25)
BUN/CREAT SERPL: 36 (ref 7–25)
CALCIUM SPEC-SCNC: 9.3 MG/DL (ref 8.6–10.5)
CALCIUM SPEC-SCNC: 9.9 MG/DL (ref 8.6–10.5)
CHLORIDE SERPL-SCNC: 95 MMOL/L (ref 98–107)
CHLORIDE SERPL-SCNC: 95 MMOL/L (ref 98–107)
CO2 BLDA-SCNC: 37.9 MMOL/L (ref 22–29)
CO2 BLDA-SCNC: 39.4 MMOL/L (ref 22–29)
CO2 BLDA-SCNC: 39.8 MMOL/L (ref 22–29)
CO2 BLDA-SCNC: 40.4 MMOL/L (ref 22–29)
CO2 SERPL-SCNC: 31.6 MMOL/L (ref 22–29)
CO2 SERPL-SCNC: 35 MMOL/L (ref 22–29)
CREAT SERPL-MCNC: 1.86 MG/DL (ref 0.57–1)
CREAT SERPL-MCNC: 1.88 MG/DL (ref 0.57–1)
CREAT UR-MCNC: 72.1 MG/DL
D-LACTATE SERPL-SCNC: 1.8 MMOL/L (ref 0.5–2)
DEPRECATED RDW RBC AUTO: 56.4 FL (ref 37–54)
DEPRECATED RDW RBC AUTO: 58.4 FL (ref 37–54)
EGFRCR SERPLBLD CKD-EPI 2021: 28.6 ML/MIN/1.73
EGFRCR SERPLBLD CKD-EPI 2021: 29 ML/MIN/1.73
EOSINOPHIL # BLD AUTO: 0.01 10*3/MM3 (ref 0–0.4)
EOSINOPHIL NFR BLD AUTO: 0.1 % (ref 0.3–6.2)
ERYTHROCYTE [DISTWIDTH] IN BLOOD BY AUTOMATED COUNT: 15.7 % (ref 12.3–15.4)
ERYTHROCYTE [DISTWIDTH] IN BLOOD BY AUTOMATED COUNT: 15.8 % (ref 12.3–15.4)
GLOBULIN UR ELPH-MCNC: 4.2 GM/DL
GLUCOSE BLDC GLUCOMTR-MCNC: 107 MG/DL (ref 70–105)
GLUCOSE BLDC GLUCOMTR-MCNC: 142 MG/DL (ref 70–105)
GLUCOSE BLDC GLUCOMTR-MCNC: 143 MG/DL (ref 70–105)
GLUCOSE BLDC GLUCOMTR-MCNC: 144 MG/DL (ref 70–105)
GLUCOSE BLDC GLUCOMTR-MCNC: 172 MG/DL (ref 70–105)
GLUCOSE BLDC GLUCOMTR-MCNC: 207 MG/DL (ref 74–100)
GLUCOSE BLDC GLUCOMTR-MCNC: 92 MG/DL (ref 70–105)
GLUCOSE BLDC GLUCOMTR-MCNC: NORMAL MG/DL
GLUCOSE SERPL-MCNC: 128 MG/DL (ref 65–99)
GLUCOSE SERPL-MCNC: 99 MG/DL (ref 65–99)
HCO3 BLDA-SCNC: 35.5 MMOL/L (ref 21–28)
HCO3 BLDA-SCNC: 37 MMOL/L (ref 21–28)
HCO3 BLDA-SCNC: 37 MMOL/L (ref 21–28)
HCO3 BLDA-SCNC: 37.4 MMOL/L (ref 21–28)
HCT VFR BLD AUTO: 45.5 % (ref 34–46.6)
HCT VFR BLD AUTO: 48.1 % (ref 34–46.6)
HEMODILUTION: NO
HGB BLD-MCNC: 13.8 G/DL (ref 12–15.9)
HGB BLD-MCNC: 14.6 G/DL (ref 12–15.9)
IMM GRANULOCYTES # BLD AUTO: 0.19 10*3/MM3 (ref 0–0.05)
IMM GRANULOCYTES NFR BLD AUTO: 1.6 % (ref 0–0.5)
INHALED O2 CONCENTRATION: 56 %
INHALED O2 CONCENTRATION: 75 %
LYMPHOCYTES # BLD AUTO: 0.53 10*3/MM3 (ref 0.7–3.1)
LYMPHOCYTES NFR BLD AUTO: 4.4 % (ref 19.6–45.3)
Lab: ABNORMAL
MAGNESIUM SERPL-MCNC: 1.9 MG/DL (ref 1.6–2.4)
MAGNESIUM SERPL-MCNC: 2 MG/DL (ref 1.6–2.4)
MCH RBC QN AUTO: 30.3 PG (ref 26.6–33)
MCH RBC QN AUTO: 30.5 PG (ref 26.6–33)
MCHC RBC AUTO-ENTMCNC: 30.3 G/DL (ref 31.5–35.7)
MCHC RBC AUTO-ENTMCNC: 30.4 G/DL (ref 31.5–35.7)
MCV RBC AUTO: 100 FL (ref 79–97)
MCV RBC AUTO: 100.6 FL (ref 79–97)
MODALITY: ABNORMAL
MONOCYTES # BLD AUTO: 0.82 10*3/MM3 (ref 0.1–0.9)
MONOCYTES NFR BLD AUTO: 6.8 % (ref 5–12)
NEUTROPHILS NFR BLD AUTO: 10.41 10*3/MM3 (ref 1.7–7)
NEUTROPHILS NFR BLD AUTO: 86.9 % (ref 42.7–76)
NOTIFIED WHO: ABNORMAL
NRBC BLD AUTO-RTO: 0.8 /100 WBC (ref 0–0.2)
PCO2 BLDA: 78.4 MM HG (ref 35–48)
PCO2 BLDA: 80.9 MM HG (ref 35–48)
PCO2 BLDA: 92.8 MM HG (ref 35–48)
PCO2 BLDA: 94.8 MM HG (ref 35–48)
PEEP RESPIRATORY: 5 CM[H2O]
PH BLDA: 7.2 PH UNITS (ref 7.35–7.45)
PH BLDA: 7.21 PH UNITS (ref 7.35–7.45)
PH BLDA: 7.25 PH UNITS (ref 7.35–7.45)
PH BLDA: 7.28 PH UNITS (ref 7.35–7.45)
PHOSPHATE SERPL-MCNC: 4.2 MG/DL (ref 2.5–4.5)
PHOSPHATE SERPL-MCNC: 5.3 MG/DL (ref 2.5–4.5)
PLATELET # BLD AUTO: 115 10*3/MM3 (ref 140–450)
PLATELET # BLD AUTO: 133 10*3/MM3 (ref 140–450)
PMV BLD AUTO: 10.3 FL (ref 6–12)
PMV BLD AUTO: 9.9 FL (ref 6–12)
PO2 BLD: 102 MM[HG] (ref 0–500)
PO2 BLD: 116 MM[HG] (ref 0–500)
PO2 BLD: 137 MM[HG] (ref 0–500)
PO2 BLD: 142 MM[HG] (ref 0–500)
PO2 BLDA: 106.7 MM HG (ref 83–108)
PO2 BLDA: 76.6 MM HG (ref 83–108)
PO2 BLDA: 76.7 MM HG (ref 83–108)
PO2 BLDA: 87 MM HG (ref 83–108)
POTASSIUM BLDA-SCNC: 5 MMOL/L (ref 3.5–4.5)
POTASSIUM SERPL-SCNC: 5.3 MMOL/L (ref 3.5–5.2)
POTASSIUM SERPL-SCNC: 5.9 MMOL/L (ref 3.5–5.2)
PROT SERPL-MCNC: 6.7 G/DL (ref 6–8.5)
PSV: 10 CMH2O
RBC # BLD AUTO: 4.55 10*6/MM3 (ref 3.77–5.28)
RBC # BLD AUTO: 4.78 10*6/MM3 (ref 3.77–5.28)
READ BACK: ABNORMAL
READ BACK: YES
RESPIRATORY RATE: 20
RESPIRATORY RATE: 24
RESPIRATORY RATE: 24
SAO2 % BLDCOA: 90.5 % (ref 94–98)
SAO2 % BLDCOA: 91.7 % (ref 94–98)
SAO2 % BLDCOA: 93.1 % (ref 94–98)
SAO2 % BLDCOA: 97 % (ref 94–98)
SODIUM SERPL-SCNC: 135 MMOL/L (ref 136–145)
SODIUM SERPL-SCNC: 136 MMOL/L (ref 136–145)
VANCOMYCIN TROUGH SERPL-MCNC: 15.4 MCG/ML (ref 5–20)
VENTILATOR MODE: ABNORMAL
VENTILATOR MODE: ABNORMAL
VT ON VENT VENT: 440 ML
VT ON VENT VENT: 450 ML
VT ON VENT VENT: 450 ML
WBC NRBC COR # BLD AUTO: 11.98 10*3/MM3 (ref 3.4–10.8)
WBC NRBC COR # BLD AUTO: 12.93 10*3/MM3 (ref 3.4–10.8)

## 2024-07-05 PROCEDURE — 94660 CPAP INITIATION&MGMT: CPT

## 2024-07-05 PROCEDURE — 63710000001 INSULIN REGULAR HUMAN PER 5 UNITS: Performed by: INTERNAL MEDICINE

## 2024-07-05 PROCEDURE — 85025 COMPLETE CBC W/AUTO DIFF WBC: CPT | Performed by: NURSE PRACTITIONER

## 2024-07-05 PROCEDURE — 83735 ASSAY OF MAGNESIUM: CPT | Performed by: NURSE PRACTITIONER

## 2024-07-05 PROCEDURE — 80053 COMPREHEN METABOLIC PANEL: CPT | Performed by: NURSE PRACTITIONER

## 2024-07-05 PROCEDURE — 82948 REAGENT STRIP/BLOOD GLUCOSE: CPT

## 2024-07-05 PROCEDURE — 99233 SBSQ HOSP IP/OBS HIGH 50: CPT | Performed by: NURSE PRACTITIONER

## 2024-07-05 PROCEDURE — 82803 BLOOD GASES ANY COMBINATION: CPT

## 2024-07-05 PROCEDURE — 03HY32Z INSERTION OF MONITORING DEVICE INTO UPPER ARTERY, PERCUTANEOUS APPROACH: ICD-10-PCS

## 2024-07-05 PROCEDURE — 85027 COMPLETE CBC AUTOMATED: CPT | Performed by: STUDENT IN AN ORGANIZED HEALTH CARE EDUCATION/TRAINING PROGRAM

## 2024-07-05 PROCEDURE — 25010000002 BUMETANIDE PER 0.5 MG: Performed by: INTERNAL MEDICINE

## 2024-07-05 PROCEDURE — 94799 UNLISTED PULMONARY SVC/PX: CPT

## 2024-07-05 PROCEDURE — 71045 X-RAY EXAM CHEST 1 VIEW: CPT

## 2024-07-05 PROCEDURE — 84132 ASSAY OF SERUM POTASSIUM: CPT

## 2024-07-05 PROCEDURE — 80202 ASSAY OF VANCOMYCIN: CPT | Performed by: INTERNAL MEDICINE

## 2024-07-05 PROCEDURE — 84100 ASSAY OF PHOSPHORUS: CPT | Performed by: NURSE PRACTITIONER

## 2024-07-05 PROCEDURE — 93005 ELECTROCARDIOGRAM TRACING: CPT | Performed by: INTERNAL MEDICINE

## 2024-07-05 PROCEDURE — 93005 ELECTROCARDIOGRAM TRACING: CPT | Performed by: STUDENT IN AN ORGANIZED HEALTH CARE EDUCATION/TRAINING PROGRAM

## 2024-07-05 PROCEDURE — 83735 ASSAY OF MAGNESIUM: CPT | Performed by: STUDENT IN AN ORGANIZED HEALTH CARE EDUCATION/TRAINING PROGRAM

## 2024-07-05 PROCEDURE — 36600 WITHDRAWAL OF ARTERIAL BLOOD: CPT

## 2024-07-05 PROCEDURE — 83605 ASSAY OF LACTIC ACID: CPT | Performed by: STUDENT IN AN ORGANIZED HEALTH CARE EDUCATION/TRAINING PROGRAM

## 2024-07-05 PROCEDURE — 94761 N-INVAS EAR/PLS OXIMETRY MLT: CPT

## 2024-07-05 PROCEDURE — 25010000002 FUROSEMIDE PER 20 MG

## 2024-07-05 PROCEDURE — 25010000002 METHYLPREDNISOLONE PER 125 MG

## 2024-07-05 PROCEDURE — 84100 ASSAY OF PHOSPHORUS: CPT | Performed by: STUDENT IN AN ORGANIZED HEALTH CARE EDUCATION/TRAINING PROGRAM

## 2024-07-05 RX ORDER — IBUPROFEN 600 MG/1
1 TABLET ORAL
Status: DISCONTINUED | OUTPATIENT
Start: 2024-07-05 | End: 2024-07-23 | Stop reason: HOSPADM

## 2024-07-05 RX ORDER — BUMETANIDE 0.25 MG/ML
2 INJECTION INTRAMUSCULAR; INTRAVENOUS EVERY 12 HOURS
Status: DISCONTINUED | OUTPATIENT
Start: 2024-07-05 | End: 2024-07-06

## 2024-07-05 RX ORDER — NOREPINEPHRINE BITARTRATE 0.03 MG/ML
INJECTION, SOLUTION INTRAVENOUS
Status: COMPLETED
Start: 2024-07-05 | End: 2024-07-05

## 2024-07-05 RX ORDER — DEXTROSE MONOHYDRATE 25 G/50ML
50 INJECTION, SOLUTION INTRAVENOUS ONCE
Status: COMPLETED | OUTPATIENT
Start: 2024-07-05 | End: 2024-07-05

## 2024-07-05 RX ORDER — BUMETANIDE 0.25 MG/ML
2 INJECTION INTRAMUSCULAR; INTRAVENOUS ONCE
Status: COMPLETED | OUTPATIENT
Start: 2024-07-05 | End: 2024-07-05

## 2024-07-05 RX ORDER — NICOTINE POLACRILEX 4 MG
15 LOZENGE BUCCAL
Status: DISCONTINUED | OUTPATIENT
Start: 2024-07-05 | End: 2024-07-23 | Stop reason: HOSPADM

## 2024-07-05 RX ORDER — FUROSEMIDE 10 MG/ML
80 INJECTION INTRAMUSCULAR; INTRAVENOUS ONCE
Status: COMPLETED | OUTPATIENT
Start: 2024-07-05 | End: 2024-07-05

## 2024-07-05 RX ORDER — LIDOCAINE HYDROCHLORIDE 10 MG/ML
20 INJECTION, SOLUTION INFILTRATION; PERINEURAL ONCE
Status: DISCONTINUED | OUTPATIENT
Start: 2024-07-05 | End: 2024-07-22

## 2024-07-05 RX ORDER — INSULIN LISPRO 100 [IU]/ML
2-7 INJECTION, SOLUTION INTRAVENOUS; SUBCUTANEOUS EVERY 6 HOURS
Status: DISCONTINUED | OUTPATIENT
Start: 2024-07-05 | End: 2024-07-08

## 2024-07-05 RX ORDER — DEXTROSE MONOHYDRATE 25 G/50ML
25 INJECTION, SOLUTION INTRAVENOUS
Status: DISCONTINUED | OUTPATIENT
Start: 2024-07-05 | End: 2024-07-23 | Stop reason: HOSPADM

## 2024-07-05 RX ORDER — NOREPINEPHRINE BITARTRATE 0.03 MG/ML
.02-.3 INJECTION, SOLUTION INTRAVENOUS
Status: DISCONTINUED | OUTPATIENT
Start: 2024-07-05 | End: 2024-07-06

## 2024-07-05 RX ADMIN — METHYLPREDNISOLONE SODIUM SUCCINATE 125 MG: 125 INJECTION, POWDER, FOR SOLUTION INTRAMUSCULAR; INTRAVENOUS at 00:17

## 2024-07-05 RX ADMIN — Medication 10 ML: at 12:38

## 2024-07-05 RX ADMIN — POVIDONE-IODINE: 10 SOLUTION TOPICAL at 12:37

## 2024-07-05 RX ADMIN — ANTI-FUNGAL POWDER MICONAZOLE NITRATE TALC FREE 1 APPLICATION: 1.42 POWDER TOPICAL at 12:37

## 2024-07-05 RX ADMIN — CEPHALEXIN 500 MG: 500 CAPSULE ORAL at 14:11

## 2024-07-05 RX ADMIN — Medication 12.5 MG: at 21:07

## 2024-07-05 RX ADMIN — AMIODARONE HYDROCHLORIDE 200 MG: 200 TABLET ORAL at 05:32

## 2024-07-05 RX ADMIN — AMIODARONE HYDROCHLORIDE 200 MG: 200 TABLET ORAL at 22:12

## 2024-07-05 RX ADMIN — MIDODRINE HYDROCHLORIDE 10 MG: 5 TABLET ORAL at 22:12

## 2024-07-05 RX ADMIN — SODIUM ZIRCONIUM CYCLOSILICATE 10 G: 10 POWDER, FOR SUSPENSION ORAL at 04:08

## 2024-07-05 RX ADMIN — MIDODRINE HYDROCHLORIDE 10 MG: 5 TABLET ORAL at 14:11

## 2024-07-05 RX ADMIN — Medication 12.5 MG: at 12:44

## 2024-07-05 RX ADMIN — DEXTROSE MONOHYDRATE 50 ML: 25 INJECTION, SOLUTION INTRAVENOUS at 04:08

## 2024-07-05 RX ADMIN — BUMETANIDE 2 MG: 0.25 INJECTION INTRAMUSCULAR; INTRAVENOUS at 18:26

## 2024-07-05 RX ADMIN — DOXYCYCLINE 100 MG: 100 CAPSULE ORAL at 21:07

## 2024-07-05 RX ADMIN — NOREPINEPHRINE BITARTRATE 0.02 MCG/KG/MIN: 0.03 INJECTION, SOLUTION INTRAVENOUS at 09:10

## 2024-07-05 RX ADMIN — BUMETANIDE 1 MG: 1 TABLET ORAL at 09:08

## 2024-07-05 RX ADMIN — AMIODARONE HYDROCHLORIDE 200 MG: 200 TABLET ORAL at 00:17

## 2024-07-05 RX ADMIN — Medication 10 ML: at 21:07

## 2024-07-05 RX ADMIN — ANTI-FUNGAL POWDER MICONAZOLE NITRATE TALC FREE 1 APPLICATION: 1.42 POWDER TOPICAL at 21:08

## 2024-07-05 RX ADMIN — IPRATROPIUM BROMIDE AND ALBUTEROL SULFATE 3 ML: .5; 3 SOLUTION RESPIRATORY (INHALATION) at 00:20

## 2024-07-05 RX ADMIN — BUMETANIDE 2 MG: 0.25 INJECTION INTRAMUSCULAR; INTRAVENOUS at 04:08

## 2024-07-05 RX ADMIN — CEPHALEXIN 500 MG: 500 CAPSULE ORAL at 22:12

## 2024-07-05 RX ADMIN — CEPHALEXIN 500 MG: 500 CAPSULE ORAL at 05:15

## 2024-07-05 RX ADMIN — RIVAROXABAN 15 MG: 15 TABLET, FILM COATED ORAL at 18:26

## 2024-07-05 RX ADMIN — DOXYCYCLINE 100 MG: 100 CAPSULE ORAL at 09:08

## 2024-07-05 RX ADMIN — FUROSEMIDE 80 MG: 10 INJECTION, SOLUTION INTRAMUSCULAR; INTRAVENOUS at 09:02

## 2024-07-05 RX ADMIN — MIDODRINE HYDROCHLORIDE 10 MG: 5 TABLET ORAL at 05:15

## 2024-07-05 RX ADMIN — PANTOPRAZOLE SODIUM 40 MG: 40 INJECTION, POWDER, FOR SOLUTION INTRAVENOUS at 05:15

## 2024-07-05 RX ADMIN — INSULIN HUMAN 10 UNITS: 100 INJECTION, SOLUTION PARENTERAL at 04:08

## 2024-07-05 RX ADMIN — RIVAROXABAN 15 MG: 15 TABLET, FILM COATED ORAL at 14:11

## 2024-07-05 RX ADMIN — AMIODARONE HYDROCHLORIDE 200 MG: 200 TABLET ORAL at 14:11

## 2024-07-05 NOTE — CONSULTS
"Nutrition Services    Patient Name: Ban Brennan  YOB: 1955  MRN: [id number]  Admission date: 6/30/2024    Comment:        CLINICAL NUTRITION ASSESSMENT      Reason for Assessment 7/5: wounds     H&P      Past Medical History:   Diagnosis Date    Bilateral leg edema     Chronic respiratory failure with hypoxia, on home O2 therapy 07/01/2024    COPD (chronic obstructive pulmonary disease)     Morbid obesity with BMI of 40.0-44.9, adult 11/08/2010    Primary hypertension 03/01/2017    Pulmonary embolism     \"many years ago, was on warfarin\"    Sleep apnea        History reviewed. No pertinent surgical history.     Current Problems   A-fib with RVR  -Bipap  -Code this am    SOB    Chronic leg edema  -immobile x 1 year    Multiple wounds  -WOCN following  -podiatry following    Possible uterine mass     CHF  HTN     Encounter Information        Trending Narrative     7/5:  Pt presented to ED via EMS on 6/30 with complaints of increasing SOB over the few days prior to admit as well as chronic leg edema with worsening swelling and redness. Upon assessment, pt found to be in A-fib and hyperkalemic. Pt mental status progressively worsened overnight per MD and pt put on bipap. Code called this am and pt possible intubation. Pt is NPO currently due to change in mental status. NGT in place. MAP avg 68.5, Lactate 1.8. On Levo. RD will provide EN recommendations and est needs if EN is desired. See below.      Anthropometrics        Current Height, Weight Height: 147.3 cm (58\")  Weight: 93.7 kg (206 lb 9.1 oz) (07/05/24 0554)       Usual Body Weight (UBW) Unable to obtain from patient.        Trending Weight Hx     This admission: 7/5: 206#             PTA: 7/5: No recent weight history documented PTA    Wt Readings from Last 30 Encounters:   07/05/24 0554 93.7 kg (206 lb 9.1 oz)   07/04/24 0532 90.9 kg (200 lb 6.4 oz)   07/03/24 0544 90.2 kg (198 lb 13.7 oz)   07/02/24 0600 92.3 kg (203 lb 7.8 oz)   07/01/24 " 0832 96.2 kg (212 lb)   06/30/24 2121 96.4 kg (212 lb 8.4 oz)   06/30/24 1650 108 kg (239 lb)   03/01/17 1306 112 kg (247 lb)      BMI kg/m2 Body mass index is 43.17 kg/m².       Labs        Pertinent Labs Hyperkalemia - management per attending     Results from last 7 days   Lab Units 07/05/24  0257 07/04/24 2248 07/04/24  1727 07/04/24  0414 07/03/24 2001 07/03/24  0635   SODIUM mmol/L 136  --  137 136  --  139   POTASSIUM mmol/L 5.9* 5.4* 6.0* 5.9*   < > 3.5   CHLORIDE mmol/L 95*  --  95* 94*  --  95*   CO2 mmol/L 35.0*  --  33.9* 33.4*  --  36.9*   BUN mg/dL 65*  --  60* 54*  --  45*   CREATININE mg/dL 1.88*  --  1.95* 1.77*  --  1.31*   CALCIUM mg/dL 9.9  --  9.5 9.7  --  9.2   BILIRUBIN mg/dL 1.6*  --   --  1.3*  --  1.0   ALK PHOS U/L 129*  --   --  138*  --  130*   ALT (SGPT) U/L 16  --   --  17  --  16   AST (SGOT) U/L 28  --   --  38*  --  30   GLUCOSE mg/dL 99  --  101* 108*  --  107*    < > = values in this interval not displayed.     Results from last 7 days   Lab Units 07/05/24  0918 07/05/24  0257 07/04/24  0414 07/03/24  0635 07/02/24  0329 07/01/24  0910   MAGNESIUM mg/dL  --  2.0 1.9 1.8   < > 2.0   PHOSPHORUS mg/dL  --  5.3* 5.8* 4.8*   < > 3.4   HEMOGLOBIN g/dL 13.8 14.6 14.7 14.4   < > 14.2   HEMATOCRIT % 45.5 48.1* 47.3* 46.0   < > 43.8   TRIGLYCERIDES mg/dL  --   --   --   --   --  86    < > = values in this interval not displayed.     Lab Results   Component Value Date    HGBA1C 6.14 (H) 07/01/2024        Medications    Scheduled Medications amiodarone, 200 mg, Nasogastric, Q8H   Followed by  [START ON 7/9/2024] amiodarone, 200 mg, Oral, Q12H   Followed by  [START ON 7/23/2024] amiodarone, 200 mg, Oral, Daily  bumetanide, 1 mg, Nasogastric, BID  cephalexin, 500 mg, Nasogastric, Q8H  dextrose, 25 g, Intravenous, Once  doxycycline, 100 mg, Nasogastric, Q12H  metoprolol tartrate, 12.5 mg, Nasogastric, Q12H  miconazole, 1 Application, Topical, Q12H  midodrine, 10 mg, Nasogastric,  Q8H  pantoprazole, 40 mg, Intravenous, Q AM  povidone-iodine, , Topical, Daily  rivaroxaban, 15 mg, Oral, BID With Meals   Followed by  [START ON 7/24/2024] rivaroxaban, 20 mg, Oral, Daily With Dinner  sodium chloride, 10 mL, Intravenous, Q12H        Infusions norepinephrine, 0.02-0.3 mcg/kg/min, Last Rate: 0.02 mcg/kg/min (07/05/24 0910)        PRN Medications   acetaminophen **OR** acetaminophen    Calcium Replacement - Follow Nurse / BPA Driven Protocol    ipratropium-albuterol    Magnesium Standard Dose Replacement - Follow Nurse / BPA Driven Protocol    nitroglycerin    ondansetron ODT **OR** ondansetron    Phosphorus Replacement - Follow Nurse / BPA Driven Protocol    Potassium Replacement - Follow Nurse / BPA Driven Protocol    [COMPLETED] Insert Peripheral IV **AND** sodium chloride    sodium chloride    sodium chloride     Physical Findings        Trending Physical   Appearance, NFPE 7/5: Unable to accurately assess due to severe level of edema   --  Edema  3+ - generalized, abdomen, L/R leg, L/R knee, L/R ankle, L/R foot  2+ - L/R arm, L/R hand     Bowel Function Last documented BM 7/4     Tubes NGT in place - no output documented      Chewing/Swallowing Unable to assess      Skin R foot ulcer  R great toe - eschar  L upper thigh - 2 open wounds  R post thigh - mid dermal wound  Abdominal fold, perineal, sacral, buttocks - MASD  Sacrum - Deep tissue PI  -WOCN/podiatry following     --  Current Nutrition Orders & Evaluation of Intake       Oral Nutrition     Food Allergies NKFA   Current PO Diet NPO Diet NPO Type: Strict NPO   Supplement none   PO Evaluation     Trending % PO Intake 7/5: NPO   --  Estimated/Assessed Needs       Energy Requirements    EST Needs, Method, Wt used 1703-5929 kcals (25-30 kcals/kg IBW 47.1 kg)       Protein Requirements    EST Needs, Method, Wt used 56-94 g PRO (1.2-2.0 g/kg IBW 47.1 kg)  -decreased due to worsening renal function       Fluid Requirements     Estimated Needs  (mL/day) 1 mL/kcal or per attending       Nutritional Risk Screening        NRS-2002 Score          Nutrition Diagnosis         Nutrition Dx Problem 1 Inadequate po intake to meet est needs related to hypermetabolism and complicated clinical course in the setting of respiratory distress and multiple chronic wounds as evidenced by NPO with no source of nutrition.       Nutrition Dx Problem 2        Intervention Goal         Intervention Goal(s) Initiate EN as clinically feasible due to NPO status necessary for medical condition.      Nutrition Intervention        RD Action Provide workup of needs and EN recommendations for use when clinically feasible if EN route desired.    Will continue to monitor per protocol         Nutrition Prescription          Diet Prescription NPO   Supplement Prescription none   --  Enteral Prescription Initial Goal:  *initial goal conservative d/t risk of RFS     Novasource Renal at 20 mL/hr + 10 mL FWF q 4 hours     End Goal:    Novasource Renal at 30 mL/hr + per clinical picture     Calories  1320 kcals (within range)    Protein  60 g (within range)    Free water  469 mL   Flushes  Will monitor hydration status     The above end goal rate is for 22 hrs/day to assume interruptions for ADLs. Water flushes adjusted based on clinical picture + Rx flushes/IV fluids     Specialized formula chosen r/t hyperkalemia, hyperphosphatemia, progressively worsening renal function         TPN Prescription        Monitor/Evaluation        Monitor Per protocol, I&O, Pertinent labs, Weight, Skin status, GI status, Symptoms, POC/GOC, Hemodynamic stability         Electronically signed by:  Shawna Miramontes RD  07/05/24 10:14 EDT

## 2024-07-05 NOTE — NURSING NOTE
Received report this AM that patient's mental status has declined overnight; patient currently responding to painful stimuli only, barely opening eyes with no verbal response, not following commands.  Respiratory status decline as well on report; currently on AVAPS at 75% with ABGs not showing improvement.  Patient continues to screen positive for severe sepsis.  BP low to low normal overnight, HR 90s and low 100s.  Placed STAT call to hospitalist to discuss patient status.  Orders for STAT labs, STAT CXR, and STAT ABG placed.  Discussed potential need to consult intensivist.  Hospitalist states he will come see patient now.  Daughter updated this AM also, confirmed code status as full code and daughter would like to speak with provider to gain better understanding of patient status for informed decision making; relayed daughter's desire to speak with provider to hospitalist.

## 2024-07-05 NOTE — CASE MANAGEMENT/SOCIAL WORK
Social Work Assessment  [facility]     Patient Name: Ban Brennan  MRN: [id number]  Today's Date: 7/5/2024    Admit Date: 6/30/2024         Discharge Plan       Row Name 07/05/24 0842       Plan    Plan Comments per chart review, ACP not uploaded to Pt chart. LSW emailed the document to HIM to be uploaded into chart.             BRITTANEY Gordon, MSW    Phone: [phone]  Fax: [phone]  Email: [email or web address]

## 2024-07-05 NOTE — PROGRESS NOTES
"    PROGRESS NOTE      Patient Name: Ban Brennan  : 1955  MRN: [id number]  Primary Care Physician: Rut Pierce APRN  Date of admission: 2024    Patient Care Team:  Rut Pierce APRN as PCP - General (Nurse Practitioner)  Austin Brooks MD as Cardiologist (Cardiology)        Reason for Follow up     Acute kidney injury, hyperkalemia      Subjective     Seen and examined, noted AMS overnight, hypercarbia with respiratory acidosis and hypoxia on NPPV/NIV 75L/min.  On levophed, ++edema  sCr 1.86, K 5.3, CO2 31, Na 135, UOP 585cc    Review of systems:    ROS was otherwise negative except as mentioned in the [place].       Personal History:     Past Medical History:   Past Medical History:   Diagnosis Date    Bilateral leg edema     Chronic respiratory failure with hypoxia, on home O2 therapy 2024    COPD (chronic obstructive pulmonary disease)     Morbid obesity with BMI of 40.0-44.9, adult 2010    Primary hypertension 2017    Pulmonary embolism     \"many years ago, was on warfarin\"    Sleep apnea        Surgical History:    History reviewed. No pertinent surgical history.    Family History: family history is not on file. Otherwise pertinent FHx was reviewed and unremarkable.     Social History:  reports that she has never smoked. She has never used smokeless tobacco. She reports that she does not drink alcohol and does not use drugs.    Medications:  Prior to Admission medications    Medication Sig Start Date End Date Taking? Authorizing Provider   Allergy Relief 180 MG tablet Take 1 tablet by mouth Daily. 24  Yes Chadwick Johnson MD   bumetanide (BUMEX) 1 MG tablet Take 1 tablet by mouth 3 times a day. 24  Yes Chadwick Johnson MD   docusate sodium (COLACE) 100 MG capsule Take 1 capsule by mouth Every 12 (Twelve) Hours. 24  Yes Chadwick Johnson MD   furosemide (LASIX) 20 MG tablet Take 1 tablet by mouth Daily.   Yes Chadwick Johnson MD "   HYDROcodone-acetaminophen (NORCO)  MG per tablet Take 1 tablet by mouth 3 times a day. 5/30/24  Yes Chadwick Johnson MD   lisinopril (PRINIVIL,ZESTRIL) 30 MG tablet Take 1 tablet by mouth Daily. 3/18/24  Yes Provider, Historical, MD   meloxicam (MOBIC) 7.5 MG tablet Take 1 tablet by mouth Daily As Needed. 3/18/24  Yes Provider, Historical, MD   metoprolol tartrate (LOPRESSOR) 50 MG tablet Take 1 tablet by mouth Daily.   Yes Chadwick Johnson MD   montelukast (SINGULAIR) 10 MG tablet Take 1 tablet by mouth every night at bedtime.   Yes Chadwick Johnson MD   omeprazole (priLOSEC) 20 MG capsule Take 1 capsule by mouth Daily. 3/18/24  Yes Provider, Historical, MD   oxybutynin XL (DITROPAN-XL) 10 MG 24 hr tablet Take 2 tablets by mouth Daily. 3/18/24  Yes Provider, Historical, MD   potassium chloride (MICRO-K) 10 MEQ CR capsule Take 1 capsule by mouth Every 12 (Twelve) Hours. 3/18/24  Yes Chadwick Johnson MD   vitamin D (ERGOCALCIFEROL) 1.25 MG (36818 UT) capsule capsule Take 1 capsule by mouth 2 (Two) Times a Week. Monday and Thursday. 5/30/24  Yes Chadwick Johnson MD     Scheduled Meds:amiodarone, 200 mg, Nasogastric, Q8H   Followed by  [START ON 7/9/2024] amiodarone, 200 mg, Oral, Q12H   Followed by  [START ON 7/23/2024] amiodarone, 200 mg, Oral, Daily  bumetanide, 1 mg, Nasogastric, BID  cephalexin, 500 mg, Nasogastric, Q8H  dextrose, 25 g, Intravenous, Once  doxycycline, 100 mg, Nasogastric, Q12H  insulin lispro, 2-7 Units, Subcutaneous, Q6H  lidocaine, 20 mL, Infiltration, Once  metoprolol tartrate, 12.5 mg, Nasogastric, Q12H  miconazole, 1 Application, Topical, Q12H  midodrine, 10 mg, Nasogastric, Q8H  pantoprazole, 40 mg, Intravenous, Q AM  povidone-iodine, , Topical, Daily  rivaroxaban, 15 mg, Oral, BID With Meals   Followed by  [START ON 7/24/2024] rivaroxaban, 20 mg, Oral, Daily With Dinner  sodium chloride, 10 mL, Intravenous, Q12H      Continuous Infusions:norepinephrine,  0.02-0.3 mcg/kg/min, Last Rate: 0.02 mcg/kg/min (07/05/24 0910)      PRN Meds:  acetaminophen **OR** acetaminophen    Calcium Replacement - Follow Nurse / BPA Driven Protocol    dextrose    dextrose    glucagon (human recombinant)    ipratropium-albuterol    Magnesium Standard Dose Replacement - Follow Nurse / BPA Driven Protocol    nitroglycerin    ondansetron ODT **OR** ondansetron    Phosphorus Replacement - Follow Nurse / BPA Driven Protocol    Potassium Replacement - Follow Nurse / BPA Driven Protocol    [COMPLETED] Insert Peripheral IV **AND** sodium chloride    sodium chloride    sodium chloride  Allergies:  No Known Allergies    Objective   Exam:     Vital Signs  Temp:  [97 °F (36.1 °C)-98.2 °F (36.8 °C)] 97 °F (36.1 °C)  Heart Rate:  [] 87  Resp:  [10-25] 24  BP: ()/(32-96) 112/61  SpO2:  [81 %-100 %] 95 %  on  Flow (L/min):  [4-75] 75;   Device (Oxygen Therapy): NPPV/NIV  Body mass index is 43.17 kg/m².  EXAM  General:  69 yr old  female, some respiratory distress  Head:      Normocephalic and atraumatic.    Eyes:      PERRL/EOM intact, conjunctivae and sclerae clear without nystagmus.    Neck:      No masses, thyromegaly,  trachea central   Lungs:    Clear bilaterally to auscultation.    Heart:      Regular rate and rhythm, no murmur no gallop  Abd:        Soft, nontender, not distended, bowel sounds positive, no shifting dullness.  Msk:        No deformity or scoliosis noted of thoracic or lumbar spine.    Pulses:   Pulses normal in all 4 extremities.    Extremities:        No cyanosis or clubbing--+ + edema.    Neuro:    Lethargic  Skin:       Intact without lesions or rashes.          Results Review:  I have personally reviewed most recent Data :  BMP @LABRCNT(creatinine:10)  CBC    Results from last 7 days   Lab Units 07/05/24  0918 07/05/24  0257 07/04/24  0414 07/03/24  0635 07/02/24  0329 07/01/24  0910 06/30/24  1858 06/30/24  1732   WBC 10*3/mm3 12.93* 11.98* 9.76 10.53 10.53 13.80*   --  16.80*   HEMOGLOBIN g/dL 13.8 14.6 14.7 14.4 13.8 14.2  --  15.9   HEMOGLOBIN, POC g/dL  --   --   --   --   --   --  17.0  --    PLATELETS 10*3/mm3 115* 133* 123* 104* 92* 104*  --  119*     CMP   Results from last 7 days   Lab Units 07/05/24  0918 07/05/24  0257 07/04/24 2248 07/04/24  1727 07/04/24  0414 07/03/24 2001 07/03/24  0635 07/02/24 2042 07/02/24  0329 07/01/24  1633 07/01/24  0910 06/30/24  2043   SODIUM mmol/L 135* 136  --  137 136  --  139  --  138  --  134* 131*   POTASSIUM mmol/L 5.3* 5.9* 5.4* 6.0* 5.9* 5.2 3.5   < > 3.5   < > 5.4* 6.5*   CHLORIDE mmol/L 95* 95*  --  95* 94*  --  95*  --  96*  --  97* 101   CO2 mmol/L 31.6* 35.0*  --  33.9* 33.4*  --  36.9*  --  34.5*  --  31.4* 24.4   BUN mg/dL 67* 65*  --  60* 54*  --  45*  --  43*  --  46* 50*   CREATININE mg/dL 1.86* 1.88*  --  1.95* 1.77*  --  1.31*  --  1.14*  --  1.33* 1.38*   GLUCOSE mg/dL 128* 99  --  101* 108*  --  107*  --  82  --  102* 194*   ALBUMIN g/dL  --  2.5*  --   --  2.6*  --  2.5*  --  2.3*  --  2.5* 2.5*   BILIRUBIN mg/dL  --  1.6*  --   --  1.3*  --  1.0  --  0.9  --  1.3* 1.6*   ALK PHOS U/L  --  129*  --   --  138*  --  130*  --  109  --  115 126*   AST (SGOT) U/L  --  28  --   --  38*  --  30  --  24  --  39* 42*   ALT (SGPT) U/L  --  16  --   --  17  --  16  --  14  --  17 18    < > = values in this interval not displayed.     ABG    Results from last 7 days   Lab Units 07/05/24  0711 07/05/24  0442 07/05/24  0353 07/05/24  0212   PH, ARTERIAL pH units 7.281* 7.250* 7.204* 7.208*   PCO2, ARTERIAL mm Hg 78.4* 80.9* 94.8* 92.8*   PO2 ART mm Hg 106.7 76.6* 87.0 76.7*   O2 SATURATION ART % 97.0 91.7* 93.1* 90.5*   BASE EXCESS ART mmol/L 6.5* 4.5* 4.6* 4.4*     XR Chest 1 View    Result Date: 7/5/2024  Impression: Marked cardiomegaly with bilateral patchy airspace disease and pleural effusions. Findings are similar to the previous exam. Electronically Signed: Amirah Coto MD  7/5/2024 9:14 AM EDT  Workstation ID:  [id number]    XR Abdomen KUB    Result Date: 7/4/2024  Impression: Feeding tube is in the stomach. Electronically Signed: Obed Sidhu MD  7/4/2024 9:59 PM EDT  Workstation ID: [id number]    US Renal Bilateral    Result Date: 7/4/2024  Impression: No acute process nor significant abnormality identified. Electronically Signed: Beto Kaur MD  7/4/2024 4:14 PM EDT  Workstation ID: [id number]    XR Chest 1 View    Result Date: 7/4/2024  Impression: Cardiomegaly with pulmonary vascular congestion and bilateral pleural effusions. Bibasilar airspace disease could be atelectasis or pneumonia. Electronically Signed: Obed Sidhu MD  7/4/2024 2:34 AM EDT  Workstation ID: [id number]    XR Chest 1 View    Result Date: 7/3/2024  Impression: No appreciable change in bibasilar lung infiltrates with small effusions. Continued cardiomegaly. Electronically Signed: Krystal Lee MD  7/3/2024 7:57 AM EDT  Workstation ID: [id number]    XR Chest 1 View    Result Date: 7/2/2024  Impression: Some improvement in bibasilar lung infiltrates with small effusions. Continued cardiomegaly. Electronically Signed: Krystal Lee MD  7/2/2024 12:46 PM EDT  Workstation ID: [id number]    US Pelvis Complete    Result Date: 7/1/2024  Impression: Sonographic features suggest the presence of a large subserosal uterine fundal fibroid. Electronically Signed: Ann Marie Maloney MD  7/1/2024 1:43 PM EDT  Workstation ID: [id number]    CT Abdomen Pelvis Without Contrast    Result Date: 7/1/2024  Impression: Moderately large right pleural effusion and small left pleural effusion. Bibasilar infiltrates suggest atelectasis although associated pneumonia is not excluded. Severe cardiomegaly. Bilateral flank edema, greatest on the right. Probable fibroid uterus. This could be confirmed with ultrasound. Electronically Signed: Krystal Lee MD  7/1/2024 11:39 AM EDT  Workstation ID: [id number]    Adult Transthoracic Echo Complete w/ Color, Spectral and Contrast if Necessary Per  Protocol    Addendum Date: 7/1/2024      Left ventricular ejection fraction appears to be 31 - 35%.   Left ventricular diastolic dysfunction is noted.   The left atrial cavity is dilated.   Moderate aortic valve stenosis is present. Mean/peak gradient of 14/24 mmHg.  Dimensionless index 0.36   Moderate to severe mitral valve regurgitation is present.   Estimated right ventricular systolic pressure from tricuspid regurgitation is normal (<35 mmHg).     CT Angiogram Chest Pulmonary Embolism    Result Date: 7/1/2024  Impression: 1. No evidence for pulmonary embolus. 2. Bibasilar airspace disease and right middle lung opacity compatible likely multifocal pneumonia with bilateral pleural effusions. 3. Cardiomegaly. Electronically Signed: Shari Kaur MD  7/1/2024 10:14 AM EDT  Workstation ID: [id number]    CT Lower Extremity Bilateral Without Contrast    Result Date: 7/1/2024  Impression: Bilateral lower extremity soft tissue swelling greater on the left than the right. There is no soft tissue gas or well-formed abscess. Electronically Signed: Obed Sidhu MD  7/1/2024 1:53 AM EDT  Workstation ID: [id number]    XR Chest 1 View    Result Date: 6/30/2024  Vascular congestion with small effusions Electronically Signed: Eamon Brock MD  6/30/2024 7:27 PM EDT  Workstation ID: [id number]     Results for orders placed during the hospital encounter of 06/30/24    Adult Transthoracic Echo Complete w/ Color, Spectral and Contrast if Necessary Per Protocol    Interpretation Summary    Left ventricular ejection fraction appears to be 31 - 35%.    Left ventricular diastolic dysfunction is noted.    The left atrial cavity is dilated.    Moderate aortic valve stenosis is present. Mean/peak gradient of 14/24 mmHg.  Dimensionless index 0.36    Moderate to severe mitral valve regurgitation is present.    Estimated right ventricular systolic pressure from tricuspid regurgitation is normal (<35 mmHg).        Assessment & Plan   Assessment and  Plan:         Atrial fibrillation with RVR    Primary hypertension    Morbid obesity with BMI of 40.0-44.9, adult    Right bundle branch block    Acute deep vein thrombosis (DVT) of both lower extremities    ARABELLA (acute kidney injury)    Acute hyperkalemia    Sepsis    Acute UTI    Acute systolic congestive heart failure    Rhinovirus infection    Multifocal pneumonia    Chronic respiratory failure with hypoxia, on home O2 therapy    Skin ulcer of right great toe    Skin ulcer of left great toe    SEDRICK (obstructive sleep apnea)    Aortic stenosis    Mitral regurgitation    Acute HFrEF (heart failure with reduced ejection fraction)    COPD (chronic obstructive pulmonary disease)    ASSESSMENT:  Acute kidney injury, with baseline creatinine roughly 0.8-1.0mg/dL  Hyperkalemia  A-fib with RVR  Acute on chronic heart failure, with EF 31-35%, diastolic dysfunction, moderate AS and moderate to severe MR as per echo from 6/30/24  Bilateral DVT  Severe sepsis/LLE cellulitis, acute cystitis  Multifocal pneumonia    PLAN :     AMS overnight, hypercarbia with respiratory acidosis and hypoxia on NPPV/NIV 75L/min. On levophed  Renal function about the same today. Suspect she may initially have had some acute cardiorenal component, now most likely has some ATN secondary to sepsis, elevated Vanc level (was 22.4 on 7/3), and some prolonged prerenal state with hemodynamic insults, arrhythmias.  TIM without hydronephrosis  UA with trace ketones, moderate occult blood, 11-20 RBCs, pyuria, UPCR 347 mg/g, urine Na <20  Chun in place for strict I/Os  Hyperkalemia, secondary to worsening ARABELLA and recent potassium supplements  Agree with Mary. Repeat labs this afternoon. Nursing to call if K>5  Adjust to low K diet  Volume reviewed. proBNP 13.5K. will change bumex 2 mg ivp every 12 hours, chest xray this am with patchy airspace disease and bilateral pleural effusions, similar to prio  Continue current diuretic regimen for now.  Reassess/readjust diuretics in the am pending above workup  Continue Midodrine. Levophed if MAP <70  Antibiotics as per ID. Now off Vanc, agree. On PO Doxy and Cephalexin  Check labs daily. Will follow closely      MANJU [facility]  7/5/2024  13:11 EDT  The note was transcribed by  MANJU.  I personally have examined the patient and interviewed the patient and modified the history, exam. I have reviewed the history, data, problems, assessment and plan .and I concur . Exam as described in the Progress note, with comments additions, revisions as noted by me.         MD Issa [facility]  7/5/2024  15:23 EDT

## 2024-07-05 NOTE — PROCEDURES
Insert Arterial Line    Date/Time: 7/5/2024 11:58 AM    Performed by: Nydia Stephen APRN  Authorized by: Nydia Stephen APRN    Universal Protocol:     Written consent obtained?: Yes      Risks and benefits: Risks, benefits and alternatives were discussed      Consent given by:  Patient    Patient states understanding of procedure being performed: Yes      Patient's understanding of procedure matches consent: Yes      Procedure consent matches procedure scheduled: Yes      Relevant documents present and verified: Yes      Test results available and properly labeled: Yes      Site marked: Yes      Imaging studies available: Yes      Required items: Required blood products, implants, devices and special equipment available      Patient identity confirmed:  Verbally with patient and arm band    Time out: Immediately prior to the procedure a time out was called    A time out verifies correct patient, procedure, equipment, support staff and site/side marked as required:   Preparation:     Preparation: Patient was prepped and draped in usual sterile fashion    Indications:     Indications: multiple ABGs, respiratory failure and hemodynamic monitoring    Location:     Location:  Right radial  Anesthesia:     Anesthesia:  Local infiltration    Local anesthetic:  Lidocaine 1% without epinephrine    Anesthetic total (ml):  3  Procedure Details:     Ultrasound Guidance: yes  The ultrasound was used for evaluation of possible access sites.  Vessel patency was confirmed with the ultrasound.  Needle entry into vessel was visualized in realtime with the ultrasound.   Permanent recorded image(s) of the vascular access site will be included in the patient record.      Seldinger technique: Seldinger technique used      Number of attempts:  2  Post-procedure:     Post-procedure:  Line sutured and dressing applied    Post-procedure CMS:  Normal     patient tolerated the procedure well with no immediate  complications    Electronically signed by MANJU Castaneda, 07/05/24, 11:59 AM EDT.

## 2024-07-05 NOTE — SIGNIFICANT NOTE
Pt mental status progressively worsened overnight ; overnight hospt team was consulted ABG was done and pt put on bipap.  This morning , ABG reviewed  shows hypercarbia with resp acidoses and hypoxia   Repeat ABG with little improvement  ON exam   Pt lethargic , urinable to protect airways   VS reviewed     Assessment   Acute hypoxic  hypercapnic resp failure   See previous note for other co morbidities     Plan   Repeat STAT lab, CXR  Called daughter  Chelsi Valle (Daughter)  [phone]  and updated on c/l status including the need for possible intubation . Daughter confirmed pt is full code   Consulted ICU team  stat

## 2024-07-05 NOTE — SIGNIFICANT NOTE
07/05/24 1534   OTHER   Discipline physical therapist   Rehab Time/Intention   Session Not Performed other (see comments)  (Pt sleeping soundly; unable to arrouse for PT.)   Therapy Assessment/Plan (PT)   Criteria for Skilled Interventions Met (PT) yes;skilled treatment is necessary   Recommendation   PT - Next Appointment 07/08/24

## 2024-07-05 NOTE — CASE MANAGEMENT/SOCIAL WORK
Continued Stay Note  QIAN Clarke     Patient Name: Ban Brennan  MRN: [id number]  Today's Date: 7/5/2024    Admit Date: 6/30/2024    Plan: DC Plan: [facility] accepted and following. Precert auto approved 7/3/2024. Good 7/4/2024 through 7/10/2024. PASRR approved. Will need EMS transport at DC.   Discharge Plan       Row Name 07/05/24 0946       Plan    Plan Comments updated pharmacy to [facility] for facility.  per chart review, decreasing LOC with intensivist consult for likely intubation.  Currently on bipap.      Row Name 07/05/24 0842       Plan    Plan Comments per chart review, ACP not uploaded to Pt chart. LSW emailed the document to HIM to be uploaded into chart.                      Expected Discharge Date and Time       Expected Discharge Date Expected Discharge Time    Jul 4, 2024               Hortencia Almanza RN

## 2024-07-05 NOTE — CONSULTS
"Critical Care Consult Note   Ban Brennan : 1955 MRN:[id number] LOS:5 ROOM: 3120/1     Reason for admission: Atrial fibrillation with RVR     Assessment / Plan     Acute respiratory failure with hypoxia and hypercapnia  Likely due to fluid overload, + rhinovirus infection  CXR: \"Bilateral airspace opacities are again seen most prominent in the left midlung in the right lung base.  Moderate sized right greater than left pleural effusions persist.\"  Lasix 80 mg x 1 dose, continue BID Bumex  Currently on AVAPS settings noted and adjusted as needed.   Close monitoring of ABG as ordered.   Minimize sedation/analgesia to keep RASS of 0 to -1.     Acute systolic CHF in exacerbation   Echo 24 EF 31-35%, moderate aortic stenosis and moderate to severe mitral regurgitation  Cardiology following  Requiring AVAPS  Lasix x 1 dose this AM, Continue with BID bumex     Acute deep vein thrombosis of bother lower extremities  Continue xarelto    Atrial fibrillation with RVR  PAF  Moderate AS  Severe MR  Cardiology following  Anticoagulated with Xarelto    LLE cellulitis  L foot wound, appears infected  Putative panniculitis  Doxygcycline 100 mg BID x 7 days  Keflex 500 mg TID x 7 days  Wound care nurse following.  Podiatry and ID have signed off    Code Status (Patient has no pulse and is not breathing): CPR (Attempt to Resuscitate)  Medical Interventions (Patient has pulse or is breathing): Full Support       Nutrition: NPO Diet NPO Type: Strict NPO Patient isn't on Tube Feeding     VTE Prophylaxis:  Pharmacologic VTE prophylaxis orders are present.    History of Present illness     Ban Brennan is a 69 y.o. female with known PMH of hypertension, heart failure, chronic bilateral lower extremity edema who presented to the hospital for creasing shortness of breath, and was admitted with a principal diagnosis of Atrial fibrillation with RVR.  Patient is alert however lethargic and attempts to participate with " HPI however she is a poor historian.  Supplemental information for HPI taken from conversation with daughter at bedside who states patient does not go to the doctor normally and has not been in the hospital for years.  She is unsure of what her full medical history is at this time but states that her legs have been swollen for a long time and that she has not been up and moving for approximately 1 year.  Patient has a home health nurse that comes out and wraps her legs twice a week.  On assessment patient's legs are both extremely red and swollen, left > right.  Patient's daughter states that redness of her legs is new however she is not sure how long it has been this bad.  EMS was called when she had worsening shortness of breath and found the patient with a wide complex tachycardia with heart rate of 190s.  She was given 150 mg of amiodarone by EMS and heart rate improved to 160s.  She is found to have a right bundle branch block however it is uncertain due to her limited history if this is chronic.     ED course: On arrival to emergency department EKG showed atrial fibrillation with RVR, heart rate 145.  CXR showed fluid overload with bilateral effusions.  Lactic acid was 3.1 and BNP 19,860.  Patient had a white count of 16.8 and 4+ bacteria in her urine.  Also noted to be hyperkalemic with a potassium of 6.5 in which she was treated with insulin D50, calcium gluconate, and Lasix.  She was started on cefepime and vancomycin for cellulitis of her legs and UTI.    Today 7/5/24, Intensivist service was re-consulted due to concern for worsening respiratory status. Patient also required to be placed on low dose levophed.     Patient was seen and examined on 07/05/24 at 09:39 EDT .    Subjective / Review of systems     Review of Systems   Unable to perform ROS: Acuity of condition        Past Medical/Surgical/Social/Family History & Allergies     Past Medical History:   Diagnosis Date    Bilateral leg edema     Chronic  "respiratory failure with hypoxia, on home O2 therapy 07/01/2024    COPD (chronic obstructive pulmonary disease)     Morbid obesity with BMI of 40.0-44.9, adult 11/08/2010    Primary hypertension 03/01/2017    Pulmonary embolism     \"many years ago, was on warfarin\"    Sleep apnea       History reviewed. No pertinent surgical history.   Social History     Socioeconomic History    Marital status:    Tobacco Use    Smoking status: Never    Smokeless tobacco: Never   Vaping Use    Vaping status: Never Used   Substance and Sexual Activity    Alcohol use: Never    Drug use: Never    Sexual activity: Defer      History reviewed. No pertinent family history.   No Known Allergies     Home Medications     Prior to Admission medications    Medication Sig Start Date End Date Taking? Authorizing Provider   Allergy Relief 180 MG tablet Take 1 tablet by mouth Daily. 5/30/24  Yes Chadwick Johnson MD   bumetanide (BUMEX) 1 MG tablet Take 1 tablet by mouth 3 times a day. 5/30/24  Yes Chadwick Johnson MD   docusate sodium (COLACE) 100 MG capsule Take 1 capsule by mouth Every 12 (Twelve) Hours. 5/30/24  Yes ProviderChadwick MD   furosemide (LASIX) 20 MG tablet Take 1 tablet by mouth Daily.   Yes Provider, MD Chadwick   HYDROcodone-acetaminophen (NORCO)  MG per tablet Take 1 tablet by mouth 3 times a day. 5/30/24  Yes Chadwick Johnson MD   lisinopril (PRINIVIL,ZESTRIL) 30 MG tablet Take 1 tablet by mouth Daily. 3/18/24  Yes Chadwick Johnson MD   meloxicam (MOBIC) 7.5 MG tablet Take 1 tablet by mouth Daily As Needed. 3/18/24  Yes ProviderChadwick MD   metoprolol tartrate (LOPRESSOR) 50 MG tablet Take 1 tablet by mouth Daily.   Yes Provider, MD Chadwick   montelukast (SINGULAIR) 10 MG tablet Take 1 tablet by mouth every night at bedtime.   Yes Chadwick Johnson MD   omeprazole (priLOSEC) 20 MG capsule Take 1 capsule by mouth Daily. 3/18/24  Yes Chadwick Johnson MD   oxybutynin XL " (DITROPAN-XL) 10 MG 24 hr tablet Take 2 tablets by mouth Daily. 3/18/24  Yes Provider, MD Chadwick   potassium chloride (MICRO-K) 10 MEQ CR capsule Take 1 capsule by mouth Every 12 (Twelve) Hours. 3/18/24  Yes Provider, MD Chadwick   vitamin D (ERGOCALCIFEROL) 1.25 MG (44059 UT) capsule capsule Take 1 capsule by mouth 2 (Two) Times a Week. Monday and Thursday. 5/30/24  Yes Provider, MD Chadwick        Objective / Physical Exam     Vital signs:  Temp: 97.3 °F (36.3 °C)  BP: (!) 86/56  Heart Rate: 96  Resp: 18  SpO2: 91 %  Weight: 93.7 kg (206 lb 9.1 oz)    Admission Weight: Weight: 108 kg (239 lb)    Physical Exam  Vitals and nursing note reviewed.   Constitutional:       General: She is sleeping. She is not in acute distress.     Appearance: She is ill-appearing.   HENT:      Head: Normocephalic.      Mouth/Throat:      Mouth: Mucous membranes are dry.      Pharynx: Oropharynx is clear.   Eyes:      Extraocular Movements: Extraocular movements intact.      Conjunctiva/sclera: Conjunctivae normal.      Pupils: Pupils are equal, round, and reactive to light.   Cardiovascular:      Rate and Rhythm: Normal rate. Rhythm irregular.      Pulses: Normal pulses.      Heart sounds: Normal heart sounds.   Pulmonary:      Effort: Pulmonary effort is normal.      Breath sounds: Examination of the right-lower field reveals decreased breath sounds. Examination of the left-lower field reveals decreased breath sounds. Decreased breath sounds present. No wheezing or rhonchi.   Abdominal:      General: Bowel sounds are normal.      Palpations: Abdomen is soft.   Musculoskeletal:      Right lower leg: Edema present.      Left lower leg: Edema present.   Skin:     General: Skin is warm and dry.      Findings: No rash.      Comments: Bilateral lower extremity cellulitis. Wounds on both feet.     Neurological:      General: No focal deficit present.      Mental Status: She is easily aroused. Mental status is at baseline.    Psychiatric:         Behavior: Behavior is cooperative.          Labs     Results from last 7 days   Lab Units 07/05/24  0918 07/05/24  0257 07/04/24 0414 07/03/24 0635 07/02/24  0329   WBC 10*3/mm3 12.93* 11.98* 9.76 10.53 10.53   HEMATOCRIT % 45.5 48.1* 47.3* 46.0 42.9   PLATELETS 10*3/mm3 115* 133* 123* 104* 92*      Results from last 7 days   Lab Units 07/05/24  0257 07/04/24  2248 07/04/24  1727 07/04/24 0414 07/03/24 2001 07/03/24 0635 07/02/24 2042 07/02/24 0329   SODIUM mmol/L 136  --  137 136  --  139  --  138   POTASSIUM mmol/L 5.9* 5.4* 6.0* 5.9* 5.2 3.5   < > 3.5   CHLORIDE mmol/L 95*  --  95* 94*  --  95*  --  96*   CO2 mmol/L 35.0*  --  33.9* 33.4*  --  36.9*  --  34.5*   BUN mg/dL 65*  --  60* 54*  --  45*  --  43*   CREATININE mg/dL 1.88*  --  1.95* 1.77*  --  1.31*  --  1.14*    < > = values in this interval not displayed.        Imaging     XR Chest 1 View    Result Date: 7/5/2024  Impression: Marked cardiomegaly with bilateral patchy airspace disease and pleural effusions. Findings are similar to the previous exam. Electronically Signed: Amirah Coto MD  7/5/2024 9:14 AM EDT  Workstation ID: [id number]    XR Abdomen KUB    Result Date: 7/4/2024  Impression: Feeding tube is in the stomach. Electronically Signed: Obed Sidhu MD  7/4/2024 9:59 PM EDT  Workstation ID: [id number]    US Renal Bilateral    Result Date: 7/4/2024  Impression: No acute process nor significant abnormality identified. Electronically Signed: Beto Kaur MD  7/4/2024 4:14 PM EDT  Workstation ID: [id number]    XR Chest 1 View    Result Date: 7/4/2024  Impression: Cardiomegaly with pulmonary vascular congestion and bilateral pleural effusions. Bibasilar airspace disease could be atelectasis or pneumonia. Electronically Signed: Obed Sidhu MD  7/4/2024 2:34 AM EDT  Workstation ID: [id number]        Current Medications     Scheduled Meds:  amiodarone, 200 mg, Nasogastric, Q8H   Followed by  [START ON 7/9/2024] amiodarone,  200 mg, Oral, Q12H   Followed by  [START ON 7/23/2024] amiodarone, 200 mg, Oral, Daily  bumetanide, 1 mg, Nasogastric, BID  cephalexin, 500 mg, Nasogastric, Q8H  dextrose, 25 g, Intravenous, Once  doxycycline, 100 mg, Nasogastric, Q12H  metoprolol tartrate, 12.5 mg, Nasogastric, Q12H  miconazole, 1 Application, Topical, Q12H  midodrine, 10 mg, Nasogastric, Q8H  pantoprazole, 40 mg, Intravenous, Q AM  povidone-iodine, , Topical, Daily  rivaroxaban, 15 mg, Oral, BID With Meals   Followed by  [START ON 7/24/2024] rivaroxaban, 20 mg, Oral, Daily With Dinner  sodium chloride, 10 mL, Intravenous, Q12H         Continuous Infusions:  norepinephrine, 0.02-0.3 mcg/kg/min, Last Rate: 0.02 mcg/kg/min (07/05/24 0910)         MANJU Castaneda   Critical Care  07/05/24   09:39 EDT

## 2024-07-05 NOTE — CASE MANAGEMENT/SOCIAL WORK
Social Work Assessment  [facility]     Patient Name: Ban Brennan  MRN: [id number]  Today's Date: 7/5/2024    Admit Date: 6/30/2024         Discharge Plan       Row Name 07/05/24 1437       Plan    Plan Comments LSW family requesting POA documents and Notary. LSW met with pt daughter, Chelsi and her spouse at bedside. Pt is unable to complete POA at this time but provided with Notary to You if Pt wakes up fully A&Ox4 to participate in POA discussion. LSW and Pt completed ACP document on 7.3 with Pt while fully A&O on 7.3.24. Daughter's name is misspelled on HCR document.      BRITTANEY Gordon, MSW    Phone: [phone]  Fax: [phone]  Email: [email or web address]

## 2024-07-06 ENCOUNTER — APPOINTMENT (OUTPATIENT)
Dept: GENERAL RADIOLOGY | Facility: HOSPITAL | Age: 69
End: 2024-07-06
Payer: MEDICARE

## 2024-07-06 ENCOUNTER — APPOINTMENT (OUTPATIENT)
Dept: CT IMAGING | Facility: HOSPITAL | Age: 69
End: 2024-07-06
Payer: MEDICARE

## 2024-07-06 LAB
ALBUMIN SERPL-MCNC: 2.5 G/DL (ref 3.5–5.2)
ALBUMIN SERPL-MCNC: 2.5 G/DL (ref 3.5–5.2)
ALBUMIN/GLOB SERPL: 0.7 G/DL
ALBUMIN/GLOB SERPL: 0.7 G/DL
ALP SERPL-CCNC: 110 U/L (ref 39–117)
ALP SERPL-CCNC: 117 U/L (ref 39–117)
ALT SERPL W P-5'-P-CCNC: 14 U/L (ref 1–33)
ALT SERPL W P-5'-P-CCNC: 17 U/L (ref 1–33)
ANION GAP SERPL CALCULATED.3IONS-SCNC: 10.1 MMOL/L (ref 5–15)
ANION GAP SERPL CALCULATED.3IONS-SCNC: 9 MMOL/L (ref 5–15)
APPEARANCE FLD: ABNORMAL
ARTERIAL PATENCY WRIST A: ABNORMAL
AST SERPL-CCNC: 27 U/L (ref 1–32)
AST SERPL-CCNC: 31 U/L (ref 1–32)
ATMOSPHERIC PRESS: ABNORMAL MM[HG]
BASE EXCESS BLDA CALC-SCNC: 9.3 MMOL/L (ref 0–3)
BASOPHILS # BLD AUTO: 0.02 10*3/MM3 (ref 0–0.2)
BASOPHILS NFR BLD AUTO: 0.1 % (ref 0–1.5)
BDY SITE: ABNORMAL
BILIRUB SERPL-MCNC: 1.6 MG/DL (ref 0–1.2)
BILIRUB SERPL-MCNC: 1.6 MG/DL (ref 0–1.2)
BUN SERPL-MCNC: 70 MG/DL (ref 8–23)
BUN SERPL-MCNC: 72 MG/DL (ref 8–23)
BUN/CREAT SERPL: 44.9 (ref 7–25)
BUN/CREAT SERPL: 51.1 (ref 7–25)
CALCIUM SPEC-SCNC: 9.5 MG/DL (ref 8.6–10.5)
CALCIUM SPEC-SCNC: 9.5 MG/DL (ref 8.6–10.5)
CHLORIDE SERPL-SCNC: 96 MMOL/L (ref 98–107)
CHLORIDE SERPL-SCNC: 96 MMOL/L (ref 98–107)
CO2 BLDA-SCNC: 39.1 MMOL/L (ref 22–29)
CO2 SERPL-SCNC: 33.9 MMOL/L (ref 22–29)
CO2 SERPL-SCNC: 34 MMOL/L (ref 22–29)
COLOR FLD: ABNORMAL
CREAT SERPL-MCNC: 1.41 MG/DL (ref 0.57–1)
CREAT SERPL-MCNC: 1.56 MG/DL (ref 0.57–1)
DEPRECATED RDW RBC AUTO: 52.8 FL (ref 37–54)
EGFRCR SERPLBLD CKD-EPI 2021: 35.8 ML/MIN/1.73
EGFRCR SERPLBLD CKD-EPI 2021: 40.5 ML/MIN/1.73
EOSINOPHIL # BLD AUTO: 0 10*3/MM3 (ref 0–0.4)
EOSINOPHIL NFR BLD AUTO: 0 % (ref 0.3–6.2)
ERYTHROCYTE [DISTWIDTH] IN BLOOD BY AUTOMATED COUNT: 15.6 % (ref 12.3–15.4)
GLOBULIN UR ELPH-MCNC: 3.6 GM/DL
GLOBULIN UR ELPH-MCNC: 3.6 GM/DL
GLUCOSE BLDC GLUCOMTR-MCNC: 105 MG/DL (ref 70–105)
GLUCOSE BLDC GLUCOMTR-MCNC: 108 MG/DL (ref 70–105)
GLUCOSE BLDC GLUCOMTR-MCNC: 89 MG/DL (ref 70–105)
GLUCOSE SERPL-MCNC: 111 MG/DL (ref 65–99)
GLUCOSE SERPL-MCNC: 133 MG/DL (ref 65–99)
HCO3 BLDA-SCNC: 37.2 MMOL/L (ref 21–28)
HCT VFR BLD AUTO: 42.8 % (ref 34–46.6)
HEMODILUTION: YES
HGB BLD-MCNC: 13.8 G/DL (ref 12–15.9)
IMM GRANULOCYTES # BLD AUTO: 0.09 10*3/MM3 (ref 0–0.05)
IMM GRANULOCYTES NFR BLD AUTO: 0.6 % (ref 0–0.5)
INHALED O2 CONCENTRATION: 52 %
LDH SERPL-CCNC: 240 U/L (ref 135–214)
LYMPHOCYTES # BLD AUTO: 0.54 10*3/MM3 (ref 0.7–3.1)
LYMPHOCYTES NFR BLD AUTO: 3.5 % (ref 19.6–45.3)
LYMPHOCYTES NFR FLD MANUAL: 2 %
MAGNESIUM SERPL-MCNC: 1.9 MG/DL (ref 1.6–2.4)
MCH RBC QN AUTO: 30.4 PG (ref 26.6–33)
MCHC RBC AUTO-ENTMCNC: 32.2 G/DL (ref 31.5–35.7)
MCV RBC AUTO: 94.3 FL (ref 79–97)
MESOTHL CELL NFR FLD MANUAL: 1 %
MODALITY: ABNORMAL
MONOCYTES # BLD AUTO: 0.59 10*3/MM3 (ref 0.1–0.9)
MONOCYTES NFR BLD AUTO: 3.9 % (ref 5–12)
MONOCYTES NFR FLD: 13 %
NEUTROPHILS NFR BLD AUTO: 14.05 10*3/MM3 (ref 1.7–7)
NEUTROPHILS NFR BLD AUTO: 91.9 % (ref 42.7–76)
NEUTROPHILS NFR FLD MANUAL: 84 %
NRBC BLD AUTO-RTO: 0.1 /100 WBC (ref 0–0.2)
NUC CELL # FLD: 2534 /MM3
PCO2 BLDA: 61.2 MM HG (ref 35–48)
PH BLDA: 7.39 PH UNITS (ref 7.35–7.45)
PH FLD: 7.5 [PH] (ref 6.5–7.5)
PHOSPHATE SERPL-MCNC: 3.5 MG/DL (ref 2.5–4.5)
PLATELET # BLD AUTO: 128 10*3/MM3 (ref 140–450)
PMV BLD AUTO: 10.2 FL (ref 6–12)
PO2 BLD: 119 MM[HG] (ref 0–500)
PO2 BLDA: 61.9 MM HG (ref 83–108)
POTASSIUM SERPL-SCNC: 3.3 MMOL/L (ref 3.5–5.2)
POTASSIUM SERPL-SCNC: 3.7 MMOL/L (ref 3.5–5.2)
PROT SERPL-MCNC: 6.1 G/DL (ref 6–8.5)
PROT SERPL-MCNC: 6.1 G/DL (ref 6–8.5)
RBC # BLD AUTO: 4.54 10*6/MM3 (ref 3.77–5.28)
SAO2 % BLDCOA: 90.2 % (ref 94–98)
SODIUM SERPL-SCNC: 139 MMOL/L (ref 136–145)
SODIUM SERPL-SCNC: 140 MMOL/L (ref 136–145)
WBC NRBC COR # BLD AUTO: 15.29 10*3/MM3 (ref 3.4–10.8)

## 2024-07-06 PROCEDURE — 94761 N-INVAS EAR/PLS OXIMETRY MLT: CPT

## 2024-07-06 PROCEDURE — 82948 REAGENT STRIP/BLOOD GLUCOSE: CPT

## 2024-07-06 PROCEDURE — 87205 SMEAR GRAM STAIN: CPT | Performed by: INTERNAL MEDICINE

## 2024-07-06 PROCEDURE — 99233 SBSQ HOSP IP/OBS HIGH 50: CPT | Performed by: INTERNAL MEDICINE

## 2024-07-06 PROCEDURE — 83615 LACTATE (LD) (LDH) ENZYME: CPT | Performed by: INTERNAL MEDICINE

## 2024-07-06 PROCEDURE — 80053 COMPREHEN METABOLIC PANEL: CPT | Performed by: NURSE PRACTITIONER

## 2024-07-06 PROCEDURE — 84157 ASSAY OF PROTEIN OTHER: CPT | Performed by: INTERNAL MEDICINE

## 2024-07-06 PROCEDURE — 85025 COMPLETE CBC W/AUTO DIFF WBC: CPT | Performed by: NURSE PRACTITIONER

## 2024-07-06 PROCEDURE — 94799 UNLISTED PULMONARY SVC/PX: CPT

## 2024-07-06 PROCEDURE — 82945 GLUCOSE OTHER FLUID: CPT | Performed by: INTERNAL MEDICINE

## 2024-07-06 PROCEDURE — 71045 X-RAY EXAM CHEST 1 VIEW: CPT

## 2024-07-06 PROCEDURE — 25010000002 BUMETANIDE PER 0.5 MG: Performed by: INTERNAL MEDICINE

## 2024-07-06 PROCEDURE — 87015 SPECIMEN INFECT AGNT CONCNTJ: CPT | Performed by: INTERNAL MEDICINE

## 2024-07-06 PROCEDURE — 0W9930Z DRAINAGE OF RIGHT PLEURAL CAVITY WITH DRAINAGE DEVICE, PERCUTANEOUS APPROACH: ICD-10-PCS | Performed by: INTERNAL MEDICINE

## 2024-07-06 PROCEDURE — 84100 ASSAY OF PHOSPHORUS: CPT | Performed by: NURSE PRACTITIONER

## 2024-07-06 PROCEDURE — 89051 BODY FLUID CELL COUNT: CPT | Performed by: INTERNAL MEDICINE

## 2024-07-06 PROCEDURE — 80053 COMPREHEN METABOLIC PANEL: CPT | Performed by: INTERNAL MEDICINE

## 2024-07-06 PROCEDURE — 71250 CT THORAX DX C-: CPT

## 2024-07-06 PROCEDURE — 83986 ASSAY PH BODY FLUID NOS: CPT | Performed by: INTERNAL MEDICINE

## 2024-07-06 PROCEDURE — 82803 BLOOD GASES ANY COMBINATION: CPT

## 2024-07-06 PROCEDURE — 88108 CYTOPATH CONCENTRATE TECH: CPT | Performed by: INTERNAL MEDICINE

## 2024-07-06 PROCEDURE — 83735 ASSAY OF MAGNESIUM: CPT | Performed by: NURSE PRACTITIONER

## 2024-07-06 PROCEDURE — 87070 CULTURE OTHR SPECIMN AEROBIC: CPT | Performed by: INTERNAL MEDICINE

## 2024-07-06 PROCEDURE — 63710000001 INSULIN LISPRO (HUMAN) PER 5 UNITS

## 2024-07-06 RX ORDER — POTASSIUM CHLORIDE 1.5 G/1.58G
40 POWDER, FOR SOLUTION ORAL EVERY 4 HOURS
Status: COMPLETED | OUTPATIENT
Start: 2024-07-06 | End: 2024-07-07

## 2024-07-06 RX ORDER — LANSOPRAZOLE 30 MG/1
30 TABLET, ORALLY DISINTEGRATING, DELAYED RELEASE ORAL
Status: DISCONTINUED | OUTPATIENT
Start: 2024-07-07 | End: 2024-07-09

## 2024-07-06 RX ADMIN — POVIDONE-IODINE: 10 SOLUTION TOPICAL at 09:21

## 2024-07-06 RX ADMIN — Medication 10 ML: at 09:20

## 2024-07-06 RX ADMIN — BUMETANIDE 1 MG/HR: 0.25 INJECTION INTRAMUSCULAR; INTRAVENOUS at 11:21

## 2024-07-06 RX ADMIN — MIDODRINE HYDROCHLORIDE 10 MG: 5 TABLET ORAL at 05:46

## 2024-07-06 RX ADMIN — PANTOPRAZOLE SODIUM 40 MG: 40 INJECTION, POWDER, FOR SOLUTION INTRAVENOUS at 05:46

## 2024-07-06 RX ADMIN — Medication 10 ML: at 20:42

## 2024-07-06 RX ADMIN — AMIODARONE HYDROCHLORIDE 200 MG: 200 TABLET ORAL at 15:58

## 2024-07-06 RX ADMIN — DOXYCYCLINE 100 MG: 100 CAPSULE ORAL at 09:20

## 2024-07-06 RX ADMIN — CEPHALEXIN 500 MG: 500 CAPSULE ORAL at 06:06

## 2024-07-06 RX ADMIN — BUMETANIDE 2 MG: 0.25 INJECTION INTRAMUSCULAR; INTRAVENOUS at 04:00

## 2024-07-06 RX ADMIN — RIVAROXABAN 15 MG: 15 TABLET, FILM COATED ORAL at 18:35

## 2024-07-06 RX ADMIN — MIDODRINE HYDROCHLORIDE 10 MG: 5 TABLET ORAL at 22:28

## 2024-07-06 RX ADMIN — CEPHALEXIN 500 MG: 500 CAPSULE ORAL at 15:58

## 2024-07-06 RX ADMIN — AMIODARONE HYDROCHLORIDE 200 MG: 200 TABLET ORAL at 22:01

## 2024-07-06 RX ADMIN — INSULIN LISPRO 2 UNITS: 100 INJECTION, SOLUTION INTRAVENOUS; SUBCUTANEOUS at 02:05

## 2024-07-06 RX ADMIN — ANTI-FUNGAL POWDER MICONAZOLE NITRATE TALC FREE 1 APPLICATION: 1.42 POWDER TOPICAL at 22:01

## 2024-07-06 RX ADMIN — ANTI-FUNGAL POWDER MICONAZOLE NITRATE TALC FREE 1 APPLICATION: 1.42 POWDER TOPICAL at 09:21

## 2024-07-06 RX ADMIN — DOXYCYCLINE 100 MG: 100 CAPSULE ORAL at 20:13

## 2024-07-06 RX ADMIN — Medication 12.5 MG: at 09:20

## 2024-07-06 RX ADMIN — CEPHALEXIN 500 MG: 500 CAPSULE ORAL at 22:01

## 2024-07-06 RX ADMIN — Medication 12.5 MG: at 20:13

## 2024-07-06 RX ADMIN — AMIODARONE HYDROCHLORIDE 200 MG: 200 TABLET ORAL at 05:46

## 2024-07-06 RX ADMIN — RIVAROXABAN 15 MG: 15 TABLET, FILM COATED ORAL at 09:20

## 2024-07-06 RX ADMIN — POTASSIUM CHLORIDE 40 MEQ: 1.5 POWDER, FOR SOLUTION ORAL at 20:13

## 2024-07-06 NOTE — PLAN OF CARE
Goal Outcome Evaluation:  Plan of Care Reviewed With: patient        Progress: declining  Outcome Evaluation: Patient currently on AVAPS this shift.  Hypotensive, requiring Levophed for part of the shift; A-line inserted.  Patient initially alert to painful stimuli only; eventually speaking during the shift, though difficult to understand.  Patient is a Q2 turn; multiple wounds that are weeping.  Chun catheter in place, IV diuretics.  POC glucose and sliding scale insulin added this shift; patient with A1C of 6.1.  CXR completed; see results.

## 2024-07-06 NOTE — PROGRESS NOTES
"    PROGRESS NOTE      Patient Name: Ban Brennan  : 1955  MRN: [id number]  Primary Care Physician: Rut Pierce APRN  Date of admission: 2024    Patient Care Team:  Rut Pierce APRN as PCP - General (Nurse Practitioner)  Austin Brooks MD as Cardiologist (Cardiology)        Reason for Follow up     Acute kidney injury, hyperkalemia      Subjective     Seen and examined, awake, alert, making urine, no distress  Renal function improved, sCr 1.56, made 2.45L urine, net negative 2.19L      Review of systems:    ROS was otherwise negative except as mentioned in the [place].       Personal History:     Past Medical History:   Past Medical History:   Diagnosis Date    Bilateral leg edema     Chronic respiratory failure with hypoxia, on home O2 therapy 2024    COPD (chronic obstructive pulmonary disease)     Morbid obesity with BMI of 40.0-44.9, adult 2010    Primary hypertension 2017    Pulmonary embolism     \"many years ago, was on warfarin\"    Sleep apnea        Surgical History:    History reviewed. No pertinent surgical history.    Family History: family history is not on file. Otherwise pertinent FHx was reviewed and unremarkable.     Social History:  reports that she has never smoked. She has never used smokeless tobacco. She reports that she does not drink alcohol and does not use drugs.    Medications:  Prior to Admission medications    Medication Sig Start Date End Date Taking? Authorizing Provider   Allergy Relief 180 MG tablet Take 1 tablet by mouth Daily. 24  Yes Chadwick Johnson MD   bumetanide (BUMEX) 1 MG tablet Take 1 tablet by mouth 3 times a day. 24  Yes ProviderChadwick MD   docusate sodium (COLACE) 100 MG capsule Take 1 capsule by mouth Every 12 (Twelve) Hours. 24  Yes ProviderChadwick MD   furosemide (LASIX) 20 MG tablet Take 1 tablet by mouth Daily.   Yes ProviderChadwick MD   HYDROcodone-acetaminophen (NORCO)  " MG per tablet Take 1 tablet by mouth 3 times a day. 5/30/24  Yes Chadwick Johnson MD   lisinopril (PRINIVIL,ZESTRIL) 30 MG tablet Take 1 tablet by mouth Daily. 3/18/24  Yes Provider, Historical, MD   meloxicam (MOBIC) 7.5 MG tablet Take 1 tablet by mouth Daily As Needed. 3/18/24  Yes Chadwick Johnson MD   metoprolol tartrate (LOPRESSOR) 50 MG tablet Take 1 tablet by mouth Daily.   Yes Chadwick Johnson MD   montelukast (SINGULAIR) 10 MG tablet Take 1 tablet by mouth every night at bedtime.   Yes Chadwick Johnson MD   omeprazole (priLOSEC) 20 MG capsule Take 1 capsule by mouth Daily. 3/18/24  Yes Provider, Historical, MD   oxybutynin XL (DITROPAN-XL) 10 MG 24 hr tablet Take 2 tablets by mouth Daily. 3/18/24  Yes Chadwick Johnson MD   potassium chloride (MICRO-K) 10 MEQ CR capsule Take 1 capsule by mouth Every 12 (Twelve) Hours. 3/18/24  Yes Chadwick Johnson MD   vitamin D (ERGOCALCIFEROL) 1.25 MG (26842 UT) capsule capsule Take 1 capsule by mouth 2 (Two) Times a Week. Monday and Thursday. 5/30/24  Yes Chadwick Johnson MD     Scheduled Meds:amiodarone, 200 mg, Nasogastric, Q8H   Followed by  [START ON 7/9/2024] amiodarone, 200 mg, Oral, Q12H   Followed by  [START ON 7/23/2024] amiodarone, 200 mg, Oral, Daily  bumetanide, 2 mg, Intravenous, Q12H  cephalexin, 500 mg, Nasogastric, Q8H  dextrose, 25 g, Intravenous, Once  doxycycline, 100 mg, Nasogastric, Q12H  insulin lispro, 2-7 Units, Subcutaneous, Q6H  lidocaine, 20 mL, Infiltration, Once  metoprolol tartrate, 12.5 mg, Nasogastric, Q12H  miconazole, 1 Application, Topical, Q12H  midodrine, 10 mg, Nasogastric, Q8H  pantoprazole, 40 mg, Intravenous, Q AM  povidone-iodine, , Topical, Daily  rivaroxaban, 15 mg, Oral, BID With Meals   Followed by  [START ON 7/24/2024] rivaroxaban, 20 mg, Oral, Daily With Dinner  sodium chloride, 10 mL, Intravenous, Q12H      Continuous Infusions:[Held by provider] norepinephrine, 0.02-0.3 mcg/kg/min, Last  Rate: Stopped (07/05/24 1400)      PRN Meds:  acetaminophen **OR** acetaminophen    Calcium Replacement - Follow Nurse / BPA Driven Protocol    dextrose    dextrose    glucagon (human recombinant)    ipratropium-albuterol    Magnesium Standard Dose Replacement - Follow Nurse / BPA Driven Protocol    nitroglycerin    ondansetron ODT **OR** ondansetron    Phosphorus Replacement - Follow Nurse / BPA Driven Protocol    Potassium Replacement - Follow Nurse / BPA Driven Protocol    [COMPLETED] Insert Peripheral IV **AND** sodium chloride    sodium chloride    sodium chloride  Allergies:  No Known Allergies    Objective   Exam:     Vital Signs  Temp:  [97 °F (36.1 °C)-97.8 °F (36.6 °C)] 97.8 °F (36.6 °C)  Heart Rate:  [] 105  Resp:  [17-25] 18  BP: ()/() 122/61  SpO2:  [76 %-97 %] 88 %  on  Flow (L/min):  [8] 8;   Device (Oxygen Therapy): high-flow nasal cannula;humidified  Body mass index is 43.77 kg/m².  EXAM  General:  69 yr old  female, some respiratory distress  Head:      Normocephalic and atraumatic.    Eyes:      PERRL/EOM intact, conjunctivae and sclerae clear without nystagmus.    Neck:      No masses, thyromegaly,  trachea central   Lungs:    Clear bilaterally to auscultation.    Heart:      Regular rate and rhythm, no murmur no gallop  Abd:        Soft, nontender, not distended, bowel sounds positive, no shifting dullness.  Msk:        No deformity or scoliosis noted of thoracic or lumbar spine.    Pulses:   Pulses normal in all 4 extremities.    Extremities:        No cyanosis or clubbing--+ + edema.    Neuro:    Lethargic  Skin:       Intact without lesions or rashes.          Results Review:  I have personally reviewed most recent Data :  BMP @LABRCNT(creatinine:10)  CBC    Results from last 7 days   Lab Units 07/06/24  0354 07/05/24  0918 07/05/24  0257 07/04/24  0414 07/03/24  0635 07/02/24  0329 07/01/24  0910   WBC 10*3/mm3 15.29* 12.93* 11.98* 9.76 10.53 10.53 13.80*   HEMOGLOBIN  g/dL 13.8 13.8 14.6 14.7 14.4 13.8 14.2   PLATELETS 10*3/mm3 128* 115* 133* 123* 104* 92* 104*     CMP   Results from last 7 days   Lab Units 07/06/24  0354 07/05/24  0918 07/05/24  0257 07/04/24  2248 07/04/24  1727 07/04/24  0414 07/03/24 2001 07/03/24  0635 07/02/24 2042 07/02/24 0329 07/01/24  1633 07/01/24  0910 06/30/24 2043   SODIUM mmol/L 139 135* 136  --  137 136  --  139  --  138  --  134* 131*   POTASSIUM mmol/L 3.7 5.3* 5.9* 5.4* 6.0* 5.9* 5.2 3.5   < > 3.5   < > 5.4* 6.5*   CHLORIDE mmol/L 96* 95* 95*  --  95* 94*  --  95*  --  96*  --  97* 101   CO2 mmol/L 34.0* 31.6* 35.0*  --  33.9* 33.4*  --  36.9*  --  34.5*  --  31.4* 24.4   BUN mg/dL 70* 67* 65*  --  60* 54*  --  45*  --  43*  --  46* 50*   CREATININE mg/dL 1.56* 1.86* 1.88*  --  1.95* 1.77*  --  1.31*  --  1.14*  --  1.33* 1.38*   GLUCOSE mg/dL 133* 128* 99  --  101* 108*  --  107*  --  82  --  102* 194*   ALBUMIN g/dL 2.5*  --  2.5*  --   --  2.6*  --  2.5*  --  2.3*  --  2.5* 2.5*   BILIRUBIN mg/dL 1.6*  --  1.6*  --   --  1.3*  --  1.0  --  0.9  --  1.3* 1.6*   ALK PHOS U/L 117  --  129*  --   --  138*  --  130*  --  109  --  115 126*   AST (SGOT) U/L 27  --  28  --   --  38*  --  30  --  24  --  39* 42*   ALT (SGPT) U/L 14  --  16  --   --  17  --  16  --  14  --  17 18    < > = values in this interval not displayed.     ABG    Results from last 7 days   Lab Units 07/05/24  0711 07/05/24  0442 07/05/24  0353 07/05/24  0212   PH, ARTERIAL pH units 7.281* 7.250* 7.204* 7.208*   PCO2, ARTERIAL mm Hg 78.4* 80.9* 94.8* 92.8*   PO2 ART mm Hg 106.7 76.6* 87.0 76.7*   O2 SATURATION ART % 97.0 91.7* 93.1* 90.5*   BASE EXCESS ART mmol/L 6.5* 4.5* 4.6* 4.4*     XR Chest 1 View    Result Date: 7/5/2024  Impression: Marked cardiomegaly with bilateral patchy airspace disease and pleural effusions. Findings are similar to the previous exam. Electronically Signed: Amirah Coto MD  7/5/2024 9:14 AM EDT  Workstation ID: [id number]    XR Abdomen  KUB    Result Date: 7/4/2024  Impression: Feeding tube is in the stomach. Electronically Signed: Obed Sidhu MD  7/4/2024 9:59 PM EDT  Workstation ID: [id number]    US Renal Bilateral    Result Date: 7/4/2024  Impression: No acute process nor significant abnormality identified. Electronically Signed: Beto Kaur MD  7/4/2024 4:14 PM EDT  Workstation ID: [id number]    XR Chest 1 View    Result Date: 7/4/2024  Impression: Cardiomegaly with pulmonary vascular congestion and bilateral pleural effusions. Bibasilar airspace disease could be atelectasis or pneumonia. Electronically Signed: Obed Sidhu MD  7/4/2024 2:34 AM EDT  Workstation ID: [id number]    XR Chest 1 View    Result Date: 7/3/2024  Impression: No appreciable change in bibasilar lung infiltrates with small effusions. Continued cardiomegaly. Electronically Signed: Krystal Lee MD  7/3/2024 7:57 AM EDT  Workstation ID: [id number]    XR Chest 1 View    Result Date: 7/2/2024  Impression: Some improvement in bibasilar lung infiltrates with small effusions. Continued cardiomegaly. Electronically Signed: Krystal Lee MD  7/2/2024 12:46 PM EDT  Workstation ID: [id number]    US Pelvis Complete    Result Date: 7/1/2024  Impression: Sonographic features suggest the presence of a large subserosal uterine fundal fibroid. Electronically Signed: Ann Marie Maloney MD  7/1/2024 1:43 PM EDT  Workstation ID: [id number]    CT Abdomen Pelvis Without Contrast    Result Date: 7/1/2024  Impression: Moderately large right pleural effusion and small left pleural effusion. Bibasilar infiltrates suggest atelectasis although associated pneumonia is not excluded. Severe cardiomegaly. Bilateral flank edema, greatest on the right. Probable fibroid uterus. This could be confirmed with ultrasound. Electronically Signed: Krystal Lee MD  7/1/2024 11:39 AM EDT  Workstation ID: [id number]    Adult Transthoracic Echo Complete w/ Color, Spectral and Contrast if Necessary Per Protocol    Addendum  Date: 7/1/2024      Left ventricular ejection fraction appears to be 31 - 35%.   Left ventricular diastolic dysfunction is noted.   The left atrial cavity is dilated.   Moderate aortic valve stenosis is present. Mean/peak gradient of 14/24 mmHg.  Dimensionless index 0.36   Moderate to severe mitral valve regurgitation is present.   Estimated right ventricular systolic pressure from tricuspid regurgitation is normal (<35 mmHg).     CT Angiogram Chest Pulmonary Embolism    Result Date: 7/1/2024  Impression: 1. No evidence for pulmonary embolus. 2. Bibasilar airspace disease and right middle lung opacity compatible likely multifocal pneumonia with bilateral pleural effusions. 3. Cardiomegaly. Electronically Signed: Shari Kaur MD  7/1/2024 10:14 AM EDT  Workstation ID: [id number]    CT Lower Extremity Bilateral Without Contrast    Result Date: 7/1/2024  Impression: Bilateral lower extremity soft tissue swelling greater on the left than the right. There is no soft tissue gas or well-formed abscess. Electronically Signed: Obed Sidhu MD  7/1/2024 1:53 AM EDT  Workstation ID: [id number]    XR Chest 1 View    Result Date: 6/30/2024  Vascular congestion with small effusions Electronically Signed: Eamon Brock MD  6/30/2024 7:27 PM EDT  Workstation ID: [id number]     Results for orders placed during the hospital encounter of 06/30/24    Adult Transthoracic Echo Complete w/ Color, Spectral and Contrast if Necessary Per Protocol    Interpretation Summary    Left ventricular ejection fraction appears to be 31 - 35%.    Left ventricular diastolic dysfunction is noted.    The left atrial cavity is dilated.    Moderate aortic valve stenosis is present. Mean/peak gradient of 14/24 mmHg.  Dimensionless index 0.36    Moderate to severe mitral valve regurgitation is present.    Estimated right ventricular systolic pressure from tricuspid regurgitation is normal (<35 mmHg).        Assessment & Plan   Assessment and Plan:         Atrial  fibrillation with RVR    Primary hypertension    Morbid obesity with BMI of 40.0-44.9, adult    Right bundle branch block    Acute deep vein thrombosis (DVT) of both lower extremities    ARABELLA (acute kidney injury)    Acute hyperkalemia    Sepsis    Acute UTI    Acute systolic congestive heart failure    Rhinovirus infection    Multifocal pneumonia    Chronic respiratory failure with hypoxia, on home O2 therapy    Skin ulcer of right great toe    Skin ulcer of left great toe    SEDRICK (obstructive sleep apnea)    Aortic stenosis    Mitral regurgitation    Acute HFrEF (heart failure with reduced ejection fraction)    COPD (chronic obstructive pulmonary disease)    ASSESSMENT:  Acute kidney injury, with baseline creatinine roughly 0.8-1.0mg/dL  Hyperkalemia  A-fib with RVR  Acute on chronic heart failure, with EF 31-35%, diastolic dysfunction, moderate AS and moderate to severe MR as per echo from 6/30/24  Bilateral DVT  Severe sepsis/LLE cellulitis, acute cystitis  Multifocal pneumonia    PLAN :     Renal function improved today. Suspect she may initially have had some acute cardiorenal component, now most likely has some ATN secondary to sepsis, elevated Vanc level (was 22.4 on 7/3), and some prolonged prerenal state with hemodynamic insults, arrhythmias.  TIM without hydronephrosis  No significant proteinuria  Chun in place for strict I/Os  Hyperkalemia, resolved.   Volume reviewed, improving, adjusted to bumex 2 mg ivp every 12 hours, recent chest xray with patchy airspace disease and bilateral pleural effusions. Will continue current diuretic regimen  Continue Midodrine, also on low dose beta blockers presumably for rate control. Monitor  Antibiotics as per ID. Now off Vanc, agree. On PO Doxy and Cephalexin  Check labs daily. Will follow closely      MANJU Arrieta  [facility]  7/6/2024  08:27 EDT  The note was transcribed by  MANJU.  I personally have examined the patient and interviewed the  patient and modified the history, exam. I have reviewed the history, data, problems, assessment and plan .and I concur . Exam as described in the Progress note, with comments additions, revisions as noted by me.           7/6/2024  08:27 EDT

## 2024-07-06 NOTE — PROGRESS NOTES
RN reports that patient is A&O x4. After introducing self, patient responded by asking me to speak with her daughter. Treatment team reports patient normally defers to her daughter even though she is A&O. Daughter not at bedside during visit.    Chaplain Eamon To

## 2024-07-06 NOTE — PROGRESS NOTES
Critical Care Progress Note   Ban Brennan : 1955 MRN:[id number] LOS:6     Principal Problem: Atrial fibrillation with RVR     Reason for follow up: All the medical problems listed below    Summary     Ban Brennan is a 69 y.o. female with known PMH of hypertension, heart failure, chronic bilateral lower extremity edema who presented to the hospital for creasing shortness of breath, and was admitted with a principal diagnosis of Atrial fibrillation with RVR.  Patient is alert however lethargic and attempts to participate with HPI however she is a poor historian.  Supplemental information for HPI taken from conversation with daughter at bedside who states patient does not go to the doctor normally and has not been in the hospital for years.  She is unsure of what her full medical history is at this time but states that her legs have been swollen for a long time and that she has not been up and moving for approximately 1 year.  Patient has a home health nurse that comes out and wraps her legs twice a week.  On assessment patient's legs are both extremely red and swollen, left > right.  Patient's daughter states that redness of her legs is new however she is not sure how long it has been this bad.  EMS was called when she had worsening shortness of breath and found the patient with a wide complex tachycardia with heart rate of 190s.  She was given 150 mg of amiodarone by EMS and heart rate improved to 160s.  She is found to have a right bundle branch block however it is uncertain due to her limited history if this is chronic.     ED course: On arrival to emergency department EKG showed atrial fibrillation with RVR, heart rate 145.  CXR showed fluid overload with bilateral effusions.  Lactic acid was 3.1 and BNP 19,860.  Patient had a white count of 16.8 and 4+ bacteria in her urine.  Also noted to be hyperkalemic with a potassium of 6.5 in which she was treated with insulin D50, calcium gluconate,  and Lasix.  She was started on cefepime and vancomycin for cellulitis of her legs and UTI.     ACP: Patient is alert with some intermittent confusion.  Her daughter is at bedside and is her decision-maker along with her brother.  Daughter states patient is a full code.       Significant events     07/06/24 : Patient remains afebrile, heart rates are in the low 100s, respirations in the mid teens with blood pressures normal, O2 sats in the upper 80s to low 90s on 8 L via nasal cannula.  She has had approximately 3.1 L of urine output over the last 24 hours and is net -4.1 L for the admission.  Chest x-ray this morning showed right sided hydropneumothorax.  CT scan was done secondary to poor quality of the plain films and was noted for moderate right-sided hydropneumothorax with partial collapse of the right lung and multifocal peripheral bronchial obstruction throughout the right lung consistent with atelectasis and infection.  Secondary to this, patient was transferred over to the medical ICU and pigtail thoracostomy tube was placed using ultrasound guidance.  There was approximately 600 mL of pink pleural fluid which was aspirated immediately upon placement of the tube.  Chest x-ray afterward shows reexpansion of the right lung with near resolution of the hydrothorax.  Chemistry panel noted that the creatinine has decreased to 1.56 today, will continue on the Bumex drip.  Albumin remains low at 2.5, T. bili is stable at 1.6, LFTs are within normal limits.  CBC shows a white blood cell count has increased slightly to 15.3, hemoglobin is stable, 92% neutrophils on differential.  Pleural fluid has been sent for cultures and cytology.    Assessment / Plan     BLE DVT  Hx of PE, unprovoked  On treatment-dose lovenox  Cardiology following  Hematology following      Atrial fibrillation with RVR  PAF  Chest pain  Acute on chronic systolic heart failure  Diastolic dysfxn  Moderate AS  Severe MR  Chronic lower extremity  "lymphedema  Fluid overload  Large right pleural effusion  Right hydropneumothorax  Rate is fairly well-controlled  Cardiology has transitioned from amio gtt to po form  TTE from 7/1/2024 shows an ejection fraction low at 30 to 35%, diastolic dysfunction noted, moderate aortic valve stenosis, severe mitral regurgitation  TSH wnl  Continue Bumex gtt  Pigtail thoracostomy tube placed on 7/6        Severe sepsis  Acute cystitis with hematuria  LLE cellulitis  L foot wound, appears infected  Putative panniculitis  MRSA PCR +  Rhinovirus +  Putative multifocal PNA  Source of sepsis is multiple  ID following--currently on Keflex  Podiatry following--no acute intervention  HbA1c  6%  PCT not significantly elevated  NT suctioned sputum Cx only with normal shorty      Uterine mass  Pelvic U/S suggesting fibroid       Acute kidney injury  Hyperkalemia, improved  Creatinine 1.38, baseline unknown  Initial potassium 6.5; treated with sling/D50, calcium gluconate, and Lasix  IV fluids contraindicated  Avoid nephrotoxic agents  Avoid hypotension  Nephrology following       Essential hypertension  Hold home lisinopril and metoprolol as patient is borderline hypotensive  Resume when clinically appropriate      Other:  PT, OT consulted      Critical Care Time:  34 mins.        Code status:   Code Status (Patient has no pulse and is not breathing): CPR (Attempt to Resuscitate)  Medical Interventions (Patient has pulse or is breathing): Full Support       Nutrition: NPO Diet NPO Type: Strict NPO   Patient isn't on Tube Feeding    VTE Prophylaxis:  Pharmacologic VTE prophylaxis orders are present.       Subjective / Review of systems     Review of Systems   Patient states she feels \"better\" today, but that she's SOB and her back is hurting from lying in bed.  She still has pain in her legs and lower abdomen. No CP.  She denies any fevers, chills, sweats, nausea, vomiting, or diarrhea.  Objective / Physical Exam   Vital signs:  Temp: 97.9 " °F (36.6 °C)  BP: 122/61  Heart Rate: 105  Resp: 18  SpO2: (!) 88 %  Weight: 95 kg (209 lb 7 oz)    Admission Weight: Weight: 108 kg (239 lb)  Current Weight: Weight: 95 kg (209 lb 7 oz)    Input/Output in last 24 hours:    Intake/Output Summary (Last 24 hours) at 7/6/2024 1417  Last data filed at 7/6/2024 1201  Gross per 24 hour   Intake 240 ml   Output 2300 ml   Net -2060 ml      Physical Exam     GEN: Very pleasant woman.  Sitting up in bed on nasal cannula. NAD.  NEURO:  Brainstem reflexes intact.  No obvious focal deficit.  Moves all 4 ext.  HEENT:  N/AT.  PERRL.  MMM.  Oropharynx non-erythematous.  No drainage from the eyes/ears/nose.  No conjunctival petechiae.  No oral thrush.  Auditory and visual acuity grossly wnl.  Voice normal.  NECK:  Supple, NT, trachea midline.  No meningismus.  No ROM limitation.  No torticollis.  No JVD.  No thyromegaly.    CHEST/LUNGS:  Breath sounds are diminished with bilateral expiratory wheezing and crackles in the bases, right greater than left.  Chest excursion equal bilaterally.  Right-sided thoracostomy tube in place.  CARDIOVASCULAR: Tachycardic, irregularly irregular rhythm w/ systolic ejection and holosystolic murmur noted.  GI:  Abdomen is obese, tenderness improved in the pannus.  +BS.    :  Normal external genitalia.  No trimble cath in place.  EXTREMITIES: Bilateral lower extremities are noted for chronic venous stasis changes, left lower extremity is hyperemic with increased warmth and much tender to palpation on the right.  The right great toe has a old wound that appears to be not currently infected.  The left foot has a another wound.  SKIN: As per above.  LYMPHATICS/HEME:  No overt LAD or abnormal bruising.  + lymphedema.  MSK:  Normal ROM.  No joint abnormalities noted.  Strength is 5/5 and equal in BUE and BLE's.  PSYCH:  Pleasant.  A&Ox 3.  Normal mood, flat affect.  Responds appropriately to commands and appears to comprehend instructions.        Radiology  and Labs     Results from last 7 days   Lab Units 07/06/24  0354 07/05/24  0918 07/05/24  0257 07/04/24  0414 07/03/24  0635   WBC 10*3/mm3 15.29* 12.93* 11.98* 9.76 10.53   HEMATOCRIT % 42.8 45.5 48.1* 47.3* 46.0   PLATELETS 10*3/mm3 128* 115* 133* 123* 104*      Results from last 7 days   Lab Units 07/06/24  0354 07/05/24  0918 07/05/24  0257 07/04/24  2248 07/04/24  1727 07/04/24  0414   SODIUM mmol/L 139 135* 136  --  137 136   POTASSIUM mmol/L 3.7 5.3* 5.9* 5.4* 6.0* 5.9*   CHLORIDE mmol/L 96* 95* 95*  --  95* 94*   CO2 mmol/L 34.0* 31.6* 35.0*  --  33.9* 33.4*   BUN mg/dL 70* 67* 65*  --  60* 54*   CREATININE mg/dL 1.56* 1.86* 1.88*  --  1.95* 1.77*      Current medications   Scheduled Meds: amiodarone, 200 mg, Nasogastric, Q8H   Followed by  [START ON 7/9/2024] amiodarone, 200 mg, Oral, Q12H   Followed by  [START ON 7/23/2024] amiodarone, 200 mg, Oral, Daily  cephalexin, 500 mg, Nasogastric, Q8H  dextrose, 25 g, Intravenous, Once  doxycycline, 100 mg, Nasogastric, Q12H  insulin lispro, 2-7 Units, Subcutaneous, Q6H  [START ON 7/7/2024] lansoprazole, 30 mg, Nasogastric, Q AM  lidocaine, 20 mL, Infiltration, Once  metoprolol tartrate, 12.5 mg, Nasogastric, Q12H  miconazole, 1 Application, Topical, Q12H  midodrine, 10 mg, Nasogastric, Q8H  povidone-iodine, , Topical, Daily  rivaroxaban, 15 mg, Oral, BID With Meals   Followed by  [START ON 7/24/2024] rivaroxaban, 20 mg, Oral, Daily With Dinner  sodium chloride, 10 mL, Intravenous, Q12H      Continuous Infusions: bumetanide, 1 mg/hr, Last Rate: 1 mg/hr (07/06/24 1121)        Plan discussed with RN. Reviewed all other data in the last 24 hours, including but not limited to vitals, labs, microbiology, imaging and pertinent notes from other providers.     Sesar Solorzano,    Critical Care  07/06/24   14:17 EDT

## 2024-07-06 NOTE — PROGRESS NOTES
Cardiology Progress Note      PATIENT IDENTIFICATION    Name: Ban Brennan  Age: 69 y.o. Sex: female : 1955  MRN: [id number]    Requesting Provider    Sesar Solozrano DO     LOS: 6 days       Reason For Followup:  Heart failure reduced ejection fraction  Sepsis  Atrial fibrillation      Subjective:    Interval History:  Seen and examined.  Chart and labs reviewed.  Events noted.  Patient was transferred to ICU because of worsening respiratory failure.  She is a poor historian.  She is alert and participates in conversation but is confused.  Denies any chest pain or discomfort.  Reports that she feels just fine.    Review of Systems:  Review of systems performed but an accurate as patient is confused.    Assessment & Plan    Impressions:  Heart failure reduced ejection fraction-new onset  New onset atrial fibrillation  Sepsis  UTI  Cellulitis  Acute respiratory failure  Altered mental status secondary to above    Recommendations:  Continue with rate control as pressure tolerates  Diuresis as renal function allows  Antimicrobials as per admitting service  Monitor renal function electrolytes closely for toxicities while undergoing IV diuresis  Further recommendations as patient's course progresses.        Objective:    Medication Review:   Scheduled Meds:amiodarone, 200 mg, Nasogastric, Q8H   Followed by  [START ON 2024] amiodarone, 200 mg, Oral, Q12H   Followed by  [START ON 2024] amiodarone, 200 mg, Oral, Daily  bumetanide, 2 mg, Intravenous, Q12H  cephalexin, 500 mg, Nasogastric, Q8H  dextrose, 25 g, Intravenous, Once  doxycycline, 100 mg, Nasogastric, Q12H  insulin lispro, 2-7 Units, Subcutaneous, Q6H  lidocaine, 20 mL, Infiltration, Once  metoprolol tartrate, 12.5 mg, Nasogastric, Q12H  miconazole, 1 Application, Topical, Q12H  midodrine, 10 mg, Nasogastric, Q8H  pantoprazole, 40 mg, Intravenous, Q AM  povidone-iodine, , Topical, Daily  rivaroxaban, 15 mg, Oral, BID With Meals    Followed by  [START ON 7/24/2024] rivaroxaban, 20 mg, Oral, Daily With Dinner  sodium chloride, 10 mL, Intravenous, Q12H      Continuous Infusions:[Held by provider] norepinephrine, 0.02-0.3 mcg/kg/min, Last Rate: Stopped (07/05/24 1400)      PRN Meds:.  acetaminophen **OR** acetaminophen    Calcium Replacement - Follow Nurse / BPA Driven Protocol    dextrose    dextrose    glucagon (human recombinant)    ipratropium-albuterol    Magnesium Standard Dose Replacement - Follow Nurse / BPA Driven Protocol    nitroglycerin    ondansetron ODT **OR** ondansetron    Phosphorus Replacement - Follow Nurse / BPA Driven Protocol    Potassium Replacement - Follow Nurse / BPA Driven Protocol    [COMPLETED] Insert Peripheral IV **AND** sodium chloride    sodium chloride    sodium chloride      Atrial fibrillation with RVR    Primary hypertension    Morbid obesity with BMI of 40.0-44.9, adult    Right bundle branch block    Acute deep vein thrombosis (DVT) of both lower extremities    ARABELLA (acute kidney injury)    Acute hyperkalemia    Sepsis    Acute UTI    Acute systolic congestive heart failure    Rhinovirus infection    Multifocal pneumonia    Chronic respiratory failure with hypoxia, on home O2 therapy    Skin ulcer of right great toe    Skin ulcer of left great toe    SEDRICK (obstructive sleep apnea)    Aortic stenosis    Mitral regurgitation    Acute HFrEF (heart failure with reduced ejection fraction)    COPD (chronic obstructive pulmonary disease)         Physical Exam:    General: Alert, cooperative, appears stated age.  Chronically ill-appearing  Head:  Normocephalic, atraumatic, mucous membranes moist  Eyes:  Conjunctivae/corneas clear, EOMs intact     Neck:  Supple,  no bruit  Lungs:  Coarse and diminished bilaterally.  Chest wall: No tenderness  Heart::  Irregular rate and rhythm, S1 and S2 normal, 1/6 holosystolic murmur.  No rub or gallop  Abdomen: Soft, nontender, nondistended, bowel sounds active  Extremities: No  cyanosis, clubbing.  Edema noted  Pulses: Diminished pedal pulses  Skin:  Toe wound noted.  Chronic stasis changes.  Neuro/psych: Alert and oriented to person and place.  No gross focal deficits noted.    Vital Signs:  Vitals:    07/06/24 0400 07/06/24 0440 07/06/24 0500 07/06/24 0812   BP: 112/74 106/52 124/66 122/61   BP Location:    Right arm   Patient Position:    Lying   Pulse: 92  102 105   Resp:  17  18   Temp:  97.2 °F (36.2 °C)  97.8 °F (36.6 °C)   TempSrc:  Bladder  Axillary   SpO2: 90%  (!) 76% (!) 88%   Weight:   95 kg (209 lb 7 oz)    Height:         Wt Readings from Last 1 Encounters:   07/06/24 95 kg (209 lb 7 oz)       Intake/Output Summary (Last 24 hours) at 7/6/2024 0829  Last data filed at 7/6/2024 0600  Gross per 24 hour   Intake 254.73 ml   Output 2350 ml   Net -2095.27 ml         Results Review:     CBC    Results from last 7 days   Lab Units 07/06/24  0354 07/05/24  0918 07/05/24  0257 07/04/24  0414 07/03/24  0635 07/02/24  0329 07/01/24  0910   WBC 10*3/mm3 15.29* 12.93* 11.98* 9.76 10.53 10.53 13.80*   HEMOGLOBIN g/dL 13.8 13.8 14.6 14.7 14.4 13.8 14.2   PLATELETS 10*3/mm3 128* 115* 133* 123* 104* 92* 104*     Cr Clearance Estimated Creatinine Clearance: 35.6 mL/min (A) (by C-G formula based on SCr of 1.56 mg/dL (H)).  Coag     HbA1C   Lab Results   Component Value Date    HGBA1C 6.14 (H) 07/01/2024    HGBA1C 6.00 (H) 01/15/2024     Blood Glucose   Glucose   Date/Time Value Ref Range Status   07/06/2024 0611 105 70 - 105 mg/dL Final     Comment:     Serial Number: [id number]:  [id number]   07/05/2024 2349 172 (H) 70 - 105 mg/dL Final     Comment:     Serial Number: [id number]:  [id number]   07/05/2024 1806 142 (H) 70 - 105 mg/dL Final     Comment:     Serial Number: [id number]:  [id number]   07/05/2024 1351 143 (H) 70 - 105 mg/dL Final     Comment:     Serial Number: [id number]:  [id number]   07/05/2024 0736 144 (H) 70 - 105 mg/dL Final     Comment:     Serial  Number: [id number]:  [id number]   07/05/2024 0442 207 (H) 74 - 100 mg/dL Final     Comment:     Serial Number: [id number]:  [id number]   07/05/2024 0258 107 (H) 70 - 105 mg/dL Final     Comment:     Serial Number: [id number]:  [id number]   07/05/2024 0220 92 70 - 105 mg/dL Final     Comment:     Serial Number: [id number]:  [id number]     Infection   Results from last 7 days   Lab Units 07/01/24  1507 07/01/24  0910 06/30/24  1844 06/30/24  1757 06/30/24  1749   BLOODCX   --   --  No growth at 5 days No growth at 5 days  --    RESPCX  Light growth (2+) Normal respiratory shorty. No S. aureus or Pseudomonas aeruginosa detected. Final report.  --   --   --   --    URINECX   --   --   --   --  >100,000 CFU/mL Escherichia coli*   PROCALCITONIN ng/mL  --  0.48*  --   --   --      CMP   Results from last 7 days   Lab Units 07/06/24  0354 07/05/24  0918 07/05/24  0257 07/04/24  2248 07/04/24  1727 07/04/24  0414 07/03/24 2001 07/03/24  0635 07/02/24  2042 07/02/24  0329 07/01/24  1633 07/01/24  0910 06/30/24  2043   SODIUM mmol/L 139 135* 136  --  137 136  --  139  --  138  --  134* 131*   POTASSIUM mmol/L 3.7 5.3* 5.9* 5.4* 6.0* 5.9* 5.2 3.5   < > 3.5   < > 5.4* 6.5*   CHLORIDE mmol/L 96* 95* 95*  --  95* 94*  --  95*  --  96*  --  97* 101   CO2 mmol/L 34.0* 31.6* 35.0*  --  33.9* 33.4*  --  36.9*  --  34.5*  --  31.4* 24.4   BUN mg/dL 70* 67* 65*  --  60* 54*  --  45*  --  43*  --  46* 50*   CREATININE mg/dL 1.56* 1.86* 1.88*  --  1.95* 1.77*  --  1.31*  --  1.14*  --  1.33* 1.38*   GLUCOSE mg/dL 133* 128* 99  --  101* 108*  --  107*  --  82  --  102* 194*   ALBUMIN g/dL 2.5*  --  2.5*  --   --  2.6*  --  2.5*  --  2.3*  --  2.5* 2.5*   BILIRUBIN mg/dL 1.6*  --  1.6*  --   --  1.3*  --  1.0  --  0.9  --  1.3* 1.6*   ALK PHOS U/L 117  --  129*  --   --  138*  --  130*  --  109  --  115 126*   AST (SGOT) U/L 27  --  28  --   --  38*  --  30  --  24  --  39* 42*   ALT (SGPT) U/L 14  --  16  --   --  17  --   "16  --  14  --  17 18    < > = values in this interval not displayed.     ABG    Results from last 7 days   Lab Units 07/05/24  0711 07/05/24  0442 07/05/24  0353 07/05/24  0212   PH, ARTERIAL pH units 7.281* 7.250* 7.204* 7.208*   PCO2, ARTERIAL mm Hg 78.4* 80.9* 94.8* 92.8*   PO2 ART mm Hg 106.7 76.6* 87.0 76.7*   O2 SATURATION ART % 97.0 91.7* 93.1* 90.5*   BASE EXCESS ART mmol/L 6.5* 4.5* 4.6* 4.4*     UA    Results from last 7 days   Lab Units 06/30/24  1749   NITRITE UA  Positive*   WBC UA /HPF 11-20*   BACTERIA UA /HPF 3+*   SQUAM EPITHEL UA /HPF 3-6*   URINECX  >100,000 CFU/mL Escherichia coli*     TOPHER  No results found for: \"POCMETH\", \"POCAMPHET\", \"POCBARBITUR\", \"POCBENZO\", \"POCCOCAINE\", \"POCOPIATES\", \"POCOXYCODO\", \"POCPHENCYC\", \"POCPROPOXY\", \"POCTHC\", \"POCTRICYC\"  Lysis Labs   Results from last 7 days   Lab Units 07/06/24  0354 07/05/24  0918 07/05/24  0257 07/04/24  1727 07/04/24  0414 07/03/24  0635 07/02/24  0329 07/01/24  0910   HEMOGLOBIN g/dL 13.8 13.8 14.6  --  14.7 14.4 13.8 14.2   PLATELETS 10*3/mm3 128* 115* 133*  --  123* 104* 92* 104*   CREATININE mg/dL 1.56* 1.86* 1.88* 1.95* 1.77* 1.31* 1.14* 1.33*     Radiology(recent) XR Chest 1 View    Result Date: 7/5/2024  Impression: Marked cardiomegaly with bilateral patchy airspace disease and pleural effusions. Findings are similar to the previous exam. Electronically Signed: Amirah Coto MD  7/5/2024 9:14 AM EDT  Workstation ID: [id number]    XR Abdomen KUB    Result Date: 7/4/2024  Impression: Feeding tube is in the stomach. Electronically Signed: Obed Sidhu MD  7/4/2024 9:59 PM EDT  Workstation ID: [id number]    US Renal Bilateral    Result Date: 7/4/2024  Impression: No acute process nor significant abnormality identified. Electronically Signed: Beto Kaur MD  7/4/2024 4:14 PM EDT  Workstation ID: [id number]       Results from last 7 days   Lab Units 07/01/24  0910   HSTROP T ng/L 36*       Imaging Results (Last 24 Hours)       Procedure " Component Value Units Date/Time    XR Chest 1 View [[id number]] Collected: 07/05/24 0911     Updated: 07/05/24 0916    Narrative:      XR CHEST 1 VW    Date of Exam: 7/5/2024 8:43 AM EDT    Indication: worsening resp status    Comparison: 7/4/2024    Findings:  The cardiac silhouette is markedly enlarged similar to prior. Bilateral airspace opacities are again seen most prominent in the left midlung and at the right lung base. Moderate sized right greater than left pleural effusions persist. An enteric tube has   been placed, with the tip not seen on this image.      Impression:      Impression:  Marked cardiomegaly with bilateral patchy airspace disease and pleural effusions. Findings are similar to the previous exam.      Electronically Signed: Amirah Coto MD    7/5/2024 9:14 AM EDT    Workstation ID: [id number]            Cardiac Studies:  Echo- Results for orders placed during the hospital encounter of 06/30/24    Adult Transthoracic Echo Complete w/ Color, Spectral and Contrast if Necessary Per Protocol    Interpretation Summary    Left ventricular ejection fraction appears to be 31 - 35%.    Left ventricular diastolic dysfunction is noted.    The left atrial cavity is dilated.    Moderate aortic valve stenosis is present. Mean/peak gradient of 14/24 mmHg.  Dimensionless index 0.36    Moderate to severe mitral valve regurgitation is present.    Estimated right ventricular systolic pressure from tricuspid regurgitation is normal (<35 mmHg).    Stress Myoview-  Cath-        Sesar Tracey DO  07/06/24  08:29 EDT    Copied information has been reviewed and is current as of 07/06/24

## 2024-07-07 LAB
ALBUMIN SERPL-MCNC: 2.5 G/DL (ref 3.5–5.2)
ALBUMIN/GLOB SERPL: 0.7 G/DL
ALP SERPL-CCNC: 125 U/L (ref 39–117)
ALT SERPL W P-5'-P-CCNC: 17 U/L (ref 1–33)
ANION GAP SERPL CALCULATED.3IONS-SCNC: 9.3 MMOL/L (ref 5–15)
AST SERPL-CCNC: 30 U/L (ref 1–32)
BASOPHILS # BLD AUTO: 0.02 10*3/MM3 (ref 0–0.2)
BASOPHILS NFR BLD AUTO: 0.1 % (ref 0–1.5)
BILIRUB SERPL-MCNC: 1.8 MG/DL (ref 0–1.2)
BUN SERPL-MCNC: 74 MG/DL (ref 8–23)
BUN/CREAT SERPL: 54 (ref 7–25)
CALCIUM SPEC-SCNC: 9.6 MG/DL (ref 8.6–10.5)
CHLORIDE SERPL-SCNC: 98 MMOL/L (ref 98–107)
CO2 SERPL-SCNC: 35.7 MMOL/L (ref 22–29)
CREAT SERPL-MCNC: 1.37 MG/DL (ref 0.57–1)
DEPRECATED RDW RBC AUTO: 52 FL (ref 37–54)
EGFRCR SERPLBLD CKD-EPI 2021: 41.9 ML/MIN/1.73
EOSINOPHIL # BLD AUTO: 0 10*3/MM3 (ref 0–0.4)
EOSINOPHIL NFR BLD AUTO: 0 % (ref 0.3–6.2)
ERYTHROCYTE [DISTWIDTH] IN BLOOD BY AUTOMATED COUNT: 16.7 % (ref 12.3–15.4)
GLOBULIN UR ELPH-MCNC: 3.7 GM/DL
GLUCOSE BLDC GLUCOMTR-MCNC: 103 MG/DL (ref 70–105)
GLUCOSE BLDC GLUCOMTR-MCNC: 112 MG/DL (ref 70–105)
GLUCOSE BLDC GLUCOMTR-MCNC: 155 MG/DL (ref 70–105)
GLUCOSE BLDC GLUCOMTR-MCNC: 77 MG/DL (ref 70–105)
GLUCOSE BLDC GLUCOMTR-MCNC: 99 MG/DL (ref 70–105)
GLUCOSE FLD-MCNC: 119 MG/DL
GLUCOSE SERPL-MCNC: 102 MG/DL (ref 65–99)
HCT VFR BLD AUTO: 43.9 % (ref 34–46.6)
HGB BLD-MCNC: 14.2 G/DL (ref 12–15.9)
IMM GRANULOCYTES # BLD AUTO: 0.12 10*3/MM3 (ref 0–0.05)
IMM GRANULOCYTES NFR BLD AUTO: 0.7 % (ref 0–0.5)
LDH FLD-CCNC: 236 U/L
LYMPHOCYTES # BLD AUTO: 0.74 10*3/MM3 (ref 0.7–3.1)
LYMPHOCYTES NFR BLD AUTO: 4.5 % (ref 19.6–45.3)
MAGNESIUM SERPL-MCNC: 1.6 MG/DL (ref 1.6–2.4)
MCH RBC QN AUTO: 30 PG (ref 26.6–33)
MCHC RBC AUTO-ENTMCNC: 32.3 G/DL (ref 31.5–35.7)
MCV RBC AUTO: 92.8 FL (ref 79–97)
MONOCYTES # BLD AUTO: 0.9 10*3/MM3 (ref 0.1–0.9)
MONOCYTES NFR BLD AUTO: 5.5 % (ref 5–12)
NEUTROPHILS NFR BLD AUTO: 14.6 10*3/MM3 (ref 1.7–7)
NEUTROPHILS NFR BLD AUTO: 89.2 % (ref 42.7–76)
NRBC BLD AUTO-RTO: 0.2 /100 WBC (ref 0–0.2)
PHOSPHATE SERPL-MCNC: 2.5 MG/DL (ref 2.5–4.5)
PLATELET # BLD AUTO: 130 10*3/MM3 (ref 140–450)
PMV BLD AUTO: 10.3 FL (ref 6–12)
POTASSIUM SERPL-SCNC: 3.9 MMOL/L (ref 3.5–5.2)
PROT FLD-MCNC: 1.5 G/DL
PROT SERPL-MCNC: 6.2 G/DL (ref 6–8.5)
QT INTERVAL: 376 MS
QT INTERVAL: 415 MS
QT INTERVAL: 435 MS
QTC INTERVAL: 525 MS
QTC INTERVAL: 536 MS
QTC INTERVAL: 537 MS
RBC # BLD AUTO: 4.73 10*6/MM3 (ref 3.77–5.28)
SODIUM SERPL-SCNC: 143 MMOL/L (ref 136–145)
WBC NRBC COR # BLD AUTO: 16.38 10*3/MM3 (ref 3.4–10.8)

## 2024-07-07 PROCEDURE — 94761 N-INVAS EAR/PLS OXIMETRY MLT: CPT

## 2024-07-07 PROCEDURE — 99233 SBSQ HOSP IP/OBS HIGH 50: CPT | Performed by: INTERNAL MEDICINE

## 2024-07-07 PROCEDURE — 85025 COMPLETE CBC W/AUTO DIFF WBC: CPT | Performed by: NURSE PRACTITIONER

## 2024-07-07 PROCEDURE — 82948 REAGENT STRIP/BLOOD GLUCOSE: CPT

## 2024-07-07 PROCEDURE — 94660 CPAP INITIATION&MGMT: CPT

## 2024-07-07 PROCEDURE — 94799 UNLISTED PULMONARY SVC/PX: CPT

## 2024-07-07 PROCEDURE — 63710000001 INSULIN LISPRO (HUMAN) PER 5 UNITS

## 2024-07-07 PROCEDURE — 80053 COMPREHEN METABOLIC PANEL: CPT | Performed by: NURSE PRACTITIONER

## 2024-07-07 PROCEDURE — 25010000002 BUMETANIDE PER 0.5 MG: Performed by: INTERNAL MEDICINE

## 2024-07-07 PROCEDURE — 83735 ASSAY OF MAGNESIUM: CPT | Performed by: NURSE PRACTITIONER

## 2024-07-07 PROCEDURE — 84100 ASSAY OF PHOSPHORUS: CPT | Performed by: NURSE PRACTITIONER

## 2024-07-07 RX ORDER — BUMETANIDE 0.25 MG/ML
1 INJECTION INTRAMUSCULAR; INTRAVENOUS EVERY 8 HOURS
Status: DISCONTINUED | OUTPATIENT
Start: 2024-07-07 | End: 2024-07-08

## 2024-07-07 RX ADMIN — APIXABAN 10 MG: 5 TABLET, FILM COATED ORAL at 20:05

## 2024-07-07 RX ADMIN — DOXYCYCLINE 100 MG: 100 CAPSULE ORAL at 09:23

## 2024-07-07 RX ADMIN — MIDODRINE HYDROCHLORIDE 10 MG: 5 TABLET ORAL at 23:12

## 2024-07-07 RX ADMIN — POVIDONE-IODINE: 10 SOLUTION TOPICAL at 10:21

## 2024-07-07 RX ADMIN — POTASSIUM CHLORIDE 40 MEQ: 1.5 POWDER, FOR SOLUTION ORAL at 00:01

## 2024-07-07 RX ADMIN — BUMETANIDE 1 MG: 0.25 INJECTION INTRAMUSCULAR; INTRAVENOUS at 10:20

## 2024-07-07 RX ADMIN — Medication 10 ML: at 09:23

## 2024-07-07 RX ADMIN — MIDODRINE HYDROCHLORIDE 10 MG: 5 TABLET ORAL at 13:12

## 2024-07-07 RX ADMIN — AMIODARONE HYDROCHLORIDE 200 MG: 200 TABLET ORAL at 13:12

## 2024-07-07 RX ADMIN — Medication 10 ML: at 20:07

## 2024-07-07 RX ADMIN — CEPHALEXIN 500 MG: 500 CAPSULE ORAL at 23:11

## 2024-07-07 RX ADMIN — AMIODARONE HYDROCHLORIDE 200 MG: 200 TABLET ORAL at 05:40

## 2024-07-07 RX ADMIN — INSULIN LISPRO 2 UNITS: 100 INJECTION, SOLUTION INTRAVENOUS; SUBCUTANEOUS at 19:48

## 2024-07-07 RX ADMIN — Medication 12.5 MG: at 09:23

## 2024-07-07 RX ADMIN — MIDODRINE HYDROCHLORIDE 10 MG: 5 TABLET ORAL at 05:40

## 2024-07-07 RX ADMIN — CEPHALEXIN 500 MG: 500 CAPSULE ORAL at 13:12

## 2024-07-07 RX ADMIN — ANTI-FUNGAL POWDER MICONAZOLE NITRATE TALC FREE 1 APPLICATION: 1.42 POWDER TOPICAL at 09:23

## 2024-07-07 RX ADMIN — AMIODARONE HYDROCHLORIDE 200 MG: 200 TABLET ORAL at 23:11

## 2024-07-07 RX ADMIN — CEPHALEXIN 500 MG: 500 CAPSULE ORAL at 05:40

## 2024-07-07 RX ADMIN — BUMETANIDE 1 MG: 0.25 INJECTION INTRAMUSCULAR; INTRAVENOUS at 18:12

## 2024-07-07 RX ADMIN — ANTI-FUNGAL POWDER MICONAZOLE NITRATE TALC FREE 1 APPLICATION: 1.42 POWDER TOPICAL at 20:07

## 2024-07-07 RX ADMIN — LANSOPRAZOLE 30 MG: 30 TABLET, ORALLY DISINTEGRATING, DELAYED RELEASE ORAL at 05:40

## 2024-07-07 RX ADMIN — RIVAROXABAN 15 MG: 15 TABLET, FILM COATED ORAL at 09:23

## 2024-07-07 RX ADMIN — BUMETANIDE 1 MG/HR: 0.25 INJECTION INTRAMUSCULAR; INTRAVENOUS at 04:02

## 2024-07-07 RX ADMIN — DOXYCYCLINE 100 MG: 100 CAPSULE ORAL at 20:05

## 2024-07-07 RX ADMIN — Medication 12.5 MG: at 20:05

## 2024-07-07 NOTE — CASE MANAGEMENT/SOCIAL WORK
Continued Stay Note   Vince     Patient Name: Ban Brennan  MRN: [id number]  Today's Date: 7/7/2024    Admit Date: 6/30/2024    Plan: DC Plan: [facility] and [facility]. Precert auto approved 7/3/2024. Good 7/4/2024 through 7/10/2024. PASRR approved. Will need EMS transport at [place].   Discharge Plan       Row Name 07/07/24 1846       Plan    Plan Comments DC barrier: dobhoff.  IV bumex transitioning to intermittent dosing                      Expected Discharge Date and Time       Expected Discharge Date Expected Discharge Time    Jul 9, 2024               Hortencia Almanza RN

## 2024-07-07 NOTE — PROGRESS NOTES
"    PROGRESS NOTE      Patient Name: Ban Brennan  : 1955  MRN: [id number]  Primary Care Physician: Rut Pierce APRN  Date of admission: 2024    Patient Care Team:  Rut Pierce APRN as PCP - General (Nurse Practitioner)  Austin Brooks MD as Cardiologist (Cardiology)        Reason for Follow up     Acute kidney injury, hyperkalemia      Subjective     Seen and examined, awake, alert, making urine, no distress  Renal function improved, made 2L urine     Review of systems:    ROS was otherwise negative except as mentioned in the [place].       Personal History:     Past Medical History:   Past Medical History:   Diagnosis Date    Bilateral leg edema     Chronic respiratory failure with hypoxia, on home O2 therapy 2024    COPD (chronic obstructive pulmonary disease)     Morbid obesity with BMI of 40.0-44.9, adult 2010    Primary hypertension 2017    Pulmonary embolism     \"many years ago, was on warfarin\"    Sleep apnea        Surgical History:    History reviewed. No pertinent surgical history.    Family History: family history is not on file. Otherwise pertinent FHx was reviewed and unremarkable.     Social History:  reports that she has never smoked. She has never used smokeless tobacco. She reports that she does not drink alcohol and does not use drugs.    Medications:  Prior to Admission medications    Medication Sig Start Date End Date Taking? Authorizing Provider   Allergy Relief 180 MG tablet Take 1 tablet by mouth Daily. 24  Yes Chadwick Johnson MD   bumetanide (BUMEX) 1 MG tablet Take 1 tablet by mouth 3 times a day. 24  Yes Chadwick Johnson MD   docusate sodium (COLACE) 100 MG capsule Take 1 capsule by mouth Every 12 (Twelve) Hours. 24  Yes Chadwick Johnson MD   furosemide (LASIX) 20 MG tablet Take 1 tablet by mouth Daily.   Yes Chadwick Johnson MD   HYDROcodone-acetaminophen (NORCO)  MG per tablet Take 1 tablet by " mouth 3 times a day. 5/30/24  Yes Chadwick Johnson MD   lisinopril (PRINIVIL,ZESTRIL) 30 MG tablet Take 1 tablet by mouth Daily. 3/18/24  Yes Chadwick Johnson MD   meloxicam (MOBIC) 7.5 MG tablet Take 1 tablet by mouth Daily As Needed. 3/18/24  Yes Chadwick Johnson MD   metoprolol tartrate (LOPRESSOR) 50 MG tablet Take 1 tablet by mouth Daily.   Yes Chadwick Johnson MD   montelukast (SINGULAIR) 10 MG tablet Take 1 tablet by mouth every night at bedtime.   Yes Chadwick Johnson MD   omeprazole (priLOSEC) 20 MG capsule Take 1 capsule by mouth Daily. 3/18/24  Yes Provider, Historical, MD   oxybutynin XL (DITROPAN-XL) 10 MG 24 hr tablet Take 2 tablets by mouth Daily. 3/18/24  Yes Chadwick Johnson MD   potassium chloride (MICRO-K) 10 MEQ CR capsule Take 1 capsule by mouth Every 12 (Twelve) Hours. 3/18/24  Yes Chadwick Johnson MD   vitamin D (ERGOCALCIFEROL) 1.25 MG (87966 UT) capsule capsule Take 1 capsule by mouth 2 (Two) Times a Week. Monday and Thursday. 5/30/24  Yes Chadwick Johnson MD     Scheduled Meds:amiodarone, 200 mg, Nasogastric, Q8H   Followed by  [START ON 7/9/2024] amiodarone, 200 mg, Oral, Q12H   Followed by  [START ON 7/23/2024] amiodarone, 200 mg, Oral, Daily  bumetanide, 1 mg, Intravenous, Q8H  cephalexin, 500 mg, Nasogastric, Q8H  dextrose, 25 g, Intravenous, Once  doxycycline, 100 mg, Nasogastric, Q12H  insulin lispro, 2-7 Units, Subcutaneous, Q6H  lansoprazole, 30 mg, Nasogastric, Q AM  lidocaine, 20 mL, Infiltration, Once  metoprolol tartrate, 12.5 mg, Nasogastric, Q12H  miconazole, 1 Application, Topical, Q12H  midodrine, 10 mg, Nasogastric, Q8H  povidone-iodine, , Topical, Daily  rivaroxaban, 15 mg, Oral, BID With Meals   Followed by  [START ON 7/24/2024] rivaroxaban, 20 mg, Oral, Daily With Dinner  sodium chloride, 10 mL, Intravenous, Q12H      Continuous Infusions:     PRN Meds:  acetaminophen **OR** acetaminophen    Calcium Replacement - Follow Nurse / BPA  Driven Protocol    dextrose    dextrose    glucagon (human recombinant)    ipratropium-albuterol    Magnesium Standard Dose Replacement - Follow Nurse / BPA Driven Protocol    nitroglycerin    ondansetron ODT **OR** ondansetron    Phosphorus Replacement - Follow Nurse / BPA Driven Protocol    Potassium Replacement - Follow Nurse / BPA Driven Protocol    [COMPLETED] Insert Peripheral IV **AND** sodium chloride    sodium chloride    sodium chloride  Allergies:  No Known Allergies    Objective   Exam:     Vital Signs  Temp:  [96.7 °F (35.9 °C)-98.4 °F (36.9 °C)] 97.2 °F (36.2 °C)  Heart Rate:  [] 109  Resp:  [22-27] 22  BP: (121)/(47) 121/47  SpO2:  [92 %-100 %] 98 %  on  Flow (L/min):  [8] 8;   Device (Oxygen Therapy): high-flow nasal cannula;humidified  Body mass index is 40.59 kg/m².  EXAM  General:  69 yr old  female, some respiratory distress  Head:      Normocephalic and atraumatic.    Eyes:      PERRL/EOM intact, conjunctivae and sclerae clear without nystagmus.    Neck:      No masses, thyromegaly,  trachea central   Lungs:    Clear bilaterally to auscultation.    Heart:      Regular rate and rhythm, no murmur no gallop  Abd:        Soft, nontender, not distended, bowel sounds positive, no shifting dullness.  Msk:        No deformity or scoliosis noted of thoracic or lumbar spine.    Pulses:   Pulses normal in all 4 extremities.    Extremities:        No cyanosis or clubbing--+ + edema.    Neuro:    Lethargic  Skin:       Intact without lesions or rashes.          Results Review:  I have personally reviewed most recent Data :  BMP @LABRCNT(creatinine:10)  CBC    Results from last 7 days   Lab Units 07/07/24  0506 07/06/24  0354 07/05/24  0918 07/05/24  0257 07/04/24  0414 07/03/24  0635 07/02/24  0329   WBC 10*3/mm3 16.38* 15.29* 12.93* 11.98* 9.76 10.53 10.53   HEMOGLOBIN g/dL 14.2 13.8 13.8 14.6 14.7 14.4 13.8   PLATELETS 10*3/mm3 130* 128* 115* 133* 123* 104* 92*     CMP   Results from last 7 days    Lab Units 07/07/24  0506 07/06/24  1608 07/06/24  0354 07/05/24  0918 07/05/24  0257 07/04/24  2248 07/04/24  1727 07/04/24  0414 07/03/24 2001 07/03/24  0635 07/02/24 2042 07/02/24  0329   SODIUM mmol/L 143 140 139 135* 136  --  137 136  --  139  --  138   POTASSIUM mmol/L 3.9 3.3* 3.7 5.3* 5.9* 5.4* 6.0* 5.9*   < > 3.5   < > 3.5   CHLORIDE mmol/L 98 96* 96* 95* 95*  --  95* 94*  --  95*  --  96*   CO2 mmol/L 35.7* 33.9* 34.0* 31.6* 35.0*  --  33.9* 33.4*  --  36.9*  --  34.5*   BUN mg/dL 74* 72* 70* 67* 65*  --  60* 54*  --  45*  --  43*   CREATININE mg/dL 1.37* 1.41* 1.56* 1.86* 1.88*  --  1.95* 1.77*  --  1.31*  --  1.14*   GLUCOSE mg/dL 102* 111* 133* 128* 99  --  101* 108*  --  107*  --  82   ALBUMIN g/dL 2.5* 2.5* 2.5*  --  2.5*  --   --  2.6*  --  2.5*  --  2.3*   BILIRUBIN mg/dL 1.8* 1.6* 1.6*  --  1.6*  --   --  1.3*  --  1.0  --  0.9   ALK PHOS U/L 125* 110 117  --  129*  --   --  138*  --  130*  --  109   AST (SGOT) U/L 30 31 27  --  28  --   --  38*  --  30  --  24   ALT (SGPT) U/L 17 17 14  --  16  --   --  17  --  16  --  14    < > = values in this interval not displayed.     ABG    Results from last 7 days   Lab Units 07/06/24  1150 07/05/24  0711 07/05/24  0442 07/05/24  0353 07/05/24  0212   PH, ARTERIAL pH units 7.392 7.281* 7.250* 7.204* 7.208*   PCO2, ARTERIAL mm Hg 61.2* 78.4* 80.9* 94.8* 92.8*   PO2 ART mm Hg 61.9* 106.7 76.6* 87.0 76.7*   O2 SATURATION ART % 90.2* 97.0 91.7* 93.1* 90.5*   BASE EXCESS ART mmol/L 9.3* 6.5* 4.5* 4.6* 4.4*     XR Chest 1 View    Result Date: 7/6/2024  Impression: Successful placement of a right chest tube for right-sided hydropneumothorax, which demonstrates decreased/trace fluid component and similar/small gas component. Electronically Signed: Manan Jefferson MD  7/6/2024 2:35 PM EDT  Workstation ID: [id number]    CT Chest Without Contrast Diagnostic    Result Date: 7/6/2024  Impression: Moderate right-sided hydropneumothorax as seen on same-day radiograph.  Adjacent partial collapse of the right lung with multifocal peripheral bronchial obstruction. There are dense airspace consolidations throughout the right lung, and findings are likely a combination of lung collapse and worsening infection. Persistent airspace opacities in the left lung consistent with infection, slightly worsened from prior CT. Small left pleural effusion. Prominent four-chamber cardiomegaly. Pulmonary artery enlargement consistent with pulmonary hypertension. Trace perihepatic ascites. Persistent bilateral flank and right chest wall edema. Electronically Signed: Javon Mortensen MD  7/6/2024 11:19 AM EDT  Workstation ID: [id number]    XR Chest 1 View    Result Date: 7/6/2024  Impression: 1. There is a definite small right-sided pneumothorax. The right lung is partially collapsed and consolidated. A small to moderate right pleural effusion is present indicating a hydropneumothorax. 2. Unchanged consolidation in the left lung with a small pleural effusion. 3. Cardiomegaly. 4.The abnormal results were discussed with the nurse (Dianne) by telephone. The referring clinician will be contacted. Electronically Signed: Jean Claude Bobby MD  7/6/2024 9:56 AM EDT  Workstation ID: [id number]    XR Chest 1 View    Result Date: 7/6/2024  Impression: 1. Questionable right-sided pneumothorax. I would recommend a repeat chest x-ray as the patient is rotated. 2. Persistent patchy consolidation in the left mid and lower lung zones suspicious for pneumonia. There is a small right pleural effusion. 3. Abnormal consolidation in the right lung with a small to moderate pleural effusion. 4. Cardiomegaly. 5. The abnormal results were discussed with the nurse in the CVICU (Dianne) by telephone and the referring clinician will be contacted. Electronically Signed: Jean Claude Bobby MD  7/6/2024 9:27 AM EDT  Workstation ID: [id number]    XR Chest 1 View    Result Date: 7/5/2024  Impression: Marked cardiomegaly with bilateral patchy airspace disease  and pleural effusions. Findings are similar to the previous exam. Electronically Signed: Amirah Coto MD  7/5/2024 9:14 AM EDT  Workstation ID: [id number]    XR Abdomen KUB    Result Date: 7/4/2024  Impression: Feeding tube is in the stomach. Electronically Signed: Obed Sidhu MD  7/4/2024 9:59 PM EDT  Workstation ID: [id number]    US Renal Bilateral    Result Date: 7/4/2024  Impression: No acute process nor significant abnormality identified. Electronically Signed: Beto Kaur MD  7/4/2024 4:14 PM EDT  Workstation ID: [id number]    XR Chest 1 View    Result Date: 7/4/2024  Impression: Cardiomegaly with pulmonary vascular congestion and bilateral pleural effusions. Bibasilar airspace disease could be atelectasis or pneumonia. Electronically Signed: Obed Sidhu MD  7/4/2024 2:34 AM EDT  Workstation ID: [id number]    XR Chest 1 View    Result Date: 7/3/2024  Impression: No appreciable change in bibasilar lung infiltrates with small effusions. Continued cardiomegaly. Electronically Signed: Krystal Lee MD  7/3/2024 7:57 AM EDT  Workstation ID: [id number]    XR Chest 1 View    Result Date: 7/2/2024  Impression: Some improvement in bibasilar lung infiltrates with small effusions. Continued cardiomegaly. Electronically Signed: Krystal Lee MD  7/2/2024 12:46 PM EDT  Workstation ID: [id number]    US Pelvis Complete    Result Date: 7/1/2024  Impression: Sonographic features suggest the presence of a large subserosal uterine fundal fibroid. Electronically Signed: Ann Marie Maloney MD  7/1/2024 1:43 PM EDT  Workstation ID: [id number]    CT Abdomen Pelvis Without Contrast    Result Date: 7/1/2024  Impression: Moderately large right pleural effusion and small left pleural effusion. Bibasilar infiltrates suggest atelectasis although associated pneumonia is not excluded. Severe cardiomegaly. Bilateral flank edema, greatest on the right. Probable fibroid uterus. This could be confirmed with ultrasound. Electronically Signed: Krystal  MD Rosa  7/1/2024 11:39 AM EDT  Workstation ID: [id number]    Adult Transthoracic Echo Complete w/ Color, Spectral and Contrast if Necessary Per Protocol    Addendum Date: 7/1/2024      Left ventricular ejection fraction appears to be 31 - 35%.   Left ventricular diastolic dysfunction is noted.   The left atrial cavity is dilated.   Moderate aortic valve stenosis is present. Mean/peak gradient of 14/24 mmHg.  Dimensionless index 0.36   Moderate to severe mitral valve regurgitation is present.   Estimated right ventricular systolic pressure from tricuspid regurgitation is normal (<35 mmHg).     CT Angiogram Chest Pulmonary Embolism    Result Date: 7/1/2024  Impression: 1. No evidence for pulmonary embolus. 2. Bibasilar airspace disease and right middle lung opacity compatible likely multifocal pneumonia with bilateral pleural effusions. 3. Cardiomegaly. Electronically Signed: Shari Kaur MD  7/1/2024 10:14 AM EDT  Workstation ID: [id number]    CT Lower Extremity Bilateral Without Contrast    Result Date: 7/1/2024  Impression: Bilateral lower extremity soft tissue swelling greater on the left than the right. There is no soft tissue gas or well-formed abscess. Electronically Signed: Obed Sidhu MD  7/1/2024 1:53 AM EDT  Workstation ID: [id number]    XR Chest 1 View    Result Date: 6/30/2024  Vascular congestion with small effusions Electronically Signed: Eamon Brock MD  6/30/2024 7:27 PM EDT  Workstation ID: [id number]     Results for orders placed during the hospital encounter of 06/30/24    Adult Transthoracic Echo Complete w/ Color, Spectral and Contrast if Necessary Per Protocol    Interpretation Summary    Left ventricular ejection fraction appears to be 31 - 35%.    Left ventricular diastolic dysfunction is noted.    The left atrial cavity is dilated.    Moderate aortic valve stenosis is present. Mean/peak gradient of 14/24 mmHg.  Dimensionless index 0.36    Moderate to severe mitral valve regurgitation is  present.    Estimated right ventricular systolic pressure from tricuspid regurgitation is normal (<35 mmHg).        Assessment & Plan   Assessment and Plan:         Atrial fibrillation with RVR    Primary hypertension    Morbid obesity with BMI of 40.0-44.9, adult    Right bundle branch block    Acute deep vein thrombosis (DVT) of both lower extremities    ARABELLA (acute kidney injury)    Acute hyperkalemia    Sepsis    Acute UTI    Acute systolic congestive heart failure    Rhinovirus infection    Multifocal pneumonia    Chronic respiratory failure with hypoxia, on home O2 therapy    Skin ulcer of right great toe    Skin ulcer of left great toe    SEDRICK (obstructive sleep apnea)    Aortic stenosis    Mitral regurgitation    Acute HFrEF (heart failure with reduced ejection fraction)    COPD (chronic obstructive pulmonary disease)    ASSESSMENT:  Acute kidney injury, with baseline creatinine roughly 0.8-1.0mg/dL  Hyperkalemia  A-fib with RVR  Acute on chronic heart failure, with EF 31-35%, diastolic dysfunction, moderate AS and moderate to severe MR as per echo from 6/30/24  Bilateral DVT  Severe sepsis/LLE cellulitis, acute cystitis  Multifocal pneumonia    PLAN :     Renal function improved today. Suspect she may initially have had some acute cardiorenal component, now most likely has some ATN secondary to sepsis, elevated Vanc level (was 22.4 on 7/3), and some prolonged prerenal state with hemodynamic insults, arrhythmias.  TIM without hydronephrosis  No significant proteinuria  Chun in place for strict I/Os  Hyperkalemia, resolved.   Volume reviewed, improving, bumex reduced to 1mg vip  2 mg every 12 hours, agree, noted some metabolic alkalosis, hopefully will improve with de-escalation in diuretics  Continue Midodrine, also on low dose beta blockers presumably for rate control. Monitor  Antibiotics as per ID. Now off Vanc, agree. On PO Doxy and Cephalexin  Check labs daily. Will follow closely      Avis Chew,  MD  [facility]  7/7/2024  10:12 EDT  The note was transcribed by  MANJU.  I personally have examined the patient and interviewed the patient and modified the history, exam. I have reviewed the history, data, problems, assessment and plan .and I concur . Exam as described in the Progress note, with comments additions, revisions as noted by me.           7/7/2024  10:12 EDT

## 2024-07-07 NOTE — PROCEDURES
"Chest Tube Insertion    Date/Time: 7/7/2024 12:17 PM    Performed by: Sesar Solorzano DO  Authorized by: Sesar Solorzano DO  Consent: Verbal consent obtained. Written consent obtained.  Consent given by: NOK.  Patient understanding: patient states understanding of the procedure being performed  Patient consent: the patient's understanding of the procedure matches consent given  Procedure consent: procedure consent matches procedure scheduled  Relevant documents: relevant documents present and verified  Test results: test results available and properly labeled  Site marked: the operative site was marked  Imaging studies: imaging studies available  Patient identity confirmed: verbally with patient and arm band  Time out: Immediately prior to procedure a \"time out\" was called to verify the correct patient, procedure, equipment, support staff and site/side marked as required.  Indications comments: Hydropneumothorax    Sedation:  Patient sedated: no    Anesthesia: local infiltration    Anesthesia:  Local Anesthetic: lidocaine 1% without epinephrine  Anesthetic total: 4 mL  Preparation: skin prepped with chlorhexidine  Placement location: right lateral  Scalpel size: 11  Tube size: minicatheter  Ultrasound guidance: yes  Tension pneumothorax heard: no  Tube connected to: suction  Drainage characteristics: serosanguinous  Drainage amount: 600 ml  Suture material: 2-0 silk  Dressing: 4x4 sterile gauze  Post-insertion x-ray findings: tube in good position  Patient tolerance: patient tolerated the procedure well with no immediate complications        "

## 2024-07-07 NOTE — PROGRESS NOTES
Critical Care Progress Note   Ban Brennan : 1955 MRN:[id number] LOS:7     Principal Problem: Atrial fibrillation with RVR     Reason for follow up: All the medical problems listed below    Summary     Ban Brennan is a 69 y.o. female with known PMH of hypertension, heart failure, chronic bilateral lower extremity edema who presented to the hospital for creasing shortness of breath, and was admitted with a principal diagnosis of Atrial fibrillation with RVR.  Patient is alert however lethargic and attempts to participate with HPI however she is a poor historian.  Supplemental information for HPI taken from conversation with daughter at bedside who states patient does not go to the doctor normally and has not been in the hospital for years.  She is unsure of what her full medical history is at this time but states that her legs have been swollen for a long time and that she has not been up and moving for approximately 1 year.  Patient has a home health nurse that comes out and wraps her legs twice a week.  On assessment patient's legs are both extremely red and swollen, left > right.  Patient's daughter states that redness of her legs is new however she is not sure how long it has been this bad.  EMS was called when she had worsening shortness of breath and found the patient with a wide complex tachycardia with heart rate of 190s.  She was given 150 mg of amiodarone by EMS and heart rate improved to 160s.  She is found to have a right bundle branch block however it is uncertain due to her limited history if this is chronic.     ED course: On arrival to emergency department EKG showed atrial fibrillation with RVR, heart rate 145.  CXR showed fluid overload with bilateral effusions.  Lactic acid was 3.1 and BNP 19,860.  Patient had a white count of 16.8 and 4+ bacteria in her urine.  Also noted to be hyperkalemic with a potassium of 6.5 in which she was treated with insulin D50, calcium gluconate,  and Lasix.  She was started on cefepime and vancomycin for cellulitis of her legs and UTI.     ACP: Patient is alert with some intermittent confusion.  Her daughter is at bedside and is her decision-maker along with her brother.  Daughter states patient is a full code.       Significant events     07/07/24 : The patient remains afebrile and hemodynamically stable.  She is having some tachycardia in the low 100s.  She has been weaned down to room air and saturations are maintaining in the low 90s with no shortness of air.  She has had 2 L urinary output with a net positive -6.0 L.  Right-sided chest tube placed yesterday and has had 630 mL 24-hour drainage and no airleak.  Chest x-ray has greatly improved with near resolution of pneumothorax and reduced volume hydrothorax.  Bumex drip transitioned to intermittent dosing with Bumex 1 mg every 8 hours.  Will DC Chun catheter.  Dobbhoff tube remains in place, can DC if she passes bedside swallow study.  She is stable for transfer out of the ICU    Assessment / Plan     BLE DVT  Hx of PE, unprovoked  Anticoagulation with Xarelto  Cardiology following  Factor V Leiden and prothrombin gene mutation reported negative   Hematology signed off.  Per heme-onc's note: Patient was offered a follow-up appt at [facility] in 3-4 weeks after discharge which she was agreeable to  Hematology also recommended lifelong anticoagulation due to persistent risk factors and extensive DVT as well as a history of prior PE.  Per their recommendation the patient will have anticoagulation with Eliquis with 10 mg every 12 hours x 1 week loading dose followed by 5 mg every 12 hours thereafter      Atrial fibrillation with RVR  PAF  Chest pain  Acute on chronic systolic heart failure  Diastolic dysfxn  Moderate AS  Severe MR  Chronic lower extremity lymphedema  Fluid overload  Large right pleural effusion  Right hydropneumothorax  Rate is fairly well-controlled  Cardiology has transitioned  from amio gtt to po form  TTE from 7/1/2024 shows an ejection fraction low at 30 to 35%, diastolic dysfunction noted, moderate aortic valve stenosis, severe mitral regurgitation  TSH wnl  Aggressively diuresed with a Bumex drip, transition to intermittent dosing 7/7  Pigtail thoracostomy tube placed on 7/6 with greatly improved hydropneumothorax.  No air leak        Severe sepsis  Acute cystitis with hematuria  LLE cellulitis  L foot wound, appears infected  Putative panniculitis  MRSA PCR +  Rhinovirus +  Putative multifocal PNA  Source of sepsis is multiple  ID has signed off--ceftriaxone and vancomycin discontinued.  P.o. doxycycline 100 mg twice daily x 7 days and Keflex 500 mg 3 times daily for 7 days started 7/3  Podiatry consulted and have signed off--no acute intervention indicated currently.  Per podiatry recommendations: Continue supportive care and the patient may follow-up in the podiatry office with the NP 1 to 2 weeks after discharge  HbA1c  6%  PCT not significantly elevated  NT suctioned sputum Cx only with normal shorty      Uterine mass  Pelvic U/S suggesting fibroid  Per heme-onc note: Recommend GYN evaluation.  Defer to discharging provider       Acute kidney injury  Hyperkalemia, resolved  Continues to have good urinary output  Creatinine baseline roughly 0.8 to 1 mg/dL  Renal ultrasound without hydronephrosis  IV fluids contraindicated  Avoid nephrotoxic agents  Avoid hypotension  Nephrology following       Essential hypertension  Hold home lisinopril due to ARABELLA  Tolerating low-dose metoprolol  Adjust/titrate medications as indicated      Other:  PT, OT consulted      Code status:   Code Status (Patient has no pulse and is not breathing): CPR (Attempt to Resuscitate)  Medical Interventions (Patient has pulse or is breathing): Full Support       Nutrition: NPO Diet NPO Type: Strict NPO   Patient isn't on Tube Feeding    VTE Prophylaxis:  Pharmacologic VTE prophylaxis orders are present.        Subjective / Review of systems     Review of Systems     The patient reports that she has generalized pain and discomfort although she is unable to qualify or quantify.  She states that overall she feels better.  She denies that she is short of air and has been titrated to room air.  She denies chest pain or nausea/vomiting    Objective / Physical Exam   Vital signs:  Temp: 97.2 °F (36.2 °C)  BP: 121/47  Heart Rate: 109  Resp: 22  SpO2: 98 %  Weight: 88.1 kg (194 lb 3.6 oz)    Admission Weight: Weight: 108 kg (239 lb)  Current Weight: Weight: 88.1 kg (194 lb 3.6 oz)    Input/Output in last 24 hours:    Intake/Output Summary (Last 24 hours) at 7/7/2024 1029  Last data filed at 7/7/2024 0640  Gross per 24 hour   Intake 69 ml   Output 2655 ml   Net -2586 ml      Physical Exam     GEN: Very pleasant woman.  Sitting up in bed on room air. NAD.  NEURO:  No obvious focal deficit.  Moves all 4 ext to follow commands, generalized weakness  HEENT:  N/AT.  PERRL.  MMM.  Edentulous.  No drainage from the eyes/ears/nose.  No conjunctival petechiae.  NECK:  Supple, no JVD, trachea midline.  Not rigid  CHEST/LUNGS:  Breath sounds are diminished with bilateral expiratory wheezing and fine crackles in the bases.  Chest excursion equal bilaterally.  Right-sided thoracostomy tube in place, no air leak or subcutaneous air.  CARDIOVASCULAR: Tachycardic, irregularly irregular rhythm w/ systolic ejection and holosystolic murmur noted.  GI:  Abdomen is obese, tenderness improved in the pannus.  +BS.  Nondistended  : Deferred.  EXTREMITIES: Bilateral lower extremities are noted for chronic venous stasis changes, left lower extremity is hyperemic with increased warmth and somewhat tender to palpation on the right.  Old wound at the base of the right great toe with dry eschar, no apparent active infection.  The left foot has a smaller wound at the base of the great toe with eschar and no apparent active infection.  SKIN: As per  above.  MSK: No joint abnormalities noted.  Generalized weakness.  No clubbing or cyanosis to the periphery   PSYCH:  Pleasant.  A&Ox 3.  Calm and cooperative      Radiology and Labs     Results from last 7 days   Lab Units 07/07/24  0506 07/06/24  0354 07/05/24  0918 07/05/24  0257 07/04/24  0414   WBC 10*3/mm3 16.38* 15.29* 12.93* 11.98* 9.76   HEMATOCRIT % 43.9 42.8 45.5 48.1* 47.3*   PLATELETS 10*3/mm3 130* 128* 115* 133* 123*      Results from last 7 days   Lab Units 07/07/24  0506 07/06/24  1608 07/06/24  0354 07/05/24 0918 07/05/24  0257   SODIUM mmol/L 143 140 139 135* 136   POTASSIUM mmol/L 3.9 3.3* 3.7 5.3* 5.9*   CHLORIDE mmol/L 98 96* 96* 95* 95*   CO2 mmol/L 35.7* 33.9* 34.0* 31.6* 35.0*   BUN mg/dL 74* 72* 70* 67* 65*   CREATININE mg/dL 1.37* 1.41* 1.56* 1.86* 1.88*      Current medications   Scheduled Meds: amiodarone, 200 mg, Nasogastric, Q8H   Followed by  [START ON 7/9/2024] amiodarone, 200 mg, Oral, Q12H   Followed by  [START ON 7/23/2024] amiodarone, 200 mg, Oral, Daily  bumetanide, 1 mg, Intravenous, Q8H  cephalexin, 500 mg, Nasogastric, Q8H  dextrose, 25 g, Intravenous, Once  doxycycline, 100 mg, Nasogastric, Q12H  insulin lispro, 2-7 Units, Subcutaneous, Q6H  lansoprazole, 30 mg, Nasogastric, Q AM  lidocaine, 20 mL, Infiltration, Once  metoprolol tartrate, 12.5 mg, Nasogastric, Q12H  miconazole, 1 Application, Topical, Q12H  midodrine, 10 mg, Nasogastric, Q8H  povidone-iodine, , Topical, Daily  rivaroxaban, 15 mg, Oral, BID With Meals   Followed by  [START ON 7/24/2024] rivaroxaban, 20 mg, Oral, Daily With Dinner  sodium chloride, 10 mL, Intravenous, Q12H      Continuous Infusions:        Plan discussed with RN. Reviewed all other data in the last 24 hours, including but not limited to vitals, labs, microbiology, imaging and pertinent notes from other providers.     MANJU Clemons   Critical Care  07/07/24   10:29 EDT

## 2024-07-07 NOTE — PROGRESS NOTES
Cardiology Progress Note      PATIENT IDENTIFICATION    Name: Ban Brennan  Age: 69 y.o. Sex: female : 1955  MRN: [id number]    Requesting Provider    Sesar Solorzano DO     LOS: 7 days       Reason For Followup:  Heart failure reduced ejection fraction  Sepsis  Atrial fibrillation      Subjective:    Interval History:  Seen and examined.  Chart and labs reviewed.  Events noted.  Patient now in medical intensive care unit following chest tube placement.  Had a CT scan demonstrative of a large hydropneumothorax with partial lung collapse.  Family at bedside.  Patient much more comfortable today.  Denies chest pain.  Breathing much better.  Heart rate improved.  Oxygen demand has improved significantly as well.      Review of Systems:  Review of systems performed but an accurate as patient is confused.    Assessment & Plan    Impressions:  Heart failure reduced ejection fraction-new onset  New onset atrial fibrillation  Sepsis  UTI  Cellulitis  Acute respiratory failure-improved  Altered mental status secondary to above  Hydropneumothorax status post chest tube placement    Recommendations:  Continue with rate control as pressure tolerates  Diuresis as renal function allows  Antimicrobials as per admitting service  Monitor renal function electrolytes closely for toxicities while undergoing IV diuresis  Chest tube as per pulmonary/critical care medicine  Further recommendations as patient's course progresses.        Objective:    Medication Review:   Scheduled Meds:amiodarone, 200 mg, Nasogastric, Q8H   Followed by  [START ON 2024] amiodarone, 200 mg, Oral, Q12H   Followed by  [START ON 2024] amiodarone, 200 mg, Oral, Daily  bumetanide, 1 mg, Intravenous, Q8H  cephalexin, 500 mg, Nasogastric, Q8H  dextrose, 25 g, Intravenous, Once  doxycycline, 100 mg, Nasogastric, Q12H  insulin lispro, 2-7 Units, Subcutaneous, Q6H  lansoprazole, 30 mg, Nasogastric, Q AM  lidocaine, 20 mL, Infiltration,  Once  metoprolol tartrate, 12.5 mg, Nasogastric, Q12H  miconazole, 1 Application, Topical, Q12H  midodrine, 10 mg, Nasogastric, Q8H  povidone-iodine, , Topical, Daily  rivaroxaban, 15 mg, Oral, BID With Meals   Followed by  [START ON 7/24/2024] rivaroxaban, 20 mg, Oral, Daily With Dinner  sodium chloride, 10 mL, Intravenous, Q12H      Continuous Infusions:     PRN Meds:.  acetaminophen **OR** acetaminophen    Calcium Replacement - Follow Nurse / BPA Driven Protocol    dextrose    dextrose    glucagon (human recombinant)    ipratropium-albuterol    Magnesium Standard Dose Replacement - Follow Nurse / BPA Driven Protocol    nitroglycerin    ondansetron ODT **OR** ondansetron    Phosphorus Replacement - Follow Nurse / BPA Driven Protocol    Potassium Replacement - Follow Nurse / BPA Driven Protocol    [COMPLETED] Insert Peripheral IV **AND** sodium chloride    sodium chloride    sodium chloride      Atrial fibrillation with RVR    Primary hypertension    Morbid obesity with BMI of 40.0-44.9, adult    Right bundle branch block    Acute deep vein thrombosis (DVT) of both lower extremities    ARABELAL (acute kidney injury)    Acute hyperkalemia    Sepsis    Acute UTI    Acute systolic congestive heart failure    Rhinovirus infection    Multifocal pneumonia    Chronic respiratory failure with hypoxia, on home O2 therapy    Skin ulcer of right great toe    Skin ulcer of left great toe    SEDRICK (obstructive sleep apnea)    Aortic stenosis    Mitral regurgitation    Acute HFrEF (heart failure with reduced ejection fraction)    COPD (chronic obstructive pulmonary disease)         Physical Exam:    General: Alert, cooperative, appears stated age.  Chronically ill-appearing  Head:  Normocephalic, atraumatic, mucous membranes moist  Eyes:  Conjunctivae/corneas clear, EOMs intact     Neck:  Supple,  no bruit  Lungs:  Coarse and diminished bilaterally.  Chest wall: No tenderness  Heart::  Irregular rate and rhythm, S1 and S2 normal, 1/6  holosystolic murmur.  No rub or gallop  Abdomen: Soft, nontender, nondistended, bowel sounds active  Extremities: No cyanosis, clubbing.  Edema noted  Pulses: Diminished pedal pulses  Skin:  Toe wound noted.  Chronic stasis changes.  Neuro/psych: Alert and oriented to person and place.  No gross focal deficits noted.    Vital Signs:  Vitals:    07/07/24 0400 07/07/24 0500 07/07/24 0600 07/07/24 0800   BP:    121/47   BP Location:       Patient Position:       Pulse: 119 108 120 109   Resp:   22 22   Temp: 96.7 °F (35.9 °C) 97.3 °F (36.3 °C) 97.2 °F (36.2 °C) 97.2 °F (36.2 °C)   TempSrc: Axillary      SpO2: 96% 96% 99% 98%   Weight: 88.1 kg (194 lb 3.6 oz)      Height:         Wt Readings from Last 1 Encounters:   07/07/24 88.1 kg (194 lb 3.6 oz)       Intake/Output Summary (Last 24 hours) at 7/7/2024 1215  Last data filed at 7/7/2024 0640  Gross per 24 hour   Intake 69 ml   Output 2005 ml   Net -1936 ml         Results Review:     CBC    Results from last 7 days   Lab Units 07/07/24  0506 07/06/24  0354 07/05/24  0918 07/05/24  0257 07/04/24  0414 07/03/24  0635 07/02/24  0329   WBC 10*3/mm3 16.38* 15.29* 12.93* 11.98* 9.76 10.53 10.53   HEMOGLOBIN g/dL 14.2 13.8 13.8 14.6 14.7 14.4 13.8   PLATELETS 10*3/mm3 130* 128* 115* 133* 123* 104* 92*     Cr Clearance Estimated Creatinine Clearance: 38.9 mL/min (A) (by C-G formula based on SCr of 1.37 mg/dL (H)).  Coag     HbA1C   Lab Results   Component Value Date    HGBA1C 6.14 (H) 07/01/2024    HGBA1C 6.00 (H) 01/15/2024     Blood Glucose   Glucose   Date/Time Value Ref Range Status   07/07/2024 1041 112 (H) 70 - 105 mg/dL Final     Comment:     Serial Number: [id number]:  [id number]   07/07/2024 0505 99 70 - 105 mg/dL Final     Comment:     Serial Number: [id number]:  [id number]   07/07/2024 0001 77 70 - 105 mg/dL Final     Comment:     Serial Number: [id number]:  [id number]   07/06/2024 1812 108 (H) 70 - 105 mg/dL Final     Comment:     Serial Number:  [id number]:  [id number]   07/06/2024 1235 89 70 - 105 mg/dL Final     Comment:     Serial Number: [id number]:  [id number]   07/06/2024 0611 105 70 - 105 mg/dL Final     Comment:     Serial Number: [id number]:  [id number]   07/05/2024 2349 172 (H) 70 - 105 mg/dL Final     Comment:     Serial Number: [id number]:  [id number]   07/05/2024 1806 142 (H) 70 - 105 mg/dL Final     Comment:     Serial Number: [id number]:  [id number]     Infection   Results from last 7 days   Lab Units 07/06/24  1544 07/01/24  1507 07/01/24  0910 06/30/24  1844 06/30/24  1757 06/30/24  1749   BLOODCX   --   --   --  No growth at 5 days No growth at 5 days  --    BODYFLDCX  No growth at less than 24 hours  --   --   --   --   --    RESPCX   --  Light growth (2+) Normal respiratory shorty. No S. aureus or Pseudomonas aeruginosa detected. Final report.  --   --   --   --    URINECX   --   --   --   --   --  >100,000 CFU/mL Escherichia coli*   PROCALCITONIN ng/mL  --   --  0.48*  --   --   --      CMP   Results from last 7 days   Lab Units 07/07/24  0506 07/06/24  1608 07/06/24  0354 07/05/24  0918 07/05/24  0257 07/04/24  2248 07/04/24  1727 07/04/24  0414 07/03/24 2001 07/03/24  0635 07/02/24  2042 07/02/24  0329   SODIUM mmol/L 143 140 139 135* 136  --  137 136  --  139  --  138   POTASSIUM mmol/L 3.9 3.3* 3.7 5.3* 5.9* 5.4* 6.0* 5.9*   < > 3.5   < > 3.5   CHLORIDE mmol/L 98 96* 96* 95* 95*  --  95* 94*  --  95*  --  96*   CO2 mmol/L 35.7* 33.9* 34.0* 31.6* 35.0*  --  33.9* 33.4*  --  36.9*  --  34.5*   BUN mg/dL 74* 72* 70* 67* 65*  --  60* 54*  --  45*  --  43*   CREATININE mg/dL 1.37* 1.41* 1.56* 1.86* 1.88*  --  1.95* 1.77*  --  1.31*  --  1.14*   GLUCOSE mg/dL 102* 111* 133* 128* 99  --  101* 108*  --  107*  --  82   ALBUMIN g/dL 2.5* 2.5* 2.5*  --  2.5*  --   --  2.6*  --  2.5*  --  2.3*   BILIRUBIN mg/dL 1.8* 1.6* 1.6*  --  1.6*  --   --  1.3*  --  1.0  --  0.9   ALK PHOS U/L 125* 110 117  --  129*  --   --   "138*  --  130*  --  109   AST (SGOT) U/L 30 31 27  --  28  --   --  38*  --  30  --  24   ALT (SGPT) U/L 17 17 14  --  16  --   --  17  --  16  --  14    < > = values in this interval not displayed.     ABG    Results from last 7 days   Lab Units 07/06/24  1150 07/05/24  0711 07/05/24  0442 07/05/24  0353 07/05/24  0212   PH, ARTERIAL pH units 7.392 7.281* 7.250* 7.204* 7.208*   PCO2, ARTERIAL mm Hg 61.2* 78.4* 80.9* 94.8* 92.8*   PO2 ART mm Hg 61.9* 106.7 76.6* 87.0 76.7*   O2 SATURATION ART % 90.2* 97.0 91.7* 93.1* 90.5*   BASE EXCESS ART mmol/L 9.3* 6.5* 4.5* 4.6* 4.4*     UA    Results from last 7 days   Lab Units 06/30/24  1749   NITRITE UA  Positive*   WBC UA /HPF 11-20*   BACTERIA UA /HPF 3+*   SQUAM EPITHEL UA /HPF 3-6*   URINECX  >100,000 CFU/mL Escherichia coli*     TOPHER  No results found for: \"POCMETH\", \"POCAMPHET\", \"POCBARBITUR\", \"POCBENZO\", \"POCCOCAINE\", \"POCOPIATES\", \"POCOXYCODO\", \"POCPHENCYC\", \"POCPROPOXY\", \"POCTHC\", \"POCTRICYC\"  Lysis Labs   Results from last 7 days   Lab Units 07/07/24  0506 07/06/24  1608 07/06/24  0354 07/05/24  0918 07/05/24  0257 07/04/24  1727 07/04/24  0414 07/03/24  0635 07/02/24  0329   HEMOGLOBIN g/dL 14.2  --  13.8 13.8 14.6  --  14.7 14.4 13.8   PLATELETS 10*3/mm3 130*  --  128* 115* 133*  --  123* 104* 92*   CREATININE mg/dL 1.37* 1.41* 1.56* 1.86* 1.88* 1.95* 1.77* 1.31* 1.14*     Radiology(recent) XR Chest 1 View    Result Date: 7/6/2024  Impression: Successful placement of a right chest tube for right-sided hydropneumothorax, which demonstrates decreased/trace fluid component and similar/small gas component. Electronically Signed: Maann Jefferson MD  7/6/2024 2:35 PM EDT  Workstation ID: [id number]    CT Chest Without Contrast Diagnostic    Result Date: 7/6/2024  Impression: Moderate right-sided hydropneumothorax as seen on same-day radiograph. Adjacent partial collapse of the right lung with multifocal peripheral bronchial obstruction. There are dense airspace " consolidations throughout the right lung, and findings are likely a combination of lung collapse and worsening infection. Persistent airspace opacities in the left lung consistent with infection, slightly worsened from prior CT. Small left pleural effusion. Prominent four-chamber cardiomegaly. Pulmonary artery enlargement consistent with pulmonary hypertension. Trace perihepatic ascites. Persistent bilateral flank and right chest wall edema. Electronically Signed: Javon Mortensen MD  7/6/2024 11:19 AM EDT  Workstation ID: [id number]    XR Chest 1 View    Result Date: 7/6/2024  Impression: 1. There is a definite small right-sided pneumothorax. The right lung is partially collapsed and consolidated. A small to moderate right pleural effusion is present indicating a hydropneumothorax. 2. Unchanged consolidation in the left lung with a small pleural effusion. 3. Cardiomegaly. 4.The abnormal results were discussed with the nurse (Dianne) by telephone. The referring clinician will be contacted. Electronically Signed: Jean Claude Bobby MD  7/6/2024 9:56 AM EDT  Workstation ID: [id number]    XR Chest 1 View    Result Date: 7/6/2024  Impression: 1. Questionable right-sided pneumothorax. I would recommend a repeat chest x-ray as the patient is rotated. 2. Persistent patchy consolidation in the left mid and lower lung zones suspicious for pneumonia. There is a small right pleural effusion. 3. Abnormal consolidation in the right lung with a small to moderate pleural effusion. 4. Cardiomegaly. 5. The abnormal results were discussed with the nurse in the CVICU (Dianne) by telephone and the referring clinician will be contacted. Electronically Signed: Jean Claude Bobby MD  7/6/2024 9:27 AM EDT  Workstation ID: [id number]       Results from last 7 days   Lab Units 07/01/24  0910   HSTROP T ng/L 36*       Imaging Results (Last 24 Hours)       Procedure Component Value Units Date/Time    XR Chest 1 View [[id number]] Collected: 07/06/24 1432     Updated:  07/06/24 1438    Narrative:      XR CHEST 1 VW    Date of Exam: 7/6/2024 2:28 PM EDT    Indication: S/p pigtail thoracostomy tube placement for hydropneumothorax    Comparison: Same day chest CT and chest radiograph.    Findings:  Placement of a chest tube with tip terminating over the medial right lower lung. Feeding catheter courses below the diaphragm with the tip excluded from the field-of-view. Enlarged cardiac silhouette, unchanged. Right-sided hydropneumothorax, with   decreased/trace fluid component and overall similar small volume gas component. Unchanged small left pleural effusion. Unchanged bilateral airspace opacities. Osseous structures are unchanged.      Impression:      Impression:  Successful placement of a right chest tube for right-sided hydropneumothorax, which demonstrates decreased/trace fluid component and similar/small gas component.      Electronically Signed: Manan Jefferson MD    7/6/2024 2:35 PM EDT    Workstation ID: [id number]            Cardiac Studies:  Echo- Results for orders placed during the hospital encounter of 06/30/24    Adult Transthoracic Echo Complete w/ Color, Spectral and Contrast if Necessary Per Protocol    Interpretation Summary    Left ventricular ejection fraction appears to be 31 - 35%.    Left ventricular diastolic dysfunction is noted.    The left atrial cavity is dilated.    Moderate aortic valve stenosis is present. Mean/peak gradient of 14/24 mmHg.  Dimensionless index 0.36    Moderate to severe mitral valve regurgitation is present.    Estimated right ventricular systolic pressure from tricuspid regurgitation is normal (<35 mmHg).    Stress Myoview-  Cath-        Sesar Tracey DO  07/07/24  12:15 EDT    Copied information has been reviewed and is current as of 07/07/24

## 2024-07-08 LAB
ALBUMIN SERPL-MCNC: 2.2 G/DL (ref 3.5–5.2)
ALBUMIN/GLOB SERPL: 0.6 G/DL
ALP SERPL-CCNC: 96 U/L (ref 39–117)
ALT SERPL W P-5'-P-CCNC: 15 U/L (ref 1–33)
ANION GAP SERPL CALCULATED.3IONS-SCNC: 10 MMOL/L (ref 5–15)
AST SERPL-CCNC: 36 U/L (ref 1–32)
BASOPHILS # BLD AUTO: 0.01 10*3/MM3 (ref 0–0.2)
BASOPHILS NFR BLD AUTO: 0.1 % (ref 0–1.5)
BILIRUB SERPL-MCNC: 1.7 MG/DL (ref 0–1.2)
BUN SERPL-MCNC: 72 MG/DL (ref 8–23)
BUN/CREAT SERPL: 60.5 (ref 7–25)
CALCIUM SPEC-SCNC: 9.3 MG/DL (ref 8.6–10.5)
CHLORIDE SERPL-SCNC: 97 MMOL/L (ref 98–107)
CO2 SERPL-SCNC: 38 MMOL/L (ref 22–29)
CREAT SERPL-MCNC: 1.19 MG/DL (ref 0.57–1)
DEPRECATED RDW RBC AUTO: 53.2 FL (ref 37–54)
EGFRCR SERPLBLD CKD-EPI 2021: 49.6 ML/MIN/1.73
EOSINOPHIL # BLD AUTO: 0.01 10*3/MM3 (ref 0–0.4)
EOSINOPHIL NFR BLD AUTO: 0.1 % (ref 0.3–6.2)
ERYTHROCYTE [DISTWIDTH] IN BLOOD BY AUTOMATED COUNT: 16.9 % (ref 12.3–15.4)
GLOBULIN UR ELPH-MCNC: 3.5 GM/DL
GLUCOSE BLDC GLUCOMTR-MCNC: 120 MG/DL (ref 70–105)
GLUCOSE BLDC GLUCOMTR-MCNC: 120 MG/DL (ref 70–105)
GLUCOSE BLDC GLUCOMTR-MCNC: 121 MG/DL (ref 70–105)
GLUCOSE BLDC GLUCOMTR-MCNC: 201 MG/DL (ref 70–105)
GLUCOSE SERPL-MCNC: 98 MG/DL (ref 65–99)
HCT VFR BLD AUTO: 41.8 % (ref 34–46.6)
HGB BLD-MCNC: 13.3 G/DL (ref 12–15.9)
IMM GRANULOCYTES # BLD AUTO: 0.05 10*3/MM3 (ref 0–0.05)
IMM GRANULOCYTES NFR BLD AUTO: 0.4 % (ref 0–0.5)
LYMPHOCYTES # BLD AUTO: 0.64 10*3/MM3 (ref 0.7–3.1)
LYMPHOCYTES NFR BLD AUTO: 5.5 % (ref 19.6–45.3)
MAGNESIUM SERPL-MCNC: 1.8 MG/DL (ref 1.6–2.4)
MCH RBC QN AUTO: 29.9 PG (ref 26.6–33)
MCHC RBC AUTO-ENTMCNC: 31.8 G/DL (ref 31.5–35.7)
MCV RBC AUTO: 93.9 FL (ref 79–97)
MONOCYTES # BLD AUTO: 0.64 10*3/MM3 (ref 0.1–0.9)
MONOCYTES NFR BLD AUTO: 5.5 % (ref 5–12)
NEUTROPHILS NFR BLD AUTO: 10.31 10*3/MM3 (ref 1.7–7)
NEUTROPHILS NFR BLD AUTO: 88.4 % (ref 42.7–76)
NRBC BLD AUTO-RTO: 0 /100 WBC (ref 0–0.2)
PHOSPHATE SERPL-MCNC: 2.4 MG/DL (ref 2.5–4.5)
PLATELET # BLD AUTO: 100 10*3/MM3 (ref 140–450)
PMV BLD AUTO: 11.3 FL (ref 6–12)
POTASSIUM SERPL-SCNC: 3 MMOL/L (ref 3.5–5.2)
PROT SERPL-MCNC: 5.7 G/DL (ref 6–8.5)
RBC # BLD AUTO: 4.45 10*6/MM3 (ref 3.77–5.28)
SODIUM SERPL-SCNC: 145 MMOL/L (ref 136–145)
WBC NRBC COR # BLD AUTO: 11.66 10*3/MM3 (ref 3.4–10.8)

## 2024-07-08 PROCEDURE — 83735 ASSAY OF MAGNESIUM: CPT | Performed by: NURSE PRACTITIONER

## 2024-07-08 PROCEDURE — 94799 UNLISTED PULMONARY SVC/PX: CPT

## 2024-07-08 PROCEDURE — 63710000001 INSULIN LISPRO (HUMAN) PER 5 UNITS

## 2024-07-08 PROCEDURE — 97535 SELF CARE MNGMENT TRAINING: CPT

## 2024-07-08 PROCEDURE — 94660 CPAP INITIATION&MGMT: CPT

## 2024-07-08 PROCEDURE — 84100 ASSAY OF PHOSPHORUS: CPT | Performed by: NURSE PRACTITIONER

## 2024-07-08 PROCEDURE — 80053 COMPREHEN METABOLIC PANEL: CPT | Performed by: NURSE PRACTITIONER

## 2024-07-08 PROCEDURE — 97530 THERAPEUTIC ACTIVITIES: CPT

## 2024-07-08 PROCEDURE — 97112 NEUROMUSCULAR REEDUCATION: CPT

## 2024-07-08 PROCEDURE — 85025 COMPLETE CBC W/AUTO DIFF WBC: CPT | Performed by: NURSE PRACTITIONER

## 2024-07-08 PROCEDURE — 99233 SBSQ HOSP IP/OBS HIGH 50: CPT | Performed by: INTERNAL MEDICINE

## 2024-07-08 PROCEDURE — 82948 REAGENT STRIP/BLOOD GLUCOSE: CPT

## 2024-07-08 PROCEDURE — 97110 THERAPEUTIC EXERCISES: CPT

## 2024-07-08 PROCEDURE — 25010000002 BUMETANIDE PER 0.5 MG: Performed by: INTERNAL MEDICINE

## 2024-07-08 RX ORDER — BUMETANIDE 1 MG/1
1 TABLET ORAL
Status: DISCONTINUED | OUTPATIENT
Start: 2024-07-08 | End: 2024-07-11

## 2024-07-08 RX ORDER — POTASSIUM CHLORIDE 20 MEQ/1
40 TABLET, EXTENDED RELEASE ORAL EVERY 4 HOURS
Status: COMPLETED | OUTPATIENT
Start: 2024-07-08 | End: 2024-07-08

## 2024-07-08 RX ORDER — SPIRONOLACTONE 25 MG/1
25 TABLET ORAL DAILY
Status: DISCONTINUED | OUTPATIENT
Start: 2024-07-08 | End: 2024-07-20

## 2024-07-08 RX ORDER — ERGOCALCIFEROL 1.25 MG/1
50000 CAPSULE ORAL ONCE
Status: COMPLETED | OUTPATIENT
Start: 2024-07-08 | End: 2024-07-08

## 2024-07-08 RX ORDER — INSULIN LISPRO 100 [IU]/ML
2-7 INJECTION, SOLUTION INTRAVENOUS; SUBCUTANEOUS
Status: DISCONTINUED | OUTPATIENT
Start: 2024-07-08 | End: 2024-07-23 | Stop reason: HOSPADM

## 2024-07-08 RX ORDER — IPRATROPIUM BROMIDE AND ALBUTEROL SULFATE 2.5; .5 MG/3ML; MG/3ML
3 SOLUTION RESPIRATORY (INHALATION)
Status: DISCONTINUED | OUTPATIENT
Start: 2024-07-08 | End: 2024-07-23 | Stop reason: HOSPADM

## 2024-07-08 RX ORDER — BUDESONIDE 0.5 MG/2ML
0.5 INHALANT ORAL
Status: DISCONTINUED | OUTPATIENT
Start: 2024-07-08 | End: 2024-07-23 | Stop reason: HOSPADM

## 2024-07-08 RX ADMIN — POTASSIUM CHLORIDE 40 MEQ: 1500 TABLET, EXTENDED RELEASE ORAL at 20:50

## 2024-07-08 RX ADMIN — BUMETANIDE 1 MG: 0.25 INJECTION INTRAMUSCULAR; INTRAVENOUS at 03:14

## 2024-07-08 RX ADMIN — APIXABAN 10 MG: 5 TABLET, FILM COATED ORAL at 20:51

## 2024-07-08 RX ADMIN — ERGOCALCIFEROL 50000 UNITS: 1.25 CAPSULE ORAL at 10:36

## 2024-07-08 RX ADMIN — Medication 10 ML: at 10:38

## 2024-07-08 RX ADMIN — ANTI-FUNGAL POWDER MICONAZOLE NITRATE TALC FREE 1 APPLICATION: 1.42 POWDER TOPICAL at 20:55

## 2024-07-08 RX ADMIN — BUMETANIDE 1 MG: 1 TABLET ORAL at 20:53

## 2024-07-08 RX ADMIN — Medication 12.5 MG: at 20:51

## 2024-07-08 RX ADMIN — CEPHALEXIN 500 MG: 500 CAPSULE ORAL at 17:24

## 2024-07-08 RX ADMIN — BUMETANIDE 1 MG: 0.25 INJECTION INTRAMUSCULAR; INTRAVENOUS at 10:36

## 2024-07-08 RX ADMIN — POVIDONE-IODINE: 10 SOLUTION TOPICAL at 17:26

## 2024-07-08 RX ADMIN — DOXYCYCLINE 100 MG: 100 CAPSULE ORAL at 10:37

## 2024-07-08 RX ADMIN — MIDODRINE HYDROCHLORIDE 10 MG: 5 TABLET ORAL at 05:34

## 2024-07-08 RX ADMIN — AMIODARONE HYDROCHLORIDE 200 MG: 200 TABLET ORAL at 05:35

## 2024-07-08 RX ADMIN — CEPHALEXIN 500 MG: 500 CAPSULE ORAL at 22:31

## 2024-07-08 RX ADMIN — CEPHALEXIN 500 MG: 500 CAPSULE ORAL at 05:34

## 2024-07-08 RX ADMIN — POTASSIUM CHLORIDE 40 MEQ: 1500 TABLET, EXTENDED RELEASE ORAL at 17:23

## 2024-07-08 RX ADMIN — DOXYCYCLINE 100 MG: 100 CAPSULE ORAL at 20:50

## 2024-07-08 RX ADMIN — Medication 12.5 MG: at 10:37

## 2024-07-08 RX ADMIN — BUDESONIDE 0.5 MG: 0.5 INHALANT RESPIRATORY (INHALATION) at 18:34

## 2024-07-08 RX ADMIN — Medication 10 ML: at 20:55

## 2024-07-08 RX ADMIN — AMIODARONE HYDROCHLORIDE 200 MG: 200 TABLET ORAL at 17:25

## 2024-07-08 RX ADMIN — INSULIN LISPRO 3 UNITS: 100 INJECTION, SOLUTION INTRAVENOUS; SUBCUTANEOUS at 10:37

## 2024-07-08 RX ADMIN — LANSOPRAZOLE 30 MG: 30 TABLET, ORALLY DISINTEGRATING, DELAYED RELEASE ORAL at 05:34

## 2024-07-08 RX ADMIN — IPRATROPIUM BROMIDE AND ALBUTEROL SULFATE 3 ML: .5; 3 SOLUTION RESPIRATORY (INHALATION) at 18:30

## 2024-07-08 RX ADMIN — POTASSIUM CHLORIDE 40 MEQ: 1500 TABLET, EXTENDED RELEASE ORAL at 10:37

## 2024-07-08 RX ADMIN — APIXABAN 10 MG: 5 TABLET, FILM COATED ORAL at 10:36

## 2024-07-08 RX ADMIN — ANTI-FUNGAL POWDER MICONAZOLE NITRATE TALC FREE 1 APPLICATION: 1.42 POWDER TOPICAL at 17:26

## 2024-07-08 NOTE — PROGRESS NOTES
"Referring Provider: Librado Villagomez MD    Reason for follow-up: Atrial fibrillation with rapid ventricular rate     Patient Care Team:  Rut Pierce APRN as PCP - General (Nurse Practitioner)  Austin Brooks MD as Cardiologist (Cardiology)      SUBJECTIVE  Laying comfortably in bed.  Currently on 2 L of oxygen via nasal cannula.  Chest tube in place.  Heart rate in the 90s in atrial fibrillation     ROS  Review of all systems negative except as indicated.    Since I have last seen, the patient has been without any chest discomfort, shortness of breath, palpitations, dizziness or syncope.  Denies having any headache, abdominal pain, nausea, vomiting, diarrhea, constipation, loss of weight or loss of appetite.  Denies having any excessive bruising, hematuria or blood in the stool.        Personal History:    Past Medical History:   Diagnosis Date    Bilateral leg edema     Chronic respiratory failure with hypoxia, on home O2 therapy 07/01/2024    COPD (chronic obstructive pulmonary disease)     Morbid obesity with BMI of 40.0-44.9, adult 11/08/2010    Primary hypertension 03/01/2017    Pulmonary embolism     \"many years ago, was on warfarin\"    Sleep apnea        History reviewed. No pertinent surgical history.    History reviewed. No pertinent family history.    Social History     Tobacco Use    Smoking status: Never    Smokeless tobacco: Never   Vaping Use    Vaping status: Never Used   Substance Use Topics    Alcohol use: Never    Drug use: Never        Home meds:  Prior to Admission medications    Medication Sig Start Date End Date Taking? Authorizing Provider   Allergy Relief 180 MG tablet Take 1 tablet by mouth Daily. 5/30/24  Yes Chadwick Johnson MD   bumetanide (BUMEX) 1 MG tablet Take 1 tablet by mouth 3 times a day. 5/30/24  Yes Chadwick Johnson MD   docusate sodium (COLACE) 100 MG capsule Take 1 capsule by mouth Every 12 (Twelve) Hours. 5/30/24  Yes Chadwick Johnson MD   furosemide " (LASIX) 20 MG tablet Take 1 tablet by mouth Daily.   Yes Chadwick Johnson MD   HYDROcodone-acetaminophen (NORCO)  MG per tablet Take 1 tablet by mouth 3 times a day. 5/30/24  Yes Chadwick Johnson MD   lisinopril (PRINIVIL,ZESTRIL) 30 MG tablet Take 1 tablet by mouth Daily. 3/18/24  Yes Chadwick Johnson MD   meloxicam (MOBIC) 7.5 MG tablet Take 1 tablet by mouth Daily As Needed. 3/18/24  Yes Chadwick Johnson MD   metoprolol tartrate (LOPRESSOR) 50 MG tablet Take 1 tablet by mouth Daily.   Yes Chadwick Johnson MD   montelukast (SINGULAIR) 10 MG tablet Take 1 tablet by mouth every night at bedtime.   Yes Chadwick Johnson MD   omeprazole (priLOSEC) 20 MG capsule Take 1 capsule by mouth Daily. 3/18/24  Yes Provider, Historical, MD   oxybutynin XL (DITROPAN-XL) 10 MG 24 hr tablet Take 2 tablets by mouth Daily. 3/18/24  Yes Chadwick Johnson MD   potassium chloride (MICRO-K) 10 MEQ CR capsule Take 1 capsule by mouth Every 12 (Twelve) Hours. 3/18/24  Yes Chadwick Johnson MD   vitamin D (ERGOCALCIFEROL) 1.25 MG (68419 UT) capsule capsule Take 1 capsule by mouth 2 (Two) Times a Week. Monday and Thursday. 5/30/24  Yes Chadwick Johnson MD       Allergies:  Patient has no known allergies.    Scheduled Meds:amiodarone, 200 mg, Nasogastric, Q8H   Followed by  [START ON 7/9/2024] amiodarone, 200 mg, Oral, Q12H   Followed by  [START ON 7/23/2024] amiodarone, 200 mg, Oral, Daily  apixaban, 10 mg, Oral, BID   Followed by  [START ON 7/14/2024] apixaban, 5 mg, Oral, BID  bumetanide, 1 mg, Intravenous, Q8H  cephalexin, 500 mg, Nasogastric, Q8H  dextrose, 25 g, Intravenous, Once  doxycycline, 100 mg, Nasogastric, Q12H  insulin lispro, 2-7 Units, Subcutaneous, Q6H  lansoprazole, 30 mg, Nasogastric, Q AM  lidocaine, 20 mL, Infiltration, Once  metoprolol tartrate, 12.5 mg, Nasogastric, Q12H  miconazole, 1 Application, Topical, Q12H  midodrine, 10 mg, Nasogastric, Q8H  povidone-iodine, ,  "Topical, Daily  sodium chloride, 10 mL, Intravenous, Q12H      Continuous Infusions:   PRN Meds:.  acetaminophen **OR** acetaminophen    Calcium Replacement - Follow Nurse / BPA Driven Protocol    dextrose    dextrose    glucagon (human recombinant)    ipratropium-albuterol    Magnesium Standard Dose Replacement - Follow Nurse / BPA Driven Protocol    nitroglycerin    ondansetron ODT **OR** ondansetron    Phosphorus Replacement - Follow Nurse / BPA Driven Protocol    Potassium Replacement - Follow Nurse / BPA Driven Protocol    [COMPLETED] Insert Peripheral IV **AND** sodium chloride    sodium chloride    sodium chloride      OBJECTIVE    Vital Signs  Vitals:    07/08/24 0314 07/08/24 0505 07/08/24 0534 07/08/24 0535   BP: 122/65 121/70 121/70    BP Location:  Right arm     Patient Position:  Lying     Pulse: 91 106 105 103   Resp:  22     Temp:  98 °F (36.7 °C)     TempSrc:  Oral     SpO2:  90%  91%   Weight:    85.3 kg (188 lb 0.8 oz)   Height:           Flowsheet Rows      Flowsheet Row First Filed Value   Admission Height 147.3 cm (58\") Documented at 06/30/2024 1650   Admission Weight 108 kg (239 lb) Documented at 06/30/2024 1650              Intake/Output Summary (Last 24 hours) at 7/8/2024 0720  Last data filed at 7/8/2024 0535  Gross per 24 hour   Intake 240 ml   Output 2360 ml   Net -2120 ml          Telemetry: Atrial fibrillation with heart rate in the 90s    Physical Exam:  The patient is alert, oriented and in no distress.  Obese  Vital signs as noted above.  Head and neck revealed no carotid bruits or jugular venous distention.  No thyromegaly or lymphadenopathy is present  Lungs clear.  No wheezing.  Breath sounds are normal bilaterally.  Heart normal first and second heart sounds.  No murmur. No precordial rub is present.  No gallop is present.  Abdomen soft and nontender.  No organomegaly is present.  Extremities with good peripheral pulses without any pedal edema.  Skin warm and dry.  Musculoskeletal " system is grossly normal.  CNS grossly normal.       Results Review:  I have personally reviewed the results from the time of this admission to 7/8/2024 07:20 EDT and agree with these findings:  []  Laboratory  []  Microbiology  []  Radiology  []  EKG/Telemetry   []  Cardiology/Vascular   []  Pathology  []  Old records  []  Other:    Most notable findings include:    Lab Results (last 24 hours)       Procedure Component Value Units Date/Time    Magnesium [[id number]]  (Normal) Collected: 07/08/24 0446    Specimen: Blood from Arm, Left Updated: 07/08/24 0606     Magnesium 1.8 mg/dL     Comprehensive Metabolic Panel [[id number]]  (Abnormal) Collected: 07/08/24 0446    Specimen: Blood from Arm, Left Updated: 07/08/24 0606     Glucose 98 mg/dL      BUN 72 mg/dL      Creatinine 1.19 mg/dL      Sodium 145 mmol/L      Potassium 3.0 mmol/L      Comment: Slight hemolysis detected by analyzer. Result may be falsely elevated.        Chloride 97 mmol/L      CO2 38.0 mmol/L      Calcium 9.3 mg/dL      Total Protein 5.7 g/dL      Albumin 2.2 g/dL      ALT (SGPT) 15 U/L      AST (SGOT) 36 U/L      Comment: Slight hemolysis detected by analyzer. Result may be falsely elevated.        Alkaline Phosphatase 96 U/L      Total Bilirubin 1.7 mg/dL      Globulin 3.5 gm/dL      A/G Ratio 0.6 g/dL      BUN/Creatinine Ratio 60.5     Anion Gap 10.0 mmol/L      eGFR 49.6 mL/min/1.73     Narrative:      GFR Normal >60  Chronic Kidney Disease <60  Kidney Failure <15      Phosphorus [[id number]]  (Abnormal) Collected: 07/08/24 0446    Specimen: Blood from Arm, Left Updated: 07/08/24 0556     Phosphorus 2.4 mg/dL     CBC & Differential [[id number]]  (Abnormal) Collected: 07/08/24 0446    Specimen: Blood from Arm, Left Updated: 07/08/24 0527    Narrative:      The following orders were created for panel order CBC & Differential.  Procedure                               Abnormality         Status                     ---------                                -----------         ------                     CBC Auto Differential[[id number]]        Abnormal            Final result                 Please view results for these tests on the individual orders.    CBC Auto Differential [[id number]]  (Abnormal) Collected: 07/08/24 0446    Specimen: Blood from Arm, Left Updated: 07/08/24 0527     WBC 11.66 10*3/mm3      RBC 4.45 10*6/mm3      Hemoglobin 13.3 g/dL      Hematocrit 41.8 %      MCV 93.9 fL      MCH 29.9 pg      MCHC 31.8 g/dL      RDW 16.9 %      RDW-SD 53.2 fl      MPV 11.3 fL      Platelets 100 10*3/mm3      Neutrophil % 88.4 %      Lymphocyte % 5.5 %      Monocyte % 5.5 %      Eosinophil % 0.1 %      Basophil % 0.1 %      Immature Grans % 0.4 %      Neutrophils, Absolute 10.31 10*3/mm3      Lymphocytes, Absolute 0.64 10*3/mm3      Monocytes, Absolute 0.64 10*3/mm3      Eosinophils, Absolute 0.01 10*3/mm3      Basophils, Absolute 0.01 10*3/mm3      Immature Grans, Absolute 0.05 10*3/mm3      nRBC 0.0 /100 WBC     POC Glucose Once [[id number]]  (Abnormal) Collected: 07/08/24 0153    Specimen: Blood Updated: 07/08/24 0154     Glucose 121 mg/dL      Comment: Serial Number: [id number]:  [id number]       POC Glucose Once [[id number]]  (Abnormal) Collected: 07/07/24 1943    Specimen: Blood Updated: 07/07/24 1945     Glucose 155 mg/dL      Comment: Serial Number: [id number]:  [id number]       Lactate Dehydrogenase, Body Fluid - Body Fluid, Pleural Cavity [[id number]] Collected: 07/06/24 1544    Specimen: Body Fluid from Pleural Cavity Updated: 07/07/24 1736     Lactate Dehydrogenase (LD), Fluid 236 U/L     Narrative:      No Reference Ranges Established.    Serous fluid LDH greater than 60 percent of the serum LDH or serous fluid LDH two-thirds of the upper limit of normal for serum LDH suggests the fluid is an exudate.     1. Pleural TP/Serum TP >0.5  2. Pleural LD/Serum LD >0.6  3. Pleural LD >2/3 of the upper limit of normal for serum LDH    This test was  developed, it performance characteristics determined and judged suitable for clinical purposes by [facility].  It has not been cleared or approved by the FDA.  The laboratory is regulated under CLIA as qualified to perform high-complexity testing.     Glucose, Body Fluid - Pleural Fluid, Pleural Cavity [[id number]] Collected: 07/06/24 1544    Specimen: Pleural Fluid from Pleural Cavity Updated: 07/07/24 1733     Glucose, Fluid 119 mg/dL     Narrative:      No Reference Ranges Established.    Serous fluid glucose less than 60 mg/dL or less than 30 mg/dL below serum glucose suggests an infectious or malignant exudate.     This test was developed, it performance characteristics determined and judged suitable for clinical purposes by [facility].  It has not been cleared or approved by the FDA.  The laboratory is regulated under CLIA as qualified to perform high-complexity testing.     Protein, Body Fluid - Pleural Fluid, Pleural Cavity [[id number]] Collected: 07/06/24 1544    Specimen: Pleural Fluid from Pleural Cavity Updated: 07/07/24 1733     Protein, Total, Fluid 1.5 g/dL     Narrative:      No Reference Ranges Established.    A serous fluid total fluid (TP) greater than 50 percent of the serum TP suggests the fluid is an exudate.      1. Pleural TP/Serum TP >0.5  2. Pleural LD/Serum LD >0.6  3. Pleural LD >2/3 of the upper limit of normal for serum LDH    This test was developed, it performance characteristics determined and judged suitable for clinical purposes by [facility].  It has not been cleared or approved by the FDA.  The laboratory is regulated under CLIA as qualified to perform high-complexity testing.     POC Glucose Once [[id number]]  (Normal) Collected: 07/07/24 1706    Specimen: Blood Updated: 07/07/24 1708     Glucose 103 mg/dL      Comment: Serial Number: [id number]:  [id number]       POC Glucose Once [[id number]]   (Abnormal) Collected: 07/07/24 1041    Specimen: Blood Updated: 07/07/24 1042     Glucose 112 mg/dL      Comment: Serial Number: [id number]:  [id number]               Imaging Results (Last 24 Hours)       ** No results found for the last 24 hours. **            LAB RESULTS (LAST 7 DAYS)    CBC  Results from last 7 days   Lab Units 07/08/24  0446 07/07/24  0506 07/06/24  0354 07/05/24  0918 07/05/24  0257 07/04/24  0414 07/03/24  0635   WBC 10*3/mm3 11.66* 16.38* 15.29* 12.93* 11.98* 9.76 10.53   RBC 10*6/mm3 4.45 4.73 4.54 4.55 4.78 4.80 4.63   HEMOGLOBIN g/dL 13.3 14.2 13.8 13.8 14.6 14.7 14.4   HEMATOCRIT % 41.8 43.9 42.8 45.5 48.1* 47.3* 46.0   MCV fL 93.9 92.8 94.3 100.0* 100.6* 98.5* 99.4*   PLATELETS 10*3/mm3 100* 130* 128* 115* 133* 123* 104*       BMP  Results from last 7 days   Lab Units 07/08/24 0446 07/07/24  0506 07/06/24  1608 07/06/24  0354 07/05/24  0918 07/05/24  0257 07/04/24 2248 07/04/24  1727 07/04/24  0414 07/03/24 2001 07/03/24  0635   SODIUM mmol/L 145 143 140 139 135* 136  --  137 136  --  139   POTASSIUM mmol/L 3.0* 3.9 3.3* 3.7 5.3* 5.9* 5.4* 6.0* 5.9*   < > 3.5   CHLORIDE mmol/L 97* 98 96* 96* 95* 95*  --  95* 94*  --  95*   CO2 mmol/L 38.0* 35.7* 33.9* 34.0* 31.6* 35.0*  --  33.9* 33.4*  --  36.9*   BUN mg/dL 72* 74* 72* 70* 67* 65*  --  60* 54*  --  45*   CREATININE mg/dL 1.19* 1.37* 1.41* 1.56* 1.86* 1.88*  --  1.95* 1.77*  --  1.31*   GLUCOSE mg/dL 98 102* 111* 133* 128* 99  --  101* 108*  --  107*   MAGNESIUM mg/dL 1.8 1.6  --  1.9 1.9 2.0  --   --  1.9  --  1.8   PHOSPHORUS mg/dL 2.4* 2.5  --  3.5 4.2 5.3*  --   --  5.8*  --  4.8*    < > = values in this interval not displayed.       CMP   Results from last 7 days   Lab Units 07/08/24  0446 07/07/24  0506 07/06/24  1608 07/06/24  0354 07/05/24  0918 07/05/24  0257 07/04/24  2248 07/04/24  1727 07/04/24  0414 07/03/24  2001 07/03/24  0635   SODIUM mmol/L 145 143 140 139 135* 136  --  137 136  --  139   POTASSIUM mmol/L 3.0*  3.9 3.3* 3.7 5.3* 5.9* 5.4* 6.0* 5.9*   < > 3.5   CHLORIDE mmol/L 97* 98 96* 96* 95* 95*  --  95* 94*  --  95*   CO2 mmol/L 38.0* 35.7* 33.9* 34.0* 31.6* 35.0*  --  33.9* 33.4*  --  36.9*   BUN mg/dL 72* 74* 72* 70* 67* 65*  --  60* 54*  --  45*   CREATININE mg/dL 1.19* 1.37* 1.41* 1.56* 1.86* 1.88*  --  1.95* 1.77*  --  1.31*   GLUCOSE mg/dL 98 102* 111* 133* 128* 99  --  101* 108*  --  107*   ALBUMIN g/dL 2.2* 2.5* 2.5* 2.5*  --  2.5*  --   --  2.6*  --  2.5*   BILIRUBIN mg/dL 1.7* 1.8* 1.6* 1.6*  --  1.6*  --   --  1.3*  --  1.0   ALK PHOS U/L 96 125* 110 117  --  129*  --   --  138*  --  130*   AST (SGOT) U/L 36* 30 31 27  --  28  --   --  38*  --  30   ALT (SGPT) U/L 15 17 17 14  --  16  --   --  17  --  16    < > = values in this interval not displayed.       BNP        TROPONIN  Results from last 7 days   Lab Units 07/01/24  0910   HSTROP T ng/L 36*       CoAg        Creatinine Clearance  Estimated Creatinine Clearance: 44.7 mL/min (A) (by C-G formula based on SCr of 1.19 mg/dL (H)).    ABG  Results from last 7 days   Lab Units 07/06/24  1150 07/05/24  0711 07/05/24  0442 07/05/24  0353 07/05/24  0212   PH, ARTERIAL pH units 7.392 7.281* 7.250* 7.204* 7.208*   PCO2, ARTERIAL mm Hg 61.2* 78.4* 80.9* 94.8* 92.8*   PO2 ART mm Hg 61.9* 106.7 76.6* 87.0 76.7*   O2 SATURATION ART % 90.2* 97.0 91.7* 93.1* 90.5*   BASE EXCESS ART mmol/L 9.3* 6.5* 4.5* 4.6* 4.4*       Radiology  XR Chest 1 View    Result Date: 7/6/2024  Impression: Successful placement of a right chest tube for right-sided hydropneumothorax, which demonstrates decreased/trace fluid component and similar/small gas component. Electronically Signed: Manan Jefferson MD  7/6/2024 2:35 PM EDT  Workstation ID: [id number]    CT Chest Without Contrast Diagnostic    Result Date: 7/6/2024  Impression: Moderate right-sided hydropneumothorax as seen on same-day radiograph. Adjacent partial collapse of the right lung with multifocal peripheral bronchial obstruction.  There are dense airspace consolidations throughout the right lung, and findings are likely a combination of lung collapse and worsening infection. Persistent airspace opacities in the left lung consistent with infection, slightly worsened from prior CT. Small left pleural effusion. Prominent four-chamber cardiomegaly. Pulmonary artery enlargement consistent with pulmonary hypertension. Trace perihepatic ascites. Persistent bilateral flank and right chest wall edema. Electronically Signed: Javon Mortensen MD  7/6/2024 11:19 AM EDT  Workstation ID: [id number]    XR Chest 1 View    Result Date: 7/6/2024  Impression: 1. There is a definite small right-sided pneumothorax. The right lung is partially collapsed and consolidated. A small to moderate right pleural effusion is present indicating a hydropneumothorax. 2. Unchanged consolidation in the left lung with a small pleural effusion. 3. Cardiomegaly. 4.The abnormal results were discussed with the nurse (Dianne) by telephone. The referring clinician will be contacted. Electronically Signed: Jean Claude Bobby MD  7/6/2024 9:56 AM EDT  Workstation ID: [id number]    XR Chest 1 View    Result Date: 7/6/2024  Impression: 1. Questionable right-sided pneumothorax. I would recommend a repeat chest x-ray as the patient is rotated. 2. Persistent patchy consolidation in the left mid and lower lung zones suspicious for pneumonia. There is a small right pleural effusion. 3. Abnormal consolidation in the right lung with a small to moderate pleural effusion. 4. Cardiomegaly. 5. The abnormal results were discussed with the nurse in the CVICU (Dianne) by telephone and the referring clinician will be contacted. Electronically Signed: Jean Claude Bobby MD  7/6/2024 9:27 AM EDT  Workstation ID: [id number]       EKG  I personally viewed and interpreted the patient's EKG/Telemetry data:  ECG 12 Lead Rhythm Change   Final Result   HEART RATE=92  bpm   RR Bzbfpzvq=384  ms   NE Interval=  ms   P Horizontal Axis=  deg    P Front Axis=  deg   QRSD Enjgnopx=875  ms   QT Ubmnkaop=701  ms   ZCvU=704  ms   QRS Axis=-96  deg   T Wave Axis=65  deg   - ABNORMAL ECG -   Atrial fibrillation   Right bundle branch block   When compared with ECG of 05-Jul-2024 02:24:17,   No significant change   Electronically Signed By: Tee Whitlock ([facility]) 2024-07-07 10:12:55   Date and Time of Study:2024-07-05 06:43:37      ECG 12 Lead Drug Monitoring; amiodarone   Final Result   HEART IUJI=260  bpm   RR Dwgtiicb=475  ms   IA Interval=  ms   P Horizontal Axis=  deg   P Front Axis=  deg   QRSD Uyfxtcjl=230  ms   QT Xgwafqfe=933  ms   VMlE=238  ms   QRS Axis=-94  deg   T Wave Axis=70  deg   - ABNORMAL ECG -   Atrial fibrillation   Ventricular premature complex   Right bundle branch block   When compared with ECG of 04-Jul-2024 09:17:07,   No change from prior tracing   Electronically Signed By: Tee Whitlock ([facility]) 2024-07-07 10:13:32   Date and Time of Study:2024-07-05 02:24:17      ECG 12 Lead Electrolyte Imbalance   Final Result   HEART ZBZG=831  bpm   RR Cbwfqbre=872  ms   IA Hxhrpttj=835  ms   P Horizontal Axis=113  deg   P Front Axis=263  deg   QRSD Obanothr=417  ms   QT Cikqpfcu=241  ms   UFvM=444  ms   QRS Axis=227  deg   T Wave Axis=27  deg   - ABNORMAL ECG -   Right bundle branch block   When compared with ECG of 04-Jul-2024 04:20:51,   Significant change in rhythm: previously atrial fibrillation   Significant repolarization change   Atrial fibrillation with rapid V-rate   No change from prior tracing   Electronically Signed By: Tee Whitlock ([facility]) 2024-07-07 10:14:27   Date and Time of Study:2024-07-04 09:17:07      ECG 12 Lead Drug Monitoring; Amiodarone   Preliminary Result   HEART PKOG=876  bpm   RR Qfhgvakk=152  ms   IA Interval=  ms   P Horizontal Axis=  deg   P Front Axis=  deg   QRSD Prbkucam=926  ms   QT Nemsknux=548  ms   IOnO=801  ms   QRS Axis=-90  deg   T Wave Axis=36  deg   - ABNORMAL ECG -   Atrial fibrillation   Right bundle  branch block   ST elevation secondary to IVCD   Date and Time of Study:2024-07-04 04:20:51      ECG 12 Lead Chest Pain   Preliminary Result   HEART AJEN=006  bpm   RR Tbwvteks=951  ms   LA Interval=  ms   P Horizontal Axis=  deg   P Front Axis=  deg   QRSD Icpscmve=094  ms   QT Perxmyoq=472  ms   VLnJ=066  ms   QRS Axis=-98  deg   T Wave Axis=37  deg   - ABNORMAL ECG -   Atrial fibrillation   Right bundle branch block   Anterolateral infarct, old   Date and Time of Study:2024-07-03 17:38:52      ECG 12 Lead Drug Monitoring; Amiodarone   Preliminary Result   HEART BLTT=388  bpm   RR Zhliqfzz=740  ms   LA Interval=  ms   P Horizontal Axis=  deg   P Front Axis=  deg   QRSD Oyiyobxq=738  ms   QT Zwflibua=229  ms   UMxD=196  ms   QRS Axis=-90  deg   T Wave Axis=74  deg   - ABNORMAL ECG -   Atrial fibrillation   Right bundle branch block   ST elevation secondary to IVCD   Date and Time of Study:2024-07-03 04:02:32      ECG 12 Lead Drug Monitoring; Amiodarone   Preliminary Result   HEART HIXG=970  bpm   RR Eqqyezbh=592  ms   LA Interval=  ms   P Horizontal Axis=  deg   P Front Axis=  deg   QRSD Hpkjkmgt=041  ms   QT Ixqpfadw=664  ms   LIaW=671  ms   QRS Axis=-93  deg   T Wave Axis=45  deg   - ABNORMAL ECG -   Atrial fibrillation   Right bundle branch block   Inferior infarct, old   When compared with ECG of 01-Jul-2024 04:42:13,   Nonspecific significant change   Date and Time of Study:2024-07-02 15:12:56      ECG 12 Lead Drug Monitoring; Amiodarone   Final Result   HEART TBCH=848  bpm   RR Zsxieokp=733  ms   LA Interval=  ms   P Horizontal Axis=  deg   P Front Axis=  deg   QRSD Mpydzeuw=848  ms   QT Myhrpxev=932  ms   YZpX=934  ms   QRS Axis=-80  deg   T Wave Axis=102  deg   - ABNORMAL ECG -   Atrial fibrillation   Right bundle branch block   Inferior  infarct, old   Anterolateral  infarct, age indeterminate   When compared with ECG of 30-Jun-2024 16:58:43,   Significant rate decrease   New or worsened ischemia or  infarction   Electronically Signed By: Austin Brooks ([facility]) 2024-07-01 13:11:59   Date and Time of Study:2024-07-01 04:42:13      ECG 12 Lead Dyspnea   Final Result   HEART RYSX=730  bpm   RR Fvqvctiq=622  ms   GA Interval=  ms   P Horizontal Axis=  deg   P Front Axis=  deg   QRSD Efhrqdtc=281  ms   QT Vkjgxvba=785  ms   DKrE=612  ms   QRS Axis=-101  deg   T Wave Axis=43  deg   - ABNORMAL ECG -   Atrial fibrillation   Right bundle branch block   ST elevation secondary to IVCD   Electronically Signed By: Jeffery Kwon ([facility]) 2024-07-01 07:37:02   Date and Time of Study:2024-06-30 16:58:43      Telemetry Scan   Final Result      Telemetry Scan   Final Result      Telemetry Scan   Final Result      Telemetry Scan   Final Result      Telemetry Scan   Final Result      Telemetry Scan   Final Result      Telemetry Scan   Final Result      Telemetry Scan   Final Result      Telemetry Scan   Final Result      Telemetry Scan   Final Result      Telemetry Scan   Final Result      Telemetry Scan   Final Result      Telemetry Scan   Final Result      Telemetry Scan   Final Result      Telemetry Scan   Final Result      Telemetry Scan   Final Result      Telemetry Scan   Final Result      Telemetry Scan   Final Result      Telemetry Scan   Final Result      Telemetry Scan   Final Result      Telemetry Scan   Final Result      Telemetry Scan   Final Result      Telemetry Scan   Final Result      Telemetry Scan   Final Result      Telemetry Scan   Final Result      Telemetry Scan   Final Result      Telemetry Scan   Final Result      Telemetry Scan   Final Result      Telemetry Scan   Final Result      Telemetry Scan   Final Result      Telemetry Scan   Final Result      Telemetry Scan   Final Result      Telemetry Scan   Final Result      Telemetry Scan   Final Result      Telemetry Scan   Final Result      Telemetry Scan   Final Result      ECG 12 Lead Electrolyte Imbalance    (Results Pending)         Echocardiogram:    Results  for orders placed during the hospital encounter of 06/30/24    Adult Transthoracic Echo Complete w/ Color, Spectral and Contrast if Necessary Per Protocol    Interpretation Summary    Left ventricular ejection fraction appears to be 31 - 35%.    Left ventricular diastolic dysfunction is noted.    The left atrial cavity is dilated.    Moderate aortic valve stenosis is present. Mean/peak gradient of 14/24 mmHg.  Dimensionless index 0.36    Moderate to severe mitral valve regurgitation is present.    Estimated right ventricular systolic pressure from tricuspid regurgitation is normal (<35 mmHg).        Stress Test:         Cardiac Catheterization:  No results found for this or any previous visit.         Other:         ASSESSMENT & PLAN:    Principal Problem:    Atrial fibrillation with RVR  Active Problems:    Primary hypertension    Morbid obesity with BMI of 40.0-44.9, adult    Right bundle branch block    Acute deep vein thrombosis (DVT) of both lower extremities    ARABELLA (acute kidney injury)    Acute hyperkalemia    Sepsis    Acute UTI    Acute systolic congestive heart failure    Rhinovirus infection    Multifocal pneumonia    Chronic respiratory failure with hypoxia, on home O2 therapy    Skin ulcer of right great toe    Skin ulcer of left great toe    SEDRICK (obstructive sleep apnea)    Aortic stenosis    Mitral regurgitation    Acute HFrEF (heart failure with reduced ejection fraction)    COPD (chronic obstructive pulmonary disease)      Acute respiratory failure with hypoxia and hypercapnia  Acute encephalopathy   Currently on 2 L of oxygen via nasal cannula  Chest tube in place for hydropneumothorax  Pulmonology is on board     Atrial fibrillation with RVR  Newly diagnosed  Electrolytes have been corrected  Rate controlled with heart rate in the 80s and 90s  Continue amiodarone for rhythm control   Uptitrate beta-blocker as tolerated  Switch from metoprolol to tartrate to succinate if able to  tolerate  [id number] score is 7  Continue Eliquis     Acute systolic CHF  Newly diagnosed  Echocardiogram shows EF of 31 to 35%, moderate aortic stenosis and moderate to severe mitral regurgitation.  Currently on IV Bumex  Monitor I's and O's and replace electrolytes as needed.  Switch from metoprolol to tartrate to succinate  Add ACE inhibitor/ARNI if renal function and blood pressure allow  Currently requiring midodrine  Plan to repeat echocardiogram after 3 months of completing GDMT     Acute deep vein thrombosis (DVT) of both lower extremities  Extensive DVT in bilateral lower extremities involving femoral, popliteal and posterior tibial.  Continue anticoagulation as above  She will need lifelong anticoagulation      Thrombocytopenia  Closely monitor platelets while on anticoagulation  Platelets are 100     ARABELLA (acute kidney injury)  Presented with creatinine of 1.4.  Creatinine 1.19 today  Closely monitor renal function while on diuretics  Nephrology following      Acute hyperkalemia  Receiving [facility]  Nephrology following      Sepsis / UTI / Multifocal pneumonia / Rhinovirus infection  Multifactorial secondary to UTI and pneumonia with possible cellulitis  Continue empiric antibiotics      Skin ulcer of right great toe  Skin ulcer of left great toe  Podiatry following   A1c is 6.2%     Primary hypertension, chronic  Currently hypotensive likely secondary to sepsis  Currently requiring midodrine  Closely monitor blood pressure      Morbid obesity with BMI of 40.0-44.9, adult  BMI is 38.  She weighs 188 pounds  Lifestyle modifications recommended   LDL 27, HDL 13, triglyceride 86 and total cholesterol 58.  No indication for statin     SEDRIKC (obstructive sleep apnea)  Encouraged compliance with CPAP      Austin Brooks MD  07/08/24  07:20 EDT                 I supervised and was present for key aspects of the procedure.

## 2024-07-08 NOTE — CONSULTS
"PULMONARY CRITICAL CARE CONSULT NOTE      PATIENT IDENTIFICATION:  Name: Ban Brennan  MRN: [id number]  :  1955     Age: 69 y.o.  Sex: female        DATE OF CONSULTATION:  2024  PRIMARY CARE PHYSICIAN    Rut Pierce APRN                  CHIEF COMPLAINT: SOB     History of Present Illness:   Ban Brennan is a 69 y.o. female with a history of COPD, SEDRICK, Systolic CHF, A-fib with RVR, R BBB, Hx bilateral DVT, HTN, Aortic stenosis, Mitral regurgitation, and Morbid obesity who was admitted for AS-fib, Cellulits BLE and Fluid overload. She was admitted to ICU, 2 days ago patient noted with a hydropneumothorax and a chest tube was placed. Patient downgraded and our service was asked to see.       Review of Systems:   Constitutional: As above   Eyes: negative   ENT/oropharynx: negative   Cardiovascular: As above   Respiratory: As above   Gastrointestinal: negative   Genitourinary: negative   Neurological: negative   Musculoskeletal: negative   Integument/breast: negative   Endocrine: negative   Allergic/Immunologic: negative     Past Medical History:  Past Medical History:   Diagnosis Date    Bilateral leg edema     Chronic respiratory failure with hypoxia, on home O2 therapy 2024    COPD (chronic obstructive pulmonary disease)     Morbid obesity with BMI of 40.0-44.9, adult 2010    Primary hypertension 2017    Pulmonary embolism     \"many years ago, was on warfarin\"    Sleep apnea        Past Surgical History:  History reviewed. No pertinent surgical history.     Family History:  History reviewed. No pertinent family history.     Social History:   Social History     Tobacco Use    Smoking status: Never    Smokeless tobacco: Never   Substance Use Topics    Alcohol use: Never        Allergies:  No Known Allergies    Home Meds:  Medications Prior to Admission   Medication Sig Dispense Refill Last Dose    Allergy Relief 180 MG tablet Take 1 tablet by mouth Daily.   2024 " "at 1330    bumetanide (BUMEX) 1 MG tablet Take 1 tablet by mouth 3 times a day.   2024 at 1330    docusate sodium (COLACE) 100 MG capsule Take 1 capsule by mouth Every 12 (Twelve) Hours.       furosemide (LASIX) 20 MG tablet Take 1 tablet by mouth Daily.   2024 at 1330    HYDROcodone-acetaminophen (NORCO)  MG per tablet Take 1 tablet by mouth 3 times a day.       lisinopril (PRINIVIL,ZESTRIL) 30 MG tablet Take 1 tablet by mouth Daily.   2024 at 1330    meloxicam (MOBIC) 7.5 MG tablet Take 1 tablet by mouth Daily As Needed.       metoprolol tartrate (LOPRESSOR) 50 MG tablet Take 1 tablet by mouth Daily.   2024 at 1330    montelukast (SINGULAIR) 10 MG tablet Take 1 tablet by mouth every night at bedtime.   2024    omeprazole (priLOSEC) 20 MG capsule Take 1 capsule by mouth Daily.   2024 at 1330    oxybutynin XL (DITROPAN-XL) 10 MG 24 hr tablet Take 2 tablets by mouth Daily.   2024 at 1330    potassium chloride (MICRO-K) 10 MEQ CR capsule Take 1 capsule by mouth Every 12 (Twelve) Hours.   2024 at 1330    vitamin D (ERGOCALCIFEROL) 1.25 MG (76647 UT) capsule capsule Take 1 capsule by mouth 2 (Two) Times a Week. Monday and Thursday.   2024       Objective:  tMax 24 hrs: Temp (24hrs), Av.5 °F (36.4 °C), Min:97.2 °F (36.2 °C), Max:98.1 °F (36.7 °C)      Vitals Ranges:   Temp:  [97.2 °F (36.2 °C)-98.1 °F (36.7 °C)] 97.7 °F (36.5 °C)  Heart Rate:  [] 95  Resp:  [15-26] 26  BP: ()/(58-78) 157/78    Intake and Output Last 3 Shifts:   I/O last 3 completed shifts:  In: 309 [P.O.:240; I.V.:69]  Out: 3415 [Urine:2825; Chest Tube:590]    Exam:  /78 (BP Location: Right arm, Patient Position: Lying)   Pulse 95   Temp 97.7 °F (36.5 °C) (Oral)   Resp 26   Ht 149.9 cm (59\")   Wt 85.3 kg (188 lb 0.8 oz)   SpO2 94%   BMI 37.98 kg/m²       24  2116 24  0535   Weight: 88.1 kg (194 lb 3.6 oz) 85.3 kg (188 lb 0.8 oz)     General Appearance:  " Alert  HEENT:  Normocephalic, without obvious abnormality, atraumatic. Conjunctivae/corneas clear.  Normal external ear canals. Nares normal, no drainage.  Neck:  Supple, symmetrical, trachea midline. No JVD.  Lungs /Chest wall:   Bilateral basal rhonchi, respirations unlabored, symmetrical wall movement, CT in place   Heart:  Regular rate and rhythm, systolic murmur PMI left sternal border  Abdomen: Soft, nontender, no masses, no organomegaly.    Extremities: Trace edema, no clubbing or cyanosis        Data Review:  All labs (24hrs):   Recent Results (from the past 24 hour(s))   POC Glucose Once    Collection Time: 07/07/24  5:06 PM    Specimen: Blood   Result Value Ref Range    Glucose 103 70 - 105 mg/dL   POC Glucose Once    Collection Time: 07/07/24  7:43 PM    Specimen: Blood   Result Value Ref Range    Glucose 155 (H) 70 - 105 mg/dL   POC Glucose Once    Collection Time: 07/08/24  1:53 AM    Specimen: Blood   Result Value Ref Range    Glucose 121 (H) 70 - 105 mg/dL   Magnesium    Collection Time: 07/08/24  4:46 AM    Specimen: Arm, Left; Blood   Result Value Ref Range    Magnesium 1.8 1.6 - 2.4 mg/dL   Phosphorus    Collection Time: 07/08/24  4:46 AM    Specimen: Arm, Left; Blood   Result Value Ref Range    Phosphorus 2.4 (L) 2.5 - 4.5 mg/dL   Comprehensive Metabolic Panel    Collection Time: 07/08/24  4:46 AM    Specimen: Arm, Left; Blood   Result Value Ref Range    Glucose 98 65 - 99 mg/dL    BUN 72 (H) 8 - 23 mg/dL    Creatinine 1.19 (H) 0.57 - 1.00 mg/dL    Sodium 145 136 - 145 mmol/L    Potassium 3.0 (L) 3.5 - 5.2 mmol/L    Chloride 97 (L) 98 - 107 mmol/L    CO2 38.0 (H) 22.0 - 29.0 mmol/L    Calcium 9.3 8.6 - 10.5 mg/dL    Total Protein 5.7 (L) 6.0 - 8.5 g/dL    Albumin 2.2 (L) 3.5 - 5.2 g/dL    ALT (SGPT) 15 1 - 33 U/L    AST (SGOT) 36 (H) 1 - 32 U/L    Alkaline Phosphatase 96 39 - 117 U/L    Total Bilirubin 1.7 (H) 0.0 - 1.2 mg/dL    Globulin 3.5 gm/dL    A/G Ratio 0.6 g/dL    BUN/Creatinine Ratio 60.5  (H) 7.0 - 25.0    Anion Gap 10.0 5.0 - 15.0 mmol/L    eGFR 49.6 (L) >60.0 mL/min/1.73   CBC Auto Differential    Collection Time: 07/08/24  4:46 AM    Specimen: Arm, Left; Blood   Result Value Ref Range    WBC 11.66 (H) 3.40 - 10.80 10*3/mm3    RBC 4.45 3.77 - 5.28 10*6/mm3    Hemoglobin 13.3 12.0 - 15.9 g/dL    Hematocrit 41.8 34.0 - 46.6 %    MCV 93.9 79.0 - 97.0 fL    MCH 29.9 26.6 - 33.0 pg    MCHC 31.8 31.5 - 35.7 g/dL    RDW 16.9 (H) 12.3 - 15.4 %    RDW-SD 53.2 37.0 - 54.0 fl    MPV 11.3 6.0 - 12.0 fL    Platelets 100 (L) 140 - 450 10*3/mm3    Neutrophil % 88.4 (H) 42.7 - 76.0 %    Lymphocyte % 5.5 (L) 19.6 - 45.3 %    Monocyte % 5.5 5.0 - 12.0 %    Eosinophil % 0.1 (L) 0.3 - 6.2 %    Basophil % 0.1 0.0 - 1.5 %    Immature Grans % 0.4 0.0 - 0.5 %    Neutrophils, Absolute 10.31 (H) 1.70 - 7.00 10*3/mm3    Lymphocytes, Absolute 0.64 (L) 0.70 - 3.10 10*3/mm3    Monocytes, Absolute 0.64 0.10 - 0.90 10*3/mm3    Eosinophils, Absolute 0.01 0.00 - 0.40 10*3/mm3    Basophils, Absolute 0.01 0.00 - 0.20 10*3/mm3    Immature Grans, Absolute 0.05 0.00 - 0.05 10*3/mm3    nRBC 0.0 0.0 - 0.2 /100 WBC   POC Glucose Once    Collection Time: 07/08/24 10:00 AM    Specimen: Blood   Result Value Ref Range    Glucose 201 (H) 70 - 105 mg/dL   POC Glucose Once    Collection Time: 07/08/24  1:03 PM    Specimen: Blood   Result Value Ref Range    Glucose 120 (H) 70 - 105 mg/dL        Imaging:  XR Chest 1 View    Result Date: 7/6/2024  XR CHEST 1 VW Date of Exam: 7/6/2024 2:28 PM EDT Indication: S/p pigtail thoracostomy tube placement for hydropneumothorax Comparison: Same day chest CT and chest radiograph. Findings: Placement of a chest tube with tip terminating over the medial right lower lung. Feeding catheter courses below the diaphragm with the tip excluded from the field-of-view. Enlarged cardiac silhouette, unchanged. Right-sided hydropneumothorax, with decreased/trace fluid component and overall similar small volume gas  component. Unchanged small left pleural effusion. Unchanged bilateral airspace opacities. Osseous structures are unchanged.     Impression: Successful placement of a right chest tube for right-sided hydropneumothorax, which demonstrates decreased/trace fluid component and similar/small gas component. Electronically Signed: Mnaan Jefferson MD  7/6/2024 2:35 PM EDT  Workstation ID: [id number]    CT Chest Without Contrast Diagnostic    Result Date: 7/6/2024  CT CHEST WO CONTRAST DIAGNOSTIC Date of Exam: 7/6/2024 11:00 AM EDT Indication: pnemothorax. Comparison: Same day chest radiographs and 7/1/2024 chest CT Technique: Axial CT images were obtained of the chest without contrast administration.  Sagittal and coronal reconstructions were performed.  Automated exposure control and iterative reconstruction methods were used. Findings: The central airways are patent. There is multifocal peripheral bronchial obstruction, greater in the right lung. There is a moderate right-sided hydropneumothorax. There is a small left pleural effusion. No evidence of left-sided pneumothorax. There is significant volume loss of the right lung with dense airspace consolidations throughout, likely a combination of of infection and lung collapse. There are patchy airspace opacities and nodular consolidations throughout the left lung as well. The mediastinum is slightly shifted to the right, in keeping with partial right lung collapse. Unchanged appearance of the thyroid with right thyroid lobe calcification. There is no new mediastinal mass. No new threshold lymphadenopathy. There is unchanged prominent four-chamber cardiomegaly. Coronary artery calcifications are present. No pericardial effusion. The pulmonary arteries are enlarged. Unremarkable appearance of the thoracic esophagus. Chest wall soft tissues are unchanged. This includes asymmetric ill-defined subcutaneous edema within the right chest. Persistent bilateral flank edema. Enteric tube  distal tip terminates in the stomach. There is mild perihepatic ascites. Thickening of the bilateral adrenal glands without discrete nodule. Colonic diverticulosis. No acute fracture or suspicious bony lesion.     Impression: Moderate right-sided hydropneumothorax as seen on same-day radiograph. Adjacent partial collapse of the right lung with multifocal peripheral bronchial obstruction. There are dense airspace consolidations throughout the right lung, and findings are likely a combination of lung collapse and worsening infection. Persistent airspace opacities in the left lung consistent with infection, slightly worsened from prior CT. Small left pleural effusion. Prominent four-chamber cardiomegaly. Pulmonary artery enlargement consistent with pulmonary hypertension. Trace perihepatic ascites. Persistent bilateral flank and right chest wall edema. Electronically Signed: Javon Mortensen MD  7/6/2024 11:19 AM EDT  Workstation ID: [id number]    XR Chest 1 View    Result Date: 7/6/2024  XR CHEST 1 VW Date of Exam: 7/6/2024 9:47 AM EDT Indication: Abnormal chest x-ray, suggestion of a right pneumothorax, pulmonary edema, respiratory failure Comparison: 7/6/2024 at 0906 hours. Findings: There is a definite small right-sided pneumothorax. It is located both medially and laterally and also probably in the apical region measuring 4 mm in thickness. The right lung appears partially collapsed and consolidated. A small to moderate right pleural effusion is present indicating a hydropneumothorax. There is unchanged consolidation in the left lung with a small pleural effusion. The heart is enlarged. The pulmonary vascular markings are probably normal. The feeding tube tip projects out of the field-of-view, but below the hemidiaphragms.     Impression: 1. There is a definite small right-sided pneumothorax. The right lung is partially collapsed and consolidated. A small to moderate right pleural effusion is present indicating a  hydropneumothorax. 2. Unchanged consolidation in the left lung with a small pleural effusion. 3. Cardiomegaly. 4.The abnormal results were discussed with the nurse (Dianne) by telephone. The referring clinician will be contacted. Electronically Signed: Jean Claude Bobby MD  7/6/2024 9:56 AM EDT  Workstation ID: [id number]    XR Chest 1 View    Result Date: 7/6/2024  XR CHEST 1 VW Date of Exam: 7/6/2024 9:12 AM EDT Indication: Pulmonary edema, respiratory failure Comparison: 7/5/2024. Findings: The patient is rotated. The heart is enlarged. There is patchy consolidation again seen in the left mid and lower lung zones suspicious for pneumonia. There is a small left pleural effusion. On the right side, there is a questionable pleural edge seen along the right cardiac border. I cannot completely exclude a right pneumothorax. There is abnormal consolidation in the right lung with a small to moderate pleural effusion. The pulmonary vascular markings are probably normal. The patient's feeding tube  tip projects out of the field-of-view, but below the hemidiaphragms.     Impression: 1. Questionable right-sided pneumothorax. I would recommend a repeat chest x-ray as the patient is rotated. 2. Persistent patchy consolidation in the left mid and lower lung zones suspicious for pneumonia. There is a small right pleural effusion. 3. Abnormal consolidation in the right lung with a small to moderate pleural effusion. 4. Cardiomegaly. 5. The abnormal results were discussed with the nurse in the CVICU (Dianne) by telephone and the referring clinician will be contacted. Electronically Signed: Jean Claude Bobby MD  7/6/2024 9:27 AM EDT  Workstation ID: [id number]    XR Chest 1 View    Result Date: 7/5/2024  XR CHEST 1 VW Date of Exam: 7/5/2024 8:43 AM EDT Indication: worsening resp status Comparison: 7/4/2024 Findings: The cardiac silhouette is markedly enlarged similar to prior. Bilateral airspace opacities are again seen most prominent in the left  midlung and at the right lung base. Moderate sized right greater than left pleural effusions persist. An enteric tube has  been placed, with the tip not seen on this image.     Impression: Marked cardiomegaly with bilateral patchy airspace disease and pleural effusions. Findings are similar to the previous exam. Electronically Signed: Amirah Coto MD  7/5/2024 9:14 AM EDT  Workstation ID: [id number]    XR Abdomen KUB    Result Date: 7/4/2024  XR ABDOMEN KUB Date of Exam: 7/4/2024 8:30 PM EDT Indication: dobhoff Comparison: None available. Findings: There is a feeding tube in the stomach. The gas pattern is nonspecific. Densities in the left upper abdomen may be contrast within the stomach.     Impression: Feeding tube is in the stomach. Electronically Signed: Obed Sidhu MD  7/4/2024 9:59 PM EDT  Workstation ID: [id number]    US Renal Bilateral    Result Date: 7/4/2024  US RENAL BILATERAL Date of Exam: 7/4/2024 3:00 PM EDT Indication: ARABELLA. Comparison: CT abdomen and pelvis without 7/1/2024 Technique: Grayscale and color Doppler ultrasound evaluation of the kidneys and urinary bladder was performed. Findings: RIGHT kidney measures 9.8 x 5.5 x 4.2 cm.  Kidney echogenicity, size, and vascularity appear within normal limits. There is no solid kidney mass. There is a 1.1 cm simple peripelvic cyst. No echogenic shadowing stone.  No hydronephrosis. LEFT kidney measures 9.7 x 5.7 x 4.9 cm. Kidney echogenicity, size, and vascularity appear within normal limits. There is no solid kidney mass.  No echogenic shadowing stone.  No hydronephrosis. Urinary bladder is unremarkable in appearance with volume of 111 cc at time of exam.     Impression: No acute process nor significant abnormality identified. Electronically Signed: Beto Kaur MD  7/4/2024 4:14 PM EDT  Workstation ID: [id number]    XR Chest 1 View    Result Date: 7/4/2024  XR CHEST 1 VW Date of Exam: 7/4/2024 1:31 AM EDT Indication: Pleural effusion, hypoxia Comparison:  Chest radiograph 7/3/2024 Findings: There is marked cardiomegaly. There is moderate pulmonary vascular congestion. There are moderate size bilateral pleural effusions greater on the left than the right. There is bilateral basilar airspace disease which could be atelectasis or pneumonia. There is no evidence of pneumothorax.     Impression: Cardiomegaly with pulmonary vascular congestion and bilateral pleural effusions. Bibasilar airspace disease could be atelectasis or pneumonia. Electronically Signed: Obed Sidhu MD  7/4/2024 2:34 AM EDT  Workstation ID: [id number]    XR Chest 1 View    Result Date: 7/3/2024  XR CHEST 1 VW Date of Exam: 7/3/2024 5:58 AM EDT Indication: Pleural effusion, hypoxia Comparison: 7/2/2024 Findings: There is continued severe cardiomegaly with pulmonary vascular congestion. Bibasilar lung infiltrates with small effusions appear similar. Exam is somewhat limited by portable technique and obesity.     Impression: No appreciable change in bibasilar lung infiltrates with small effusions. Continued cardiomegaly. Electronically Signed: Krystal Lee MD  7/3/2024 7:57 AM EDT  Workstation ID: [id number]    XR Chest 1 View    Result Date: 7/2/2024  XR CHEST 1 VW Date of Exam: 7/2/2024 11:57 AM EDT Indication: Pleural effusion, hypoxia Comparison: 6/30/2024. Findings: There is continued cardiomegaly with pulmonary vascular congestion. Bibasilar lung infiltrates with small effusions appear somewhat improved. Exam is somewhat limited by portable technique and obesity.     Impression: Some improvement in bibasilar lung infiltrates with small effusions. Continued cardiomegaly. Electronically Signed: Krystal Lee MD  7/2/2024 12:46 PM EDT  Workstation ID: [id number]    US Pelvis Complete    Result Date: 7/1/2024  US PELVIS COMPLETE Date of Exam: 7/1/2024 12:48 PM EDT Indication: Pt with BLE DVT's, abnormal appearing uterus on CT, eval for fibroids vs Ca. Comparison: CT abdomen pelvis 7/1/2024. Technique:  Transabdominal and transvaginal ultrasound evaluation of the pelvis was performed utilizing grayscale and color Doppler technique.  Doppler spectral analysis was performed. Findings: Uterus measures 11.7 x 8.2 x 6.4 cm. The myometrium is heterogeneous. What is thought to represent a subserosal fibroid projects exophytically from the uterine fundal region measuring 6.8 x 7.7 x 6.6 cm, containing a few coarse calcifications. Endometrial bilayer thickness is 3 mm, within normal limits for patient of this stated age. No suspicious endometrial lesion is identified. No gross pelvic free fluid is identified. Neither the right ovary nor the left ovary could be visualized, likely obscured by bowel gas.     Impression: Sonographic features suggest the presence of a large subserosal uterine fundal fibroid. Electronically Signed: Ann Marie Maloney MD  7/1/2024 1:43 PM EDT  Workstation ID: [id number]    CT Abdomen Pelvis Without Contrast    Result Date: 7/1/2024  CT ABDOMEN PELVIS WO CONTRAST Date of Exam: 7/1/2024 11:30 AM EDT Indication: abdominal pain, possible panniculitis. Comparison: None available. Technique: Axial CT images were obtained of the abdomen and pelvis without the administration of contrast. Sagittal and coronal reconstructions were performed.  Automated exposure control and iterative reconstruction methods were used. Findings: There is severe cardiomegaly. There is a moderately large right pleural effusion. There is a small left pleural effusion. There are bibasilar lung infiltrates with areas of atelectasis. There is some contrast in the renal collecting systems from CT angiogram performed on today's date. There is a small right lower pole parapelvic renal cyst. There is no hydronephrosis. There is bilateral flank edema, greatest on the right. Partially filled bladder appears normal. There is a lobulated mass protruding  from the uterine fundus measuring approximately 8.4 x 5.9 cm measured on image #60 of series  2. There is no large or small bowel dilatation. There is no bowel wall thickening. The appendix is not identified. The abdominal aorta is mildly calcified and is normal in size. The pancreas is atrophic. The spleen, liver, and adrenal glands appear normal in size. The gallbladder and biliary tree are nondilated.     Impression: Moderately large right pleural effusion and small left pleural effusion. Bibasilar infiltrates suggest atelectasis although associated pneumonia is not excluded. Severe cardiomegaly. Bilateral flank edema, greatest on the right. Probable fibroid uterus. This could be confirmed with ultrasound. Electronically Signed: Krystal Lee MD  7/1/2024 11:39 AM EDT  Workstation ID: [id number]    Adult Transthoracic Echo Complete w/ Color, Spectral and Contrast if Necessary Per Protocol    Addendum Date: 7/1/2024      Left ventricular ejection fraction appears to be 31 - 35%.   Left ventricular diastolic dysfunction is noted.   The left atrial cavity is dilated.   Moderate aortic valve stenosis is present. Mean/peak gradient of 14/24 mmHg.  Dimensionless index 0.36   Moderate to severe mitral valve regurgitation is present.   Estimated right ventricular systolic pressure from tricuspid regurgitation is normal (<35 mmHg).     Result Date: 7/1/2024    Left ventricular ejection fraction appears to be 31 - 35%.   Left ventricular diastolic dysfunction is noted.   The left atrial cavity is dilated.   Moderate aortic valve stenosis is present. Mean/peak gradient of 14/24 mmHg.  Dimensionless index 0.36   Moderate mitral valve regurgitation is present.   Estimated right ventricular systolic pressure from tricuspid regurgitation is normal (<35 mmHg).     CT Angiogram Chest Pulmonary Embolism    Result Date: 7/1/2024  CT ANGIOGRAM CHEST PULMONARY EMBOLISM Date of Exam: 7/1/2024 10:03 AM EDT Indication: BLE DVT's, new cardiac arrhythmia, eval for PE. Comparison: Chest x-ray 6/30/2024 Technique: Axial CT images  were obtained of the chest after the uneventful intravenous administration of iodinated contrast utilizing pulmonary embolism protocol.  Sagittal and coronal reconstructions were performed.  Automated exposure control and iterative reconstruction methods were used. Findings: PULMONARY VASCULATURE: Pulmonary arteries are widely patent without evidence of embolus.Main pulmonary artery is normal in size. No evidence of right heart strain. MEDIASTINUM: The heart is prominent in size. There is biatrial and left ventricular enlargement. No significant pericardial effusion. No evidence for aortic enlargement or dissection. CORONARY ARTERIES: Mild calcified atherosclerotic disease. LUNGS: There is bibasilar airspace disease with early consolidation noted. Some focal infiltrate is present in the right middle lung as well with scattered groundglass densities throughout there is a subpleural nodule in the posterior right apical region  measuring 4 mm (image 27 of series 4). PLEURAL SPACE: There are mild bilateral pleural effusions. No evidence for pneumothorax. LYMPH NODES: There are no pathologically enlarged lymph nodes. UPPER ABDOMEN: There is bilateral flank edema. OSSEOUS STRUCTURES: Appropriate for age with no acute process identified. There is multilevel thoracic spondylosis.     Impression: 1. No evidence for pulmonary embolus. 2. Bibasilar airspace disease and right middle lung opacity compatible likely multifocal pneumonia with bilateral pleural effusions. 3. Cardiomegaly. Electronically Signed: Shari Kaur MD  7/1/2024 10:14 AM EDT  Workstation ID: [id number]    Duplex Venous Lower Extremity - Bilateral CAR    Result Date: 7/1/2024    Acute right lower extremity deep vein thrombosis noted in the common femoral, proximal femoral, mid femoral, distal femoral and popliteal.   Acute right lower extremity superficial thrombophlebitis noted in the saphenofemoral junction and great saphenous (below knee).   Acute left  lower extremity deep vein thrombosis noted in the proximal femoral, mid femoral, distal femoral, popliteal and posterial tibial.   Right and left popliteal fossa fluid collection.   All other visualized veins appeared normal bilaterally but calf veins were poorly visualized bilaterally.     CT Lower Extremity Bilateral Without Contrast    Result Date: 7/1/2024  CT LOWER EXTREMITY BILATERAL WO CONTRAST Date of Exam: 6/30/2024 10:48 PM EDT Indication: BLE edema L>R, multiple ulcerations, erythema. Comparison: None available. Technique: Axial CT images were obtained of the bilateral lower extremities without contrast administration.  Sagittal and coronal reconstructions were performed.  Automated exposure control and iterative reconstruction methods were used. Findings: There is bilateral lower extremity soft tissue swelling greater on the left than the right. There is diffuse skin thickening bilaterally involving the thighs and to a lesser degree the lower legs. There is no evidence of an acute osseous abnormality. There are advanced degenerative changes of both knees. There is no definite enlargement of the venous structures. There is no soft tissue gas. There is no well-formed abscess.     Impression: Bilateral lower extremity soft tissue swelling greater on the left than the right. There is no soft tissue gas or well-formed abscess. Electronically Signed: Obed Sidhu MD  7/1/2024 1:53 AM EDT  Workstation ID: [id number]    XR Chest 1 View    Result Date: 6/30/2024  XR CHEST 1 VW Date of Exam: 6/30/2024 7:06 PM EDT Indication: Shortness of breath Comparison: 6/4/2009 Findings: Vascular congestion. Small effusions. No pneumothorax. The clavicles are intact. No rib fractures.     Vascular congestion with small effusions Electronically Signed: Eamon Brock MD  6/30/2024 7:27 PM EDT  Workstation ID: [id number]       Current:  amiodarone, 200 mg, Nasogastric, Q8H   Followed by  [START ON 7/9/2024] amiodarone, 200 mg, Oral,  Q12H   Followed by  [START ON 7/23/2024] amiodarone, 200 mg, Oral, Daily  apixaban, 10 mg, Oral, BID   Followed by  [START ON 7/14/2024] apixaban, 5 mg, Oral, BID  budesonide, 0.5 mg, Nebulization, BID - RT  bumetanide, 1 mg, Intravenous, Q8H  cephalexin, 500 mg, Nasogastric, Q8H  doxycycline, 100 mg, Nasogastric, Q12H  insulin lispro, 2-7 Units, Subcutaneous, 4x Daily With Meals & Nightly  ipratropium-albuterol, 3 mL, Nebulization, 4x Daily - RT  lansoprazole, 30 mg, Nasogastric, Q AM  lidocaine, 20 mL, Infiltration, Once  metoprolol tartrate, 12.5 mg, Nasogastric, Q12H  miconazole, 1 Application, Topical, Q12H  midodrine, 10 mg, Nasogastric, Q8H  potassium chloride ER, 40 mEq, Oral, Q4H  povidone-iodine, , Topical, Daily  sodium chloride, 10 mL, Intravenous, Q12H        Infusion:        PRN:    acetaminophen **OR** acetaminophen    Calcium Replacement - Follow Nurse / BPA Driven Protocol    dextrose    dextrose    glucagon (human recombinant)    ipratropium-albuterol    Magnesium Standard Dose Replacement - Follow Nurse / BPA Driven Protocol    nitroglycerin    ondansetron ODT **OR** ondansetron    Phosphorus Replacement - Follow Nurse / BPA Driven Protocol    Potassium Replacement - Follow Nurse / BPA Driven Protocol    [COMPLETED] Insert Peripheral IV **AND** sodium chloride    sodium chloride    sodium chloride    ASSESSMENT:  Hydropneumothorax s/p CT  COPD  SEDRICK  Systolic CHF  A-fib with RVR  Aortic stenosis  Mitral valve regurgitation   R BBB  Hx bilateral DVT  HTN  Morbid obesity        PLAN:  Antibiotics  Bronchodilators  Inhaled corticosteroids  Incentive spirometer  Diuresis and monitor NANCY's   CT management keep suction now  Electrolytes/ glycemic control  DVT prophylaxis-Apixaban     Discussed with Dr Pineda Martin, APRN   7/8/2024  15:55 EDT      I personally have examined  and interviewed the patient. I have reviewed the history, data, problems, assessment and plan with our NP.  Total  Critical care time in direct medical management (   ) minutes, This time specifically excludes time spent performing procedures.    Donna Sung MD   7/8/2024  21:59 EDT

## 2024-07-08 NOTE — PLAN OF CARE
Goal Outcome Evaluation:  Plan of Care Reviewed With: patient  Pt was rec'd to unit from ICU towards beginning of shift, pt remains very edematous, no c/o's of pain or discomfort, numerous wounds, new dressings applied to coccyx, and bilateral outer thighs, betadine applied to bilateral great toe wounds, awaiting discharge to [facility], resp remain even and unlabored, plan of care ongoing

## 2024-07-08 NOTE — PROGRESS NOTES
Nutrition Services    Patient Name: Ban Brennan  YOB: 1955  MRN: [id number]  Admission date: 6/30/2024    PROGRESS NOTE      Encounter Information: Checking in on pt to monitor Nutrition POC and diet advancement/tolerance.  Pt was NPO from 7/4-7/7 and CLD started on 7/7.  Pt remains on CLD at this time. Pt transferred from ICU to PCU this am. Pt tolerating CLD without noted issues at this time. RD to add ONS to supplement po intake.        PO Diet: Diet: Liquid; Clear Liquid; Fluid Consistency: Thin (IDDSI 0)   PO Supplements: none   PO Intake:  Minimal due to nature of CLD. NPO x 3 days, CLD x 2 days       Current nutrition support: -   Nutrition support review: -       Labs (reviewed below): Hypokalemia- replaced today   Elevated BUN/Cr - management per nephrology         GI Function:  + BM 7/8       Nutrition Intervention Updates: Add Boost Breeze BID (provides 500 kcals, 18 g protein if consumed)     Continue to encourage good po intake and ONS consumption.        Results from last 7 days   Lab Units 07/08/24  0446 07/07/24  0506 07/06/24  1608   SODIUM mmol/L 145 143 140   POTASSIUM mmol/L 3.0* 3.9 3.3*   CHLORIDE mmol/L 97* 98 96*   CO2 mmol/L 38.0* 35.7* 33.9*   BUN mg/dL 72* 74* 72*   CREATININE mg/dL 1.19* 1.37* 1.41*   CALCIUM mg/dL 9.3 9.6 9.5   BILIRUBIN mg/dL 1.7* 1.8* 1.6*   ALK PHOS U/L 96 125* 110   ALT (SGPT) U/L 15 17 17   AST (SGOT) U/L 36* 30 31   GLUCOSE mg/dL 98 102* 111*     Results from last 7 days   Lab Units 07/08/24  0446 07/07/24  0506 07/06/24  0354   MAGNESIUM mg/dL 1.8 1.6 1.9   PHOSPHORUS mg/dL 2.4* 2.5 3.5   HEMOGLOBIN g/dL 13.3 14.2 13.8   HEMATOCRIT % 41.8 43.9 42.8     COVID19   Date Value Ref Range Status   06/30/2024 Not Detected Not Detected - Ref. Range Final     Lab Results   Component Value Date    HGBA1C 6.14 (H) 07/01/2024         RD to follow up per protocol.    Electronically signed by:  Shawna Miramontes RD  07/08/24 13:01 EDT

## 2024-07-08 NOTE — PROGRESS NOTES
"    PROGRESS NOTE      Patient Name: Ban Brennan  : 1955  MRN: [id number]  Primary Care Physician: Rut Pierce APRN  Date of admission: 2024    Patient Care Team:  Rut Pierce APRN as PCP - General (Nurse Practitioner)  Austin Brooks MD as Cardiologist (Cardiology)        Reason for Follow up     Acute kidney injury, hyperkalemia      Subjective     Seen and examined, awake, alert, making urine, no distress  Renal function improved, made 2L urine     Review of systems:    ROS was otherwise negative except as mentioned in the [facility].       Personal History:     Past Medical History:   Past Medical History:   Diagnosis Date    Bilateral leg edema     Chronic respiratory failure with hypoxia, on home O2 therapy 2024    COPD (chronic obstructive pulmonary disease)     Morbid obesity with BMI of 40.0-44.9, adult 2010    Primary hypertension 2017    Pulmonary embolism     \"many years ago, was on warfarin\"    Sleep apnea        Surgical History:    History reviewed. No pertinent surgical history.    Family History: family history is not on file. Otherwise pertinent FHx was reviewed and unremarkable.     Social History:  reports that she has never smoked. She has never used smokeless tobacco. She reports that she does not drink alcohol and does not use drugs.    Medications:  Prior to Admission medications    Medication Sig Start Date End Date Taking? Authorizing Provider   Allergy Relief 180 MG tablet Take 1 tablet by mouth Daily. 24  Yes Chadwick Johnson MD   bumetanide (BUMEX) 1 MG tablet Take 1 tablet by mouth 3 times a day. 24  Yes Chadwick Johnson MD   docusate sodium (COLACE) 100 MG capsule Take 1 capsule by mouth Every 12 (Twelve) Hours. 24  Yes Chadwick Johnson MD   furosemide (LASIX) 20 MG tablet Take 1 tablet by mouth Daily.   Yes Chadwick Johnson MD   HYDROcodone-acetaminophen (NORCO)  MG per tablet Take 1 tablet by " mouth 3 times a day. 5/30/24  Yes Chadwick Johnson MD   lisinopril (PRINIVIL,ZESTRIL) 30 MG tablet Take 1 tablet by mouth Daily. 3/18/24  Yes Chadwick Johnson MD   meloxicam (MOBIC) 7.5 MG tablet Take 1 tablet by mouth Daily As Needed. 3/18/24  Yes Chadwick Johnson MD   metoprolol tartrate (LOPRESSOR) 50 MG tablet Take 1 tablet by mouth Daily.   Yes Chadwick Johnson MD   montelukast (SINGULAIR) 10 MG tablet Take 1 tablet by mouth every night at bedtime.   Yes Chadwick Johnson MD   omeprazole (priLOSEC) 20 MG capsule Take 1 capsule by mouth Daily. 3/18/24  Yes Provider, Historical, MD   oxybutynin XL (DITROPAN-XL) 10 MG 24 hr tablet Take 2 tablets by mouth Daily. 3/18/24  Yes Chadwick Johnson MD   potassium chloride (MICRO-K) 10 MEQ CR capsule Take 1 capsule by mouth Every 12 (Twelve) Hours. 3/18/24  Yes Chadwick Johnson MD   vitamin D (ERGOCALCIFEROL) 1.25 MG (20744 UT) capsule capsule Take 1 capsule by mouth 2 (Two) Times a Week. Monday and Thursday. 5/30/24  Yes Chadwick Johnson MD     Scheduled Meds:amiodarone, 200 mg, Nasogastric, Q8H   Followed by  [START ON 7/9/2024] amiodarone, 200 mg, Oral, Q12H   Followed by  [START ON 7/23/2024] amiodarone, 200 mg, Oral, Daily  apixaban, 10 mg, Oral, BID   Followed by  [START ON 7/14/2024] apixaban, 5 mg, Oral, BID  budesonide, 0.5 mg, Nebulization, BID - RT  bumetanide, 1 mg, Intravenous, Q8H  cephalexin, 500 mg, Nasogastric, Q8H  doxycycline, 100 mg, Nasogastric, Q12H  insulin lispro, 2-7 Units, Subcutaneous, 4x Daily With Meals & Nightly  ipratropium-albuterol, 3 mL, Nebulization, 4x Daily - RT  lansoprazole, 30 mg, Nasogastric, Q AM  lidocaine, 20 mL, Infiltration, Once  metoprolol tartrate, 12.5 mg, Nasogastric, Q12H  miconazole, 1 Application, Topical, Q12H  midodrine, 10 mg, Nasogastric, Q8H  potassium chloride ER, 40 mEq, Oral, Q4H  povidone-iodine, , Topical, Daily  sodium chloride, 10 mL, Intravenous, Q12H      Continuous  Infusions:     PRN Meds:  acetaminophen **OR** acetaminophen    Calcium Replacement - Follow Nurse / BPA Driven Protocol    dextrose    dextrose    glucagon (human recombinant)    ipratropium-albuterol    Magnesium Standard Dose Replacement - Follow Nurse / BPA Driven Protocol    nitroglycerin    ondansetron ODT **OR** ondansetron    Phosphorus Replacement - Follow Nurse / BPA Driven Protocol    Potassium Replacement - Follow Nurse / BPA Driven Protocol    [COMPLETED] Insert Peripheral IV **AND** sodium chloride    sodium chloride    sodium chloride  Allergies:  No Known Allergies    Objective   Exam:     Vital Signs  Temp:  [97.2 °F (36.2 °C)-98.1 °F (36.7 °C)] 97.7 °F (36.5 °C)  Heart Rate:  [] 95  Resp:  [15-26] 26  BP: ()/(58-78) 157/78  SpO2:  [90 %-97 %] 94 %  on  Flow (L/min):  [1-3] 3;   Device (Oxygen Therapy): high-flow nasal cannula  Body mass index is 37.98 kg/m².  EXAM  General:  69 yr old  female, some respiratory distress  Head:      Normocephalic and atraumatic.    Eyes:      PERRL/EOM intact, conjunctivae and sclerae clear without nystagmus.    Neck:      No masses, thyromegaly,  trachea central   Lungs:    Clear bilaterally to auscultation.    Heart:      Regular rate and rhythm, no murmur no gallop  Abd:        Soft, nontender, not distended, bowel sounds positive, no shifting dullness.  Msk:        No deformity or scoliosis noted of thoracic or lumbar spine.    Pulses:   Pulses normal in all 4 extremities.    Extremities:        No cyanosis or clubbing--+ + edema.    Neuro:    Lethargic  Skin:       Intact without lesions or rashes.          Results Review:  I have personally reviewed most recent Data :  BMP @LABRCNT(creatinine:10)  CBC    Results from last 7 days   Lab Units 07/08/24  0446 07/07/24  0506 07/06/24  0354 07/05/24  0918 07/05/24  0257 07/04/24  0414 07/03/24  0635   WBC 10*3/mm3 11.66* 16.38* 15.29* 12.93* 11.98* 9.76 10.53   HEMOGLOBIN g/dL 13.3 14.2 13.8 13.8 14.6  14.7 14.4   PLATELETS 10*3/mm3 100* 130* 128* 115* 133* 123* 104*     CMP   Results from last 7 days   Lab Units 07/08/24  0446 07/07/24  0506 07/06/24  1608 07/06/24  0354 07/05/24  0918 07/05/24  0257 07/04/24  2248 07/04/24  1727 07/04/24  0414 07/03/24 2001 07/03/24  0635   SODIUM mmol/L 145 143 140 139 135* 136  --  137 136  --  139   POTASSIUM mmol/L 3.0* 3.9 3.3* 3.7 5.3* 5.9* 5.4* 6.0* 5.9*   < > 3.5   CHLORIDE mmol/L 97* 98 96* 96* 95* 95*  --  95* 94*  --  95*   CO2 mmol/L 38.0* 35.7* 33.9* 34.0* 31.6* 35.0*  --  33.9* 33.4*  --  36.9*   BUN mg/dL 72* 74* 72* 70* 67* 65*  --  60* 54*  --  45*   CREATININE mg/dL 1.19* 1.37* 1.41* 1.56* 1.86* 1.88*  --  1.95* 1.77*  --  1.31*   GLUCOSE mg/dL 98 102* 111* 133* 128* 99  --  101* 108*  --  107*   ALBUMIN g/dL 2.2* 2.5* 2.5* 2.5*  --  2.5*  --   --  2.6*  --  2.5*   BILIRUBIN mg/dL 1.7* 1.8* 1.6* 1.6*  --  1.6*  --   --  1.3*  --  1.0   ALK PHOS U/L 96 125* 110 117  --  129*  --   --  138*  --  130*   AST (SGOT) U/L 36* 30 31 27  --  28  --   --  38*  --  30   ALT (SGPT) U/L 15 17 17 14  --  16  --   --  17  --  16    < > = values in this interval not displayed.     ABG    Results from last 7 days   Lab Units 07/06/24  1150 07/05/24  0711 07/05/24  0442 07/05/24  0353 07/05/24  0212   PH, ARTERIAL pH units 7.392 7.281* 7.250* 7.204* 7.208*   PCO2, ARTERIAL mm Hg 61.2* 78.4* 80.9* 94.8* 92.8*   PO2 ART mm Hg 61.9* 106.7 76.6* 87.0 76.7*   O2 SATURATION ART % 90.2* 97.0 91.7* 93.1* 90.5*   BASE EXCESS ART mmol/L 9.3* 6.5* 4.5* 4.6* 4.4*     XR Chest 1 View    Result Date: 7/6/2024  Impression: Successful placement of a right chest tube for right-sided hydropneumothorax, which demonstrates decreased/trace fluid component and similar/small gas component. Electronically Signed: Manan Jefferson MD  7/6/2024 2:35 PM EDT  Workstation ID: [id number]    CT Chest Without Contrast Diagnostic    Result Date: 7/6/2024  Impression: Moderate right-sided hydropneumothorax as seen  on same-day radiograph. Adjacent partial collapse of the right lung with multifocal peripheral bronchial obstruction. There are dense airspace consolidations throughout the right lung, and findings are likely a combination of lung collapse and worsening infection. Persistent airspace opacities in the left lung consistent with infection, slightly worsened from prior CT. Small left pleural effusion. Prominent four-chamber cardiomegaly. Pulmonary artery enlargement consistent with pulmonary hypertension. Trace perihepatic ascites. Persistent bilateral flank and right chest wall edema. Electronically Signed: Javon Mortensen MD  7/6/2024 11:19 AM EDT  Workstation ID: [id number]    XR Chest 1 View    Result Date: 7/6/2024  Impression: 1. There is a definite small right-sided pneumothorax. The right lung is partially collapsed and consolidated. A small to moderate right pleural effusion is present indicating a hydropneumothorax. 2. Unchanged consolidation in the left lung with a small pleural effusion. 3. Cardiomegaly. 4.The abnormal results were discussed with the nurse (Dianne) by telephone. The referring clinician will be contacted. Electronically Signed: Jean Claude Bobby MD  7/6/2024 9:56 AM EDT  Workstation ID: [id number]    XR Chest 1 View    Result Date: 7/6/2024  Impression: 1. Questionable right-sided pneumothorax. I would recommend a repeat chest x-ray as the patient is rotated. 2. Persistent patchy consolidation in the left mid and lower lung zones suspicious for pneumonia. There is a small right pleural effusion. 3. Abnormal consolidation in the right lung with a small to moderate pleural effusion. 4. Cardiomegaly. 5. The abnormal results were discussed with the nurse in the CVICU (Dianne) by telephone and the referring clinician will be contacted. Electronically Signed: Jean Claude Bobby MD  7/6/2024 9:27 AM EDT  Workstation ID: [id number]    XR Chest 1 View    Result Date: 7/5/2024  Impression: Marked cardiomegaly with bilateral  patchy airspace disease and pleural effusions. Findings are similar to the previous exam. Electronically Signed: Amirah Coto MD  7/5/2024 9:14 AM EDT  Workstation ID: [id number]    XR Abdomen KUB    Result Date: 7/4/2024  Impression: Feeding tube is in the stomach. Electronically Signed: Obed Sidhu MD  7/4/2024 9:59 PM EDT  Workstation ID: [id number]    US Renal Bilateral    Result Date: 7/4/2024  Impression: No acute process nor significant abnormality identified. Electronically Signed: Beto Kaur MD  7/4/2024 4:14 PM EDT  Workstation ID: [id number]    XR Chest 1 View    Result Date: 7/4/2024  Impression: Cardiomegaly with pulmonary vascular congestion and bilateral pleural effusions. Bibasilar airspace disease could be atelectasis or pneumonia. Electronically Signed: Obed Sidhu MD  7/4/2024 2:34 AM EDT  Workstation ID: [id number]    XR Chest 1 View    Result Date: 7/3/2024  Impression: No appreciable change in bibasilar lung infiltrates with small effusions. Continued cardiomegaly. Electronically Signed: Krystal Lee MD  7/3/2024 7:57 AM EDT  Workstation ID: [id number]    XR Chest 1 View    Result Date: 7/2/2024  Impression: Some improvement in bibasilar lung infiltrates with small effusions. Continued cardiomegaly. Electronically Signed: Krystal Lee MD  7/2/2024 12:46 PM EDT  Workstation ID: [id number]    US Pelvis Complete    Result Date: 7/1/2024  Impression: Sonographic features suggest the presence of a large subserosal uterine fundal fibroid. Electronically Signed: Ann Marie Maloney MD  7/1/2024 1:43 PM EDT  Workstation ID: [id number]    CT Abdomen Pelvis Without Contrast    Result Date: 7/1/2024  Impression: Moderately large right pleural effusion and small left pleural effusion. Bibasilar infiltrates suggest atelectasis although associated pneumonia is not excluded. Severe cardiomegaly. Bilateral flank edema, greatest on the right. Probable fibroid uterus. This could be confirmed with ultrasound.  Electronically Signed: Krystal Lee MD  7/1/2024 11:39 AM EDT  Workstation ID: [id number]    Adult Transthoracic Echo Complete w/ Color, Spectral and Contrast if Necessary Per Protocol    Addendum Date: 7/1/2024      Left ventricular ejection fraction appears to be 31 - 35%.   Left ventricular diastolic dysfunction is noted.   The left atrial cavity is dilated.   Moderate aortic valve stenosis is present. Mean/peak gradient of 14/24 mmHg.  Dimensionless index 0.36   Moderate to severe mitral valve regurgitation is present.   Estimated right ventricular systolic pressure from tricuspid regurgitation is normal (<35 mmHg).     CT Angiogram Chest Pulmonary Embolism    Result Date: 7/1/2024  Impression: 1. No evidence for pulmonary embolus. 2. Bibasilar airspace disease and right middle lung opacity compatible likely multifocal pneumonia with bilateral pleural effusions. 3. Cardiomegaly. Electronically Signed: Shari Kaur MD  7/1/2024 10:14 AM EDT  Workstation ID: [id number]    CT Lower Extremity Bilateral Without Contrast    Result Date: 7/1/2024  Impression: Bilateral lower extremity soft tissue swelling greater on the left than the right. There is no soft tissue gas or well-formed abscess. Electronically Signed: Obed Sidhu MD  7/1/2024 1:53 AM EDT  Workstation ID: [id number]    XR Chest 1 View    Result Date: 6/30/2024  Vascular congestion with small effusions Electronically Signed: Eamon Brock MD  6/30/2024 7:27 PM EDT  Workstation ID: [id number]     Results for orders placed during the hospital encounter of 06/30/24    Adult Transthoracic Echo Complete w/ Color, Spectral and Contrast if Necessary Per Protocol    Interpretation Summary    Left ventricular ejection fraction appears to be 31 - 35%.    Left ventricular diastolic dysfunction is noted.    The left atrial cavity is dilated.    Moderate aortic valve stenosis is present. Mean/peak gradient of 14/24 mmHg.  Dimensionless index 0.36    Moderate to severe  mitral valve regurgitation is present.    Estimated right ventricular systolic pressure from tricuspid regurgitation is normal (<35 mmHg).        Assessment & Plan   Assessment and Plan:         Atrial fibrillation with RVR    Primary hypertension    Morbid obesity with BMI of 40.0-44.9, adult    Right bundle branch block    Acute deep vein thrombosis (DVT) of both lower extremities    ARABELLA (acute kidney injury)    Acute hyperkalemia    Sepsis    Acute UTI    Acute systolic congestive heart failure    Rhinovirus infection    Multifocal pneumonia    Chronic respiratory failure with hypoxia, on home O2 therapy    Skin ulcer of right great toe    Skin ulcer of left great toe    SEDRICK (obstructive sleep apnea)    Aortic stenosis    Mitral regurgitation    Acute HFrEF (heart failure with reduced ejection fraction)    COPD (chronic obstructive pulmonary disease)    ASSESSMENT:  Acute kidney injury, with baseline creatinine roughly 0.8-1.0mg/dL  Hyperkalemia  A-fib with RVR  Acute on chronic heart failure, with EF 31-35%, diastolic dysfunction, moderate AS and moderate to severe MR as per echo from 6/30/24  Bilateral DVT  Severe sepsis/LLE cellulitis, acute cystitis  Multifocal pneumonia    PLAN :     Renal function improved today. Suspect she may initially have had some acute cardiorenal component, now most likely has some ATN secondary to sepsis, elevated Vanc level (was 22.4 on 7/3), and some prolonged prerenal state with hemodynamic insults, arrhythmias.  Creatinine continues to improve at this time  Replace potassium as per protocol  No significant proteinuria  Chun in place for strict I/Os  Hyperkalemia, resolved.  Now potassium is dropping  As volume status getting better although patient still has edema we will change to p.o. diuretics at this time with Aldactone that will help with hypokalemia and hypertension  I will discontinue midodrine as blood pressure is stable  Antibiotics as per ID. Now off Vanc, agree. On  PO Doxy and Cephalexin  Check labs daily. Will follow closely      Charan Krueger MD  [facility]  7/8/2024  16:39 EDT

## 2024-07-08 NOTE — PROGRESS NOTES
[facility] MEDICINE SERVICE  DAILY PROGRESS NOTE    NAME: Ban Brennan  : 1955  MRN: [id number]      LOS: 8 days     PROVIDER OF SERVICE: Librado Villagomez MD    Chief Complaint: Atrial fibrillation with RVR    Subjective:     Interval History:  History taken from: patient chart RN  Pain controlled afebrile on supplemental O2     Review of Systems:   Review of Systems  All negative except as above  Objective:     Vital Signs  Temp:  [97.2 °F (36.2 °C)-98.1 °F (36.7 °C)] 97.8 °F (36.6 °C)  Heart Rate:  [] 93  Resp:  [15-24] 15  BP: ()/(58-73) 136/68  Flow (L/min):  [1-3] 3   Body mass index is 37.98 kg/m².    Physical Exam  Physical Exam  AOx3 NAD  RRR S1-S2 audible  Lungs with fair air entry  Abdomen soft nontender nondistended  Scheduled Meds   amiodarone, 200 mg, Nasogastric, Q8H   Followed by  [START ON 2024] amiodarone, 200 mg, Oral, Q12H   Followed by  [START ON 2024] amiodarone, 200 mg, Oral, Daily  apixaban, 10 mg, Oral, BID   Followed by  [START ON 2024] apixaban, 5 mg, Oral, BID  bumetanide, 1 mg, Intravenous, Q8H  cephalexin, 500 mg, Nasogastric, Q8H  dextrose, 25 g, Intravenous, Once  doxycycline, 100 mg, Nasogastric, Q12H  insulin lispro, 2-7 Units, Subcutaneous, Q6H  lansoprazole, 30 mg, Nasogastric, Q AM  lidocaine, 20 mL, Infiltration, Once  metoprolol tartrate, 12.5 mg, Nasogastric, Q12H  miconazole, 1 Application, Topical, Q12H  midodrine, 10 mg, Nasogastric, Q8H  potassium chloride ER, 40 mEq, Oral, Q4H  povidone-iodine, , Topical, Daily  sodium chloride, 10 mL, Intravenous, Q12H       PRN Meds     acetaminophen **OR** acetaminophen    Calcium Replacement - Follow Nurse / BPA Driven Protocol    dextrose    dextrose    glucagon (human recombinant)    ipratropium-albuterol    Magnesium Standard Dose Replacement - Follow Nurse / BPA Driven Protocol    nitroglycerin    ondansetron ODT **OR** ondansetron    Phosphorus Replacement - Follow Nurse / BPA  Driven Protocol    Potassium Replacement - Follow Nurse / BPA Driven Protocol    [COMPLETED] Insert Peripheral IV **AND** sodium chloride    sodium chloride    sodium chloride   Infusions         Diagnostic Data    Results from last 7 days   Lab Units 07/08/24  0446   WBC 10*3/mm3 11.66*   HEMOGLOBIN g/dL 13.3   HEMATOCRIT % 41.8   PLATELETS 10*3/mm3 100*   GLUCOSE mg/dL 98   CREATININE mg/dL 1.19*   BUN mg/dL 72*   SODIUM mmol/L 145   POTASSIUM mmol/L 3.0*   AST (SGOT) U/L 36*   ALT (SGPT) U/L 15   ALK PHOS U/L 96   BILIRUBIN mg/dL 1.7*   ANION GAP mmol/L 10.0       XR Chest 1 View    Result Date: 7/6/2024  Impression: Successful placement of a right chest tube for right-sided hydropneumothorax, which demonstrates decreased/trace fluid component and similar/small gas component. Electronically Signed: Manan Jefferson MD  7/6/2024 2:35 PM EDT  Workstation ID: [id number]       I reviewed the patient's new clinical results.  I reviewed the patient's new imaging results and agree with the interpretation.    Assessment/Plan:     Active and Resolved Problems  Active Hospital Problems    Diagnosis  POA    **Atrial fibrillation with RVR [I48.91]  Yes    COPD (chronic obstructive pulmonary disease) [J44.9]  Yes    Aortic stenosis [I35.0]  Yes    Mitral regurgitation [I34.0]  Yes    Acute HFrEF (heart failure with reduced ejection fraction) [I50.21]  Yes    Right bundle branch block [I45.10]  Yes    Acute deep vein thrombosis (DVT) of both lower extremities [I82.403]  Yes    ARABELLA (acute kidney injury) [N17.9]  Yes    Acute hyperkalemia [E87.5]  Yes    Sepsis [A41.9]  Yes    Acute UTI [N39.0]  Yes    Acute systolic congestive heart failure [I50.21]  Yes    Rhinovirus infection [B34.8]  Yes    Multifocal pneumonia [J18.9]  Yes    Chronic respiratory failure with hypoxia, on home O2 therapy [J96.11, Z99.81]  Not Applicable    Skin ulcer of right great toe [L97.519]  Yes    Skin ulcer of left great toe [L97.529]  Yes    SEDRICK  (obstructive sleep apnea) [G47.33]  Yes    Primary hypertension [I10]  Yes    Morbid obesity with BMI of 40.0-44.9, adult [E66.01, Z68.41]  Not Applicable      Resolved Hospital Problems   No resolved problems to display.       69-year-old female with history of hypertension, HFrEF, lower extremity lymphedema admitted to [facility] 6/30 progressive shortness of breath      #Bilateral lower extremity DVT  #History of reported PE  Seen by hematology appreciate recommendation.  Factor V and prothrombin gene mutation negative  Continue Eliquis 10 mg twice daily for 7 days followed by 5 mg twice daily  Outpatient follow-up with hematology    #Acute on chronic HFrEF  #A-fib with RVR.  New onset  #Severe MR  Cardiology following appreciate recommendations  Started on amiodarone GGT transition to p.o. tapering dose  TTE with EF 30 to 35%.  Defer ischemic workup to cardiology  Continue metoprolol 12.5 twice daily  IV Bumex 1 mg every 8.  Monitor I's and O's  Lisinopril on hold given ARABELLA    #Right-sided hydropneumothorax  Chest tube placed in ICU 7/6  Consult pulmonary on floor    #Severe sepsis  #UTI  #Left lower extremity cellulitis and wound  #Rhinovirus infection  #Multifocal pneumonia  #Chronic venous stasis lower extremity  Seen by infectious disease appreciate recommendations  To finish course of Keflex and doxycycline for total 7 days  Seen by podiatry no surgical intervention recommended  On midodrine 10 mg 3 times daily wean off as blood pressure permits    #Acute hypoxic respiratory failure  Multifactorial pneumonia CHF  Antibiotics and diuretics as above  Wean off supplemental oxygen as tolerated.    #DM2  A1c 6.14  Continue ISS lispro  POC ACHS    #ARABELLA  Suspect ATN in setting of sepsis   Diuretics as above  BMP daily  Avoid nephrotoxic drugs   lisinopril on hold     #uterine mass  Large subserosal uterine fundal fibroid on pelvic ultrasound  GYN follow-up as outpatient            VTE  Prophylaxis:  Pharmacologic VTE prophylaxis orders are present.         Code status is   Code Status and Medical Interventions:   Ordered at: 06/30/24 2120     Code Status (Patient has no pulse and is not breathing):    CPR (Attempt to Resuscitate)     Medical Interventions (Patient has pulse or is breathing):    Full Support       Plan for disposition: 2 to 3 days    Time: 30 minutes    Signature: Electronically signed by Librado Villagomez MD, 07/08/24, 11:50 EDT.  [facility], [place], operated by [facility] Hospitalist Team

## 2024-07-08 NOTE — THERAPY TREATMENT NOTE
"Subjective: Pt agreeable to therapeutic plan of care.  Cognition: oriented to Person, Place, Time, and Situation    Objective:     Bed Mobility: Max-A and Assist x 2 required increased cues for sequencing   Functional Transfers: Max-A and Assist x 2     Balance: standing Max-A and Assist x 2  Functional Ambulation: N/A or Not attempted.    Toileting: Max-A  ADL Position: supported standing  ADL Comments: renee care     Lower Body Dressing: Max-A  ADL Position: edge of bed sitting  ADL Comments: don socks     Therapeutic Exercise - 10 Reps B LE AROM lying supine    Vitals: WNL    Pain: 0 VAS  Location:   Interventions for pain: N/A  Education: Provided education on the importance of mobility in the acute care setting, Verbal/Tactile Cues, ADL training, and Transfer Training      Assessment: Ban Brennan presents with ADL impairments affecting function including balance, cognition, endurance / activity tolerance, motor planning, and strength. Pt required 2 assist to get out of bed and transferred over to chair. Pt with edema in dorsal side of LUE where IV is located, RN made aware. Following transfer to chair pt with increased bleeding from that site (L hand) and RN made aware. Pt required increased assistance secondary to weakness. Demonstrated functioning below baseline abilities indicate the need for continued skilled intervention while inpatient. Tolerating session today without incident. Will continue to follow and progress as tolerated.     Plan/Recommendations:   Moderate Intensity Therapy recommended post-acute care. This is recommended as therapy feels the patient would require 3-4 days per week and wouldn't tolerate \"3 hour daily\" rehab intensity. SNF would be the preferred choice. If the patient does not agree to SNF, arrange HH or OP depending on home bound status. If patient is medically complex, consider LTACH.. Pt requires no DME at discharge.     Pt desires Skilled Rehab placement at discharge. Pt " cooperative; agreeable to therapeutic recommendations and plan of care.     Modified [place]: N/A = No pre-op stroke/TIA    Post-Tx Position: Up in Chair, Alarms activated, and Call light and personal items within reach, with kael lift pad underneath   PPE: gloves, surgical mask, and gown

## 2024-07-08 NOTE — CONSULTS
Palliative Care Social Work Progress Note    Code Status:full code    Goals of Care: Full Treatment    Narrative: Palliative care  received report from RN who states patient has auth to go to [facility] for rehab. She declined palliative needs at this time. Please reconsult if palliative needs arise.    Plan: Palliative care team following pending clinical course          Lily Baig

## 2024-07-08 NOTE — CASE MANAGEMENT/SOCIAL WORK
Continued Stay Note  QIAN Clarke     Patient Name: Ban Brennan  MRN: [id number]  Today's Date: 7/8/2024    Admit Date: 6/30/2024    Plan: [facility] accepted  Precert approved (valid 7/4-7/10). PASRR approved. From home with family.   Discharge Plan       Row Name 07/08/24 1434       Plan    Plan [facility] accepted  Precert approved (valid 7/4-7/10). PASRR approved. From home with family.    Plan Comments DC Barriers: Chest tube in place, pulmonology consulted.                Dea Erwin RN     Office Phone: [phone]  Office Cell: [phone]

## 2024-07-08 NOTE — PLAN OF CARE
"Assessment: Ban Brennan presents with ADL impairments affecting function including balance, cognition, endurance / activity tolerance, motor planning, and strength. Pt required 2 assist to get out of bed and transferred over to chair. Pt with edema in dorsal side of LUE where IV is located, RN made aware. Following transfer to chair pt with increased bleeding from that site (L hand) and RN made aware. Pt required increased assistance secondary to weakness. Demonstrated functioning below baseline abilities indicate the need for continued skilled intervention while inpatient. Tolerating session today without incident. Will continue to follow and progress as tolerated.     Plan/Recommendations:   Moderate Intensity Therapy recommended post-acute care. This is recommended as therapy feels the patient would require 3-4 days per week and wouldn't tolerate \"3 hour daily\" rehab intensity. SNF would be the preferred choice. If the patient does not agree to SNF, arrange HH or OP depending on home bound status. If patient is medically complex, consider LTACH.. Pt requires no DME at discharge.     "

## 2024-07-09 ENCOUNTER — INPATIENT HOSPITAL (OUTPATIENT)
Dept: URBAN - METROPOLITAN AREA HOSPITAL 84 | Facility: HOSPITAL | Age: 69
End: 2024-07-09
Payer: MEDICAID

## 2024-07-09 ENCOUNTER — APPOINTMENT (OUTPATIENT)
Dept: GENERAL RADIOLOGY | Facility: HOSPITAL | Age: 69
End: 2024-07-09
Payer: MEDICARE

## 2024-07-09 DIAGNOSIS — R94.5 ABNORMAL RESULTS OF LIVER FUNCTION STUDIES: ICD-10-CM

## 2024-07-09 DIAGNOSIS — K62.5 HEMORRHAGE OF ANUS AND RECTUM: ICD-10-CM

## 2024-07-09 LAB
ALBUMIN SERPL-MCNC: 2.2 G/DL (ref 3.5–5.2)
ALBUMIN/GLOB SERPL: 0.7 G/DL
ALP SERPL-CCNC: 84 U/L (ref 39–117)
ALT SERPL W P-5'-P-CCNC: 17 U/L (ref 1–33)
ANION GAP SERPL CALCULATED.3IONS-SCNC: 7.5 MMOL/L (ref 5–15)
AST SERPL-CCNC: 37 U/L (ref 1–32)
BASOPHILS # BLD AUTO: 0.01 10*3/MM3 (ref 0–0.2)
BASOPHILS NFR BLD AUTO: 0.1 % (ref 0–1.5)
BILIRUB SERPL-MCNC: 1.7 MG/DL (ref 0–1.2)
BUN SERPL-MCNC: 68 MG/DL (ref 8–23)
BUN/CREAT SERPL: 63 (ref 7–25)
CALCIUM SPEC-SCNC: 9 MG/DL (ref 8.6–10.5)
CHLORIDE SERPL-SCNC: 98 MMOL/L (ref 98–107)
CO2 SERPL-SCNC: 39.5 MMOL/L (ref 22–29)
CREAT SERPL-MCNC: 1.08 MG/DL (ref 0.57–1)
DEPRECATED RDW RBC AUTO: 54.1 FL (ref 37–54)
EGFRCR SERPLBLD CKD-EPI 2021: 55.7 ML/MIN/1.73
EOSINOPHIL # BLD AUTO: 0.03 10*3/MM3 (ref 0–0.4)
EOSINOPHIL NFR BLD AUTO: 0.3 % (ref 0.3–6.2)
ERYTHROCYTE [DISTWIDTH] IN BLOOD BY AUTOMATED COUNT: 16.7 % (ref 12.3–15.4)
GLOBULIN UR ELPH-MCNC: 3.2 GM/DL
GLUCOSE BLDC GLUCOMTR-MCNC: 108 MG/DL (ref 70–105)
GLUCOSE BLDC GLUCOMTR-MCNC: 117 MG/DL (ref 70–105)
GLUCOSE BLDC GLUCOMTR-MCNC: 175 MG/DL (ref 70–105)
GLUCOSE BLDC GLUCOMTR-MCNC: 99 MG/DL (ref 70–105)
GLUCOSE SERPL-MCNC: 117 MG/DL (ref 65–99)
HCT VFR BLD AUTO: 38.9 % (ref 34–46.6)
HCT VFR BLD AUTO: 39.1 % (ref 34–46.6)
HCT VFR BLD AUTO: 40.5 % (ref 34–46.6)
HEMOCCULT STL QL IA: POSITIVE
HGB BLD-MCNC: 12.6 G/DL (ref 12–15.9)
HGB BLD-MCNC: 12.6 G/DL (ref 12–15.9)
HGB BLD-MCNC: 13.1 G/DL (ref 12–15.9)
IMM GRANULOCYTES # BLD AUTO: 0.06 10*3/MM3 (ref 0–0.05)
IMM GRANULOCYTES NFR BLD AUTO: 0.6 % (ref 0–0.5)
LAB AP CASE REPORT: NORMAL
LYMPHOCYTES # BLD AUTO: 0.66 10*3/MM3 (ref 0.7–3.1)
LYMPHOCYTES NFR BLD AUTO: 6.2 % (ref 19.6–45.3)
MAGNESIUM SERPL-MCNC: 1.3 MG/DL (ref 1.6–2.4)
MCH RBC QN AUTO: 30.4 PG (ref 26.6–33)
MCHC RBC AUTO-ENTMCNC: 32.2 G/DL (ref 31.5–35.7)
MCV RBC AUTO: 94.2 FL (ref 79–97)
MONOCYTES # BLD AUTO: 0.67 10*3/MM3 (ref 0.1–0.9)
MONOCYTES NFR BLD AUTO: 6.3 % (ref 5–12)
NEUTROPHILS NFR BLD AUTO: 86.5 % (ref 42.7–76)
NEUTROPHILS NFR BLD AUTO: 9.23 10*3/MM3 (ref 1.7–7)
NRBC BLD AUTO-RTO: 0 /100 WBC (ref 0–0.2)
PATH REPORT.FINAL DX SPEC: NORMAL
PATH REPORT.GROSS SPEC: NORMAL
PHOSPHATE SERPL-MCNC: 1.8 MG/DL (ref 2.5–4.5)
PHOSPHATE SERPL-MCNC: 2.3 MG/DL (ref 2.5–4.5)
PLATELET # BLD AUTO: 87 10*3/MM3 (ref 140–450)
PMV BLD AUTO: 11.6 FL (ref 6–12)
POTASSIUM SERPL-SCNC: 3.6 MMOL/L (ref 3.5–5.2)
POTASSIUM SERPL-SCNC: 3.9 MMOL/L (ref 3.5–5.2)
PROT SERPL-MCNC: 5.4 G/DL (ref 6–8.5)
RBC # BLD AUTO: 4.15 10*6/MM3 (ref 3.77–5.28)
SODIUM SERPL-SCNC: 145 MMOL/L (ref 136–145)
WBC NRBC COR # BLD AUTO: 10.66 10*3/MM3 (ref 3.4–10.8)

## 2024-07-09 PROCEDURE — 94660 CPAP INITIATION&MGMT: CPT

## 2024-07-09 PROCEDURE — 97110 THERAPEUTIC EXERCISES: CPT

## 2024-07-09 PROCEDURE — 85025 COMPLETE CBC W/AUTO DIFF WBC: CPT | Performed by: NURSE PRACTITIONER

## 2024-07-09 PROCEDURE — 84132 ASSAY OF SERUM POTASSIUM: CPT | Performed by: HOSPITALIST

## 2024-07-09 PROCEDURE — 97112 NEUROMUSCULAR REEDUCATION: CPT

## 2024-07-09 PROCEDURE — 94799 UNLISTED PULMONARY SVC/PX: CPT

## 2024-07-09 PROCEDURE — 82948 REAGENT STRIP/BLOOD GLUCOSE: CPT | Performed by: HOSPITALIST

## 2024-07-09 PROCEDURE — 82274 ASSAY TEST FOR BLOOD FECAL: CPT | Performed by: HOSPITALIST

## 2024-07-09 PROCEDURE — 80053 COMPREHEN METABOLIC PANEL: CPT | Performed by: NURSE PRACTITIONER

## 2024-07-09 PROCEDURE — 94761 N-INVAS EAR/PLS OXIMETRY MLT: CPT

## 2024-07-09 PROCEDURE — 84100 ASSAY OF PHOSPHORUS: CPT | Performed by: NURSE PRACTITIONER

## 2024-07-09 PROCEDURE — 84100 ASSAY OF PHOSPHORUS: CPT | Performed by: HOSPITALIST

## 2024-07-09 PROCEDURE — 71045 X-RAY EXAM CHEST 1 VIEW: CPT

## 2024-07-09 PROCEDURE — 85014 HEMATOCRIT: CPT | Performed by: HOSPITALIST

## 2024-07-09 PROCEDURE — 94664 DEMO&/EVAL PT USE INHALER: CPT

## 2024-07-09 PROCEDURE — 25810000003 SODIUM CHLORIDE 0.9 % SOLUTION: Performed by: HOSPITALIST

## 2024-07-09 PROCEDURE — 85018 HEMOGLOBIN: CPT | Performed by: HOSPITALIST

## 2024-07-09 PROCEDURE — 97535 SELF CARE MNGMENT TRAINING: CPT

## 2024-07-09 PROCEDURE — 83735 ASSAY OF MAGNESIUM: CPT | Performed by: NURSE PRACTITIONER

## 2024-07-09 PROCEDURE — 82948 REAGENT STRIP/BLOOD GLUCOSE: CPT

## 2024-07-09 PROCEDURE — 63710000001 INSULIN LISPRO (HUMAN) PER 5 UNITS: Performed by: HOSPITALIST

## 2024-07-09 PROCEDURE — 99232 SBSQ HOSP IP/OBS MODERATE 35: CPT | Performed by: INTERNAL MEDICINE

## 2024-07-09 PROCEDURE — 25010000002 MAGNESIUM SULFATE 2 GM/50ML SOLUTION: Performed by: HOSPITALIST

## 2024-07-09 PROCEDURE — 99223 1ST HOSP IP/OBS HIGH 75: CPT | Performed by: NURSE PRACTITIONER

## 2024-07-09 PROCEDURE — 97530 THERAPEUTIC ACTIVITIES: CPT

## 2024-07-09 PROCEDURE — 25010000002 POTASSIUM CHLORIDE 10 MEQ/100ML SOLUTION: Performed by: HOSPITALIST

## 2024-07-09 RX ORDER — MAGNESIUM SULFATE HEPTAHYDRATE 40 MG/ML
2 INJECTION, SOLUTION INTRAVENOUS
Qty: 150 ML | Refills: 0 | Status: COMPLETED | OUTPATIENT
Start: 2024-07-09 | End: 2024-07-09

## 2024-07-09 RX ORDER — METOPROLOL SUCCINATE 25 MG/1
25 TABLET, EXTENDED RELEASE ORAL
Status: DISCONTINUED | OUTPATIENT
Start: 2024-07-09 | End: 2024-07-09

## 2024-07-09 RX ORDER — PANTOPRAZOLE SODIUM 40 MG/10ML
40 INJECTION, POWDER, LYOPHILIZED, FOR SOLUTION INTRAVENOUS
Status: DISCONTINUED | OUTPATIENT
Start: 2024-07-09 | End: 2024-07-10

## 2024-07-09 RX ORDER — FENTANYL/ROPIVACAINE/NS/PF 2-625MCG/1
15 PLASTIC BAG, INJECTION (ML) EPIDURAL ONCE
Qty: 250 ML | Refills: 0 | Status: COMPLETED | OUTPATIENT
Start: 2024-07-09 | End: 2024-07-09

## 2024-07-09 RX ORDER — HYDROCORTISONE ACETATE 25 MG/1
25 SUPPOSITORY RECTAL 2 TIMES DAILY
Status: DISCONTINUED | OUTPATIENT
Start: 2024-07-09 | End: 2024-07-23 | Stop reason: HOSPADM

## 2024-07-09 RX ORDER — METOPROLOL SUCCINATE 25 MG/1
25 TABLET, EXTENDED RELEASE ORAL EVERY 12 HOURS SCHEDULED
Status: DISCONTINUED | OUTPATIENT
Start: 2024-07-09 | End: 2024-07-23 | Stop reason: HOSPADM

## 2024-07-09 RX ORDER — POTASSIUM CHLORIDE 7.45 MG/ML
10 INJECTION INTRAVENOUS
Qty: 200 ML | Refills: 0 | Status: COMPLETED | OUTPATIENT
Start: 2024-07-09 | End: 2024-07-09

## 2024-07-09 RX ADMIN — APIXABAN 10 MG: 5 TABLET, FILM COATED ORAL at 22:03

## 2024-07-09 RX ADMIN — CEPHALEXIN 500 MG: 500 CAPSULE ORAL at 13:33

## 2024-07-09 RX ADMIN — CEPHALEXIN 500 MG: 500 CAPSULE ORAL at 05:23

## 2024-07-09 RX ADMIN — BUMETANIDE 1 MG: 1 TABLET ORAL at 09:57

## 2024-07-09 RX ADMIN — IPRATROPIUM BROMIDE AND ALBUTEROL SULFATE 3 ML: .5; 3 SOLUTION RESPIRATORY (INHALATION) at 15:50

## 2024-07-09 RX ADMIN — APIXABAN 10 MG: 5 TABLET, FILM COATED ORAL at 09:56

## 2024-07-09 RX ADMIN — POTASSIUM CHLORIDE 10 MEQ: 7.46 INJECTION, SOLUTION INTRAVENOUS at 11:00

## 2024-07-09 RX ADMIN — SPIRONOLACTONE 25 MG: 25 TABLET ORAL at 09:57

## 2024-07-09 RX ADMIN — CEPHALEXIN 500 MG: 500 CAPSULE ORAL at 22:15

## 2024-07-09 RX ADMIN — Medication 10 ML: at 10:02

## 2024-07-09 RX ADMIN — HYDROCORTISONE ACETATE 25 MG: 25 SUPPOSITORY RECTAL at 22:03

## 2024-07-09 RX ADMIN — AMIODARONE HYDROCHLORIDE 200 MG: 200 TABLET ORAL at 19:24

## 2024-07-09 RX ADMIN — POVIDONE-IODINE: 10 SOLUTION TOPICAL at 10:02

## 2024-07-09 RX ADMIN — POTASSIUM PHOSPHATE, MONOBASIC POTASSIUM PHOSPHATE, DIBASIC 15 MMOL: 224; 236 INJECTION, SOLUTION, CONCENTRATE INTRAVENOUS at 05:22

## 2024-07-09 RX ADMIN — BUDESONIDE 0.5 MG: 0.5 INHALANT RESPIRATORY (INHALATION) at 08:33

## 2024-07-09 RX ADMIN — METOPROLOL SUCCINATE 25 MG: 25 TABLET, FILM COATED, EXTENDED RELEASE ORAL at 09:55

## 2024-07-09 RX ADMIN — Medication 10 ML: at 22:03

## 2024-07-09 RX ADMIN — METOPROLOL SUCCINATE 25 MG: 25 TABLET, FILM COATED, EXTENDED RELEASE ORAL at 22:03

## 2024-07-09 RX ADMIN — PANTOPRAZOLE SODIUM 40 MG: 40 INJECTION, POWDER, FOR SOLUTION INTRAVENOUS at 19:24

## 2024-07-09 RX ADMIN — PANTOPRAZOLE SODIUM 40 MG: 40 INJECTION, POWDER, FOR SOLUTION INTRAVENOUS at 09:55

## 2024-07-09 RX ADMIN — MAGNESIUM SULFATE HEPTAHYDRATE 2 G: 40 INJECTION, SOLUTION INTRAVENOUS at 05:22

## 2024-07-09 RX ADMIN — IPRATROPIUM BROMIDE AND ALBUTEROL SULFATE 3 ML: .5; 3 SOLUTION RESPIRATORY (INHALATION) at 08:33

## 2024-07-09 RX ADMIN — MAGNESIUM SULFATE HEPTAHYDRATE 2 G: 40 INJECTION, SOLUTION INTRAVENOUS at 13:33

## 2024-07-09 RX ADMIN — INSULIN LISPRO 2 UNITS: 100 INJECTION, SOLUTION INTRAVENOUS; SUBCUTANEOUS at 13:34

## 2024-07-09 RX ADMIN — AMIODARONE HYDROCHLORIDE 200 MG: 200 TABLET ORAL at 05:22

## 2024-07-09 RX ADMIN — DOXYCYCLINE 100 MG: 100 CAPSULE ORAL at 22:03

## 2024-07-09 RX ADMIN — ANTI-FUNGAL POWDER MICONAZOLE NITRATE TALC FREE 1 APPLICATION: 1.42 POWDER TOPICAL at 09:59

## 2024-07-09 RX ADMIN — LANSOPRAZOLE 30 MG: 30 TABLET, ORALLY DISINTEGRATING, DELAYED RELEASE ORAL at 05:25

## 2024-07-09 RX ADMIN — DOXYCYCLINE 100 MG: 100 CAPSULE ORAL at 09:57

## 2024-07-09 RX ADMIN — IPRATROPIUM BROMIDE AND ALBUTEROL SULFATE 3 ML: .5; 3 SOLUTION RESPIRATORY (INHALATION) at 12:29

## 2024-07-09 RX ADMIN — ANTI-FUNGAL POWDER MICONAZOLE NITRATE TALC FREE 1 APPLICATION: 1.42 POWDER TOPICAL at 22:14

## 2024-07-09 RX ADMIN — MAGNESIUM SULFATE HEPTAHYDRATE 2 G: 40 INJECTION, SOLUTION INTRAVENOUS at 09:57

## 2024-07-09 RX ADMIN — POTASSIUM CHLORIDE 10 MEQ: 7.46 INJECTION, SOLUTION INTRAVENOUS at 09:59

## 2024-07-09 NOTE — THERAPY TREATMENT NOTE
"Subjective: Pt agreeable to therapeutic plan of care.    Objective:     Bed mobility - Supine<>sitting Max-A and Assist x 2; DEPx2 for scooting up towards HOB, rolling L/R.     Balance - Sitting balance initially min A, and able to progress to SBA. Pt tolerated sitting at EOB ~15 min   Standing balance - max Ax2    ADLs - DEP for pericare while standing with support from another.     Transfers - x2 STS from EOB Max Ax2 using RW. Poor standing tolerance.     Therex - 10-15 sitting B AROM ankle pumps, LAQ, sitting marches    Ambulation - 0 feet N/A or Not attempted.    Vitals: Tachycardic  HR elevates up to 169 bpm with exertion.     Education: Provided education on the importance of mobility in the acute care setting, Verbal/Tactile Cues, ADL training, and Transfer Training    Assessment: Ban Brennan presents with functional mobility impairments which indicate the need for skilled intervention. Tolerating session today without incident. Pt requires max A-DEPx2 for bed mobility. Max Ax2 for STS from EOB using RW. Unable to attempt SPS transfer to recliner chair secondary to fatigue and weakness. Encouraged pt to request for nursing staff to utilize Raymond lift to transfer to recliner chair for meals. Will continue to follow and progress as tolerated.     Plan/Recommendations:   If medically appropriate, Moderate Intensity Therapy recommended post-acute care. This is recommended as therapy feels the patient would require 3-4 days per week and wouldn't tolerate \"3 hour daily\" rehab intensity. SNF would be the preferred choice. If the patient does not agree to SNF, arrange HH or OP depending on home bound status. If patient is medically complex, consider LTACH. Pt requires no DME at discharge.     Pt desires Skilled Rehab placement at discharge. Pt cooperative; agreeable to therapeutic recommendations and plan of care.         Basic Mobility 6-click:  Rollin = Total, A lot = 2, A little = 3; 4 = " None  Supine>Sit:   1 = Total, A lot = 2, A little = 3; 4 = None   Sit>Stand with arms:  1 = Total, A lot = 2, A little = 3; 4 = None  Bed>Chair:   1 = Total, A lot = 2, A little = 3; 4 = None  Ambulate in room:  1 = Total, A lot = 2, A little = 3; 4 = None  3-5 Steps with railin = Total, A lot = 2, A little = 3; 4 = None  Score: 9    Modified [place]: N/A = No pre-op stroke/TIA    Post-Tx Position: Up in Chair, Alarms activated, and Call light and personal items within reach  PPE: gloves, surgical mask, and gown

## 2024-07-09 NOTE — PROGRESS NOTES
"Referring Provider: Librado Villagomez MD    Reason for follow-up: Atrial fibrillation with rapid ventricular rate     Patient Care Team:  Rut Pierce APRN as PCP - General (Nurse Practitioner)  Austin Brooks MD as Cardiologist (Cardiology)      SUBJECTIVE  Complains of rectal bleeding today.     ROS  Review of all systems negative except as indicated.    Since I have last seen, the patient has been without any chest discomfort, shortness of breath, palpitations, dizziness or syncope.  Denies having any headache, abdominal pain, nausea, vomiting, diarrhea, constipation, loss of weight or loss of appetite.  Denies having any excessive bruising, hematuria or blood in the stool.        Personal History:    Past Medical History:   Diagnosis Date    Bilateral leg edema     Chronic respiratory failure with hypoxia, on home O2 therapy 07/01/2024    COPD (chronic obstructive pulmonary disease)     Morbid obesity with BMI of 40.0-44.9, adult 11/08/2010    Primary hypertension 03/01/2017    Pulmonary embolism     \"many years ago, was on warfarin\"    Sleep apnea        History reviewed. No pertinent surgical history.    History reviewed. No pertinent family history.    Social History     Tobacco Use    Smoking status: Never    Smokeless tobacco: Never   Vaping Use    Vaping status: Never Used   Substance Use Topics    Alcohol use: Never    Drug use: Never        Home meds:  Prior to Admission medications    Medication Sig Start Date End Date Taking? Authorizing Provider   Allergy Relief 180 MG tablet Take 1 tablet by mouth Daily. 5/30/24  Yes Chadwick Johnson MD   bumetanide (BUMEX) 1 MG tablet Take 1 tablet by mouth 3 times a day. 5/30/24  Yes Chadwick Johnson MD   docusate sodium (COLACE) 100 MG capsule Take 1 capsule by mouth Every 12 (Twelve) Hours. 5/30/24  Yes Chadwick Johnson MD   furosemide (LASIX) 20 MG tablet Take 1 tablet by mouth Daily.   Yes Chadwick Johnson MD "   HYDROcodone-acetaminophen (NORCO)  MG per tablet Take 1 tablet by mouth 3 times a day. 5/30/24  Yes Chadwick Johnson MD   lisinopril (PRINIVIL,ZESTRIL) 30 MG tablet Take 1 tablet by mouth Daily. 3/18/24  Yes Chadwick Johnson MD   meloxicam (MOBIC) 7.5 MG tablet Take 1 tablet by mouth Daily As Needed. 3/18/24  Yes Chadwick Johnson MD   metoprolol tartrate (LOPRESSOR) 50 MG tablet Take 1 tablet by mouth Daily.   Yes Chadwick Johnson MD   montelukast (SINGULAIR) 10 MG tablet Take 1 tablet by mouth every night at bedtime.   Yes Chadwick Johnson MD   omeprazole (priLOSEC) 20 MG capsule Take 1 capsule by mouth Daily. 3/18/24  Yes Chadwick Johnson MD   oxybutynin XL (DITROPAN-XL) 10 MG 24 hr tablet Take 2 tablets by mouth Daily. 3/18/24  Yes Chadwick Johnson MD   potassium chloride (MICRO-K) 10 MEQ CR capsule Take 1 capsule by mouth Every 12 (Twelve) Hours. 3/18/24  Yes Chadwick Johnson MD   vitamin D (ERGOCALCIFEROL) 1.25 MG (02522 UT) capsule capsule Take 1 capsule by mouth 2 (Two) Times a Week. Monday and Thursday. 5/30/24  Yes Chadwick Johnson MD       Allergies:  Patient has no known allergies.    Scheduled Meds:amiodarone, 200 mg, Oral, Q12H   Followed by  [START ON 7/23/2024] amiodarone, 200 mg, Oral, Daily  apixaban, 10 mg, Oral, BID   Followed by  [START ON 7/14/2024] apixaban, 5 mg, Oral, BID  budesonide, 0.5 mg, Nebulization, BID - RT  bumetanide, 1 mg, Oral, BID  cephalexin, 500 mg, Nasogastric, Q8H  doxycycline, 100 mg, Nasogastric, Q12H  insulin lispro, 2-7 Units, Subcutaneous, 4x Daily With Meals & Nightly  ipratropium-albuterol, 3 mL, Nebulization, 4x Daily - RT  lansoprazole, 30 mg, Nasogastric, Q AM  lidocaine, 20 mL, Infiltration, Once  magnesium sulfate, 2 g, Intravenous, Q2H  metoprolol tartrate, 12.5 mg, Nasogastric, Q12H  miconazole, 1 Application, Topical, Q12H  potassium chloride, 10 mEq, Intravenous, Q1H  potassium phosphate, 15 mmol,  "Intravenous, Once  povidone-iodine, , Topical, Daily  sodium chloride, 10 mL, Intravenous, Q12H  spironolactone, 25 mg, Oral, Daily      Continuous Infusions:   PRN Meds:.  acetaminophen **OR** acetaminophen    Calcium Replacement - Follow Nurse / BPA Driven Protocol    dextrose    dextrose    glucagon (human recombinant)    ipratropium-albuterol    Magnesium Standard Dose Replacement - Follow Nurse / BPA Driven Protocol    nitroglycerin    ondansetron ODT **OR** ondansetron    Phosphorus Replacement - Follow Nurse / BPA Driven Protocol    Potassium Replacement - Follow Nurse / BPA Driven Protocol    [COMPLETED] Insert Peripheral IV **AND** sodium chloride    sodium chloride    sodium chloride      OBJECTIVE    Vital Signs  Vitals:    07/08/24 2055 07/09/24 0149 07/09/24 0500 07/09/24 0525   BP: 116/59 109/59  102/67   BP Location: Right arm Left arm  Left arm   Patient Position: Lying Lying  Lying   Pulse: 94 97  98   Resp: 21 23 24   Temp: 97.5 °F (36.4 °C) 97.8 °F (36.6 °C)  97.6 °F (36.4 °C)   TempSrc: Oral Oral  Oral   SpO2: 96% 91%  95%   Weight:   87 kg (191 lb 12.8 oz)    Height:           Flowsheet Rows      Flowsheet Row First Filed Value   Admission Height 147.3 cm (58\") Documented at 06/30/2024 1650   Admission Weight 108 kg (239 lb) Documented at 06/30/2024 1650              Intake/Output Summary (Last 24 hours) at 7/9/2024 0735  Last data filed at 7/9/2024 0149  Gross per 24 hour   Intake 480 ml   Output 2400 ml   Net -1920 ml          Telemetry: Atrial fibrillation with heart rate in the 90s    Physical Exam:  The patient is alert, oriented and in no distress.  Obese  Vital signs as noted above.  Head and neck revealed no carotid bruits or jugular venous distention.  No thyromegaly or lymphadenopathy is present  Lungs clear.  No wheezing.  Breath sounds are normal bilaterally.  Heart normal first and second heart sounds.  No murmur. No precordial rub is present.  No gallop is present.  Abdomen soft " and nontender.  No organomegaly is present.  Extremities with good peripheral pulses without any pedal edema.  Skin warm and dry.  Musculoskeletal system is grossly normal.  CNS grossly normal.       Results Review:  I have personally reviewed the results from the time of this admission to 7/9/2024 07:35 EDT and agree with these findings:  []  Laboratory  []  Microbiology  []  Radiology  []  EKG/Telemetry   []  Cardiology/Vascular   []  Pathology  []  Old records  []  Other:    Most notable findings include:    Lab Results (last 24 hours)       Procedure Component Value Units Date/Time    POC Glucose 4x Daily Before Meals & at Bedtime [[id number]]  (Normal) Collected: 07/09/24 0724    Specimen: Blood Updated: 07/09/24 0726     Glucose 99 mg/dL      Comment: Serial Number: [id number]:  [id number]       Magnesium [[id number]]  (Abnormal) Collected: 07/09/24 0256    Specimen: Blood from Arm, Right Updated: 07/09/24 0326     Magnesium 1.3 mg/dL     Phosphorus [[id number]]  (Abnormal) Collected: 07/09/24 0256    Specimen: Blood from Arm, Right Updated: 07/09/24 0325     Phosphorus 1.8 mg/dL     CBC & Differential [[id number]]  (Abnormal) Collected: 07/09/24 0256    Specimen: Blood from Arm, Right Updated: 07/09/24 0324    Narrative:      The following orders were created for panel order CBC & Differential.  Procedure                               Abnormality         Status                     ---------                               -----------         ------                     CBC Auto Differential[[id number]]        Abnormal            Final result               Scan Slide[[id number]]                                                                    Please view results for these tests on the individual orders.    CBC Auto Differential [[id number]]  (Abnormal) Collected: 07/09/24 0256    Specimen: Blood from Arm, Right Updated: 07/09/24 0324     WBC 10.66 10*3/mm3      RBC 4.15 10*6/mm3      Hemoglobin 12.6 g/dL       Hematocrit 39.1 %      MCV 94.2 fL      MCH 30.4 pg      MCHC 32.2 g/dL      RDW 16.7 %      RDW-SD 54.1 fl      MPV 11.6 fL      Platelets 87 10*3/mm3      Comment: Result checked          Neutrophil % 86.5 %      Lymphocyte % 6.2 %      Monocyte % 6.3 %      Eosinophil % 0.3 %      Basophil % 0.1 %      Immature Grans % 0.6 %      Neutrophils, Absolute 9.23 10*3/mm3      Lymphocytes, Absolute 0.66 10*3/mm3      Monocytes, Absolute 0.67 10*3/mm3      Eosinophils, Absolute 0.03 10*3/mm3      Basophils, Absolute 0.01 10*3/mm3      Immature Grans, Absolute 0.06 10*3/mm3      nRBC 0.0 /100 WBC     Comprehensive Metabolic Panel [[id number]]  (Abnormal) Collected: 07/09/24 0256    Specimen: Blood from Arm, Right Updated: 07/09/24 0322     Glucose 117 mg/dL      BUN 68 mg/dL      Creatinine 1.08 mg/dL      Sodium 145 mmol/L      Potassium 3.6 mmol/L      Chloride 98 mmol/L      CO2 39.5 mmol/L      Calcium 9.0 mg/dL      Total Protein 5.4 g/dL      Albumin 2.2 g/dL      ALT (SGPT) 17 U/L      AST (SGOT) 37 U/L      Alkaline Phosphatase 84 U/L      Total Bilirubin 1.7 mg/dL      Globulin 3.2 gm/dL      A/G Ratio 0.7 g/dL      BUN/Creatinine Ratio 63.0     Anion Gap 7.5 mmol/L      eGFR 55.7 mL/min/1.73     Narrative:      GFR Normal >60  Chronic Kidney Disease <60  Kidney Failure <15      POC Glucose Once [[id number]]  (Abnormal) Collected: 07/08/24 2009    Specimen: Blood Updated: 07/08/24 2011     Glucose 120 mg/dL      Comment: Serial Number: [id number]:  [id number]       POC Glucose Once [[id number]]  (Abnormal) Collected: 07/08/24 1303    Specimen: Blood Updated: 07/08/24 1304     Glucose 120 mg/dL      Comment: Serial Number: [id number]:  [id number]       POC Glucose Once [[id number]]  (Abnormal) Collected: 07/08/24 1000    Specimen: Blood Updated: 07/08/24 1002     Glucose 201 mg/dL      Comment: Serial Number: [id number]:  [id number]       Body Fluid Culture - Body Fluid, Pleural Cavity [[id number]]  Collected: 07/06/24 1544    Specimen: Body Fluid from Pleural Cavity Updated: 07/08/24 0917     Body Fluid Culture No growth     Gram Stain Many (4+) WBCs per low power field      No organisms seen    Non-gynecologic Cytology [[id number]] Collected: 07/06/24 1544    Specimen: Pleural Fluid from Pleural Cavity Updated: 07/08/24 0855            Imaging Results (Last 24 Hours)       ** No results found for the last 24 hours. **            LAB RESULTS (LAST 7 DAYS)    CBC  Results from last 7 days   Lab Units 07/09/24  0256 07/08/24  0446 07/07/24  0506 07/06/24  0354 07/05/24  0918 07/05/24  0257 07/04/24  0414   WBC 10*3/mm3 10.66 11.66* 16.38* 15.29* 12.93* 11.98* 9.76   RBC 10*6/mm3 4.15 4.45 4.73 4.54 4.55 4.78 4.80   HEMOGLOBIN g/dL 12.6 13.3 14.2 13.8 13.8 14.6 14.7   HEMATOCRIT % 39.1 41.8 43.9 42.8 45.5 48.1* 47.3*   MCV fL 94.2 93.9 92.8 94.3 100.0* 100.6* 98.5*   PLATELETS 10*3/mm3 87* 100* 130* 128* 115* 133* 123*       BMP  Results from last 7 days   Lab Units 07/09/24  0256 07/08/24  0446 07/07/24  0506 07/06/24  1608 07/06/24  0354 07/05/24  0918 07/05/24  0257 07/04/24  1727 07/04/24  0414   SODIUM mmol/L 145 145 143 140 139 135* 136   < > 136   POTASSIUM mmol/L 3.6 3.0* 3.9 3.3* 3.7 5.3* 5.9*   < > 5.9*   CHLORIDE mmol/L 98 97* 98 96* 96* 95* 95*   < > 94*   CO2 mmol/L 39.5* 38.0* 35.7* 33.9* 34.0* 31.6* 35.0*   < > 33.4*   BUN mg/dL 68* 72* 74* 72* 70* 67* 65*   < > 54*   CREATININE mg/dL 1.08* 1.19* 1.37* 1.41* 1.56* 1.86* 1.88*   < > 1.77*   GLUCOSE mg/dL 117* 98 102* 111* 133* 128* 99   < > 108*   MAGNESIUM mg/dL 1.3* 1.8 1.6  --  1.9 1.9 2.0  --  1.9   PHOSPHORUS mg/dL 1.8* 2.4* 2.5  --  3.5 4.2 5.3*  --  5.8*    < > = values in this interval not displayed.       CMP   Results from last 7 days   Lab Units 07/09/24  0256 07/08/24  0446 07/07/24  0506 07/06/24  1608 07/06/24  0354 07/05/24  0918 07/05/24  0257 07/04/24  1727 07/04/24  0414   SODIUM mmol/L 145 145 143 140 139 135* 136   < > 136    POTASSIUM mmol/L 3.6 3.0* 3.9 3.3* 3.7 5.3* 5.9*   < > 5.9*   CHLORIDE mmol/L 98 97* 98 96* 96* 95* 95*   < > 94*   CO2 mmol/L 39.5* 38.0* 35.7* 33.9* 34.0* 31.6* 35.0*   < > 33.4*   BUN mg/dL 68* 72* 74* 72* 70* 67* 65*   < > 54*   CREATININE mg/dL 1.08* 1.19* 1.37* 1.41* 1.56* 1.86* 1.88*   < > 1.77*   GLUCOSE mg/dL 117* 98 102* 111* 133* 128* 99   < > 108*   ALBUMIN g/dL 2.2* 2.2* 2.5* 2.5* 2.5*  --  2.5*  --  2.6*   BILIRUBIN mg/dL 1.7* 1.7* 1.8* 1.6* 1.6*  --  1.6*  --  1.3*   ALK PHOS U/L 84 96 125* 110 117  --  129*  --  138*   AST (SGOT) U/L 37* 36* 30 31 27  --  28  --  38*   ALT (SGPT) U/L 17 15 17 17 14  --  16  --  17    < > = values in this interval not displayed.       BNP        TROPONIN          CoAg        Creatinine Clearance  Estimated Creatinine Clearance: 49.7 mL/min (A) (by C-G formula based on SCr of 1.08 mg/dL (H)).    ABG  Results from last 7 days   Lab Units 07/06/24  1150 07/05/24  0711 07/05/24  0442 07/05/24  0353 07/05/24  0212   PH, ARTERIAL pH units 7.392 7.281* 7.250* 7.204* 7.208*   PCO2, ARTERIAL mm Hg 61.2* 78.4* 80.9* 94.8* 92.8*   PO2 ART mm Hg 61.9* 106.7 76.6* 87.0 76.7*   O2 SATURATION ART % 90.2* 97.0 91.7* 93.1* 90.5*   BASE EXCESS ART mmol/L 9.3* 6.5* 4.5* 4.6* 4.4*       Radiology  No radiology results for the last day      EKG  I personally viewed and interpreted the patient's EKG/Telemetry data:  ECG 12 Lead Rhythm Change   Final Result   HEART RATE=92  bpm   RR Ublqgvud=406  ms   VA Interval=  ms   P Horizontal Axis=  deg   P Front Axis=  deg   QRSD Foreqsha=037  ms   QT Ppypcloz=330  ms   INbA=613  ms   QRS Axis=-96  deg   T Wave Axis=65  deg   - ABNORMAL ECG -   Atrial fibrillation   Right bundle branch block   When compared with ECG of 05-Jul-2024 02:24:17,   No significant change   Electronically Signed By: Tee Whitlock ([facility]) 2024-07-07 10:12:55   Date and Time of Study:2024-07-05 06:43:37      ECG 12 Lead Drug Monitoring; amiodarone   Final Result   HEART  MRAG=154  bpm   RR Kfwqpvlb=784  ms   UT Interval=  ms   P Horizontal Axis=  deg   P Front Axis=  deg   QRSD Umbxhlhb=794  ms   QT Cdkrzuow=429  ms   NYdI=555  ms   QRS Axis=-94  deg   T Wave Axis=70  deg   - ABNORMAL ECG -   Atrial fibrillation   Ventricular premature complex   Right bundle branch block   When compared with ECG of 04-Jul-2024 09:17:07,   No change from prior tracing   Electronically Signed By: Tee Whitlock ([facility]) 2024-07-07 10:13:32   Date and Time of Study:2024-07-05 02:24:17      ECG 12 Lead Electrolyte Imbalance   Final Result   HEART QYPF=479  bpm   RR Grcwcyfd=465  ms   UT Wjalahnw=332  ms   P Horizontal Axis=113  deg   P Front Axis=263  deg   QRSD Tqkmfanc=744  ms   QT Yldtouci=674  ms   IIrG=889  ms   QRS Axis=227  deg   T Wave Axis=27  deg   - ABNORMAL ECG -   Right bundle branch block   When compared with ECG of 04-Jul-2024 04:20:51,   Significant change in rhythm: previously atrial fibrillation   Significant repolarization change   Atrial fibrillation with rapid V-rate   No change from prior tracing   Electronically Signed By: Tee Whitlock ([facility]) 2024-07-07 10:14:27   Date and Time of Study:2024-07-04 09:17:07      ECG 12 Lead Drug Monitoring; Amiodarone   Preliminary Result   HEART YMDA=991  bpm   RR Qqcphjrb=213  ms   UT Interval=  ms   P Horizontal Axis=  deg   P Front Axis=  deg   QRSD Kamzubao=516  ms   QT Wogtqjlh=375  ms   GBbA=324  ms   QRS Axis=-90  deg   T Wave Axis=36  deg   - ABNORMAL ECG -   Atrial fibrillation   Right bundle branch block   ST elevation secondary to IVCD   Date and Time of Study:2024-07-04 04:20:51      ECG 12 Lead Chest Pain   Preliminary Result   HEART LLSF=277  bpm   RR Qgrzofes=265  ms   UT Interval=  ms   P Horizontal Axis=  deg   P Front Axis=  deg   QRSD Ccnqlxca=047  ms   QT Pgbgbhsk=833  ms   WLhT=496  ms   QRS Axis=-98  deg   T Wave Axis=37  deg   - ABNORMAL ECG -   Atrial fibrillation   Right bundle branch block   Anterolateral infarct, old    Date and Time of Study:2024-07-03 17:38:52      ECG 12 Lead Drug Monitoring; Amiodarone   Preliminary Result   HEART LIPM=915  bpm   RR Fbjhjjno=318  ms   MA Interval=  ms   P Horizontal Axis=  deg   P Front Axis=  deg   QRSD Vapxuxix=281  ms   QT Fnqblhyf=833  ms   HOlR=885  ms   QRS Axis=-90  deg   T Wave Axis=74  deg   - ABNORMAL ECG -   Atrial fibrillation   Right bundle branch block   ST elevation secondary to IVCD   Date and Time of Study:2024-07-03 04:02:32      ECG 12 Lead Drug Monitoring; Amiodarone   Preliminary Result   HEART ERQD=185  bpm   RR Izjuzlrb=920  ms   MA Interval=  ms   P Horizontal Axis=  deg   P Front Axis=  deg   QRSD Uwiwqqaa=132  ms   QT Xbkpsbeo=617  ms   GGxZ=692  ms   QRS Axis=-93  deg   T Wave Axis=45  deg   - ABNORMAL ECG -   Atrial fibrillation   Right bundle branch block   Inferior infarct, old   When compared with ECG of 01-Jul-2024 04:42:13,   Nonspecific significant change   Date and Time of Study:2024-07-02 15:12:56      ECG 12 Lead Drug Monitoring; Amiodarone   Final Result   HEART UYHT=038  bpm   RR Qyddqrnh=931  ms   MA Interval=  ms   P Horizontal Axis=  deg   P Front Axis=  deg   QRSD Lawcafxi=179  ms   QT Hzpqqysp=479  ms   PGjG=980  ms   QRS Axis=-80  deg   T Wave Axis=102  deg   - ABNORMAL ECG -   Atrial fibrillation   Right bundle branch block   Inferior  infarct, old   Anterolateral  infarct, age indeterminate   When compared with ECG of 30-Jun-2024 16:58:43,   Significant rate decrease   New or worsened ischemia or infarction   Electronically Signed By: Austin Brooks ([facility]) 2024-07-01 13:11:59   Date and Time of Study:2024-07-01 04:42:13      ECG 12 Lead Dyspnea   Final Result   HEART WASN=388  bpm   RR Cfqfajeh=510  ms   MA Interval=  ms   P Horizontal Axis=  deg   P Front Axis=  deg   QRSD Incsocbc=219  ms   QT Exajlwwa=799  ms   KBsP=780  ms   QRS Axis=-101  deg   T Wave Axis=43  deg   - ABNORMAL ECG -   Atrial fibrillation   Right bundle branch block   ST  elevation secondary to IVCD   Electronically Signed By: Jeffery Kwon ([facility]) 2024-07-01 07:37:02   Date and Time of Study:2024-06-30 16:58:43      Telemetry Scan   Final Result      Telemetry Scan   Final Result      Telemetry Scan   Final Result      Telemetry Scan   Final Result      Telemetry Scan   Final Result      Telemetry Scan   Final Result      Telemetry Scan   Final Result      Telemetry Scan   Final Result      Telemetry Scan   Final Result      Telemetry Scan   Final Result      Telemetry Scan   Final Result      Telemetry Scan   Final Result      Telemetry Scan   Final Result      Telemetry Scan   Final Result      Telemetry Scan   Final Result      Telemetry Scan   Final Result      Telemetry Scan   Final Result      Telemetry Scan   Final Result      Telemetry Scan   Final Result      Telemetry Scan   Final Result      Telemetry Scan   Final Result      Telemetry Scan   Final Result      Telemetry Scan   Final Result      Telemetry Scan   Final Result      Telemetry Scan   Final Result      Telemetry Scan   Final Result      Telemetry Scan   Final Result      Telemetry Scan   Final Result      Telemetry Scan   Final Result      Telemetry Scan   Final Result      Telemetry Scan   Final Result      Telemetry Scan   Final Result      Telemetry Scan   Final Result      Telemetry Scan   Final Result      Telemetry Scan   Final Result      Telemetry Scan   Final Result      Telemetry Scan   Final Result      Telemetry Scan   Final Result      Telemetry Scan   Final Result      Telemetry Scan   Final Result      Telemetry Scan   Final Result      ECG 12 Lead Electrolyte Imbalance    (Results Pending)         Echocardiogram:    Results for orders placed during the hospital encounter of 06/30/24    Adult Transthoracic Echo Complete w/ Color, Spectral and Contrast if Necessary Per Protocol    Interpretation Summary    Left ventricular ejection fraction appears to be 31 - 35%.    Left ventricular diastolic  dysfunction is noted.    The left atrial cavity is dilated.    Moderate aortic valve stenosis is present. Mean/peak gradient of 14/24 mmHg.  Dimensionless index 0.36    Moderate to severe mitral valve regurgitation is present.    Estimated right ventricular systolic pressure from tricuspid regurgitation is normal (<35 mmHg).        Stress Test:         Cardiac Catheterization:  No results found for this or any previous visit.         Other:         ASSESSMENT & PLAN:    Principal Problem:    Atrial fibrillation with RVR  Active Problems:    Primary hypertension    Morbid obesity with BMI of 40.0-44.9, adult    Right bundle branch block    Acute deep vein thrombosis (DVT) of both lower extremities    ARABELLA (acute kidney injury)    Acute hyperkalemia    Sepsis    Acute UTI    Acute systolic congestive heart failure    Rhinovirus infection    Multifocal pneumonia    Chronic respiratory failure with hypoxia, on home O2 therapy    Skin ulcer of right great toe    Skin ulcer of left great toe    SEDRICK (obstructive sleep apnea)    Aortic stenosis    Mitral regurgitation    Acute HFrEF (heart failure with reduced ejection fraction)    COPD (chronic obstructive pulmonary disease)      Acute respiratory failure with hypoxia and hypercapnia  Acute encephalopathy   Currently on 2 L of oxygen via nasal cannula  Required chest tube placement for hydropneumothorax  Pulmonology is on board     Atrial fibrillation with RVR  Newly diagnosed  Electrolytes have been corrected  Rate controlled with heart rate in the 80s and 90s  Continue amiodarone for rhythm control   Will switch from metoprolol to tartrate to metoprolol succinate today  Increasing frequency of Toprol-XL to twice daily for better heart rate control  [id number] score is 7  Continue Eliquis  Rectal bleeding with no drop in H&H     Acute systolic CHF  Newly diagnosed  Echocardiogram shows EF of 31 to 35%, moderate aortic stenosis and moderate to severe mitral  regurgitation.  Completed IV diuretics now on oral  Monitor I's and O's and replace electrolytes as needed.  Switch from metoprolol to tartrate to succinate  Add ACE inhibitor/ARNI if renal function and blood pressure allow  Currently requiring midodrine  Plan to repeat echocardiogram after 3 months of completing GDMT     Acute deep vein thrombosis (DVT) of both lower extremities  Extensive DVT in bilateral lower extremities involving femoral, popliteal and posterior tibial.  Continue anticoagulation as above  She will need lifelong anticoagulation   Rectal bleeding with stable H&H     Thrombocytopenia  Closely monitor platelets while on anticoagulation  Platelets are 87     ARABELLA (acute kidney injury)  Presented with creatinine of 1.4.  Creatinine 1.08 today  Closely monitor renal function while on diuretics  Switched from IV to oral diuretics  Nephrology following      Acute hyperkalemia  Receiving [facility]  Nephrology following      Sepsis / UTI / Multifocal pneumonia / Rhinovirus infection  Multifactorial secondary to UTI and pneumonia with possible cellulitis  Continue empiric antibiotics      Skin ulcer of right great toe  Skin ulcer of left great toe  Podiatry following   A1c is 6.2%     Primary hypertension, chronic  Currently hypotensive likely secondary to sepsis  Currently requiring midodrine  Closely monitor blood pressure      Morbid obesity with BMI of 40.0-44.9, adult  BMI is 38.  She weighs 188 pounds  Lifestyle modifications recommended   LDL 27, HDL 13, triglyceride 86 and total cholesterol 58.  No indication for statin     SEDRICK (obstructive sleep apnea)  Encouraged compliance with CPAP      Austin Brooks MD  07/09/24  07:35 EDT

## 2024-07-09 NOTE — PROGRESS NOTES
"    PROGRESS NOTE      Patient Name: Ban Brennan  : 1955  MRN: [id number]  Primary Care Physician: Rut Pierce APRN  Date of admission: 2024    Patient Care Team:  Rut Pierce APRN as PCP - General (Nurse Practitioner)  Austin Brooks MD as Cardiologist (Cardiology)        Reason for Follow up     Acute kidney injury, hyperkalemia      Subjective     Seen and examined, awake, alert, making urine, no distress  Renal function improved, made 2L urine     Review of systems:    ROS was otherwise negative except as mentioned in the [place].       Personal History:     Past Medical History:   Past Medical History:   Diagnosis Date    Bilateral leg edema     Chronic respiratory failure with hypoxia, on home O2 therapy 2024    COPD (chronic obstructive pulmonary disease)     Morbid obesity with BMI of 40.0-44.9, adult 2010    Primary hypertension 2017    Pulmonary embolism     \"many years ago, was on warfarin\"    Sleep apnea        Surgical History:    History reviewed. No pertinent surgical history.    Family History: family history is not on file. Otherwise pertinent FHx was reviewed and unremarkable.     Social History:  reports that she has never smoked. She has never used smokeless tobacco. She reports that she does not drink alcohol and does not use drugs.    Medications:  Prior to Admission medications    Medication Sig Start Date End Date Taking? Authorizing Provider   Allergy Relief 180 MG tablet Take 1 tablet by mouth Daily. 24  Yes Chadwick Johnson MD   bumetanide (BUMEX) 1 MG tablet Take 1 tablet by mouth 3 times a day. 24  Yes Chadwick Johnson MD   docusate sodium (COLACE) 100 MG capsule Take 1 capsule by mouth Every 12 (Twelve) Hours. 24  Yes Chadwick Johnson MD   furosemide (LASIX) 20 MG tablet Take 1 tablet by mouth Daily.   Yes Chadwick Johnson MD   HYDROcodone-acetaminophen (NORCO)  MG per tablet Take 1 tablet by " mouth 3 times a day. 5/30/24  Yes Chadwick Johnson MD   lisinopril (PRINIVIL,ZESTRIL) 30 MG tablet Take 1 tablet by mouth Daily. 3/18/24  Yes Chadwick Johnson MD   meloxicam (MOBIC) 7.5 MG tablet Take 1 tablet by mouth Daily As Needed. 3/18/24  Yes Chadwick Johnson MD   metoprolol tartrate (LOPRESSOR) 50 MG tablet Take 1 tablet by mouth Daily.   Yes Chadwick Johnson MD   montelukast (SINGULAIR) 10 MG tablet Take 1 tablet by mouth every night at bedtime.   Yes Chadwick Johnson MD   omeprazole (priLOSEC) 20 MG capsule Take 1 capsule by mouth Daily. 3/18/24  Yes Provider, Historical, MD   oxybutynin XL (DITROPAN-XL) 10 MG 24 hr tablet Take 2 tablets by mouth Daily. 3/18/24  Yes Chadwick Johnson MD   potassium chloride (MICRO-K) 10 MEQ CR capsule Take 1 capsule by mouth Every 12 (Twelve) Hours. 3/18/24  Yes Chadwick Johnson MD   vitamin D (ERGOCALCIFEROL) 1.25 MG (44428 UT) capsule capsule Take 1 capsule by mouth 2 (Two) Times a Week. Monday and Thursday. 5/30/24  Yes Chadwick Johnson MD     Scheduled Meds:amiodarone, 200 mg, Oral, Q12H   Followed by  [START ON 7/23/2024] amiodarone, 200 mg, Oral, Daily  apixaban, 10 mg, Oral, BID   Followed by  [START ON 7/14/2024] apixaban, 5 mg, Oral, BID  budesonide, 0.5 mg, Nebulization, BID - RT  bumetanide, 1 mg, Oral, BID  cephalexin, 500 mg, Nasogastric, Q8H  doxycycline, 100 mg, Nasogastric, Q12H  insulin lispro, 2-7 Units, Subcutaneous, 4x Daily With Meals & Nightly  ipratropium-albuterol, 3 mL, Nebulization, 4x Daily - RT  lidocaine, 20 mL, Infiltration, Once  metoprolol succinate XL, 25 mg, Oral, Q12H  miconazole, 1 Application, Topical, Q12H  pantoprazole, 40 mg, Intravenous, BID AC  povidone-iodine, , Topical, Daily  sodium chloride, 10 mL, Intravenous, Q12H  spironolactone, 25 mg, Oral, Daily      Continuous Infusions:     PRN Meds:  acetaminophen **OR** acetaminophen    Calcium Replacement - Follow Nurse / BPA Driven Protocol     dextrose    dextrose    glucagon (human recombinant)    ipratropium-albuterol    Magnesium Standard Dose Replacement - Follow Nurse / BPA Driven Protocol    nitroglycerin    ondansetron ODT **OR** ondansetron    Phosphorus Replacement - Follow Nurse / BPA Driven Protocol    Potassium Replacement - Follow Nurse / BPA Driven Protocol    [COMPLETED] Insert Peripheral IV **AND** sodium chloride    sodium chloride    sodium chloride  Allergies:  No Known Allergies    Objective   Exam:     Vital Signs  Temp:  [97.5 °F (36.4 °C)-98.4 °F (36.9 °C)] 98.1 °F (36.7 °C)  Heart Rate:  [] 117  Resp:  [14-28] 18  BP: (102-136)/(58-75) 102/60  SpO2:  [90 %-96 %] 90 %  on  Flow (L/min):  [2-3] 2;   Device (Oxygen Therapy): humidified;nasal cannula  Body mass index is 38.74 kg/m².  EXAM  General:  69 yr old  female, some respiratory distress  Head:      Normocephalic and atraumatic.    Eyes:      PERRL/EOM intact, conjunctivae and sclerae clear without nystagmus.    Neck:      No masses, thyromegaly,  trachea central   Lungs:    Clear bilaterally to auscultation.    Heart:      Regular rate and rhythm, no murmur no gallop  Abd:        Soft, nontender, not distended, bowel sounds positive, no shifting dullness.  Msk:        No deformity or scoliosis noted of thoracic or lumbar spine.    Pulses:   Pulses normal in all 4 extremities.    Extremities:        No cyanosis or clubbing--+ + edema.    Neuro:    Lethargic  Skin:       Intact without lesions or rashes.          Results Review:  I have personally reviewed most recent Data :  BMP @LABRCNT(creatinine:10)  CBC    Results from last 7 days   Lab Units 07/09/24  0851 07/09/24  0256 07/08/24  0446 07/07/24  0506 07/06/24  0354 07/05/24  0918 07/05/24  0257 07/04/24  0414   WBC 10*3/mm3  --  10.66 11.66* 16.38* 15.29* 12.93* 11.98* 9.76   HEMOGLOBIN g/dL 13.1 12.6 13.3 14.2 13.8 13.8 14.6 14.7   PLATELETS 10*3/mm3  --  87* 100* 130* 128* 115* 133* 123*     CMP   Results from last  7 days   Lab Units 07/09/24  0256 07/08/24  0446 07/07/24  0506 07/06/24  1608 07/06/24  0354 07/05/24  0918 07/05/24  0257 07/04/24  1727 07/04/24  0414   SODIUM mmol/L 145 145 143 140 139 135* 136   < > 136   POTASSIUM mmol/L 3.6 3.0* 3.9 3.3* 3.7 5.3* 5.9*   < > 5.9*   CHLORIDE mmol/L 98 97* 98 96* 96* 95* 95*   < > 94*   CO2 mmol/L 39.5* 38.0* 35.7* 33.9* 34.0* 31.6* 35.0*   < > 33.4*   BUN mg/dL 68* 72* 74* 72* 70* 67* 65*   < > 54*   CREATININE mg/dL 1.08* 1.19* 1.37* 1.41* 1.56* 1.86* 1.88*   < > 1.77*   GLUCOSE mg/dL 117* 98 102* 111* 133* 128* 99   < > 108*   ALBUMIN g/dL 2.2* 2.2* 2.5* 2.5* 2.5*  --  2.5*  --  2.6*   BILIRUBIN mg/dL 1.7* 1.7* 1.8* 1.6* 1.6*  --  1.6*  --  1.3*   ALK PHOS U/L 84 96 125* 110 117  --  129*  --  138*   AST (SGOT) U/L 37* 36* 30 31 27  --  28  --  38*   ALT (SGPT) U/L 17 15 17 17 14  --  16  --  17    < > = values in this interval not displayed.     ABG    Results from last 7 days   Lab Units 07/06/24  1150 07/05/24  0711 07/05/24  0442 07/05/24  0353 07/05/24  0212   PH, ARTERIAL pH units 7.392 7.281* 7.250* 7.204* 7.208*   PCO2, ARTERIAL mm Hg 61.2* 78.4* 80.9* 94.8* 92.8*   PO2 ART mm Hg 61.9* 106.7 76.6* 87.0 76.7*   O2 SATURATION ART % 90.2* 97.0 91.7* 93.1* 90.5*   BASE EXCESS ART mmol/L 9.3* 6.5* 4.5* 4.6* 4.4*     XR Chest 1 View    Result Date: 7/9/2024  Impression: 1.Bilateral airspace disease again noted. 2.Cardiomegaly 3.Pigtail catheter chest tube right lung base. Electronically Signed: Bran Bernal MD  7/9/2024 11:37 AM EDT  Workstation ID: [id number]    XR Chest 1 View    Result Date: 7/6/2024  Impression: Successful placement of a right chest tube for right-sided hydropneumothorax, which demonstrates decreased/trace fluid component and similar/small gas component. Electronically Signed: Manan Jefferson MD  7/6/2024 2:35 PM EDT  Workstation ID: [id number]    CT Chest Without Contrast Diagnostic    Result Date: 7/6/2024  Impression: Moderate right-sided  hydropneumothorax as seen on same-day radiograph. Adjacent partial collapse of the right lung with multifocal peripheral bronchial obstruction. There are dense airspace consolidations throughout the right lung, and findings are likely a combination of lung collapse and worsening infection. Persistent airspace opacities in the left lung consistent with infection, slightly worsened from prior CT. Small left pleural effusion. Prominent four-chamber cardiomegaly. Pulmonary artery enlargement consistent with pulmonary hypertension. Trace perihepatic ascites. Persistent bilateral flank and right chest wall edema. Electronically Signed: Javon Mortensen MD  7/6/2024 11:19 AM EDT  Workstation ID: [id number]    XR Chest 1 View    Result Date: 7/6/2024  Impression: 1. There is a definite small right-sided pneumothorax. The right lung is partially collapsed and consolidated. A small to moderate right pleural effusion is present indicating a hydropneumothorax. 2. Unchanged consolidation in the left lung with a small pleural effusion. 3. Cardiomegaly. 4.The abnormal results were discussed with the nurse (Dianne) by telephone. The referring clinician will be contacted. Electronically Signed: Jean Claude Bobby MD  7/6/2024 9:56 AM EDT  Workstation ID: [id number]    XR Chest 1 View    Result Date: 7/6/2024  Impression: 1. Questionable right-sided pneumothorax. I would recommend a repeat chest x-ray as the patient is rotated. 2. Persistent patchy consolidation in the left mid and lower lung zones suspicious for pneumonia. There is a small right pleural effusion. 3. Abnormal consolidation in the right lung with a small to moderate pleural effusion. 4. Cardiomegaly. 5. The abnormal results were discussed with the nurse in the CVICU (Dianne) by telephone and the referring clinician will be contacted. Electronically Signed: Jean Claude Bobby MD  7/6/2024 9:27 AM EDT  Workstation ID: [id number]    XR Chest 1 View    Result Date: 7/5/2024  Impression: Marked  cardiomegaly with bilateral patchy airspace disease and pleural effusions. Findings are similar to the previous exam. Electronically Signed: Amirah Coto MD  7/5/2024 9:14 AM EDT  Workstation ID: [id number]    XR Abdomen KUB    Result Date: 7/4/2024  Impression: Feeding tube is in the stomach. Electronically Signed: Obed Sidhu MD  7/4/2024 9:59 PM EDT  Workstation ID: [id number]    US Renal Bilateral    Result Date: 7/4/2024  Impression: No acute process nor significant abnormality identified. Electronically Signed: Beto Kaur MD  7/4/2024 4:14 PM EDT  Workstation ID: [id number]    XR Chest 1 View    Result Date: 7/4/2024  Impression: Cardiomegaly with pulmonary vascular congestion and bilateral pleural effusions. Bibasilar airspace disease could be atelectasis or pneumonia. Electronically Signed: Obed Sidhu MD  7/4/2024 2:34 AM EDT  Workstation ID: [id number]    XR Chest 1 View    Result Date: 7/3/2024  Impression: No appreciable change in bibasilar lung infiltrates with small effusions. Continued cardiomegaly. Electronically Signed: Krystal Lee MD  7/3/2024 7:57 AM EDT  Workstation ID: [id number]    XR Chest 1 View    Result Date: 7/2/2024  Impression: Some improvement in bibasilar lung infiltrates with small effusions. Continued cardiomegaly. Electronically Signed: Krystal Lee MD  7/2/2024 12:46 PM EDT  Workstation ID: [id number]     Results for orders placed during the hospital encounter of 06/30/24    Adult Transthoracic Echo Complete w/ Color, Spectral and Contrast if Necessary Per Protocol    Interpretation Summary    Left ventricular ejection fraction appears to be 31 - 35%.    Left ventricular diastolic dysfunction is noted.    The left atrial cavity is dilated.    Moderate aortic valve stenosis is present. Mean/peak gradient of 14/24 mmHg.  Dimensionless index 0.36    Moderate to severe mitral valve regurgitation is present.    Estimated right ventricular systolic pressure from tricuspid regurgitation  is normal (<35 mmHg).        Assessment & Plan   Assessment and Plan:         Atrial fibrillation with RVR    Primary hypertension    Morbid obesity with BMI of 40.0-44.9, adult    Right bundle branch block    Acute deep vein thrombosis (DVT) of both lower extremities    ARABELLA (acute kidney injury)    Acute hyperkalemia    Sepsis    Acute UTI    Acute systolic congestive heart failure    Rhinovirus infection    Multifocal pneumonia    Chronic respiratory failure with hypoxia, on home O2 therapy    Skin ulcer of right great toe    Skin ulcer of left great toe    SEDRICK (obstructive sleep apnea)    Aortic stenosis    Mitral regurgitation    Acute HFrEF (heart failure with reduced ejection fraction)    COPD (chronic obstructive pulmonary disease)    ASSESSMENT:  Acute kidney injury, with baseline creatinine roughly 0.8-1.0mg/dL  Hyperkalemia  A-fib with RVR  Acute on chronic heart failure, with EF 31-35%, diastolic dysfunction, moderate AS and moderate to severe MR as per echo from 6/30/24  Bilateral DVT  Severe sepsis/LLE cellulitis, acute cystitis  Multifocal pneumonia    PLAN :     Renal function improved today. Suspect she may initially have had some acute cardiorenal component, now most likely has some ATN secondary to sepsis, elevated Vanc level (was 22.4 on 7/3), and some prolonged prerenal state with hemodynamic insults, arrhythmias.  Creatinine continues to improve at this time down to 1.08   Hypophosphatemia noted that need to be replaced   Hypomagnesemia noted   replace potassium as per protocol  No significant proteinuria present at this time  Chun in place for strict I/Os  Continue Aldactone that help with hypokalemia and renal function is slightly better  Patient still has edema significantly high bicarbonate noted likely secondary to underlying respiratory acidosis  No need for additional midodrine  Antibiotics as per ID. Now off Vanc, agree. On PO Doxy and Cephalexin  Check labs daily. Will follow  closely      Charan Krueger MD  [facility]  7/9/2024  15:36 EDT

## 2024-07-09 NOTE — PLAN OF CARE
"Assessment: Ban Brennan presents with ADL impairments affecting function including balance, endurance / activity tolerance, shortness of breath, and strength. Demonstrated functioning below baseline abilities indicate the need for continued skilled intervention while inpatient. Tolerating session today without incident. Pt requires Max-Dependent x2 for all bed mobility. Pt with incontinence of bowels, dependent for renee-care in standing. Also completed hair care seated EOB, but BUE fatigue limited performance and ultimately required assist for completion. Encouraged pt to request kael transfer to chair when ready to get out of bed. Will continue to follow and progress as tolerated.      Plan/Recommendations:   Moderate Intensity Therapy recommended post-acute care. This is recommended as therapy feels the patient would require 3-4 days per week and wouldn't tolerate \"3 hour daily\" rehab intensity. SNF would be the preferred choice. If the patient does not agree to SNF, arrange HH or OP depending on home bound status. If patient is medically complex, consider LTACH.. Pt requires no DME at discharge.      Pt desires Skilled Rehab placement at discharge. Pt cooperative; agreeable to therapeutic recommendations and plan of care.     "

## 2024-07-09 NOTE — PLAN OF CARE
"Assessment: Ban Brennan presents with functional mobility impairments which indicate the need for skilled intervention. Tolerating session today without incident. Pt requires max A-DEPx2 for bed mobility. Max Ax2 for STS from EOB using RW. Unable to attempt SPS transfer to recliner chair secondary to fatigue and weakness. Encouraged pt to request for nursing staff to utilize Raymond lift to transfer to recliner chair for meals. Will continue to follow and progress as tolerated.     Plan/Recommendations:   If medically appropriate, Moderate Intensity Therapy recommended post-acute care. This is recommended as therapy feels the patient would require 3-4 days per week and wouldn't tolerate \"3 hour daily\" rehab intensity. SNF would be the preferred choice. If the patient does not agree to SNF, arrange HH or OP depending on home bound status. If patient is medically complex, consider LTACH. Pt requires no DME at discharge.     Pt desires Skilled Rehab placement at discharge. Pt cooperative; agreeable to therapeutic recommendations and plan of care.     "

## 2024-07-09 NOTE — CONSULTS
"GI CONSULT  NOTE:    Referring Provider:  Dr. Villagomez    Chief complaint: Rectal bleeding    Subjective .     History of present illness: Ban Brennan is a 69 y.o. female with history of hypertension, CHF, COPD, SEDRICK, and DMII who presented on 6/30 with complaints of shortness of breath.  She was diagnosed with A-fib with RVR and was treated with Cardizem.  Cardiology is following.  Since admission, the patient has also been diagnosed with bilateral lower extremity cellulitis, UTI, sepsis, acute DVT of the bilateral lower extremities, rhinovirus, and multifocal pneumonia.  She is being followed by pulmonary, cardiology, nephrology, and hospitalist service.  GI was asked to consult due to rectal bleeding.  The patient reportedly had a bowel movement yesterday and again today that contained a small amount of bright red blood per rectum.  Denies melena.  States that she typically has regular bowel movements at home with stool softeners.  Denies rectal pain.  Also denies abdominal pain.  No nausea/vomiting or heartburn.    Endo History:  No previous endoscopy.    Past Medical History:  Past Medical History:   Diagnosis Date    Bilateral leg edema     Chronic respiratory failure with hypoxia, on home O2 therapy 07/01/2024    COPD (chronic obstructive pulmonary disease)     Morbid obesity with BMI of 40.0-44.9, adult 11/08/2010    Primary hypertension 03/01/2017    Pulmonary embolism     \"many years ago, was on warfarin\"    Sleep apnea        Past Surgical History:  History reviewed. No pertinent surgical history.    Social History:  Social History     Tobacco Use    Smoking status: Never    Smokeless tobacco: Never   Vaping Use    Vaping status: Never Used   Substance Use Topics    Alcohol use: Never    Drug use: Never       Family History:  History reviewed. No pertinent family history.    Medications:  Medications Prior to Admission   Medication Sig Dispense Refill Last Dose    Allergy Relief 180 MG tablet Take 1 " tablet by mouth Daily.   6/30/2024 at 1330    bumetanide (BUMEX) 1 MG tablet Take 1 tablet by mouth 3 times a day.   6/30/2024 at 1330    docusate sodium (COLACE) 100 MG capsule Take 1 capsule by mouth Every 12 (Twelve) Hours.       furosemide (LASIX) 20 MG tablet Take 1 tablet by mouth Daily.   6/30/2024 at 1330    HYDROcodone-acetaminophen (NORCO)  MG per tablet Take 1 tablet by mouth 3 times a day.       lisinopril (PRINIVIL,ZESTRIL) 30 MG tablet Take 1 tablet by mouth Daily.   6/30/2024 at 1330    meloxicam (MOBIC) 7.5 MG tablet Take 1 tablet by mouth Daily As Needed.       metoprolol tartrate (LOPRESSOR) 50 MG tablet Take 1 tablet by mouth Daily.   6/30/2024 at 1330    montelukast (SINGULAIR) 10 MG tablet Take 1 tablet by mouth every night at bedtime.   6/29/2024    omeprazole (priLOSEC) 20 MG capsule Take 1 capsule by mouth Daily.   6/30/2024 at 1330    oxybutynin XL (DITROPAN-XL) 10 MG 24 hr tablet Take 2 tablets by mouth Daily.   6/30/2024 at 1330    potassium chloride (MICRO-K) 10 MEQ CR capsule Take 1 capsule by mouth Every 12 (Twelve) Hours.   6/30/2024 at 1330    vitamin D (ERGOCALCIFEROL) 1.25 MG (54903 UT) capsule capsule Take 1 capsule by mouth 2 (Two) Times a Week. Monday and Thursday.   6/27/2024       Scheduled Meds:amiodarone, 200 mg, Oral, Q12H   Followed by  [START ON 7/23/2024] amiodarone, 200 mg, Oral, Daily  apixaban, 10 mg, Oral, BID   Followed by  [START ON 7/14/2024] apixaban, 5 mg, Oral, BID  budesonide, 0.5 mg, Nebulization, BID - RT  bumetanide, 1 mg, Oral, BID  cephalexin, 500 mg, Nasogastric, Q8H  doxycycline, 100 mg, Nasogastric, Q12H  insulin lispro, 2-7 Units, Subcutaneous, 4x Daily With Meals & Nightly  ipratropium-albuterol, 3 mL, Nebulization, 4x Daily - RT  lidocaine, 20 mL, Infiltration, Once  metoprolol succinate XL, 25 mg, Oral, Q12H  miconazole, 1 Application, Topical, Q12H  pantoprazole, 40 mg, Intravenous, BID AC  povidone-iodine, , Topical, Daily  sodium chloride,  10 mL, Intravenous, Q12H  spironolactone, 25 mg, Oral, Daily      Continuous Infusions:   PRN Meds:.  acetaminophen **OR** acetaminophen    Calcium Replacement - Follow Nurse / BPA Driven Protocol    dextrose    dextrose    glucagon (human recombinant)    ipratropium-albuterol    Magnesium Standard Dose Replacement - Follow Nurse / BPA Driven Protocol    nitroglycerin    ondansetron ODT **OR** ondansetron    Phosphorus Replacement - Follow Nurse / BPA Driven Protocol    Potassium Replacement - Follow Nurse / BPA Driven Protocol    [COMPLETED] Insert Peripheral IV **AND** sodium chloride    sodium chloride    sodium chloride    ALLERGIES:  Patient has no known allergies.    ROS:  The following systems were reviewed and negative;   Constitution:  No fevers, chills, no unintentional weight loss  Skin: no rash, no jaundice  Eyes:  No blurry vision, no eye pain  HENT:  No change in hearing or smell  Resp:  No dyspnea or cough  CV:  No chest pain or palpitations  :  No dysuria, hematuria  Musculoskeletal:  No leg cramps or arthralgias  Neuro:  No tremor, no numbness  Psych:  No depression or confusion    Objective     Vital Signs:   Vitals:    07/09/24 0955 07/09/24 1229 07/09/24 1233 07/09/24 1334   BP: 108/60   102/60   BP Location:       Patient Position:       Pulse: 102 96 100 117   Resp: 24 23 17 18   Temp: 98.1 °F (36.7 °C)   98.1 °F (36.7 °C)   TempSrc: Oral   Oral   SpO2: 94% 94% 96% 90%   Weight:       Height:           Physical Exam:       General Appearance:    Awake and alert, in no acute distress   Head:    Normocephalic, without obvious abnormality, atraumatic   Throat:   No oral lesions, no thrush, oral mucosa moist   Lungs:     Respirations regular, even and unlabored   Chest Wall:    No abnormalities observed, chest tube in place   Abdomen:     Soft, non-tender, no rebound or guarding, non-distended   Rectal:     Deferred   Extremities:   Moves all extremities, no edema, no cyanosis   Pulses:   Pulses  palpable and equal bilaterally   Skin:   No rash, no jaundice, normal palpation   Lymph nodes:   No cervical, supraclavicular or submandibular palpable adenopathy   Neurologic:   Cranial nerves 2 - 12 grossly intact, no asterixis       Results Review:   I reviewed the patient's labs and imaging.  CBC    Results from last 7 days   Lab Units 07/09/24  0851 07/09/24  0256 07/08/24  0446 07/07/24  0506 07/06/24  0354 07/05/24  0918 07/05/24  0257 07/04/24  0414   WBC 10*3/mm3  --  10.66 11.66* 16.38* 15.29* 12.93* 11.98* 9.76   HEMOGLOBIN g/dL 13.1 12.6 13.3 14.2 13.8 13.8 14.6 14.7   PLATELETS 10*3/mm3  --  87* 100* 130* 128* 115* 133* 123*     CMP   Results from last 7 days   Lab Units 07/09/24  1507 07/09/24  0256 07/08/24  0446 07/07/24  0506 07/06/24  1608 07/06/24  0354 07/05/24  0918 07/05/24  0257 07/04/24  1727 07/04/24  0414   SODIUM mmol/L  --  145 145 143 140 139 135* 136   < > 136   POTASSIUM mmol/L 3.9 3.6 3.0* 3.9 3.3* 3.7 5.3* 5.9*   < > 5.9*   CHLORIDE mmol/L  --  98 97* 98 96* 96* 95* 95*   < > 94*   CO2 mmol/L  --  39.5* 38.0* 35.7* 33.9* 34.0* 31.6* 35.0*   < > 33.4*   BUN mg/dL  --  68* 72* 74* 72* 70* 67* 65*   < > 54*   CREATININE mg/dL  --  1.08* 1.19* 1.37* 1.41* 1.56* 1.86* 1.88*   < > 1.77*   GLUCOSE mg/dL  --  117* 98 102* 111* 133* 128* 99   < > 108*   ALBUMIN g/dL  --  2.2* 2.2* 2.5* 2.5* 2.5*  --  2.5*  --  2.6*   BILIRUBIN mg/dL  --  1.7* 1.7* 1.8* 1.6* 1.6*  --  1.6*  --  1.3*   ALK PHOS U/L  --  84 96 125* 110 117  --  129*  --  138*   AST (SGOT) U/L  --  37* 36* 30 31 27  --  28  --  38*   ALT (SGPT) U/L  --  17 15 17 17 14  --  16  --  17   MAGNESIUM mg/dL  --  1.3* 1.8 1.6  --  1.9 1.9 2.0  --  1.9   PHOSPHORUS mg/dL  --  1.8* 2.4* 2.5  --  3.5 4.2 5.3*  --  5.8*    < > = values in this interval not displayed.     Cr Clearance Estimated Creatinine Clearance: 49.7 mL/min (A) (by C-G formula based on SCr of 1.08 mg/dL (H)).  Coag     HbA1C   Lab Results   Component Value Date    HGBA1C  6.14 (H) 07/01/2024    HGBA1C 6.00 (H) 01/15/2024     Blood Glucose   Glucose   Date/Time Value Ref Range Status   07/09/2024 1108 175 (H) 70 - 105 mg/dL Final     Comment:     Serial Number: [id number]:  [id number]   07/09/2024 0724 99 70 - 105 mg/dL Final     Comment:     Serial Number: [id number]:  [id number]   07/08/2024 2009 120 (H) 70 - 105 mg/dL Final     Comment:     Serial Number: [id number]:  [id number]   07/08/2024 1303 120 (H) 70 - 105 mg/dL Final     Comment:     Serial Number: [id number]:  [id number]   07/08/2024 1000 201 (H) 70 - 105 mg/dL Final     Comment:     Serial Number: [id number]:  [id number]   07/08/2024 0153 121 (H) 70 - 105 mg/dL Final     Comment:     Serial Number: [id number]:  [id number]   07/07/2024 1943 155 (H) 70 - 105 mg/dL Final     Comment:     Serial Number: [id number]:  [id number]   07/07/2024 1706 103 70 - 105 mg/dL Final     Comment:     Serial Number: [id number]:  [id number]     Infection   Results from last 7 days   Lab Units 07/06/24  1544   BODYFLDCX  No growth at 2 days     UA      Imaging Results (Last 72 Hours)       Procedure Component Value Units Date/Time    XR Chest 1 View [[id number]] Collected: 07/09/24 1135     Updated: 07/09/24 1139    Narrative:      XR CHEST 1 VW    Date of Exam: 7/9/2024 11:30 AM EDT    Indication: Chest tube    Comparison: July 6, 2024    Findings:  There is a pigtail catheter noted at the right lung base. A pneumothorax not definitely confirmed. There is airspace disease in the lower lobes bilaterally. There are no large pleural effusions. The heart appears enlarged.      Impression:      Impression:  1.Bilateral airspace disease again noted.  2.Cardiomegaly  3.Pigtail catheter chest tube right lung base.      Electronically Signed: Bran Bernal MD    7/9/2024 11:37 AM EDT    Workstation ID: [id number]            ASSESSMENT:  -Rectal bleeding -suspect anorectal source such as  hemorrhoids  -Elevated LFTs  -A-fib with RVR -on Eliquis  -Acute bilateral lower extremity DVT  -Bilateral lower extremity cellulitis  -UTI  -Sepsis  -Rhinovirus  -Multifocal pneumonia  -Large right pleural effusion s/p chest tube placement  -Hypertension  -CHF  -COPD  -SEDRICK  -DMII    PLAN:  Patient is a 69-year-old female with history of CHF, COPD, and DMII who presented on 6/30 with complaints of shortness of breath.  She was diagnosed with A-fib with RVR and was treated with Cardizem.  Cardiology is following.  Since admission, the patient has also been diagnosed with bilateral lower extremity cellulitis, UTI, sepsis, acute bilateral lower extremity DVTs, rhinovirus, multifocal pneumonia, and large right pleural effusion s/p chest tube placement.  GI asked to consult on 7/9 due to rectal bleeding.    Patient has reportedly had 2 bowel movements with a small amount of bright red blood per rectum.  Hemoglobin remains normal at 13.1.  Continue to monitor H/H and transfuse as needed.  No plans for endoscopy at this time due to patient's multiple acute medical issues requiring anticoagulation in the setting of normal hemoglobin.  We can plan outpatient colonoscopy following discharge.  We will plan hydrocortisone suppositories for suspected hemorrhoids.  Continue PPI.  Diet as tolerated.  No need to hold anticoagulation from GI standpoint at this time.  Supportive care.      I discussed the patients findings and my recommendations with the patient.  I will discuss the case with Dr. Webster and change plan accordingly.    We appreciate the referral.    Electronically signed by MANJU Askew, 07/09/24, 3:52 PM EDT.

## 2024-07-09 NOTE — THERAPY TREATMENT NOTE
"Subjective: Pt agreeable to therapeutic plan of care.  Cognition: oriented to Person, Place, Time, and Situation    Objective:     Bed Mobility: Max-A and Assist x 2 supine<>sit EOB, DEP for scooting in bed.  Functional Transfers: Max-A, Assist x 2, and with rolling walker sit to stand x2. Poort standing tolerance noted, but able to stand for long enough for dependent renee-care completion.      Balance: sitting EOB, unsupported, static, and dynamic CGA, static standing Max Ax2 with RW  Functional Ambulation: N/A or Not attempted.    Toileting: Dependent  ADL Position: supported standing  ADL Comments: Pt incontinent of bowels discovered in standing. Dependent for renee-care while PTA provided balance support    Therapeutic Exercise - 10 Reps B UE AROM lying supine    Vitals; Tachycardia, HR elevated to 169 with standing.    Pain: 0 VAS  Location: NA  Interventions for pain: Repositioned and N/A  Education: Provided education on the importance of mobility in the acute care setting, Verbal/Tactile Cues, ADL training, and Transfer Training      Assessment: Ban Brennan presents with ADL impairments affecting function including balance, endurance / activity tolerance, shortness of breath, and strength. Demonstrated functioning below baseline abilities indicate the need for continued skilled intervention while inpatient. Tolerating session today without incident. Pt requires Max-Dependent x2 for all bed mobility. Pt with incontinence of bowels, dependent for renee-care in standing. Also completed hair care seated EOB, but BUE fatigue limited performance and ultimately required assist for completion. Encouraged pt to request kael transfer to chair when ready to get out of bed. Will continue to follow and progress as tolerated.     Plan/Recommendations:   Moderate Intensity Therapy recommended post-acute care. This is recommended as therapy feels the patient would require 3-4 days per week and wouldn't tolerate \"3 " "hour daily\" rehab intensity. SNF would be the preferred choice. If the patient does not agree to SNF, arrange HH or OP depending on home bound status. If patient is medically complex, consider LTACH.. Pt requires no DME at discharge.     Pt desires Skilled Rehab placement at discharge. Pt cooperative; agreeable to therapeutic recommendations and plan of care.     Modified Zach: N/A = No pre-op stroke/TIA    Post-Tx Position: Up in Chair, Alarms activated, and Call light and personal items within reach  PPE: gloves, surgical mask, and gown    "

## 2024-07-09 NOTE — PROGRESS NOTES
[facility] MEDICINE SERVICE  DAILY PROGRESS NOTE    NAME: Ban Brennan  : 1955  MRN: [id number]      LOS: 9 days     PROVIDER OF SERVICE: Librado Villagomez MD    Chief Complaint: Atrial fibrillation with RVR    Subjective:     Interval History:  History taken from: patient chart RN  Episode of hematochezia    Review of Systems:   Review of Systems  All negative except as above  Objective:     Vital Signs  Temp:  [97.5 °F (36.4 °C)-98.4 °F (36.9 °C)] 97.6 °F (36.4 °C)  Heart Rate:  [] 98  Resp:  [14-26] 24  BP: (102-157)/(58-78) 102/67  Flow (L/min):  [2-3] 2   Body mass index is 38.74 kg/m².    Physical Exam  Physical Exam  AOx3 NAD  RRR S1-S2 audible  Lungs with fair air entry  Abdomen soft nontender nondistended  Scheduled Meds   amiodarone, 200 mg, Oral, Q12H   Followed by  [START ON 2024] amiodarone, 200 mg, Oral, Daily  apixaban, 10 mg, Oral, BID   Followed by  [START ON 2024] apixaban, 5 mg, Oral, BID  budesonide, 0.5 mg, Nebulization, BID - RT  bumetanide, 1 mg, Oral, BID  cephalexin, 500 mg, Nasogastric, Q8H  doxycycline, 100 mg, Nasogastric, Q12H  insulin lispro, 2-7 Units, Subcutaneous, 4x Daily With Meals & Nightly  ipratropium-albuterol, 3 mL, Nebulization, 4x Daily - RT  lidocaine, 20 mL, Infiltration, Once  magnesium sulfate, 2 g, Intravenous, Q2H  metoprolol tartrate, 12.5 mg, Nasogastric, Q12H  miconazole, 1 Application, Topical, Q12H  pantoprazole, 40 mg, Intravenous, BID AC  potassium chloride, 10 mEq, Intravenous, Q1H  potassium phosphate, 15 mmol, Intravenous, Once  povidone-iodine, , Topical, Daily  sodium chloride, 10 mL, Intravenous, Q12H  spironolactone, 25 mg, Oral, Daily       PRN Meds     acetaminophen **OR** acetaminophen    Calcium Replacement - Follow Nurse / BPA Driven Protocol    dextrose    dextrose    glucagon (human recombinant)    ipratropium-albuterol    Magnesium Standard Dose Replacement - Follow Nurse / BPA Driven Protocol     nitroglycerin    ondansetron ODT **OR** ondansetron    Phosphorus Replacement - Follow Nurse / BPA Driven Protocol    Potassium Replacement - Follow Nurse / BPA Driven Protocol    [COMPLETED] Insert Peripheral IV **AND** sodium chloride    sodium chloride    sodium chloride   Infusions         Diagnostic Data    Results from last 7 days   Lab Units 07/09/24  0256   WBC 10*3/mm3 10.66   HEMOGLOBIN g/dL 12.6   HEMATOCRIT % 39.1   PLATELETS 10*3/mm3 87*   GLUCOSE mg/dL 117*   CREATININE mg/dL 1.08*   BUN mg/dL 68*   SODIUM mmol/L 145   POTASSIUM mmol/L 3.6   AST (SGOT) U/L 37*   ALT (SGPT) U/L 17   ALK PHOS U/L 84   BILIRUBIN mg/dL 1.7*   ANION GAP mmol/L 7.5       No radiology results for the last day      I reviewed the patient's new clinical results.  I reviewed the patient's new imaging results and agree with the interpretation.    Assessment/Plan:     Active and Resolved Problems  Active Hospital Problems    Diagnosis  POA    **Atrial fibrillation with RVR [I48.91]  Yes    COPD (chronic obstructive pulmonary disease) [J44.9]  Yes    Aortic stenosis [I35.0]  Yes    Mitral regurgitation [I34.0]  Yes    Acute HFrEF (heart failure with reduced ejection fraction) [I50.21]  Yes    Right bundle branch block [I45.10]  Yes    Acute deep vein thrombosis (DVT) of both lower extremities [I82.403]  Yes    ARABELLA (acute kidney injury) [N17.9]  Yes    Acute hyperkalemia [E87.5]  Yes    Sepsis [A41.9]  Yes    Acute UTI [N39.0]  Yes    Acute systolic congestive heart failure [I50.21]  Yes    Rhinovirus infection [B34.8]  Yes    Multifocal pneumonia [J18.9]  Yes    Chronic respiratory failure with hypoxia, on home O2 therapy [J96.11, Z99.81]  Not Applicable    Skin ulcer of right great toe [L97.519]  Yes    Skin ulcer of left great toe [L97.529]  Yes    SEDRICK (obstructive sleep apnea) [G47.33]  Yes    Primary hypertension [I10]  Yes    Morbid obesity with BMI of 40.0-44.9, adult [E66.01, Z68.41]  Not Applicable      [facility]  Problems   No resolved problems to display.       69-year-old female with history of hypertension, HFrEF, lower extremity lymphedema admitted to [facility] 6/30 progressive shortness of breath        #Bilateral lower extremity DVT  #History of reported PE  Seen by hematology appreciate recommendation.  Factor V and prothrombin gene mutation negative  Continue Eliquis 10 mg twice daily for 7 days followed by 5 mg twice daily  Outpatient follow-up with hematology     #Acute on chronic HFrEF  #A-fib with RVR.  New onset  #Severe MR  Cardiology following appreciate recommendations  Started on amiodarone GGT transition to p.o. tapering dose  TTE with EF 30 to 35%.  Defer ischemic workup to cardiology  Continue metoprolol 12.5 twice daily  IV Bumex has been switched to p.o. 20 mg every 8  Monitor I's and O's  Started on Aldactone  Lisinopril on hold given ARABELLA     #Right-sided hydropneumothorax  Chest tube placed in ICU 7/6  Pulmonary following  Chest x-ray reviewed     #Severe sepsis  #UTI  #Left lower extremity cellulitis and wound  #Rhinovirus infection  #Multifocal pneumonia  #Chronic venous stasis lower extremity  Seen by infectious disease appreciate recommendations  To finish course of Keflex and doxycycline for total 7 days  Seen by podiatry no surgical intervention recommended  On midodrine 10 mg 3 times daily wean off as blood pressure permits     #Acute hypoxic respiratory failure  Multifactorial pneumonia CHF  Antibiotics and diuretics as above  Wean off supplemental oxygen as tolerated.     #DM2  A1c 6.14  Continue ISS lispro  POC ACHS     #ARABELLA  Suspect ATN in setting of sepsis   Diuretics as above  BMP daily  Avoid nephrotoxic drugs   lisinopril on hold      #uterine mass  Large subserosal uterine fundal fibroid on pelvic ultrasound  GYN follow-up as outpatient     #hematochezia  Monitor H/H closely   Low threshold to consult GI        VTE Prophylaxis:  Pharmacologic VTE prophylaxis orders are present.          Code status is   Code Status and Medical Interventions:   Ordered at: 06/30/24 2120     Code Status (Patient has no pulse and is not breathing):    CPR (Attempt to Resuscitate)     Medical Interventions (Patient has pulse or is breathing):    Full Support       Plan for disposition:3 days    Time: 30 minutes    Signature: Electronically signed by Librado Villagomez MD, 07/09/24, 08:10 EDT.  [facility]

## 2024-07-09 NOTE — PLAN OF CARE
Goal Outcome Evaluation:      Pt had small BM of gross blood/dark stool.  Pt asymptomatic.  Sample was taken and sent for occult testing.  MD informed.  Upon second sm/scant BM at 13:00, MD notified again and informed occult test was positive.   As per his request, a GI consult was ordered and called. The automated answering service was reached.  Pt had stat H&H at 0800 which came back higher than AM lab results.  A second H&H will be obtained at 1800.    Phos and Potassium labs were also collected and sent to lab. Both have improved.

## 2024-07-09 NOTE — PROGRESS NOTES
"PULMONARY CRITICAL CARE PROGRESS  NOTE      PATIENT IDENTIFICATION:  Name: Ban Brennan  MRN: [id number]  :  1955     Age: 69 y.o.  Sex: female    DATE OF Note:  2024   Referring Physician: Sesar Solorzano DO                  Subjective:   In bed, no SOB, no chest or abd pain, CT patent, no bowel or bladder issues      Objective:  tMax 24 hrs: Temp (24hrs), Av.9 °F (36.6 °C), Min:97.5 °F (36.4 °C), Max:98.4 °F (36.9 °C)      Vitals Ranges:   Temp:  [97.5 °F (36.4 °C)-98.4 °F (36.9 °C)] 98.1 °F (36.7 °C)  Heart Rate:  [] 102  Resp:  [14-28] 24  BP: (102-157)/(58-78) 108/60    Intake and Output Last 3 Shifts:   I/O last 3 completed shifts:  In: 480 [P.O.:480]  Out: 3960 [Urine:3600; Chest Tube:360]    Exam:  /60   Pulse 102   Temp 98.1 °F (36.7 °C) (Oral)   Resp 24   Ht 149.9 cm (59\")   Wt 87 kg (191 lb 12.8 oz)   SpO2 94%   BMI 38.74 kg/m²     General Appearance:   Alert  HEENT:  Normocephalic, without obvious abnormality. Conjunctivae/corneas clear.  Normal external ear canals. Nares normal, no drainage     Neck:  Supple, symmetrical, trachea midline. No JVD.  Lungs /Chest wall:   Bilateral basal rhonchi, respirations unlabored, symmetrical wall movement.   CT in place   Heart:  Regular rate and rhythm, systolic murmur PMI left sternal border  Abdomen: Soft, nontender, no masses, no organomegaly.    Extremities: Trace edema, no clubbing or cyanosis        Medications:  amiodarone, 200 mg, Oral, Q12H   Followed by  [START ON 2024] amiodarone, 200 mg, Oral, Daily  apixaban, 10 mg, Oral, BID   Followed by  [START ON 2024] apixaban, 5 mg, Oral, BID  budesonide, 0.5 mg, Nebulization, BID - RT  bumetanide, 1 mg, Oral, BID  cephalexin, 500 mg, Nasogastric, Q8H  doxycycline, 100 mg, Nasogastric, Q12H  insulin lispro, 2-7 Units, Subcutaneous, 4x Daily With Meals & Nightly  ipratropium-albuterol, 3 mL, Nebulization, 4x Daily - RT  lidocaine, 20 mL, Infiltration, " Once  magnesium sulfate, 2 g, Intravenous, Q2H  metoprolol succinate XL, 25 mg, Oral, Q12H  miconazole, 1 Application, Topical, Q12H  pantoprazole, 40 mg, Intravenous, BID AC  potassium chloride, 10 mEq, Intravenous, Q1H  povidone-iodine, , Topical, Daily  sodium chloride, 10 mL, Intravenous, Q12H  spironolactone, 25 mg, Oral, Daily        Infusion:        PRN:    acetaminophen **OR** acetaminophen    Calcium Replacement - Follow Nurse / BPA Driven Protocol    dextrose    dextrose    glucagon (human recombinant)    ipratropium-albuterol    Magnesium Standard Dose Replacement - Follow Nurse / BPA Driven Protocol    nitroglycerin    ondansetron ODT **OR** ondansetron    Phosphorus Replacement - Follow Nurse / BPA Driven Protocol    Potassium Replacement - Follow Nurse / BPA Driven Protocol    [COMPLETED] Insert Peripheral IV **AND** sodium chloride    sodium chloride    sodium chloride  Data Review:  All labs (24hrs):   Recent Results (from the past 24 hour(s))   POC Glucose Once    Collection Time: 07/08/24  1:03 PM    Specimen: Blood   Result Value Ref Range    Glucose 120 (H) 70 - 105 mg/dL   POC Glucose Once    Collection Time: 07/08/24  8:09 PM    Specimen: Blood   Result Value Ref Range    Glucose 120 (H) 70 - 105 mg/dL   Magnesium    Collection Time: 07/09/24  2:56 AM    Specimen: Arm, Right; Blood   Result Value Ref Range    Magnesium 1.3 (L) 1.6 - 2.4 mg/dL   Phosphorus    Collection Time: 07/09/24  2:56 AM    Specimen: Arm, Right; Blood   Result Value Ref Range    Phosphorus 1.8 (C) 2.5 - 4.5 mg/dL   Comprehensive Metabolic Panel    Collection Time: 07/09/24  2:56 AM    Specimen: Arm, Right; Blood   Result Value Ref Range    Glucose 117 (H) 65 - 99 mg/dL    BUN 68 (H) 8 - 23 mg/dL    Creatinine 1.08 (H) 0.57 - 1.00 mg/dL    Sodium 145 136 - 145 mmol/L    Potassium 3.6 3.5 - 5.2 mmol/L    Chloride 98 98 - 107 mmol/L    CO2 39.5 (H) 22.0 - 29.0 mmol/L    Calcium 9.0 8.6 - 10.5 mg/dL    Total Protein 5.4 (L)  6.0 - 8.5 g/dL    Albumin 2.2 (L) 3.5 - 5.2 g/dL    ALT (SGPT) 17 1 - 33 U/L    AST (SGOT) 37 (H) 1 - 32 U/L    Alkaline Phosphatase 84 39 - 117 U/L    Total Bilirubin 1.7 (H) 0.0 - 1.2 mg/dL    Globulin 3.2 gm/dL    A/G Ratio 0.7 g/dL    BUN/Creatinine Ratio 63.0 (H) 7.0 - 25.0    Anion Gap 7.5 5.0 - 15.0 mmol/L    eGFR 55.7 (L) >60.0 mL/min/1.73   CBC Auto Differential    Collection Time: 07/09/24  2:56 AM    Specimen: Arm, Right; Blood   Result Value Ref Range    WBC 10.66 3.40 - 10.80 10*3/mm3    RBC 4.15 3.77 - 5.28 10*6/mm3    Hemoglobin 12.6 12.0 - 15.9 g/dL    Hematocrit 39.1 34.0 - 46.6 %    MCV 94.2 79.0 - 97.0 fL    MCH 30.4 26.6 - 33.0 pg    MCHC 32.2 31.5 - 35.7 g/dL    RDW 16.7 (H) 12.3 - 15.4 %    RDW-SD 54.1 (H) 37.0 - 54.0 fl    MPV 11.6 6.0 - 12.0 fL    Platelets 87 (L) 140 - 450 10*3/mm3    Neutrophil % 86.5 (H) 42.7 - 76.0 %    Lymphocyte % 6.2 (L) 19.6 - 45.3 %    Monocyte % 6.3 5.0 - 12.0 %    Eosinophil % 0.3 0.3 - 6.2 %    Basophil % 0.1 0.0 - 1.5 %    Immature Grans % 0.6 (H) 0.0 - 0.5 %    Neutrophils, Absolute 9.23 (H) 1.70 - 7.00 10*3/mm3    Lymphocytes, Absolute 0.66 (L) 0.70 - 3.10 10*3/mm3    Monocytes, Absolute 0.67 0.10 - 0.90 10*3/mm3    Eosinophils, Absolute 0.03 0.00 - 0.40 10*3/mm3    Basophils, Absolute 0.01 0.00 - 0.20 10*3/mm3    Immature Grans, Absolute 0.06 (H) 0.00 - 0.05 10*3/mm3    nRBC 0.0 0.0 - 0.2 /100 WBC   POC Glucose 4x Daily Before Meals & at Bedtime    Collection Time: 07/09/24  7:24 AM    Specimen: Blood   Result Value Ref Range    Glucose 99 70 - 105 mg/dL   Hemoglobin & Hematocrit, Blood    Collection Time: 07/09/24  8:51 AM    Specimen: Arm, Right; Blood   Result Value Ref Range    Hemoglobin 13.1 12.0 - 15.9 g/dL    Hematocrit 40.5 34.0 - 46.6 %   Occult Blood, Fecal By Immunoassay - Stool, Per Rectum    Collection Time: 07/09/24  9:00 AM    Specimen: Per Rectum; Stool   Result Value Ref Range    Occult Blood, Fecal by Immunoassay Positive (A) Negative         Imaging:  XR Chest 1 View  Narrative: XR CHEST 1 VW    Date of Exam: 7/6/2024 2:28 PM EDT    Indication: S/p pigtail thoracostomy tube placement for hydropneumothorax    Comparison: Same day chest CT and chest radiograph.    Findings:  Placement of a chest tube with tip terminating over the medial right lower lung. Feeding catheter courses below the diaphragm with the tip excluded from the field-of-view. Enlarged cardiac silhouette, unchanged. Right-sided hydropneumothorax, with   decreased/trace fluid component and overall similar small volume gas component. Unchanged small left pleural effusion. Unchanged bilateral airspace opacities. Osseous structures are unchanged.  Impression: Impression:  Successful placement of a right chest tube for right-sided hydropneumothorax, which demonstrates decreased/trace fluid component and similar/small gas component.    Electronically Signed: Manan Jefferson MD    7/6/2024 2:35 PM EDT    Workstation ID: [id number]  CT Chest Without Contrast Diagnostic  Narrative: CT CHEST WO CONTRAST DIAGNOSTIC    Date of Exam: 7/6/2024 11:00 AM EDT    Indication: pnemothorax.    Comparison: Same day chest radiographs and 7/1/2024 chest CT    Technique: Axial CT images were obtained of the chest without contrast administration.  Sagittal and coronal reconstructions were performed.  Automated exposure control and iterative reconstruction methods were used.    Findings:  The central airways are patent. There is multifocal peripheral bronchial obstruction, greater in the right lung. There is a moderate right-sided hydropneumothorax. There is a small left pleural effusion. No evidence of left-sided pneumothorax. There is   significant volume loss of the right lung with dense airspace consolidations throughout, likely a combination of of infection and lung collapse. There are patchy airspace opacities and nodular consolidations throughout the left lung as well.    The mediastinum is slightly  shifted to the right, in keeping with partial right lung collapse. Unchanged appearance of the thyroid with right thyroid lobe calcification. There is no new mediastinal mass. No new threshold lymphadenopathy. There is   unchanged prominent four-chamber cardiomegaly. Coronary artery calcifications are present. No pericardial effusion. The pulmonary arteries are enlarged. Unremarkable appearance of the thoracic esophagus.    Chest wall soft tissues are unchanged. This includes asymmetric ill-defined subcutaneous edema within the right chest. Persistent bilateral flank edema.    Enteric tube distal tip terminates in the stomach. There is mild perihepatic ascites. Thickening of the bilateral adrenal glands without discrete nodule. Colonic diverticulosis. No acute fracture or suspicious bony lesion.  Impression: Impression:  Moderate right-sided hydropneumothorax as seen on same-day radiograph. Adjacent partial collapse of the right lung with multifocal peripheral bronchial obstruction. There are dense airspace consolidations throughout the right lung, and findings are   likely a combination of lung collapse and worsening infection. Persistent airspace opacities in the left lung consistent with infection, slightly worsened from prior CT. Small left pleural effusion.    Prominent four-chamber cardiomegaly. Pulmonary artery enlargement consistent with pulmonary hypertension.    Trace perihepatic ascites. Persistent bilateral flank and right chest wall edema.    Electronically Signed: Javon Mortensen MD    7/6/2024 11:19 AM EDT    Workstation ID: [id number]  XR Chest 1 View  Narrative: XR CHEST 1 VW    Date of Exam: 7/6/2024 9:47 AM EDT    Indication: Abnormal chest x-ray, suggestion of a right pneumothorax, pulmonary edema, respiratory failure    Comparison: 7/6/2024 at 0906 hours.    Findings:  There is a definite small right-sided pneumothorax. It is located both medially and laterally and also probably in the apical region  measuring 4 mm in thickness. The right lung appears partially collapsed and consolidated. A small to moderate right   pleural effusion is present indicating a hydropneumothorax. There is unchanged consolidation in the left lung with a small pleural effusion. The heart is enlarged. The pulmonary vascular markings are probably normal. The feeding tube tip projects out of   the field-of-view, but below the hemidiaphragms.  Impression: Impression:    1. There is a definite small right-sided pneumothorax. The right lung is partially collapsed and consolidated. A small to moderate right pleural effusion is present indicating a hydropneumothorax.  2. Unchanged consolidation in the left lung with a small pleural effusion.  3. Cardiomegaly.  4.The abnormal results were discussed with the nurse (Dianne) by telephone. The referring clinician will be contacted.    Electronically Signed: Jean Claude Bobby MD    7/6/2024 9:56 AM EDT    Workstation ID: [id number]  XR Chest 1 View  Narrative: XR CHEST 1 VW    Date of Exam: 7/6/2024 9:12 AM EDT    Indication: Pulmonary edema, respiratory failure    Comparison: 7/5/2024.    Findings:  The patient is rotated. The heart is enlarged. There is patchy consolidation again seen in the left mid and lower lung zones suspicious for pneumonia. There is a small left pleural effusion. On the right side, there is a questionable pleural edge seen   along the right cardiac border. I cannot completely exclude a right pneumothorax. There is abnormal consolidation in the right lung with a small to moderate pleural effusion. The pulmonary vascular markings are probably normal. The patient's feeding tube   tip projects out of the field-of-view, but below the hemidiaphragms.  Impression: Impression:    1. Questionable right-sided pneumothorax. I would recommend a repeat chest x-ray as the patient is rotated.  2. Persistent patchy consolidation in the left mid and lower lung zones suspicious for pneumonia. There is  a small right pleural effusion.  3. Abnormal consolidation in the right lung with a small to moderate pleural effusion.  4. Cardiomegaly.  5. The abnormal results were discussed with the nurse in the CVICU (Dianne) by telephone and the referring clinician will be contacted.    Electronically Signed: Jean Claude Bobby MD    7/6/2024 9:27 AM EDT    Workstation ID: [id number]       ASSESSMENT:  Hydropneumothorax s/p CT  COPD  SEDRICK  Systolic CHF  A-fib with RVR  Aortic stenosis  Mitral valve regurgitation   R BBB  Hx bilateral DVT  HTN  Morbid obesity         PLAN:  Add Mucomyst  Antibiotics  Bronchodilators  Inhaled corticosteroids  Incentive spirometer  Diuresis and monitor NANCY's   CT management   Electrolytes/ glycemic control  DVT prophylaxis-Apixaban      Discussed with MANJU Sena    7/9/2024  10:24 EDT    I personally have examined  and interviewed the patient. I have reviewed the history, data, problems, assessment and plan with our NP.  Total Critical care time in direct medical management (   ) minutes, This time specifically excludes time spent performing procedures.    Donna Sung MD   7/9/2024  22:22 EDT

## 2024-07-09 NOTE — PLAN OF CARE
Goal Outcome Evaluation:              Problem: Adult Inpatient Plan of Care  Goal: Plan of Care Review  Outcome: Ongoing, Progressing  Goal: Patient-Specific Goal (Individualized)  Outcome: Ongoing, Progressing  Goal: Absence of Hospital-Acquired Illness or Injury  Outcome: Ongoing, Progressing  Intervention: Identify and Manage Fall Risk  Recent Flowsheet Documentation  Taken 7/9/2024 0200 by Marisol Saenz RN  Safety Promotion/Fall Prevention:   safety round/check completed   room organization consistent  Taken 7/9/2024 0000 by Marisol Saenz RN  Safety Promotion/Fall Prevention:   safety round/check completed   room organization consistent  Taken 7/8/2024 2200 by Marisol Saenz RN  Safety Promotion/Fall Prevention:   safety round/check completed   room organization consistent  Taken 7/8/2024 2055 by Marisol Saenz RN  Safety Promotion/Fall Prevention:   safety round/check completed   room organization consistent  Intervention: Prevent Skin Injury  Recent Flowsheet Documentation  Taken 7/9/2024 0000 by Marisol Saenz RN  Skin Protection:   adhesive use limited   tubing/devices free from skin contact   incontinence pads utilized  Taken 7/8/2024 2055 by Marisol Saenz RN  Skin Protection:   adhesive use limited   tubing/devices free from skin contact   incontinence pads utilized  Intervention: Prevent and Manage VTE (Venous Thromboembolism) Risk  Recent Flowsheet Documentation  Taken 7/9/2024 0000 by Marisol Saenz RN  VTE Prevention/Management:   sequential compression devices off   patient refused intervention  Range of Motion: active ROM (range of motion) encouraged  Taken 7/8/2024 2055 by Marisol Saenz RN  VTE Prevention/Management:   sequential compression devices off   patient refused intervention  Range of Motion: active ROM (range of motion) encouraged  Intervention: Prevent Infection  Recent Flowsheet Documentation  Taken 7/9/2024 0200 by Marisol Saenz RN  Infection Prevention:   single patient room provided    rest/sleep promoted   personal protective equipment utilized   hand hygiene promoted  Taken 7/9/2024 0000 by Marisol Saenz RN  Infection Prevention:   single patient room provided   rest/sleep promoted   hand hygiene promoted   personal protective equipment utilized  Taken 7/8/2024 2200 by Marisol Saenz RN  Infection Prevention:   single patient room provided   rest/sleep promoted   hand hygiene promoted  Taken 7/8/2024 2055 by Marisol Saenz RN  Infection Prevention:   single patient room provided   rest/sleep promoted   hand hygiene promoted   personal protective equipment utilized  Goal: Optimal Comfort and Wellbeing  Outcome: Ongoing, Progressing  Intervention: Provide Person-Centered Care  Recent Flowsheet Documentation  Taken 7/9/2024 0000 by Marisol Saenz RN  Trust Relationship/Rapport:   care explained   choices provided   emotional support provided   questions encouraged   questions answered  Taken 7/8/2024 2055 by Marisol Saenz RN  Trust Relationship/Rapport:   care explained   choices provided   emotional support provided   questions encouraged   questions answered  Goal: Readiness for Transition of Care  Outcome: Ongoing, Progressing     Problem: Skin Injury Risk Increased  Goal: Skin Health and Integrity  Outcome: Ongoing, Progressing  Intervention: Promote and Optimize Oral Intake  Recent Flowsheet Documentation  Taken 7/9/2024 0000 by Marisol Saenz RN  Oral Nutrition Promotion: rest periods promoted  Taken 7/8/2024 2055 by Marisol Saenz RN  Oral Nutrition Promotion: rest periods promoted  Intervention: Optimize Skin Protection  Recent Flowsheet Documentation  Taken 7/9/2024 0000 by Marisol aSenz RN  Pressure Reduction Techniques:   frequent weight shift encouraged   sit time limited to 2 hours   weight shift assistance provided  Pressure Reduction Devices: positioning supports utilized  Skin Protection:   adhesive use limited   tubing/devices free from skin contact   incontinence pads utilized  Taken  7/8/2024 2055 by Marisol Saenz RN  Pressure Reduction Techniques:   frequent weight shift encouraged   sit time limited to 2 hours   weight shift assistance provided  Pressure Reduction Devices: positioning supports utilized  Skin Protection:   adhesive use limited   tubing/devices free from skin contact   incontinence pads utilized     Problem: Adjustment to Illness (Sepsis/Septic Shock)  Goal: Optimal Coping  Outcome: Ongoing, Progressing  Intervention: Optimize Psychosocial Adjustment to Illness  Recent Flowsheet Documentation  Taken 7/9/2024 0000 by Marisol Saenz RN  Supportive Measures: active listening utilized  Family/Support System Care:   support provided   self-care encouraged   presence promoted   involvement promoted  Taken 7/8/2024 2055 by Marisol Saenz RN  Supportive Measures: active listening utilized  Family/Support System Care:   support provided   self-care encouraged   presence promoted   involvement promoted     Problem: Bleeding (Sepsis/Septic Shock)  Goal: Absence of Bleeding  Outcome: Ongoing, Progressing     Problem: Glycemic Control Impaired (Sepsis/Septic Shock)  Goal: Blood Glucose Level Within Desired Range  Outcome: Ongoing, Progressing  Intervention: Optimize Glycemic Control  Recent Flowsheet Documentation  Taken 7/9/2024 0000 by Marisol Saenz RN  Glycemic Management: blood glucose monitored  Taken 7/8/2024 2055 by Marisol Saenz RN  Glycemic Management: blood glucose monitored     Problem: Infection Progression (Sepsis/Septic Shock)  Goal: Absence of Infection Signs and Symptoms  Outcome: Ongoing, Progressing  Intervention: Initiate Sepsis Management  Recent Flowsheet Documentation  Taken 7/9/2024 0200 by Marisol Saenz RN  Infection Prevention:   single patient room provided   rest/sleep promoted   personal protective equipment utilized   hand hygiene promoted  Isolation Precautions:   contact   droplet   precautions maintained  Taken 7/9/2024 0000 by Marisol Saenz RN  Infection  Prevention:   single patient room provided   rest/sleep promoted   hand hygiene promoted   personal protective equipment utilized  Isolation Precautions:   contact   droplet   precautions maintained  Taken 7/8/2024 2200 by Marisol Saenz RN  Infection Prevention:   single patient room provided   rest/sleep promoted   hand hygiene promoted  Isolation Precautions:   droplet   contact   precautions maintained  Taken 7/8/2024 2055 by Marisol Saenz RN  Infection Prevention:   single patient room provided   rest/sleep promoted   hand hygiene promoted   personal protective equipment utilized  Isolation Precautions:   droplet   contact   precautions maintained  Intervention: Promote Recovery  Recent Flowsheet Documentation  Taken 7/8/2024 2055 by Marisol Saenz RN  Airway/Ventilation Support: pulmonary hygiene promoted  Sleep/Rest Enhancement: consistent schedule promoted  Intervention: Promote Stabilization  Recent Flowsheet Documentation  Taken 7/8/2024 2055 by Marisol Saenz RN  Lung Protection Measures: fluid excess minimized  Fluid/Electrolyte Management: fluids provided     Problem: Nutrition Impaired (Sepsis/Septic Shock)  Goal: Optimal Nutrition Intake  Outcome: Ongoing, Progressing     Problem: Adjustment to Illness (Heart Failure)  Goal: Optimal Coping  Outcome: Ongoing, Progressing  Intervention: Support Psychosocial Response  Recent Flowsheet Documentation  Taken 7/9/2024 0000 by Marisol Saenz RN  Supportive Measures: active listening utilized  Family/Support System Care:   support provided   self-care encouraged   presence promoted   involvement promoted  Taken 7/8/2024 2055 by Marisol Saenz RN  Supportive Measures: active listening utilized  Family/Support System Care:   support provided   self-care encouraged   presence promoted   involvement promoted     Problem: Cardiac Output Decreased (Heart Failure)  Goal: Optimal Cardiac Output  Outcome: Ongoing, Progressing  Intervention: Optimize Cardiac Output  Recent  Flowsheet Documentation  Taken 7/9/2024 0000 by Marisol Saenz RN  Environmental Support: calm environment promoted  Taken 7/8/2024 2055 by Marisol Saenz RN  Environmental Support: rest periods encouraged     Problem: Dysrhythmia (Heart Failure)  Goal: Stable Heart Rate and Rhythm  Outcome: Ongoing, Progressing     Problem: Fluid Imbalance (Heart Failure)  Goal: Fluid Balance  Outcome: Ongoing, Progressing  Intervention: Monitor and Manage Fluid Balance  Recent Flowsheet Documentation  Taken 7/8/2024 2055 by Marisol Saenz RN  Fluid/Electrolyte Management: fluids provided     Problem: Functional Ability Impaired (Heart Failure)  Goal: Optimal Functional Ability  Outcome: Ongoing, Progressing     Problem: Oral Intake Inadequate (Heart Failure)  Goal: Optimal Nutrition Intake  Outcome: Ongoing, Progressing  Intervention: Promote and Optimize Nutrition Intake  Recent Flowsheet Documentation  Taken 7/9/2024 0000 by Marisol Saenz RN  Oral Nutrition Promotion: rest periods promoted  Taken 7/8/2024 2055 by Marisol Saenz RN  Oral Nutrition Promotion: rest periods promoted     Problem: Respiratory Compromise (Heart Failure)  Goal: Effective Oxygenation and Ventilation  Outcome: Ongoing, Progressing  Intervention: Promote Airway Secretion Clearance  Recent Flowsheet Documentation  Taken 7/9/2024 0000 by Marisol Saenz RN  Cough And Deep Breathing: done independently per patient  Taken 7/8/2024 2055 by Marisol Saenz RN  Breathing Techniques/Airway Clearance: deep/controlled cough encouraged  Cough And Deep Breathing: done independently per patient  Intervention: Optimize Oxygenation and Ventilation  Recent Flowsheet Documentation  Taken 7/8/2024 2055 by Marisol Saenz RN  Airway/Ventilation Management: calming measures promoted     Problem: Sleep Disordered Breathing (Heart Failure)  Goal: Effective Breathing Pattern During Sleep  Outcome: Ongoing, Progressing     Problem: Fall Injury Risk  Goal: Absence of Fall and Fall-Related  Injury  Outcome: Ongoing, Progressing  Intervention: Identify and Manage Contributors  Recent Flowsheet Documentation  Taken 7/9/2024 0200 by Marisol Saenz RN  Medication Review/Management: medications reviewed  Taken 7/9/2024 0000 by Marisol Saenz RN  Medication Review/Management: medications reviewed  Taken 7/8/2024 2200 by Marisol Saenz RN  Medication Review/Management: medications reviewed  Taken 7/8/2024 2055 by Marisol Saenz RN  Medication Review/Management: medications reviewed  Intervention: Promote Injury-Free Environment  Recent Flowsheet Documentation  Taken 7/9/2024 0200 by Marisol Saenz RN  Safety Promotion/Fall Prevention:   safety round/check completed   room organization consistent  Taken 7/9/2024 0000 by Marisol Saenz RN  Safety Promotion/Fall Prevention:   safety round/check completed   room organization consistent  Taken 7/8/2024 2200 by Marisol Saenz RN  Safety Promotion/Fall Prevention:   safety round/check completed   room organization consistent  Taken 7/8/2024 2055 by Marisol Saenz RN  Safety Promotion/Fall Prevention:   safety round/check completed   room organization consistent     Problem: Noninvasive Ventilation Acute  Goal: Effective Unassisted Ventilation and Oxygenation  Outcome: Ongoing, Progressing  Intervention: Monitor and Manage Noninvasive Ventilation  Recent Flowsheet Documentation  Taken 7/8/2024 2055 by Marisol Saenz RN  Airway/Ventilation Management: calming measures promoted

## 2024-07-10 ENCOUNTER — APPOINTMENT (OUTPATIENT)
Dept: GENERAL RADIOLOGY | Facility: HOSPITAL | Age: 69
End: 2024-07-10
Payer: MEDICARE

## 2024-07-10 LAB
ALBUMIN SERPL-MCNC: 2 G/DL (ref 3.5–5.2)
ALBUMIN/GLOB SERPL: 0.6 G/DL
ALP SERPL-CCNC: 80 U/L (ref 39–117)
ALT SERPL W P-5'-P-CCNC: 17 U/L (ref 1–33)
ANION GAP SERPL CALCULATED.3IONS-SCNC: 5.1 MMOL/L (ref 5–15)
AST SERPL-CCNC: 46 U/L (ref 1–32)
BACTERIA FLD CULT: NORMAL
BASOPHILS # BLD AUTO: 0.01 10*3/MM3 (ref 0–0.2)
BASOPHILS NFR BLD AUTO: 0.1 % (ref 0–1.5)
BILIRUB SERPL-MCNC: 1.9 MG/DL (ref 0–1.2)
BUN SERPL-MCNC: 54 MG/DL (ref 8–23)
BUN/CREAT SERPL: 54 (ref 7–25)
CALCIUM SPEC-SCNC: 8.8 MG/DL (ref 8.6–10.5)
CHLORIDE SERPL-SCNC: 96 MMOL/L (ref 98–107)
CO2 SERPL-SCNC: 37.9 MMOL/L (ref 22–29)
CREAT SERPL-MCNC: 1 MG/DL (ref 0.57–1)
DEPRECATED RDW RBC AUTO: 53.6 FL (ref 37–54)
EGFRCR SERPLBLD CKD-EPI 2021: 61.1 ML/MIN/1.73
EOSINOPHIL # BLD AUTO: 0.12 10*3/MM3 (ref 0–0.4)
EOSINOPHIL NFR BLD AUTO: 1.1 % (ref 0.3–6.2)
ERYTHROCYTE [DISTWIDTH] IN BLOOD BY AUTOMATED COUNT: 16.5 % (ref 12.3–15.4)
GLOBULIN UR ELPH-MCNC: 3.2 GM/DL
GLUCOSE BLDC GLUCOMTR-MCNC: 100 MG/DL (ref 70–105)
GLUCOSE BLDC GLUCOMTR-MCNC: 107 MG/DL (ref 70–105)
GLUCOSE BLDC GLUCOMTR-MCNC: 129 MG/DL (ref 70–105)
GLUCOSE BLDC GLUCOMTR-MCNC: 142 MG/DL (ref 70–105)
GLUCOSE SERPL-MCNC: 90 MG/DL (ref 65–99)
GRAM STN SPEC: NORMAL
GRAM STN SPEC: NORMAL
HCT VFR BLD AUTO: 38.6 % (ref 34–46.6)
HEMOCCULT STL QL IA: POSITIVE
HGB BLD-MCNC: 12.5 G/DL (ref 12–15.9)
IMM GRANULOCYTES # BLD AUTO: 0.05 10*3/MM3 (ref 0–0.05)
IMM GRANULOCYTES NFR BLD AUTO: 0.5 % (ref 0–0.5)
LYMPHOCYTES # BLD AUTO: 0.85 10*3/MM3 (ref 0.7–3.1)
LYMPHOCYTES NFR BLD AUTO: 7.7 % (ref 19.6–45.3)
MAGNESIUM SERPL-MCNC: 2.3 MG/DL (ref 1.6–2.4)
MCH RBC QN AUTO: 30.3 PG (ref 26.6–33)
MCHC RBC AUTO-ENTMCNC: 32.4 G/DL (ref 31.5–35.7)
MCV RBC AUTO: 93.5 FL (ref 79–97)
MONOCYTES # BLD AUTO: 0.81 10*3/MM3 (ref 0.1–0.9)
MONOCYTES NFR BLD AUTO: 7.3 % (ref 5–12)
NEUTROPHILS NFR BLD AUTO: 83.3 % (ref 42.7–76)
NEUTROPHILS NFR BLD AUTO: 9.27 10*3/MM3 (ref 1.7–7)
NRBC BLD AUTO-RTO: 0 /100 WBC (ref 0–0.2)
PHOSPHATE SERPL-MCNC: 2.5 MG/DL (ref 2.5–4.5)
PLATELET # BLD AUTO: 85 10*3/MM3 (ref 140–450)
PMV BLD AUTO: 11.1 FL (ref 6–12)
POTASSIUM SERPL-SCNC: 4.5 MMOL/L (ref 3.5–5.2)
PROT SERPL-MCNC: 5.2 G/DL (ref 6–8.5)
QT INTERVAL: 401 MS
QTC INTERVAL: 523 MS
RBC # BLD AUTO: 4.13 10*6/MM3 (ref 3.77–5.28)
SODIUM SERPL-SCNC: 139 MMOL/L (ref 136–145)
WBC NRBC COR # BLD AUTO: 11.11 10*3/MM3 (ref 3.4–10.8)

## 2024-07-10 PROCEDURE — 99232 SBSQ HOSP IP/OBS MODERATE 35: CPT | Performed by: INTERNAL MEDICINE

## 2024-07-10 PROCEDURE — 83735 ASSAY OF MAGNESIUM: CPT | Performed by: NURSE PRACTITIONER

## 2024-07-10 PROCEDURE — 94761 N-INVAS EAR/PLS OXIMETRY MLT: CPT

## 2024-07-10 PROCEDURE — 84100 ASSAY OF PHOSPHORUS: CPT | Performed by: NURSE PRACTITIONER

## 2024-07-10 PROCEDURE — 99232 SBSQ HOSP IP/OBS MODERATE 35: CPT | Performed by: NURSE PRACTITIONER

## 2024-07-10 PROCEDURE — 82948 REAGENT STRIP/BLOOD GLUCOSE: CPT

## 2024-07-10 PROCEDURE — 71045 X-RAY EXAM CHEST 1 VIEW: CPT

## 2024-07-10 PROCEDURE — 82948 REAGENT STRIP/BLOOD GLUCOSE: CPT | Performed by: HOSPITALIST

## 2024-07-10 PROCEDURE — 94799 UNLISTED PULMONARY SVC/PX: CPT

## 2024-07-10 PROCEDURE — 82274 ASSAY TEST FOR BLOOD FECAL: CPT | Performed by: HOSPITALIST

## 2024-07-10 PROCEDURE — 85025 COMPLETE CBC W/AUTO DIFF WBC: CPT | Performed by: NURSE PRACTITIONER

## 2024-07-10 PROCEDURE — 99232 SBSQ HOSP IP/OBS MODERATE 35: CPT | Performed by: STUDENT IN AN ORGANIZED HEALTH CARE EDUCATION/TRAINING PROGRAM

## 2024-07-10 PROCEDURE — 80053 COMPREHEN METABOLIC PANEL: CPT | Performed by: NURSE PRACTITIONER

## 2024-07-10 PROCEDURE — 94664 DEMO&/EVAL PT USE INHALER: CPT

## 2024-07-10 PROCEDURE — 94660 CPAP INITIATION&MGMT: CPT

## 2024-07-10 RX ORDER — PANTOPRAZOLE SODIUM 40 MG/1
40 TABLET, DELAYED RELEASE ORAL
Status: DISCONTINUED | OUTPATIENT
Start: 2024-07-10 | End: 2024-07-23 | Stop reason: HOSPADM

## 2024-07-10 RX ADMIN — ANTI-FUNGAL POWDER MICONAZOLE NITRATE TALC FREE 1 APPLICATION: 1.42 POWDER TOPICAL at 09:01

## 2024-07-10 RX ADMIN — AMIODARONE HYDROCHLORIDE 200 MG: 200 TABLET ORAL at 17:48

## 2024-07-10 RX ADMIN — BUDESONIDE 0.5 MG: 0.5 INHALANT RESPIRATORY (INHALATION) at 06:50

## 2024-07-10 RX ADMIN — SPIRONOLACTONE 25 MG: 25 TABLET ORAL at 09:01

## 2024-07-10 RX ADMIN — Medication 10 ML: at 22:42

## 2024-07-10 RX ADMIN — ANTI-FUNGAL POWDER MICONAZOLE NITRATE TALC FREE 1 APPLICATION: 1.42 POWDER TOPICAL at 22:42

## 2024-07-10 RX ADMIN — METOPROLOL SUCCINATE 25 MG: 25 TABLET, FILM COATED, EXTENDED RELEASE ORAL at 09:00

## 2024-07-10 RX ADMIN — AMIODARONE HYDROCHLORIDE 200 MG: 200 TABLET ORAL at 05:19

## 2024-07-10 RX ADMIN — APIXABAN 10 MG: 5 TABLET, FILM COATED ORAL at 22:41

## 2024-07-10 RX ADMIN — POVIDONE-IODINE: 10 SOLUTION TOPICAL at 09:01

## 2024-07-10 RX ADMIN — IPRATROPIUM BROMIDE AND ALBUTEROL SULFATE 3 ML: .5; 3 SOLUTION RESPIRATORY (INHALATION) at 14:02

## 2024-07-10 RX ADMIN — BUMETANIDE 1 MG: 1 TABLET ORAL at 17:48

## 2024-07-10 RX ADMIN — PANTOPRAZOLE SODIUM 40 MG: 40 TABLET, DELAYED RELEASE ORAL at 17:48

## 2024-07-10 RX ADMIN — METOPROLOL SUCCINATE 25 MG: 25 TABLET, FILM COATED, EXTENDED RELEASE ORAL at 22:41

## 2024-07-10 RX ADMIN — PANTOPRAZOLE SODIUM 40 MG: 40 INJECTION, POWDER, FOR SOLUTION INTRAVENOUS at 09:00

## 2024-07-10 RX ADMIN — IPRATROPIUM BROMIDE AND ALBUTEROL SULFATE 3 ML: .5; 3 SOLUTION RESPIRATORY (INHALATION) at 06:46

## 2024-07-10 RX ADMIN — HYDROCORTISONE ACETATE 25 MG: 25 SUPPOSITORY RECTAL at 09:01

## 2024-07-10 RX ADMIN — IPRATROPIUM BROMIDE AND ALBUTEROL SULFATE 3 ML: .5; 3 SOLUTION RESPIRATORY (INHALATION) at 10:37

## 2024-07-10 RX ADMIN — CEPHALEXIN 500 MG: 500 CAPSULE ORAL at 05:19

## 2024-07-10 RX ADMIN — APIXABAN 10 MG: 5 TABLET, FILM COATED ORAL at 09:01

## 2024-07-10 RX ADMIN — Medication 10 ML: at 09:01

## 2024-07-10 RX ADMIN — BUMETANIDE 1 MG: 1 TABLET ORAL at 09:01

## 2024-07-10 RX ADMIN — HYDROCORTISONE ACETATE 25 MG: 25 SUPPOSITORY RECTAL at 22:41

## 2024-07-10 NOTE — PLAN OF CARE
Problem: Adult Inpatient Plan of Care  Goal: Plan of Care Review  Outcome: Ongoing, Progressing  Flowsheets (Taken 7/10/2024 1717)  Progress: no change  Plan of Care Reviewed With: patient  Outcome Evaluation: Pt with chest tube on right. FC removed today per protocol and good urine output afterwards. pt continue to have dark bloody stools. GI following, no plans for scope.  Goal: Patient-Specific Goal (Individualized)  Outcome: Ongoing, Progressing  Goal: Absence of Hospital-Acquired Illness or Injury  Outcome: Ongoing, Progressing  Intervention: Identify and Manage Fall Risk  Recent Flowsheet Documentation  Taken 7/10/2024 1600 by Shyann Oro RN  Safety Promotion/Fall Prevention:   safety round/check completed   nonskid shoes/slippers when out of bed  Taken 7/10/2024 1400 by Shyann Oro RN  Safety Promotion/Fall Prevention:   safety round/check completed   nonskid shoes/slippers when out of bed  Taken 7/10/2024 1200 by Shaynn Oro RN  Safety Promotion/Fall Prevention:   safety round/check completed   nonskid shoes/slippers when out of bed  Taken 7/10/2024 1000 by Shyann Oro RN  Safety Promotion/Fall Prevention: safety round/check completed  Taken 7/10/2024 0800 by Shyann Oro RN  Safety Promotion/Fall Prevention:   safety round/check completed   nonskid shoes/slippers when out of bed  Intervention: Prevent Skin Injury  Recent Flowsheet Documentation  Taken 7/10/2024 1615 by Shyann Oro RN  Body Position:   turned   right  Taken 7/10/2024 1319 by Shyann Oro RN  Body Position:   turned   left  Taken 7/10/2024 1058 by Shyann Oro RN  Body Position:   turned   right  Goal: Optimal Comfort and Wellbeing  Outcome: Ongoing, Progressing  Goal: Readiness for Transition of Care  Outcome: Ongoing, Progressing     Problem: Skin Injury Risk Increased  Goal: Skin Health and Integrity  Outcome: Ongoing, Progressing  Intervention: Optimize Skin Protection  Recent Flowsheet Documentation  Taken 7/10/2024 1615  by Shyann Oro RN  Head of Bed ([facility]) Positioning: HOB elevated  Taken 7/10/2024 1600 by Shyann Oro RN  Pressure Reduction Techniques:   frequent weight shift encouraged   sit time limited to 2 hours  Taken 7/10/2024 1319 by Shyann Oro RN  Head of Bed ([facility]) Positioning: HOB elevated  Taken 7/10/2024 1200 by Shyann Oro RN  Pressure Reduction Techniques:   frequent weight shift encouraged   sit time limited to 2 hours  Taken 7/10/2024 1058 by Shyann Oro RN  Head of Bed ([facility]) Positioning: HOB elevated  Taken 7/10/2024 0800 by Shyann Oro RN  Pressure Reduction Techniques:   frequent weight shift encouraged   weight shift assistance provided   sit time limited to 2 hours     Problem: Adjustment to Illness (Sepsis/Septic Shock)  Goal: Optimal Coping  Outcome: Ongoing, Progressing     Problem: Bleeding (Sepsis/Septic Shock)  Goal: Absence of Bleeding  Outcome: Ongoing, Progressing     Problem: Glycemic Control Impaired (Sepsis/Septic Shock)  Goal: Blood Glucose Level Within Desired Range  Outcome: Ongoing, Progressing  Intervention: Optimize Glycemic Control  Recent Flowsheet Documentation  Taken 7/10/2024 1600 by Shyann Oro RN  Glycemic Management: blood glucose monitored  Taken 7/10/2024 1200 by Shyann Oro RN  Glycemic Management: blood glucose monitored  Taken 7/10/2024 0800 by Shyann Oro RN  Glycemic Management: blood glucose monitored     Problem: Infection Progression (Sepsis/Septic Shock)  Goal: Absence of Infection Signs and Symptoms  Outcome: Ongoing, Progressing  Intervention: Initiate Sepsis Management  Recent Flowsheet Documentation  Taken 7/10/2024 1200 by Shyann Oro RN  Isolation Precautions: precautions maintained  Taken 7/10/2024 0800 by Shyann Oro RN  Isolation Precautions: precautions maintained     Problem: Nutrition Impaired (Sepsis/Septic Shock)  Goal: Optimal Nutrition Intake  Outcome: Ongoing, Progressing     Problem: Adjustment to Illness (Heart Failure)  Goal: Optimal  Coping  Outcome: Ongoing, Progressing     Problem: Cardiac Output Decreased (Heart Failure)  Goal: Optimal Cardiac Output  Outcome: Ongoing, Progressing     Problem: Dysrhythmia (Heart Failure)  Goal: Stable Heart Rate and Rhythm  Outcome: Ongoing, Progressing     Problem: Fluid Imbalance (Heart Failure)  Goal: Fluid Balance  Outcome: Ongoing, Progressing     Problem: Functional Ability Impaired (Heart Failure)  Goal: Optimal Functional Ability  Outcome: Ongoing, Progressing     Problem: Oral Intake Inadequate (Heart Failure)  Goal: Optimal Nutrition Intake  Outcome: Ongoing, Progressing     Problem: Respiratory Compromise (Heart Failure)  Goal: Effective Oxygenation and Ventilation  Outcome: Ongoing, Progressing     Problem: Sleep Disordered Breathing (Heart Failure)  Goal: Effective Breathing Pattern During Sleep  Outcome: Ongoing, Progressing     Problem: Fall Injury Risk  Goal: Absence of Fall and Fall-Related Injury  Outcome: Ongoing, Progressing  Intervention: Promote Injury-Free Environment  Recent Flowsheet Documentation  Taken 7/10/2024 1600 by Shyann Oro RN  Safety Promotion/Fall Prevention:   safety round/check completed   nonskid shoes/slippers when out of bed  Taken 7/10/2024 1400 by Shyann Oro RN  Safety Promotion/Fall Prevention:   safety round/check completed   nonskid shoes/slippers when out of bed  Taken 7/10/2024 1200 by Shyann Oro RN  Safety Promotion/Fall Prevention:   safety round/check completed   nonskid shoes/slippers when out of bed  Taken 7/10/2024 1000 by Shyann Oro RN  Safety Promotion/Fall Prevention: safety round/check completed  Taken 7/10/2024 0800 by Shyann Oro RN  Safety Promotion/Fall Prevention:   safety round/check completed   nonskid shoes/slippers when out of bed     Problem: Noninvasive Ventilation Acute  Goal: Effective Unassisted Ventilation and Oxygenation  Outcome: Ongoing, Progressing   Goal Outcome Evaluation:  Plan of Care Reviewed With: patient         Progress: no change  Outcome Evaluation: Pt with chest tube on right. FC removed today per protocol and good urine output afterwards. pt continue to have dark bloody stools. GI following, no plans for scope.

## 2024-07-10 NOTE — PROGRESS NOTES
Enter Query Response Below      Query Response: Unable to determine    Electronically signed by Austin Brooks MD, 07/10/24, 1:55 PM EDT.               If applicable, please update the problem list.       Patient: Ban Brennan        : 1955  Account: [id number]           Admit Date:         How to Respond to this query:       a. Click New Note     b. Answer query within the yellow box.                c. Update the Problem List, if applicable.      If you have any questions about this query contact me at: [email or web address]     Dr. Brooks:     History and physical states alert with some intermittent confusion, severe sepsis, urinary tract infection, acute kidney injury, started on cefepime, vancomycin.  Nursing note states alert, oriented x4, pleasant on [id number] nasal cannula.  Nursing note states patient's mental status has declined overnight, patient currently responding to painful stimuli only, barely opens eyes, no verbal response, not following commands, AVAPS at 75% placed. Intensivist progress note states acute respiratory failure with hypoxic and hypercapnia, likely due to fluid overload, +rhinovirus infection, lasix iv x1, currently on AVAPS.  Cardiology progress note states alert, oriented and in no distress, currently on [id number] nasal cannula, chest tube in place, acute encephalopathy, acute respiratory failure with hypoxia and hypercapnia.     Please clarify if patient treated/monitored for one or more of the following:    Metabolic encephalopathy  Septic encephalopathy  Other- specify__________  Unable to determine.     By submitting this query, we are merely seeking further clarification of documentation to accurately reflect all conditions that you are monitoring, evaluating, treating or that extend the hospitalization or utilize additional resources of care. Please utilize your independent clinical judgment when addressing the question(s) above.     This query and your  response, once completed, will be entered into the legal medical record.    Sincerely,  Stewart CAVANAUGH RN BSN   Clinical Documentation Integrity Program   [email or web address]

## 2024-07-10 NOTE — PROGRESS NOTES
[facility] MEDICINE SERVICE  DAILY PROGRESS NOTE    NAME: Ban Brennan  : 1955  MRN: [id number]      LOS: 10 days     PROVIDER OF SERVICE: Librado Villagomez MD    Chief Complaint: Atrial fibrillation with RVR    Subjective:     Interval History:  History taken from: patient chart RN  Breathing stable    Review of Systems:   Review of Systems  All negative except as above  Objective:     Vital Signs  Temp:  [97.5 °F (36.4 °C)-98.4 °F (36.9 °C)] 97.6 °F (36.4 °C)  Heart Rate:  [] 104  Resp:  [16-26] 20  BP: ()/(55-72) 108/72  Flow (L/min):  [2-4] 3   Body mass index is 38.25 kg/m².    Physical Exam  Physical Exam  AOx3 NAD  RRR S1-S2 audible  Lungs with fair air entry  Abdomen soft nontender nondistended  Scheduled Meds   amiodarone, 200 mg, Oral, Q12H   Followed by  [START ON 2024] amiodarone, 200 mg, Oral, Daily  apixaban, 10 mg, Oral, BID   Followed by  [START ON 2024] apixaban, 5 mg, Oral, BID  budesonide, 0.5 mg, Nebulization, BID - RT  bumetanide, 1 mg, Oral, BID  hydrocortisone, 25 mg, Rectal, BID  insulin lispro, 2-7 Units, Subcutaneous, 4x Daily With Meals & Nightly  ipratropium-albuterol, 3 mL, Nebulization, 4x Daily - RT  lidocaine, 20 mL, Infiltration, Once  metoprolol succinate XL, 25 mg, Oral, Q12H  miconazole, 1 Application, Topical, Q12H  pantoprazole, 40 mg, Intravenous, BID AC  povidone-iodine, , Topical, Daily  sodium chloride, 10 mL, Intravenous, Q12H  spironolactone, 25 mg, Oral, Daily       PRN Meds     acetaminophen **OR** acetaminophen    Calcium Replacement - Follow Nurse / BPA Driven Protocol    dextrose    dextrose    glucagon (human recombinant)    ipratropium-albuterol    Magnesium Standard Dose Replacement - Follow Nurse / BPA Driven Protocol    nitroglycerin    ondansetron ODT **OR** ondansetron    Phosphorus Replacement - Follow Nurse / BPA Driven Protocol    Potassium Replacement - Follow Nurse / BPA Driven Protocol    [COMPLETED] Insert  Peripheral IV **AND** sodium chloride    sodium chloride    sodium chloride   Infusions         Diagnostic Data    Results from last 7 days   Lab Units 07/10/24  0220   WBC 10*3/mm3 11.11*   HEMOGLOBIN g/dL 12.5   HEMATOCRIT % 38.6   PLATELETS 10*3/mm3 85*   GLUCOSE mg/dL 90   CREATININE mg/dL 1.00   BUN mg/dL 54*   SODIUM mmol/L 139   POTASSIUM mmol/L 4.5   AST (SGOT) U/L 46*   ALT (SGPT) U/L 17   ALK PHOS U/L 80   BILIRUBIN mg/dL 1.9*   ANION GAP mmol/L 5.1       XR Chest 1 View    Result Date: 7/10/2024  Impression: 1. Stable chest tube overlying the right lung base. No pneumothorax. 2. Persistent bibasilar airspace disease and small bilateral pleural effusions. 3. Stable cardiomegaly. Electronically Signed: Mina Wheeler MD  7/10/2024 7:11 AM EDT  Workstation ID: [id number]    XR Chest 1 View    Result Date: 7/9/2024  Impression: 1.Bilateral airspace disease again noted. 2.Cardiomegaly 3.Pigtail catheter chest tube right lung base. Electronically Signed: Bran Bernal MD  7/9/2024 11:37 AM EDT  Workstation ID: [id number]       I reviewed the patient's new clinical results.  I reviewed the patient's new imaging results and agree with the interpretation.    Assessment/Plan:     Active and Resolved Problems  Active Hospital Problems    Diagnosis  POA    **Atrial fibrillation with RVR [I48.91]  Yes    COPD (chronic obstructive pulmonary disease) [J44.9]  Yes    Aortic stenosis [I35.0]  Yes    Mitral regurgitation [I34.0]  Yes    Acute HFrEF (heart failure with reduced ejection fraction) [I50.21]  Yes    Right bundle branch block [I45.10]  Yes    Acute deep vein thrombosis (DVT) of both lower extremities [I82.403]  Yes    ARABELLA (acute kidney injury) [N17.9]  Yes    Acute hyperkalemia [E87.5]  Yes    Sepsis [A41.9]  Yes    Acute UTI [N39.0]  Yes    Acute systolic congestive heart failure [I50.21]  Yes    Rhinovirus infection [B34.8]  Yes    Multifocal pneumonia [J18.9]  Yes    Chronic respiratory failure with hypoxia, on home  O2 therapy [J96.11, Z99.81]  Not Applicable    Skin ulcer of right great toe [L97.519]  Yes    Skin ulcer of left great toe [L97.529]  Yes    SEDRICK (obstructive sleep apnea) [G47.33]  Yes    Primary hypertension [I10]  Yes    Morbid obesity with BMI of 40.0-44.9, adult [E66.01, Z68.41]  Not Applicable      Resolved Hospital Problems   No resolved problems to display.       69-year-old female with history of hypertension, HFrEF, lower extremity lymphedema admitted to [facility] 6/30 progressive shortness of breath        #Bilateral lower extremity DVT  #History of reported PE  Seen by hematology appreciate recommendation.  Factor V and prothrombin gene mutation negative  Continue Eliquis 10 mg twice daily for 7 days followed by 5 mg twice daily  Outpatient follow-up with hematology     #Acute on chronic HFrEF  #A-fib with RVR.  New onset  #Severe MR  Cardiology following appreciate recommendations  Started on amiodarone GGT transition to p.o. tapering dose  TTE with EF 30 to 35%.  Defer ischemic workup to cardiology  Continue metoprolol 12.5 twice daily  Continue p.o. Bumex  Monitor I's and O's  Continue Aldactone  Lisinopril on hold given ARABELLA     #Right-sided hydropneumothorax  Chest tube placed in ICU 7/6  Pulmonary managing       #Severe sepsis  #UTI  #Left lower extremity cellulitis and wound  #Rhinovirus infection  #Multifocal pneumonia  #Chronic venous stasis lower extremity  Seen by infectious disease appreciate recommendations  To finish course of Keflex and doxycycline for total 7 days  Seen by podiatry no surgical intervention recommended  Midodrine has been weaned off monitor blood pressure     #Acute hypoxic respiratory failure  Multifactorial pneumonia CHF  Antibiotics and diuretics as above  Wean off supplemental oxygen as tolerated.     #DM2  A1c 6.14  Continue ISS lispro  POC ACHS     #ARABELLA  Suspect ATN in setting of sepsis   Diuretics as above  BMP daily  Avoid nephrotoxic drugs   lisinopril on hold       #uterine mass  Large subserosal uterine fundal fibroid on pelvic ultrasound  GYN follow-up as outpatient      #hematochezia  GI on board noted plan for outpatient colonoscopy  Etiology suspected to be hemorrhoids  Topical steroids started  Monitor H&H     VTE Prophylaxis:  Pharmacologic VTE prophylaxis orders are present.         Code status is   Code Status and Medical Interventions:   Ordered at: 06/30/24 2120     Code Status (Patient has no pulse and is not breathing):    CPR (Attempt to Resuscitate)     Medical Interventions (Patient has pulse or is breathing):    Full Support       Plan for disposition:48 hours    Time: 30 minutes    Signature: Electronically signed by Librado Villagomez MD, 07/10/24, 13:15 EDT.  [facility] Hospitalist Team

## 2024-07-10 NOTE — PROGRESS NOTES
"PULMONARY CRITICAL CARE PROGRESS  NOTE      PATIENT IDENTIFICATION:  Name: Ban Brennan  MRN: [id number]  :  1955     Age: 69 y.o.  Sex: female    DATE OF Note:  7/10/2024   Referring Physician: Sesar Solorzano DO                  Subjective:   In bed, no SOB, no chest or abd pain, CT patent, no bowel or bladder issues      Objective:  tMax 24 hrs: Temp (24hrs), Av.9 °F (36.6 °C), Min:97.5 °F (36.4 °C), Max:98.4 °F (36.9 °C)      Vitals Ranges:   Temp:  [97.5 °F (36.4 °C)-98.4 °F (36.9 °C)] 97.6 °F (36.4 °C)  Heart Rate:  [] 104  Resp:  [16-26] 20  BP: ()/(55-72) 108/72    Intake and Output Last 3 Shifts:   I/O last 3 completed shifts:  In: 1480 [P.O.:1480]  Out: 3275 [Urine:2905; Chest Tube:370]    Exam:  /72   Pulse 104   Temp 97.6 °F (36.4 °C) (Oral)   Resp 20   Ht 149.9 cm (59\")   Wt 85.9 kg (189 lb 6 oz)   SpO2 95%   BMI 38.25 kg/m²     General Appearance:   Alert  HEENT:  Normocephalic, without obvious abnormality. Conjunctivae/corneas clear.  Normal external ear canals. Nares normal, no drainage     Neck:  Supple, symmetrical, trachea midline. No JVD.  Lungs /Chest wall:   Bilateral basal rhonchi, respirations unlabored, symmetrical wall movement.   CT in place   Heart:  Regular rate and rhythm, systolic murmur PMI left sternal border  Abdomen: Soft, nontender, no masses, no organomegaly.    Extremities: Trace edema, no clubbing or cyanosis        Medications:  amiodarone, 200 mg, Oral, Q12H   Followed by  [START ON 2024] amiodarone, 200 mg, Oral, Daily  apixaban, 10 mg, Oral, BID   Followed by  [START ON 2024] apixaban, 5 mg, Oral, BID  budesonide, 0.5 mg, Nebulization, BID - RT  bumetanide, 1 mg, Oral, BID  hydrocortisone, 25 mg, Rectal, BID  insulin lispro, 2-7 Units, Subcutaneous, 4x Daily With Meals & Nightly  ipratropium-albuterol, 3 mL, Nebulization, 4x Daily - RT  lidocaine, 20 mL, Infiltration, Once  metoprolol succinate XL, 25 mg, Oral, " Q12H  miconazole, 1 Application, Topical, Q12H  pantoprazole, 40 mg, Intravenous, BID AC  povidone-iodine, , Topical, Daily  sodium chloride, 10 mL, Intravenous, Q12H  spironolactone, 25 mg, Oral, Daily        Infusion:        PRN:    acetaminophen **OR** acetaminophen    Calcium Replacement - Follow Nurse / BPA Driven Protocol    dextrose    dextrose    glucagon (human recombinant)    ipratropium-albuterol    Magnesium Standard Dose Replacement - Follow Nurse / BPA Driven Protocol    nitroglycerin    ondansetron ODT **OR** ondansetron    Phosphorus Replacement - Follow Nurse / BPA Driven Protocol    Potassium Replacement - Follow Nurse / BPA Driven Protocol    [COMPLETED] Insert Peripheral IV **AND** sodium chloride    sodium chloride    sodium chloride  Data Review:  All labs (24hrs):   Recent Results (from the past 24 hour(s))   Phosphorus    Collection Time: 07/09/24  3:07 PM    Specimen: Arm, Right; Blood   Result Value Ref Range    Phosphorus 2.3 (L) 2.5 - 4.5 mg/dL   Potassium    Collection Time: 07/09/24  3:07 PM    Specimen: Arm, Right; Blood   Result Value Ref Range    Potassium 3.9 3.5 - 5.2 mmol/L   POC Glucose 4x Daily Before Meals & at Bedtime    Collection Time: 07/09/24  5:43 PM    Specimen: Blood   Result Value Ref Range    Glucose 108 (H) 70 - 105 mg/dL   POC Glucose Once    Collection Time: 07/09/24  8:48 PM    Specimen: Blood   Result Value Ref Range    Glucose 117 (H) 70 - 105 mg/dL   Hemoglobin & Hematocrit, Blood    Collection Time: 07/09/24  8:51 PM    Specimen: Blood   Result Value Ref Range    Hemoglobin 12.6 12.0 - 15.9 g/dL    Hematocrit 38.9 34.0 - 46.6 %   Magnesium    Collection Time: 07/10/24  2:20 AM    Specimen: Arm, Right; Blood   Result Value Ref Range    Magnesium 2.3 1.6 - 2.4 mg/dL   Phosphorus    Collection Time: 07/10/24  2:20 AM    Specimen: Arm, Right; Blood   Result Value Ref Range    Phosphorus 2.5 2.5 - 4.5 mg/dL   Comprehensive Metabolic Panel    Collection Time:  07/10/24  2:20 AM    Specimen: Arm, Right; Blood   Result Value Ref Range    Glucose 90 65 - 99 mg/dL    BUN 54 (H) 8 - 23 mg/dL    Creatinine 1.00 0.57 - 1.00 mg/dL    Sodium 139 136 - 145 mmol/L    Potassium 4.5 3.5 - 5.2 mmol/L    Chloride 96 (L) 98 - 107 mmol/L    CO2 37.9 (H) 22.0 - 29.0 mmol/L    Calcium 8.8 8.6 - 10.5 mg/dL    Total Protein 5.2 (L) 6.0 - 8.5 g/dL    Albumin 2.0 (L) 3.5 - 5.2 g/dL    ALT (SGPT) 17 1 - 33 U/L    AST (SGOT) 46 (H) 1 - 32 U/L    Alkaline Phosphatase 80 39 - 117 U/L    Total Bilirubin 1.9 (H) 0.0 - 1.2 mg/dL    Globulin 3.2 gm/dL    A/G Ratio 0.6 g/dL    BUN/Creatinine Ratio 54.0 (H) 7.0 - 25.0    Anion Gap 5.1 5.0 - 15.0 mmol/L    eGFR 61.1 >60.0 mL/min/1.73   CBC Auto Differential    Collection Time: 07/10/24  2:20 AM    Specimen: Arm, Right; Blood   Result Value Ref Range    WBC 11.11 (H) 3.40 - 10.80 10*3/mm3    RBC 4.13 3.77 - 5.28 10*6/mm3    Hemoglobin 12.5 12.0 - 15.9 g/dL    Hematocrit 38.6 34.0 - 46.6 %    MCV 93.5 79.0 - 97.0 fL    MCH 30.3 26.6 - 33.0 pg    MCHC 32.4 31.5 - 35.7 g/dL    RDW 16.5 (H) 12.3 - 15.4 %    RDW-SD 53.6 37.0 - 54.0 fl    MPV 11.1 6.0 - 12.0 fL    Platelets 85 (L) 140 - 450 10*3/mm3    Neutrophil % 83.3 (H) 42.7 - 76.0 %    Lymphocyte % 7.7 (L) 19.6 - 45.3 %    Monocyte % 7.3 5.0 - 12.0 %    Eosinophil % 1.1 0.3 - 6.2 %    Basophil % 0.1 0.0 - 1.5 %    Immature Grans % 0.5 0.0 - 0.5 %    Neutrophils, Absolute 9.27 (H) 1.70 - 7.00 10*3/mm3    Lymphocytes, Absolute 0.85 0.70 - 3.10 10*3/mm3    Monocytes, Absolute 0.81 0.10 - 0.90 10*3/mm3    Eosinophils, Absolute 0.12 0.00 - 0.40 10*3/mm3    Basophils, Absolute 0.01 0.00 - 0.20 10*3/mm3    Immature Grans, Absolute 0.05 0.00 - 0.05 10*3/mm3    nRBC 0.0 0.0 - 0.2 /100 WBC   Occult Blood, Fecal By Immunoassay - Stool, Per Rectum    Collection Time: 07/10/24  5:25 AM    Specimen: Per Rectum; Stool   Result Value Ref Range    Occult Blood, Fecal by Immunoassay Positive (A) Negative   POC Glucose 4x  Daily Before Meals & at Bedtime    Collection Time: 07/10/24  7:20 AM    Specimen: Blood   Result Value Ref Range    Glucose 100 70 - 105 mg/dL   POC Glucose 4x Daily Before Meals & at Bedtime    Collection Time: 07/10/24 11:50 AM    Specimen: Blood   Result Value Ref Range    Glucose 129 (H) 70 - 105 mg/dL        Imaging:  XR Chest 1 View  Narrative: XR CHEST 1 VW    Date of Exam: 7/10/2024 5:31 AM EDT    Indication: CT chest tube management    Comparison: 7/9/2024    Findings:  Chest tube again noted overlying the right lung base. Negative for pneumothorax. Stable cardiomegaly. Persistent bibasilar airspace disease and small bilateral pleural effusions.  Impression: Impression:  1. Stable chest tube overlying the right lung base. No pneumothorax.  2. Persistent bibasilar airspace disease and small bilateral pleural effusions.  3. Stable cardiomegaly.    Electronically Signed: Mina Wheeler MD    7/10/2024 7:11 AM EDT    Workstation ID: [id number]       ASSESSMENT:  Hydropneumothorax s/p CT  COPD  SEDRICK  Systolic CHF  A-fib with RVR  Aortic stenosis  Mitral valve regurgitation   R BBB  Hx bilateral DVT  HTN  Morbid obesity         PLAN:  Add Mucomyst  Antibiotics  Bronchodilators  Inhaled corticosteroids  Incentive spirometer  Diuresis and monitor NANCY's   CT management   Electrolytes/ glycemic control  DVT prophylaxis-Apixaban      Discussed with MANJU Sena    7/10/2024  11:53 EDT    I personally have examined  and interviewed the patient. I have reviewed the history, data, problems, assessment and plan with our NP.  Total Critical care time in direct medical management (   ) minutes, This time specifically excludes time spent performing procedures.    MANJU Ribeiro   7/10/2024  11:53 EDT

## 2024-07-10 NOTE — PROGRESS NOTES
"PULMONARY CRITICAL CARE PROGRESS  NOTE      PATIENT IDENTIFICATION:  Name: Ban Brennan  MRN: [id number]  :  1955     Age: 69 y.o.  Sex: female    DATE OF Note:  7/10/2024   Referring Physician: Sesar Solorzano DO                  Subjective:   In bed, no SOB, no chest or abd pain, CT patent, no bowel or bladder issues      Objective:  tMax 24 hrs: Temp (24hrs), Av.9 °F (36.6 °C), Min:97.5 °F (36.4 °C), Max:98.4 °F (36.9 °C)      Vitals Ranges:   Temp:  [97.5 °F (36.4 °C)-98.4 °F (36.9 °C)] 97.6 °F (36.4 °C)  Heart Rate:  [] 104  Resp:  [16-26] 20  BP: ()/(55-72) 108/72    Intake and Output Last 3 Shifts:   I/O last 3 completed shifts:  In: 1480 [P.O.:1480]  Out: 3275 [Urine:2905; Chest Tube:370]    Exam:  /72   Pulse 104   Temp 97.6 °F (36.4 °C) (Oral)   Resp 20   Ht 149.9 cm (59\")   Wt 85.9 kg (189 lb 6 oz)   SpO2 95%   BMI 38.25 kg/m²     General Appearance:   Alert  HEENT:  Normocephalic, without obvious abnormality. Conjunctivae/corneas clear.  Normal external ear canals. Nares normal, no drainage     Neck:  Supple, symmetrical, trachea midline. No JVD.  Lungs /Chest wall:   Bilateral basal rhonchi, respirations unlabored, symmetrical wall movement.   CT in place minimal drainage  Heart:  Regular rate and rhythm, systolic murmur PMI left sternal border  Abdomen: Soft, nontender, no masses, no organomegaly.    Extremities: Trace edema, no clubbing or cyanosis        Medications:  amiodarone, 200 mg, Oral, Q12H   Followed by  [START ON 2024] amiodarone, 200 mg, Oral, Daily  apixaban, 10 mg, Oral, BID   Followed by  [START ON 2024] apixaban, 5 mg, Oral, BID  budesonide, 0.5 mg, Nebulization, BID - RT  bumetanide, 1 mg, Oral, BID  hydrocortisone, 25 mg, Rectal, BID  insulin lispro, 2-7 Units, Subcutaneous, 4x Daily With Meals & Nightly  ipratropium-albuterol, 3 mL, Nebulization, 4x Daily - RT  lidocaine, 20 mL, Infiltration, Once  metoprolol succinate XL, " 25 mg, Oral, Q12H  miconazole, 1 Application, Topical, Q12H  pantoprazole, 40 mg, Intravenous, BID AC  povidone-iodine, , Topical, Daily  sodium chloride, 10 mL, Intravenous, Q12H  spironolactone, 25 mg, Oral, Daily        Infusion:        PRN:    acetaminophen **OR** acetaminophen    Calcium Replacement - Follow Nurse / BPA Driven Protocol    dextrose    dextrose    glucagon (human recombinant)    ipratropium-albuterol    Magnesium Standard Dose Replacement - Follow Nurse / BPA Driven Protocol    nitroglycerin    ondansetron ODT **OR** ondansetron    Phosphorus Replacement - Follow Nurse / BPA Driven Protocol    Potassium Replacement - Follow Nurse / BPA Driven Protocol    [COMPLETED] Insert Peripheral IV **AND** sodium chloride    sodium chloride    sodium chloride  Data Review:  All labs (24hrs):   Recent Results (from the past 24 hour(s))   Phosphorus    Collection Time: 07/09/24  3:07 PM    Specimen: Arm, Right; Blood   Result Value Ref Range    Phosphorus 2.3 (L) 2.5 - 4.5 mg/dL   Potassium    Collection Time: 07/09/24  3:07 PM    Specimen: Arm, Right; Blood   Result Value Ref Range    Potassium 3.9 3.5 - 5.2 mmol/L   POC Glucose 4x Daily Before Meals & at Bedtime    Collection Time: 07/09/24  5:43 PM    Specimen: Blood   Result Value Ref Range    Glucose 108 (H) 70 - 105 mg/dL   POC Glucose Once    Collection Time: 07/09/24  8:48 PM    Specimen: Blood   Result Value Ref Range    Glucose 117 (H) 70 - 105 mg/dL   Hemoglobin & Hematocrit, Blood    Collection Time: 07/09/24  8:51 PM    Specimen: Blood   Result Value Ref Range    Hemoglobin 12.6 12.0 - 15.9 g/dL    Hematocrit 38.9 34.0 - 46.6 %   Magnesium    Collection Time: 07/10/24  2:20 AM    Specimen: Arm, Right; Blood   Result Value Ref Range    Magnesium 2.3 1.6 - 2.4 mg/dL   Phosphorus    Collection Time: 07/10/24  2:20 AM    Specimen: Arm, Right; Blood   Result Value Ref Range    Phosphorus 2.5 2.5 - 4.5 mg/dL   Comprehensive Metabolic Panel    Collection  Time: 07/10/24  2:20 AM    Specimen: Arm, Right; Blood   Result Value Ref Range    Glucose 90 65 - 99 mg/dL    BUN 54 (H) 8 - 23 mg/dL    Creatinine 1.00 0.57 - 1.00 mg/dL    Sodium 139 136 - 145 mmol/L    Potassium 4.5 3.5 - 5.2 mmol/L    Chloride 96 (L) 98 - 107 mmol/L    CO2 37.9 (H) 22.0 - 29.0 mmol/L    Calcium 8.8 8.6 - 10.5 mg/dL    Total Protein 5.2 (L) 6.0 - 8.5 g/dL    Albumin 2.0 (L) 3.5 - 5.2 g/dL    ALT (SGPT) 17 1 - 33 U/L    AST (SGOT) 46 (H) 1 - 32 U/L    Alkaline Phosphatase 80 39 - 117 U/L    Total Bilirubin 1.9 (H) 0.0 - 1.2 mg/dL    Globulin 3.2 gm/dL    A/G Ratio 0.6 g/dL    BUN/Creatinine Ratio 54.0 (H) 7.0 - 25.0    Anion Gap 5.1 5.0 - 15.0 mmol/L    eGFR 61.1 >60.0 mL/min/1.73   CBC Auto Differential    Collection Time: 07/10/24  2:20 AM    Specimen: Arm, Right; Blood   Result Value Ref Range    WBC 11.11 (H) 3.40 - 10.80 10*3/mm3    RBC 4.13 3.77 - 5.28 10*6/mm3    Hemoglobin 12.5 12.0 - 15.9 g/dL    Hematocrit 38.6 34.0 - 46.6 %    MCV 93.5 79.0 - 97.0 fL    MCH 30.3 26.6 - 33.0 pg    MCHC 32.4 31.5 - 35.7 g/dL    RDW 16.5 (H) 12.3 - 15.4 %    RDW-SD 53.6 37.0 - 54.0 fl    MPV 11.1 6.0 - 12.0 fL    Platelets 85 (L) 140 - 450 10*3/mm3    Neutrophil % 83.3 (H) 42.7 - 76.0 %    Lymphocyte % 7.7 (L) 19.6 - 45.3 %    Monocyte % 7.3 5.0 - 12.0 %    Eosinophil % 1.1 0.3 - 6.2 %    Basophil % 0.1 0.0 - 1.5 %    Immature Grans % 0.5 0.0 - 0.5 %    Neutrophils, Absolute 9.27 (H) 1.70 - 7.00 10*3/mm3    Lymphocytes, Absolute 0.85 0.70 - 3.10 10*3/mm3    Monocytes, Absolute 0.81 0.10 - 0.90 10*3/mm3    Eosinophils, Absolute 0.12 0.00 - 0.40 10*3/mm3    Basophils, Absolute 0.01 0.00 - 0.20 10*3/mm3    Immature Grans, Absolute 0.05 0.00 - 0.05 10*3/mm3    nRBC 0.0 0.0 - 0.2 /100 WBC   Occult Blood, Fecal By Immunoassay - Stool, Per Rectum    Collection Time: 07/10/24  5:25 AM    Specimen: Per Rectum; Stool   Result Value Ref Range    Occult Blood, Fecal by Immunoassay Positive (A) Negative   POC Glucose  4x Daily Before Meals & at Bedtime    Collection Time: 07/10/24  7:20 AM    Specimen: Blood   Result Value Ref Range    Glucose 100 70 - 105 mg/dL   POC Glucose 4x Daily Before Meals & at Bedtime    Collection Time: 07/10/24 11:50 AM    Specimen: Blood   Result Value Ref Range    Glucose 129 (H) 70 - 105 mg/dL        Imaging:  XR Chest 1 View  Narrative: XR CHEST 1 VW    Date of Exam: 7/10/2024 5:31 AM EDT    Indication: CT chest tube management    Comparison: 7/9/2024    Findings:  Chest tube again noted overlying the right lung base. Negative for pneumothorax. Stable cardiomegaly. Persistent bibasilar airspace disease and small bilateral pleural effusions.  Impression: Impression:  1. Stable chest tube overlying the right lung base. No pneumothorax.  2. Persistent bibasilar airspace disease and small bilateral pleural effusions.  3. Stable cardiomegaly.    Electronically Signed: Mina Wheeler MD    7/10/2024 7:11 AM EDT    Workstation ID: [id number]       ASSESSMENT:  Hydropneumothorax s/p CT  COPD  SEDRICK  Systolic CHF  A-fib with RVR  Aortic stenosis  Mitral valve regurgitation   R BBB  Hx bilateral DVT  HTN  Morbid obesity         PLAN:  Will change chest tube to waterseal  Encourage OOB during the day   Antibiotics  Bronchodilators  Inhaled corticosteroids  Mucomyst  Incentive spirometer  Diuresis and monitor NANCY's   CT management   Electrolytes/ glycemic control  DVT prophylaxis-Apixaban      Discussed with Dr Pineda Martin, MANJU    7/10/2024  12:06 EDT    I personally have examined  and interviewed the patient. I have reviewed the history, data, problems, assessment and plan with our NP.  Total Critical care time in direct medical management (   ) minutes, This time specifically excludes time spent performing procedures.    Donna Sung MD   7/11/2024  00:23 EDT

## 2024-07-10 NOTE — PROGRESS NOTES
"Referring Provider: Librado Villagomez MD    Reason for follow-up: Atrial fibrillation with rapid ventricular rate     Patient Care Team:  Rut Pierce APRN as PCP - General (Nurse Practitioner)  Austin Brooks MD as Cardiologist (Cardiology)      SUBJECTIVE  Bright red blood per rectum with no drop in H&H.     ROS  Review of all systems negative except as indicated.    Since I have last seen, the patient has been without any chest discomfort, shortness of breath, palpitations, dizziness or syncope.  Denies having any headache, abdominal pain, nausea, vomiting, diarrhea, constipation, loss of weight or loss of appetite.  Denies having any excessive bruising, hematuria or blood in the stool.        Personal History:    Past Medical History:   Diagnosis Date    Bilateral leg edema     Chronic respiratory failure with hypoxia, on home O2 therapy 07/01/2024    COPD (chronic obstructive pulmonary disease)     Morbid obesity with BMI of 40.0-44.9, adult 11/08/2010    Primary hypertension 03/01/2017    Pulmonary embolism     \"many years ago, was on warfarin\"    Sleep apnea        History reviewed. No pertinent surgical history.    History reviewed. No pertinent family history.    Social History     Tobacco Use    Smoking status: Never    Smokeless tobacco: Never   Vaping Use    Vaping status: Never Used   Substance Use Topics    Alcohol use: Never    Drug use: Never        Home meds:  Prior to Admission medications    Medication Sig Start Date End Date Taking? Authorizing Provider   Allergy Relief 180 MG tablet Take 1 tablet by mouth Daily. 5/30/24  Yes Chadwick Johnson MD   bumetanide (BUMEX) 1 MG tablet Take 1 tablet by mouth 3 times a day. 5/30/24  Yes Chadwick Johnson MD   docusate sodium (COLACE) 100 MG capsule Take 1 capsule by mouth Every 12 (Twelve) Hours. 5/30/24  Yes Chadwick Johnson MD   furosemide (LASIX) 20 MG tablet Take 1 tablet by mouth Daily.   Yes Chadwick Johnson MD "   HYDROcodone-acetaminophen (NORCO)  MG per tablet Take 1 tablet by mouth 3 times a day. 5/30/24  Yes Chadwick Johnson MD   lisinopril (PRINIVIL,ZESTRIL) 30 MG tablet Take 1 tablet by mouth Daily. 3/18/24  Yes Chadwick Johnson MD   meloxicam (MOBIC) 7.5 MG tablet Take 1 tablet by mouth Daily As Needed. 3/18/24  Yes Chadwick Johnson MD   metoprolol tartrate (LOPRESSOR) 50 MG tablet Take 1 tablet by mouth Daily.   Yes Chadwick Johnson MD   montelukast (SINGULAIR) 10 MG tablet Take 1 tablet by mouth every night at bedtime.   Yes Chadwick Johnson MD   omeprazole (priLOSEC) 20 MG capsule Take 1 capsule by mouth Daily. 3/18/24  Yes Chadwick Johnson MD   oxybutynin XL (DITROPAN-XL) 10 MG 24 hr tablet Take 2 tablets by mouth Daily. 3/18/24  Yes Chadwick Johnson MD   potassium chloride (MICRO-K) 10 MEQ CR capsule Take 1 capsule by mouth Every 12 (Twelve) Hours. 3/18/24  Yes Chadwick Johnson MD   vitamin D (ERGOCALCIFEROL) 1.25 MG (13960 UT) capsule capsule Take 1 capsule by mouth 2 (Two) Times a Week. Monday and Thursday. 5/30/24  Yes Chadwick Johnson MD       Allergies:  Patient has no known allergies.    Scheduled Meds:amiodarone, 200 mg, Oral, Q12H   Followed by  [START ON 7/23/2024] amiodarone, 200 mg, Oral, Daily  apixaban, 10 mg, Oral, BID   Followed by  [START ON 7/14/2024] apixaban, 5 mg, Oral, BID  budesonide, 0.5 mg, Nebulization, BID - RT  bumetanide, 1 mg, Oral, BID  hydrocortisone, 25 mg, Rectal, BID  insulin lispro, 2-7 Units, Subcutaneous, 4x Daily With Meals & Nightly  ipratropium-albuterol, 3 mL, Nebulization, 4x Daily - RT  lidocaine, 20 mL, Infiltration, Once  metoprolol succinate XL, 25 mg, Oral, Q12H  miconazole, 1 Application, Topical, Q12H  pantoprazole, 40 mg, Intravenous, BID AC  povidone-iodine, , Topical, Daily  sodium chloride, 10 mL, Intravenous, Q12H  spironolactone, 25 mg, Oral, Daily      Continuous Infusions:   PRN Meds:.  acetaminophen  "**OR** acetaminophen    Calcium Replacement - Follow Nurse / BPA Driven Protocol    dextrose    dextrose    glucagon (human recombinant)    ipratropium-albuterol    Magnesium Standard Dose Replacement - Follow Nurse / BPA Driven Protocol    nitroglycerin    ondansetron ODT **OR** ondansetron    Phosphorus Replacement - Follow Nurse / BPA Driven Protocol    Potassium Replacement - Follow Nurse / BPA Driven Protocol    [COMPLETED] Insert Peripheral IV **AND** sodium chloride    sodium chloride    sodium chloride      OBJECTIVE    Vital Signs  Vitals:    07/10/24 0456 07/10/24 0646 07/10/24 0650 07/10/24 0653   BP: 104/56      BP Location: Left arm      Patient Position: Lying      Pulse: 98 96 87 96   Resp: 19 18 18    Temp: 97.8 °F (36.6 °C)      TempSrc: Oral      SpO2: 96% 96% 96% 97%   Weight: 85.9 kg (189 lb 6 oz)      Height:           Flowsheet Rows      Flowsheet Row First Filed Value   Admission Height 147.3 cm (58\") Documented at 06/30/2024 1650   Admission Weight 108 kg (239 lb) Documented at 06/30/2024 1650              Intake/Output Summary (Last 24 hours) at 7/10/2024 0742  Last data filed at 7/10/2024 0700  Gross per 24 hour   Intake 1480 ml   Output 1995 ml   Net -515 ml          Telemetry: Atrial fibrillation with heart rate in the 90s    Physical Exam:  The patient is alert, oriented and in no distress.  Obese  Vital signs as noted above.  Head and neck revealed no carotid bruits or jugular venous distention.  No thyromegaly or lymphadenopathy is present  Lungs clear.  No wheezing.  Breath sounds are normal bilaterally.  Heart normal first and second heart sounds.  No murmur. No precordial rub is present.  No gallop is present.  Abdomen soft and nontender.  No organomegaly is present.  Extremities with good peripheral pulses without any pedal edema.  Skin warm and dry.  Musculoskeletal system is grossly normal.  CNS grossly normal.       Results Review:  I have personally reviewed the results from " the time of this admission to 7/10/2024 07:42 EDT and agree with these findings:  []  Laboratory  []  Microbiology  []  Radiology  []  EKG/Telemetry   []  Cardiology/Vascular   []  Pathology  []  Old records  []  Other:    Most notable findings include:    Lab Results (last 24 hours)       Procedure Component Value Units Date/Time    POC Glucose 4x Daily Before Meals & at Bedtime [[id number]]  (Normal) Collected: 07/10/24 0720    Specimen: Blood Updated: 07/10/24 0722     Glucose 100 mg/dL      Comment: Serial Number: [id number]:  [id number]       Occult Blood, Fecal By Immunoassay - Stool, Per Rectum [[id number]]  (Abnormal) Collected: 07/10/24 0525    Specimen: Stool from Per Rectum Updated: 07/10/24 0604     Occult Blood, Fecal by Immunoassay Positive    Magnesium [[id number]]  (Normal) Collected: 07/10/24 0220    Specimen: Blood from Arm, Right Updated: 07/10/24 0305     Magnesium 2.3 mg/dL     Comprehensive Metabolic Panel [[id number]]  (Abnormal) Collected: 07/10/24 0220    Specimen: Blood from Arm, Right Updated: 07/10/24 0305     Glucose 90 mg/dL      BUN 54 mg/dL      Creatinine 1.00 mg/dL      Sodium 139 mmol/L      Potassium 4.5 mmol/L      Comment: Slight hemolysis detected by analyzer. Result may be falsely elevated.        Chloride 96 mmol/L      CO2 37.9 mmol/L      Calcium 8.8 mg/dL      Total Protein 5.2 g/dL      Albumin 2.0 g/dL      ALT (SGPT) 17 U/L      AST (SGOT) 46 U/L      Comment: Slight hemolysis detected by analyzer. Result may be falsely elevated.        Alkaline Phosphatase 80 U/L      Total Bilirubin 1.9 mg/dL      Globulin 3.2 gm/dL      A/G Ratio 0.6 g/dL      BUN/Creatinine Ratio 54.0     Anion Gap 5.1 mmol/L      eGFR 61.1 mL/min/1.73     Narrative:      GFR Normal >60  Chronic Kidney Disease <60  Kidney Failure <15      Phosphorus [[id number]]  (Normal) Collected: 07/10/24 0220    Specimen: Blood from Arm, Right Updated: 07/10/24 0250     Phosphorus 2.5 mg/dL     CBC &  Differential [[id number]]  (Abnormal) Collected: 07/10/24 0220    Specimen: Blood from Arm, Right Updated: 07/10/24 0233    Narrative:      The following orders were created for panel order CBC & Differential.  Procedure                               Abnormality         Status                     ---------                               -----------         ------                     CBC Auto Differential[[id number]]        Abnormal            Final result               Scan Slide[[id number]]                                                                    Please view results for these tests on the individual orders.    CBC Auto Differential [[id number]]  (Abnormal) Collected: 07/10/24 0220    Specimen: Blood from Arm, Right Updated: 07/10/24 0233     WBC 11.11 10*3/mm3      RBC 4.13 10*6/mm3      Hemoglobin 12.5 g/dL      Hematocrit 38.6 %      MCV 93.5 fL      MCH 30.3 pg      MCHC 32.4 g/dL      RDW 16.5 %      RDW-SD 53.6 fl      MPV 11.1 fL      Platelets 85 10*3/mm3      Neutrophil % 83.3 %      Lymphocyte % 7.7 %      Monocyte % 7.3 %      Eosinophil % 1.1 %      Basophil % 0.1 %      Immature Grans % 0.5 %      Neutrophils, Absolute 9.27 10*3/mm3      Lymphocytes, Absolute 0.85 10*3/mm3      Monocytes, Absolute 0.81 10*3/mm3      Eosinophils, Absolute 0.12 10*3/mm3      Basophils, Absolute 0.01 10*3/mm3      Immature Grans, Absolute 0.05 10*3/mm3      nRBC 0.0 /100 WBC     Hemoglobin & Hematocrit, Blood [[id number]]  (Normal) Collected: 07/09/24 2051    Specimen: Blood Updated: 07/09/24 2100     Hemoglobin 12.6 g/dL      Hematocrit 38.9 %     POC Glucose Once [[id number]]  (Abnormal) Collected: 07/09/24 2048    Specimen: Blood Updated: 07/09/24 2050     Glucose 117 mg/dL      Comment: Serial Number: [id number]:  [id number]       POC Glucose 4x Daily Before Meals & at Bedtime [[id number]]  (Abnormal) Collected: 07/09/24 1743    Specimen: Blood Updated: 07/09/24 1744     Glucose 108 mg/dL      Comment: Serial  "Number: [id number]:  [id number]       Phosphorus [[id number]]  (Abnormal) Collected: 07/09/24 1507    Specimen: Blood from Arm, Right Updated: 07/09/24 1557     Phosphorus 2.3 mg/dL     Potassium [[id number]]  (Normal) Collected: 07/09/24 1507    Specimen: Blood from Arm, Right Updated: 07/09/24 1543     Potassium 3.9 mmol/L      Comment: Specimen hemolyzed.  Result may be falsely elevated.       Non-gynecologic Cytology [[id number]] Collected: 07/06/24 1544    Specimen: Pleural Fluid from Pleural Cavity Updated: 07/09/24 1342     Case Report --     Medical Cytology Report                           Case: [id number]                                  Authorizing Provider:  Sesar Solorzano DO  Collected:           07/06/2024 03:44 PM          Ordering Location:     [facility]       Received:            07/08/2024 08:55 AM                                 [facility]                                                          Pathologist:           Eamon Melgar MD                                                             Specimen:    Pleural Cavity, Right                                                                       Final Diagnosis --     Pleural fluid, right, smears and cytospin preparation:    Mesothelial cells, histiocytes and predominantly acute inflammatory cells    Negative for malignant cells    JPR       Gross Description --     1. Pleural Cavity.  Received in carbowax and designated \"Pleural fluid\" are 6 mL of cloudy, brown colored fluid. Particulate matter is not present. This specimen is processed as per protocol.          POC Glucose 4x Daily Before Meals & at Bedtime [[id number]]  (Abnormal) Collected: 07/09/24 1108    Specimen: Blood Updated: 07/09/24 1110     Glucose 175 mg/dL      Comment: Serial Number: [id number]:  [id number]       Occult Blood, Fecal By Immunoassay - Stool, Per Rectum [[id number]]  (Abnormal) Collected: 07/09/24 0900    Specimen: Stool from Per " Rectum Updated: 07/09/24 0928     Occult Blood, Fecal by Immunoassay Positive    Body Fluid Culture - Body Fluid, Pleural Cavity [[id number]] Collected: 07/06/24 1544    Specimen: Body Fluid from Pleural Cavity Updated: 07/09/24 0927     Body Fluid Culture No growth at 2 days     Gram Stain Many (4+) WBCs per low power field      No organisms seen    Hemoglobin & Hematocrit, Blood [[id number]]  (Normal) Collected: 07/09/24 0851    Specimen: Blood from Arm, Right Updated: 07/09/24 0922     Hemoglobin 13.1 g/dL      Hematocrit 40.5 %             Imaging Results (Last 24 Hours)       Procedure Component Value Units Date/Time    XR Chest 1 View [[id number]] Collected: 07/10/24 0710     Updated: 07/10/24 0713    Narrative:      XR CHEST 1 VW    Date of Exam: 7/10/2024 5:31 AM EDT    Indication: CT chest tube management    Comparison: 7/9/2024    Findings:  Chest tube again noted overlying the right lung base. Negative for pneumothorax. Stable cardiomegaly. Persistent bibasilar airspace disease and small bilateral pleural effusions.      Impression:      Impression:  1. Stable chest tube overlying the right lung base. No pneumothorax.  2. Persistent bibasilar airspace disease and small bilateral pleural effusions.  3. Stable cardiomegaly.        Electronically Signed: Mina Wheeler MD    7/10/2024 7:11 AM EDT    Workstation ID: [id number]    XR Chest 1 View [[id number]] Collected: 07/09/24 1135     Updated: 07/09/24 1139    Narrative:      XR CHEST 1 VW    Date of Exam: 7/9/2024 11:30 AM EDT    Indication: Chest tube    Comparison: July 6, 2024    Findings:  There is a pigtail catheter noted at the right lung base. A pneumothorax not definitely confirmed. There is airspace disease in the lower lobes bilaterally. There are no large pleural effusions. The heart appears enlarged.      Impression:      Impression:  1.Bilateral airspace disease again noted.  2.Cardiomegaly  3.Pigtail catheter chest tube right lung  base.      Electronically Signed: Bran Bernal MD    7/9/2024 11:37 AM EDT    Workstation ID: [id number]            LAB RESULTS (LAST 7 DAYS)    CBC  Results from last 7 days   Lab Units 07/10/24  0220 07/09/24  2051 07/09/24  0851 07/09/24  0256 07/08/24  0446 07/07/24  0506 07/06/24  0354 07/05/24  0918 07/05/24  0257   WBC 10*3/mm3 11.11*  --   --  10.66 11.66* 16.38* 15.29* 12.93* 11.98*   RBC 10*6/mm3 4.13  --   --  4.15 4.45 4.73 4.54 4.55 4.78   HEMOGLOBIN g/dL 12.5 12.6 13.1 12.6 13.3 14.2 13.8 13.8 14.6   HEMATOCRIT % 38.6 38.9 40.5 39.1 41.8 43.9 42.8 45.5 48.1*   MCV fL 93.5  --   --  94.2 93.9 92.8 94.3 100.0* 100.6*   PLATELETS 10*3/mm3 85*  --   --  87* 100* 130* 128* 115* 133*       BMP  Results from last 7 days   Lab Units 07/10/24  0220 07/09/24  1507 07/09/24  0256 07/08/24  0446 07/07/24  0506 07/06/24  1608 07/06/24  0354 07/05/24  0918 07/05/24  0257   SODIUM mmol/L 139  --  145 145 143 140 139 135* 136   POTASSIUM mmol/L 4.5 3.9 3.6 3.0* 3.9 3.3* 3.7 5.3* 5.9*   CHLORIDE mmol/L 96*  --  98 97* 98 96* 96* 95* 95*   CO2 mmol/L 37.9*  --  39.5* 38.0* 35.7* 33.9* 34.0* 31.6* 35.0*   BUN mg/dL 54*  --  68* 72* 74* 72* 70* 67* 65*   CREATININE mg/dL 1.00  --  1.08* 1.19* 1.37* 1.41* 1.56* 1.86* 1.88*   GLUCOSE mg/dL 90  --  117* 98 102* 111* 133* 128* 99   MAGNESIUM mg/dL 2.3  --  1.3* 1.8 1.6  --  1.9 1.9 2.0   PHOSPHORUS mg/dL 2.5 2.3* 1.8* 2.4* 2.5  --  3.5 4.2 5.3*       CMP   Results from last 7 days   Lab Units 07/10/24  0220 07/09/24  1507 07/09/24  0256 07/08/24  0446 07/07/24  0506 07/06/24  1608 07/06/24  0354 07/05/24  0918 07/05/24  0257   SODIUM mmol/L 139  --  145 145 143 140 139 135* 136   POTASSIUM mmol/L 4.5 3.9 3.6 3.0* 3.9 3.3* 3.7 5.3* 5.9*   CHLORIDE mmol/L 96*  --  98 97* 98 96* 96* 95* 95*   CO2 mmol/L 37.9*  --  39.5* 38.0* 35.7* 33.9* 34.0* 31.6* 35.0*   BUN mg/dL 54*  --  68* 72* 74* 72* 70* 67* 65*   CREATININE mg/dL 1.00  --  1.08* 1.19* 1.37* 1.41* 1.56* 1.86* 1.88*    GLUCOSE mg/dL 90  --  117* 98 102* 111* 133* 128* 99   ALBUMIN g/dL 2.0*  --  2.2* 2.2* 2.5* 2.5* 2.5*  --  2.5*   BILIRUBIN mg/dL 1.9*  --  1.7* 1.7* 1.8* 1.6* 1.6*  --  1.6*   ALK PHOS U/L 80  --  84 96 125* 110 117  --  129*   AST (SGOT) U/L 46*  --  37* 36* 30 31 27  --  28   ALT (SGPT) U/L 17  --  17 15 17 17 14  --  16       BNP        TROPONIN          CoAg        Creatinine Clearance  Estimated Creatinine Clearance: 53.3 mL/min (by C-G formula based on SCr of 1 mg/dL).    ABG  Results from last 7 days   Lab Units 07/06/24  1150 07/05/24  0711 07/05/24  0442 07/05/24  0353 07/05/24  0212   PH, ARTERIAL pH units 7.392 7.281* 7.250* 7.204* 7.208*   PCO2, ARTERIAL mm Hg 61.2* 78.4* 80.9* 94.8* 92.8*   PO2 ART mm Hg 61.9* 106.7 76.6* 87.0 76.7*   O2 SATURATION ART % 90.2* 97.0 91.7* 93.1* 90.5*   BASE EXCESS ART mmol/L 9.3* 6.5* 4.5* 4.6* 4.4*       Radiology  XR Chest 1 View    Result Date: 7/10/2024  Impression: 1. Stable chest tube overlying the right lung base. No pneumothorax. 2. Persistent bibasilar airspace disease and small bilateral pleural effusions. 3. Stable cardiomegaly. Electronically Signed: Mina Wheeler MD  7/10/2024 7:11 AM EDT  Workstation ID: [id number]    XR Chest 1 View    Result Date: 7/9/2024  Impression: 1.Bilateral airspace disease again noted. 2.Cardiomegaly 3.Pigtail catheter chest tube right lung base. Electronically Signed: Bran Bernal MD  7/9/2024 11:37 AM EDT  Workstation ID: [id number]       EKG  I personally viewed and interpreted the patient's EKG/Telemetry data:  ECG 12 Lead Rhythm Change   Final Result   HEART RATE=92  bpm   RR Wiilgunj=107  ms   NM Interval=  ms   P Horizontal Axis=  deg   P Front Axis=  deg   QRSD Xrozzerx=311  ms   QT Libnrigq=980  ms   PTyD=736  ms   QRS Axis=-96  deg   T Wave Axis=65  deg   - ABNORMAL ECG -   Atrial fibrillation   Right bundle branch block   When compared with ECG of 05-Jul-2024 02:24:17,   No significant change   Electronically Signed By:  Tee Whitlock ([facility]) 2024-07-07 10:12:55   Date and Time of Study:2024-07-05 06:43:37      ECG 12 Lead Drug Monitoring; amiodarone   Final Result   HEART IXOG=945  bpm   RR Zdtklilz=485  ms   OR Interval=  ms   P Horizontal Axis=  deg   P Front Axis=  deg   QRSD Qlrntuku=674  ms   QT Lphzqysv=484  ms   YOfI=827  ms   QRS Axis=-94  deg   T Wave Axis=70  deg   - ABNORMAL ECG -   Atrial fibrillation   Ventricular premature complex   Right bundle branch block   When compared with ECG of 04-Jul-2024 09:17:07,   No change from prior tracing   Electronically Signed By: Tee Whitlock ([facility]) 2024-07-07 10:13:32   Date and Time of Study:2024-07-05 02:24:17      ECG 12 Lead Electrolyte Imbalance   Final Result   HEART RATU=382  bpm   RR Zujlvlsl=926  ms   OR Ccbrubpc=427  ms   P Horizontal Axis=113  deg   P Front Axis=263  deg   QRSD Uyegondm=619  ms   QT Cltpukfa=318  ms   SEkR=304  ms   QRS Axis=227  deg   T Wave Axis=27  deg   - ABNORMAL ECG -   Right bundle branch block   When compared with ECG of 04-Jul-2024 04:20:51,   Significant change in rhythm: previously atrial fibrillation   Significant repolarization change   Atrial fibrillation with rapid V-rate   No change from prior tracing   Electronically Signed By: Tee Whitlock ([facility]) 2024-07-07 10:14:27   Date and Time of Study:2024-07-04 09:17:07      ECG 12 Lead Drug Monitoring; Amiodarone   Preliminary Result   HEART RDHM=497  bpm   RR Diaplpem=803  ms   OR Interval=  ms   P Horizontal Axis=  deg   P Front Axis=  deg   QRSD Vhfonbsm=680  ms   QT Wyhplvla=223  ms   DHsY=487  ms   QRS Axis=-90  deg   T Wave Axis=36  deg   - ABNORMAL ECG -   Atrial fibrillation   Right bundle branch block   ST elevation secondary to IVCD   Date and Time of Study:2024-07-04 04:20:51      ECG 12 Lead Chest Pain   Preliminary Result   HEART XYNB=467  bpm   RR Lsoxrkbx=871  ms   OR Interval=  ms   P Horizontal Axis=  deg   P Front Axis=  deg   QRSD Ymuoygni=738  ms   QT Ibzapaau=485  ms    SLwK=998  ms   QRS Axis=-98  deg   T Wave Axis=37  deg   - ABNORMAL ECG -   Atrial fibrillation   Right bundle branch block   Anterolateral infarct, old   Date and Time of Study:2024-07-03 17:38:52      ECG 12 Lead Drug Monitoring; Amiodarone   Preliminary Result   HEART AJOP=304  bpm   RR Qmyxajhz=991  ms   MA Interval=  ms   P Horizontal Axis=  deg   P Front Axis=  deg   QRSD Xhgheesy=312  ms   QT Ypnvlvkx=060  ms   GVeA=312  ms   QRS Axis=-90  deg   T Wave Axis=74  deg   - ABNORMAL ECG -   Atrial fibrillation   Right bundle branch block   ST elevation secondary to IVCD   Date and Time of Study:2024-07-03 04:02:32      ECG 12 Lead Drug Monitoring; Amiodarone   Preliminary Result   HEART QMLH=039  bpm   RR Uedmghvh=744  ms   MA Interval=  ms   P Horizontal Axis=  deg   P Front Axis=  deg   QRSD Zronvxrt=161  ms   QT Anbakbgu=289  ms   AQoH=022  ms   QRS Axis=-93  deg   T Wave Axis=45  deg   - ABNORMAL ECG -   Atrial fibrillation   Right bundle branch block   Inferior infarct, old   When compared with ECG of 01-Jul-2024 04:42:13,   Nonspecific significant change   Date and Time of Study:2024-07-02 15:12:56      ECG 12 Lead Drug Monitoring; Amiodarone   Final Result   HEART DWOD=359  bpm   RR Ybepswyc=246  ms   MA Interval=  ms   P Horizontal Axis=  deg   P Front Axis=  deg   QRSD Jafervsv=152  ms   QT Mjpdtlqv=006  ms   BNmM=678  ms   QRS Axis=-80  deg   T Wave Axis=102  deg   - ABNORMAL ECG -   Atrial fibrillation   Right bundle branch block   Inferior  infarct, old   Anterolateral  infarct, age indeterminate   When compared with ECG of 30-Jun-2024 16:58:43,   Significant rate decrease   New or worsened ischemia or infarction   Electronically Signed By: Austin Brooks ([facility]) 2024-07-01 13:11:59   Date and Time of Study:2024-07-01 04:42:13      ECG 12 Lead Dyspnea   Final Result   HEART OEFV=475  bpm   RR Thxoxxsc=911  ms   MA Interval=  ms   P Horizontal Axis=  deg   P Front Axis=  deg   QRSD Yibovhrd=985  ms   QT  Oftsdsqg=830  ms   UYkS=589  ms   QRS Axis=-101  deg   T Wave Axis=43  deg   - ABNORMAL ECG -   Atrial fibrillation   Right bundle branch block   ST elevation secondary to IVCD   Electronically Signed By: Jeffery Kwon ([facility]) 2024-07-01 07:37:02   Date and Time of Study:2024-06-30 16:58:43      Telemetry Scan   Final Result      Telemetry Scan   Final Result      Telemetry Scan   Final Result      Telemetry Scan   Final Result      Telemetry Scan   Final Result      Telemetry Scan   Final Result      Telemetry Scan   Final Result      Telemetry Scan   Final Result      Telemetry Scan   Final Result      Telemetry Scan   Final Result      Telemetry Scan   Final Result      Telemetry Scan   Final Result      Telemetry Scan   Final Result      Telemetry Scan   Final Result      Telemetry Scan   Final Result      Telemetry Scan   Final Result      Telemetry Scan   Final Result      Telemetry Scan   Final Result      Telemetry Scan   Final Result      Telemetry Scan   Final Result      Telemetry Scan   Final Result      Telemetry Scan   Final Result      Telemetry Scan   Final Result      Telemetry Scan   Final Result      Telemetry Scan   Final Result      Telemetry Scan   Final Result      Telemetry Scan   Final Result      Telemetry Scan   Final Result      Telemetry Scan   Final Result      Telemetry Scan   Final Result      Telemetry Scan   Final Result      Telemetry Scan   Final Result      Telemetry Scan   Final Result      Telemetry Scan   Final Result      Telemetry Scan   Final Result      Telemetry Scan   Final Result      Telemetry Scan   Final Result      Telemetry Scan   Final Result      Telemetry Scan   Final Result      Telemetry Scan   Final Result      Telemetry Scan   Final Result      Telemetry Scan   Final Result      Telemetry Scan   Final Result      Telemetry Scan   Final Result      Telemetry Scan   Final Result      Telemetry Scan   Final Result      Telemetry Scan   Final Result       Telemetry Scan   Final Result      ECG 12 Lead Electrolyte Imbalance    (Results Pending)         Echocardiogram:    Results for orders placed during the hospital encounter of 06/30/24    Adult Transthoracic Echo Complete w/ Color, Spectral and Contrast if Necessary Per Protocol    Interpretation Summary    Left ventricular ejection fraction appears to be 31 - 35%.    Left ventricular diastolic dysfunction is noted.    The left atrial cavity is dilated.    Moderate aortic valve stenosis is present. Mean/peak gradient of 14/24 mmHg.  Dimensionless index 0.36    Moderate to severe mitral valve regurgitation is present.    Estimated right ventricular systolic pressure from tricuspid regurgitation is normal (<35 mmHg).        Stress Test:         Cardiac Catheterization:  No results found for this or any previous visit.         Other:         ASSESSMENT & PLAN:    Principal Problem:    Atrial fibrillation with RVR  Active Problems:    Primary hypertension    Morbid obesity with BMI of 40.0-44.9, adult    Right bundle branch block    Acute deep vein thrombosis (DVT) of both lower extremities    ARABELLA (acute kidney injury)    Acute hyperkalemia    Sepsis    Acute UTI    Acute systolic congestive heart failure    Rhinovirus infection    Multifocal pneumonia    Chronic respiratory failure with hypoxia, on home O2 therapy    Skin ulcer of right great toe    Skin ulcer of left great toe    SEDRICK (obstructive sleep apnea)    Aortic stenosis    Mitral regurgitation    Acute HFrEF (heart failure with reduced ejection fraction)    COPD (chronic obstructive pulmonary disease)      Acute respiratory failure with hypoxia and hypercapnia  Acute encephalopathy   Currently on 2 L of oxygen via nasal cannula  Required chest tube placement for hydropneumothorax  Pulmonology is on board     Atrial fibrillation with RVR  Newly diagnosed  Electrolytes have been corrected  Rate controlled with heart rate in the 80s and 90s  Continue amiodarone  for rhythm control   Toprol-XL for rate control: 25 twice daily  [id number] score is 7  Continue Eliquis  Rectal bleeding with no drop in H&H likely from hemorrhoids  No plan for endoscopy       Acute systolic CHF  Newly diagnosed  Echocardiogram shows EF of 31 to 35%, moderate aortic stenosis and moderate to severe mitral regurgitation.  Completed IV diuretics now on oral Bumex  Monitor I's and O's and replace electrolytes as needed.  Switch from metoprolol to tartrate to succinate  Add ACE inhibitor/ARNI if renal function and blood pressure allow  Was requiring midodrine.  Now off.  Blood pressure is still borderline low  Plan to repeat echocardiogram after 3 months of completing GDMT     Acute deep vein thrombosis (DVT) of both lower extremities  Extensive DVT in bilateral lower extremities involving femoral, popliteal and posterior tibial.  Continue anticoagulation as above  She will need lifelong anticoagulation   Rectal bleeding with stable H&H     Thrombocytopenia  Closely monitor platelets while on anticoagulation  Platelets are 85     ARABELLA (acute kidney injury)  Presented with creatinine of 1.4.  Creatinine 1 today  Closely monitor renal function while on diuretics  Continue p.o. Bumex  Nephrology following      Acute hyperkalemia  Receiving [facility]  Nephrology following      Sepsis / UTI / Multifocal pneumonia / Rhinovirus infection  Multifactorial secondary to UTI and pneumonia with possible cellulitis  Continue empiric antibiotics      Skin ulcer of right great toe  Skin ulcer of left great toe  Podiatry following   A1c is 6.2%     Primary hypertension, chronic  Currently hypotensive likely secondary to sepsis  Currently requiring midodrine  Closely monitor blood pressure      Morbid obesity with BMI of 40.0-44.9, adult  BMI is 38.  She weighs 188 pounds  Lifestyle modifications recommended   LDL 27, HDL 13, triglyceride 86 and total cholesterol 58.  No indication for statin     SEDRICK (obstructive sleep  apnea)  Encouraged compliance with CPAP      Austin Brooks MD  07/10/24  07:42 EDT

## 2024-07-10 NOTE — PROGRESS NOTES
Nutrition Services    Patient Name: Ban Brennan  YOB: 1955  MRN: [id number]  Admission date: 6/30/2024    PROGRESS NOTE      Encounter Information: Checking in with pt to monitor Nutrition POC and diet advancement. Diet advanced to Healthy Heart Consistent CHO this am from [facility].  Pt continues to have significant edema - 3+ generalized, L/R hands, abdomen, L/R legs, L/R knees, L/R ankles, L/R feet, and 2+ L/R arms.  RN reports 1 bloody stool on 7/9 - GI following.  No N/V/D/C or abdominal pain reported at this time.        PO Diet: Diet: Cardiac, Diabetic; Healthy Heart (2-3 Na+); Consistent Carbohydrate; Fluid Consistency: Thin (IDDSI 0)   PO Supplements: Boost Breeze BID (provides 500 kcals, 18 g protein if consumed)      PO Intake:  No intake records since advancement from [facility] this am.        Current nutrition support: -   Nutrition support review: -       Labs (reviewed below): Reviewed. Management per attending.         GI Function:  + BM 7/9 - bloody stool        Nutrition Intervention Updates: RD to change ONS to Boost Plus BID (Provides 720 kcals, 28 g protein if consumed) to provide increased supplementation to po intake.     Will continue to monitor per protocol.      Results from last 7 days   Lab Units 07/10/24  0220 07/09/24  1507 07/09/24  0256 07/08/24  0446   SODIUM mmol/L 139  --  145 145   POTASSIUM mmol/L 4.5 3.9 3.6 3.0*   CHLORIDE mmol/L 96*  --  98 97*   CO2 mmol/L 37.9*  --  39.5* 38.0*   BUN mg/dL 54*  --  68* 72*   CREATININE mg/dL 1.00  --  1.08* 1.19*   CALCIUM mg/dL 8.8  --  9.0 9.3   BILIRUBIN mg/dL 1.9*  --  1.7* 1.7*   ALK PHOS U/L 80  --  84 96   ALT (SGPT) U/L 17  --  17 15   AST (SGOT) U/L 46*  --  37* 36*   GLUCOSE mg/dL 90  --  117* 98     Results from last 7 days   Lab Units 07/10/24  0220 07/09/24  0851 07/09/24  0256 07/08/24  0446   MAGNESIUM mg/dL 2.3  --  1.3* 1.8   PHOSPHORUS mg/dL 2.5   < > 1.8* 2.4*   HEMOGLOBIN g/dL 12.5   < > 12.6 13.3   HEMATOCRIT %  38.6   < > 39.1 41.8    < > = values in this interval not displayed.     COVID19   Date Value Ref Range Status   06/30/2024 Not Detected Not Detected - Ref. Range Final     Lab Results   Component Value Date    HGBA1C 6.14 (H) 07/01/2024         RD to follow up per protocol.    Electronically signed by:  Shawna Miramontes RD  07/10/24 12:36 EDT

## 2024-07-10 NOTE — CASE MANAGEMENT/SOCIAL WORK
Continued Stay Note  QIAN Clarke     Patient Name: Ban Brennan  MRN: [id number]  Today's Date: 7/10/2024    Admit Date: 6/30/2024    Plan: [facility] accepted. Precert approved (valid 7/4-7/10). PASRR approved. From home with family.   Discharge Plan       Row Name 07/10/24 1433       Plan    Plan [facility] accepted. Precert approved (valid 7/4-7/10). PASRR approved. From home with family.    Plan Comments DC Barriers: Chest tube in place. CM contacted [facility] to inquire if precert could be extended or if new precert would be needed closer to d/c.                  Dea Erwin RN     Office Phone: [phone]  Office Cell: [phone]

## 2024-07-10 NOTE — PROGRESS NOTES
"    PROGRESS NOTE      Patient Name: Ban Brennan  : 1955  MRN: [id number]  Primary Care Physician: Rut Pierce APRN  Date of admission: 2024    Patient Care Team:  Rut Pierce APRN as PCP - General (Nurse Practitioner)  Austin Brooks MD as Cardiologist (Cardiology)        Reason for Follow up     Acute kidney injury, hyperkalemia      Subjective     Seen and examined, awake, alert, making urine, no distress  Renal function improved, made 2L urine     Review of systems:    ROS was otherwise negative except as mentioned in the [place].       Personal History:     Past Medical History:   Past Medical History:   Diagnosis Date    Bilateral leg edema     Chronic respiratory failure with hypoxia, on home O2 therapy 2024    COPD (chronic obstructive pulmonary disease)     Morbid obesity with BMI of 40.0-44.9, adult 2010    Primary hypertension 2017    Pulmonary embolism     \"many years ago, was on warfarin\"    Sleep apnea        Surgical History:    History reviewed. No pertinent surgical history.    Family History: family history is not on file. Otherwise pertinent FHx was reviewed and unremarkable.     Social History:  reports that she has never smoked. She has never used smokeless tobacco. She reports that she does not drink alcohol and does not use drugs.    Medications:  Prior to Admission medications    Medication Sig Start Date End Date Taking? Authorizing Provider   Allergy Relief 180 MG tablet Take 1 tablet by mouth Daily. 24  Yes Chadwick Johnson MD   bumetanide (BUMEX) 1 MG tablet Take 1 tablet by mouth 3 times a day. 24  Yes Chadwick Johnson MD   docusate sodium (COLACE) 100 MG capsule Take 1 capsule by mouth Every 12 (Twelve) Hours. 24  Yes Chadwick Johnson MD   furosemide (LASIX) 20 MG tablet Take 1 tablet by mouth Daily.   Yes Chadwick Johnson MD   HYDROcodone-acetaminophen (NORCO)  MG per tablet Take 1 tablet by " mouth 3 times a day. 5/30/24  Yes Chadwick Johnson MD   lisinopril (PRINIVIL,ZESTRIL) 30 MG tablet Take 1 tablet by mouth Daily. 3/18/24  Yes Chadwick Johnson MD   meloxicam (MOBIC) 7.5 MG tablet Take 1 tablet by mouth Daily As Needed. 3/18/24  Yes Chadwick Johnson MD   metoprolol tartrate (LOPRESSOR) 50 MG tablet Take 1 tablet by mouth Daily.   Yes Chadwick Johnson MD   montelukast (SINGULAIR) 10 MG tablet Take 1 tablet by mouth every night at bedtime.   Yes Chadwick Johnson MD   omeprazole (priLOSEC) 20 MG capsule Take 1 capsule by mouth Daily. 3/18/24  Yes Provider, Historical, MD   oxybutynin XL (DITROPAN-XL) 10 MG 24 hr tablet Take 2 tablets by mouth Daily. 3/18/24  Yes Chadwick Johnson MD   potassium chloride (MICRO-K) 10 MEQ CR capsule Take 1 capsule by mouth Every 12 (Twelve) Hours. 3/18/24  Yes Chadwick Johnson MD   vitamin D (ERGOCALCIFEROL) 1.25 MG (48439 UT) capsule capsule Take 1 capsule by mouth 2 (Two) Times a Week. Monday and Thursday. 5/30/24  Yes Chadwick Johnson MD     Scheduled Meds:amiodarone, 200 mg, Oral, Q12H   Followed by  [START ON 7/23/2024] amiodarone, 200 mg, Oral, Daily  apixaban, 10 mg, Oral, BID   Followed by  [START ON 7/14/2024] apixaban, 5 mg, Oral, BID  budesonide, 0.5 mg, Nebulization, BID - RT  bumetanide, 1 mg, Oral, BID  hydrocortisone, 25 mg, Rectal, BID  insulin lispro, 2-7 Units, Subcutaneous, 4x Daily With Meals & Nightly  ipratropium-albuterol, 3 mL, Nebulization, 4x Daily - RT  lidocaine, 20 mL, Infiltration, Once  metoprolol succinate XL, 25 mg, Oral, Q12H  miconazole, 1 Application, Topical, Q12H  pantoprazole, 40 mg, Intravenous, BID AC  povidone-iodine, , Topical, Daily  sodium chloride, 10 mL, Intravenous, Q12H  spironolactone, 25 mg, Oral, Daily      Continuous Infusions:     PRN Meds:  acetaminophen **OR** acetaminophen    Calcium Replacement - Follow Nurse / BPA Driven Protocol    dextrose    dextrose    glucagon (human  recombinant)    ipratropium-albuterol    Magnesium Standard Dose Replacement - Follow Nurse / BPA Driven Protocol    nitroglycerin    ondansetron ODT **OR** ondansetron    Phosphorus Replacement - Follow Nurse / BPA Driven Protocol    Potassium Replacement - Follow Nurse / BPA Driven Protocol    [COMPLETED] Insert Peripheral IV **AND** sodium chloride    sodium chloride    sodium chloride  Allergies:  No Known Allergies    Objective   Exam:     Vital Signs  Temp:  [97.5 °F (36.4 °C)-98.4 °F (36.9 °C)] 97.6 °F (36.4 °C)  Heart Rate:  [] 104  Resp:  [16-26] 20  BP: ()/(55-72) 108/72  SpO2:  [90 %-97 %] 95 %  on  Flow (L/min):  [2-4] 3;   Device (Oxygen Therapy): nasal cannula  Body mass index is 38.25 kg/m².  EXAM  General:  69 yr old  female, some respiratory distress  Head:      Normocephalic and atraumatic.    Eyes:      PERRL/EOM intact, conjunctivae and sclerae clear without nystagmus.    Neck:      No masses, thyromegaly,  trachea central   Lungs:    Clear bilaterally to auscultation.    Heart:      Regular rate and rhythm, no murmur no gallop  Abd:        Soft, nontender, not distended, bowel sounds positive, no shifting dullness.  Msk:        No deformity or scoliosis noted of thoracic or lumbar spine.    Pulses:   Pulses normal in all 4 extremities.    Extremities:        No cyanosis or clubbing--+ + edema.    Neuro:    Lethargic  Skin:       Intact without lesions or rashes.          Results Review:  I have personally reviewed most recent Data :  BMP @LABRCNT(creatinine:10)  CBC    Results from last 7 days   Lab Units 07/10/24  0220 07/09/24  2051 07/09/24  0851 07/09/24  0256 07/08/24  0446 07/07/24  0506 07/06/24  0354 07/05/24  0918 07/05/24  0257   WBC 10*3/mm3 11.11*  --   --  10.66 11.66* 16.38* 15.29* 12.93* 11.98*   HEMOGLOBIN g/dL 12.5 12.6 13.1 12.6 13.3 14.2 13.8 13.8 14.6   PLATELETS 10*3/mm3 85*  --   --  87* 100* 130* 128* 115* 133*     CMP   Results from last 7 days   Lab Units  07/10/24  0220 07/09/24  1507 07/09/24  0256 07/08/24  0446 07/07/24  0506 07/06/24  1608 07/06/24  0354 07/05/24  0918 07/05/24  0257   SODIUM mmol/L 139  --  145 145 143 140 139 135* 136   POTASSIUM mmol/L 4.5 3.9 3.6 3.0* 3.9 3.3* 3.7 5.3* 5.9*   CHLORIDE mmol/L 96*  --  98 97* 98 96* 96* 95* 95*   CO2 mmol/L 37.9*  --  39.5* 38.0* 35.7* 33.9* 34.0* 31.6* 35.0*   BUN mg/dL 54*  --  68* 72* 74* 72* 70* 67* 65*   CREATININE mg/dL 1.00  --  1.08* 1.19* 1.37* 1.41* 1.56* 1.86* 1.88*   GLUCOSE mg/dL 90  --  117* 98 102* 111* 133* 128* 99   ALBUMIN g/dL 2.0*  --  2.2* 2.2* 2.5* 2.5* 2.5*  --  2.5*   BILIRUBIN mg/dL 1.9*  --  1.7* 1.7* 1.8* 1.6* 1.6*  --  1.6*   ALK PHOS U/L 80  --  84 96 125* 110 117  --  129*   AST (SGOT) U/L 46*  --  37* 36* 30 31 27  --  28   ALT (SGPT) U/L 17  --  17 15 17 17 14  --  16     ABG    Results from last 7 days   Lab Units 07/06/24  1150 07/05/24  0711 07/05/24  0442 07/05/24  0353 07/05/24  0212   PH, ARTERIAL pH units 7.392 7.281* 7.250* 7.204* 7.208*   PCO2, ARTERIAL mm Hg 61.2* 78.4* 80.9* 94.8* 92.8*   PO2 ART mm Hg 61.9* 106.7 76.6* 87.0 76.7*   O2 SATURATION ART % 90.2* 97.0 91.7* 93.1* 90.5*   BASE EXCESS ART mmol/L 9.3* 6.5* 4.5* 4.6* 4.4*     XR Chest 1 View    Result Date: 7/10/2024  Impression: 1. Stable chest tube overlying the right lung base. No pneumothorax. 2. Persistent bibasilar airspace disease and small bilateral pleural effusions. 3. Stable cardiomegaly. Electronically Signed: Mina Wheeler MD  7/10/2024 7:11 AM EDT  Workstation ID: [id number]    XR Chest 1 View    Result Date: 7/9/2024  Impression: 1.Bilateral airspace disease again noted. 2.Cardiomegaly 3.Pigtail catheter chest tube right lung base. Electronically Signed: Bran Bernal MD  7/9/2024 11:37 AM EDT  Workstation ID: [id number]    XR Chest 1 View    Result Date: 7/6/2024  Impression: Successful placement of a right chest tube for right-sided hydropneumothorax, which demonstrates decreased/trace fluid  component and similar/small gas component. Electronically Signed: Manan Jefferson MD  7/6/2024 2:35 PM EDT  Workstation ID: [id number]    CT Chest Without Contrast Diagnostic    Result Date: 7/6/2024  Impression: Moderate right-sided hydropneumothorax as seen on same-day radiograph. Adjacent partial collapse of the right lung with multifocal peripheral bronchial obstruction. There are dense airspace consolidations throughout the right lung, and findings are likely a combination of lung collapse and worsening infection. Persistent airspace opacities in the left lung consistent with infection, slightly worsened from prior CT. Small left pleural effusion. Prominent four-chamber cardiomegaly. Pulmonary artery enlargement consistent with pulmonary hypertension. Trace perihepatic ascites. Persistent bilateral flank and right chest wall edema. Electronically Signed: Javon Mortensen MD  7/6/2024 11:19 AM EDT  Workstation ID: [id number]    XR Chest 1 View    Result Date: 7/6/2024  Impression: 1. There is a definite small right-sided pneumothorax. The right lung is partially collapsed and consolidated. A small to moderate right pleural effusion is present indicating a hydropneumothorax. 2. Unchanged consolidation in the left lung with a small pleural effusion. 3. Cardiomegaly. 4.The abnormal results were discussed with the nurse (Dianne) by telephone. The referring clinician will be contacted. Electronically Signed: Jean Claude Bobby MD  7/6/2024 9:56 AM EDT  Workstation ID: [id number]    XR Chest 1 View    Result Date: 7/6/2024  Impression: 1. Questionable right-sided pneumothorax. I would recommend a repeat chest x-ray as the patient is rotated. 2. Persistent patchy consolidation in the left mid and lower lung zones suspicious for pneumonia. There is a small right pleural effusion. 3. Abnormal consolidation in the right lung with a small to moderate pleural effusion. 4. Cardiomegaly. 5. The abnormal results were discussed with the nurse in  the CVICU ([facility]) by telephone and the referring clinician will be contacted. Electronically Signed: Jean Claude Bobby MD  7/6/2024 9:27 AM EDT  Workstation ID: [id number]    XR Chest 1 View    Result Date: 7/5/2024  Impression: Marked cardiomegaly with bilateral patchy airspace disease and pleural effusions. Findings are similar to the previous exam. Electronically Signed: Amirah Coto MD  7/5/2024 9:14 AM EDT  Workstation ID: [id number]    XR Abdomen KUB    Result Date: 7/4/2024  Impression: Feeding tube is in the stomach. Electronically Signed: Obed Sidhu MD  7/4/2024 9:59 PM EDT  Workstation ID: [id number]    US Renal Bilateral    Result Date: 7/4/2024  Impression: No acute process nor significant abnormality identified. Electronically Signed: Beto Kaur MD  7/4/2024 4:14 PM EDT  Workstation ID: [id number]    XR Chest 1 View    Result Date: 7/4/2024  Impression: Cardiomegaly with pulmonary vascular congestion and bilateral pleural effusions. Bibasilar airspace disease could be atelectasis or pneumonia. Electronically Signed: Obed Sidhu MD  7/4/2024 2:34 AM EDT  Workstation ID: [id number]    XR Chest 1 View    Result Date: 7/3/2024  Impression: No appreciable change in bibasilar lung infiltrates with small effusions. Continued cardiomegaly. Electronically Signed: Krystal Lee MD  7/3/2024 7:57 AM EDT  Workstation ID: [id number]     Results for orders placed during the hospital encounter of 06/30/24    Adult Transthoracic Echo Complete w/ Color, Spectral and Contrast if Necessary Per Protocol    Interpretation Summary    Left ventricular ejection fraction appears to be 31 - 35%.    Left ventricular diastolic dysfunction is noted.    The left atrial cavity is dilated.    Moderate aortic valve stenosis is present. Mean/peak gradient of 14/24 mmHg.  Dimensionless index 0.36    Moderate to severe mitral valve regurgitation is present.    Estimated right ventricular systolic pressure from tricuspid regurgitation is normal  (<35 mmHg).        Assessment & Plan   Assessment and Plan:         Atrial fibrillation with RVR    Primary hypertension    Morbid obesity with BMI of 40.0-44.9, adult    Right bundle branch block    Acute deep vein thrombosis (DVT) of both lower extremities    ARABELLA (acute kidney injury)    Acute hyperkalemia    Sepsis    Acute UTI    Acute systolic congestive heart failure    Rhinovirus infection    Multifocal pneumonia    Chronic respiratory failure with hypoxia, on home O2 therapy    Skin ulcer of right great toe    Skin ulcer of left great toe    SEDRICK (obstructive sleep apnea)    Aortic stenosis    Mitral regurgitation    Acute HFrEF (heart failure with reduced ejection fraction)    COPD (chronic obstructive pulmonary disease)    ASSESSMENT:  Acute kidney injury, with baseline creatinine roughly 0.8-1.0mg/dL  Hyperkalemia  A-fib with RVR  Acute on chronic heart failure, with EF 31-35%, diastolic dysfunction, moderate AS and moderate to severe MR as per echo from 6/30/24  Bilateral DVT  Severe sepsis/LLE cellulitis, acute cystitis  Multifocal pneumonia    PLAN :     Renal function improved today. Suspect she may initially have had some acute cardiorenal component, now most likely has some ATN secondary to sepsis, elevated Vanc level (was 22.4 on 7/3), and some prolonged prerenal state with hemodynamic insults, arrhythmias.  Creatinine continues to improve at this time down to 1.08   Increased bicarbonate because of the underlying respiratory acidosis  Potassium level is slightly better  Creatinine level back to baseline  Phosphorus better   Patient has no significant proteinuria   continue Aldactone that help with hypokalemia and renal function is slightly better  Patient still has edema significantly high bicarbonate noted likely secondary to underlying respiratory acidosis  No need for additional midodrine  Antibiotics as per ID. Now off Vanc, agree. On PO Doxy and Cephalexin  Check labs daily. Will follow  closely      Charan Krueger MD  [facility]  7/10/2024  12:52 EDT

## 2024-07-10 NOTE — NURSING NOTE
WOCN note:    69 yr female admitted with increasing shortness of breath and afib with RVR. Patient has a hx of HTN, HF and BLE lymphedema. WOCN follow up on multiple wounds.     Patient was evaluated by podiatry on 7/1 for wounds to bilateral great toes. Patient reports the wounds started as blisters which reabsorbed and are now covered with a stable black eschar. There is no erythema or fluctuance noted. The dorsalis pedis pulses are palpable. Will continue with Betadine paint. Foam off-loading boots are in place.     Patient also noted to have a dry wound to her right inner thigh and two wounds to the left lateral thigh that are draining serous exudate. The legs remain erythematous with chronic lymphedema skin changes. Continue the Maxorb and silicone foam dressings.     There is silicone foam dressing in place to the sacrum/buttocks. There are multiple partial thickness wounds to the low sacrum and buttocks consistent with moisture associated skin damage. The foam dressing was removed and Calazime zinc barrier paste was applied. Pressure injury prevention measures are in place with foam boots and foam wedge. Will add a low air loss pump to the bed for microclimate moisture control and continue to follow as needed.

## 2024-07-10 NOTE — PROGRESS NOTES
LOS: 10 days   Patient Care Team:  Rut Pierce APRN as PCP - General (Nurse Practitioner)  Austin Brooks MD as Cardiologist (Cardiology)      Subjective     Interval History:     Subjective: Patient with no new complaints.  Bedside RN reports 1 bowel movement this morning which was bloody.  Patient denies any abdominal pain.  No nausea/vomiting.      ROS:   No chest pain, shortness of breath, or cough.        Medication Review:     Current Facility-Administered Medications:     acetaminophen (TYLENOL) tablet 650 mg, 650 mg, Nasogastric, Q4H PRN **OR** acetaminophen (TYLENOL) suppository 650 mg, 650 mg, Rectal, Q4H PRN, Luzmaria Rodriguez APRN    [] amiodarone 360 mg in 200 mL D5W infusion, 1 mg/min, Intravenous, Continuous, Last Rate: 33.3 mL/hr at 24 0223, 1 mg/min at 24 0223 **FOLLOWED BY** [] amiodarone 360 mg in 200 mL D5W infusion, 0.5 mg/min, Intravenous, Continuous, Last Rate: 16.67 mL/hr at 24 1731, 0.5 mg/min at 24 1731 **FOLLOWED BY** [COMPLETED] amiodarone (PACERONE) tablet 200 mg, 200 mg, Oral, Once, 200 mg at 24 0002 **FOLLOWED BY** [COMPLETED] amiodarone (PACERONE) tablet 200 mg, 200 mg, Nasogastric, Q8H, 200 mg at 24 1725 **FOLLOWED BY** amiodarone (PACERONE) tablet 200 mg, 200 mg, Oral, Q12H, 200 mg at 07/10/24 0519 **FOLLOWED BY** [START ON 2024] amiodarone (PACERONE) tablet 200 mg, 200 mg, Oral, Daily, Luzmaria Rodriguez APRN    apixaban (ELIQUIS) tablet 10 mg, 10 mg, Oral, BID, 10 mg at 07/10/24 0901 **FOLLOWED BY** [START ON 2024] apixaban (ELIQUIS) tablet 5 mg, 5 mg, Oral, BID, Day, Toya G, APRN    budesonide (PULMICORT) nebulizer solution 0.5 mg, 0.5 mg, Nebulization, BID - RT, Radha Martin APRN, 0.5 mg at 07/10/24 0650    bumetanide (BUMEX) tablet 1 mg, 1 mg, Oral, BID, Charan Krueger MD, 1 mg at 07/10/24 0901    Calcium Replacement - Follow Nurse / BPA Driven Protocol, , Does not apply, PRN, Myrna Young, APRN     dextrose (D50W) (25 g/50 mL) IV injection 25 g, 25 g, Intravenous, Q15 Min PRN, Nydia Stephen APRN    dextrose (GLUTOSE) oral gel 15 g, 15 g, Oral, Q15 Min PRN, Nydia Stephen APRN    glucagon (GLUCAGEN) injection 1 mg, 1 mg, Intramuscular, Q15 Min PRN, Nydia Stephen APRN    hydrocortisone (ANUSOL-HC) suppository 25 mg, 25 mg, Rectal, BID, Cherie Canchola APRN, 25 mg at 07/10/24 0901    insulin lispro (HUMALOG/ADMELOG) injection 2-7 Units, 2-7 Units, Subcutaneous, 4x Daily With Meals & Nightly, Librado Villagomez MD, 2 Units at 07/09/24 1334    ipratropium-albuterol (DUO-NEB) nebulizer solution 3 mL, 3 mL, Nebulization, Q4H PRN, Luzmaria Rodriguez APRN, 3 mL at 07/05/24 0020    ipratropium-albuterol (DUO-NEB) nebulizer solution 3 mL, 3 mL, Nebulization, 4x Daily - RT, Radha Martin APRN, 3 mL at 07/10/24 1037    lidocaine (XYLOCAINE) 1 % injection 20 mL, 20 mL, Infiltration, Once, Nydia Stephen APRN    Magnesium Standard Dose Replacement - Follow Nurse / BPA Driven Protocol, , Does not apply, PRN, Myrna Young APRN    metoprolol succinate XL (TOPROL-XL) 24 hr tablet 25 mg, 25 mg, Oral, Q12H, Austin Brooks MD, 25 mg at 07/10/24 0900    miconazole (MICOTIN) 2 % powder 1 Application, 1 Application, Topical, Q12H, Toya Hylton APRN, 1 Application at 07/10/24 0901    nitroglycerin (NITROSTAT) SL tablet 0.4 mg, 0.4 mg, Sublingual, Q5 Min PRN, Day, Toya RAMIREZ, APRN    ondansetron ODT (ZOFRAN-ODT) disintegrating tablet 4 mg, 4 mg, Oral, Q6H PRN **OR** ondansetron (ZOFRAN) injection 4 mg, 4 mg, Intravenous, Q6H RICHARD, Enedina Molina APRN    pantoprazole (PROTONIX) injection 40 mg, 40 mg, Intravenous, BID Dahlia LIN Syed Hassan, MD, 40 mg at 07/10/24 0900    Phosphorus Replacement - Follow Nurse / BPA Driven Protocol, , Does not apply, Hector RAMOS Lachelle, APRN    Potassium Replacement - Follow Nurse / BPA Driven Protocol, , Does not apply, Hector RAMOS Lachelle, APRN     povidone-iodine (BETADINE) external solution 10%, , Topical, Daily, Toya Hylton APRN, Given at 07/10/24 0901    [COMPLETED] Insert Peripheral IV, , , Once **AND** sodium chloride 0.9 % flush 10 mL, 10 mL, Intravenous, PRN, Galo Solorzano MD    sodium chloride 0.9 % flush 10 mL, 10 mL, Intravenous, Q12H, Enedina Molina APRN, 10 mL at 07/10/24 0901    sodium chloride 0.9 % flush 10 mL, 10 mL, Intravenous, PRN, Enedina Molina APRN    sodium chloride 0.9 % infusion 40 mL, 40 mL, Intravenous, PRN, Enedina Molina APRN    spironolactone (ALDACTONE) tablet 25 mg, 25 mg, Oral, Daily, Charan Krueger MD, 25 mg at 07/10/24 0901      Objective     Vital Signs  Vitals:    07/10/24 0646 07/10/24 0650 07/10/24 0653 07/10/24 0833   BP:    108/72   BP Location:       Patient Position:       Pulse: 96 87 96 100   Resp: 18 18  23   Temp:    97.6 °F (36.4 °C)   TempSrc:    Oral   SpO2: 96% 96% 97% 93%   Weight:       Height:           Physical Exam:     General Appearance:    Awake and alert, in no acute distress   Head:    Normocephalic, without obvious abnormality   Eyes:          Conjunctivae normal, anicteric sclera   Throat:   No oral lesions, no thrush, oral mucosa moist   Neck:   No adenopathy, supple, no JVD   Lungs:     respirations regular, even and unlabored   Abdomen:     Soft, non-tender, no rebound or guarding, non-distended   Rectal:     Deferred   Extremities:   No edema, no cyanosis   Skin:   No bruising or rash, no jaundice        Results Review:    CBC    Results from last 7 days   Lab Units 07/10/24  0220 07/09/24  2051 07/09/24  0851 07/09/24  0256 07/08/24  0446 07/07/24  0506 07/06/24  0354 07/05/24  0918 07/05/24  0257   WBC 10*3/mm3 11.11*  --   --  10.66 11.66* 16.38* 15.29* 12.93* 11.98*   HEMOGLOBIN g/dL 12.5 12.6 13.1 12.6 13.3 14.2 13.8 13.8 14.6   PLATELETS 10*3/mm3 85*  --   --  87* 100* 130* 128* 115* 133*     CMP   Results from last 7 days   Lab Units 07/10/24  5905  07/09/24  1507 07/09/24  0256 07/08/24  0446 07/07/24  0506 07/06/24  1608 07/06/24  0354 07/05/24  0918 07/05/24  0257   SODIUM mmol/L 139  --  145 145 143 140 139 135* 136   POTASSIUM mmol/L 4.5 3.9 3.6 3.0* 3.9 3.3* 3.7 5.3* 5.9*   CHLORIDE mmol/L 96*  --  98 97* 98 96* 96* 95* 95*   CO2 mmol/L 37.9*  --  39.5* 38.0* 35.7* 33.9* 34.0* 31.6* 35.0*   BUN mg/dL 54*  --  68* 72* 74* 72* 70* 67* 65*   CREATININE mg/dL 1.00  --  1.08* 1.19* 1.37* 1.41* 1.56* 1.86* 1.88*   GLUCOSE mg/dL 90  --  117* 98 102* 111* 133* 128* 99   ALBUMIN g/dL 2.0*  --  2.2* 2.2* 2.5* 2.5* 2.5*  --  2.5*   BILIRUBIN mg/dL 1.9*  --  1.7* 1.7* 1.8* 1.6* 1.6*  --  1.6*   ALK PHOS U/L 80  --  84 96 125* 110 117  --  129*   AST (SGOT) U/L 46*  --  37* 36* 30 31 27  --  28   ALT (SGPT) U/L 17  --  17 15 17 17 14  --  16   MAGNESIUM mg/dL 2.3  --  1.3* 1.8 1.6  --  1.9 1.9 2.0   PHOSPHORUS mg/dL 2.5 2.3* 1.8* 2.4* 2.5  --  3.5 4.2 5.3*     Cr Clearance Estimated Creatinine Clearance: 53.3 mL/min (by C-G formula based on SCr of 1 mg/dL).  Coag     HbA1C   Lab Results   Component Value Date    HGBA1C 6.14 (H) 07/01/2024    HGBA1C 6.00 (H) 01/15/2024         Infection   Results from last 7 days   Lab Units 07/06/24  1544   BODYFLDCX  No growth at 3 days     UA      Microbiology Results (last 10 days)       Procedure Component Value - Date/Time    Body Fluid Culture - Body Fluid, Pleural Cavity [[id number]] Collected: 07/06/24 1544    Lab Status: Final result Specimen: Body Fluid from Pleural Cavity Updated: 07/10/24 1000     Body Fluid Culture No growth at 3 days     Gram Stain Many (4+) WBCs per low power field      No organisms seen    Respiratory Culture - Sputum, NT Suction [[id number]] Collected: 07/01/24 1507    Lab Status: Final result Specimen: Sputum from NT Suction Updated: 07/03/24 1047     Respiratory Culture Light growth (2+) Normal respiratory shorty. No S. aureus or Pseudomonas aeruginosa detected. Final report.     Gram Stain Few (2+)  Epithelial cells per low power field      Rare (1+) WBCs per low power field      Few (2+) Mixed bacterial morphotypes seen on Gram Stain    Respiratory Panel PCR w/COVID-19(SARS-CoV-2) NIKKI/CHRISTY/RAMIRO/PAD/COR/FRANCIA In-House, NP Swab in UTM/VTM, 2 HR TAT - Swab, Nasopharynx [[id number]]  (Abnormal) Collected: 06/30/24 2340    Lab Status: Final result Specimen: Swab from Nasopharynx Updated: 07/01/24 0055     ADENOVIRUS, PCR Not Detected     Coronavirus 229E Not Detected     Coronavirus HKU1 Not Detected     Coronavirus NL63 Not Detected     Coronavirus OC43 Not Detected     COVID19 Not Detected     Human Metapneumovirus Not Detected     Human Rhinovirus/Enterovirus Detected     Influenza A PCR Not Detected     Influenza B PCR Not Detected     Parainfluenza Virus 1 Not Detected     Parainfluenza Virus 2 Not Detected     Parainfluenza Virus 3 Not Detected     Parainfluenza Virus 4 Not Detected     RSV, PCR Not Detected     Bordetella pertussis pcr Not Detected     Bordetella parapertussis PCR Not Detected     Chlamydophila pneumoniae PCR Not Detected     Mycoplasma pneumo by PCR Not Detected    Narrative:      In the setting of a positive respiratory panel with a viral infection PLUS a negative procalcitonin without other underlying concern for bacterial infection, consider observing off antibiotics or discontinuation of antibiotics and continue supportive care. If the respiratory panel is positive for atypical bacterial infection (Bordetella pertussis, Chlamydophila pneumoniae, or Mycoplasma pneumoniae), consider antibiotic de-escalation to target atypical bacterial infection.    MRSA Screen, PCR (Inpatient) - Swab, Nares [[id number]]  (Abnormal) Collected: 06/30/24 2340    Lab Status: Final result Specimen: Swab from Nares Updated: 07/01/24 0125     MRSA PCR MRSA Detected    Narrative:      The negative predictive value of this diagnostic test is high and should only be used to consider de-escalating anti-MRSA therapy. A  positive result may indicate colonization with MRSA and must be correlated clinically.    Blood Culture - Blood, Arm, Right [[id number]]  (Normal) Collected: 06/30/24 1844    Lab Status: Final result Specimen: Blood from Arm, Right Updated: 07/05/24 1916     Blood Culture No growth at 5 days    Narrative:      Less than seven (7) mL's of blood was collected.  Insufficient quantity may yield false negative results.    Blood Culture - Blood, Arm, Right [[id number]]  (Normal) Collected: 06/30/24 1757    Lab Status: Final result Specimen: Blood from Arm, Right Updated: 07/05/24 1815     Blood Culture No growth at 5 days    Narrative:      Less than seven (7) mL's of blood was collected.  Insufficient quantity may yield false negative results.    Urine Culture - Urine, Urine, Catheter [[id number]]  (Abnormal)  (Susceptibility) Collected: 06/30/24 1749    Lab Status: Final result Specimen: Urine, Catheter Updated: 07/03/24 0916     Urine Culture >100,000 CFU/mL Escherichia coli    Narrative:      Colonization of the urinary tract without infection is common. Treatment is discouraged unless the patient is symptomatic, pregnant, or undergoing an invasive urologic procedure.    Susceptibility        Escherichia coli      SIL      Amoxicillin + Clavulanate Susceptible      Ampicillin Resistant      Ampicillin + Sulbactam Susceptible      Cefazolin Susceptible      Cefepime Susceptible      Ceftazidime Susceptible      Ceftriaxone Susceptible      Gentamicin Susceptible      Levofloxacin Susceptible      Nitrofurantoin Intermediate      Piperacillin + Tazobactam Susceptible      Trimethoprim + Sulfamethoxazole Susceptible                                 Imaging Results (Last 72 Hours)       Procedure Component Value Units Date/Time    XR Chest 1 View [[id number]] Collected: 07/10/24 0710     Updated: 07/10/24 0713    Narrative:      XR CHEST 1 VW    Date of Exam: 7/10/2024 5:31 AM EDT    Indication: CT chest tube  management    Comparison: 7/9/2024    Findings:  Chest tube again noted overlying the right lung base. Negative for pneumothorax. Stable cardiomegaly. Persistent bibasilar airspace disease and small bilateral pleural effusions.      Impression:      Impression:  1. Stable chest tube overlying the right lung base. No pneumothorax.  2. Persistent bibasilar airspace disease and small bilateral pleural effusions.  3. Stable cardiomegaly.        Electronically Signed: Mina Wheeler MD    7/10/2024 7:11 AM EDT    Workstation ID: [id number]    XR Chest 1 View [[id number]] Collected: 07/09/24 1135     Updated: 07/09/24 1139    Narrative:      XR CHEST 1 VW    Date of Exam: 7/9/2024 11:30 AM EDT    Indication: Chest tube    Comparison: July 6, 2024    Findings:  There is a pigtail catheter noted at the right lung base. A pneumothorax not definitely confirmed. There is airspace disease in the lower lobes bilaterally. There are no large pleural effusions. The heart appears enlarged.      Impression:      Impression:  1.Bilateral airspace disease again noted.  2.Cardiomegaly  3.Pigtail catheter chest tube right lung base.      Electronically Signed: Bran Bernal MD    7/9/2024 11:37 AM EDT    Workstation ID: [id number]            Assessment & Plan     ASSESSMENT:  -Rectal bleeding -suspect anorectal source such as hemorrhoids  -Elevated LFTs  -A-fib with RVR -on Eliquis  -Acute bilateral lower extremity DVT  -Bilateral lower extremity cellulitis  -UTI  -Sepsis  -Rhinovirus  -Multifocal pneumonia  -Large right pleural effusion s/p chest tube placement  -Hypertension  -CHF  -COPD  -SEDRICK  -DMII     PLAN:  Patient is a 69-year-old female with history of CHF, COPD, and DMII who presented on 6/30 with complaints of shortness of breath.  She was diagnosed with A-fib with RVR and was treated with Cardizem.  Cardiology is following.  Since admission, the patient has also been diagnosed with bilateral lower extremity cellulitis, UTI, sepsis,  acute bilateral lower extremity DVTs, rhinovirus, multifocal pneumonia, and large right pleural effusion s/p chest tube placement.  GI asked to consult on 7/9 due to rectal bleeding.    Patient with no new complaints.  Bedside RN reports 1 bowel movement this morning which remained bloody.  Hemoglobin remained stable at 12.5 from 12.6.  Continue to monitor H/H and transfuse as needed.  No plans for endoscopy at this time due to patient's multiple acute medical issues requiring anticoagulation in the setting of normal hemoglobin.  We can plan outpatient colonoscopy following discharge.  Continue hydrocortisone suppositories and PPI.  Diet as tolerated.  No need to hold anticoagulation from GI standpoint at this time.  Nothing to add acutely at this time from GI standpoint as patient's hemoglobin is stable.  Please notify our service if rectal bleeding persist with significant drop in hemoglobin.  For now, we will be available as needed.      Electronically signed by MANJU Askew, 07/10/24, 10:57 AM EDT.

## 2024-07-11 ENCOUNTER — APPOINTMENT (OUTPATIENT)
Dept: GENERAL RADIOLOGY | Facility: HOSPITAL | Age: 69
End: 2024-07-11
Payer: MEDICARE

## 2024-07-11 LAB
ALBUMIN SERPL-MCNC: 2.1 G/DL (ref 3.5–5.2)
ALBUMIN/GLOB SERPL: 0.6 G/DL
ALP SERPL-CCNC: 85 U/L (ref 39–117)
ALT SERPL W P-5'-P-CCNC: 19 U/L (ref 1–33)
ANION GAP SERPL CALCULATED.3IONS-SCNC: 5.4 MMOL/L (ref 5–15)
ARTERIAL PATENCY WRIST A: ABNORMAL
AST SERPL-CCNC: 48 U/L (ref 1–32)
ATMOSPHERIC PRESS: ABNORMAL MM[HG]
BASE EXCESS BLDA CALC-SCNC: 17.4 MMOL/L (ref 0–3)
BASOPHILS # BLD AUTO: 0.01 10*3/MM3 (ref 0–0.2)
BASOPHILS NFR BLD AUTO: 0.1 % (ref 0–1.5)
BDY SITE: ABNORMAL
BILIRUB SERPL-MCNC: 2.4 MG/DL (ref 0–1.2)
BUN SERPL-MCNC: 46 MG/DL (ref 8–23)
BUN/CREAT SERPL: 42.6 (ref 7–25)
CALCIUM SPEC-SCNC: 9.1 MG/DL (ref 8.6–10.5)
CHLORIDE SERPL-SCNC: 95 MMOL/L (ref 98–107)
CO2 BLDA-SCNC: 44.1 MMOL/L (ref 22–29)
CO2 SERPL-SCNC: 41.6 MMOL/L (ref 22–29)
CREAT SERPL-MCNC: 1.08 MG/DL (ref 0.57–1)
DEPRECATED RDW RBC AUTO: 58 FL (ref 37–54)
EGFRCR SERPLBLD CKD-EPI 2021: 55.7 ML/MIN/1.73
EOSINOPHIL # BLD AUTO: 0.17 10*3/MM3 (ref 0–0.4)
EOSINOPHIL NFR BLD AUTO: 1.5 % (ref 0.3–6.2)
ERYTHROCYTE [DISTWIDTH] IN BLOOD BY AUTOMATED COUNT: 17.2 % (ref 12.3–15.4)
GLOBULIN UR ELPH-MCNC: 3.5 GM/DL
GLUCOSE BLDC GLUCOMTR-MCNC: 100 MG/DL (ref 70–105)
GLUCOSE BLDC GLUCOMTR-MCNC: 112 MG/DL (ref 70–105)
GLUCOSE BLDC GLUCOMTR-MCNC: 131 MG/DL (ref 70–105)
GLUCOSE BLDC GLUCOMTR-MCNC: 81 MG/DL (ref 70–105)
GLUCOSE SERPL-MCNC: 94 MG/DL (ref 65–99)
HCO3 BLDA-SCNC: 42.6 MMOL/L (ref 21–28)
HCT VFR BLD AUTO: 39.1 % (ref 34–46.6)
HEMOCCULT STL QL IA: POSITIVE
HEMODILUTION: NO
HGB BLD-MCNC: 12.4 G/DL (ref 12–15.9)
IMM GRANULOCYTES # BLD AUTO: 0.06 10*3/MM3 (ref 0–0.05)
IMM GRANULOCYTES NFR BLD AUTO: 0.5 % (ref 0–0.5)
INHALED O2 CONCENTRATION: 28 %
LYMPHOCYTES # BLD AUTO: 1 10*3/MM3 (ref 0.7–3.1)
LYMPHOCYTES NFR BLD AUTO: 8.6 % (ref 19.6–45.3)
MAGNESIUM SERPL-MCNC: 1.9 MG/DL (ref 1.6–2.4)
MCH RBC QN AUTO: 30.2 PG (ref 26.6–33)
MCHC RBC AUTO-ENTMCNC: 31.7 G/DL (ref 31.5–35.7)
MCV RBC AUTO: 95.4 FL (ref 79–97)
MODALITY: ABNORMAL
MONOCYTES # BLD AUTO: 0.94 10*3/MM3 (ref 0.1–0.9)
MONOCYTES NFR BLD AUTO: 8 % (ref 5–12)
NEUTROPHILS NFR BLD AUTO: 81.3 % (ref 42.7–76)
NEUTROPHILS NFR BLD AUTO: 9.51 10*3/MM3 (ref 1.7–7)
NRBC BLD AUTO-RTO: 0 /100 WBC (ref 0–0.2)
PCO2 BLDA: 49.9 MM HG (ref 35–48)
PH BLDA: 7.54 PH UNITS (ref 7.35–7.45)
PHOSPHATE SERPL-MCNC: 2.5 MG/DL (ref 2.5–4.5)
PLATELET # BLD AUTO: 93 10*3/MM3 (ref 140–450)
PMV BLD AUTO: 12.5 FL (ref 6–12)
PO2 BLD: 237 MM[HG] (ref 0–500)
PO2 BLDA: 66.3 MM HG (ref 83–108)
POTASSIUM SERPL-SCNC: 4.5 MMOL/L (ref 3.5–5.2)
PROT SERPL-MCNC: 5.6 G/DL (ref 6–8.5)
RBC # BLD AUTO: 4.1 10*6/MM3 (ref 3.77–5.28)
SAO2 % BLDCOA: 94.6 % (ref 94–98)
SODIUM SERPL-SCNC: 142 MMOL/L (ref 136–145)
WBC NRBC COR # BLD AUTO: 11.69 10*3/MM3 (ref 3.4–10.8)

## 2024-07-11 PROCEDURE — 97110 THERAPEUTIC EXERCISES: CPT

## 2024-07-11 PROCEDURE — 94761 N-INVAS EAR/PLS OXIMETRY MLT: CPT

## 2024-07-11 PROCEDURE — 85025 COMPLETE CBC W/AUTO DIFF WBC: CPT | Performed by: NURSE PRACTITIONER

## 2024-07-11 PROCEDURE — 94660 CPAP INITIATION&MGMT: CPT

## 2024-07-11 PROCEDURE — 97530 THERAPEUTIC ACTIVITIES: CPT

## 2024-07-11 PROCEDURE — 94799 UNLISTED PULMONARY SVC/PX: CPT

## 2024-07-11 PROCEDURE — 82948 REAGENT STRIP/BLOOD GLUCOSE: CPT

## 2024-07-11 PROCEDURE — 97116 GAIT TRAINING THERAPY: CPT

## 2024-07-11 PROCEDURE — 94664 DEMO&/EVAL PT USE INHALER: CPT

## 2024-07-11 PROCEDURE — 83735 ASSAY OF MAGNESIUM: CPT | Performed by: NURSE PRACTITIONER

## 2024-07-11 PROCEDURE — 97112 NEUROMUSCULAR REEDUCATION: CPT

## 2024-07-11 PROCEDURE — 36600 WITHDRAWAL OF ARTERIAL BLOOD: CPT

## 2024-07-11 PROCEDURE — 97535 SELF CARE MNGMENT TRAINING: CPT

## 2024-07-11 PROCEDURE — 80053 COMPREHEN METABOLIC PANEL: CPT | Performed by: NURSE PRACTITIONER

## 2024-07-11 PROCEDURE — 82274 ASSAY TEST FOR BLOOD FECAL: CPT | Performed by: HOSPITALIST

## 2024-07-11 PROCEDURE — 99232 SBSQ HOSP IP/OBS MODERATE 35: CPT | Performed by: INTERNAL MEDICINE

## 2024-07-11 PROCEDURE — 84100 ASSAY OF PHOSPHORUS: CPT | Performed by: NURSE PRACTITIONER

## 2024-07-11 PROCEDURE — 71045 X-RAY EXAM CHEST 1 VIEW: CPT

## 2024-07-11 PROCEDURE — 82803 BLOOD GASES ANY COMBINATION: CPT

## 2024-07-11 RX ORDER — BUMETANIDE 1 MG/1
1 TABLET ORAL DAILY
Status: DISCONTINUED | OUTPATIENT
Start: 2024-07-12 | End: 2024-07-18

## 2024-07-11 RX ADMIN — METOPROLOL SUCCINATE 25 MG: 25 TABLET, FILM COATED, EXTENDED RELEASE ORAL at 20:56

## 2024-07-11 RX ADMIN — HYDROCORTISONE ACETATE 25 MG: 25 SUPPOSITORY RECTAL at 20:56

## 2024-07-11 RX ADMIN — AMIODARONE HYDROCHLORIDE 200 MG: 200 TABLET ORAL at 17:07

## 2024-07-11 RX ADMIN — POVIDONE-IODINE: 10 SOLUTION TOPICAL at 09:51

## 2024-07-11 RX ADMIN — BUDESONIDE 0.5 MG: 0.5 INHALANT RESPIRATORY (INHALATION) at 07:37

## 2024-07-11 RX ADMIN — METOPROLOL SUCCINATE 25 MG: 25 TABLET, FILM COATED, EXTENDED RELEASE ORAL at 09:51

## 2024-07-11 RX ADMIN — IPRATROPIUM BROMIDE AND ALBUTEROL SULFATE 3 ML: .5; 3 SOLUTION RESPIRATORY (INHALATION) at 07:37

## 2024-07-11 RX ADMIN — APIXABAN 10 MG: 5 TABLET, FILM COATED ORAL at 20:56

## 2024-07-11 RX ADMIN — IPRATROPIUM BROMIDE AND ALBUTEROL SULFATE 3 ML: .5; 3 SOLUTION RESPIRATORY (INHALATION) at 15:00

## 2024-07-11 RX ADMIN — Medication 10 ML: at 20:57

## 2024-07-11 RX ADMIN — ANTI-FUNGAL POWDER MICONAZOLE NITRATE TALC FREE 1 APPLICATION: 1.42 POWDER TOPICAL at 09:50

## 2024-07-11 RX ADMIN — AMIODARONE HYDROCHLORIDE 200 MG: 200 TABLET ORAL at 06:28

## 2024-07-11 RX ADMIN — IPRATROPIUM BROMIDE AND ALBUTEROL SULFATE 3 ML: .5; 3 SOLUTION RESPIRATORY (INHALATION) at 11:33

## 2024-07-11 RX ADMIN — ANTI-FUNGAL POWDER MICONAZOLE NITRATE TALC FREE 1 APPLICATION: 1.42 POWDER TOPICAL at 20:57

## 2024-07-11 RX ADMIN — IPRATROPIUM BROMIDE AND ALBUTEROL SULFATE 3 ML: .5; 3 SOLUTION RESPIRATORY (INHALATION) at 19:00

## 2024-07-11 RX ADMIN — Medication 10 ML: at 09:50

## 2024-07-11 RX ADMIN — SPIRONOLACTONE 25 MG: 25 TABLET ORAL at 09:51

## 2024-07-11 RX ADMIN — PANTOPRAZOLE SODIUM 40 MG: 40 TABLET, DELAYED RELEASE ORAL at 17:07

## 2024-07-11 RX ADMIN — APIXABAN 10 MG: 5 TABLET, FILM COATED ORAL at 09:51

## 2024-07-11 RX ADMIN — BUMETANIDE 1 MG: 1 TABLET ORAL at 09:51

## 2024-07-11 RX ADMIN — BUDESONIDE 0.5 MG: 0.5 INHALANT RESPIRATORY (INHALATION) at 19:04

## 2024-07-11 RX ADMIN — PANTOPRAZOLE SODIUM 40 MG: 40 TABLET, DELAYED RELEASE ORAL at 09:51

## 2024-07-11 NOTE — PROGRESS NOTES
"PULMONARY CRITICAL CARE PROGRESS  NOTE      PATIENT IDENTIFICATION:  Name: Ban Brennan  MRN: [id number]  :  1955     Age: 69 y.o.  Sex: female    DATE OF Note:  2024   Referring Physician: Sesar Solorzano DO                  Subjective:   In bed, no SOB, no chest or abd pain, CT patent, no bowel or bladder issues      Objective:  tMax 24 hrs: Temp (24hrs), Av.8 °F (36.6 °C), Min:97 °F (36.1 °C), Max:98.3 °F (36.8 °C)      Vitals Ranges:   Temp:  [97 °F (36.1 °C)-98.3 °F (36.8 °C)] 98 °F (36.7 °C)  Heart Rate:  [] 99  Resp:  [17-28] 17  BP: ()/(47-66) 123/66    Intake and Output Last 3 Shifts:   I/O last 3 completed shifts:  In: 960 [P.O.:960]  Out: 2060 [Urine:1350; Chest Tube:710]    Exam:  /66   Pulse 99   Temp 98 °F (36.7 °C) (Oral)   Resp 17   Ht 149.9 cm (59\")   Wt 83.3 kg (183 lb 10.3 oz)   SpO2 94%   BMI 37.09 kg/m²     General Appearance:   Alert  HEENT:  Normocephalic, without obvious abnormality. Conjunctivae/corneas clear.  Normal external ear canals. Nares normal, no drainage     Neck:  Supple, symmetrical, trachea midline. No JVD.  Lungs /Chest wall:   Bilateral basal rhonchi, respirations unlabored, symmetrical wall movement.   CT in place minimal drainage  Heart:  Regular rate and rhythm, systolic murmur PMI left sternal border  Abdomen: Soft, nontender, no masses, no organomegaly.    Extremities: Trace edema, no clubbing or cyanosis        Medications:  amiodarone, 200 mg, Oral, Q12H   Followed by  [START ON 2024] amiodarone, 200 mg, Oral, Daily  apixaban, 10 mg, Oral, BID   Followed by  [START ON 2024] apixaban, 5 mg, Oral, BID  budesonide, 0.5 mg, Nebulization, BID - RT  bumetanide, 1 mg, Oral, BID  hydrocortisone, 25 mg, Rectal, BID  insulin lispro, 2-7 Units, Subcutaneous, 4x Daily With Meals & Nightly  ipratropium-albuterol, 3 mL, Nebulization, 4x Daily - RT  lidocaine, 20 mL, Infiltration, Once  metoprolol succinate XL, 25 mg, " Oral, Q12H  miconazole, 1 Application, Topical, Q12H  pantoprazole, 40 mg, Oral, BID AC  povidone-iodine, , Topical, Daily  sodium chloride, 10 mL, Intravenous, Q12H  spironolactone, 25 mg, Oral, Daily        Infusion:        PRN:    acetaminophen **OR** acetaminophen    Calcium Replacement - Follow Nurse / BPA Driven Protocol    dextrose    dextrose    glucagon (human recombinant)    ipratropium-albuterol    Magnesium Standard Dose Replacement - Follow Nurse / BPA Driven Protocol    nitroglycerin    ondansetron ODT **OR** ondansetron    Phosphorus Replacement - Follow Nurse / BPA Driven Protocol    Potassium Replacement - Follow Nurse / BPA Driven Protocol    [COMPLETED] Insert Peripheral IV **AND** sodium chloride    sodium chloride    sodium chloride  Data Review:  All labs (24hrs):   Recent Results (from the past 24 hour(s))   POC Glucose Once    Collection Time: 07/10/24  4:39 PM    Specimen: Blood   Result Value Ref Range    Glucose 142 (H) 70 - 105 mg/dL   POC Glucose Once    Collection Time: 07/10/24  9:41 PM    Specimen: Blood   Result Value Ref Range    Glucose 107 (H) 70 - 105 mg/dL   Magnesium    Collection Time: 07/11/24  4:37 AM    Specimen: Blood   Result Value Ref Range    Magnesium 1.9 1.6 - 2.4 mg/dL   Phosphorus    Collection Time: 07/11/24  4:37 AM    Specimen: Blood   Result Value Ref Range    Phosphorus 2.5 2.5 - 4.5 mg/dL   Comprehensive Metabolic Panel    Collection Time: 07/11/24  4:37 AM    Specimen: Blood   Result Value Ref Range    Glucose 94 65 - 99 mg/dL    BUN 46 (H) 8 - 23 mg/dL    Creatinine 1.08 (H) 0.57 - 1.00 mg/dL    Sodium 142 136 - 145 mmol/L    Potassium 4.5 3.5 - 5.2 mmol/L    Chloride 95 (L) 98 - 107 mmol/L    CO2 41.6 (H) 22.0 - 29.0 mmol/L    Calcium 9.1 8.6 - 10.5 mg/dL    Total Protein 5.6 (L) 6.0 - 8.5 g/dL    Albumin 2.1 (L) 3.5 - 5.2 g/dL    ALT (SGPT) 19 1 - 33 U/L    AST (SGOT) 48 (H) 1 - 32 U/L    Alkaline Phosphatase 85 39 - 117 U/L    Total Bilirubin 2.4 (H) 0.0  - 1.2 mg/dL    Globulin 3.5 gm/dL    A/G Ratio 0.6 g/dL    BUN/Creatinine Ratio 42.6 (H) 7.0 - 25.0    Anion Gap 5.4 5.0 - 15.0 mmol/L    eGFR 55.7 (L) >60.0 mL/min/1.73   CBC Auto Differential    Collection Time: 07/11/24  4:37 AM    Specimen: Blood   Result Value Ref Range    WBC 11.69 (H) 3.40 - 10.80 10*3/mm3    RBC 4.10 3.77 - 5.28 10*6/mm3    Hemoglobin 12.4 12.0 - 15.9 g/dL    Hematocrit 39.1 34.0 - 46.6 %    MCV 95.4 79.0 - 97.0 fL    MCH 30.2 26.6 - 33.0 pg    MCHC 31.7 31.5 - 35.7 g/dL    RDW 17.2 (H) 12.3 - 15.4 %    RDW-SD 58.0 (H) 37.0 - 54.0 fl    MPV 12.5 (H) 6.0 - 12.0 fL    Platelets 93 (L) 140 - 450 10*3/mm3    Neutrophil % 81.3 (H) 42.7 - 76.0 %    Lymphocyte % 8.6 (L) 19.6 - 45.3 %    Monocyte % 8.0 5.0 - 12.0 %    Eosinophil % 1.5 0.3 - 6.2 %    Basophil % 0.1 0.0 - 1.5 %    Immature Grans % 0.5 0.0 - 0.5 %    Neutrophils, Absolute 9.51 (H) 1.70 - 7.00 10*3/mm3    Lymphocytes, Absolute 1.00 0.70 - 3.10 10*3/mm3    Monocytes, Absolute 0.94 (H) 0.10 - 0.90 10*3/mm3    Eosinophils, Absolute 0.17 0.00 - 0.40 10*3/mm3    Basophils, Absolute 0.01 0.00 - 0.20 10*3/mm3    Immature Grans, Absolute 0.06 (H) 0.00 - 0.05 10*3/mm3    nRBC 0.0 0.0 - 0.2 /100 WBC   POC Glucose Finger 4x Daily Before Meals & at Bedtime    Collection Time: 07/11/24  7:06 AM    Specimen: Finger; Blood   Result Value Ref Range    Glucose 81 70 - 105 mg/dL   Blood Gas, Arterial -    Collection Time: 07/11/24 10:45 AM    Specimen: Arterial Blood   Result Value Ref Range    Site Right Radial     Reece's Test N/A     pH, Arterial 7.539 (H) 7.350 - 7.450 pH units    pCO2, Arterial 49.9 (H) 35.0 - 48.0 mm Hg    pO2, Arterial 66.3 (L) 83.0 - 108.0 mm Hg    HCO3, Arterial 42.6 (H) 21.0 - 28.0 mmol/L    Base Excess, Arterial 17.4 (H) 0.0 - 3.0 mmol/L    O2 Saturation, Arterial 94.6 94.0 - 98.0 %    CO2 Content 44.1 (H) 22 - 29 mmol/L    Barometric Pressure for Blood Gas      Modality Cannula     FIO2 28 %    Hemodilution No     PO2/FIO2  237 0 - 500   Occult Blood, Fecal By Immunoassay - Stool, Per Rectum    Collection Time: 07/11/24 11:27 AM    Specimen: Per Rectum; Stool   Result Value Ref Range    Occult Blood, Fecal by Immunoassay Positive (A) Negative   POC Glucose Finger 4x Daily Before Meals & at Bedtime    Collection Time: 07/11/24 11:30 AM    Specimen: Finger; Blood   Result Value Ref Range    Glucose 131 (H) 70 - 105 mg/dL        Imaging:  XR Chest 1 View  Narrative: XR CHEST 1 VW    Date of Exam: 7/11/2024 5:37 AM EDT    Indication: CT    Comparison: Chest radiograph 7/10/2024.    Findings:  Similar position of right chest tube. Enlarged cardiac silhouette, unchanged. Similar airspace opacities in the bilateral lung bases and left midlung. Similar small bilateral pleural effusions. No discrete pneumothorax. Osseous structures are unchanged.  Impression: Impression:  Similar position of right-sided chest tube. No discrete pneumothorax.    Similar small bilateral pleural effusions and bilateral airspace opacities, left greater than right.    Electronically Signed: Manan Jefferson MD    7/11/2024 7:48 AM EDT    Workstation ID: [id number]       ASSESSMENT:  Hydropneumothorax s/p CT  COPD  SEDRICK  Systolic CHF  A-fib with RVR  Aortic stenosis  Mitral valve regurgitation   R BBB  Hx bilateral DVT  HTN  Morbid obesity         PLAN:  Encourage OOB during the day   Antibiotics  Bronchodilators  Inhaled corticosteroids  Mucomyst  Incentive spirometer  Diuresis and monitor NANCY's   CT management   Electrolytes/ glycemic control  DVT prophylaxis-Apixaban      Discussed with Dr Pineda Martin, MANJU    7/11/2024  12:07 EDT    I personally have examined  and interviewed the patient. I have reviewed the history, data, problems, assessment and plan with our NP.  Total Critical care time in direct medical management (   ) minutes, This time specifically excludes time spent performing procedures.    Donna Sung MD   7/11/2024  22:08 EDT

## 2024-07-11 NOTE — PLAN OF CARE
"Assessment: Ban Brennan presents with ADL impairments affecting function including balance, endurance / activity tolerance, pain, and strength. Demonstrated functioning below baseline abilities indicate the need for continued skilled intervention while inpatient. Tolerating session today without incident. PT with overall improved function this date, demoing ability to transfer to chair from bed and to complete BUE exercises while in chair. Ed provided to complete exercises 3-5x/day. OT returned to assist pt back to bed as outlined above. Pt remains unsafe for return home, requiring significant assist with ADL care and mobility. Will continue to follow and progress as tolerated.      Plan/Recommendations:   Moderate Intensity Therapy recommended post-acute care. This is recommended as therapy feels the patient would require 3-4 days per week and wouldn't tolerate \"3 hour daily\" rehab intensity. SNF would be the preferred choice. If the patient does not agree to SNF, arrange HH or OP depending on home bound status. If patient is medically complex, consider LTACH.. Pt requires no DME at discharge.      Pt desires Skilled Rehab placement at discharge. Pt cooperative; agreeable to therapeutic recommendations and plan of care.     "

## 2024-07-11 NOTE — PLAN OF CARE
Goal Outcome Evaluation:  Plan of Care Reviewed With: patient        Progress: no change  Outcome Evaluation: Patient received medications. Patient remains with a chest tube, bloody drainage noted- MD aware. Patient also had bloody stool, fecal occult sent down. Patient is on 2-3L nc. Patient has orders for specialty bed, however none are available at this time.

## 2024-07-11 NOTE — CASE MANAGEMENT/SOCIAL WORK
Continued Stay Note  [facility]     Patient Name: Ban Brennan  MRN: [id number]  Today's Date: 2024    Admit Date: 2024    Plan: [facility] accepted. Will require new precert ( 7/10). PASRR approved. From home with family.   Discharge Plan       Row Name 24 1334       Plan    Plan [facility] accepted. Will require new precert ( 7/10). PASRR approved. From home with family.    Plan Comments CM confirmed with CMA that new precert will be required, previous precert  7/10. Barrier to D/C: chest tube, nephrology and pulmonology following.                      Expected Discharge Date and Time       Expected Discharge Date Expected Discharge Time    2024           Phone communication or documentation only - no physical contact with patient or family.     LIANG CerdaN, RN    [address] [address]    Office: [phone]  Fax: [phone]

## 2024-07-11 NOTE — THERAPY TREATMENT NOTE
"Subjective: Pt agreeable to therapeutic plan of care.    Objective:     Bed mobility - Supine to sitting Max-A and Assist x 2    Balance - Sitting balance min A  Standing balance Min Ax2 using RW.     Transfers - STS from EOB Mod-A, Assist x 2, and with rolling walker    Ambulation - 3 feet Mod-max Ax2 using RW. Pt quickly became increasingly weak, requiring max Ax2 to safely descend onto chair.     Vitals: WNL    Pain: Pt reports generalized chronic pain.     Education: Provided education on the importance of mobility in the acute care setting, Verbal/Tactile Cues, Transfer Training, and Gait Training    Assessment: Ban Brennan presents with functional mobility impairments which indicate the need for skilled intervention. Tolerating session today without incident. Pt requires max Ax2 for bed mobility, mod Ax2 for STS, and mod-max Ax2 to take a few steps to recliner chair. Raymond pad placed under pt to allow nursing staff safe transfer back to bed when ready. Will continue to follow and progress as tolerated.     Plan/Recommendations:   If medically appropriate, Moderate Intensity Therapy recommended post-acute care. This is recommended as therapy feels the patient would require 3-4 days per week and wouldn't tolerate \"3 hour daily\" rehab intensity. SNF would be the preferred choice. If the patient does not agree to SNF, arrange HH or OP depending on home bound status. If patient is medically complex, consider LTACH. Pt requires no DME at discharge.     Pt desires Skilled Rehab placement at discharge. Pt cooperative; agreeable to therapeutic recommendations and plan of care.         Basic Mobility 6-click:  Rollin = Total, A lot = 2, A little = 3; 4 = None  Supine>Sit:   1 = Total, A lot = 2, A little = 3; 4 = None   Sit>Stand with arms:  1 = Total, A lot = 2, A little = 3; 4 = None  Bed>Chair:   1 = Total, A lot = 2, A little = 3; 4 = None  Ambulate in room:  1 = Total, A lot = 2, A little = 3; 4 " = None  3-5 Steps with railin = Total, A lot = 2, A little = 3; 4 = None  Score: 10    Modified Zach: N/A = No pre-op stroke/TIA    Post-Tx Position: Up in Chair, Alarms activated, and Call light and personal items within reach  PPE: gloves and surgical mask

## 2024-07-11 NOTE — PLAN OF CARE
Goal Outcome Evaluation:      Chest tube output 210mL serosanguineous. No BM this am. Copious amount of blood observed on jolene pad and wipes during patient care. L arm appears edematous, destinee, and ecchymotic. Removed IV from L forearm last pm r/t to state of arm. Recommend Midline placement r/t poor venous access. Call light in reach and patient has no needs at this time.

## 2024-07-11 NOTE — PROGRESS NOTES
"Referring Provider: Librado Villagomez MD    Reason for follow-up: Atrial fibrillation with rapid ventricular rate     Patient Care Team:  Rut Pierce APRN as PCP - General (Nurse Practitioner)  Austin Brooks MD as Cardiologist (Cardiology)      SUBJECTIVE  Resting comfortably in bed.  Low blood pressure, normal heart rate and chest tube in place.     ROS  Review of all systems negative except as indicated.    Since I have last seen, the patient has been without any chest discomfort, shortness of breath, palpitations, dizziness or syncope.  Denies having any headache, abdominal pain, nausea, vomiting, diarrhea, constipation, loss of weight or loss of appetite.  Denies having any excessive bruising, hematuria or blood in the stool.        Personal History:    Past Medical History:   Diagnosis Date    Bilateral leg edema     Chronic respiratory failure with hypoxia, on home O2 therapy 07/01/2024    COPD (chronic obstructive pulmonary disease)     Morbid obesity with BMI of 40.0-44.9, adult 11/08/2010    Primary hypertension 03/01/2017    Pulmonary embolism     \"many years ago, was on warfarin\"    Sleep apnea        History reviewed. No pertinent surgical history.    History reviewed. No pertinent family history.    Social History     Tobacco Use    Smoking status: Never    Smokeless tobacco: Never   Vaping Use    Vaping status: Never Used   Substance Use Topics    Alcohol use: Never    Drug use: Never        Home meds:  Prior to Admission medications    Medication Sig Start Date End Date Taking? Authorizing Provider   Allergy Relief 180 MG tablet Take 1 tablet by mouth Daily. 5/30/24  Yes Chadwick Johnson MD   bumetanide (BUMEX) 1 MG tablet Take 1 tablet by mouth 3 times a day. 5/30/24  Yes Chadwick Johnson MD   docusate sodium (COLACE) 100 MG capsule Take 1 capsule by mouth Every 12 (Twelve) Hours. 5/30/24  Yes Chadwick Johnson MD   furosemide (LASIX) 20 MG tablet Take 1 tablet by mouth " Daily.   Yes Chadwick Johnson MD   HYDROcodone-acetaminophen (NORCO)  MG per tablet Take 1 tablet by mouth 3 times a day. 5/30/24  Yes Chadwick Johnson MD   lisinopril (PRINIVIL,ZESTRIL) 30 MG tablet Take 1 tablet by mouth Daily. 3/18/24  Yes Provider, Historical, MD   meloxicam (MOBIC) 7.5 MG tablet Take 1 tablet by mouth Daily As Needed. 3/18/24  Yes Chadwick Johnson MD   metoprolol tartrate (LOPRESSOR) 50 MG tablet Take 1 tablet by mouth Daily.   Yes Chadwick Jonhson MD   montelukast (SINGULAIR) 10 MG tablet Take 1 tablet by mouth every night at bedtime.   Yes Chadwick Johnson MD   omeprazole (priLOSEC) 20 MG capsule Take 1 capsule by mouth Daily. 3/18/24  Yes Chadwick Johnson MD   oxybutynin XL (DITROPAN-XL) 10 MG 24 hr tablet Take 2 tablets by mouth Daily. 3/18/24  Yes Chadwick Johnson MD   potassium chloride (MICRO-K) 10 MEQ CR capsule Take 1 capsule by mouth Every 12 (Twelve) Hours. 3/18/24  Yes Chadwick Johnson MD   vitamin D (ERGOCALCIFEROL) 1.25 MG (33146 UT) capsule capsule Take 1 capsule by mouth 2 (Two) Times a Week. Monday and Thursday. 5/30/24  Yes Chadwick Johnson MD       Allergies:  Patient has no known allergies.    Scheduled Meds:amiodarone, 200 mg, Oral, Q12H   Followed by  [START ON 7/23/2024] amiodarone, 200 mg, Oral, Daily  apixaban, 10 mg, Oral, BID   Followed by  [START ON 7/14/2024] apixaban, 5 mg, Oral, BID  budesonide, 0.5 mg, Nebulization, BID - RT  bumetanide, 1 mg, Oral, BID  hydrocortisone, 25 mg, Rectal, BID  insulin lispro, 2-7 Units, Subcutaneous, 4x Daily With Meals & Nightly  ipratropium-albuterol, 3 mL, Nebulization, 4x Daily - RT  lidocaine, 20 mL, Infiltration, Once  metoprolol succinate XL, 25 mg, Oral, Q12H  miconazole, 1 Application, Topical, Q12H  pantoprazole, 40 mg, Oral, BID AC  povidone-iodine, , Topical, Daily  sodium chloride, 10 mL, Intravenous, Q12H  spironolactone, 25 mg, Oral, Daily      Continuous Infusions:  "  PRN Meds:.  acetaminophen **OR** acetaminophen    Calcium Replacement - Follow Nurse / BPA Driven Protocol    dextrose    dextrose    glucagon (human recombinant)    ipratropium-albuterol    Magnesium Standard Dose Replacement - Follow Nurse / BPA Driven Protocol    nitroglycerin    ondansetron ODT **OR** ondansetron    Phosphorus Replacement - Follow Nurse / BPA Driven Protocol    Potassium Replacement - Follow Nurse / BPA Driven Protocol    [COMPLETED] Insert Peripheral IV **AND** sodium chloride    sodium chloride    sodium chloride      OBJECTIVE    Vital Signs  Vitals:    07/11/24 0122 07/11/24 0500 07/11/24 0522 07/11/24 0524   BP: 100/62   92/60   BP Location: Right arm   Right arm   Patient Position: Lying   Lying   Pulse: 104  89 90   Resp: 22   17   Temp: 97 °F (36.1 °C)   97.7 °F (36.5 °C)   TempSrc: Axillary   Oral   SpO2: 93%  98% 98%   Weight:  83.3 kg (183 lb 10.3 oz)     Height:           Flowsheet Rows      Flowsheet Row First Filed Value   Admission Height 147.3 cm (58\") Documented at 06/30/2024 1650   Admission Weight 108 kg (239 lb) Documented at 06/30/2024 1650              Intake/Output Summary (Last 24 hours) at 7/11/2024 0659  Last data filed at 7/11/2024 0524  Gross per 24 hour   Intake 480 ml   Output 1150 ml   Net -670 ml          Telemetry: Atrial fibrillation with heart rate in the 90s    Physical Exam:  The patient is alert, oriented and in no distress.  Obese  Vital signs as noted above.  Head and neck revealed no carotid bruits or jugular venous distention.  No thyromegaly or lymphadenopathy is present  Lungs clear.  No wheezing.  Breath sounds are normal bilaterally.  Heart normal first and second heart sounds.  No murmur. No precordial rub is present.  No gallop is present.  Abdomen soft and nontender.  No organomegaly is present.  Extremities with good peripheral pulses without any pedal edema.  Skin warm and dry.  Musculoskeletal system is grossly normal.  CNS grossly normal. "       Results Review:  I have personally reviewed the results from the time of this admission to 7/11/2024 06:59 EDT and agree with these findings:  []  Laboratory  []  Microbiology  []  Radiology  []  EKG/Telemetry   []  Cardiology/Vascular   []  Pathology  []  Old records  []  Other:    Most notable findings include:    Lab Results (last 24 hours)       Procedure Component Value Units Date/Time    Magnesium [[id number]]  (Normal) Collected: 07/11/24 0437    Specimen: Blood Updated: 07/11/24 0511     Magnesium 1.9 mg/dL     Comprehensive Metabolic Panel [[id number]]  (Abnormal) Collected: 07/11/24 0437    Specimen: Blood Updated: 07/11/24 0511     Glucose 94 mg/dL      BUN 46 mg/dL      Creatinine 1.08 mg/dL      Sodium 142 mmol/L      Potassium 4.5 mmol/L      Comment: Slight hemolysis detected by analyzer. Result may be falsely elevated.        Chloride 95 mmol/L      CO2 41.6 mmol/L      Calcium 9.1 mg/dL      Total Protein 5.6 g/dL      Albumin 2.1 g/dL      ALT (SGPT) 19 U/L      AST (SGOT) 48 U/L      Comment: Slight hemolysis detected by analyzer. Result may be falsely elevated.        Alkaline Phosphatase 85 U/L      Total Bilirubin 2.4 mg/dL      Globulin 3.5 gm/dL      A/G Ratio 0.6 g/dL      BUN/Creatinine Ratio 42.6     Anion Gap 5.4 mmol/L      eGFR 55.7 mL/min/1.73     Narrative:      GFR Normal >60  Chronic Kidney Disease <60  Kidney Failure <15      Phosphorus [[id number]]  (Normal) Collected: 07/11/24 0437    Specimen: Blood Updated: 07/11/24 0507     Phosphorus 2.5 mg/dL     CBC & Differential [[id number]]  (Abnormal) Collected: 07/11/24 0437    Specimen: Blood Updated: 07/11/24 0445    Narrative:      The following orders were created for panel order CBC & Differential.  Procedure                               Abnormality         Status                     ---------                               -----------         ------                     CBC Auto Differential[[id number]]        Abnormal             Final result                 Please view results for these tests on the individual orders.    CBC Auto Differential [[id number]]  (Abnormal) Collected: 07/11/24 0437    Specimen: Blood Updated: 07/11/24 0445     WBC 11.69 10*3/mm3      RBC 4.10 10*6/mm3      Hemoglobin 12.4 g/dL      Hematocrit 39.1 %      MCV 95.4 fL      MCH 30.2 pg      MCHC 31.7 g/dL      RDW 17.2 %      RDW-SD 58.0 fl      MPV 12.5 fL      Platelets 93 10*3/mm3      Neutrophil % 81.3 %      Lymphocyte % 8.6 %      Monocyte % 8.0 %      Eosinophil % 1.5 %      Basophil % 0.1 %      Immature Grans % 0.5 %      Neutrophils, Absolute 9.51 10*3/mm3      Lymphocytes, Absolute 1.00 10*3/mm3      Monocytes, Absolute 0.94 10*3/mm3      Eosinophils, Absolute 0.17 10*3/mm3      Basophils, Absolute 0.01 10*3/mm3      Immature Grans, Absolute 0.06 10*3/mm3      nRBC 0.0 /100 WBC     POC Glucose Once [[id number]]  (Abnormal) Collected: 07/10/24 2141    Specimen: Blood Updated: 07/10/24 2142     Glucose 107 mg/dL      Comment: Serial Number: [id number]:  [id number]       POC Glucose Once [[id number]]  (Abnormal) Collected: 07/10/24 1639    Specimen: Blood Updated: 07/10/24 1642     Glucose 142 mg/dL      Comment: Serial Number: [id number]:  [id number]       POC Glucose 4x Daily Before Meals & at Bedtime [[id number]]  (Abnormal) Collected: 07/10/24 1150    Specimen: Blood Updated: 07/10/24 1152     Glucose 129 mg/dL      Comment: Serial Number: [id number]:  [id number]       Body Fluid Culture - Body Fluid, Pleural Cavity [[id number]] Collected: 07/06/24 1544    Specimen: Body Fluid from Pleural Cavity Updated: 07/10/24 1000     Body Fluid Culture No growth at 3 days     Gram Stain Many (4+) WBCs per low power field      No organisms seen    POC Glucose 4x Daily Before Meals & at Bedtime [[id number]]  (Normal) Collected: 07/10/24 0720    Specimen: Blood Updated: 07/10/24 0722     Glucose 100 mg/dL      Comment: Serial Number: [id number]:   [id number]               Imaging Results (Last 24 Hours)       Procedure Component Value Units Date/Time    XR Chest 1 View [[id number]] Resulted: 07/11/24 0609     Updated: 07/11/24 0609    XR Chest 1 View [[id number]] Collected: 07/10/24 0710     Updated: 07/10/24 0713    Narrative:      XR CHEST 1 VW    Date of Exam: 7/10/2024 5:31 AM EDT    Indication: CT chest tube management    Comparison: 7/9/2024    Findings:  Chest tube again noted overlying the right lung base. Negative for pneumothorax. Stable cardiomegaly. Persistent bibasilar airspace disease and small bilateral pleural effusions.      Impression:      Impression:  1. Stable chest tube overlying the right lung base. No pneumothorax.  2. Persistent bibasilar airspace disease and small bilateral pleural effusions.  3. Stable cardiomegaly.        Electronically Signed: Mina Wheeler MD    7/10/2024 7:11 AM EDT    Workstation ID: [id number]            LAB RESULTS (LAST 7 DAYS)    CBC  Results from last 7 days   Lab Units 07/11/24  0437 07/10/24  0220 07/09/24  2051 07/09/24  0851 07/09/24  0256 07/08/24  0446 07/07/24  0506 07/06/24  0354 07/05/24  0918   WBC 10*3/mm3 11.69* 11.11*  --   --  10.66 11.66* 16.38* 15.29* 12.93*   RBC 10*6/mm3 4.10 4.13  --   --  4.15 4.45 4.73 4.54 4.55   HEMOGLOBIN g/dL 12.4 12.5 12.6 13.1 12.6 13.3 14.2 13.8 13.8   HEMATOCRIT % 39.1 38.6 38.9 40.5 39.1 41.8 43.9 42.8 45.5   MCV fL 95.4 93.5  --   --  94.2 93.9 92.8 94.3 100.0*   PLATELETS 10*3/mm3 93* 85*  --   --  87* 100* 130* 128* 115*       BMP  Results from last 7 days   Lab Units 07/11/24  0437 07/10/24  0220 07/09/24  1507 07/09/24  0256 07/08/24  0446 07/07/24  0506 07/06/24  1608 07/06/24  0354 07/05/24  0918   SODIUM mmol/L 142 139  --  145 145 143 140 139 135*   POTASSIUM mmol/L 4.5 4.5 3.9 3.6 3.0* 3.9 3.3* 3.7 5.3*   CHLORIDE mmol/L 95* 96*  --  98 97* 98 96* 96* 95*   CO2 mmol/L 41.6* 37.9*  --  39.5* 38.0* 35.7* 33.9* 34.0* 31.6*   BUN mg/dL 46* 54*  --  68* 72* 74*  72* 70* 67*   CREATININE mg/dL 1.08* 1.00  --  1.08* 1.19* 1.37* 1.41* 1.56* 1.86*   GLUCOSE mg/dL 94 90  --  117* 98 102* 111* 133* 128*   MAGNESIUM mg/dL 1.9 2.3  --  1.3* 1.8 1.6  --  1.9 1.9   PHOSPHORUS mg/dL 2.5 2.5 2.3* 1.8* 2.4* 2.5  --  3.5 4.2       CMP   Results from last 7 days   Lab Units 07/11/24  0437 07/10/24  0220 07/09/24  1507 07/09/24  0256 07/08/24  0446 07/07/24  0506 07/06/24  1608 07/06/24  0354   SODIUM mmol/L 142 139  --  145 145 143 140 139   POTASSIUM mmol/L 4.5 4.5 3.9 3.6 3.0* 3.9 3.3* 3.7   CHLORIDE mmol/L 95* 96*  --  98 97* 98 96* 96*   CO2 mmol/L 41.6* 37.9*  --  39.5* 38.0* 35.7* 33.9* 34.0*   BUN mg/dL 46* 54*  --  68* 72* 74* 72* 70*   CREATININE mg/dL 1.08* 1.00  --  1.08* 1.19* 1.37* 1.41* 1.56*   GLUCOSE mg/dL 94 90  --  117* 98 102* 111* 133*   ALBUMIN g/dL 2.1* 2.0*  --  2.2* 2.2* 2.5* 2.5* 2.5*   BILIRUBIN mg/dL 2.4* 1.9*  --  1.7* 1.7* 1.8* 1.6* 1.6*   ALK PHOS U/L 85 80  --  84 96 125* 110 117   AST (SGOT) U/L 48* 46*  --  37* 36* 30 31 27   ALT (SGPT) U/L 19 17  --  17 15 17 17 14       BNP        TROPONIN          CoAg        Creatinine Clearance  Estimated Creatinine Clearance: 48.6 mL/min (A) (by C-G formula based on SCr of 1.08 mg/dL (H)).    ABG  Results from last 7 days   Lab Units 07/06/24  1150 07/05/24  0711 07/05/24  0442 07/05/24  0353 07/05/24  0212   PH, ARTERIAL pH units 7.392 7.281* 7.250* 7.204* 7.208*   PCO2, ARTERIAL mm Hg 61.2* 78.4* 80.9* 94.8* 92.8*   PO2 ART mm Hg 61.9* 106.7 76.6* 87.0 76.7*   O2 SATURATION ART % 90.2* 97.0 91.7* 93.1* 90.5*   BASE EXCESS ART mmol/L 9.3* 6.5* 4.5* 4.6* 4.4*       Radiology  XR Chest 1 View    Result Date: 7/10/2024  Impression: 1. Stable chest tube overlying the right lung base. No pneumothorax. 2. Persistent bibasilar airspace disease and small bilateral pleural effusions. 3. Stable cardiomegaly. Electronically Signed: Mina Wheeler MD  7/10/2024 7:11 AM EDT  Workstation ID: [id number]    XR Chest 1 View    Result  Date: 7/9/2024  Impression: 1.Bilateral airspace disease again noted. 2.Cardiomegaly 3.Pigtail catheter chest tube right lung base. Electronically Signed: Bran Bernal MD  7/9/2024 11:37 AM EDT  Workstation ID: [id number]       EKG  I personally viewed and interpreted the patient's EKG/Telemetry data:  ECG 12 Lead Rhythm Change   Final Result   HEART RATE=92  bpm   RR Jcsuaaom=682  ms   MN Interval=  ms   P Horizontal Axis=  deg   P Front Axis=  deg   QRSD Scdmosmi=487  ms   QT Qpyhpsye=482  ms   KWyY=195  ms   QRS Axis=-96  deg   T Wave Axis=65  deg   - ABNORMAL ECG -   Atrial fibrillation   Right bundle branch block   When compared with ECG of 05-Jul-2024 02:24:17,   No significant change   Electronically Signed By: Tee Whitlock ([facility]) 2024-07-07 10:12:55   Date and Time of Study:2024-07-05 06:43:37      ECG 12 Lead Drug Monitoring; amiodarone   Final Result   HEART FWYV=267  bpm   RR Lwkkjfyk=673  ms   MN Interval=  ms   P Horizontal Axis=  deg   P Front Axis=  deg   QRSD Pqhvqria=690  ms   QT Wrzcnqrm=688  ms   SVjW=708  ms   QRS Axis=-94  deg   T Wave Axis=70  deg   - ABNORMAL ECG -   Atrial fibrillation   Ventricular premature complex   Right bundle branch block   When compared with ECG of 04-Jul-2024 09:17:07,   No change from prior tracing   Electronically Signed By: Tee Whitlock ([facility]) 2024-07-07 10:13:32   Date and Time of Study:2024-07-05 02:24:17      ECG 12 Lead Electrolyte Imbalance   Final Result   HEART VPHT=311  bpm   RR Buvmgpjh=165  ms   MN Nyoxpylk=122  ms   P Horizontal Axis=113  deg   P Front Axis=263  deg   QRSD Xftafqwm=360  ms   QT Mwsbwccd=491  ms   ZBlK=710  ms   QRS Axis=227  deg   T Wave Axis=27  deg   - ABNORMAL ECG -   Right bundle branch block   When compared with ECG of 04-Jul-2024 04:20:51,   Significant change in rhythm: previously atrial fibrillation   Significant repolarization change   Atrial fibrillation with rapid V-rate   No change from prior tracing   Electronically  Signed By: Tee Whitlock ([facility]) 2024-07-07 10:14:27   Date and Time of Study:2024-07-04 09:17:07      ECG 12 Lead Drug Monitoring; Amiodarone   Preliminary Result   HEART CQBA=003  bpm   RR Bmwfkbve=822  ms   VA Interval=  ms   P Horizontal Axis=  deg   P Front Axis=  deg   QRSD Yovuqcxn=772  ms   QT Ynvyibpx=392  ms   WRbK=758  ms   QRS Axis=-90  deg   T Wave Axis=36  deg   - ABNORMAL ECG -   Atrial fibrillation   Right bundle branch block   ST elevation secondary to IVCD   Date and Time of Study:2024-07-04 04:20:51      ECG 12 Lead Chest Pain   Preliminary Result   HEART XYCE=948  bpm   RR Tchzcwbc=833  ms   VA Interval=  ms   P Horizontal Axis=  deg   P Front Axis=  deg   QRSD Leomexfc=602  ms   QT Lyaimese=026  ms   USbW=076  ms   QRS Axis=-98  deg   T Wave Axis=37  deg   - ABNORMAL ECG -   Atrial fibrillation   Right bundle branch block   Anterolateral infarct, old   Date and Time of Study:2024-07-03 17:38:52      ECG 12 Lead Drug Monitoring; Amiodarone   Preliminary Result   HEART UEWM=631  bpm   RR Avwjgdqu=675  ms   VA Interval=  ms   P Horizontal Axis=  deg   P Front Axis=  deg   QRSD Ibtlvgwx=171  ms   QT Kimmtlqz=791  ms   JVgK=379  ms   QRS Axis=-90  deg   T Wave Axis=74  deg   - ABNORMAL ECG -   Atrial fibrillation   Right bundle branch block   ST elevation secondary to IVCD   Date and Time of Study:2024-07-03 04:02:32      ECG 12 Lead Drug Monitoring; Amiodarone   Final Result   HEART XYZF=348  bpm   RR Emrjcjaq=886  ms   VA Interval=  ms   P Horizontal Axis=  deg   P Front Axis=  deg   QRSD Kqxnqvtp=272  ms   QT Tlbxpdjg=352  ms   MYuN=607  ms   QRS Axis=-93  deg   T Wave Axis=45  deg   - ABNORMAL ECG -   Atrial fibrillation   Right bundle branch block   Inferior  infarct, old   When compared with ECG of 01-Jul-2024 04:42:13,   Nonspecific significant change   Electronically Signed By: Librado Mcdermott ([facility]) 2024-07-10 12:58:26   Date and Time of Study:2024-07-02 15:12:56      ECG 12 Lead Drug Monitoring;  Amiodarone   Final Result   HEART GARC=738  bpm   RR Lwbfbcwx=779  ms   MN Interval=  ms   P Horizontal Axis=  deg   P Front Axis=  deg   QRSD Lledvreh=273  ms   QT Sbangxyq=209  ms   JHpN=106  ms   QRS Axis=-80  deg   T Wave Axis=102  deg   - ABNORMAL ECG -   Atrial fibrillation   Right bundle branch block   Inferior  infarct, old   Anterolateral  infarct, age indeterminate   When compared with ECG of 30-Jun-2024 16:58:43,   Significant rate decrease   New or worsened ischemia or infarction   Electronically Signed By: Austin Brooks ([facility]) 2024-07-01 13:11:59   Date and Time of Study:2024-07-01 04:42:13      ECG 12 Lead Dyspnea   Final Result   HEART ILVT=851  bpm   RR Jtnccuca=731  ms   MN Interval=  ms   P Horizontal Axis=  deg   P Front Axis=  deg   QRSD Fywsarbj=813  ms   QT Ffdjfirx=515  ms   KJwZ=454  ms   QRS Axis=-101  deg   T Wave Axis=43  deg   - ABNORMAL ECG -   Atrial fibrillation   Right bundle branch block   ST elevation secondary to IVCD   Electronically Signed By: Jeffery Kwon ([facility]) 2024-07-01 07:37:02   Date and Time of Study:2024-06-30 16:58:43      Telemetry Scan   Final Result      Telemetry Scan   Final Result      Telemetry Scan   Final Result      Telemetry Scan   Final Result      Telemetry Scan   Final Result      Telemetry Scan   Final Result      Telemetry Scan   Final Result      Telemetry Scan   Final Result      Telemetry Scan   Final Result      Telemetry Scan   Final Result      Telemetry Scan   Final Result      Telemetry Scan   Final Result      Telemetry Scan   Final Result      Telemetry Scan   Final Result      Telemetry Scan   Final Result      Telemetry Scan   Final Result      Telemetry Scan   Final Result      Telemetry Scan   Final Result      Telemetry Scan   Final Result      Telemetry Scan   Final Result      Telemetry Scan   Final Result      Telemetry Scan   Final Result      Telemetry Scan   Final Result      Telemetry Scan   Final Result      Telemetry Scan   Final  Result      Telemetry Scan   Final Result      Telemetry Scan   Final Result      Telemetry Scan   Final Result      Telemetry Scan   Final Result      Telemetry Scan   Final Result      Telemetry Scan   Final Result      Telemetry Scan   Final Result      Telemetry Scan   Final Result      Telemetry Scan   Final Result      Telemetry Scan   Final Result      Telemetry Scan   Final Result      Telemetry Scan   Final Result      Telemetry Scan   Final Result      Telemetry Scan   Final Result      Telemetry Scan   Final Result      Telemetry Scan   Final Result      Telemetry Scan   Final Result      Telemetry Scan   Final Result      Telemetry Scan   Final Result      Telemetry Scan   Final Result      Telemetry Scan   Final Result      Telemetry Scan   Final Result      Telemetry Scan   Final Result      Telemetry Scan   Final Result      Telemetry Scan   Final Result      Telemetry Scan   Final Result      Telemetry Scan   Final Result      Telemetry Scan   Final Result      Telemetry Scan   Final Result      ECG 12 Lead Electrolyte Imbalance    (Results Pending)         Echocardiogram:    Results for orders placed during the hospital encounter of 06/30/24    Adult Transthoracic Echo Complete w/ Color, Spectral and Contrast if Necessary Per Protocol    Interpretation Summary    Left ventricular ejection fraction appears to be 31 - 35%.    Left ventricular diastolic dysfunction is noted.    The left atrial cavity is dilated.    Moderate aortic valve stenosis is present. Mean/peak gradient of 14/24 mmHg.  Dimensionless index 0.36    Moderate to severe mitral valve regurgitation is present.    Estimated right ventricular systolic pressure from tricuspid regurgitation is normal (<35 mmHg).        Stress Test:         Cardiac Catheterization:  No results found for this or any previous visit.         Other:         ASSESSMENT & PLAN:    Principal Problem:    Atrial fibrillation with RVR  Active Problems:    Primary  hypertension    Morbid obesity with BMI of 40.0-44.9, adult    Right bundle branch block    Acute deep vein thrombosis (DVT) of both lower extremities    ARABELLA (acute kidney injury)    Acute hyperkalemia    Sepsis    Acute UTI    Acute systolic congestive heart failure    Rhinovirus infection    Multifocal pneumonia    Chronic respiratory failure with hypoxia, on home O2 therapy    Skin ulcer of right great toe    Skin ulcer of left great toe    SEDRICK (obstructive sleep apnea)    Aortic stenosis    Mitral regurgitation    Acute HFrEF (heart failure with reduced ejection fraction)    COPD (chronic obstructive pulmonary disease)      Acute respiratory failure with hypoxia and hypercapnia  Acute encephalopathy   Currently on 2 L of oxygen via nasal cannula  Required chest tube placement for hydropneumothorax  Pulmonology is managing.     Atrial fibrillation with RVR  Newly diagnosed  Electrolytes have been corrected  Rate controlled with heart rate in the 80s and 90s  Continue amiodarone for rhythm control   Toprol-XL for rate control: 25 twice daily  [id number] score is 7  Continue Eliquis  Rectal bleeding with no drop in H&H likely from hemorrhoids  No plan for endoscopy       Acute systolic CHF  Newly diagnosed  Echocardiogram shows EF of 31 to 35%, moderate aortic stenosis and moderate to severe mitral regurgitation.  Completed IV diuretics now on oral Bumex  Monitor I's and O's and replace electrolytes as needed.  Switch from metoprolol to tartrate to succinate  Add ACE inhibitor/ARNI if renal function and blood pressure allow  Was requiring midodrine.  Now off.  Blood pressure is still borderline low  Started on Aldactone  Plan to repeat echocardiogram after 3 months of completing GDMT     Acute deep vein thrombosis (DVT) of both lower extremities  Extensive DVT in bilateral lower extremities involving femoral, popliteal and posterior tibial.  Continue anticoagulation as above  She will need lifelong  anticoagulation   Rectal bleeding with stable H&H     Thrombocytopenia  Closely monitor platelets while on anticoagulation  Platelets are 93     ARABELLA (acute kidney injury)  Presented with creatinine of 1.4.  Creatinine 1 today  Closely monitor renal function while on diuretics  Continue p.o. Bumex  Nephrology following      Acute hyperkalemia  Receiving [facility]  Nephrology following      Sepsis / UTI / Multifocal pneumonia / Rhinovirus infection  Multifactorial secondary to UTI and pneumonia with possible cellulitis  Continue empiric antibiotics      Skin ulcer of right great toe  Skin ulcer of left great toe  Podiatry following   A1c is 6.2%     Primary hypertension, chronic  Now on Aldactone, Toprol-XL.  Previously required midodrine.  Closely monitor blood pressure      Morbid obesity with BMI of 40.0-44.9, adult  BMI is 37.  She weighs 183 pounds  Lifestyle modifications recommended   LDL 27, HDL 13, triglyceride 86 and total cholesterol 58.  No indication for statin     SEDRICK (obstructive sleep apnea)  Encouraged compliance with CPAP      Austin Brooks MD  07/11/24  06:59 EDT

## 2024-07-11 NOTE — PLAN OF CARE
"Assessment: Ban Brennan presents with functional mobility impairments which indicate the need for skilled intervention. Tolerating session today without incident. Pt requires max Ax2 for bed mobility, mod Ax2 for STS, and mod-max Ax2 to take a few steps to recliner chair. Raymond pad placed under pt to allow nursing staff safe transfer back to bed when ready. Will continue to follow and progress as tolerated.     Plan/Recommendations:   If medically appropriate, Moderate Intensity Therapy recommended post-acute care. This is recommended as therapy feels the patient would require 3-4 days per week and wouldn't tolerate \"3 hour daily\" rehab intensity. SNF would be the preferred choice. If the patient does not agree to SNF, arrange HH or OP depending on home bound status. If patient is medically complex, consider LTACH. Pt requires no DME at discharge.     Pt desires Skilled Rehab placement at discharge. Pt cooperative; agreeable to therapeutic recommendations and plan of care.     "

## 2024-07-11 NOTE — PROGRESS NOTES
"    PROGRESS NOTE      Patient Name: Ban Brennan  : 1955  MRN: [id number]  Primary Care Physician: Rut Pierce APRN  Date of admission: 2024    Patient Care Team:  Rut Pierce APRN as PCP - General (Nurse Practitioner)  Austin Brooks MD as Cardiologist (Cardiology)        Reason for Follow up     Acute kidney injury, hyperkalemia      Subjective     Seen and examined, NAD noted, renal functions stable, worsening bicarb level, ordered ABG, good UOP, 1.3L     Review of systems:    ROS was otherwise negative except as mentioned in the [facility] [facility].       Personal History:     Past Medical History:   Past Medical History:   Diagnosis Date    Bilateral leg edema     Chronic respiratory failure with hypoxia, on home O2 therapy 2024    COPD (chronic obstructive pulmonary disease)     Morbid obesity with BMI of 40.0-44.9, adult 2010    Primary hypertension 2017    Pulmonary embolism     \"many years ago, was on warfarin\"    Sleep apnea        Surgical History:    History reviewed. No pertinent surgical history.    Family History: family history is not on file. Otherwise pertinent FHx was reviewed and unremarkable.     Social History:  reports that she has never smoked. She has never used smokeless tobacco. She reports that she does not drink alcohol and does not use drugs.    Medications:  Prior to Admission medications    Medication Sig Start Date End Date Taking? Authorizing Provider   Allergy Relief 180 MG tablet Take 1 tablet by mouth Daily. 24  Yes ProviderChadwick MD   bumetanide (BUMEX) 1 MG tablet Take 1 tablet by mouth 3 times a day. 24  Yes ProviderChadwick MD   docusate sodium (COLACE) 100 MG capsule Take 1 capsule by mouth Every 12 (Twelve) Hours. 24  Yes ProviderChadwick MD   furosemide (LASIX) 20 MG tablet Take 1 tablet by mouth Daily.   Yes ProviderChadwick MD   HYDROcodone-acetaminophen (NORCO)  MG per tablet Take 1 " tablet by mouth 3 times a day. 5/30/24  Yes Chadwick Johnson MD   lisinopril (PRINIVIL,ZESTRIL) 30 MG tablet Take 1 tablet by mouth Daily. 3/18/24  Yes Chadwick Johnson MD   meloxicam (MOBIC) 7.5 MG tablet Take 1 tablet by mouth Daily As Needed. 3/18/24  Yes Chadwick Johnson MD   metoprolol tartrate (LOPRESSOR) 50 MG tablet Take 1 tablet by mouth Daily.   Yes Chadwick Johnson MD   montelukast (SINGULAIR) 10 MG tablet Take 1 tablet by mouth every night at bedtime.   Yes Chadwick Johnson MD   omeprazole (priLOSEC) 20 MG capsule Take 1 capsule by mouth Daily. 3/18/24  Yes Provider, Historical, MD   oxybutynin XL (DITROPAN-XL) 10 MG 24 hr tablet Take 2 tablets by mouth Daily. 3/18/24  Yes Chadwick Johnson MD   potassium chloride (MICRO-K) 10 MEQ CR capsule Take 1 capsule by mouth Every 12 (Twelve) Hours. 3/18/24  Yes Chadwick Johnson MD   vitamin D (ERGOCALCIFEROL) 1.25 MG (79163 UT) capsule capsule Take 1 capsule by mouth 2 (Two) Times a Week. Monday and Thursday. 5/30/24  Yes Chadwick Johnson MD     Scheduled Meds:amiodarone, 200 mg, Oral, Q12H   Followed by  [START ON 7/23/2024] amiodarone, 200 mg, Oral, Daily  apixaban, 10 mg, Oral, BID   Followed by  [START ON 7/14/2024] apixaban, 5 mg, Oral, BID  budesonide, 0.5 mg, Nebulization, BID - RT  bumetanide, 1 mg, Oral, BID  hydrocortisone, 25 mg, Rectal, BID  insulin lispro, 2-7 Units, Subcutaneous, 4x Daily With Meals & Nightly  ipratropium-albuterol, 3 mL, Nebulization, 4x Daily - RT  lidocaine, 20 mL, Infiltration, Once  metoprolol succinate XL, 25 mg, Oral, Q12H  miconazole, 1 Application, Topical, Q12H  pantoprazole, 40 mg, Oral, BID AC  povidone-iodine, , Topical, Daily  sodium chloride, 10 mL, Intravenous, Q12H  spironolactone, 25 mg, Oral, Daily      Continuous Infusions:     PRN Meds:  acetaminophen **OR** acetaminophen    Calcium Replacement - Follow Nurse / BPA Driven Protocol    dextrose    dextrose    glucagon (human  recombinant)    ipratropium-albuterol    Magnesium Standard Dose Replacement - Follow Nurse / BPA Driven Protocol    nitroglycerin    ondansetron ODT **OR** ondansetron    Phosphorus Replacement - Follow Nurse / BPA Driven Protocol    Potassium Replacement - Follow Nurse / BPA Driven Protocol    [COMPLETED] Insert Peripheral IV **AND** sodium chloride    sodium chloride    sodium chloride  Allergies:  No Known Allergies    Objective   Exam:     Vital Signs  Temp:  [97 °F (36.1 °C)-98.3 °F (36.8 °C)] 98 °F (36.7 °C)  Heart Rate:  [] 99  Resp:  [17-28] 17  BP: ()/(47-66) 123/66  SpO2:  [92 %-99 %] 94 %  on  Flow (L/min):  [2-3] 2;   Device (Oxygen Therapy): nasal cannula  Body mass index is 37.09 kg/m².  EXAM  General:  69 yr old  female, some respiratory distress  Head:      Normocephalic and atraumatic.    Eyes:      PERRL/EOM intact, conjunctivae and sclerae clear without nystagmus.    Neck:      No masses, thyromegaly,  trachea central   Lungs:    Clear bilaterally to auscultation.    Heart:      Regular rate and rhythm, no murmur no gallop  Abd:        Soft, nontender, not distended, bowel sounds positive, no shifting dullness.  Msk:        No deformity or scoliosis noted of thoracic or lumbar spine.    Pulses:   Pulses normal in all 4 extremities.    Extremities:        No cyanosis or clubbing--+ + edema.    Neuro:    Lethargic  Skin:       Intact without lesions or rashes.          Results Review:  I have personally reviewed most recent Data :  BMP @LABRCNT(creatinine:10)  CBC    Results from last 7 days   Lab Units 07/11/24  0437 07/10/24  0220 07/09/24  2051 07/09/24  0851 07/09/24  0256 07/08/24  0446 07/07/24  0506 07/06/24  0354 07/05/24  0918   WBC 10*3/mm3 11.69* 11.11*  --   --  10.66 11.66* 16.38* 15.29* 12.93*   HEMOGLOBIN g/dL 12.4 12.5 12.6 13.1 12.6 13.3 14.2 13.8 13.8   PLATELETS 10*3/mm3 93* 85*  --   --  87* 100* 130* 128* 115*     CMP   Results from last 7 days   Lab Units  07/11/24  0437 07/10/24  0220 07/09/24  1507 07/09/24  0256 07/08/24  0446 07/07/24  0506 07/06/24  1608 07/06/24  0354   SODIUM mmol/L 142 139  --  145 145 143 140 139   POTASSIUM mmol/L 4.5 4.5 3.9 3.6 3.0* 3.9 3.3* 3.7   CHLORIDE mmol/L 95* 96*  --  98 97* 98 96* 96*   CO2 mmol/L 41.6* 37.9*  --  39.5* 38.0* 35.7* 33.9* 34.0*   BUN mg/dL 46* 54*  --  68* 72* 74* 72* 70*   CREATININE mg/dL 1.08* 1.00  --  1.08* 1.19* 1.37* 1.41* 1.56*   GLUCOSE mg/dL 94 90  --  117* 98 102* 111* 133*   ALBUMIN g/dL 2.1* 2.0*  --  2.2* 2.2* 2.5* 2.5* 2.5*   BILIRUBIN mg/dL 2.4* 1.9*  --  1.7* 1.7* 1.8* 1.6* 1.6*   ALK PHOS U/L 85 80  --  84 96 125* 110 117   AST (SGOT) U/L 48* 46*  --  37* 36* 30 31 27   ALT (SGPT) U/L 19 17  --  17 15 17 17 14     ABG    Results from last 7 days   Lab Units 07/11/24  1045 07/06/24  1150 07/05/24  0711 07/05/24  0442 07/05/24  0353 07/05/24  0212   PH, ARTERIAL pH units 7.539* 7.392 7.281* 7.250* 7.204* 7.208*   PCO2, ARTERIAL mm Hg 49.9* 61.2* 78.4* 80.9* 94.8* 92.8*   PO2 ART mm Hg 66.3* 61.9* 106.7 76.6* 87.0 76.7*   O2 SATURATION ART % 94.6 90.2* 97.0 91.7* 93.1* 90.5*   BASE EXCESS ART mmol/L 17.4* 9.3* 6.5* 4.5* 4.6* 4.4*     XR Chest 1 View    Result Date: 7/11/2024  Impression: Similar position of right-sided chest tube. No discrete pneumothorax. Similar small bilateral pleural effusions and bilateral airspace opacities, left greater than right. Electronically Signed: Manan Jefferson MD  7/11/2024 7:48 AM EDT  Workstation ID: [id number]    XR Chest 1 View    Result Date: 7/10/2024  Impression: 1. Stable chest tube overlying the right lung base. No pneumothorax. 2. Persistent bibasilar airspace disease and small bilateral pleural effusions. 3. Stable cardiomegaly. Electronically Signed: Mina Wheeler MD  7/10/2024 7:11 AM EDT  Workstation ID: [id number]    XR Chest 1 View    Result Date: 7/9/2024  Impression: 1.Bilateral airspace disease again noted. 2.Cardiomegaly 3.Pigtail catheter chest tube  right lung base. Electronically Signed: Bran Bernal MD  7/9/2024 11:37 AM EDT  Workstation ID: [id number]    XR Chest 1 View    Result Date: 7/6/2024  Impression: Successful placement of a right chest tube for right-sided hydropneumothorax, which demonstrates decreased/trace fluid component and similar/small gas component. Electronically Signed: Manan Jefferson MD  7/6/2024 2:35 PM EDT  Workstation ID: [id number]    CT Chest Without Contrast Diagnostic    Result Date: 7/6/2024  Impression: Moderate right-sided hydropneumothorax as seen on same-day radiograph. Adjacent partial collapse of the right lung with multifocal peripheral bronchial obstruction. There are dense airspace consolidations throughout the right lung, and findings are likely a combination of lung collapse and worsening infection. Persistent airspace opacities in the left lung consistent with infection, slightly worsened from prior CT. Small left pleural effusion. Prominent four-chamber cardiomegaly. Pulmonary artery enlargement consistent with pulmonary hypertension. Trace perihepatic ascites. Persistent bilateral flank and right chest wall edema. Electronically Signed: Javon Mortensen MD  7/6/2024 11:19 AM EDT  Workstation ID: [id number]    XR Chest 1 View    Result Date: 7/6/2024  Impression: 1. There is a definite small right-sided pneumothorax. The right lung is partially collapsed and consolidated. A small to moderate right pleural effusion is present indicating a hydropneumothorax. 2. Unchanged consolidation in the left lung with a small pleural effusion. 3. Cardiomegaly. 4.The abnormal results were discussed with the nurse (Dianne) by telephone. The referring clinician will be contacted. Electronically Signed: Jean Claude Bobby MD  7/6/2024 9:56 AM EDT  Workstation ID: [id number]    XR Chest 1 View    Result Date: 7/6/2024  Impression: 1. Questionable right-sided pneumothorax. I would recommend a repeat chest x-ray as the patient is rotated. 2. Persistent patchy  consolidation in the left mid and lower lung zones suspicious for pneumonia. There is a small right pleural effusion. 3. Abnormal consolidation in the right lung with a small to moderate pleural effusion. 4. Cardiomegaly. 5. The abnormal results were discussed with the nurse in the CVICU (Dianne) by telephone and the referring clinician will be contacted. Electronically Signed: Jean Claude Bobby MD  7/6/2024 9:27 AM EDT  Workstation ID: [id number]    XR Chest 1 View    Result Date: 7/5/2024  Impression: Marked cardiomegaly with bilateral patchy airspace disease and pleural effusions. Findings are similar to the previous exam. Electronically Signed: Amirah Coto MD  7/5/2024 9:14 AM EDT  Workstation ID: [id number]    XR Abdomen KUB    Result Date: 7/4/2024  Impression: Feeding tube is in the stomach. Electronically Signed: Obed Sidhu MD  7/4/2024 9:59 PM EDT  Workstation ID: [id number]    US Renal Bilateral    Result Date: 7/4/2024  Impression: No acute process nor significant abnormality identified. Electronically Signed: Beto Kaur MD  7/4/2024 4:14 PM EDT  Workstation ID: [id number]    XR Chest 1 View    Result Date: 7/4/2024  Impression: Cardiomegaly with pulmonary vascular congestion and bilateral pleural effusions. Bibasilar airspace disease could be atelectasis or pneumonia. Electronically Signed: Obed Sidhu MD  7/4/2024 2:34 AM EDT  Workstation ID: [id number]     Results for orders placed during the hospital encounter of 06/30/24    Adult Transthoracic Echo Complete w/ Color, Spectral and Contrast if Necessary Per Protocol    Interpretation Summary    Left ventricular ejection fraction appears to be 31 - 35%.    Left ventricular diastolic dysfunction is noted.    The left atrial cavity is dilated.    Moderate aortic valve stenosis is present. Mean/peak gradient of 14/24 mmHg.  Dimensionless index 0.36    Moderate to severe mitral valve regurgitation is present.    Estimated right ventricular systolic pressure from  tricuspid regurgitation is normal (<35 mmHg).        Assessment & Plan   Assessment and Plan:         Atrial fibrillation with RVR    Primary hypertension    Morbid obesity with BMI of 40.0-44.9, adult    Right bundle branch block    Acute deep vein thrombosis (DVT) of both lower extremities    ARABELLA (acute kidney injury)    Acute hyperkalemia    Sepsis    Acute UTI    Acute systolic congestive heart failure    Rhinovirus infection    Multifocal pneumonia    Chronic respiratory failure with hypoxia, on home O2 therapy    Skin ulcer of right great toe    Skin ulcer of left great toe    SEDRICK (obstructive sleep apnea)    Aortic stenosis    Mitral regurgitation    Acute HFrEF (heart failure with reduced ejection fraction)    COPD (chronic obstructive pulmonary disease)    ASSESSMENT:  Acute kidney injury, with baseline creatinine roughly 0.8-1.0mg/dL  Hyperkalemia  A-fib with RVR  Acute on chronic heart failure, with EF 31-35%, diastolic dysfunction, moderate AS and moderate to severe MR as per echo from 6/30/24  Bilateral DVT  Severe sepsis/LLE cellulitis, acute cystitis  Multifocal pneumonia    PLAN :     Renal function unchanged/stable today from prior reading. Suspect she may initially have had some acute cardiorenal component, now most likely has some ATN secondary to sepsis, elevated Vanc level (was 22.4 on 7/3), and some prolonged prerenal state with hemodynamic insults, arrhythmias. Had no significant proteinuria  Bicarbonate continues to trend up, initially thought may be due to underlying respiratory acidosis, ordered ABG this morning, consistent with metabolic and respiratory alkalosis. Will decrease her Bumex to 1mg PO daily. Continue current dose of Aldactone for now  Electrolytes stable overall. Sodium trending upward, should improve with decrease in Bumex  Volume remains expanded, diuretics as above   Blood pressure reviewed, stable. Continue to monitor   Antibiotics per ID, just finished 7 day course of  Keflex and Doxy. Previously was on Vanc. Now not on any abx  Daily labs   We will continue to follow       [facility]  7/11/2024  13:27 EDT

## 2024-07-11 NOTE — PROGRESS NOTES
Hematology/Oncology Inpatient Progress Note    PATIENT NAME: Ban Brennan  : 1955  MRN: [id number]    CHIEF COMPLAINT: Shortness of breath    HISTORY OF PRESENT ILLNESS:    Ban Brennan is a 69 y.o. female who presented to [facility] on 2024 with complaints of shortness of breath.  Past medical history of hypertension, heart failure, chronic bilateral lower extremity edema, remote history of pulmonary embolism treated with warfarin.  The patient was brought to the ED via EMS.  The patient's daughter reported that the patient does not normally go to the doctor and had not been in the hospital for years.  She was unsure of her full medical history but stated that her legs have been swollen for a long time and that she had not been mobile for approximately 1 year.  She reported that a home health nurse comes to the house and wraps her mother's legs twice a week.  EMS was called due to the worsening shortness of breath and the patient was found to have an EKG showing a wide-complex tachycardia with a heart rate in the 190s.  She was given amiodarone by EMS with rate improvement to the 160s.  Follow-up EKG in the ED at [facility] showed atrial fibrillation with RVR with a heart rate of 145.  Chest x-ray showed fluid overload with bilateral effusions.  The patient had a elevated white blood cell count of 16.8 and 4+ bacteria in her urine.  She was also noted to be hyperkalemic with a potassium of 6.5.  She was diagnosed with cellulitis and a urinary tract infection and initiated on IV antibiotics with cefepime and vancomycin.  She was admitted for further evaluation and treatment.     2024 bilateral venous Doppler ultrasound  -Acute right lower extremity DVT in the common femoral, proximal femoral, mid femoral, distal femoral, and popliteal  -Acute right lower extremity superficial thrombophlebitis in the saphenofemoral junction and great saphenous  -Acute left lower extremity DVT in  the proximal femoral, mid femoral, distal femoral, popliteal, and posterior tibial     7/1/2020 four 2D echocardiogram  -Left ventricular ejection fraction 31-35%  -Left ventricular diastolic dysfunction  -Left atrial cavity dilated  -Moderate aortic valve stenosis  -Moderate to severe mitral valve regurgitation  -Estimated right ventricular systolic pressure from tricuspid regurgitation is normal     7/1/2024 CT chest PE protocol  -No evidence for pulmonary embolus  -Bibasilar airspace disease with right middle lung opacity compatible likely multifocal pneumonia with bilateral pleural effusions  -Cardiomegaly     7/1/2024 CT of the abdomen and pelvis without contrast  -Moderately large right pleural effusion and small left pleural effusion; bibasilar infiltrates suggest atelectasis although associated pneumonia not excluded  -Severe cardiomegaly  -Bilateral flank edema  -Probable fibroid uterus     07/01/24  Hematology/Oncology was consulted for bilateral lower extremity DVT.     He/She  has a past medical history of Bilateral leg edema, Chronic respiratory failure with hypoxia, on home O2 therapy (07/01/2024), COPD (chronic obstructive pulmonary disease), Morbid obesity with BMI of 40.0-44.9, adult (11/08/2010), Primary hypertension (03/01/2017), Pulmonary embolism, and Sleep apnea.     PCP: Rut Pierce APRN  Subjective   Patient seen today. No new complaints today, being managed for Afib and Right sided hydropneumothorax s/p chest tube placement.      ROS:  Review of Systems   Constitutional:  Positive for fatigue.   HENT: Negative.     Eyes: Negative.    Respiratory: Negative.     Cardiovascular: Negative.    Gastrointestinal: Negative.    Endocrine: Negative.    Genitourinary: Negative.    Musculoskeletal:  Positive for arthralgias, back pain, gait problem and myalgias.   Skin:  Positive for wound (Clean decubitus ulcers noted on medial aspect of bilateral feet).   Allergic/Immunologic: Negative.   "  Neurological:  Positive for weakness (Bilateral leg weakness).   Hematological: Negative.    Psychiatric/Behavioral: Negative.          MEDICATIONS:    Scheduled Meds:  amiodarone, 200 mg, Oral, Q12H   Followed by  [START ON 7/23/2024] amiodarone, 200 mg, Oral, Daily  apixaban, 10 mg, Oral, BID   Followed by  [START ON 7/14/2024] apixaban, 5 mg, Oral, BID  budesonide, 0.5 mg, Nebulization, BID - RT  bumetanide, 1 mg, Oral, BID  hydrocortisone, 25 mg, Rectal, BID  insulin lispro, 2-7 Units, Subcutaneous, 4x Daily With Meals & Nightly  ipratropium-albuterol, 3 mL, Nebulization, 4x Daily - RT  lidocaine, 20 mL, Infiltration, Once  metoprolol succinate XL, 25 mg, Oral, Q12H  miconazole, 1 Application, Topical, Q12H  pantoprazole, 40 mg, Oral, BID AC  povidone-iodine, , Topical, Daily  sodium chloride, 10 mL, Intravenous, Q12H  spironolactone, 25 mg, Oral, Daily       Continuous Infusions:        PRN Meds:    acetaminophen **OR** acetaminophen    Calcium Replacement - Follow Nurse / BPA Driven Protocol    dextrose    dextrose    glucagon (human recombinant)    ipratropium-albuterol    Magnesium Standard Dose Replacement - Follow Nurse / BPA Driven Protocol    nitroglycerin    ondansetron ODT **OR** ondansetron    Phosphorus Replacement - Follow Nurse / BPA Driven Protocol    Potassium Replacement - Follow Nurse / BPA Driven Protocol    [COMPLETED] Insert Peripheral IV **AND** sodium chloride    sodium chloride    sodium chloride     ALLERGIES:  No Known Allergies    Objective    VITALS:   /62 (BP Location: Right arm, Patient Position: Lying)   Pulse 111   Temp 98.3 °F (36.8 °C) (Oral)   Resp 28   Ht 149.9 cm (59\")   Wt 85.9 kg (189 lb 6 oz)   SpO2 92%   BMI 38.25 kg/m²     PHYSICAL EXAM: (performed by MD)  Physical Exam  Constitutional:       Appearance: She is normal weight. She is ill-appearing.   HENT:      Head: Normocephalic and atraumatic.      Right Ear: External ear normal.      Left Ear: External " ear normal.      Nose: Nose normal.      Mouth/Throat:      Mouth: Mucous membranes are moist.      Pharynx: Oropharynx is clear.   Eyes:      Extraocular Movements: Extraocular movements intact.      Conjunctiva/sclera: Conjunctivae normal.      Pupils: Pupils are equal, round, and reactive to light.   Cardiovascular:      Rate and Rhythm: Normal rate.      Pulses: Normal pulses.   Pulmonary:      Effort: Pulmonary effort is normal.   Abdominal:      General: Abdomen is flat.      Palpations: Abdomen is soft.   Musculoskeletal:         General: Normal range of motion.      Cervical back: Normal range of motion and neck supple.      Right lower leg: Edema present.      Left lower leg: Edema present.   Skin:     General: Skin is warm.   Neurological:      Mental Status: She is alert.   Psychiatric:         Mood and Affect: Mood is depressed.         Speech: Speech is delayed.         Behavior: Behavior normal.         Thought Content: Thought content normal.         Judgment: Judgment normal.           RECENT LABS:  Lab Results (last 24 hours)       Procedure Component Value Units Date/Time    POC Glucose Once [[id number]]  (Abnormal) Collected: 07/10/24 2141    Specimen: Blood Updated: 07/10/24 2142     Glucose 107 mg/dL      Comment: Serial Number: [id number]:  [id number]       POC Glucose Once [[id number]]  (Abnormal) Collected: 07/10/24 1639    Specimen: Blood Updated: 07/10/24 1642     Glucose 142 mg/dL      Comment: Serial Number: [id number]:  [id number]       POC Glucose 4x Daily Before Meals & at Bedtime [[id number]]  (Abnormal) Collected: 07/10/24 1150    Specimen: Blood Updated: 07/10/24 1152     Glucose 129 mg/dL      Comment: Serial Number: [id number]:  [id number]       Body Fluid Culture - Body Fluid, Pleural Cavity [[id number]] Collected: 07/06/24 1544    Specimen: Body Fluid from Pleural Cavity Updated: 07/10/24 1000     Body Fluid Culture No growth at 3 days     Gram Stain Many (4+) WBCs  per low power field      No organisms seen    POC Glucose 4x Daily Before Meals & at Bedtime [[id number]]  (Normal) Collected: 07/10/24 0720    Specimen: Blood Updated: 07/10/24 0722     Glucose 100 mg/dL      Comment: Serial Number: [id number]:  [id number]       Occult Blood, Fecal By Immunoassay - Stool, Per Rectum [[id number]]  (Abnormal) Collected: 07/10/24 0525    Specimen: Stool from Per Rectum Updated: 07/10/24 0604     Occult Blood, Fecal by Immunoassay Positive    Magnesium [[id number]]  (Normal) Collected: 07/10/24 0220    Specimen: Blood from Arm, Right Updated: 07/10/24 0305     Magnesium 2.3 mg/dL     Comprehensive Metabolic Panel [[id number]]  (Abnormal) Collected: 07/10/24 0220    Specimen: Blood from Arm, Right Updated: 07/10/24 0305     Glucose 90 mg/dL      BUN 54 mg/dL      Creatinine 1.00 mg/dL      Sodium 139 mmol/L      Potassium 4.5 mmol/L      Comment: Slight hemolysis detected by analyzer. Result may be falsely elevated.        Chloride 96 mmol/L      CO2 37.9 mmol/L      Calcium 8.8 mg/dL      Total Protein 5.2 g/dL      Albumin 2.0 g/dL      ALT (SGPT) 17 U/L      AST (SGOT) 46 U/L      Comment: Slight hemolysis detected by analyzer. Result may be falsely elevated.        Alkaline Phosphatase 80 U/L      Total Bilirubin 1.9 mg/dL      Globulin 3.2 gm/dL      A/G Ratio 0.6 g/dL      BUN/Creatinine Ratio 54.0     Anion Gap 5.1 mmol/L      eGFR 61.1 mL/min/1.73     Narrative:      GFR Normal >60  Chronic Kidney Disease <60  Kidney Failure <15      Phosphorus [[id number]]  (Normal) Collected: 07/10/24 0220    Specimen: Blood from Arm, Right Updated: 07/10/24 0250     Phosphorus 2.5 mg/dL     CBC & Differential [[id number]]  (Abnormal) Collected: 07/10/24 0220    Specimen: Blood from Arm, Right Updated: 07/10/24 0233    Narrative:      The following orders were created for panel order CBC & Differential.  Procedure                               Abnormality         Status                      ---------                               -----------         ------                     CBC Auto Differential[[id number]]        Abnormal            Final result               Scan Slide[[id number]]                                                                    Please view results for these tests on the individual orders.    CBC Auto Differential [[id number]]  (Abnormal) Collected: 07/10/24 0220    Specimen: Blood from Arm, Right Updated: 07/10/24 0233     WBC 11.11 10*3/mm3      RBC 4.13 10*6/mm3      Hemoglobin 12.5 g/dL      Hematocrit 38.6 %      MCV 93.5 fL      MCH 30.3 pg      MCHC 32.4 g/dL      RDW 16.5 %      RDW-SD 53.6 fl      MPV 11.1 fL      Platelets 85 10*3/mm3      Neutrophil % 83.3 %      Lymphocyte % 7.7 %      Monocyte % 7.3 %      Eosinophil % 1.1 %      Basophil % 0.1 %      Immature Grans % 0.5 %      Neutrophils, Absolute 9.27 10*3/mm3      Lymphocytes, Absolute 0.85 10*3/mm3      Monocytes, Absolute 0.81 10*3/mm3      Eosinophils, Absolute 0.12 10*3/mm3      Basophils, Absolute 0.01 10*3/mm3      Immature Grans, Absolute 0.05 10*3/mm3      nRBC 0.0 /100 WBC             PENDING RESULTS: PTG, FVL, LDH, Retic, Hapto    IMAGING REVIEWED:  XR Chest 1 View    Result Date: 7/10/2024  Impression: 1. Stable chest tube overlying the right lung base. No pneumothorax. 2. Persistent bibasilar airspace disease and small bilateral pleural effusions. 3. Stable cardiomegaly. Electronically Signed: Mina Wheeler MD  7/10/2024 7:11 AM EDT  Workstation ID: [id number]    XR Chest 1 View    Result Date: 7/9/2024  Impression: 1.Bilateral airspace disease again noted. 2.Cardiomegaly 3.Pigtail catheter chest tube right lung base. Electronically Signed: Bran Bernal MD  7/9/2024 11:37 AM EDT  Workstation ID: [id number]     Assessment & Plan     Bilateral lower extremity DVT:  Remote history of pulmonary embolism  -Extensive acute right and left lower extremity DVT noted on presentation  -patient's chronic deconditioning  and immobility may have been the risk factor for DVT.   -Negative CTA Chest for PE.  -Reportedly treated with warfarin for prior history of PE  -On Lovenox 1 mg/kg every 12 hours. This can be continued during hospitalization, patient can be transitioned to oral anticoagulation closer to discharge.  Will recommend Eliquis 10mg BID X 1 week followed by 5mg BID thereafter.  -Will need lifelong anticoagulation in my view due to persistent risk factors and extensive DVT as well as history of prior pulmonary embolism.  -factor V Leiden and prothrombin gene mutation reported negative. Will defer remainder of thrombophilia workup.   Continue therapeutic anticoagulation with Eliquis.       Uterine mass  -Transvaginal ultrasound suggestive of presence of a large subserosal uterine fundal fibroid.   -CT abdomen/pelvis showed presence of a lobulated mass protruding   from the uterine fundus measuring approximately 8.4 x 5.9 cm  -this is concerning for fibroid vs malignancy.  Will recommend GYN Evaluation for the same.  -No vaginal bleeding reported.     Thrombocytopenia  - 93,000, 119,000 at admission  -Baseline from January 2020 138,000  -Acute drop likely secondary to sepsis, rhinovirus  -Continue to monitor closely given initiation of anticoagulation  -Hemolysis labs reported negative.   -Counts are stable. Contineu to monitor.     Sepsis  -UA suggestive of Acute cystitis, Urine culture consistent with GNR UTI.  -Respiratory panel positive for Rhinovirus.  -On antibiotic therapy with IV vancomycin and cefepime, management per primary team     Atrial fibrillation with RVR/paroxysmal atrial fibrillation  Acute on chronic systolic heart failure with diastolic dysfunction,   moderate aortic stenosis, severe mitral valve regurgitation  -On amiodarone drip, diuresis.  Management per cardiology.  Patient  on Lovenox for anticoagulation, Likely candidate for lifelong anticoagulation given Afib and extensive DVT.     Acute kidney  injury/hyperkalemia  -Creatinine 1.33, EGFR 43.4.  Continues to improve EGFR 52 point  -Potassium improved to 5.4 from 6.5.  Now normalized at 3.5.  -Management per primary team      Pleural Effusion:  Right sided hydropneumothorax:  Likely secondary to CHF and suspected pneumonia.  -patient is status post Chest tube placement.  -management per primary.    Patient was offered a followup appt at [facility] in 3-4 weeks after discharge. She Is agreeable to It.     Thanks for this consult, Please reach out for any further clinical questions/concerns.

## 2024-07-11 NOTE — PROGRESS NOTES
"    PROGRESS NOTE      Patient Name: Ban Brennan  : 1955  MRN: [id number]  Primary Care Physician: Rut Pierce APRN  Date of admission: 2024    Patient Care Team:  Rut Pierce APRN as PCP - General (Nurse Practitioner)  Austin Brooks MD as Cardiologist (Cardiology)        Reason for Follow up     Acute kidney injury, hyperkalemia      Subjective     Seen and examined, awake, alert, making urine, no distress  Renal function improved, made 2L urine     Review of systems:    ROS was otherwise negative except as mentioned in the [place].       Personal History:     Past Medical History:   Past Medical History:   Diagnosis Date    Bilateral leg edema     Chronic respiratory failure with hypoxia, on home O2 therapy 2024    COPD (chronic obstructive pulmonary disease)     Morbid obesity with BMI of 40.0-44.9, adult 2010    Primary hypertension 2017    Pulmonary embolism     \"many years ago, was on warfarin\"    Sleep apnea        Surgical History:    History reviewed. No pertinent surgical history.    Family History: family history is not on file. Otherwise pertinent FHx was reviewed and unremarkable.     Social History:  reports that she has never smoked. She has never used smokeless tobacco. She reports that she does not drink alcohol and does not use drugs.    Medications:  Prior to Admission medications    Medication Sig Start Date End Date Taking? Authorizing Provider   Allergy Relief 180 MG tablet Take 1 tablet by mouth Daily. 24  Yes Chadwick Johnson MD   bumetanide (BUMEX) 1 MG tablet Take 1 tablet by mouth 3 times a day. 24  Yes Chadwick Johnson MD   docusate sodium (COLACE) 100 MG capsule Take 1 capsule by mouth Every 12 (Twelve) Hours. 24  Yes Chadwick Johnson MD   furosemide (LASIX) 20 MG tablet Take 1 tablet by mouth Daily.   Yes Chadwick Johnson MD   HYDROcodone-acetaminophen (NORCO)  MG per tablet Take 1 tablet by " mouth 3 times a day. 5/30/24  Yes Chadwick Johnson MD   lisinopril (PRINIVIL,ZESTRIL) 30 MG tablet Take 1 tablet by mouth Daily. 3/18/24  Yes Chadwick Johnson MD   meloxicam (MOBIC) 7.5 MG tablet Take 1 tablet by mouth Daily As Needed. 3/18/24  Yes Chadwick Johnson MD   metoprolol tartrate (LOPRESSOR) 50 MG tablet Take 1 tablet by mouth Daily.   Yes Chadwick Johnson MD   montelukast (SINGULAIR) 10 MG tablet Take 1 tablet by mouth every night at bedtime.   Yes Chadwick Johnson MD   omeprazole (priLOSEC) 20 MG capsule Take 1 capsule by mouth Daily. 3/18/24  Yes Provider, Historical, MD   oxybutynin XL (DITROPAN-XL) 10 MG 24 hr tablet Take 2 tablets by mouth Daily. 3/18/24  Yes Chadwick Johnson MD   potassium chloride (MICRO-K) 10 MEQ CR capsule Take 1 capsule by mouth Every 12 (Twelve) Hours. 3/18/24  Yes Chadwick Johnson MD   vitamin D (ERGOCALCIFEROL) 1.25 MG (51688 UT) capsule capsule Take 1 capsule by mouth 2 (Two) Times a Week. Monday and Thursday. 5/30/24  Yes Chadwick Johnson MD     Scheduled Meds:amiodarone, 200 mg, Oral, Q12H   Followed by  [START ON 7/23/2024] amiodarone, 200 mg, Oral, Daily  apixaban, 10 mg, Oral, BID   Followed by  [START ON 7/14/2024] apixaban, 5 mg, Oral, BID  budesonide, 0.5 mg, Nebulization, BID - RT  bumetanide, 1 mg, Oral, BID  hydrocortisone, 25 mg, Rectal, BID  insulin lispro, 2-7 Units, Subcutaneous, 4x Daily With Meals & Nightly  ipratropium-albuterol, 3 mL, Nebulization, 4x Daily - RT  lidocaine, 20 mL, Infiltration, Once  metoprolol succinate XL, 25 mg, Oral, Q12H  miconazole, 1 Application, Topical, Q12H  pantoprazole, 40 mg, Oral, BID AC  povidone-iodine, , Topical, Daily  sodium chloride, 10 mL, Intravenous, Q12H  spironolactone, 25 mg, Oral, Daily      Continuous Infusions:     PRN Meds:  acetaminophen **OR** acetaminophen    Calcium Replacement - Follow Nurse / BPA Driven Protocol    dextrose    dextrose    glucagon (human  recombinant)    ipratropium-albuterol    Magnesium Standard Dose Replacement - Follow Nurse / BPA Driven Protocol    nitroglycerin    ondansetron ODT **OR** ondansetron    Phosphorus Replacement - Follow Nurse / BPA Driven Protocol    Potassium Replacement - Follow Nurse / BPA Driven Protocol    [COMPLETED] Insert Peripheral IV **AND** sodium chloride    sodium chloride    sodium chloride  Allergies:  No Known Allergies    Objective   Exam:     Vital Signs  Temp:  [97 °F (36.1 °C)-98.3 °F (36.8 °C)] 98 °F (36.7 °C)  Heart Rate:  [] 101  Resp:  [17-28] 18  BP: ()/(47-66) 123/66  SpO2:  [92 %-99 %] 97 %  on  Flow (L/min):  [2-3] 2;   Device (Oxygen Therapy): nasal cannula  Body mass index is 37.09 kg/m².  EXAM  General:  69 yr old  female, some respiratory distress  Head:      Normocephalic and atraumatic.    Eyes:      PERRL/EOM intact, conjunctivae and sclerae clear without nystagmus.    Neck:      No masses, thyromegaly,  trachea central   Lungs:    Clear bilaterally to auscultation.    Heart:      Regular rate and rhythm, no murmur no gallop  Abd:        Soft, nontender, not distended, bowel sounds positive, no shifting dullness.  Msk:        No deformity or scoliosis noted of thoracic or lumbar spine.    Pulses:   Pulses normal in all 4 extremities.    Extremities:        No cyanosis or clubbing--+ + edema.    Neuro:    Lethargic  Skin:       Intact without lesions or rashes.          Results Review:  I have personally reviewed most recent Data :  BMP @LABRCNT(creatinine:10)  CBC    Results from last 7 days   Lab Units 07/11/24  0437 07/10/24  0220 07/09/24  2051 07/09/24  0851 07/09/24  0256 07/08/24  0446 07/07/24  0506 07/06/24  0354 07/05/24  0918   WBC 10*3/mm3 11.69* 11.11*  --   --  10.66 11.66* 16.38* 15.29* 12.93*   HEMOGLOBIN g/dL 12.4 12.5 12.6 13.1 12.6 13.3 14.2 13.8 13.8   PLATELETS 10*3/mm3 93* 85*  --   --  87* 100* 130* 128* 115*     CMP   Results from last 7 days   Lab Units  07/11/24  0437 07/10/24  0220 07/09/24  1507 07/09/24  0256 07/08/24  0446 07/07/24  0506 07/06/24  1608 07/06/24  0354   SODIUM mmol/L 142 139  --  145 145 143 140 139   POTASSIUM mmol/L 4.5 4.5 3.9 3.6 3.0* 3.9 3.3* 3.7   CHLORIDE mmol/L 95* 96*  --  98 97* 98 96* 96*   CO2 mmol/L 41.6* 37.9*  --  39.5* 38.0* 35.7* 33.9* 34.0*   BUN mg/dL 46* 54*  --  68* 72* 74* 72* 70*   CREATININE mg/dL 1.08* 1.00  --  1.08* 1.19* 1.37* 1.41* 1.56*   GLUCOSE mg/dL 94 90  --  117* 98 102* 111* 133*   ALBUMIN g/dL 2.1* 2.0*  --  2.2* 2.2* 2.5* 2.5* 2.5*   BILIRUBIN mg/dL 2.4* 1.9*  --  1.7* 1.7* 1.8* 1.6* 1.6*   ALK PHOS U/L 85 80  --  84 96 125* 110 117   AST (SGOT) U/L 48* 46*  --  37* 36* 30 31 27   ALT (SGPT) U/L 19 17  --  17 15 17 17 14     ABG    Results from last 7 days   Lab Units 07/06/24  1150 07/05/24  0711 07/05/24  0442 07/05/24  0353 07/05/24  0212   PH, ARTERIAL pH units 7.392 7.281* 7.250* 7.204* 7.208*   PCO2, ARTERIAL mm Hg 61.2* 78.4* 80.9* 94.8* 92.8*   PO2 ART mm Hg 61.9* 106.7 76.6* 87.0 76.7*   O2 SATURATION ART % 90.2* 97.0 91.7* 93.1* 90.5*   BASE EXCESS ART mmol/L 9.3* 6.5* 4.5* 4.6* 4.4*     XR Chest 1 View    Result Date: 7/11/2024  Impression: Similar position of right-sided chest tube. No discrete pneumothorax. Similar small bilateral pleural effusions and bilateral airspace opacities, left greater than right. Electronically Signed: Manan Jefferson MD  7/11/2024 7:48 AM EDT  Workstation ID: [id number]    XR Chest 1 View    Result Date: 7/10/2024  Impression: 1. Stable chest tube overlying the right lung base. No pneumothorax. 2. Persistent bibasilar airspace disease and small bilateral pleural effusions. 3. Stable cardiomegaly. Electronically Signed: Mina Wheeler MD  7/10/2024 7:11 AM EDT  Workstation ID: [id number]    XR Chest 1 View    Result Date: 7/9/2024  Impression: 1.Bilateral airspace disease again noted. 2.Cardiomegaly 3.Pigtail catheter chest tube right lung base. Electronically Signed: Bran  MD Dolores  7/9/2024 11:37 AM EDT  Workstation ID: [id number]    XR Chest 1 View    Result Date: 7/6/2024  Impression: Successful placement of a right chest tube for right-sided hydropneumothorax, which demonstrates decreased/trace fluid component and similar/small gas component. Electronically Signed: Manan Jefferson MD  7/6/2024 2:35 PM EDT  Workstation ID: [id number]    CT Chest Without Contrast Diagnostic    Result Date: 7/6/2024  Impression: Moderate right-sided hydropneumothorax as seen on same-day radiograph. Adjacent partial collapse of the right lung with multifocal peripheral bronchial obstruction. There are dense airspace consolidations throughout the right lung, and findings are likely a combination of lung collapse and worsening infection. Persistent airspace opacities in the left lung consistent with infection, slightly worsened from prior CT. Small left pleural effusion. Prominent four-chamber cardiomegaly. Pulmonary artery enlargement consistent with pulmonary hypertension. Trace perihepatic ascites. Persistent bilateral flank and right chest wall edema. Electronically Signed: Javon Mortensen MD  7/6/2024 11:19 AM EDT  Workstation ID: [id number]    XR Chest 1 View    Result Date: 7/6/2024  Impression: 1. There is a definite small right-sided pneumothorax. The right lung is partially collapsed and consolidated. A small to moderate right pleural effusion is present indicating a hydropneumothorax. 2. Unchanged consolidation in the left lung with a small pleural effusion. 3. Cardiomegaly. 4.The abnormal results were discussed with the nurse (Dianne) by telephone. The referring clinician will be contacted. Electronically Signed: Jean Claude Bobby MD  7/6/2024 9:56 AM EDT  Workstation ID: [id number]    XR Chest 1 View    Result Date: 7/6/2024  Impression: 1. Questionable right-sided pneumothorax. I would recommend a repeat chest x-ray as the patient is rotated. 2. Persistent patchy consolidation in the left mid and lower lung  zones suspicious for pneumonia. There is a small right pleural effusion. 3. Abnormal consolidation in the right lung with a small to moderate pleural effusion. 4. Cardiomegaly. 5. The abnormal results were discussed with the nurse in the CVICU (Dianne) by telephone and the referring clinician will be contacted. Electronically Signed: Jean Claude Bobby MD  7/6/2024 9:27 AM EDT  Workstation ID: [id number]    XR Chest 1 View    Result Date: 7/5/2024  Impression: Marked cardiomegaly with bilateral patchy airspace disease and pleural effusions. Findings are similar to the previous exam. Electronically Signed: Amirah Coto MD  7/5/2024 9:14 AM EDT  Workstation ID: [id number]    XR Abdomen KUB    Result Date: 7/4/2024  Impression: Feeding tube is in the stomach. Electronically Signed: Obed Sidhu MD  7/4/2024 9:59 PM EDT  Workstation ID: [id number]    US Renal Bilateral    Result Date: 7/4/2024  Impression: No acute process nor significant abnormality identified. Electronically Signed: Beto Kaur MD  7/4/2024 4:14 PM EDT  Workstation ID: [id number]    XR Chest 1 View    Result Date: 7/4/2024  Impression: Cardiomegaly with pulmonary vascular congestion and bilateral pleural effusions. Bibasilar airspace disease could be atelectasis or pneumonia. Electronically Signed: Obed Sidhu MD  7/4/2024 2:34 AM EDT  Workstation ID: [id number]     Results for orders placed during the hospital encounter of 06/30/24    Adult Transthoracic Echo Complete w/ Color, Spectral and Contrast if Necessary Per Protocol    Interpretation Summary    Left ventricular ejection fraction appears to be 31 - 35%.    Left ventricular diastolic dysfunction is noted.    The left atrial cavity is dilated.    Moderate aortic valve stenosis is present. Mean/peak gradient of 14/24 mmHg.  Dimensionless index 0.36    Moderate to severe mitral valve regurgitation is present.    Estimated right ventricular systolic pressure from tricuspid regurgitation is normal (<35  mmHg).        Assessment & Plan   Assessment and Plan:         Atrial fibrillation with RVR    Primary hypertension    Morbid obesity with BMI of 40.0-44.9, adult    Right bundle branch block    Acute deep vein thrombosis (DVT) of both lower extremities    ARABELLA (acute kidney injury)    Acute hyperkalemia    Sepsis    Acute UTI    Acute systolic congestive heart failure    Rhinovirus infection    Multifocal pneumonia    Chronic respiratory failure with hypoxia, on home O2 therapy    Skin ulcer of right great toe    Skin ulcer of left great toe    SEDRICK (obstructive sleep apnea)    Aortic stenosis    Mitral regurgitation    Acute HFrEF (heart failure with reduced ejection fraction)    COPD (chronic obstructive pulmonary disease)    ASSESSMENT:  Acute kidney injury, with baseline creatinine roughly 0.8-1.0mg/dL  Hyperkalemia  A-fib with RVR  Acute on chronic heart failure, with EF 31-35%, diastolic dysfunction, moderate AS and moderate to severe MR as per echo from 6/30/24  Bilateral DVT  Severe sepsis/LLE cellulitis, acute cystitis  Multifocal pneumonia    PLAN :     Renal function improved today. Suspect she may initially have had some acute cardiorenal component, now most likely has some ATN secondary to sepsis, elevated Vanc level (was 22.4 on 7/3), and some prolonged prerenal state with hemodynamic insults, arrhythmias.  Creatinine continues to improve at this time down to 1.08   Increased bicarbonate because of the underlying respiratory acidosis  Potassium level is slightly better  Creatinine level back to baseline  Phosphorus better   Patient has no significant proteinuria   continue Aldactone that help with hypokalemia and renal function is slightly better  Patient still has edema significantly high bicarbonate noted likely secondary to underlying respiratory acidosis  No need for additional midodrine  Antibiotics as per ID. Now off Vanc, agree. On PO Doxy and Cephalexin  Check labs daily. Will follow  closely      [facility]  7/11/2024  09:59 EDT

## 2024-07-11 NOTE — PROGRESS NOTES
[facility] MEDICINE SERVICE  DAILY PROGRESS NOTE    NAME: Ban Brennan  : 1955  MRN: [id number]      LOS: 11 days     PROVIDER OF SERVICE: Librado Villagomez MD    Chief Complaint: Atrial fibrillation with RVR    Subjective:     Interval History:  History taken from: patient chart RN  Breathing stable     Review of Systems:   Review of Systems  All negative except as above   Objective:     Vital Signs  Temp:  [97 °F (36.1 °C)-98.3 °F (36.8 °C)] 98 °F (36.7 °C)  Heart Rate:  [] 99  Resp:  [17-28] 17  BP: ()/(47-66) 123/66  Flow (L/min):  [2-3] 2   Body mass index is 37.09 kg/m².    Physical Exam  Physical Exam  AOx3 NAD  RRR S1-S2 audible  Lungs with fair air entry  Abdomen soft nontender nondistended  Scheduled Meds   amiodarone, 200 mg, Oral, Q12H   Followed by  [START ON 2024] amiodarone, 200 mg, Oral, Daily  apixaban, 10 mg, Oral, BID   Followed by  [START ON 2024] apixaban, 5 mg, Oral, BID  budesonide, 0.5 mg, Nebulization, BID - RT  bumetanide, 1 mg, Oral, BID  hydrocortisone, 25 mg, Rectal, BID  insulin lispro, 2-7 Units, Subcutaneous, 4x Daily With Meals & Nightly  ipratropium-albuterol, 3 mL, Nebulization, 4x Daily - RT  lidocaine, 20 mL, Infiltration, Once  metoprolol succinate XL, 25 mg, Oral, Q12H  miconazole, 1 Application, Topical, Q12H  pantoprazole, 40 mg, Oral, BID AC  povidone-iodine, , Topical, Daily  sodium chloride, 10 mL, Intravenous, Q12H  spironolactone, 25 mg, Oral, Daily       PRN Meds     acetaminophen **OR** acetaminophen    Calcium Replacement - Follow Nurse / BPA Driven Protocol    dextrose    dextrose    glucagon (human recombinant)    ipratropium-albuterol    Magnesium Standard Dose Replacement - Follow Nurse / BPA Driven Protocol    nitroglycerin    ondansetron ODT **OR** ondansetron    Phosphorus Replacement - Follow Nurse / BPA Driven Protocol    Potassium Replacement - Follow Nurse / BPA Driven Protocol    [COMPLETED] Insert Peripheral  IV **AND** sodium chloride    sodium chloride    sodium chloride   Infusions         Diagnostic Data    Results from last 7 days   Lab Units 07/11/24  0437   WBC 10*3/mm3 11.69*   HEMOGLOBIN g/dL 12.4   HEMATOCRIT % 39.1   PLATELETS 10*3/mm3 93*   GLUCOSE mg/dL 94   CREATININE mg/dL 1.08*   BUN mg/dL 46*   SODIUM mmol/L 142   POTASSIUM mmol/L 4.5   AST (SGOT) U/L 48*   ALT (SGPT) U/L 19   ALK PHOS U/L 85   BILIRUBIN mg/dL 2.4*   ANION GAP mmol/L 5.4       XR Chest 1 View    Result Date: 7/11/2024  Impression: Similar position of right-sided chest tube. No discrete pneumothorax. Similar small bilateral pleural effusions and bilateral airspace opacities, left greater than right. Electronically Signed: Manan Jefferson MD  7/11/2024 7:48 AM EDT  Workstation ID: [id number]    XR Chest 1 View    Result Date: 7/10/2024  Impression: 1. Stable chest tube overlying the right lung base. No pneumothorax. 2. Persistent bibasilar airspace disease and small bilateral pleural effusions. 3. Stable cardiomegaly. Electronically Signed: Mina Wheeler MD  7/10/2024 7:11 AM EDT  Workstation ID: [id number]       I reviewed the patient's new clinical results.  I reviewed the patient's new imaging results and agree with the interpretation.    Assessment/Plan:     Active and Resolved Problems  Active Hospital Problems    Diagnosis  POA    **Atrial fibrillation with RVR [I48.91]  Yes    COPD (chronic obstructive pulmonary disease) [J44.9]  Yes    Aortic stenosis [I35.0]  Yes    Mitral regurgitation [I34.0]  Yes    Acute HFrEF (heart failure with reduced ejection fraction) [I50.21]  Yes    Right bundle branch block [I45.10]  Yes    Acute deep vein thrombosis (DVT) of both lower extremities [I82.403]  Yes    ARABELLA (acute kidney injury) [N17.9]  Yes    Acute hyperkalemia [E87.5]  Yes    Sepsis [A41.9]  Yes    Acute UTI [N39.0]  Yes    Acute systolic congestive heart failure [I50.21]  Yes    Rhinovirus infection [B34.8]  Yes    Multifocal pneumonia  [J18.9]  Yes    Chronic respiratory failure with hypoxia, on home O2 therapy [J96.11, Z99.81]  Not Applicable    Skin ulcer of right great toe [L97.519]  Yes    Skin ulcer of left great toe [L97.529]  Yes    SEDRICK (obstructive sleep apnea) [G47.33]  Yes    Primary hypertension [I10]  Yes    Morbid obesity with BMI of 40.0-44.9, adult [E66.01, Z68.41]  Not Applicable      Resolved Hospital Problems   No resolved problems to display.       69-year-old female with history of hypertension, HFrEF, lower extremity lymphedema admitted to [facility] 6/30 progressive shortness of breath        #Bilateral lower extremity DVT  #History of reported PE  Seen by hematology appreciate recommendation.  Factor V and prothrombin gene mutation negative  Continue Eliquis 10 mg twice daily for 7 days followed by 5 mg twice daily  Outpatient follow-up with hematology     #Acute on chronic HFrEF  #A-fib with RVR.  New onset  #Severe MR  Cardiology following appreciate recommendations  Started on amiodarone GGT transitioned to p.o. tapering dose  TTE with EF 30 to 35%.  Defer ischemic workup to cardiology  Continue metoprolol 12.5 twice daily  Continue p.o. Bumex   Monitor I's and O's  Continue Aldactone  Lisinopril on hold given ARABELLA     #Right-sided hydropneumothorax  Chest tube placed in ICU 7/6  Pulmonary managing  Monitor out put   Daily CXR      #Severe sepsis  #UTI  #Left lower extremity cellulitis and wound  #Rhinovirus infection  #Multifocal pneumonia  #Chronic venous stasis lower extremity  Seen by infectious disease appreciate recommendations  Finished Keflex and doxycycline for 7 day course   Seen by podiatry no surgical intervention recommended  Midodrine has been weaned off monitor blood pressure     #Acute hypoxic respiratory failure  Multifactorial pneumonia CHF  Antibiotics and diuretics as above  Wean off supplemental oxygen as tolerated.     #DM2  A1c 6.14  Continue ISS lispro  POC ACHS     #ARABELLA  Suspect ATN in setting  of sepsis   Diuretics as above  BMP daily  Avoid nephrotoxic drugs   lisinopril on hold      #uterine mass  Large subserosal uterine fundal fibroid on pelvic ultrasound  GYN follow-up as outpatient      #hematochezia  GI on board noted plan for outpatient colonoscopy  Etiology suspected to be hemorrhoids  Topical steroids started  Monitor H&H     VTE Prophylaxis:  Pharmacologic VTE prophylaxis orders are present.         Code status is   Code Status and Medical Interventions:   Ordered at: 06/30/24 2120     Code Status (Patient has no pulse and is not breathing):    CPR (Attempt to Resuscitate)     Medical Interventions (Patient has pulse or is breathing):    Full Support       Plan for disposition:2 days    Time: 30 minutes    Signature: Electronically signed by Librado Villagomez MD, 07/11/24, 12:59 EDT.  [facility] Hospitalist Team

## 2024-07-12 ENCOUNTER — APPOINTMENT (OUTPATIENT)
Dept: GENERAL RADIOLOGY | Facility: HOSPITAL | Age: 69
End: 2024-07-12
Payer: MEDICARE

## 2024-07-12 LAB
ALBUMIN SERPL-MCNC: 1.9 G/DL (ref 3.5–5.2)
ALBUMIN/GLOB SERPL: 0.6 G/DL
ALP SERPL-CCNC: 94 U/L (ref 39–117)
ALT SERPL W P-5'-P-CCNC: 18 U/L (ref 1–33)
ANION GAP SERPL CALCULATED.3IONS-SCNC: 5.5 MMOL/L (ref 5–15)
AST SERPL-CCNC: 45 U/L (ref 1–32)
BASOPHILS # BLD AUTO: 0.01 10*3/MM3 (ref 0–0.2)
BASOPHILS NFR BLD AUTO: 0.1 % (ref 0–1.5)
BILIRUB SERPL-MCNC: 2.8 MG/DL (ref 0–1.2)
BUN SERPL-MCNC: 39 MG/DL (ref 8–23)
BUN/CREAT SERPL: 39.4 (ref 7–25)
CALCIUM SPEC-SCNC: 9 MG/DL (ref 8.6–10.5)
CHLORIDE SERPL-SCNC: 97 MMOL/L (ref 98–107)
CO2 SERPL-SCNC: 38.5 MMOL/L (ref 22–29)
CREAT SERPL-MCNC: 0.99 MG/DL (ref 0.57–1)
DEPRECATED RDW RBC AUTO: 59.6 FL (ref 37–54)
EGFRCR SERPLBLD CKD-EPI 2021: 61.9 ML/MIN/1.73
EOSINOPHIL # BLD AUTO: 0.22 10*3/MM3 (ref 0–0.4)
EOSINOPHIL NFR BLD AUTO: 2.1 % (ref 0.3–6.2)
ERYTHROCYTE [DISTWIDTH] IN BLOOD BY AUTOMATED COUNT: 17.1 % (ref 12.3–15.4)
GLOBULIN UR ELPH-MCNC: 3.3 GM/DL
GLUCOSE BLDC GLUCOMTR-MCNC: 106 MG/DL (ref 70–105)
GLUCOSE BLDC GLUCOMTR-MCNC: 80 MG/DL (ref 70–105)
GLUCOSE BLDC GLUCOMTR-MCNC: 84 MG/DL (ref 70–105)
GLUCOSE BLDC GLUCOMTR-MCNC: 91 MG/DL (ref 70–105)
GLUCOSE SERPL-MCNC: 82 MG/DL (ref 65–99)
HCT VFR BLD AUTO: 37.5 % (ref 34–46.6)
HGB BLD-MCNC: 11.9 G/DL (ref 12–15.9)
IMM GRANULOCYTES # BLD AUTO: 0.07 10*3/MM3 (ref 0–0.05)
IMM GRANULOCYTES NFR BLD AUTO: 0.7 % (ref 0–0.5)
LYMPHOCYTES # BLD AUTO: 0.83 10*3/MM3 (ref 0.7–3.1)
LYMPHOCYTES NFR BLD AUTO: 8.1 % (ref 19.6–45.3)
MAGNESIUM SERPL-MCNC: 1.7 MG/DL (ref 1.6–2.4)
MCH RBC QN AUTO: 30.6 PG (ref 26.6–33)
MCHC RBC AUTO-ENTMCNC: 31.7 G/DL (ref 31.5–35.7)
MCV RBC AUTO: 96.4 FL (ref 79–97)
MONOCYTES # BLD AUTO: 0.97 10*3/MM3 (ref 0.1–0.9)
MONOCYTES NFR BLD AUTO: 9.5 % (ref 5–12)
NEUTROPHILS NFR BLD AUTO: 79.5 % (ref 42.7–76)
NEUTROPHILS NFR BLD AUTO: 8.15 10*3/MM3 (ref 1.7–7)
NRBC BLD AUTO-RTO: 0 /100 WBC (ref 0–0.2)
PHOSPHATE SERPL-MCNC: 2.7 MG/DL (ref 2.5–4.5)
PLATELET # BLD AUTO: 88 10*3/MM3 (ref 140–450)
PMV BLD AUTO: 12.8 FL (ref 6–12)
POTASSIUM SERPL-SCNC: 3.5 MMOL/L (ref 3.5–5.2)
POTASSIUM SERPL-SCNC: 4.3 MMOL/L (ref 3.5–5.2)
PROT SERPL-MCNC: 5.2 G/DL (ref 6–8.5)
RBC # BLD AUTO: 3.89 10*6/MM3 (ref 3.77–5.28)
SODIUM SERPL-SCNC: 141 MMOL/L (ref 136–145)
WBC NRBC COR # BLD AUTO: 10.25 10*3/MM3 (ref 3.4–10.8)

## 2024-07-12 PROCEDURE — 82948 REAGENT STRIP/BLOOD GLUCOSE: CPT

## 2024-07-12 PROCEDURE — 80053 COMPREHEN METABOLIC PANEL: CPT | Performed by: NURSE PRACTITIONER

## 2024-07-12 PROCEDURE — 94664 DEMO&/EVAL PT USE INHALER: CPT

## 2024-07-12 PROCEDURE — 94799 UNLISTED PULMONARY SVC/PX: CPT

## 2024-07-12 PROCEDURE — 84100 ASSAY OF PHOSPHORUS: CPT | Performed by: NURSE PRACTITIONER

## 2024-07-12 PROCEDURE — 83735 ASSAY OF MAGNESIUM: CPT | Performed by: NURSE PRACTITIONER

## 2024-07-12 PROCEDURE — 25010000002 DIGOXIN PER 500 MCG: Performed by: INTERNAL MEDICINE

## 2024-07-12 PROCEDURE — 85025 COMPLETE CBC W/AUTO DIFF WBC: CPT | Performed by: NURSE PRACTITIONER

## 2024-07-12 PROCEDURE — 94761 N-INVAS EAR/PLS OXIMETRY MLT: CPT

## 2024-07-12 PROCEDURE — 84132 ASSAY OF SERUM POTASSIUM: CPT | Performed by: HOSPITALIST

## 2024-07-12 PROCEDURE — 71045 X-RAY EXAM CHEST 1 VIEW: CPT

## 2024-07-12 PROCEDURE — 94660 CPAP INITIATION&MGMT: CPT

## 2024-07-12 RX ORDER — POTASSIUM CHLORIDE 1.5 G/1.58G
40 POWDER, FOR SOLUTION ORAL EVERY 4 HOURS
Status: COMPLETED | OUTPATIENT
Start: 2024-07-12 | End: 2024-07-12

## 2024-07-12 RX ORDER — DIGOXIN 0.25 MG/ML
500 INJECTION INTRAMUSCULAR; INTRAVENOUS ONCE
Status: COMPLETED | OUTPATIENT
Start: 2024-07-12 | End: 2024-07-12

## 2024-07-12 RX ADMIN — POTASSIUM CHLORIDE 40 MEQ: 1.5 POWDER, FOR SOLUTION ORAL at 12:53

## 2024-07-12 RX ADMIN — IPRATROPIUM BROMIDE AND ALBUTEROL SULFATE 3 ML: .5; 3 SOLUTION RESPIRATORY (INHALATION) at 11:05

## 2024-07-12 RX ADMIN — PANTOPRAZOLE SODIUM 40 MG: 40 TABLET, DELAYED RELEASE ORAL at 17:52

## 2024-07-12 RX ADMIN — ANTI-FUNGAL POWDER MICONAZOLE NITRATE TALC FREE 1 APPLICATION: 1.42 POWDER TOPICAL at 10:08

## 2024-07-12 RX ADMIN — ANTI-FUNGAL POWDER MICONAZOLE NITRATE TALC FREE 1 APPLICATION: 1.42 POWDER TOPICAL at 22:29

## 2024-07-12 RX ADMIN — Medication 10 ML: at 22:24

## 2024-07-12 RX ADMIN — PANTOPRAZOLE SODIUM 40 MG: 40 TABLET, DELAYED RELEASE ORAL at 10:09

## 2024-07-12 RX ADMIN — POVIDONE-IODINE: 10 SOLUTION TOPICAL at 10:09

## 2024-07-12 RX ADMIN — IPRATROPIUM BROMIDE AND ALBUTEROL SULFATE 3 ML: .5; 3 SOLUTION RESPIRATORY (INHALATION) at 14:54

## 2024-07-12 RX ADMIN — AMIODARONE HYDROCHLORIDE 200 MG: 200 TABLET ORAL at 17:53

## 2024-07-12 RX ADMIN — HYDROCORTISONE ACETATE 25 MG: 25 SUPPOSITORY RECTAL at 10:09

## 2024-07-12 RX ADMIN — AMIODARONE HYDROCHLORIDE 200 MG: 200 TABLET ORAL at 05:32

## 2024-07-12 RX ADMIN — IPRATROPIUM BROMIDE AND ALBUTEROL SULFATE 3 ML: .5; 3 SOLUTION RESPIRATORY (INHALATION) at 07:37

## 2024-07-12 RX ADMIN — IPRATROPIUM BROMIDE AND ALBUTEROL SULFATE 3 ML: .5; 3 SOLUTION RESPIRATORY (INHALATION) at 19:07

## 2024-07-12 RX ADMIN — BUDESONIDE 0.5 MG: 0.5 INHALANT RESPIRATORY (INHALATION) at 19:11

## 2024-07-12 RX ADMIN — DIGOXIN 500 MCG: 0.25 INJECTION INTRAMUSCULAR; INTRAVENOUS at 22:23

## 2024-07-12 RX ADMIN — POTASSIUM CHLORIDE 40 MEQ: 1.5 POWDER, FOR SOLUTION ORAL at 10:09

## 2024-07-12 RX ADMIN — Medication 10 ML: at 10:08

## 2024-07-12 RX ADMIN — METOPROLOL SUCCINATE 25 MG: 25 TABLET, FILM COATED, EXTENDED RELEASE ORAL at 10:18

## 2024-07-12 RX ADMIN — APIXABAN 10 MG: 5 TABLET, FILM COATED ORAL at 20:12

## 2024-07-12 RX ADMIN — Medication 10 ML: at 20:13

## 2024-07-12 RX ADMIN — BUMETANIDE 1 MG: 1 TABLET ORAL at 10:18

## 2024-07-12 RX ADMIN — HYDROCORTISONE ACETATE 25 MG: 25 SUPPOSITORY RECTAL at 20:13

## 2024-07-12 RX ADMIN — APIXABAN 10 MG: 5 TABLET, FILM COATED ORAL at 10:09

## 2024-07-12 RX ADMIN — BUDESONIDE 0.5 MG: 0.5 INHALANT RESPIRATORY (INHALATION) at 07:32

## 2024-07-12 NOTE — PLAN OF CARE
Problem: Adult Inpatient Plan of Care  Goal: Absence of Hospital-Acquired Illness or Injury  Intervention: Identify and Manage Fall Risk  Recent Flowsheet Documentation  Taken 7/12/2024 0600 by Mayra Isbell LPN  Safety Promotion/Fall Prevention:   safety round/check completed   room organization consistent   assistive device/personal items within reach   clutter free environment maintained  Taken 7/12/2024 0400 by Mayra Isbell LPN  Safety Promotion/Fall Prevention:   safety round/check completed   room organization consistent   clutter free environment maintained   assistive device/personal items within reach  Taken 7/12/2024 0200 by Mayra Isbell LPN  Safety Promotion/Fall Prevention:   safety round/check completed   room organization consistent   assistive device/personal items within reach   clutter free environment maintained  Taken 7/12/2024 0000 by Mayra Isbell LPN  Safety Promotion/Fall Prevention:   safety round/check completed   room organization consistent   clutter free environment maintained   elopement precautions  Taken 7/11/2024 2200 by Mayra Isbell LPN  Safety Promotion/Fall Prevention:   safety round/check completed   room organization consistent   assistive device/personal items within reach   clutter free environment maintained  Taken 7/11/2024 2000 by Mayra Isbell LPN  Safety Promotion/Fall Prevention:   safety round/check completed   room organization consistent   clutter free environment maintained   assistive device/personal items within reach  Intervention: Prevent Skin Injury  Recent Flowsheet Documentation  Taken 7/12/2024 0400 by Mayra Isbell LPN  Body Position:   turned   right  Taken 7/12/2024 0000 by Mayra Isbell LPN  Body Position:   turned   right  Taken 7/11/2024 2000 by Mayra Isbell LPN  Body Position: turned  Skin Protection: adhesive use limited  Intervention: Prevent and Manage VTE (Venous Thromboembolism) Risk  Recent Flowsheet  Documentation  Taken 7/12/2024 0400 by Mayra Isbell LPN  VTE Prevention/Management: sequential compression devices off  Taken 7/12/2024 0000 by Mayra Isbell LPN  VTE Prevention/Management: sequential compression devices off  Taken 7/11/2024 2000 by Mayra Isbell LPN  Activity Management: bedrest  VTE Prevention/Management: sequential compression devices off  Range of Motion: active ROM (range of motion) encouraged  Intervention: Prevent Infection  Recent Flowsheet Documentation  Taken 7/12/2024 0600 by Mayra Isbell LPN  Infection Prevention:   rest/sleep promoted   environmental surveillance performed   hand hygiene promoted   single patient room provided  Taken 7/12/2024 0400 by Mayra Isbell LPN  Infection Prevention:   single patient room provided   rest/sleep promoted   hand hygiene promoted  Taken 7/12/2024 0200 by Mayra Isbell LPN  Infection Prevention:   rest/sleep promoted   environmental surveillance performed   hand hygiene promoted   single patient room provided  Taken 7/12/2024 0000 by Mayra Isbell LPN  Infection Prevention:   single patient room provided   rest/sleep promoted   hand hygiene promoted  Taken 7/11/2024 2200 by Mayra Isbell LPN  Infection Prevention:   rest/sleep promoted   environmental surveillance performed   hand hygiene promoted   single patient room provided  Taken 7/11/2024 2000 by Mayra Isbell LPN  Infection Prevention:   single patient room provided   rest/sleep promoted   hand hygiene promoted     Problem: Adult Inpatient Plan of Care  Goal: Absence of Hospital-Acquired Illness or Injury  Intervention: Prevent Skin Injury  Recent Flowsheet Documentation  Taken 7/12/2024 0400 by Mayra Isbell LPN  Body Position:   turned   right  Taken 7/12/2024 0000 by Mayra Isbell LPN  Body Position:   turned   right  Taken 7/11/2024 2000 by Mayra Isbell LPN  Body Position: turned  Skin Protection: adhesive use limited     Problem: Adult  Inpatient Plan of Care  Goal: Absence of Hospital-Acquired Illness or Injury  Intervention: Prevent Infection  Recent Flowsheet Documentation  Taken 7/12/2024 0600 by Mayra Isbell LPN  Infection Prevention:   rest/sleep promoted   environmental surveillance performed   hand hygiene promoted   single patient room provided  Taken 7/12/2024 0400 by Mayra Isbell LPN  Infection Prevention:   single patient room provided   rest/sleep promoted   hand hygiene promoted  Taken 7/12/2024 0200 by Mayra Isbell LPN  Infection Prevention:   rest/sleep promoted   environmental surveillance performed   hand hygiene promoted   single patient room provided  Taken 7/12/2024 0000 by Mayra Isbell LPN  Infection Prevention:   single patient room provided   rest/sleep promoted   hand hygiene promoted  Taken 7/11/2024 2200 by Mayra Isbell LPN  Infection Prevention:   rest/sleep promoted   environmental surveillance performed   hand hygiene promoted   single patient room provided  Taken 7/11/2024 2000 by Mayra Isbell LPN  Infection Prevention:   single patient room provided   rest/sleep promoted   hand hygiene promoted     Problem: Adult Inpatient Plan of Care  Goal: Absence of Hospital-Acquired Illness or Injury  Intervention: Prevent and Manage VTE (Venous Thromboembolism) Risk  Recent Flowsheet Documentation  Taken 7/12/2024 0400 by Mayra Isbell LPN  VTE Prevention/Management: sequential compression devices off  Taken 7/12/2024 0000 by Mayra Isbell LPN  VTE Prevention/Management: sequential compression devices off  Taken 7/11/2024 2000 by Mayra Isbell LPN  Activity Management: bedrest  VTE Prevention/Management: sequential compression devices off  Range of Motion: active ROM (range of motion) encouraged   Goal Outcome Evaluation:

## 2024-07-12 NOTE — PROGRESS NOTES
"    PROGRESS NOTE      Patient Name: Ban Brennan  : 1955  MRN: [id number]  Primary Care Physician: Rut Pierce APRN  Date of admission: 2024    Patient Care Team:  Rut Pierce APRN as PCP - General (Nurse Practitioner)  Austin Brooks MD as Cardiologist (Cardiology)        Reason for Follow up     Acute kidney injury, hyperkalemia      Subjective     Seen and examined, NAD noted, renal functions stable, worsening bicarb level, ordered ABG, good UOP, 1.3L     Review of systems:    ROS was otherwise negative except as mentioned in the [place].       Personal History:     Past Medical History:   Past Medical History:   Diagnosis Date    Bilateral leg edema     Chronic respiratory failure with hypoxia, on home O2 therapy 2024    COPD (chronic obstructive pulmonary disease)     Morbid obesity with BMI of 40.0-44.9, adult 2010    Primary hypertension 2017    Pulmonary embolism     \"many years ago, was on warfarin\"    Sleep apnea        Surgical History:    History reviewed. No pertinent surgical history.    Family History: family history is not on file. Otherwise pertinent FHx was reviewed and unremarkable.     Social History:  reports that she has never smoked. She has never used smokeless tobacco. She reports that she does not drink alcohol and does not use drugs.    Medications:  Prior to Admission medications    Medication Sig Start Date End Date Taking? Authorizing Provider   Allergy Relief 180 MG tablet Take 1 tablet by mouth Daily. 24  Yes ProviderChadwick MD   bumetanide (BUMEX) 1 MG tablet Take 1 tablet by mouth 3 times a day. 24  Yes ProviderChadwick MD   docusate sodium (COLACE) 100 MG capsule Take 1 capsule by mouth Every 12 (Twelve) Hours. 24  Yes ProviderChadwick MD   furosemide (LASIX) 20 MG tablet Take 1 tablet by mouth Daily.   Yes ProviderChadwick MD   HYDROcodone-acetaminophen (NORCO)  MG per tablet Take 1 " tablet by mouth 3 times a day. 5/30/24  Yes Chadwick Johnson MD   lisinopril (PRINIVIL,ZESTRIL) 30 MG tablet Take 1 tablet by mouth Daily. 3/18/24  Yes Chadwick Johnson MD   meloxicam (MOBIC) 7.5 MG tablet Take 1 tablet by mouth Daily As Needed. 3/18/24  Yes Chadwick Johnson MD   metoprolol tartrate (LOPRESSOR) 50 MG tablet Take 1 tablet by mouth Daily.   Yes Chadwick Johnson MD   montelukast (SINGULAIR) 10 MG tablet Take 1 tablet by mouth every night at bedtime.   Yes Chadwick Johnson MD   omeprazole (priLOSEC) 20 MG capsule Take 1 capsule by mouth Daily. 3/18/24  Yes Provider, Historical, MD   oxybutynin XL (DITROPAN-XL) 10 MG 24 hr tablet Take 2 tablets by mouth Daily. 3/18/24  Yes Chadwick Johnson MD   potassium chloride (MICRO-K) 10 MEQ CR capsule Take 1 capsule by mouth Every 12 (Twelve) Hours. 3/18/24  Yes Chadwick Johnson MD   vitamin D (ERGOCALCIFEROL) 1.25 MG (95120 UT) capsule capsule Take 1 capsule by mouth 2 (Two) Times a Week. Monday and Thursday. 5/30/24  Yes Chadwick Johnson MD     Scheduled Meds:amiodarone, 200 mg, Oral, Q12H   Followed by  [START ON 7/23/2024] amiodarone, 200 mg, Oral, Daily  apixaban, 10 mg, Oral, BID   Followed by  [START ON 7/14/2024] apixaban, 5 mg, Oral, BID  budesonide, 0.5 mg, Nebulization, BID - RT  bumetanide, 1 mg, Oral, Daily  hydrocortisone, 25 mg, Rectal, BID  insulin lispro, 2-7 Units, Subcutaneous, 4x Daily With Meals & Nightly  ipratropium-albuterol, 3 mL, Nebulization, 4x Daily - RT  lidocaine, 20 mL, Infiltration, Once  metoprolol succinate XL, 25 mg, Oral, Q12H  miconazole, 1 Application, Topical, Q12H  pantoprazole, 40 mg, Oral, BID AC  potassium chloride, 40 mEq, Oral, Q4H  povidone-iodine, , Topical, Daily  sodium chloride, 10 mL, Intravenous, Q12H  spironolactone, 25 mg, Oral, Daily      Continuous Infusions:     PRN Meds:  acetaminophen **OR** acetaminophen    Calcium Replacement - Follow Nurse / BPA Driven Protocol     dextrose    dextrose    glucagon (human recombinant)    ipratropium-albuterol    Magnesium Standard Dose Replacement - Follow Nurse / BPA Driven Protocol    nitroglycerin    ondansetron ODT **OR** ondansetron    Phosphorus Replacement - Follow Nurse / BPA Driven Protocol    Potassium Replacement - Follow Nurse / BPA Driven Protocol    [COMPLETED] Insert Peripheral IV **AND** sodium chloride    sodium chloride    sodium chloride  Allergies:  No Known Allergies    Objective   Exam:     Vital Signs  Temp:  [98 °F (36.7 °C)-98.3 °F (36.8 °C)] 98.3 °F (36.8 °C)  Heart Rate:  [] 84  Resp:  [12-26] 16  BP: ()/(47-77) 84/53  SpO2:  [92 %-100 %] 100 %  on  Flow (L/min):  [2] 2;   Device (Oxygen Therapy): nasal cannula  Body mass index is 36.51 kg/m².  EXAM  General:  69 yr old  female, some respiratory distress  Head:      Normocephalic and atraumatic.    Eyes:      PERRL/EOM intact, conjunctivae and sclerae clear without nystagmus.    Neck:      No masses, thyromegaly,  trachea central   Lungs:    Clear bilaterally to auscultation.    Heart:      Regular rate and rhythm, no murmur no gallop  Abd:        Soft, nontender, not distended, bowel sounds positive, no shifting dullness.  Msk:        No deformity or scoliosis noted of thoracic or lumbar spine.    Pulses:   Pulses normal in all 4 extremities.    Extremities:        No cyanosis or clubbing--+ + edema.    Neuro:    Lethargic  Skin:       Intact without lesions or rashes.          Results Review:  I have personally reviewed most recent Data :  BMP @LABRCNT(creatinine:10)  CBC    Results from last 7 days   Lab Units 07/12/24  0531 07/11/24  0437 07/10/24  0220 07/09/24  2051 07/09/24  0851 07/09/24  0256 07/08/24  0446 07/07/24  0506 07/06/24  0354   WBC 10*3/mm3 10.25 11.69* 11.11*  --   --  10.66 11.66* 16.38* 15.29*   HEMOGLOBIN g/dL 11.9* 12.4 12.5 12.6 13.1 12.6 13.3 14.2 13.8   PLATELETS 10*3/mm3 88* 93* 85*  --   --  87* 100* 130* 128*     CMP    Results from last 7 days   Lab Units 07/12/24  0531 07/11/24  0437 07/10/24  0220 07/09/24  1507 07/09/24  0256 07/08/24  0446 07/07/24  0506 07/06/24  1608   SODIUM mmol/L 141 142 139  --  145 145 143 140   POTASSIUM mmol/L 3.5 4.5 4.5 3.9 3.6 3.0* 3.9 3.3*   CHLORIDE mmol/L 97* 95* 96*  --  98 97* 98 96*   CO2 mmol/L 38.5* 41.6* 37.9*  --  39.5* 38.0* 35.7* 33.9*   BUN mg/dL 39* 46* 54*  --  68* 72* 74* 72*   CREATININE mg/dL 0.99 1.08* 1.00  --  1.08* 1.19* 1.37* 1.41*   GLUCOSE mg/dL 82 94 90  --  117* 98 102* 111*   ALBUMIN g/dL 1.9* 2.1* 2.0*  --  2.2* 2.2* 2.5* 2.5*   BILIRUBIN mg/dL 2.8* 2.4* 1.9*  --  1.7* 1.7* 1.8* 1.6*   ALK PHOS U/L 94 85 80  --  84 96 125* 110   AST (SGOT) U/L 45* 48* 46*  --  37* 36* 30 31   ALT (SGPT) U/L 18 19 17  --  17 15 17 17     ABG    Results from last 7 days   Lab Units 07/11/24  1045 07/06/24  1150   PH, ARTERIAL pH units 7.539* 7.392   PCO2, ARTERIAL mm Hg 49.9* 61.2*   PO2 ART mm Hg 66.3* 61.9*   O2 SATURATION ART % 94.6 90.2*   BASE EXCESS ART mmol/L 17.4* 9.3*     XR Chest 1 View    Result Date: 7/12/2024  Impression: 1.Right basilar chest tube appears in similar position. No definite pneumothorax. 2.Patchy basilar predominant airspace opacities and possible small pleural effusions, as before. 3.Stable cardiomegaly. Electronically Signed: Tanvir Melgar  7/12/2024 7:18 AM EDT  Workstation ID: [id number]    XR Chest 1 View    Result Date: 7/11/2024  Impression: Similar position of right-sided chest tube. No discrete pneumothorax. Similar small bilateral pleural effusions and bilateral airspace opacities, left greater than right. Electronically Signed: Manan Jefferson MD  7/11/2024 7:48 AM EDT  Workstation ID: [id number]    XR Chest 1 View    Result Date: 7/10/2024  Impression: 1. Stable chest tube overlying the right lung base. No pneumothorax. 2. Persistent bibasilar airspace disease and small bilateral pleural effusions. 3. Stable cardiomegaly. Electronically Signed: Mina  MD Summer  7/10/2024 7:11 AM EDT  Workstation ID: [id number]    XR Chest 1 View    Result Date: 7/9/2024  Impression: 1.Bilateral airspace disease again noted. 2.Cardiomegaly 3.Pigtail catheter chest tube right lung base. Electronically Signed: Bran Bernal MD  7/9/2024 11:37 AM EDT  Workstation ID: [id number]    XR Chest 1 View    Result Date: 7/6/2024  Impression: Successful placement of a right chest tube for right-sided hydropneumothorax, which demonstrates decreased/trace fluid component and similar/small gas component. Electronically Signed: Manan Jefferson MD  7/6/2024 2:35 PM EDT  Workstation ID: [id number]    CT Chest Without Contrast Diagnostic    Result Date: 7/6/2024  Impression: Moderate right-sided hydropneumothorax as seen on same-day radiograph. Adjacent partial collapse of the right lung with multifocal peripheral bronchial obstruction. There are dense airspace consolidations throughout the right lung, and findings are likely a combination of lung collapse and worsening infection. Persistent airspace opacities in the left lung consistent with infection, slightly worsened from prior CT. Small left pleural effusion. Prominent four-chamber cardiomegaly. Pulmonary artery enlargement consistent with pulmonary hypertension. Trace perihepatic ascites. Persistent bilateral flank and right chest wall edema. Electronically Signed: Javon Mortensen MD  7/6/2024 11:19 AM EDT  Workstation ID: [id number]    XR Chest 1 View    Result Date: 7/6/2024  Impression: 1. There is a definite small right-sided pneumothorax. The right lung is partially collapsed and consolidated. A small to moderate right pleural effusion is present indicating a hydropneumothorax. 2. Unchanged consolidation in the left lung with a small pleural effusion. 3. Cardiomegaly. 4.The abnormal results were discussed with the nurse (Dianne) by telephone. The referring clinician will be contacted. Electronically Signed: Jean Claude Bobby MD  7/6/2024 9:56 AM EDT   Workstation ID: [id number]    XR Chest 1 View    Result Date: 7/6/2024  Impression: 1. Questionable right-sided pneumothorax. I would recommend a repeat chest x-ray as the patient is rotated. 2. Persistent patchy consolidation in the left mid and lower lung zones suspicious for pneumonia. There is a small right pleural effusion. 3. Abnormal consolidation in the right lung with a small to moderate pleural effusion. 4. Cardiomegaly. 5. The abnormal results were discussed with the nurse in the CVICU (Dianne) by telephone and the referring clinician will be contacted. Electronically Signed: Jean Claude Bobby MD  7/6/2024 9:27 AM EDT  Workstation ID: [id number]    XR Chest 1 View    Result Date: 7/5/2024  Impression: Marked cardiomegaly with bilateral patchy airspace disease and pleural effusions. Findings are similar to the previous exam. Electronically Signed: Amirah Coto MD  7/5/2024 9:14 AM EDT  Workstation ID: [id number]    XR Abdomen KUB    Result Date: 7/4/2024  Impression: Feeding tube is in the stomach. Electronically Signed: Obed Sidhu MD  7/4/2024 9:59 PM EDT  Workstation ID: [id number]    US Renal Bilateral    Result Date: 7/4/2024  Impression: No acute process nor significant abnormality identified. Electronically Signed: Beto Kaur MD  7/4/2024 4:14 PM EDT  Workstation ID: [id number]     Results for orders placed during the hospital encounter of 06/30/24    Adult Transthoracic Echo Complete w/ Color, Spectral and Contrast if Necessary Per Protocol    Interpretation Summary    Left ventricular ejection fraction appears to be 31 - 35%.    Left ventricular diastolic dysfunction is noted.    The left atrial cavity is dilated.    Moderate aortic valve stenosis is present. Mean/peak gradient of 14/24 mmHg.  Dimensionless index 0.36    Moderate to severe mitral valve regurgitation is present.    Estimated right ventricular systolic pressure from tricuspid regurgitation is normal (<35 mmHg).        Assessment & Plan    Assessment and Plan:         Atrial fibrillation with RVR    Primary hypertension    Morbid obesity with BMI of 40.0-44.9, adult    Right bundle branch block    Acute deep vein thrombosis (DVT) of both lower extremities    ARABELLA (acute kidney injury)    Acute hyperkalemia    Sepsis    Acute UTI    Acute systolic congestive heart failure    Rhinovirus infection    Multifocal pneumonia    Chronic respiratory failure with hypoxia, on home O2 therapy    Skin ulcer of right great toe    Skin ulcer of left great toe    SEDRICK (obstructive sleep apnea)    Aortic stenosis    Mitral regurgitation    Acute HFrEF (heart failure with reduced ejection fraction)    COPD (chronic obstructive pulmonary disease)    ASSESSMENT:  Acute kidney injury, with baseline creatinine roughly 0.8-1.0mg/dL  Hyperkalemia  A-fib with RVR  Acute on chronic heart failure, with EF 31-35%, diastolic dysfunction, moderate AS and moderate to severe MR as per echo from 6/30/24  Bilateral DVT  Severe sepsis/LLE cellulitis, acute cystitis  Multifocal pneumonia    PLAN :     Renal function unchanged/stable today from prior reading. Suspect she may initially have had some acute cardiorenal component, now most likely has some ATN secondary to sepsis, elevated Vanc level (was 22.4 on 7/3), and some prolonged prerenal state with hemodynamic insults, arrhythmias. Had no significant proteinuria  Bicarbonate continues to trend up, initially thought may be due to underlying respiratory acidosis, ordered ABG this morning, consistent with metabolic and respiratory alkalosis.   Will decrease her Bumex to 1mg PO daily. Continue current dose of Aldactone for now  Alkalosis slightly better since yesterday after decreasing the Bumex potassium level is still acceptable  Sodium level is acceptable at this time  To me volume acceptable but still has some peripheral edema  Blood pressure reviewed, stable. Continue to monitor   Antibiotics per ID, just finished 7 day course of  Keflex and Doxy. Previously was on Vanc. Now not on any abx  Daily labs   We will continue to follow       Charan Krugeer MD  [facility]  7/12/2024  07:33 EDT

## 2024-07-12 NOTE — SIGNIFICANT NOTE
Case Management/Social Work    Patient Name:  Ban Brennan  YOB: 1955  MRN: [id number]  Admit Date:  6/30/2024 07/12/24 1451   Post Acute Pre-Cert Documentation   New Pre-Cert Needed? Yes   Post Acute Pre-Cert #2 Documentation   Request Submitted by Facility - Type: Hospital   Post-Acute Authorization Type Submitted: SNF   Date Post Acute Pre-Cert Inititated per Facility 07/12/24   Verification from Payer Yes   Date Post Acute Pre-Cert Completed 07/12/24   Accepting Facility [facility] Discharge Date Requested 07/14/24   All Clinicals Submitted? Yes   Had Accepting Facility at Time of Submission Yes   Response Received from Insurance? Approval   Response Communicated to:    Authorization Number: [id number]   Post Acute Pre-Cert Initiated Comment [facility] submitted SNF precert for [facility] via [employer] portal. Authorization ID [id number]. SNF precert auto approved. Valid 7/14-7/19. Approval letter indexed to media. CM made aware.           Electronically signed by:  Ronni Hamilton CMA  07/12/24 14:56 EDT    Ronni Hamilton  Case Management Associate  [address] [address]  P: [phone]  F: [phone]

## 2024-07-12 NOTE — CASE MANAGEMENT/SOCIAL WORK
Continued Stay Note  QIAN Clarke     Patient Name: Ban Brennan  MRN: [id number]  Today's Date: 7/12/2024    Admit Date: 6/30/2024    Plan: [facility] (accepted). PASRR approved, pre-cert approved 7/14-7/19   Discharge Plan       Row Name 07/12/24 1516       Plan    Plan [facility] (accepted). PASRR approved, pre-cert approved 7/14-7/19    Plan Comments CM messaged [facility] cronin to start new pre-cert, precert approved for 7/14-7/19. DC barriers: patient continues with chest tube, IV abx.           Evert Miles RN     Cell number [phone]  Office number [phone]

## 2024-07-12 NOTE — PROGRESS NOTES
"PULMONARY CRITICAL CARE PROGRESS  NOTE      PATIENT IDENTIFICATION:  Name: Ban Brennan  MRN: [id number]  :  1955     Age: 69 y.o.  Sex: female    DATE OF Note:  2024   Referring Physician: Sesar Solorzano DO                  Subjective:   In bed, no SOB, no chest or abd pain, CT patent, no air leak, no bowel or bladder issues      Objective:  tMax 24 hrs: Temp (24hrs), Av.2 °F (36.8 °C), Min:98 °F (36.7 °C), Max:98.3 °F (36.8 °C)      Vitals Ranges:   Temp:  [98 °F (36.7 °C)-98.3 °F (36.8 °C)] 98.3 °F (36.8 °C)  Heart Rate:  [] 86  Resp:  [12-26] 16  BP: ()/(53-77) 84/53    Intake and Output Last 3 Shifts:   I/O last 3 completed shifts:  In: -   Out: 975 [Urine:600; Chest Tube:375]    Exam:  BP (!) 84/53 (BP Location: Right arm, Patient Position: Lying)   Pulse 86   Temp 98.3 °F (36.8 °C) (Oral)   Resp 16   Ht 149.9 cm (59\")   Wt 82 kg (180 lb 12.4 oz)   SpO2 100%   BMI 36.51 kg/m²     General Appearance:   Alert  HEENT:  Normocephalic, without obvious abnormality. Conjunctivae/corneas clear.  Normal external ear canals. Nares normal, no drainage     Neck:  Supple, symmetrical, trachea midline. No JVD.  Lungs /Chest wall:   Bilateral basal rhonchi, respirations unlabored, symmetrical wall movement.   CT in place minimal drainage  Heart:  Regular rate and rhythm, systolic murmur PMI left sternal border  Abdomen: Soft, nontender, no masses, no organomegaly.    Extremities: Trace edema, no clubbing or cyanosis        Medications:  amiodarone, 200 mg, Oral, Q12H   Followed by  [START ON 2024] amiodarone, 200 mg, Oral, Daily  apixaban, 10 mg, Oral, BID   Followed by  [START ON 2024] apixaban, 5 mg, Oral, BID  budesonide, 0.5 mg, Nebulization, BID - RT  bumetanide, 1 mg, Oral, Daily  hydrocortisone, 25 mg, Rectal, BID  insulin lispro, 2-7 Units, Subcutaneous, 4x Daily With Meals & Nightly  ipratropium-albuterol, 3 mL, Nebulization, 4x Daily - RT  lidocaine, 20 " mL, Infiltration, Once  metoprolol succinate XL, 25 mg, Oral, Q12H  miconazole, 1 Application, Topical, Q12H  pantoprazole, 40 mg, Oral, BID AC  potassium chloride, 40 mEq, Oral, Q4H  povidone-iodine, , Topical, Daily  sodium chloride, 10 mL, Intravenous, Q12H  spironolactone, 25 mg, Oral, Daily        Infusion:        PRN:    acetaminophen **OR** acetaminophen    Calcium Replacement - Follow Nurse / BPA Driven Protocol    dextrose    dextrose    glucagon (human recombinant)    ipratropium-albuterol    Magnesium Standard Dose Replacement - Follow Nurse / BPA Driven Protocol    nitroglycerin    ondansetron ODT **OR** ondansetron    Phosphorus Replacement - Follow Nurse / BPA Driven Protocol    Potassium Replacement - Follow Nurse / BPA Driven Protocol    [COMPLETED] Insert Peripheral IV **AND** sodium chloride    sodium chloride    sodium chloride  Data Review:  All labs (24hrs):   Recent Results (from the past 24 hour(s))   Blood Gas, Arterial -    Collection Time: 07/11/24 10:45 AM    Specimen: Arterial Blood   Result Value Ref Range    Site Right Radial     Reece's Test N/A     pH, Arterial 7.539 (H) 7.350 - 7.450 pH units    pCO2, Arterial 49.9 (H) 35.0 - 48.0 mm Hg    pO2, Arterial 66.3 (L) 83.0 - 108.0 mm Hg    HCO3, Arterial 42.6 (H) 21.0 - 28.0 mmol/L    Base Excess, Arterial 17.4 (H) 0.0 - 3.0 mmol/L    O2 Saturation, Arterial 94.6 94.0 - 98.0 %    CO2 Content 44.1 (H) 22 - 29 mmol/L    Barometric Pressure for Blood Gas      Modality Cannula     FIO2 28 %    Hemodilution No     PO2/FIO2 237 0 - 500   Occult Blood, Fecal By Immunoassay - Stool, Per Rectum    Collection Time: 07/11/24 11:27 AM    Specimen: Per Rectum; Stool   Result Value Ref Range    Occult Blood, Fecal by Immunoassay Positive (A) Negative   POC Glucose Finger 4x Daily Before Meals & at Bedtime    Collection Time: 07/11/24 11:30 AM    Specimen: Finger; Blood   Result Value Ref Range    Glucose 131 (H) 70 - 105 mg/dL   POC Glucose Once     Collection Time: 07/11/24  4:22 PM    Specimen: Blood   Result Value Ref Range    Glucose 100 70 - 105 mg/dL   POC Glucose Once    Collection Time: 07/11/24  8:13 PM    Specimen: Blood   Result Value Ref Range    Glucose 112 (H) 70 - 105 mg/dL   Magnesium    Collection Time: 07/12/24  5:31 AM    Specimen: Blood   Result Value Ref Range    Magnesium 1.7 1.6 - 2.4 mg/dL   Phosphorus    Collection Time: 07/12/24  5:31 AM    Specimen: Blood   Result Value Ref Range    Phosphorus 2.7 2.5 - 4.5 mg/dL   Comprehensive Metabolic Panel    Collection Time: 07/12/24  5:31 AM    Specimen: Blood   Result Value Ref Range    Glucose 82 65 - 99 mg/dL    BUN 39 (H) 8 - 23 mg/dL    Creatinine 0.99 0.57 - 1.00 mg/dL    Sodium 141 136 - 145 mmol/L    Potassium 3.5 3.5 - 5.2 mmol/L    Chloride 97 (L) 98 - 107 mmol/L    CO2 38.5 (H) 22.0 - 29.0 mmol/L    Calcium 9.0 8.6 - 10.5 mg/dL    Total Protein 5.2 (L) 6.0 - 8.5 g/dL    Albumin 1.9 (L) 3.5 - 5.2 g/dL    ALT (SGPT) 18 1 - 33 U/L    AST (SGOT) 45 (H) 1 - 32 U/L    Alkaline Phosphatase 94 39 - 117 U/L    Total Bilirubin 2.8 (H) 0.0 - 1.2 mg/dL    Globulin 3.3 gm/dL    A/G Ratio 0.6 g/dL    BUN/Creatinine Ratio 39.4 (H) 7.0 - 25.0    Anion Gap 5.5 5.0 - 15.0 mmol/L    eGFR 61.9 >60.0 mL/min/1.73   CBC Auto Differential    Collection Time: 07/12/24  5:31 AM    Specimen: Blood   Result Value Ref Range    WBC 10.25 3.40 - 10.80 10*3/mm3    RBC 3.89 3.77 - 5.28 10*6/mm3    Hemoglobin 11.9 (L) 12.0 - 15.9 g/dL    Hematocrit 37.5 34.0 - 46.6 %    MCV 96.4 79.0 - 97.0 fL    MCH 30.6 26.6 - 33.0 pg    MCHC 31.7 31.5 - 35.7 g/dL    RDW 17.1 (H) 12.3 - 15.4 %    RDW-SD 59.6 (H) 37.0 - 54.0 fl    MPV 12.8 (H) 6.0 - 12.0 fL    Platelets 88 (L) 140 - 450 10*3/mm3    Neutrophil % 79.5 (H) 42.7 - 76.0 %    Lymphocyte % 8.1 (L) 19.6 - 45.3 %    Monocyte % 9.5 5.0 - 12.0 %    Eosinophil % 2.1 0.3 - 6.2 %    Basophil % 0.1 0.0 - 1.5 %    Immature Grans % 0.7 (H) 0.0 - 0.5 %    Neutrophils, Absolute 8.15  (H) 1.70 - 7.00 10*3/mm3    Lymphocytes, Absolute 0.83 0.70 - 3.10 10*3/mm3    Monocytes, Absolute 0.97 (H) 0.10 - 0.90 10*3/mm3    Eosinophils, Absolute 0.22 0.00 - 0.40 10*3/mm3    Basophils, Absolute 0.01 0.00 - 0.20 10*3/mm3    Immature Grans, Absolute 0.07 (H) 0.00 - 0.05 10*3/mm3    nRBC 0.0 0.0 - 0.2 /100 WBC   POC Glucose Finger 4x Daily Before Meals & at Bedtime    Collection Time: 07/12/24  7:24 AM    Specimen: Finger; Blood   Result Value Ref Range    Glucose 84 70 - 105 mg/dL        Imaging:  XR Chest 1 View  Narrative: XR CHEST 1 VW    Date of Exam: 7/12/2024 5:55 AM EDT    Indication: CT    Comparison: July 11, 2024 and prior.    Findings:  Right basilar chest tube appears in similar position. No definite right pneumothorax. Basilar predominant patchy airspace opacities, similar to the prior exam. There may again be small pleural effusions. Stable cardiomegaly.  Impression: Impression:  1.Right basilar chest tube appears in similar position. No definite pneumothorax.  2.Patchy basilar predominant airspace opacities and possible small pleural effusions, as before.  3.Stable cardiomegaly.    Electronically Signed: Tanvir Melgar    7/12/2024 7:18 AM EDT    Workstation ID: [id number]       ASSESSMENT:  Hydropneumothorax s/p CT  COPD  SEDRICK  Systolic CHF  A-fib with RVR  Aortic stenosis  Mitral valve regurgitation   R BBB  Hx bilateral DVT  HTN  Morbid obesity         PLAN:  Encourage to cough more to clear secretions   Encourage OOB during the day   Antibiotics  Bronchodilators  Inhaled corticosteroids  Mucomyst  Incentive spirometer  Diuresis and monitor NANCY's   CT management   Electrolytes/ glycemic control  DVT prophylaxis-Apixaban      Discussed with Dr Pineda Martin, MANJU    7/12/2024  09:16 EDT    I personally have examined  and interviewed the patient. I have reviewed the history, data, problems, assessment and plan with our NP.  Total Critical care time in direct medical management (   )  minutes, This time specifically excludes time spent performing procedures.    Donna Sung MD   7/12/2024  09:32 EDT

## 2024-07-12 NOTE — PROGRESS NOTES
[facility] MEDICINE SERVICE  DAILY PROGRESS NOTE    NAME: Ban Brennan  : 1955  MRN: [id number]      LOS: 12 days     PROVIDER OF SERVICE: Librado Villagomez MD    Chief Complaint: Atrial fibrillation with RVR    Subjective:     Interval History:  History taken from: patient chart RN  No new symptoms     Review of Systems:   Review of Systems  All negative except as above   Objective:     Vital Signs  Temp:  [98 °F (36.7 °C)-98.3 °F (36.8 °C)] (P) 98.1 °F (36.7 °C)  Heart Rate:  [] 91  Resp:  [12-26] (P) 18  BP: ()/(53-77) 92/55  Flow (L/min):  [2] 2   Body mass index is 36.51 kg/m².    Physical Exam  Physical Exam  AOx3 NAD  RRR S1-S2 audible  Lungs with fair air entry  Abdomen soft nontender nondistended  Scheduled Meds   amiodarone, 200 mg, Oral, Q12H   Followed by  [START ON 2024] amiodarone, 200 mg, Oral, Daily  apixaban, 10 mg, Oral, BID   Followed by  [START ON 2024] apixaban, 5 mg, Oral, BID  budesonide, 0.5 mg, Nebulization, BID - RT  bumetanide, 1 mg, Oral, Daily  hydrocortisone, 25 mg, Rectal, BID  insulin lispro, 2-7 Units, Subcutaneous, 4x Daily With Meals & Nightly  ipratropium-albuterol, 3 mL, Nebulization, 4x Daily - RT  lidocaine, 20 mL, Infiltration, Once  metoprolol succinate XL, 25 mg, Oral, Q12H  miconazole, 1 Application, Topical, Q12H  pantoprazole, 40 mg, Oral, BID AC  potassium chloride, 40 mEq, Oral, Q4H  povidone-iodine, , Topical, Daily  sodium chloride, 10 mL, Intravenous, Q12H  spironolactone, 25 mg, Oral, Daily       PRN Meds     acetaminophen **OR** acetaminophen    Calcium Replacement - Follow Nurse / BPA Driven Protocol    dextrose    dextrose    glucagon (human recombinant)    ipratropium-albuterol    Magnesium Standard Dose Replacement - Follow Nurse / BPA Driven Protocol    nitroglycerin    ondansetron ODT **OR** ondansetron    Phosphorus Replacement - Follow Nurse / BPA Driven Protocol    Potassium Replacement - Follow Nurse / BPA  Driven Protocol    [COMPLETED] Insert Peripheral IV **AND** sodium chloride    sodium chloride    sodium chloride   Infusions         Diagnostic Data    Results from last 7 days   Lab Units 07/12/24  0531   WBC 10*3/mm3 10.25   HEMOGLOBIN g/dL 11.9*   HEMATOCRIT % 37.5   PLATELETS 10*3/mm3 88*   GLUCOSE mg/dL 82   CREATININE mg/dL 0.99   BUN mg/dL 39*   SODIUM mmol/L 141   POTASSIUM mmol/L 3.5   AST (SGOT) U/L 45*   ALT (SGPT) U/L 18   ALK PHOS U/L 94   BILIRUBIN mg/dL 2.8*   ANION GAP mmol/L 5.5       XR Chest 1 View    Result Date: 7/12/2024  Impression: 1.Right basilar chest tube appears in similar position. No definite pneumothorax. 2.Patchy basilar predominant airspace opacities and possible small pleural effusions, as before. 3.Stable cardiomegaly. Electronically Signed: Tanvir Melgar  7/12/2024 7:18 AM EDT  Workstation ID: [id number]    XR Chest 1 View    Result Date: 7/11/2024  Impression: Similar position of right-sided chest tube. No discrete pneumothorax. Similar small bilateral pleural effusions and bilateral airspace opacities, left greater than right. Electronically Signed: Manan Jefferson MD  7/11/2024 7:48 AM EDT  Workstation ID: [id number]       I reviewed the patient's new clinical results.  I reviewed the patient's new imaging results and agree with the interpretation.    Assessment/Plan:     Active and Resolved Problems  Active Hospital Problems    Diagnosis  POA    **Atrial fibrillation with RVR [I48.91]  Yes    COPD (chronic obstructive pulmonary disease) [J44.9]  Yes    Aortic stenosis [I35.0]  Yes    Mitral regurgitation [I34.0]  Yes    Acute HFrEF (heart failure with reduced ejection fraction) [I50.21]  Yes    Right bundle branch block [I45.10]  Yes    Acute deep vein thrombosis (DVT) of both lower extremities [I82.403]  Yes    ARABELLA (acute kidney injury) [N17.9]  Yes    Acute hyperkalemia [E87.5]  Yes    Sepsis [A41.9]  Yes    Acute UTI [N39.0]  Yes    Acute systolic congestive heart failure  [I50.21]  Yes    Rhinovirus infection [B34.8]  Yes    Multifocal pneumonia [J18.9]  Yes    Chronic respiratory failure with hypoxia, on home O2 therapy [J96.11, Z99.81]  Not Applicable    Skin ulcer of right great toe [L97.519]  Yes    Skin ulcer of left great toe [L97.529]  Yes    SEDRICK (obstructive sleep apnea) [G47.33]  Yes    Primary hypertension [I10]  Yes    Morbid obesity with BMI of 40.0-44.9, adult [E66.01, Z68.41]  Not Applicable      Resolved Hospital Problems   No resolved problems to display.       69-year-old female with history of hypertension, HFrEF, lower extremity lymphedema admitted to [facility] 6/30 progressive shortness of breath        #Bilateral lower extremity DVT  #History of reported PE  Seen by hematology appreciate recommendation.  Factor V and prothrombin gene mutation negative  Continue Eliquis 10 mg twice daily for 7 days followed by 5 mg twice daily  Outpatient follow-up with hematology     #Acute on chronic HFrEF  #A-fib with RVR.  New onset  #Severe MR  Cardiology following appreciate recommendations  Started on amiodarone GGT transitioned to p.o. tapering dose  TTE with EF 30 to 35%.  Defer ischemic workup to cardiology  Currently on toprol 25 BID per cards will need to decrease dose if BP remains soft  Continue p.o. Bumex. Dose decreased to 1 mg QD   Monitor I's and O's  Hold Aldactone given soft BP   Lisinopril on hold given ARABELLA     #Right-sided hydropneumothorax  Chest tube placed in ICU 7/6  Pulmonary managing  Monitor out put   Daily CXR      #Severe sepsis  #UTI  #Left lower extremity cellulitis and wound  #Rhinovirus infection  #Multifocal pneumonia  #Chronic venous stasis lower extremity  Seen by infectious disease appreciate recommendations  Finished Keflex and doxycycline for 7 day course   Seen by podiatry no surgical intervention recommended  Midodrine has been weaned off monitor blood pressure     #Acute hypoxic respiratory failure  Multifactorial pneumonia  CHF  Antibiotics and diuretics as above  Wean off supplemental oxygen as tolerated.     #DM2  A1c 6.14  Continue ISS lispro  POC ACHS     #ARABELLA  #Metabolic Acidosis   Suspect ATN in setting of sepsis   Diuretics as above  BMP daily  Avoid nephrotoxic drugs   lisinopril on hold      #uterine mass  Large subserosal uterine fundal fibroid on pelvic ultrasound  GYN follow-up as outpatient      #hematochezia  GI on board noted plan for outpatient colonoscopy  Etiology suspected to be hemorrhoids  Topical steroids started  Monitor H&H     VTE Prophylaxis:  Pharmacologic VTE prophylaxis orders are present.         Code status is   Code Status and Medical Interventions:   Ordered at: 06/30/24 2120     Code Status (Patient has no pulse and is not breathing):    CPR (Attempt to Resuscitate)     Medical Interventions (Patient has pulse or is breathing):    Full Support       Plan for disposition 2 days    Time: 30 minutes    Signature: Electronically signed by Librado Villagomez MD, 07/12/24, 10:56 EDT.  [facility] Hospitalist Team

## 2024-07-13 ENCOUNTER — APPOINTMENT (OUTPATIENT)
Dept: GENERAL RADIOLOGY | Facility: HOSPITAL | Age: 69
End: 2024-07-13
Payer: MEDICARE

## 2024-07-13 ENCOUNTER — APPOINTMENT (OUTPATIENT)
Dept: CT IMAGING | Facility: HOSPITAL | Age: 69
End: 2024-07-13
Payer: MEDICARE

## 2024-07-13 LAB
ALBUMIN SERPL-MCNC: 2.1 G/DL (ref 3.5–5.2)
ALBUMIN/GLOB SERPL: 0.6 G/DL
ALP SERPL-CCNC: 96 U/L (ref 39–117)
ALT SERPL W P-5'-P-CCNC: 19 U/L (ref 1–33)
ANION GAP SERPL CALCULATED.3IONS-SCNC: 7.1 MMOL/L (ref 5–15)
AST SERPL-CCNC: 37 U/L (ref 1–32)
BASOPHILS # BLD AUTO: 0.02 10*3/MM3 (ref 0–0.2)
BASOPHILS NFR BLD AUTO: 0.2 % (ref 0–1.5)
BILIRUB SERPL-MCNC: 2.9 MG/DL (ref 0–1.2)
BUN SERPL-MCNC: 38 MG/DL (ref 8–23)
BUN/CREAT SERPL: 34.2 (ref 7–25)
CALCIUM SPEC-SCNC: 8.8 MG/DL (ref 8.6–10.5)
CHLORIDE SERPL-SCNC: 97 MMOL/L (ref 98–107)
CO2 SERPL-SCNC: 36.9 MMOL/L (ref 22–29)
CREAT SERPL-MCNC: 1.11 MG/DL (ref 0.57–1)
DEPRECATED RDW RBC AUTO: 59.4 FL (ref 37–54)
EGFRCR SERPLBLD CKD-EPI 2021: 53.9 ML/MIN/1.73
EOSINOPHIL # BLD AUTO: 0.09 10*3/MM3 (ref 0–0.4)
EOSINOPHIL NFR BLD AUTO: 0.8 % (ref 0.3–6.2)
ERYTHROCYTE [DISTWIDTH] IN BLOOD BY AUTOMATED COUNT: 17.1 % (ref 12.3–15.4)
GLOBULIN UR ELPH-MCNC: 3.4 GM/DL
GLUCOSE BLDC GLUCOMTR-MCNC: 103 MG/DL (ref 70–105)
GLUCOSE BLDC GLUCOMTR-MCNC: 113 MG/DL (ref 70–105)
GLUCOSE BLDC GLUCOMTR-MCNC: 114 MG/DL (ref 70–105)
GLUCOSE BLDC GLUCOMTR-MCNC: 90 MG/DL (ref 70–105)
GLUCOSE SERPL-MCNC: 87 MG/DL (ref 65–99)
HCT VFR BLD AUTO: 36 % (ref 34–46.6)
HGB BLD-MCNC: 11.5 G/DL (ref 12–15.9)
IMM GRANULOCYTES # BLD AUTO: 0.11 10*3/MM3 (ref 0–0.05)
IMM GRANULOCYTES NFR BLD AUTO: 1 % (ref 0–0.5)
LYMPHOCYTES # BLD AUTO: 0.87 10*3/MM3 (ref 0.7–3.1)
LYMPHOCYTES NFR BLD AUTO: 7.9 % (ref 19.6–45.3)
MAGNESIUM SERPL-MCNC: 1.7 MG/DL (ref 1.6–2.4)
MCH RBC QN AUTO: 30.9 PG (ref 26.6–33)
MCHC RBC AUTO-ENTMCNC: 31.9 G/DL (ref 31.5–35.7)
MCV RBC AUTO: 96.8 FL (ref 79–97)
MONOCYTES # BLD AUTO: 1.13 10*3/MM3 (ref 0.1–0.9)
MONOCYTES NFR BLD AUTO: 10.3 % (ref 5–12)
NEUTROPHILS NFR BLD AUTO: 79.8 % (ref 42.7–76)
NEUTROPHILS NFR BLD AUTO: 8.8 10*3/MM3 (ref 1.7–7)
NRBC BLD AUTO-RTO: 0 /100 WBC (ref 0–0.2)
PHOSPHATE SERPL-MCNC: 2.4 MG/DL (ref 2.5–4.5)
PLATELET # BLD AUTO: 112 10*3/MM3 (ref 140–450)
PMV BLD AUTO: 12.1 FL (ref 6–12)
POTASSIUM SERPL-SCNC: 3.9 MMOL/L (ref 3.5–5.2)
PROT SERPL-MCNC: 5.5 G/DL (ref 6–8.5)
RBC # BLD AUTO: 3.72 10*6/MM3 (ref 3.77–5.28)
SODIUM SERPL-SCNC: 141 MMOL/L (ref 136–145)
WBC NRBC COR # BLD AUTO: 11.02 10*3/MM3 (ref 3.4–10.8)

## 2024-07-13 PROCEDURE — 82948 REAGENT STRIP/BLOOD GLUCOSE: CPT

## 2024-07-13 PROCEDURE — 94761 N-INVAS EAR/PLS OXIMETRY MLT: CPT

## 2024-07-13 PROCEDURE — 80053 COMPREHEN METABOLIC PANEL: CPT | Performed by: NURSE PRACTITIONER

## 2024-07-13 PROCEDURE — 87205 SMEAR GRAM STAIN: CPT | Performed by: INTERNAL MEDICINE

## 2024-07-13 PROCEDURE — 87070 CULTURE OTHR SPECIMN AEROBIC: CPT | Performed by: INTERNAL MEDICINE

## 2024-07-13 PROCEDURE — 84100 ASSAY OF PHOSPHORUS: CPT | Performed by: NURSE PRACTITIONER

## 2024-07-13 PROCEDURE — 74176 CT ABD & PELVIS W/O CONTRAST: CPT

## 2024-07-13 PROCEDURE — 82948 REAGENT STRIP/BLOOD GLUCOSE: CPT | Performed by: HOSPITALIST

## 2024-07-13 PROCEDURE — 94799 UNLISTED PULMONARY SVC/PX: CPT

## 2024-07-13 PROCEDURE — 83735 ASSAY OF MAGNESIUM: CPT | Performed by: NURSE PRACTITIONER

## 2024-07-13 PROCEDURE — 87077 CULTURE AEROBIC IDENTIFY: CPT | Performed by: INTERNAL MEDICINE

## 2024-07-13 PROCEDURE — 87186 SC STD MICRODIL/AGAR DIL: CPT | Performed by: INTERNAL MEDICINE

## 2024-07-13 PROCEDURE — 94660 CPAP INITIATION&MGMT: CPT

## 2024-07-13 PROCEDURE — 71045 X-RAY EXAM CHEST 1 VIEW: CPT

## 2024-07-13 PROCEDURE — 94664 DEMO&/EVAL PT USE INHALER: CPT

## 2024-07-13 PROCEDURE — 85025 COMPLETE CBC W/AUTO DIFF WBC: CPT | Performed by: NURSE PRACTITIONER

## 2024-07-13 RX ADMIN — ANTI-FUNGAL POWDER MICONAZOLE NITRATE TALC FREE 1 APPLICATION: 1.42 POWDER TOPICAL at 09:01

## 2024-07-13 RX ADMIN — PANTOPRAZOLE SODIUM 40 MG: 40 TABLET, DELAYED RELEASE ORAL at 17:01

## 2024-07-13 RX ADMIN — AMIODARONE HYDROCHLORIDE 200 MG: 200 TABLET ORAL at 17:01

## 2024-07-13 RX ADMIN — Medication 10 ML: at 09:01

## 2024-07-13 RX ADMIN — IPRATROPIUM BROMIDE AND ALBUTEROL SULFATE 3 ML: .5; 3 SOLUTION RESPIRATORY (INHALATION) at 06:53

## 2024-07-13 RX ADMIN — ANTI-FUNGAL POWDER MICONAZOLE NITRATE TALC FREE 1 APPLICATION: 1.42 POWDER TOPICAL at 21:04

## 2024-07-13 RX ADMIN — AMIODARONE HYDROCHLORIDE 200 MG: 200 TABLET ORAL at 05:52

## 2024-07-13 RX ADMIN — HYDROCORTISONE ACETATE 25 MG: 25 SUPPOSITORY RECTAL at 08:49

## 2024-07-13 RX ADMIN — IPRATROPIUM BROMIDE AND ALBUTEROL SULFATE 3 ML: .5; 3 SOLUTION RESPIRATORY (INHALATION) at 10:42

## 2024-07-13 RX ADMIN — APIXABAN 10 MG: 5 TABLET, FILM COATED ORAL at 09:00

## 2024-07-13 RX ADMIN — ACETAMINOPHEN 650 MG: 325 TABLET, FILM COATED ORAL at 13:33

## 2024-07-13 RX ADMIN — METOPROLOL SUCCINATE 25 MG: 25 TABLET, FILM COATED, EXTENDED RELEASE ORAL at 21:21

## 2024-07-13 RX ADMIN — HYDROCORTISONE ACETATE 25 MG: 25 SUPPOSITORY RECTAL at 21:21

## 2024-07-13 RX ADMIN — IPRATROPIUM BROMIDE AND ALBUTEROL SULFATE 3 ML: .5; 3 SOLUTION RESPIRATORY (INHALATION) at 20:10

## 2024-07-13 RX ADMIN — POVIDONE-IODINE: 10 SOLUTION TOPICAL at 09:02

## 2024-07-13 RX ADMIN — PANTOPRAZOLE SODIUM 40 MG: 40 TABLET, DELAYED RELEASE ORAL at 09:00

## 2024-07-13 RX ADMIN — Medication 10 ML: at 21:06

## 2024-07-13 RX ADMIN — BUDESONIDE 0.5 MG: 0.5 INHALANT RESPIRATORY (INHALATION) at 06:49

## 2024-07-13 RX ADMIN — APIXABAN 10 MG: 5 TABLET, FILM COATED ORAL at 21:19

## 2024-07-13 RX ADMIN — BUDESONIDE 0.5 MG: 0.5 INHALANT RESPIRATORY (INHALATION) at 20:10

## 2024-07-13 NOTE — PROGRESS NOTES
"    PROGRESS NOTE      Patient Name: Ban Brennan  : 1955  MRN: [id number]  Primary Care Physician: Rut Pierce APRN  Date of admission: 2024    Patient Care Team:  Rut Pierce APRN as PCP - General (Nurse Practitioner)  Austin Brooks MD as Cardiologist (Cardiology)        Reason for Follow up     Acute kidney injury, hyperkalemia      Subjective     Seen and examined, NAD noted, renal functions stable, worsening bicarb level, ordered ABG, good UOP, 1.3L     Review of systems:    ROS was otherwise negative except as mentioned in the [place].       Personal History:     Past Medical History:   Past Medical History:   Diagnosis Date    Bilateral leg edema     Chronic respiratory failure with hypoxia, on home O2 therapy 2024    COPD (chronic obstructive pulmonary disease)     Morbid obesity with BMI of 40.0-44.9, adult 2010    Primary hypertension 2017    Pulmonary embolism     \"many years ago, was on warfarin\"    Sleep apnea        Surgical History:    History reviewed. No pertinent surgical history.    Family History: family history is not on file. Otherwise pertinent FHx was reviewed and unremarkable.     Social History:  reports that she has never smoked. She has never used smokeless tobacco. She reports that she does not drink alcohol and does not use drugs.    Medications:  Prior to Admission medications    Medication Sig Start Date End Date Taking? Authorizing Provider   Allergy Relief 180 MG tablet Take 1 tablet by mouth Daily. 24  Yes ProviderChadwick MD   bumetanide (BUMEX) 1 MG tablet Take 1 tablet by mouth 3 times a day. 24  Yes ProviderChadwick MD   docusate sodium (COLACE) 100 MG capsule Take 1 capsule by mouth Every 12 (Twelve) Hours. 24  Yes ProviderChadwick MD   furosemide (LASIX) 20 MG tablet Take 1 tablet by mouth Daily.   Yes ProviderChadwick MD   HYDROcodone-acetaminophen (NORCO)  MG per tablet Take 1 " tablet by mouth 3 times a day. 5/30/24  Yes Chadwick Johnson MD   lisinopril (PRINIVIL,ZESTRIL) 30 MG tablet Take 1 tablet by mouth Daily. 3/18/24  Yes Chadwick Johnson MD   meloxicam (MOBIC) 7.5 MG tablet Take 1 tablet by mouth Daily As Needed. 3/18/24  Yes Chadwick Johnson MD   metoprolol tartrate (LOPRESSOR) 50 MG tablet Take 1 tablet by mouth Daily.   Yes Chadwick Johnson MD   montelukast (SINGULAIR) 10 MG tablet Take 1 tablet by mouth every night at bedtime.   Yes Chadwick Johnson MD   omeprazole (priLOSEC) 20 MG capsule Take 1 capsule by mouth Daily. 3/18/24  Yes Provider, Historical, MD   oxybutynin XL (DITROPAN-XL) 10 MG 24 hr tablet Take 2 tablets by mouth Daily. 3/18/24  Yes Chadwick Johnson MD   potassium chloride (MICRO-K) 10 MEQ CR capsule Take 1 capsule by mouth Every 12 (Twelve) Hours. 3/18/24  Yes Chadwick Johnson MD   vitamin D (ERGOCALCIFEROL) 1.25 MG (50081 UT) capsule capsule Take 1 capsule by mouth 2 (Two) Times a Week. Monday and Thursday. 5/30/24  Yes Chadwick Johnson MD     Scheduled Meds:amiodarone, 200 mg, Oral, Q12H   Followed by  [START ON 7/23/2024] amiodarone, 200 mg, Oral, Daily  apixaban, 10 mg, Oral, BID   Followed by  [START ON 7/14/2024] apixaban, 5 mg, Oral, BID  budesonide, 0.5 mg, Nebulization, BID - RT  bumetanide, 1 mg, Oral, Daily  hydrocortisone, 25 mg, Rectal, BID  insulin lispro, 2-7 Units, Subcutaneous, 4x Daily With Meals & Nightly  ipratropium-albuterol, 3 mL, Nebulization, 4x Daily - RT  lidocaine, 20 mL, Infiltration, Once  metoprolol succinate XL, 25 mg, Oral, Q12H  miconazole, 1 Application, Topical, Q12H  pantoprazole, 40 mg, Oral, BID AC  povidone-iodine, , Topical, Daily  sodium chloride, 10 mL, Intravenous, Q12H  [Held by provider] spironolactone, 25 mg, Oral, Daily      Continuous Infusions:     PRN Meds:  acetaminophen **OR** acetaminophen    Calcium Replacement - Follow Nurse / BPA Driven Protocol    dextrose     dextrose    glucagon (human recombinant)    ipratropium-albuterol    Magnesium Standard Dose Replacement - Follow Nurse / BPA Driven Protocol    nitroglycerin    ondansetron ODT **OR** ondansetron    Phosphorus Replacement - Follow Nurse / BPA Driven Protocol    Potassium Replacement - Follow Nurse / BPA Driven Protocol    [COMPLETED] Insert Peripheral IV **AND** sodium chloride    sodium chloride    sodium chloride  Allergies:  No Known Allergies    Objective   Exam:     Vital Signs  Temp:  [97.7 °F (36.5 °C)-99.1 °F (37.3 °C)] 97.8 °F (36.6 °C)  Heart Rate:  [] 84  Resp:  [17-26] 20  BP: ()/(54-65) 115/54  SpO2:  [90 %-100 %] 100 %  on  Flow (L/min):  [2] 2;   Device (Oxygen Therapy): humidified;nasal cannula  Body mass index is 35.62 kg/m².  EXAM  General:  69 yr old  female, some respiratory distress  Head:      Normocephalic and atraumatic.    Eyes:      PERRL/EOM intact, conjunctivae and sclerae clear without nystagmus.    Neck:      No masses, thyromegaly,  trachea central   Lungs:    Clear bilaterally to auscultation.    Heart:      Regular rate and rhythm, no murmur no gallop  Abd:        Soft, nontender, not distended, bowel sounds positive, no shifting dullness.  Msk:        No deformity or scoliosis noted of thoracic or lumbar spine.    Pulses:   Pulses normal in all 4 extremities.    Extremities:        No cyanosis or clubbing--+ + edema.    Neuro:    Lethargic  Skin:       Intact without lesions or rashes.          Results Review:  I have personally reviewed most recent Data :  BMP @LABRCNT(creatinine:10)  CBC    Results from last 7 days   Lab Units 07/13/24  0240 07/12/24  0531 07/11/24  0437 07/10/24  0220 07/09/24  2051 07/09/24  0851 07/09/24  0256 07/08/24  0446 07/07/24  0506   WBC 10*3/mm3 11.02* 10.25 11.69* 11.11*  --   --  10.66 11.66* 16.38*   HEMOGLOBIN g/dL 11.5* 11.9* 12.4 12.5 12.6 13.1 12.6 13.3 14.2   PLATELETS 10*3/mm3 112* 88* 93* 85*  --   --  87* 100* 130*     CMP    Results from last 7 days   Lab Units 07/13/24  0240 07/12/24  1619 07/12/24  0531 07/11/24  0437 07/10/24  0220 07/09/24  1507 07/09/24  0256 07/08/24  0446 07/07/24  0506   SODIUM mmol/L 141  --  141 142 139  --  145 145 143   POTASSIUM mmol/L 3.9 4.3 3.5 4.5 4.5 3.9 3.6 3.0* 3.9   CHLORIDE mmol/L 97*  --  97* 95* 96*  --  98 97* 98   CO2 mmol/L 36.9*  --  38.5* 41.6* 37.9*  --  39.5* 38.0* 35.7*   BUN mg/dL 38*  --  39* 46* 54*  --  68* 72* 74*   CREATININE mg/dL 1.11*  --  0.99 1.08* 1.00  --  1.08* 1.19* 1.37*   GLUCOSE mg/dL 87  --  82 94 90  --  117* 98 102*   ALBUMIN g/dL 2.1*  --  1.9* 2.1* 2.0*  --  2.2* 2.2* 2.5*   BILIRUBIN mg/dL 2.9*  --  2.8* 2.4* 1.9*  --  1.7* 1.7* 1.8*   ALK PHOS U/L 96  --  94 85 80  --  84 96 125*   AST (SGOT) U/L 37*  --  45* 48* 46*  --  37* 36* 30   ALT (SGPT) U/L 19  --  18 19 17  --  17 15 17     ABG    Results from last 7 days   Lab Units 07/11/24  1045 07/06/24  1150   PH, ARTERIAL pH units 7.539* 7.392   PCO2, ARTERIAL mm Hg 49.9* 61.2*   PO2 ART mm Hg 66.3* 61.9*   O2 SATURATION ART % 94.6 90.2*   BASE EXCESS ART mmol/L 17.4* 9.3*     XR Chest 1 View    Result Date: 7/12/2024  Impression: 1.Right basilar chest tube appears in similar position. No definite pneumothorax. 2.Patchy basilar predominant airspace opacities and possible small pleural effusions, as before. 3.Stable cardiomegaly. Electronically Signed: Tanvir Melgar  7/12/2024 7:18 AM EDT  Workstation ID: [id number]    XR Chest 1 View    Result Date: 7/11/2024  Impression: Similar position of right-sided chest tube. No discrete pneumothorax. Similar small bilateral pleural effusions and bilateral airspace opacities, left greater than right. Electronically Signed: Manan Jefferson MD  7/11/2024 7:48 AM EDT  Workstation ID: [id number]    XR Chest 1 View    Result Date: 7/10/2024  Impression: 1. Stable chest tube overlying the right lung base. No pneumothorax. 2. Persistent bibasilar airspace disease and small bilateral  pleural effusions. 3. Stable cardiomegaly. Electronically Signed: Mina Wheeler MD  7/10/2024 7:11 AM EDT  Workstation ID: [id number]    XR Chest 1 View    Result Date: 7/9/2024  Impression: 1.Bilateral airspace disease again noted. 2.Cardiomegaly 3.Pigtail catheter chest tube right lung base. Electronically Signed: Bran Bernal MD  7/9/2024 11:37 AM EDT  Workstation ID: [id number]    XR Chest 1 View    Result Date: 7/6/2024  Impression: Successful placement of a right chest tube for right-sided hydropneumothorax, which demonstrates decreased/trace fluid component and similar/small gas component. Electronically Signed: Manan Jefferson MD  7/6/2024 2:35 PM EDT  Workstation ID: [id number]    CT Chest Without Contrast Diagnostic    Result Date: 7/6/2024  Impression: Moderate right-sided hydropneumothorax as seen on same-day radiograph. Adjacent partial collapse of the right lung with multifocal peripheral bronchial obstruction. There are dense airspace consolidations throughout the right lung, and findings are likely a combination of lung collapse and worsening infection. Persistent airspace opacities in the left lung consistent with infection, slightly worsened from prior CT. Small left pleural effusion. Prominent four-chamber cardiomegaly. Pulmonary artery enlargement consistent with pulmonary hypertension. Trace perihepatic ascites. Persistent bilateral flank and right chest wall edema. Electronically Signed: Javon Mortensen MD  7/6/2024 11:19 AM EDT  Workstation ID: [id number]    XR Chest 1 View    Result Date: 7/6/2024  Impression: 1. There is a definite small right-sided pneumothorax. The right lung is partially collapsed and consolidated. A small to moderate right pleural effusion is present indicating a hydropneumothorax. 2. Unchanged consolidation in the left lung with a small pleural effusion. 3. Cardiomegaly. 4.The abnormal results were discussed with the nurse (Dianne) by telephone. The referring clinician will be  contacted. Electronically Signed: Jean Claude Bobby MD  7/6/2024 9:56 AM EDT  Workstation ID: [id number]    XR Chest 1 View    Result Date: 7/6/2024  Impression: 1. Questionable right-sided pneumothorax. I would recommend a repeat chest x-ray as the patient is rotated. 2. Persistent patchy consolidation in the left mid and lower lung zones suspicious for pneumonia. There is a small right pleural effusion. 3. Abnormal consolidation in the right lung with a small to moderate pleural effusion. 4. Cardiomegaly. 5. The abnormal results were discussed with the nurse in the CVICU (Dianne) by telephone and the referring clinician will be contacted. Electronically Signed: Jean Claude Bobby MD  7/6/2024 9:27 AM EDT  Workstation ID: [id number]    XR Chest 1 View    Result Date: 7/5/2024  Impression: Marked cardiomegaly with bilateral patchy airspace disease and pleural effusions. Findings are similar to the previous exam. Electronically Signed: Amirah Coto MD  7/5/2024 9:14 AM EDT  Workstation ID: [id number]     Results for orders placed during the hospital encounter of 06/30/24    Adult Transthoracic Echo Complete w/ Color, Spectral and Contrast if Necessary Per Protocol    Interpretation Summary    Left ventricular ejection fraction appears to be 31 - 35%.    Left ventricular diastolic dysfunction is noted.    The left atrial cavity is dilated.    Moderate aortic valve stenosis is present. Mean/peak gradient of 14/24 mmHg.  Dimensionless index 0.36    Moderate to severe mitral valve regurgitation is present.    Estimated right ventricular systolic pressure from tricuspid regurgitation is normal (<35 mmHg).        Assessment & Plan   Assessment and Plan:         Atrial fibrillation with RVR    Primary hypertension    Morbid obesity with BMI of 40.0-44.9, adult    Right bundle branch block    Acute deep vein thrombosis (DVT) of both lower extremities    ARABELLA (acute kidney injury)    Acute hyperkalemia    Sepsis    Acute UTI    Acute systolic  congestive heart failure    Rhinovirus infection    Multifocal pneumonia    Chronic respiratory failure with hypoxia, on home O2 therapy    Skin ulcer of right great toe    Skin ulcer of left great toe    SEDRICK (obstructive sleep apnea)    Aortic stenosis    Mitral regurgitation    Acute HFrEF (heart failure with reduced ejection fraction)    COPD (chronic obstructive pulmonary disease)    ASSESSMENT:  Acute kidney injury, with baseline creatinine roughly 0.8-1.0mg/dL  Hyperkalemia  A-fib with RVR  Acute on chronic heart failure, with EF 31-35%, diastolic dysfunction, moderate AS and moderate to severe MR as per echo from 6/30/24  Bilateral DVT  Severe sepsis/LLE cellulitis, acute cystitis  Multifocal pneumonia    PLAN :     Renal function unchanged/stable today from prior reading. Suspect she may initially have had some acute cardiorenal component, now most likely has some ATN secondary to sepsis, elevated Vanc level (was 22.4 on 7/3), and some prolonged prerenal state with hemodynamic insults, arrhythmias. Had no significant proteinuria  Bicarbonate level is stable in last few days in fact slightly better no change in medications needed  Continue Bumex 1 mg a day continue low-dose Aldactone potassium level is acceptable  Alkalosis slightly better than yesterday and potassium level is acceptable  Sodium level is acceptable at this time  To me volume acceptable but still has some peripheral edema  Blood pressure reviewed, stable. Continue to monitor   Antibiotics per ID, just finished 7 day course of Keflex and Doxy. Previously was on Vanc. Now not on any abx  Daily labs   We will continue to follow       Charan Krueger MD  [facility]  7/13/2024  08:29 EDT

## 2024-07-13 NOTE — PLAN OF CARE
Goal Outcome Evaluation:    Patient came in here with a diagnosis of Afib with RVR.   Last night patient received a dose of Digoxin 500 mcg IV.   Also she triggered sepsis on my shift- Dr. Hayes made aware no new order was received since patient already completed her ABX.     Plan of care is ongoing.

## 2024-07-13 NOTE — PLAN OF CARE
Goal Outcome Evaluation:           Progress: improving  Outcome Evaluation: Pt resting this shift, c/o pain x1 pain relieved with Tylenol. This nurse talked to Charge nurse this am, due to pt needing a low air pump, Charge reached out to  and per report we do not have any pumps available. Pt has been turned every 2 hours, purple wedge has been used, heel boots, pillows. Per Cardio ok to hold Metoprolol and Bumex as pt b/p has been on the soft side. This nurse also changed pt chest tube site r/t serosanguinous drainage. Site did not have signs of infection, but this nurse was given report that dressing was foul odor. Dr. Sung notified during rounds and requeted a wound culture. Wound care done to jolene thighs per doctor's order. Call light within reach, bed alarm in place.

## 2024-07-13 NOTE — NURSING NOTE
Daily Care Plan Summary: Heart Failure    Diuretic in use (IV or PO):   No        Daily weight (up or down): the same      Output > Intake (yes/no):Y      O2 Requirements (current, any change?):2L      Symptoms noted with Activity (Respiratory Tolerance, functional state):    SOA noted on exertion      Anticipated Discharge Plans:

## 2024-07-13 NOTE — CONSULTS
"Nutrition Services    Patient Name: Ban Brennan  YOB: 1955  MRN: [id number]  Admission date: 6/30/2024    Comment:  -- Continue current diet and encourage good PO intakes       CLINICAL NUTRITION ASSESSMENT      Reason for Assessment Follow up protocol   7/5: wounds     H&P      Past Medical History:   Diagnosis Date    Bilateral leg edema     Chronic respiratory failure with hypoxia, on home O2 therapy 07/01/2024    COPD (chronic obstructive pulmonary disease)     Morbid obesity with BMI of 40.0-44.9, adult 11/08/2010    Primary hypertension 03/01/2017    Pulmonary embolism     \"many years ago, was on warfarin\"    Sleep apnea        History reviewed. No pertinent surgical history.     Current Problems   A-fib with RVR  -Bipap  -Code 7/5    ARABELLA  - Nephrology following    SOB  - pulmonology following    Chronic leg edema  -immobile x 1 year    Multiple wounds  -WOCN following  -podiatry following    Possible uterine mass     CHF  HTN     Encounter Information        Trending Narrative     7/13: Since last RD review, patient diet advanced to solids.  RD unable to see patient in person on this date.      7/5:  Pt presented to ED via EMS on 6/30 with complaints of increasing SOB over the few days prior to admit as well as chronic leg edema with worsening swelling and redness. Upon assessment, pt found to be in A-fib and hyperkalemic. Pt mental status progressively worsened overnight per MD and pt put on bipap. Code called this am and pt possible intubation. Pt is NPO currently due to change in mental status. NGT in place. MAP avg 68.5, Lactate 1.8. On Levo. RD will provide EN recommendations and est needs if EN is desired. See below.      Anthropometrics        Current Height, Weight Height: 149.9 cm (59\")  Weight: 80 kg (176 lb 5.9 oz) (07/13/24 0500)       Usual Body Weight (UBW) Unable to obtain from patient.        Trending Weight Hx     This admission: 7/13: 176#, 14.5% weight loss since last " RD review, -22.0L since admission per I/Os  7/5: 206#             PTA: 7/5: No recent weight history documented PTA    Wt Readings from Last 30 Encounters:   07/13/24 0500 80 kg (176 lb 5.9 oz)   07/12/24 0500 82 kg (180 lb 12.4 oz)   07/11/24 0500 83.3 kg (183 lb 10.3 oz)   07/10/24 0456 85.9 kg (189 lb 6 oz)   07/09/24 0500 87 kg (191 lb 12.8 oz)   07/08/24 0535 85.3 kg (188 lb 0.8 oz)   07/07/24 2116 88.1 kg (194 lb 3.6 oz)   07/07/24 0400 88.1 kg (194 lb 3.6 oz)   07/06/24 0500 95 kg (209 lb 7 oz)   07/05/24 0554 93.7 kg (206 lb 9.1 oz)   07/04/24 0532 90.9 kg (200 lb 6.4 oz)   07/03/24 0544 90.2 kg (198 lb 13.7 oz)   07/02/24 0600 92.3 kg (203 lb 7.8 oz)   07/01/24 0832 96.2 kg (212 lb)   06/30/24 2121 96.4 kg (212 lb 8.4 oz)   06/30/24 1650 108 kg (239 lb)   03/01/17 1306 112 kg (247 lb)      BMI kg/m2 Body mass index is 35.62 kg/m².       Labs        Pertinent Labs    Results from last 7 days   Lab Units 07/13/24  0240 07/12/24  1619 07/12/24  0531 07/11/24  0437   SODIUM mmol/L 141  --  141 142   POTASSIUM mmol/L 3.9 4.3 3.5 4.5   CHLORIDE mmol/L 97*  --  97* 95*   CO2 mmol/L 36.9*  --  38.5* 41.6*   BUN mg/dL 38*  --  39* 46*   CREATININE mg/dL 1.11*  --  0.99 1.08*   CALCIUM mg/dL 8.8  --  9.0 9.1   BILIRUBIN mg/dL 2.9*  --  2.8* 2.4*   ALK PHOS U/L 96  --  94 85   ALT (SGPT) U/L 19  --  18 19   AST (SGOT) U/L 37*  --  45* 48*   GLUCOSE mg/dL 87  --  82 94     Results from last 7 days   Lab Units 07/13/24  0240 07/12/24  0531 07/11/24  0437   MAGNESIUM mg/dL 1.7 1.7 1.9   PHOSPHORUS mg/dL 2.4* 2.7 2.5   HEMOGLOBIN g/dL 11.5* 11.9* 12.4   HEMATOCRIT % 36.0 37.5 39.1     Lab Results   Component Value Date    HGBA1C 6.14 (H) 07/01/2024        Medications    Scheduled Medications amiodarone, 200 mg, Oral, Q12H   Followed by  [START ON 7/23/2024] amiodarone, 200 mg, Oral, Daily  apixaban, 10 mg, Oral, BID   Followed by  [START ON 7/14/2024] apixaban, 5 mg, Oral, BID  budesonide, 0.5 mg, Nebulization, BID -  RT  bumetanide, 1 mg, Oral, Daily  hydrocortisone, 25 mg, Rectal, BID  insulin lispro, 2-7 Units, Subcutaneous, 4x Daily With Meals & Nightly  ipratropium-albuterol, 3 mL, Nebulization, 4x Daily - RT  lidocaine, 20 mL, Infiltration, Once  metoprolol succinate XL, 25 mg, Oral, Q12H  miconazole, 1 Application, Topical, Q12H  pantoprazole, 40 mg, Oral, BID AC  povidone-iodine, , Topical, Daily  sodium chloride, 10 mL, Intravenous, Q12H  [Held by provider] spironolactone, 25 mg, Oral, Daily        Infusions        PRN Medications   acetaminophen **OR** acetaminophen    Calcium Replacement - Follow Nurse / BPA Driven Protocol    dextrose    dextrose    glucagon (human recombinant)    ipratropium-albuterol    Magnesium Standard Dose Replacement - Follow Nurse / BPA Driven Protocol    nitroglycerin    ondansetron ODT **OR** ondansetron    Phosphorus Replacement - Follow Nurse / BPA Driven Protocol    Potassium Replacement - Follow Nurse / BPA Driven Protocol    [COMPLETED] Insert Peripheral IV **AND** sodium chloride    sodium chloride    sodium chloride     Physical Findings        Trending Physical   Appearance, NFPE 7/13: NAV    7/5: Unable to accurately assess due to severe level of edema   --  Edema  3+ generalized, abdomen, hands, abdomen, legs, knees, ankles, feet   2+ arms      Bowel Function Last documented BM 7/11 (x 2 days ago)     Tubes No feeding tube      Chewing/Swallowing Unknown baseline      Skin R foot ulcer  R great toe - eschar  L upper thigh - 2 open wounds  R post thigh - mid dermal wound  Abdominal fold, perineal, sacral, buttocks - MASD  Sacrum - Deep tissue PI    WOCN notes 7/1 and 7/10     --  Current Nutrition Orders & Evaluation of Intake       Oral Nutrition     Food Allergies NKFA   Current PO Diet Diet: Cardiac, Diabetic; Healthy Heart (2-3 Na+); Consistent Carbohydrate; Fluid Consistency: Thin (IDDSI 0)   Supplement Boost Plus BID   PO Evaluation     Trending % PO Intake 7/13: 50% x 2  documented meals since diet advance,ent   7/5: NPO   --  Nutritional Risk Screening        NRS-2002 Score          Nutrition Diagnosis         Nutrition Dx Problem 1 Inadequate energy intakes related to intake less than needs as evidenced by PO intakes 50% since admission.        Nutrition Dx Problem 2        Intervention Goal         Intervention Goal(s) PO intakes at least 75%     Nutrition Intervention        RD Action Monitor PO intakes      Nutrition Prescription          Diet Prescription Heart Healthy   Supplement Prescription Boost Plus BID    --  Monitor/Evaluation        Monitor Per protocol, I&O, Pertinent labs, Weight, Skin status, GI status, Symptoms, POC/GOC, Hemodynamic stability     Electronically signed by:  Lakshmi Ledbetter RD  07/13/24 08:26 EDT

## 2024-07-13 NOTE — PROGRESS NOTES
[facility] MEDICINE SERVICE  DAILY PROGRESS NOTE    NAME: Ban Brennan  : 1955  MRN: [id number]      LOS: 13 days     PROVIDER OF SERVICE: Librado Villagomez MD    Chief Complaint: Atrial fibrillation with RVR    Subjective:     Interval History:  History taken from: patient chart RN  Received digoxin overnight   LLQ abdominal pain     Review of Systems:   Review of Systems  All negative except as aboe   Objective:     Vital Signs  Temp:  [97.7 °F (36.5 °C)-99.1 °F (37.3 °C)] 98.6 °F (37 °C)  Heart Rate:  [] 92  Resp:  [17-26] 24  BP: ()/(54-65) 97/54  Flow (L/min):  [2] 2   Body mass index is 35.62 kg/m².    Physical Exam  Physical Exam  AOx3 NAD  Irregular controlled S1-S2 normal  Lungs with fair air entry  Abdomen left lower quadrant tenderness no guarding no rigidity bowel sounds positive  Scheduled Meds   amiodarone, 200 mg, Oral, Q12H   Followed by  [START ON 2024] amiodarone, 200 mg, Oral, Daily  apixaban, 10 mg, Oral, BID   Followed by  [START ON 2024] apixaban, 5 mg, Oral, BID  budesonide, 0.5 mg, Nebulization, BID - RT  bumetanide, 1 mg, Oral, Daily  hydrocortisone, 25 mg, Rectal, BID  insulin lispro, 2-7 Units, Subcutaneous, 4x Daily With Meals & Nightly  ipratropium-albuterol, 3 mL, Nebulization, 4x Daily - RT  lidocaine, 20 mL, Infiltration, Once  metoprolol succinate XL, 25 mg, Oral, Q12H  miconazole, 1 Application, Topical, Q12H  pantoprazole, 40 mg, Oral, BID AC  povidone-iodine, , Topical, Daily  sodium chloride, 10 mL, Intravenous, Q12H  [Held by provider] spironolactone, 25 mg, Oral, Daily       PRN Meds     acetaminophen **OR** acetaminophen    Calcium Replacement - Follow Nurse / BPA Driven Protocol    dextrose    dextrose    glucagon (human recombinant)    ipratropium-albuterol    Magnesium Standard Dose Replacement - Follow Nurse / BPA Driven Protocol    nitroglycerin    ondansetron ODT **OR** ondansetron    Phosphorus Replacement - Follow Nurse /  BPA Driven Protocol    Potassium Replacement - Follow Nurse / BPA Driven Protocol    [COMPLETED] Insert Peripheral IV **AND** sodium chloride    sodium chloride    sodium chloride   Infusions         Diagnostic Data    Results from last 7 days   Lab Units 07/13/24  0240   WBC 10*3/mm3 11.02*   HEMOGLOBIN g/dL 11.5*   HEMATOCRIT % 36.0   PLATELETS 10*3/mm3 112*   GLUCOSE mg/dL 87   CREATININE mg/dL 1.11*   BUN mg/dL 38*   SODIUM mmol/L 141   POTASSIUM mmol/L 3.9   AST (SGOT) U/L 37*   ALT (SGPT) U/L 19   ALK PHOS U/L 96   BILIRUBIN mg/dL 2.9*   ANION GAP mmol/L 7.1       XR Chest 1 View    Result Date: 7/13/2024  Impression: 1. Stable chest tube overlying the right lung base. No pneumothorax. 2. Persistent small bilateral pleural effusions with bibasilar airspace disease which may relate to atelectasis and/or pneumonia. 3. Stable cardiomegaly with central pulmonary vascular congestion and findings of pulmonary edema. Electronically Signed: Mina Wheeler MD  7/13/2024 11:24 AM EDT  Workstation ID: [id number]    XR Chest 1 View    Result Date: 7/12/2024  Impression: 1.Right basilar chest tube appears in similar position. No definite pneumothorax. 2.Patchy basilar predominant airspace opacities and possible small pleural effusions, as before. 3.Stable cardiomegaly. Electronically Signed: Tanvir Melgar  7/12/2024 7:18 AM EDT  Workstation ID: [id number]       I reviewed the patient's new clinical results.  I reviewed the patient's new imaging results and agree with the interpretation.    Assessment/Plan:     Active and Resolved Problems  Active Hospital Problems    Diagnosis  POA    **Atrial fibrillation with RVR [I48.91]  Yes    COPD (chronic obstructive pulmonary disease) [J44.9]  Yes    Aortic stenosis [I35.0]  Yes    Mitral regurgitation [I34.0]  Yes    Acute HFrEF (heart failure with reduced ejection fraction) [I50.21]  Yes    Right bundle branch block [I45.10]  Yes    Acute deep vein thrombosis (DVT) of both lower  extremities [I82.403]  Yes    ARABELLA (acute kidney injury) [N17.9]  Yes    Acute hyperkalemia [E87.5]  Yes    Sepsis [A41.9]  Yes    Acute UTI [N39.0]  Yes    Acute systolic congestive heart failure [I50.21]  Yes    Rhinovirus infection [B34.8]  Yes    Multifocal pneumonia [J18.9]  Yes    Chronic respiratory failure with hypoxia, on home O2 therapy [J96.11, Z99.81]  Not Applicable    Skin ulcer of right great toe [L97.519]  Yes    Skin ulcer of left great toe [L97.529]  Yes    SEDRICK (obstructive sleep apnea) [G47.33]  Yes    Primary hypertension [I10]  Yes    Morbid obesity with BMI of 40.0-44.9, adult [E66.01, Z68.41]  Not Applicable      Resolved Hospital Problems   No resolved problems to display.       69-year-old female with history of hypertension, HFrEF, lower extremity lymphedema admitted to [facility] 6/30 progressive shortness of breath        #Bilateral lower extremity DVT  #History of reported PE  Seen by hematology appreciate recommendation.  Factor V and prothrombin gene mutation negative  Continue Eliquis 10 mg twice daily for 7 days followed by 5 mg twice daily  Outpatient follow-up with hematology     #Acute on chronic HFrEF  #A-fib with RVR.  New onset  #Severe MR  Cardiology follow-up  Started on amiodarone GGT transitioned to p.o. tapering dose  TTE with EF 30 to 35%.  Defer ischemic workup to cardiology  On Toprol 25 twice daily.  A.m. dose held given soft blood pressure  Bumex 1 mg daily  Monitor I's and O's  Hold Aldactone given soft BP   Lisinopril on hold given ARABELLA  Received digoxin overnight  CT abdomen pelvis given left lower quadrant abdominal tenderness     #Right-sided hydropneumothorax  Chest tube placed in ICU 7/6  Pulmonary managing  Monitor out put   Daily CXR      #Severe sepsis  #UTI  #Left lower extremity cellulitis and wound  #Rhinovirus infection  #Multifocal pneumonia  #Chronic venous stasis lower extremity  Seen by infectious disease appreciate recommendations  Finished Keflex  and doxycycline for 7 day course   Seen by podiatry no surgical intervention recommended  Midodrine has been weaned off monitor blood pressure     #Acute hypoxic respiratory failure  Multifactorial pneumonia CHF  Antibiotics and diuretics as above  Wean off supplemental oxygen as tolerated.     #DM2  A1c 6.14  Continue ISS lispro  POC ACHS     #ARABELLA  #Metabolic Acidosis   Suspect ATN in setting of sepsis   Diuretics as above  BMP daily  Avoid nephrotoxic drugs   lisinopril on hold      #uterine mass  Large subserosal uterine fundal fibroid on pelvic ultrasound  GYN follow-up as outpatient      #hematochezia  GI on board noted plan for outpatient colonoscopy  Etiology suspected to be hemorrhoids  Topical steroids started  Monitor H&H     VTE Prophylaxis:  Pharmacologic VTE prophylaxis orders are present.         Code status is   Code Status and Medical Interventions:   Ordered at: 06/30/24 2120     Code Status (Patient has no pulse and is not breathing):    CPR (Attempt to Resuscitate)     Medical Interventions (Patient has pulse or is breathing):    Full Support       Plan for disposition: 2 to 3 days    Time: 30 minutes    Signature: Electronically signed by Librado Villagomez MD, 07/13/24, 12:03 EDT.  [facility] Hospitalist Team

## 2024-07-13 NOTE — PROGRESS NOTES
"PULMONARY CRITICAL CARE PROGRESS  NOTE      PATIENT IDENTIFICATION:  Name: Ban Brennan  MRN: [id number]  :  1955     Age: 69 y.o.  Sex: female    DATE OF Note:  2024   Referring Physician: Sesar Solorzano DO                  Subjective:   No new issue   On 2 L nasal cannula,no SOB, no chest or abd pain, CT patent, no air leak, no bowel or bladder issues      Objective:  tMax 24 hrs: Temp (24hrs), Av.5 °F (36.9 °C), Min:97.7 °F (36.5 °C), Max:99.1 °F (37.3 °C)      Vitals Ranges:   Temp:  [97.7 °F (36.5 °C)-99.1 °F (37.3 °C)] 98.6 °F (37 °C)  Heart Rate:  [] 92  Resp:  [17-26] 24  BP: ()/(54-65) 97/54    Intake and Output Last 3 Shifts:   I/O last 3 completed shifts:  In: 360 [P.O.:360]  Out: 99494 [Urine:98932; Chest Tube:65]    Exam:  BP 97/54   Pulse 92   Temp 98.6 °F (37 °C) (Oral)   Resp 24   Ht 149.9 cm (59\")   Wt 80 kg (176 lb 5.9 oz)   SpO2 100%   BMI 35.62 kg/m²     General Appearance:   Alert  HEENT:  Normocephalic, without obvious abnormality. Conjunctivae/corneas clear.  Normal external ear canals. Nares normal, no drainage     Neck:  Supple, symmetrical, trachea midline. No JVD.  Lungs /Chest wall:   Bilateral basal rhonchi, respirations unlabored, symmetrical wall movement.   CT in place minimal drainage  Heart:  Regular rate and rhythm, systolic murmur PMI left sternal border  Abdomen: Soft, nontender, no masses, no organomegaly.    Extremities: Trace edema, no clubbing or cyanosis        Medications:  amiodarone, 200 mg, Oral, Q12H   Followed by  [START ON 2024] amiodarone, 200 mg, Oral, Daily  apixaban, 10 mg, Oral, BID   Followed by  [START ON 2024] apixaban, 5 mg, Oral, BID  budesonide, 0.5 mg, Nebulization, BID - RT  bumetanide, 1 mg, Oral, Daily  hydrocortisone, 25 mg, Rectal, BID  insulin lispro, 2-7 Units, Subcutaneous, 4x Daily With Meals & Nightly  ipratropium-albuterol, 3 mL, Nebulization, 4x Daily - RT  lidocaine, 20 mL, " Infiltration, Once  metoprolol succinate XL, 25 mg, Oral, Q12H  miconazole, 1 Application, Topical, Q12H  pantoprazole, 40 mg, Oral, BID AC  povidone-iodine, , Topical, Daily  sodium chloride, 10 mL, Intravenous, Q12H  [Held by provider] spironolactone, 25 mg, Oral, Daily        Infusion:        PRN:    acetaminophen **OR** acetaminophen    Calcium Replacement - Follow Nurse / BPA Driven Protocol    dextrose    dextrose    glucagon (human recombinant)    ipratropium-albuterol    Magnesium Standard Dose Replacement - Follow Nurse / BPA Driven Protocol    nitroglycerin    ondansetron ODT **OR** ondansetron    Phosphorus Replacement - Follow Nurse / BPA Driven Protocol    Potassium Replacement - Follow Nurse / BPA Driven Protocol    [COMPLETED] Insert Peripheral IV **AND** sodium chloride    sodium chloride    sodium chloride  Data Review:  All labs (24hrs):   Recent Results (from the past 24 hour(s))   Potassium    Collection Time: 07/12/24  4:19 PM    Specimen: Blood   Result Value Ref Range    Potassium 4.3 3.5 - 5.2 mmol/L   POC Glucose Finger 4x Daily Before Meals & at Bedtime    Collection Time: 07/12/24  5:35 PM    Specimen: Finger; Blood   Result Value Ref Range    Glucose 91 70 - 105 mg/dL   POC Glucose Once    Collection Time: 07/12/24  8:44 PM    Specimen: Blood   Result Value Ref Range    Glucose 106 (H) 70 - 105 mg/dL   Magnesium    Collection Time: 07/13/24  2:40 AM    Specimen: Blood   Result Value Ref Range    Magnesium 1.7 1.6 - 2.4 mg/dL   Phosphorus    Collection Time: 07/13/24  2:40 AM    Specimen: Blood   Result Value Ref Range    Phosphorus 2.4 (L) 2.5 - 4.5 mg/dL   Comprehensive Metabolic Panel    Collection Time: 07/13/24  2:40 AM    Specimen: Blood   Result Value Ref Range    Glucose 87 65 - 99 mg/dL    BUN 38 (H) 8 - 23 mg/dL    Creatinine 1.11 (H) 0.57 - 1.00 mg/dL    Sodium 141 136 - 145 mmol/L    Potassium 3.9 3.5 - 5.2 mmol/L    Chloride 97 (L) 98 - 107 mmol/L    CO2 36.9 (H) 22.0 - 29.0  mmol/L    Calcium 8.8 8.6 - 10.5 mg/dL    Total Protein 5.5 (L) 6.0 - 8.5 g/dL    Albumin 2.1 (L) 3.5 - 5.2 g/dL    ALT (SGPT) 19 1 - 33 U/L    AST (SGOT) 37 (H) 1 - 32 U/L    Alkaline Phosphatase 96 39 - 117 U/L    Total Bilirubin 2.9 (H) 0.0 - 1.2 mg/dL    Globulin 3.4 gm/dL    A/G Ratio 0.6 g/dL    BUN/Creatinine Ratio 34.2 (H) 7.0 - 25.0    Anion Gap 7.1 5.0 - 15.0 mmol/L    eGFR 53.9 (L) >60.0 mL/min/1.73   CBC Auto Differential    Collection Time: 07/13/24  2:40 AM    Specimen: Blood   Result Value Ref Range    WBC 11.02 (H) 3.40 - 10.80 10*3/mm3    RBC 3.72 (L) 3.77 - 5.28 10*6/mm3    Hemoglobin 11.5 (L) 12.0 - 15.9 g/dL    Hematocrit 36.0 34.0 - 46.6 %    MCV 96.8 79.0 - 97.0 fL    MCH 30.9 26.6 - 33.0 pg    MCHC 31.9 31.5 - 35.7 g/dL    RDW 17.1 (H) 12.3 - 15.4 %    RDW-SD 59.4 (H) 37.0 - 54.0 fl    MPV 12.1 (H) 6.0 - 12.0 fL    Platelets 112 (L) 140 - 450 10*3/mm3    Neutrophil % 79.8 (H) 42.7 - 76.0 %    Lymphocyte % 7.9 (L) 19.6 - 45.3 %    Monocyte % 10.3 5.0 - 12.0 %    Eosinophil % 0.8 0.3 - 6.2 %    Basophil % 0.2 0.0 - 1.5 %    Immature Grans % 1.0 (H) 0.0 - 0.5 %    Neutrophils, Absolute 8.80 (H) 1.70 - 7.00 10*3/mm3    Lymphocytes, Absolute 0.87 0.70 - 3.10 10*3/mm3    Monocytes, Absolute 1.13 (H) 0.10 - 0.90 10*3/mm3    Eosinophils, Absolute 0.09 0.00 - 0.40 10*3/mm3    Basophils, Absolute 0.02 0.00 - 0.20 10*3/mm3    Immature Grans, Absolute 0.11 (H) 0.00 - 0.05 10*3/mm3    nRBC 0.0 0.0 - 0.2 /100 WBC   POC Glucose 4x Daily Before Meals & at Bedtime    Collection Time: 07/13/24  7:05 AM    Specimen: Blood   Result Value Ref Range    Glucose 90 70 - 105 mg/dL   POC Glucose Once    Collection Time: 07/13/24 11:45 AM    Specimen: Blood   Result Value Ref Range    Glucose 103 70 - 105 mg/dL        Imaging:  XR Chest 1 View  Narrative: XR CHEST 1 VW    Date of Exam: 7/13/2024 6:24 AM EDT    Indication: Chest tube, follow-up    Comparison: 7/12/2024    Findings:  Stable cardiomegaly with central  pulmonary vascular congestion and findings of pulmonary edema. Persistent small bilateral pleural effusions with bibasilar airspace disease which may relate to atelectasis and/or pneumonia. Stable chest tube overlying the   right lung base. Negative for pneumothorax. Osseous structures appear intact.  Impression: Impression:  1. Stable chest tube overlying the right lung base. No pneumothorax.  2. Persistent small bilateral pleural effusions with bibasilar airspace disease which may relate to atelectasis and/or pneumonia.  3. Stable cardiomegaly with central pulmonary vascular congestion and findings of pulmonary edema.    Electronically Signed: Mina Wheeler MD    7/13/2024 11:24 AM EDT    Workstation ID: [id number]       ASSESSMENT:  Hydropneumothorax s/p CT  COPD  SEDRICK  Systolic CHF  A-fib with RVR  Aortic stenosis  Mitral valve regurgitation   R BBB  Hx bilateral DVT  HTN  Morbid obesity         PLAN:  Encourage OOB during the day   Antibiotics  Bronchodilators  Inhaled corticosteroids  Mucomyst  Incentive spirometer  Diuresis and monitor NANCY's   CT management   Electrolytes/ glycemic control  DVT prophylaxis-Apixaban        Total Critical care time in direct medical management (   ) minutes, This time specifically excludes time spent performing procedures.    Donna Sung MD   7/13/2024  12:59 EDT

## 2024-07-14 ENCOUNTER — APPOINTMENT (OUTPATIENT)
Dept: GENERAL RADIOLOGY | Facility: HOSPITAL | Age: 69
End: 2024-07-14
Payer: MEDICARE

## 2024-07-14 ENCOUNTER — APPOINTMENT (OUTPATIENT)
Dept: CT IMAGING | Facility: HOSPITAL | Age: 69
End: 2024-07-14
Payer: MEDICARE

## 2024-07-14 LAB
ALBUMIN SERPL-MCNC: 2.1 G/DL (ref 3.5–5.2)
ALBUMIN/GLOB SERPL: 0.6 G/DL
ALP SERPL-CCNC: 115 U/L (ref 39–117)
ALT SERPL W P-5'-P-CCNC: 23 U/L (ref 1–33)
ANION GAP SERPL CALCULATED.3IONS-SCNC: 5.7 MMOL/L (ref 5–15)
ANISOCYTOSIS BLD QL: NORMAL
AST SERPL-CCNC: 43 U/L (ref 1–32)
BASOPHILS # BLD AUTO: 0.01 10*3/MM3 (ref 0–0.2)
BASOPHILS NFR BLD AUTO: 0.1 % (ref 0–1.5)
BILIRUB SERPL-MCNC: 2.7 MG/DL (ref 0–1.2)
BUN SERPL-MCNC: 30 MG/DL (ref 8–23)
BUN/CREAT SERPL: 30.3 (ref 7–25)
CALCIUM SPEC-SCNC: 8.8 MG/DL (ref 8.6–10.5)
CHLORIDE SERPL-SCNC: 97 MMOL/L (ref 98–107)
CO2 SERPL-SCNC: 38.3 MMOL/L (ref 22–29)
CREAT SERPL-MCNC: 0.99 MG/DL (ref 0.57–1)
DEPRECATED RDW RBC AUTO: 59.4 FL (ref 37–54)
EGFRCR SERPLBLD CKD-EPI 2021: 61.9 ML/MIN/1.73
EOSINOPHIL # BLD AUTO: 0.17 10*3/MM3 (ref 0–0.4)
EOSINOPHIL NFR BLD AUTO: 1.8 % (ref 0.3–6.2)
ERYTHROCYTE [DISTWIDTH] IN BLOOD BY AUTOMATED COUNT: 16.9 % (ref 12.3–15.4)
GLOBULIN UR ELPH-MCNC: 3.7 GM/DL
GLUCOSE BLDC GLUCOMTR-MCNC: 102 MG/DL (ref 70–105)
GLUCOSE BLDC GLUCOMTR-MCNC: 122 MG/DL (ref 70–105)
GLUCOSE BLDC GLUCOMTR-MCNC: 91 MG/DL (ref 70–105)
GLUCOSE SERPL-MCNC: 75 MG/DL (ref 65–99)
HCT VFR BLD AUTO: 36.5 % (ref 34–46.6)
HGB BLD-MCNC: 11.2 G/DL (ref 12–15.9)
IMM GRANULOCYTES # BLD AUTO: 0.06 10*3/MM3 (ref 0–0.05)
IMM GRANULOCYTES NFR BLD AUTO: 0.6 % (ref 0–0.5)
LYMPHOCYTES # BLD AUTO: 0.86 10*3/MM3 (ref 0.7–3.1)
LYMPHOCYTES NFR BLD AUTO: 8.9 % (ref 19.6–45.3)
MAGNESIUM SERPL-MCNC: 1.8 MG/DL (ref 1.6–2.4)
MCH RBC QN AUTO: 30.3 PG (ref 26.6–33)
MCHC RBC AUTO-ENTMCNC: 30.7 G/DL (ref 31.5–35.7)
MCV RBC AUTO: 98.6 FL (ref 79–97)
MONOCYTES # BLD AUTO: 0.88 10*3/MM3 (ref 0.1–0.9)
MONOCYTES NFR BLD AUTO: 9.2 % (ref 5–12)
NEUTROPHILS NFR BLD AUTO: 7.63 10*3/MM3 (ref 1.7–7)
NEUTROPHILS NFR BLD AUTO: 79.4 % (ref 42.7–76)
NRBC BLD AUTO-RTO: 0 /100 WBC (ref 0–0.2)
PHOSPHATE SERPL-MCNC: 2.5 MG/DL (ref 2.5–4.5)
PLATELET # BLD AUTO: 117 10*3/MM3 (ref 140–450)
PMV BLD AUTO: 11.9 FL (ref 6–12)
POTASSIUM SERPL-SCNC: 3.4 MMOL/L (ref 3.5–5.2)
POTASSIUM SERPL-SCNC: 4.3 MMOL/L (ref 3.5–5.2)
PROT SERPL-MCNC: 5.8 G/DL (ref 6–8.5)
RBC # BLD AUTO: 3.7 10*6/MM3 (ref 3.77–5.28)
SMALL PLATELETS BLD QL SMEAR: NORMAL
SODIUM SERPL-SCNC: 141 MMOL/L (ref 136–145)
WBC MORPH BLD: NORMAL
WBC NRBC COR # BLD AUTO: 9.61 10*3/MM3 (ref 3.4–10.8)

## 2024-07-14 PROCEDURE — 85007 BL SMEAR W/DIFF WBC COUNT: CPT | Performed by: NURSE PRACTITIONER

## 2024-07-14 PROCEDURE — 94799 UNLISTED PULMONARY SVC/PX: CPT

## 2024-07-14 PROCEDURE — 71045 X-RAY EXAM CHEST 1 VIEW: CPT

## 2024-07-14 PROCEDURE — 94664 DEMO&/EVAL PT USE INHALER: CPT

## 2024-07-14 PROCEDURE — 85025 COMPLETE CBC W/AUTO DIFF WBC: CPT | Performed by: NURSE PRACTITIONER

## 2024-07-14 PROCEDURE — 80053 COMPREHEN METABOLIC PANEL: CPT | Performed by: NURSE PRACTITIONER

## 2024-07-14 PROCEDURE — 94660 CPAP INITIATION&MGMT: CPT

## 2024-07-14 PROCEDURE — 99232 SBSQ HOSP IP/OBS MODERATE 35: CPT | Performed by: INTERNAL MEDICINE

## 2024-07-14 PROCEDURE — 94761 N-INVAS EAR/PLS OXIMETRY MLT: CPT

## 2024-07-14 PROCEDURE — 83735 ASSAY OF MAGNESIUM: CPT | Performed by: NURSE PRACTITIONER

## 2024-07-14 PROCEDURE — 84100 ASSAY OF PHOSPHORUS: CPT | Performed by: NURSE PRACTITIONER

## 2024-07-14 PROCEDURE — 84132 ASSAY OF SERUM POTASSIUM: CPT | Performed by: HOSPITALIST

## 2024-07-14 PROCEDURE — 71250 CT THORAX DX C-: CPT

## 2024-07-14 PROCEDURE — 82948 REAGENT STRIP/BLOOD GLUCOSE: CPT

## 2024-07-14 RX ORDER — POTASSIUM CHLORIDE 20 MEQ/1
40 TABLET, EXTENDED RELEASE ORAL EVERY 4 HOURS
Status: COMPLETED | OUTPATIENT
Start: 2024-07-14 | End: 2024-07-14

## 2024-07-14 RX ORDER — BISACODYL 10 MG
10 SUPPOSITORY, RECTAL RECTAL ONCE
Status: COMPLETED | OUTPATIENT
Start: 2024-07-14 | End: 2024-07-14

## 2024-07-14 RX ADMIN — IPRATROPIUM BROMIDE AND ALBUTEROL SULFATE 3 ML: .5; 3 SOLUTION RESPIRATORY (INHALATION) at 10:45

## 2024-07-14 RX ADMIN — HYDROCORTISONE ACETATE 25 MG: 25 SUPPOSITORY RECTAL at 21:15

## 2024-07-14 RX ADMIN — POTASSIUM CHLORIDE 40 MEQ: 1500 TABLET, EXTENDED RELEASE ORAL at 08:58

## 2024-07-14 RX ADMIN — HYDROCORTISONE ACETATE 25 MG: 25 SUPPOSITORY RECTAL at 08:47

## 2024-07-14 RX ADMIN — BUDESONIDE 0.5 MG: 0.5 INHALANT RESPIRATORY (INHALATION) at 19:30

## 2024-07-14 RX ADMIN — BUDESONIDE 0.5 MG: 0.5 INHALANT RESPIRATORY (INHALATION) at 07:03

## 2024-07-14 RX ADMIN — APIXABAN 5 MG: 5 TABLET, FILM COATED ORAL at 20:53

## 2024-07-14 RX ADMIN — PANTOPRAZOLE SODIUM 40 MG: 40 TABLET, DELAYED RELEASE ORAL at 08:47

## 2024-07-14 RX ADMIN — POTASSIUM CHLORIDE 40 MEQ: 1500 TABLET, EXTENDED RELEASE ORAL at 14:55

## 2024-07-14 RX ADMIN — IPRATROPIUM BROMIDE AND ALBUTEROL SULFATE 3 ML: .5; 3 SOLUTION RESPIRATORY (INHALATION) at 15:01

## 2024-07-14 RX ADMIN — METOPROLOL SUCCINATE 25 MG: 25 TABLET, FILM COATED, EXTENDED RELEASE ORAL at 20:53

## 2024-07-14 RX ADMIN — METOPROLOL SUCCINATE 25 MG: 25 TABLET, FILM COATED, EXTENDED RELEASE ORAL at 08:46

## 2024-07-14 RX ADMIN — AMIODARONE HYDROCHLORIDE 200 MG: 200 TABLET ORAL at 18:58

## 2024-07-14 RX ADMIN — AMIODARONE HYDROCHLORIDE 200 MG: 200 TABLET ORAL at 06:16

## 2024-07-14 RX ADMIN — IPRATROPIUM BROMIDE AND ALBUTEROL SULFATE 3 ML: .5; 3 SOLUTION RESPIRATORY (INHALATION) at 07:07

## 2024-07-14 RX ADMIN — Medication 10 ML: at 08:47

## 2024-07-14 RX ADMIN — IPRATROPIUM BROMIDE AND ALBUTEROL SULFATE 3 ML: .5; 3 SOLUTION RESPIRATORY (INHALATION) at 19:29

## 2024-07-14 RX ADMIN — BISACODYL 10 MG: 10 SUPPOSITORY RECTAL at 15:47

## 2024-07-14 RX ADMIN — APIXABAN 10 MG: 5 TABLET, FILM COATED ORAL at 08:46

## 2024-07-14 RX ADMIN — POVIDONE-IODINE: 10 SOLUTION TOPICAL at 08:48

## 2024-07-14 RX ADMIN — Medication 10 ML: at 20:53

## 2024-07-14 RX ADMIN — PANTOPRAZOLE SODIUM 40 MG: 40 TABLET, DELAYED RELEASE ORAL at 18:58

## 2024-07-14 RX ADMIN — ANTI-FUNGAL POWDER MICONAZOLE NITRATE TALC FREE 1 APPLICATION: 1.42 POWDER TOPICAL at 08:48

## 2024-07-14 RX ADMIN — BUMETANIDE 1 MG: 1 TABLET ORAL at 08:47

## 2024-07-14 RX ADMIN — ANTI-FUNGAL POWDER MICONAZOLE NITRATE TALC FREE 1 APPLICATION: 1.42 POWDER TOPICAL at 20:55

## 2024-07-14 NOTE — PROGRESS NOTES
"PULMONARY CRITICAL CARE PROGRESS  NOTE      PATIENT IDENTIFICATION:  Name: Ban Brennan  MRN: [id number]  :  1955     Age: 69 y.o.  Sex: female    DATE OF Note:  2024   Referring Physician: Sesar Solorzano DO                  Subjective:      On 2 L nasal cannula,no SOB, no chest or abd pain, CT patent, no air leak, no bowel or bladder issues      Objective:  tMax 24 hrs: Temp (24hrs), Av.6 °F (37 °C), Min:97.9 °F (36.6 °C), Max:98.9 °F (37.2 °C)      Vitals Ranges:   Temp:  [97.9 °F (36.6 °C)-98.9 °F (37.2 °C)] 98.8 °F (37.1 °C)  Heart Rate:  [] 84  Resp:  [17-27] 19  BP: ()/(31-58) 120/58    Intake and Output Last 3 Shifts:   I/O last 3 completed shifts:  In: 700 [P.O.:700]  Out: 1230 [Urine:1100; Chest Tube:130]    Exam:  /58 (BP Location: Left arm, Patient Position: Sitting)   Pulse 84   Temp 98.8 °F (37.1 °C) (Oral)   Resp 19   Ht 149.9 cm (59\")   Wt 82.6 kg (182 lb 1.6 oz)   SpO2 100%   BMI 36.78 kg/m²     General Appearance:   Alert awake following commands  HEENT:  Normocephalic, without obvious abnormality. Conjunctivae/corneas clear.  Normal external ear canals. Nares normal, no drainage     Neck:  Supple, symmetrical, trachea midline. No JVD.  Lungs /Chest wall:   Bilateral basal rhonchi, respirations unlabored, symmetrical wall movement.   CT in place minimal drainage  Heart:  Regular rate and rhythm, systolic murmur PMI left sternal border  Abdomen: Soft, nontender, no masses, no organomegaly.    Extremities: Trace edema, no clubbing or cyanosis        Medications:  amiodarone, 200 mg, Oral, Q12H   Followed by  [START ON 2024] amiodarone, 200 mg, Oral, Daily  apixaban, 5 mg, Oral, BID  budesonide, 0.5 mg, Nebulization, BID - RT  bumetanide, 1 mg, Oral, Daily  hydrocortisone, 25 mg, Rectal, BID  insulin lispro, 2-7 Units, Subcutaneous, 4x Daily With Meals & Nightly  ipratropium-albuterol, 3 mL, Nebulization, 4x Daily - RT  lidocaine, 20 mL, " Infiltration, Once  metoprolol succinate XL, 25 mg, Oral, Q12H  miconazole, 1 Application, Topical, Q12H  pantoprazole, 40 mg, Oral, BID AC  potassium chloride ER, 40 mEq, Oral, Q4H  povidone-iodine, , Topical, Daily  sodium chloride, 10 mL, Intravenous, Q12H  [Held by provider] spironolactone, 25 mg, Oral, Daily        Infusion:        PRN:    acetaminophen **OR** acetaminophen    Calcium Replacement - Follow Nurse / BPA Driven Protocol    dextrose    dextrose    glucagon (human recombinant)    ipratropium-albuterol    Magnesium Standard Dose Replacement - Follow Nurse / BPA Driven Protocol    nitroglycerin    ondansetron ODT **OR** ondansetron    Phosphorus Replacement - Follow Nurse / BPA Driven Protocol    Potassium Replacement - Follow Nurse / BPA Driven Protocol    [COMPLETED] Insert Peripheral IV **AND** sodium chloride    sodium chloride    sodium chloride  Data Review:  All labs (24hrs):   Recent Results (from the past 24 hour(s))   POC Glucose Once    Collection Time: 07/13/24 11:45 AM    Specimen: Blood   Result Value Ref Range    Glucose 103 70 - 105 mg/dL   Wound Culture - Surgical Site, Chest, Right    Collection Time: 07/13/24  2:25 PM    Specimen: Chest, Right; Surgical Site   Result Value Ref Range    Gram Stain Rare (1+) WBCs per low power field     Gram Stain Few (2+) Gram negative bacilli    POC Glucose Once    Collection Time: 07/13/24  5:19 PM    Specimen: Blood   Result Value Ref Range    Glucose 113 (H) 70 - 105 mg/dL   POC Glucose Once    Collection Time: 07/13/24  8:53 PM    Specimen: Blood   Result Value Ref Range    Glucose 114 (H) 70 - 105 mg/dL   Magnesium    Collection Time: 07/14/24  6:59 AM    Specimen: Arm, Right; Blood   Result Value Ref Range    Magnesium 1.8 1.6 - 2.4 mg/dL   Phosphorus    Collection Time: 07/14/24  6:59 AM    Specimen: Arm, Right; Blood   Result Value Ref Range    Phosphorus 2.5 2.5 - 4.5 mg/dL   Comprehensive Metabolic Panel    Collection Time: 07/14/24  6:59  AM    Specimen: Arm, Right; Blood   Result Value Ref Range    Glucose 75 65 - 99 mg/dL    BUN 30 (H) 8 - 23 mg/dL    Creatinine 0.99 0.57 - 1.00 mg/dL    Sodium 141 136 - 145 mmol/L    Potassium 3.4 (L) 3.5 - 5.2 mmol/L    Chloride 97 (L) 98 - 107 mmol/L    CO2 38.3 (H) 22.0 - 29.0 mmol/L    Calcium 8.8 8.6 - 10.5 mg/dL    Total Protein 5.8 (L) 6.0 - 8.5 g/dL    Albumin 2.1 (L) 3.5 - 5.2 g/dL    ALT (SGPT) 23 1 - 33 U/L    AST (SGOT) 43 (H) 1 - 32 U/L    Alkaline Phosphatase 115 39 - 117 U/L    Total Bilirubin 2.7 (H) 0.0 - 1.2 mg/dL    Globulin 3.7 gm/dL    A/G Ratio 0.6 g/dL    BUN/Creatinine Ratio 30.3 (H) 7.0 - 25.0    Anion Gap 5.7 5.0 - 15.0 mmol/L    eGFR 61.9 >60.0 mL/min/1.73   CBC Auto Differential    Collection Time: 07/14/24  6:59 AM    Specimen: Arm, Right; Blood   Result Value Ref Range    WBC 9.61 3.40 - 10.80 10*3/mm3    RBC 3.70 (L) 3.77 - 5.28 10*6/mm3    Hemoglobin 11.2 (L) 12.0 - 15.9 g/dL    Hematocrit 36.5 34.0 - 46.6 %    MCV 98.6 (H) 79.0 - 97.0 fL    MCH 30.3 26.6 - 33.0 pg    MCHC 30.7 (L) 31.5 - 35.7 g/dL    RDW 16.9 (H) 12.3 - 15.4 %    RDW-SD 59.4 (H) 37.0 - 54.0 fl    MPV 11.9 6.0 - 12.0 fL    Platelets 117 (L) 140 - 450 10*3/mm3    Neutrophil % 79.4 (H) 42.7 - 76.0 %    Lymphocyte % 8.9 (L) 19.6 - 45.3 %    Monocyte % 9.2 5.0 - 12.0 %    Eosinophil % 1.8 0.3 - 6.2 %    Basophil % 0.1 0.0 - 1.5 %    Immature Grans % 0.6 (H) 0.0 - 0.5 %    Neutrophils, Absolute 7.63 (H) 1.70 - 7.00 10*3/mm3    Lymphocytes, Absolute 0.86 0.70 - 3.10 10*3/mm3    Monocytes, Absolute 0.88 0.10 - 0.90 10*3/mm3    Eosinophils, Absolute 0.17 0.00 - 0.40 10*3/mm3    Basophils, Absolute 0.01 0.00 - 0.20 10*3/mm3    Immature Grans, Absolute 0.06 (H) 0.00 - 0.05 10*3/mm3    nRBC 0.0 0.0 - 0.2 /100 WBC   Scan Slide    Collection Time: 07/14/24  6:59 AM    Specimen: Arm, Right; Blood   Result Value Ref Range    Anisocytosis Slight/1+ None Seen    WBC Morphology Normal Normal    Platelet Estimate Decreased Normal         Imaging:  XR Chest 1 View  Narrative: XR CHEST 1 VW    Date of Exam: 7/14/2024 8:40 AM EDT    Indication: Chest tube     Comparison: CT chest 7/6/2024, AP chest x-ray 7/13/2024    Findings:  Right basilar pleural catheter remains in place. No pneumothorax is seen. There are bilateral perihilar and bibasilar airspace opacities, increased in the right lower lung compared to yesterday's chest x-ray. Cardiac silhouette remains enlarged.  Impression: Impression:  1.Right basilar pleural catheter remains in place. No visible pneumothorax.  2.Persistent bilateral perihilar and bibasilar airspace opacities, increased in the right lower lung compared to yesterday's chest x-ray.    Electronically Signed: Elise Castro    7/14/2024 9:59 AM EDT    Workstation ID: [id number]       ASSESSMENT:  Hydropneumothorax s/p CT  COPD  SEDRICK  Systolic CHF  A-fib with RVR  Aortic stenosis  Mitral valve regurgitation   R BBB  Hx bilateral DVT  HTN  Morbid obesity         PLAN:  Chest tube chest x-ray still showing possible like effusion or/infiltrate but no significant drainage over last 24 hours I am going to arrange for CT chest and reevaluate before taking the tube out  Encourage OOB during the day   Antibiotics  Bronchodilators  Inhaled corticosteroids  Mucomyst  Incentive spirometer  Diuresis and monitor NANCY's   Electrolytes/ glycemic control  DVT prophylaxis-Apixaban        Total Critical care time in direct medical management (   ) minutes, This time specifically excludes time spent performing procedures.    Donna Sung MD   7/14/2024  10:54 EDT

## 2024-07-14 NOTE — PLAN OF CARE
Goal Outcome Evaluation:  Plan of Care Reviewed With: patient        Progress: improving  Outcome Evaluation: Pt resting this shift, denies any pain. Pt has been turned every 2 hours and barrier cream applied to open areas to coccyx. Pt has also had heel boots, mepilex placed in heels, wound care done today to ble. K+ was low this am, was replaced, pending K+ lab results. Pt has been making good output, chest tube to water seal, dressing to chest tube intact. Pt on specialty air pump r/t buttock wounds. Daughter at bedside, very supportive. Call light within reach, bed alarm in place.

## 2024-07-14 NOTE — PROGRESS NOTES
[facility] MEDICINE SERVICE  DAILY PROGRESS NOTE    NAME: Ban Brennan  : 1955  MRN: [id number]      LOS: 14 days     PROVIDER OF SERVICE: Librado Villagomez MD    Chief Complaint: Atrial fibrillation with RVR    Subjective:     Interval History:  History taken from: patient chart RN  Constipation   No CP or SOB    Review of Systems:   Review of Systems  All negative except as above   Objective:     Vital Signs  Temp:  [97.9 °F (36.6 °C)-98.9 °F (37.2 °C)] 98.8 °F (37.1 °C)  Heart Rate:  [] 84  Resp:  [17-27] 19  BP: ()/(31-58) 120/58  Flow (L/min):  [2-4] 4   Body mass index is 36.78 kg/m².    Physical Exam  Physical Exam  AOx3 NAD  Irregular controlled S1-S2 normal  Lungs with fair air entry  Abdomen left lower quadrant tenderness no guarding no rigidity bowel sounds positive  Scheduled Meds   amiodarone, 200 mg, Oral, Q12H   Followed by  [START ON 2024] amiodarone, 200 mg, Oral, Daily  apixaban, 5 mg, Oral, BID  budesonide, 0.5 mg, Nebulization, BID - RT  bumetanide, 1 mg, Oral, Daily  hydrocortisone, 25 mg, Rectal, BID  insulin lispro, 2-7 Units, Subcutaneous, 4x Daily With Meals & Nightly  ipratropium-albuterol, 3 mL, Nebulization, 4x Daily - RT  lidocaine, 20 mL, Infiltration, Once  metoprolol succinate XL, 25 mg, Oral, Q12H  miconazole, 1 Application, Topical, Q12H  pantoprazole, 40 mg, Oral, BID AC  potassium chloride ER, 40 mEq, Oral, Q4H  povidone-iodine, , Topical, Daily  sodium chloride, 10 mL, Intravenous, Q12H  [Held by provider] spironolactone, 25 mg, Oral, Daily       PRN Meds     acetaminophen **OR** acetaminophen    Calcium Replacement - Follow Nurse / BPA Driven Protocol    dextrose    dextrose    glucagon (human recombinant)    ipratropium-albuterol    Magnesium Standard Dose Replacement - Follow Nurse / BPA Driven Protocol    nitroglycerin    ondansetron ODT **OR** ondansetron    Phosphorus Replacement - Follow Nurse / BPA Driven Protocol    Potassium  Replacement - Follow Nurse / BPA Driven Protocol    [COMPLETED] Insert Peripheral IV **AND** sodium chloride    sodium chloride    sodium chloride   Infusions         Diagnostic Data    Results from last 7 days   Lab Units 07/14/24  0659   WBC 10*3/mm3 9.61   HEMOGLOBIN g/dL 11.2*   HEMATOCRIT % 36.5   PLATELETS 10*3/mm3 117*   GLUCOSE mg/dL 75   CREATININE mg/dL 0.99   BUN mg/dL 30*   SODIUM mmol/L 141   POTASSIUM mmol/L 3.4*   AST (SGOT) U/L 43*   ALT (SGPT) U/L 23   ALK PHOS U/L 115   BILIRUBIN mg/dL 2.7*   ANION GAP mmol/L 5.7       XR Chest 1 View    Result Date: 7/14/2024  Impression: 1.Right basilar pleural catheter remains in place. No visible pneumothorax. 2.Persistent bilateral perihilar and bibasilar airspace opacities, increased in the right lower lung compared to yesterday's chest x-ray. Electronically Signed: Elise Castro  7/14/2024 9:59 AM EDT  Workstation ID: [id number]    CT Abdomen Pelvis Without Contrast    Result Date: 7/13/2024  1. Colonic diverticulosis without evidence of diverticulitis. 2. There is a stable 3 cm soft tissue mass of the pancreas. Pancreatic mass protocol MRI of the abdomen recommended. 3. Focal splenic infarct 4. Small bilateral pleural effusions with trace right-sided pneumothorax. 5. Cholelithiasis without evidence of cholecystitis. Electronically Signed: Eamon Brock MD  7/13/2024 1:51 PM EDT  Workstation ID: [id number]    XR Chest 1 View    Result Date: 7/13/2024  Impression: 1. Stable chest tube overlying the right lung base. No pneumothorax. 2. Persistent small bilateral pleural effusions with bibasilar airspace disease which may relate to atelectasis and/or pneumonia. 3. Stable cardiomegaly with central pulmonary vascular congestion and findings of pulmonary edema. Electronically Signed: Mina Wheeler MD  7/13/2024 11:24 AM EDT  Workstation ID: [id number]       I reviewed the patient's new clinical results.  I reviewed the patient's new imaging results and agree with the  interpretation.    Assessment/Plan:     Active and Resolved Problems  Active Hospital Problems    Diagnosis  POA    **Atrial fibrillation with RVR [I48.91]  Yes    COPD (chronic obstructive pulmonary disease) [J44.9]  Yes    Aortic stenosis [I35.0]  Yes    Mitral regurgitation [I34.0]  Yes    Acute HFrEF (heart failure with reduced ejection fraction) [I50.21]  Yes    Right bundle branch block [I45.10]  Yes    Acute deep vein thrombosis (DVT) of both lower extremities [I82.403]  Yes    ARABELLA (acute kidney injury) [N17.9]  Yes    Acute hyperkalemia [E87.5]  Yes    Sepsis [A41.9]  Yes    Acute UTI [N39.0]  Yes    Acute systolic congestive heart failure [I50.21]  Yes    Rhinovirus infection [B34.8]  Yes    Multifocal pneumonia [J18.9]  Yes    Chronic respiratory failure with hypoxia, on home O2 therapy [J96.11, Z99.81]  Not Applicable    Skin ulcer of right great toe [L97.519]  Yes    Skin ulcer of left great toe [L97.529]  Yes    SEDRICK (obstructive sleep apnea) [G47.33]  Yes    Primary hypertension [I10]  Yes    Morbid obesity with BMI of 40.0-44.9, adult [E66.01, Z68.41]  Not Applicable      Resolved Hospital Problems   No resolved problems to display.       69-year-old female with history of hypertension, HFrEF, lower extremity lymphedema admitted to [facility] 6/30 progressive shortness of breath        #Bilateral lower extremity DVT  #History of reported PE  Seen by hematology appreciate recommendation.  Factor V and prothrombin gene mutation negative  Continue Eliquis 10 mg twice daily for 7 days followed by 5 mg twice daily  Outpatient follow-up with hematology     #Acute on chronic HFrEF  #A-fib with RVR.  New onset  #Severe MR  Cardiology follow-up  Started on amiodarone GGT transitioned to p.o. tapering dose  TTE with EF 30 to 35%.  Defer ischemic workup to cardiology  On Toprol 25 twice daily with holding parameters   Bumex 1 mg daily  Monitor I's and O's  Hold Aldactone given soft BP   Lisinopril on hold  given ARABELLA  CT abdomen pelvis 7/13.  Stable 3 cm soft tissue mass of pancreas.  Patient will benefit from outpatient MRI abdomen pancreatic protocol.  She has a small superficial lump in her left lower quadrant abdomen which I suspect may be because of subcutaneous injections.  Currently she is off Lovenox injections CTM    #Right-sided hydropneumothorax  Chest tube placed in ICU 7/6  Fluid culture from right chest tube growing gram-negative bacilli-need for antibiotics to pulmonary  Noted plan to obtain CT chest  Monitor out put        #Severe sepsis  #UTI  #Left lower extremity cellulitis and wound  #Rhinovirus infection  #Multifocal pneumonia  #Chronic venous stasis lower extremity  Seen by infectious disease appreciate recommendations  Finished Keflex and doxycycline for 7 day course   Seen by podiatry no surgical intervention recommended  Midodrine has been weaned off monitor blood pressure     #Acute hypoxic respiratory failure  Multifactorial pneumonia CHF  Antibiotics and diuretics as above  Wean off supplemental oxygen as tolerated.     #DM2  A1c 6.14  Continue ISS lispro  POC ACHS     #ARABELLA  #Metabolic Acidosis   Suspect ATN in setting of sepsis   Diuretics as above  BMP daily  Avoid nephrotoxic drugs   lisinopril on hold      #uterine mass  Large subserosal uterine fundal fibroid on pelvic ultrasound  GYN follow-up as outpatient      #hematochezia  GI on board noted plan for outpatient colonoscopy  Etiology suspected to be hemorrhoids  Topical steroids started  Monitor H&H     VTE Prophylaxis:  Pharmacologic VTE prophylaxis orders are present.         Code status is   Code Status and Medical Interventions:   Ordered at: 06/30/24 2120     Code Status (Patient has no pulse and is not breathing):    CPR (Attempt to Resuscitate)     Medical Interventions (Patient has pulse or is breathing):    Full Support       Plan for disposition:TBD pending clinical progress     Time: 30 minutes    Signature: Electronically  signed by Librado Villagomez MD, 07/14/24, 10:53 EDT.  [facility] Hospitalist Team

## 2024-07-14 NOTE — PROGRESS NOTES
"    PROGRESS NOTE      Patient Name: Ban Brennan  : 1955  MRN: [id number]  Primary Care Physician: Rut Pierce APRN  Date of admission: 2024    Patient Care Team:  Rut Pierce APRN as PCP - General (Nurse Practitioner)  Austin Brooks MD as Cardiologist (Cardiology)        Reason for Follow up     Acute kidney injury, hyperkalemia      Subjective     Seen and examined, NAD noted, renal functions stable, worsening bicarb level, ordered ABG, good UOP, 1.3L     Review of systems:    ROS was otherwise negative except as mentioned in the [place].       Personal History:     Past Medical History:   Past Medical History:   Diagnosis Date    Bilateral leg edema     Chronic respiratory failure with hypoxia, on home O2 therapy 2024    COPD (chronic obstructive pulmonary disease)     Morbid obesity with BMI of 40.0-44.9, adult 2010    Primary hypertension 2017    Pulmonary embolism     \"many years ago, was on warfarin\"    Sleep apnea        Surgical History:    History reviewed. No pertinent surgical history.    Family History: family history is not on file. Otherwise pertinent FHx was reviewed and unremarkable.     Social History:  reports that she has never smoked. She has never used smokeless tobacco. She reports that she does not drink alcohol and does not use drugs.    Medications:  Prior to Admission medications    Medication Sig Start Date End Date Taking? Authorizing Provider   Allergy Relief 180 MG tablet Take 1 tablet by mouth Daily. 24  Yes ProviderChadwick MD   bumetanide (BUMEX) 1 MG tablet Take 1 tablet by mouth 3 times a day. 24  Yes ProviderChadwick MD   docusate sodium (COLACE) 100 MG capsule Take 1 capsule by mouth Every 12 (Twelve) Hours. 24  Yes ProviderChadwick MD   furosemide (LASIX) 20 MG tablet Take 1 tablet by mouth Daily.   Yes ProviderChadwick MD   HYDROcodone-acetaminophen (NORCO)  MG per tablet Take 1 " tablet by mouth 3 times a day. 5/30/24  Yes Chadwick Johnson MD   lisinopril (PRINIVIL,ZESTRIL) 30 MG tablet Take 1 tablet by mouth Daily. 3/18/24  Yes Chadwick Johnson MD   meloxicam (MOBIC) 7.5 MG tablet Take 1 tablet by mouth Daily As Needed. 3/18/24  Yes Chadwick Johnson MD   metoprolol tartrate (LOPRESSOR) 50 MG tablet Take 1 tablet by mouth Daily.   Yes Chadwick Johnson MD   montelukast (SINGULAIR) 10 MG tablet Take 1 tablet by mouth every night at bedtime.   Yes Chadwick Johnson MD   omeprazole (priLOSEC) 20 MG capsule Take 1 capsule by mouth Daily. 3/18/24  Yes Provider, Historical, MD   oxybutynin XL (DITROPAN-XL) 10 MG 24 hr tablet Take 2 tablets by mouth Daily. 3/18/24  Yes Chadwick Johnson MD   potassium chloride (MICRO-K) 10 MEQ CR capsule Take 1 capsule by mouth Every 12 (Twelve) Hours. 3/18/24  Yes Chadwick Johnson MD   vitamin D (ERGOCALCIFEROL) 1.25 MG (11022 UT) capsule capsule Take 1 capsule by mouth 2 (Two) Times a Week. Monday and Thursday. 5/30/24  Yes Chadwick Johnson MD     Scheduled Meds:amiodarone, 200 mg, Oral, Q12H   Followed by  [START ON 7/23/2024] amiodarone, 200 mg, Oral, Daily  apixaban, 10 mg, Oral, BID   Followed by  apixaban, 5 mg, Oral, BID  budesonide, 0.5 mg, Nebulization, BID - RT  bumetanide, 1 mg, Oral, Daily  hydrocortisone, 25 mg, Rectal, BID  insulin lispro, 2-7 Units, Subcutaneous, 4x Daily With Meals & Nightly  ipratropium-albuterol, 3 mL, Nebulization, 4x Daily - RT  lidocaine, 20 mL, Infiltration, Once  metoprolol succinate XL, 25 mg, Oral, Q12H  miconazole, 1 Application, Topical, Q12H  pantoprazole, 40 mg, Oral, BID AC  povidone-iodine, , Topical, Daily  sodium chloride, 10 mL, Intravenous, Q12H  [Held by provider] spironolactone, 25 mg, Oral, Daily      Continuous Infusions:     PRN Meds:  acetaminophen **OR** acetaminophen    Calcium Replacement - Follow Nurse / BPA Driven Protocol    dextrose    dextrose    glucagon (human  recombinant)    ipratropium-albuterol    Magnesium Standard Dose Replacement - Follow Nurse / BPA Driven Protocol    nitroglycerin    ondansetron ODT **OR** ondansetron    Phosphorus Replacement - Follow Nurse / BPA Driven Protocol    Potassium Replacement - Follow Nurse / BPA Driven Protocol    [COMPLETED] Insert Peripheral IV **AND** sodium chloride    sodium chloride    sodium chloride  Allergies:  No Known Allergies    Objective   Exam:     Vital Signs  Temp:  [97.9 °F (36.6 °C)-98.9 °F (37.2 °C)] 98.9 °F (37.2 °C)  Heart Rate:  [] 84  Resp:  [17-25] 20  BP: ()/(31-63) 107/49  SpO2:  [93 %-100 %] 100 %  on  Flow (L/min):  [2-4] 4;   Device (Oxygen Therapy): nasal cannula  Body mass index is 36.78 kg/m².  EXAM  General:  69 yr old  female, some respiratory distress  Head:      Normocephalic and atraumatic.    Eyes:      PERRL/EOM intact, conjunctivae and sclerae clear without nystagmus.    Neck:      No masses, thyromegaly,  trachea central   Lungs:    Clear bilaterally to auscultation.    Heart:      Regular rate and rhythm, no murmur no gallop  Abd:        Soft, nontender, not distended, bowel sounds positive, no shifting dullness.  Msk:        No deformity or scoliosis noted of thoracic or lumbar spine.    Pulses:   Pulses normal in all 4 extremities.    Extremities:        No cyanosis or clubbing--+ + edema.    Neuro:    Lethargic  Skin:       Intact without lesions or rashes.          Results Review:  I have personally reviewed most recent Data :  BMP @LABRCNT(creatinine:10)  CBC    Results from last 7 days   Lab Units 07/14/24  0659 07/13/24  0240 07/12/24  0531 07/11/24  0437 07/10/24  0220 07/09/24  2051 07/09/24  0851 07/09/24  0256 07/08/24  0446   WBC 10*3/mm3 9.61 11.02* 10.25 11.69* 11.11*  --   --  10.66 11.66*   HEMOGLOBIN g/dL 11.2* 11.5* 11.9* 12.4 12.5 12.6 13.1 12.6 13.3   PLATELETS 10*3/mm3 117* 112* 88* 93* 85*  --   --  87* 100*     CMP   Results from last 7 days   Lab Units  07/13/24  0240 07/12/24  1619 07/12/24  0531 07/11/24  0437 07/10/24  0220 07/09/24  1507 07/09/24  0256 07/08/24  0446   SODIUM mmol/L 141  --  141 142 139  --  145 145   POTASSIUM mmol/L 3.9 4.3 3.5 4.5 4.5 3.9 3.6 3.0*   CHLORIDE mmol/L 97*  --  97* 95* 96*  --  98 97*   CO2 mmol/L 36.9*  --  38.5* 41.6* 37.9*  --  39.5* 38.0*   BUN mg/dL 38*  --  39* 46* 54*  --  68* 72*   CREATININE mg/dL 1.11*  --  0.99 1.08* 1.00  --  1.08* 1.19*   GLUCOSE mg/dL 87  --  82 94 90  --  117* 98   ALBUMIN g/dL 2.1*  --  1.9* 2.1* 2.0*  --  2.2* 2.2*   BILIRUBIN mg/dL 2.9*  --  2.8* 2.4* 1.9*  --  1.7* 1.7*   ALK PHOS U/L 96  --  94 85 80  --  84 96   AST (SGOT) U/L 37*  --  45* 48* 46*  --  37* 36*   ALT (SGPT) U/L 19  --  18 19 17  --  17 15     ABG    Results from last 7 days   Lab Units 07/11/24  1045   PH, ARTERIAL pH units 7.539*   PCO2, ARTERIAL mm Hg 49.9*   PO2 ART mm Hg 66.3*   O2 SATURATION ART % 94.6   BASE EXCESS ART mmol/L 17.4*     CT Abdomen Pelvis Without Contrast    Result Date: 7/13/2024  1. Colonic diverticulosis without evidence of diverticulitis. 2. There is a stable 3 cm soft tissue mass of the pancreas. Pancreatic mass protocol MRI of the abdomen recommended. 3. Focal splenic infarct 4. Small bilateral pleural effusions with trace right-sided pneumothorax. 5. Cholelithiasis without evidence of cholecystitis. Electronically Signed: Eamon Brock MD  7/13/2024 1:51 PM EDT  Workstation ID: [id number]    XR Chest 1 View    Result Date: 7/13/2024  Impression: 1. Stable chest tube overlying the right lung base. No pneumothorax. 2. Persistent small bilateral pleural effusions with bibasilar airspace disease which may relate to atelectasis and/or pneumonia. 3. Stable cardiomegaly with central pulmonary vascular congestion and findings of pulmonary edema. Electronically Signed: Mina Wheeler MD  7/13/2024 11:24 AM EDT  Workstation ID: [id number]    XR Chest 1 View    Result Date: 7/12/2024  Impression: 1.Right basilar  chest tube appears in similar position. No definite pneumothorax. 2.Patchy basilar predominant airspace opacities and possible small pleural effusions, as before. 3.Stable cardiomegaly. Electronically Signed: Tanvir Formanhenri  7/12/2024 7:18 AM EDT  Workstation ID: [id number]    XR Chest 1 View    Result Date: 7/11/2024  Impression: Similar position of right-sided chest tube. No discrete pneumothorax. Similar small bilateral pleural effusions and bilateral airspace opacities, left greater than right. Electronically Signed: Manan Jefferson MD  7/11/2024 7:48 AM EDT  Workstation ID: [id number]    XR Chest 1 View    Result Date: 7/10/2024  Impression: 1. Stable chest tube overlying the right lung base. No pneumothorax. 2. Persistent bibasilar airspace disease and small bilateral pleural effusions. 3. Stable cardiomegaly. Electronically Signed: Mina Wheeler MD  7/10/2024 7:11 AM EDT  Workstation ID: [id number]    XR Chest 1 View    Result Date: 7/9/2024  Impression: 1.Bilateral airspace disease again noted. 2.Cardiomegaly 3.Pigtail catheter chest tube right lung base. Electronically Signed: Bran Bernal MD  7/9/2024 11:37 AM EDT  Workstation ID: [id number]    XR Chest 1 View    Result Date: 7/6/2024  Impression: Successful placement of a right chest tube for right-sided hydropneumothorax, which demonstrates decreased/trace fluid component and similar/small gas component. Electronically Signed: Manan Jefferson MD  7/6/2024 2:35 PM EDT  Workstation ID: [id number]    CT Chest Without Contrast Diagnostic    Result Date: 7/6/2024  Impression: Moderate right-sided hydropneumothorax as seen on same-day radiograph. Adjacent partial collapse of the right lung with multifocal peripheral bronchial obstruction. There are dense airspace consolidations throughout the right lung, and findings are likely a combination of lung collapse and worsening infection. Persistent airspace opacities in the left lung consistent with infection, slightly worsened  from prior CT. Small left pleural effusion. Prominent four-chamber cardiomegaly. Pulmonary artery enlargement consistent with pulmonary hypertension. Trace perihepatic ascites. Persistent bilateral flank and right chest wall edema. Electronically Signed: Javon Mortensen MD  7/6/2024 11:19 AM EDT  Workstation ID: [id number]    XR Chest 1 View    Result Date: 7/6/2024  Impression: 1. There is a definite small right-sided pneumothorax. The right lung is partially collapsed and consolidated. A small to moderate right pleural effusion is present indicating a hydropneumothorax. 2. Unchanged consolidation in the left lung with a small pleural effusion. 3. Cardiomegaly. 4.The abnormal results were discussed with the nurse (Dianne) by telephone. The referring clinician will be contacted. Electronically Signed: Jean Claude Bobby MD  7/6/2024 9:56 AM EDT  Workstation ID: [id number]    XR Chest 1 View    Result Date: 7/6/2024  Impression: 1. Questionable right-sided pneumothorax. I would recommend a repeat chest x-ray as the patient is rotated. 2. Persistent patchy consolidation in the left mid and lower lung zones suspicious for pneumonia. There is a small right pleural effusion. 3. Abnormal consolidation in the right lung with a small to moderate pleural effusion. 4. Cardiomegaly. 5. The abnormal results were discussed with the nurse in the CVICU (Dianne) by telephone and the referring clinician will be contacted. Electronically Signed: Jean Claude Bobby MD  7/6/2024 9:27 AM EDT  Workstation ID: [id number]     Results for orders placed during the hospital encounter of 06/30/24    Adult Transthoracic Echo Complete w/ Color, Spectral and Contrast if Necessary Per Protocol    Interpretation Summary    Left ventricular ejection fraction appears to be 31 - 35%.    Left ventricular diastolic dysfunction is noted.    The left atrial cavity is dilated.    Moderate aortic valve stenosis is present. Mean/peak gradient of 14/24 mmHg.  Dimensionless index  0.36    Moderate to severe mitral valve regurgitation is present.    Estimated right ventricular systolic pressure from tricuspid regurgitation is normal (<35 mmHg).        Assessment & Plan   Assessment and Plan:         Atrial fibrillation with RVR    Primary hypertension    Morbid obesity with BMI of 40.0-44.9, adult    Right bundle branch block    Acute deep vein thrombosis (DVT) of both lower extremities    ARABELLA (acute kidney injury)    Acute hyperkalemia    Sepsis    Acute UTI    Acute systolic congestive heart failure    Rhinovirus infection    Multifocal pneumonia    Chronic respiratory failure with hypoxia, on home O2 therapy    Skin ulcer of right great toe    Skin ulcer of left great toe    SEDRICK (obstructive sleep apnea)    Aortic stenosis    Mitral regurgitation    Acute HFrEF (heart failure with reduced ejection fraction)    COPD (chronic obstructive pulmonary disease)    ASSESSMENT:  Acute kidney injury, with baseline creatinine roughly 0.8-1.0mg/dL  Hyperkalemia  A-fib with RVR  Acute on chronic heart failure, with EF 31-35%, diastolic dysfunction, moderate AS and moderate to severe MR as per echo from 6/30/24  Bilateral DVT  Severe sepsis/LLE cellulitis, acute cystitis  Multifocal pneumonia    PLAN :     Renal function unchanged/stable today from prior reading. Suspect she may initially have had some acute cardiorenal component, now most likely has some ATN secondary to sepsis, elevated Vanc level (was 22.4 on 7/3), and some prolonged prerenal state with hemodynamic insults, arrhythmias. Had no significant proteinuria  Labs labs are pending from today  Patient is on Bumex 1 mg a day that being held because of low blood pressure okay to restart at this time  Follow-up labs from later today  Alkalosis slightly better than yesterday and potassium level is acceptable  Sodium level is acceptable at this time and potassium level is improved  To me volume acceptable but still has some peripheral edema  Blood  pressure reviewed, stable. Continue to monitor   Antibiotics per ID, just finished 7 day course of Keflex and Doxy. Previously was on Vanc. Now not on any abx  Daily labs   We will continue to follow       Charan Krueger MD  [facility]  7/14/2024  08:02 EDT

## 2024-07-14 NOTE — PLAN OF CARE
Goal Outcome Evaluation:  Plan of Care Reviewed With: patient        Progress: no change    Patient blood pressure still on the soft side.   Not in acute respiratory distress- currently she still on 4L of oxygen via nc.    Plan of care is ongoing.

## 2024-07-14 NOTE — PROGRESS NOTES
Reason for follow-up: A-fib     Patient Care Team:  Rut Pierce APRN as PCP - General (Nurse Practitioner)  Austin Brooks MD as Cardiologist (Cardiology)    Subjective .   Ban Brennan is doing well today     ROS    Patient has no known allergies.    Scheduled Meds:amiodarone, 200 mg, Oral, Q12H   Followed by  [START ON 7/23/2024] amiodarone, 200 mg, Oral, Daily  apixaban, 5 mg, Oral, BID  budesonide, 0.5 mg, Nebulization, BID - RT  bumetanide, 1 mg, Oral, Daily  hydrocortisone, 25 mg, Rectal, BID  insulin lispro, 2-7 Units, Subcutaneous, 4x Daily With Meals & Nightly  ipratropium-albuterol, 3 mL, Nebulization, 4x Daily - RT  lidocaine, 20 mL, Infiltration, Once  metoprolol succinate XL, 25 mg, Oral, Q12H  miconazole, 1 Application, Topical, Q12H  pantoprazole, 40 mg, Oral, BID AC  potassium chloride ER, 40 mEq, Oral, Q4H  povidone-iodine, , Topical, Daily  sodium chloride, 10 mL, Intravenous, Q12H  [Held by provider] spironolactone, 25 mg, Oral, Daily      Continuous Infusions:   PRN Meds:.  acetaminophen **OR** acetaminophen    Calcium Replacement - Follow Nurse / BPA Driven Protocol    dextrose    dextrose    glucagon (human recombinant)    ipratropium-albuterol    Magnesium Standard Dose Replacement - Follow Nurse / BPA Driven Protocol    nitroglycerin    ondansetron ODT **OR** ondansetron    Phosphorus Replacement - Follow Nurse / BPA Driven Protocol    Potassium Replacement - Follow Nurse / BPA Driven Protocol    [COMPLETED] Insert Peripheral IV **AND** sodium chloride    sodium chloride    sodium chloride      VITAL SIGNS  Vitals:    07/14/24 0706 07/14/24 0707 07/14/24 0710 07/14/24 0843   BP:    120/58   BP Location:    Left arm   Patient Position:    Sitting   Pulse: 84 75 84 89   Resp: 20 20 20 27   Temp:    98.8 °F (37.1 °C)   TempSrc:    Oral   SpO2: 98% 100% 100% 96%   Weight:       Height:           Flowsheet Rows      Flowsheet Row First Filed Value   Admission Height 147.3 cm  "(58\") Documented at 06/30/2024 1650   Admission Weight 108 kg (239 lb) Documented at 06/30/2024 1650               Physical Exam  VITALS REVIEWED    General:      well developed, in no acute distress.    Head:      normocephalic and atraumatic.    Eyes:      PERRL/EOM intact, conjunctiva and sclera clear with out nystagmus.    Neck:      no masses, thyromegaly,  trachea central with normal respiratory effort and PMI displaced laterally  Lungs:      Decreased air entry  Heart:       Irregular rhythm  Msk:      no deformity or scoliosis noted of thoracic or lumbar spine.    Pulses:      pulses normal in all 4 extremities.    Extremities:       No lower extremity edema  Neurologic:      no focal deficits.   alert oriented x3  Skin:      intact without lesions or rashes.    Psych:      alert and cooperative; normal mood and affect; normal attention span and concentration.          LAB RESULTS (LAST 7 DAYS)    CBC  Results from last 7 days   Lab Units 07/14/24  0659 07/13/24  0240 07/12/24  0531 07/11/24  0437 07/10/24  0220 07/09/24  2051 07/09/24  0851 07/09/24  0256 07/08/24  0446   WBC 10*3/mm3 9.61 11.02* 10.25 11.69* 11.11*  --   --  10.66 11.66*   RBC 10*6/mm3 3.70* 3.72* 3.89 4.10 4.13  --   --  4.15 4.45   HEMOGLOBIN g/dL 11.2* 11.5* 11.9* 12.4 12.5 12.6 13.1 12.6 13.3   HEMATOCRIT % 36.5 36.0 37.5 39.1 38.6 38.9 40.5 39.1 41.8   MCV fL 98.6* 96.8 96.4 95.4 93.5  --   --  94.2 93.9   PLATELETS 10*3/mm3 117* 112* 88* 93* 85*  --   --  87* 100*       BMP  Results from last 7 days   Lab Units 07/14/24  0659 07/13/24  0240 07/12/24  1619 07/12/24  0531 07/11/24  0437 07/10/24  0220 07/09/24  1507 07/09/24  0256 07/08/24  0446   SODIUM mmol/L 141 141  --  141 142 139  --  145 145   POTASSIUM mmol/L 3.4* 3.9 4.3 3.5 4.5 4.5 3.9 3.6 3.0*   CHLORIDE mmol/L 97* 97*  --  97* 95* 96*  --  98 97*   CO2 mmol/L 38.3* 36.9*  --  38.5* 41.6* 37.9*  --  39.5* 38.0*   BUN mg/dL 30* 38*  --  39* 46* 54*  --  68* 72*   CREATININE " mg/dL 0.99 1.11*  --  0.99 1.08* 1.00  --  1.08* 1.19*   GLUCOSE mg/dL 75 87  --  82 94 90  --  117* 98   MAGNESIUM mg/dL 1.8 1.7  --  1.7 1.9 2.3  --  1.3* 1.8   PHOSPHORUS mg/dL 2.5 2.4*  --  2.7 2.5 2.5 2.3* 1.8* 2.4*       CMP   Results from last 7 days   Lab Units 07/14/24  0659 07/13/24  0240 07/12/24  1619 07/12/24  0531 07/11/24  0437 07/10/24  0220 07/09/24  1507 07/09/24  0256 07/08/24  0446   SODIUM mmol/L 141 141  --  141 142 139  --  145 145   POTASSIUM mmol/L 3.4* 3.9 4.3 3.5 4.5 4.5 3.9 3.6 3.0*   CHLORIDE mmol/L 97* 97*  --  97* 95* 96*  --  98 97*   CO2 mmol/L 38.3* 36.9*  --  38.5* 41.6* 37.9*  --  39.5* 38.0*   BUN mg/dL 30* 38*  --  39* 46* 54*  --  68* 72*   CREATININE mg/dL 0.99 1.11*  --  0.99 1.08* 1.00  --  1.08* 1.19*   GLUCOSE mg/dL 75 87  --  82 94 90  --  117* 98   ALBUMIN g/dL 2.1* 2.1*  --  1.9* 2.1* 2.0*  --  2.2* 2.2*   BILIRUBIN mg/dL 2.7* 2.9*  --  2.8* 2.4* 1.9*  --  1.7* 1.7*   ALK PHOS U/L 115 96  --  94 85 80  --  84 96   AST (SGOT) U/L 43* 37*  --  45* 48* 46*  --  37* 36*   ALT (SGPT) U/L 23 19  --  18 19 17  --  17 15         BNP        TROPONIN        CoAg        Creatinine Clearance  Estimated Creatinine Clearance: 52.7 mL/min (by C-G formula based on SCr of 0.99 mg/dL).    ABG  Results from last 7 days   Lab Units 07/11/24  1045   PH, ARTERIAL pH units 7.539*   PCO2, ARTERIAL mm Hg 49.9*   PO2 ART mm Hg 66.3*   O2 SATURATION ART % 94.6   BASE EXCESS ART mmol/L 17.4*         EKG    I personally reviewed the patient's EKG/Telemetry data: Atrial fibrillation      Assessment & Plan       Atrial fibrillation with RVR    Primary hypertension    Morbid obesity with BMI of 40.0-44.9, adult    Right bundle branch block    Acute deep vein thrombosis (DVT) of both lower extremities    ARABELLA (acute kidney injury)    Acute hyperkalemia    Sepsis    Acute UTI    Acute systolic congestive heart failure    Rhinovirus infection    Multifocal pneumonia    Chronic respiratory failure with  hypoxia, on home O2 therapy    Skin ulcer of right great toe    Skin ulcer of left great toe    SEDRICK (obstructive sleep apnea)    Aortic stenosis    Mitral regurgitation    Acute HFrEF (heart failure with reduced ejection fraction)    COPD (chronic obstructive pulmonary disease)      Ban Brennan is a 69-year-old female patient who was COPD, chronic respiratory failure, is admitted to the hospital due to shortness of breath, she was found to have hydropneumothorax and received a chest tube.  Also found to be in atrial fibrillation with rapid ventricular rates.  Unsure about the chronicity of A-fib.  Patient is now on Eliquis for stroke prevention, and also started on  therapy with metoprolol, and amiodarone.  After her lung condition improves and the chest tube comes out, we might consider rhythm management options, although in her case it might be difficult if she has advanced lung disease.    I discussed the patients findings and my recommendations with patient and agrees with outlined plan    Rickie Peterson MD  07/14/24  10:26 EDT        Electronically signed by Rickie Peterson MD, 07/14/24, 10:30 AM EDT.

## 2024-07-15 ENCOUNTER — APPOINTMENT (OUTPATIENT)
Dept: MRI IMAGING | Facility: HOSPITAL | Age: 69
End: 2024-07-15
Payer: MEDICARE

## 2024-07-15 ENCOUNTER — APPOINTMENT (OUTPATIENT)
Dept: GENERAL RADIOLOGY | Facility: HOSPITAL | Age: 69
End: 2024-07-15
Payer: MEDICARE

## 2024-07-15 ENCOUNTER — INPATIENT HOSPITAL (OUTPATIENT)
Dept: URBAN - METROPOLITAN AREA HOSPITAL 84 | Facility: HOSPITAL | Age: 69
End: 2024-07-15
Payer: MEDICAID

## 2024-07-15 DIAGNOSIS — R93.3 ABNORMAL FINDINGS ON DIAGNOSTIC IMAGING OF OTHER PARTS OF DI: ICD-10-CM

## 2024-07-15 LAB
ALBUMIN SERPL-MCNC: 2.1 G/DL (ref 3.5–5.2)
ALBUMIN/GLOB SERPL: 0.7 G/DL
ALP SERPL-CCNC: 117 U/L (ref 39–117)
ALT SERPL W P-5'-P-CCNC: 26 U/L (ref 1–33)
ANION GAP SERPL CALCULATED.3IONS-SCNC: 5 MMOL/L (ref 5–15)
AST SERPL-CCNC: 36 U/L (ref 1–32)
BASOPHILS # BLD AUTO: 0.02 10*3/MM3 (ref 0–0.2)
BASOPHILS NFR BLD AUTO: 0.2 % (ref 0–1.5)
BILIRUB CONJ SERPL-MCNC: 1.7 MG/DL (ref 0–0.3)
BILIRUB INDIRECT SERPL-MCNC: 0.9 MG/DL
BILIRUB SERPL-MCNC: 2.6 MG/DL (ref 0–1.2)
BILIRUB SERPL-MCNC: 2.6 MG/DL (ref 0–1.2)
BUN SERPL-MCNC: 27 MG/DL (ref 8–23)
BUN/CREAT SERPL: 27.3 (ref 7–25)
CALCIUM SPEC-SCNC: 8.6 MG/DL (ref 8.6–10.5)
CANCER AG19-9 SERPL-ACNC: 58.5 U/ML
CHLORIDE SERPL-SCNC: 99 MMOL/L (ref 98–107)
CO2 SERPL-SCNC: 36 MMOL/L (ref 22–29)
CREAT SERPL-MCNC: 0.99 MG/DL (ref 0.57–1)
DEPRECATED RDW RBC AUTO: 58.1 FL (ref 37–54)
EGFRCR SERPLBLD CKD-EPI 2021: 61.9 ML/MIN/1.73
EOSINOPHIL # BLD AUTO: 0.09 10*3/MM3 (ref 0–0.4)
EOSINOPHIL NFR BLD AUTO: 0.8 % (ref 0.3–6.2)
ERYTHROCYTE [DISTWIDTH] IN BLOOD BY AUTOMATED COUNT: 16.8 % (ref 12.3–15.4)
GLOBULIN UR ELPH-MCNC: 2.9 GM/DL
GLUCOSE BLDC GLUCOMTR-MCNC: 110 MG/DL (ref 70–105)
GLUCOSE BLDC GLUCOMTR-MCNC: 122 MG/DL (ref 70–105)
GLUCOSE BLDC GLUCOMTR-MCNC: 75 MG/DL (ref 70–105)
GLUCOSE BLDC GLUCOMTR-MCNC: 89 MG/DL (ref 70–105)
GLUCOSE SERPL-MCNC: 89 MG/DL (ref 65–99)
HCT VFR BLD AUTO: 32.2 % (ref 34–46.6)
HGB BLD-MCNC: 10.3 G/DL (ref 12–15.9)
IMM GRANULOCYTES # BLD AUTO: 0.09 10*3/MM3 (ref 0–0.05)
IMM GRANULOCYTES NFR BLD AUTO: 0.8 % (ref 0–0.5)
LYMPHOCYTES # BLD AUTO: 0.98 10*3/MM3 (ref 0.7–3.1)
LYMPHOCYTES NFR BLD AUTO: 8.9 % (ref 19.6–45.3)
MAGNESIUM SERPL-MCNC: 1.8 MG/DL (ref 1.6–2.4)
MCH RBC QN AUTO: 30.8 PG (ref 26.6–33)
MCHC RBC AUTO-ENTMCNC: 32 G/DL (ref 31.5–35.7)
MCV RBC AUTO: 96.4 FL (ref 79–97)
MONOCYTES # BLD AUTO: 1.07 10*3/MM3 (ref 0.1–0.9)
MONOCYTES NFR BLD AUTO: 9.7 % (ref 5–12)
NEUTROPHILS NFR BLD AUTO: 79.6 % (ref 42.7–76)
NEUTROPHILS NFR BLD AUTO: 8.81 10*3/MM3 (ref 1.7–7)
NRBC BLD AUTO-RTO: 0 /100 WBC (ref 0–0.2)
PHOSPHATE SERPL-MCNC: 2.2 MG/DL (ref 2.5–4.5)
PHOSPHATE SERPL-MCNC: 2.7 MG/DL (ref 2.5–4.5)
PLATELET # BLD AUTO: 163 10*3/MM3 (ref 140–450)
PMV BLD AUTO: 12 FL (ref 6–12)
POTASSIUM SERPL-SCNC: 4.3 MMOL/L (ref 3.5–5.2)
PROT SERPL-MCNC: 5 G/DL (ref 6–8.5)
RBC # BLD AUTO: 3.34 10*6/MM3 (ref 3.77–5.28)
SODIUM SERPL-SCNC: 140 MMOL/L (ref 136–145)
WBC NRBC COR # BLD AUTO: 11.06 10*3/MM3 (ref 3.4–10.8)

## 2024-07-15 PROCEDURE — 86301 IMMUNOASSAY TUMOR CA 19-9: CPT | Performed by: NURSE PRACTITIONER

## 2024-07-15 PROCEDURE — 97530 THERAPEUTIC ACTIVITIES: CPT

## 2024-07-15 PROCEDURE — 25810000003 SODIUM CHLORIDE 0.9 % SOLUTION: Performed by: STUDENT IN AN ORGANIZED HEALTH CARE EDUCATION/TRAINING PROGRAM

## 2024-07-15 PROCEDURE — 86316 IMMUNOASSAY TUMOR OTHER: CPT | Performed by: NURSE PRACTITIONER

## 2024-07-15 PROCEDURE — 94761 N-INVAS EAR/PLS OXIMETRY MLT: CPT

## 2024-07-15 PROCEDURE — 84100 ASSAY OF PHOSPHORUS: CPT | Performed by: STUDENT IN AN ORGANIZED HEALTH CARE EDUCATION/TRAINING PROGRAM

## 2024-07-15 PROCEDURE — 84100 ASSAY OF PHOSPHORUS: CPT | Performed by: NURSE PRACTITIONER

## 2024-07-15 PROCEDURE — 74183 MRI ABD W/O CNTR FLWD CNTR: CPT

## 2024-07-15 PROCEDURE — 94799 UNLISTED PULMONARY SVC/PX: CPT

## 2024-07-15 PROCEDURE — 82948 REAGENT STRIP/BLOOD GLUCOSE: CPT | Performed by: HOSPITALIST

## 2024-07-15 PROCEDURE — A9579 GAD-BASE MR CONTRAST NOS,1ML: HCPCS | Performed by: STUDENT IN AN ORGANIZED HEALTH CARE EDUCATION/TRAINING PROGRAM

## 2024-07-15 PROCEDURE — 99232 SBSQ HOSP IP/OBS MODERATE 35: CPT | Performed by: INTERNAL MEDICINE

## 2024-07-15 PROCEDURE — 85025 COMPLETE CBC W/AUTO DIFF WBC: CPT | Performed by: NURSE PRACTITIONER

## 2024-07-15 PROCEDURE — 80053 COMPREHEN METABOLIC PANEL: CPT | Performed by: NURSE PRACTITIONER

## 2024-07-15 PROCEDURE — 82247 BILIRUBIN TOTAL: CPT | Performed by: NURSE PRACTITIONER

## 2024-07-15 PROCEDURE — 99232 SBSQ HOSP IP/OBS MODERATE 35: CPT | Performed by: NURSE PRACTITIONER

## 2024-07-15 PROCEDURE — 83735 ASSAY OF MAGNESIUM: CPT | Performed by: NURSE PRACTITIONER

## 2024-07-15 PROCEDURE — 94664 DEMO&/EVAL PT USE INHALER: CPT

## 2024-07-15 PROCEDURE — 97535 SELF CARE MNGMENT TRAINING: CPT

## 2024-07-15 PROCEDURE — 94660 CPAP INITIATION&MGMT: CPT

## 2024-07-15 PROCEDURE — 82248 BILIRUBIN DIRECT: CPT | Performed by: NURSE PRACTITIONER

## 2024-07-15 PROCEDURE — 71045 X-RAY EXAM CHEST 1 VIEW: CPT

## 2024-07-15 PROCEDURE — 82948 REAGENT STRIP/BLOOD GLUCOSE: CPT

## 2024-07-15 PROCEDURE — 25010000002 GADOTERIDOL PER 1 ML: Performed by: STUDENT IN AN ORGANIZED HEALTH CARE EDUCATION/TRAINING PROGRAM

## 2024-07-15 PROCEDURE — 97110 THERAPEUTIC EXERCISES: CPT

## 2024-07-15 RX ORDER — HYDROCODONE BITARTRATE AND ACETAMINOPHEN 10; 325 MG/1; MG/1
1 TABLET ORAL EVERY 6 HOURS PRN
Status: DISCONTINUED | OUTPATIENT
Start: 2024-07-15 | End: 2024-07-23 | Stop reason: HOSPADM

## 2024-07-15 RX ADMIN — Medication 10 ML: at 09:10

## 2024-07-15 RX ADMIN — PANTOPRAZOLE SODIUM 40 MG: 40 TABLET, DELAYED RELEASE ORAL at 17:25

## 2024-07-15 RX ADMIN — BUMETANIDE 1 MG: 1 TABLET ORAL at 09:10

## 2024-07-15 RX ADMIN — APIXABAN 5 MG: 5 TABLET, FILM COATED ORAL at 20:53

## 2024-07-15 RX ADMIN — AMIODARONE HYDROCHLORIDE 200 MG: 200 TABLET ORAL at 17:25

## 2024-07-15 RX ADMIN — BUDESONIDE 0.5 MG: 0.5 INHALANT RESPIRATORY (INHALATION) at 06:47

## 2024-07-15 RX ADMIN — BUDESONIDE 0.5 MG: 0.5 INHALANT RESPIRATORY (INHALATION) at 19:17

## 2024-07-15 RX ADMIN — GADOTERIDOL 20 ML: 279.3 INJECTION, SOLUTION INTRAVENOUS at 18:45

## 2024-07-15 RX ADMIN — IPRATROPIUM BROMIDE AND ALBUTEROL SULFATE 3 ML: .5; 3 SOLUTION RESPIRATORY (INHALATION) at 10:58

## 2024-07-15 RX ADMIN — METOPROLOL SUCCINATE 25 MG: 25 TABLET, FILM COATED, EXTENDED RELEASE ORAL at 20:53

## 2024-07-15 RX ADMIN — HYDROCORTISONE ACETATE 25 MG: 25 SUPPOSITORY RECTAL at 20:53

## 2024-07-15 RX ADMIN — IPRATROPIUM BROMIDE AND ALBUTEROL SULFATE 3 ML: .5; 3 SOLUTION RESPIRATORY (INHALATION) at 16:02

## 2024-07-15 RX ADMIN — HYDROCODONE BITARTRATE AND ACETAMINOPHEN 1 TABLET: 10; 325 TABLET ORAL at 23:14

## 2024-07-15 RX ADMIN — ANTI-FUNGAL POWDER MICONAZOLE NITRATE TALC FREE 1 APPLICATION: 1.42 POWDER TOPICAL at 09:10

## 2024-07-15 RX ADMIN — Medication 10 ML: at 20:53

## 2024-07-15 RX ADMIN — ANTI-FUNGAL POWDER MICONAZOLE NITRATE TALC FREE 1 APPLICATION: 1.42 POWDER TOPICAL at 20:53

## 2024-07-15 RX ADMIN — ACETAMINOPHEN 650 MG: 325 TABLET, FILM COATED ORAL at 03:02

## 2024-07-15 RX ADMIN — PANTOPRAZOLE SODIUM 40 MG: 40 TABLET, DELAYED RELEASE ORAL at 09:10

## 2024-07-15 RX ADMIN — METOPROLOL SUCCINATE 25 MG: 25 TABLET, FILM COATED, EXTENDED RELEASE ORAL at 09:10

## 2024-07-15 RX ADMIN — APIXABAN 5 MG: 5 TABLET, FILM COATED ORAL at 09:10

## 2024-07-15 RX ADMIN — IPRATROPIUM BROMIDE AND ALBUTEROL SULFATE 3 ML: .5; 3 SOLUTION RESPIRATORY (INHALATION) at 06:47

## 2024-07-15 RX ADMIN — IPRATROPIUM BROMIDE AND ALBUTEROL SULFATE 3 ML: .5; 3 SOLUTION RESPIRATORY (INHALATION) at 19:18

## 2024-07-15 RX ADMIN — SODIUM PHOSPHATE, MONOBASIC, MONOHYDRATE AND SODIUM PHOSPHATE, DIBASIC, ANHYDROUS 15 MMOL: 142; 276 INJECTION, SOLUTION INTRAVENOUS at 06:18

## 2024-07-15 RX ADMIN — POVIDONE-IODINE: 10 SOLUTION TOPICAL at 09:14

## 2024-07-15 RX ADMIN — SODIUM CHLORIDE 500 ML: 9 INJECTION, SOLUTION INTRAVENOUS at 05:50

## 2024-07-15 RX ADMIN — AMIODARONE HYDROCHLORIDE 200 MG: 200 TABLET ORAL at 05:11

## 2024-07-15 NOTE — PROGRESS NOTES
"    PROGRESS NOTE      Patient Name: Ban Brennan  : 1955  MRN: [id number]  Primary Care Physician: Rut Pierce APRN  Date of admission: 2024    Patient Care Team:  Rut Pierce APRN as PCP - General (Nurse Practitioner)  Austni Brooks MD as Cardiologist (Cardiology)        Reason for Follow up     Acute kidney injury, hyperkalemia      Subjective     Seen and examined, NAD noted, renal functions stable, worsening bicarb level, ordered ABG, good UOP, 1.3L     Review of systems:    ROS was otherwise negative except as mentioned in the [place].       Personal History:     Past Medical History:   Past Medical History:   Diagnosis Date    Bilateral leg edema     Chronic respiratory failure with hypoxia, on home O2 therapy 2024    COPD (chronic obstructive pulmonary disease)     Morbid obesity with BMI of 40.0-44.9, adult 2010    Primary hypertension 2017    Pulmonary embolism     \"many years ago, was on warfarin\"    Sleep apnea        Surgical History:    History reviewed. No pertinent surgical history.    Family History: family history is not on file. Otherwise pertinent FHx was reviewed and unremarkable.     Social History:  reports that she has never smoked. She has never used smokeless tobacco. She reports that she does not drink alcohol and does not use drugs.    Medications:  Prior to Admission medications    Medication Sig Start Date End Date Taking? Authorizing Provider   Allergy Relief 180 MG tablet Take 1 tablet by mouth Daily. 24  Yes ProviderChadwick MD   bumetanide (BUMEX) 1 MG tablet Take 1 tablet by mouth 3 times a day. 24  Yes ProviderChadwick MD   docusate sodium (COLACE) 100 MG capsule Take 1 capsule by mouth Every 12 (Twelve) Hours. 24  Yes ProviderChadwick MD   furosemide (LASIX) 20 MG tablet Take 1 tablet by mouth Daily.   Yes ProviderChadwick MD   HYDROcodone-acetaminophen (NORCO)  MG per tablet Take 1 " tablet by mouth 3 times a day. 5/30/24  Yes Chadwick Johnson MD   lisinopril (PRINIVIL,ZESTRIL) 30 MG tablet Take 1 tablet by mouth Daily. 3/18/24  Yes Chadwick Johnson MD   meloxicam (MOBIC) 7.5 MG tablet Take 1 tablet by mouth Daily As Needed. 3/18/24  Yes Chadwick Johnson MD   metoprolol tartrate (LOPRESSOR) 50 MG tablet Take 1 tablet by mouth Daily.   Yes Chadwick Johnson MD   montelukast (SINGULAIR) 10 MG tablet Take 1 tablet by mouth every night at bedtime.   Yes Chadwick Johnson MD   omeprazole (priLOSEC) 20 MG capsule Take 1 capsule by mouth Daily. 3/18/24  Yes Chadwick Johnson MD   oxybutynin XL (DITROPAN-XL) 10 MG 24 hr tablet Take 2 tablets by mouth Daily. 3/18/24  Yes Chadwick Johnson MD   potassium chloride (MICRO-K) 10 MEQ CR capsule Take 1 capsule by mouth Every 12 (Twelve) Hours. 3/18/24  Yes Chadwick Johnson MD   vitamin D (ERGOCALCIFEROL) 1.25 MG (67965 UT) capsule capsule Take 1 capsule by mouth 2 (Two) Times a Week. Monday and Thursday. 5/30/24  Yes Chadwick Johnson MD     Scheduled Meds:amiodarone, 200 mg, Oral, Q12H   Followed by  [START ON 7/23/2024] amiodarone, 200 mg, Oral, Daily  apixaban, 5 mg, Oral, BID  budesonide, 0.5 mg, Nebulization, BID - RT  bumetanide, 1 mg, Oral, Daily  hydrocortisone, 25 mg, Rectal, BID  insulin lispro, 2-7 Units, Subcutaneous, 4x Daily With Meals & Nightly  ipratropium-albuterol, 3 mL, Nebulization, 4x Daily - RT  lidocaine, 20 mL, Infiltration, Once  metoprolol succinate XL, 25 mg, Oral, Q12H  miconazole, 1 Application, Topical, Q12H  pantoprazole, 40 mg, Oral, BID AC  povidone-iodine, , Topical, Daily  sodium chloride, 10 mL, Intravenous, Q12H  [Held by provider] spironolactone, 25 mg, Oral, Daily      Continuous Infusions:     PRN Meds:  acetaminophen **OR** acetaminophen    Calcium Replacement - Follow Nurse / BPA Driven Protocol    dextrose    dextrose    glucagon (human recombinant)    ipratropium-albuterol     Magnesium Standard Dose Replacement - Follow Nurse / BPA Driven Protocol    nitroglycerin    ondansetron ODT **OR** ondansetron    Phosphorus Replacement - Follow Nurse / BPA Driven Protocol    Potassium Replacement - Follow Nurse / BPA Driven Protocol    [COMPLETED] Insert Peripheral IV **AND** sodium chloride    sodium chloride    sodium chloride  Allergies:  No Known Allergies    Objective   Exam:     Vital Signs  Temp:  [97.6 °F (36.4 °C)-99.1 °F (37.3 °C)] 97.6 °F (36.4 °C)  Heart Rate:  [] 92  Resp:  [16-28] 28  BP: ()/(41-73) 106/61  SpO2:  [92 %-100 %] 100 %  on  Flow (L/min):  [3-4] 3;   Device (Oxygen Therapy): nasal cannula  Body mass index is 35.27 kg/m².  EXAM  General:  69 yr old  female, some respiratory distress  Head:      Normocephalic and atraumatic.    Eyes:      PERRL/EOM intact, conjunctivae and sclerae clear without nystagmus.    Neck:      No masses, thyromegaly,  trachea central   Lungs:    Clear bilaterally to auscultation.    Heart:      Regular rate and rhythm, no murmur no gallop  Abd:        Soft, nontender, not distended, bowel sounds positive, no shifting dullness.  Msk:        No deformity or scoliosis noted of thoracic or lumbar spine.    Pulses:   Pulses normal in all 4 extremities.    Extremities:        No cyanosis or clubbing--+ + edema.    Neuro:    Lethargic  Skin:       Intact without lesions or rashes.          Results Review:  I have personally reviewed most recent Data :  BMP @LABNT(creatinine:10)  CBC    Results from last 7 days   Lab Units 07/15/24  0256 07/14/24  0659 07/13/24  0240 07/12/24  0531 07/11/24  0437 07/10/24  0220 07/09/24  2051 07/09/24  0851 07/09/24  0256   WBC 10*3/mm3 11.06* 9.61 11.02* 10.25 11.69* 11.11*  --   --  10.66   HEMOGLOBIN g/dL 10.3* 11.2* 11.5* 11.9* 12.4 12.5 12.6   < > 12.6   PLATELETS 10*3/mm3 163 117* 112* 88* 93* 85*  --   --  87*    < > = values in this interval not displayed.     CMP   Results from last 7 days   Lab  Units 07/15/24  0519 07/14/24  1918 07/14/24  0659 07/13/24  0240 07/12/24  1619 07/12/24  0531 07/11/24  0437 07/10/24  0220 07/09/24  1507 07/09/24  0256   SODIUM mmol/L 140  --  141 141  --  141 142 139  --  145   POTASSIUM mmol/L 4.3 4.3 3.4* 3.9 4.3 3.5 4.5 4.5   < > 3.6   CHLORIDE mmol/L 99  --  97* 97*  --  97* 95* 96*  --  98   CO2 mmol/L 36.0*  --  38.3* 36.9*  --  38.5* 41.6* 37.9*  --  39.5*   BUN mg/dL 27*  --  30* 38*  --  39* 46* 54*  --  68*   CREATININE mg/dL 0.99  --  0.99 1.11*  --  0.99 1.08* 1.00  --  1.08*   GLUCOSE mg/dL 89  --  75 87  --  82 94 90  --  117*   ALBUMIN g/dL 2.1*  --  2.1* 2.1*  --  1.9* 2.1* 2.0*  --  2.2*   BILIRUBIN mg/dL 2.6*  --  2.7* 2.9*  --  2.8* 2.4* 1.9*  --  1.7*   ALK PHOS U/L 117  --  115 96  --  94 85 80  --  84   AST (SGOT) U/L 36*  --  43* 37*  --  45* 48* 46*  --  37*   ALT (SGPT) U/L 26  --  23 19  --  18 19 17  --  17    < > = values in this interval not displayed.     ABG    Results from last 7 days   Lab Units 07/11/24  1045   PH, ARTERIAL pH units 7.539*   PCO2, ARTERIAL mm Hg 49.9*   PO2 ART mm Hg 66.3*   O2 SATURATION ART % 94.6   BASE EXCESS ART mmol/L 17.4*     XR Chest 1 View    Result Date: 7/15/2024  Impression: Stable chest with cardiomegaly, moderate bilateral effusion and bibasilar consolidations Electronically Signed: Eliu High MD  7/15/2024 7:56 AM EDT  Workstation ID: [id number]    CT Chest Without Contrast Diagnostic    Result Date: 7/14/2024  Impression: 1.Persistent moderate-sized right pleural effusion despite indwelling pleural catheter. Tiny right pneumothorax. Improved aeration of the right lung with persistent right middle/lower lobe volume loss and consolidation. 2.Stable smaller left pleural effusion with increased left lower lobe volume loss and consolidation. 3.Stable massive cardiomegaly. 4.Included upper abdominal findings of pancreatic mass, splenic infarct, and cholelithiasis, as described on recent CT abdomen/pelvis.  Electronically Signed: Elise Castro  7/14/2024 11:44 AM EDT  Workstation ID: [id number]    XR Chest 1 View    Result Date: 7/14/2024  Impression: 1.Right basilar pleural catheter remains in place. No visible pneumothorax. 2.Persistent bilateral perihilar and bibasilar airspace opacities, increased in the right lower lung compared to yesterday's chest x-ray. Electronically Signed: Elise Castro  7/14/2024 9:59 AM EDT  Workstation ID: [id number]    CT Abdomen Pelvis Without Contrast    Result Date: 7/13/2024  1. Colonic diverticulosis without evidence of diverticulitis. 2. There is a stable 3 cm soft tissue mass of the pancreas. Pancreatic mass protocol MRI of the abdomen recommended. 3. Focal splenic infarct 4. Small bilateral pleural effusions with trace right-sided pneumothorax. 5. Cholelithiasis without evidence of cholecystitis. Electronically Signed: Eamon Brock MD  7/13/2024 1:51 PM EDT  Workstation ID: [id number]    XR Chest 1 View    Result Date: 7/13/2024  Impression: 1. Stable chest tube overlying the right lung base. No pneumothorax. 2. Persistent small bilateral pleural effusions with bibasilar airspace disease which may relate to atelectasis and/or pneumonia. 3. Stable cardiomegaly with central pulmonary vascular congestion and findings of pulmonary edema. Electronically Signed: Mina Wheeler MD  7/13/2024 11:24 AM EDT  Workstation ID: [id number]    XR Chest 1 View    Result Date: 7/12/2024  Impression: 1.Right basilar chest tube appears in similar position. No definite pneumothorax. 2.Patchy basilar predominant airspace opacities and possible small pleural effusions, as before. 3.Stable cardiomegaly. Electronically Signed: Tanvir Melgar  7/12/2024 7:18 AM EDT  Workstation ID: [id number]    XR Chest 1 View    Result Date: 7/11/2024  Impression: Similar position of right-sided chest tube. No discrete pneumothorax. Similar small bilateral pleural effusions and bilateral airspace opacities, left greater than right.  Electronically Signed: Manan Jefferson MD  7/11/2024 7:48 AM EDT  Workstation ID: [id number]    XR Chest 1 View    Result Date: 7/10/2024  Impression: 1. Stable chest tube overlying the right lung base. No pneumothorax. 2. Persistent bibasilar airspace disease and small bilateral pleural effusions. 3. Stable cardiomegaly. Electronically Signed: Mina Wheeler MD  7/10/2024 7:11 AM EDT  Workstation ID: [id number]    XR Chest 1 View    Result Date: 7/9/2024  Impression: 1.Bilateral airspace disease again noted. 2.Cardiomegaly 3.Pigtail catheter chest tube right lung base. Electronically Signed: Bran Bernal MD  7/9/2024 11:37 AM EDT  Workstation ID: [id number]     Results for orders placed during the hospital encounter of 06/30/24    Adult Transthoracic Echo Complete w/ Color, Spectral and Contrast if Necessary Per Protocol    Interpretation Summary    Left ventricular ejection fraction appears to be 31 - 35%.    Left ventricular diastolic dysfunction is noted.    The left atrial cavity is dilated.    Moderate aortic valve stenosis is present. Mean/peak gradient of 14/24 mmHg.  Dimensionless index 0.36    Moderate to severe mitral valve regurgitation is present.    Estimated right ventricular systolic pressure from tricuspid regurgitation is normal (<35 mmHg).        Assessment & Plan   Assessment and Plan:         Atrial fibrillation with RVR    Primary hypertension    Morbid obesity with BMI of 40.0-44.9, adult    Right bundle branch block    Acute deep vein thrombosis (DVT) of both lower extremities    ARABELLA (acute kidney injury)    Acute hyperkalemia    Sepsis    Acute UTI    Acute systolic congestive heart failure    Rhinovirus infection    Multifocal pneumonia    Chronic respiratory failure with hypoxia, on home O2 therapy    Skin ulcer of right great toe    Skin ulcer of left great toe    SEDRICK (obstructive sleep apnea)    Aortic stenosis    Mitral regurgitation    Acute HFrEF (heart failure with reduced ejection fraction)     COPD (chronic obstructive pulmonary disease)    ASSESSMENT:  Acute kidney injury, with baseline creatinine roughly 0.8-1.0mg/dL  Hyperkalemia  A-fib with RVR  Acute on chronic heart failure, with EF 31-35%, diastolic dysfunction, moderate AS and moderate to severe MR as per echo from 6/30/24  Bilateral DVT  Severe sepsis/LLE cellulitis, acute cystitis  Multifocal pneumonia    PLAN :     Renal function unchanged/stable today from prior reading. Suspect she may initially have had some acute cardiorenal component, now most likely has some ATN secondary to sepsis, elevated Vanc level (was 22.4 on 7/3), and some prolonged prerenal state with hemodynamic insults, arrhythmias. Had no significant proteinuria  Patient renal functions are stable at this time  Creatinine still 0.9.  Continue Bumex at 1 mg a day to readjust Bumex again in 1 to 2 days specially if hemodynamics are stable  follow-up labs from later today  Alkalosis slightly better than yesterday and potassium level is acceptable  Sodium level is acceptable at this time and potassium level is improved  To me volume acceptable but still has some peripheral edema  Blood pressure reviewed, stable. Continue to monitor   Antibiotics per ID, just finished 7 day course of Keflex and Doxy. Previously was on Vanc. Now not on any abx  Daily labs   We will continue to follow       Charan Krueger MD  [facility]  7/15/2024  10:11 EDT      unknown

## 2024-07-15 NOTE — THERAPY TREATMENT NOTE
"Subjective: Pt agreeable to therapeutic plan of care.  Cognition: oriented to Person, Place, and Time    Objective:     Bed Mobility: Min-A and Assist x 2   Functional Transfers: N/A or Not attempted. Pt declined to attempt t/f to chair     Balance: sitting EOB Supervision and SBA    Grooming: SBA  ADL Position: edge of bed sitting  ADL Comments: washing face    Grooming: SBA  ADL Position: edge of bed sitting  ADL Comments: oral hygiene      Vitals: WNL    Pain: 10 VAS  Location: stomach  Interventions for pain: Repositioned, RN notified, Increased Activity, and Therapeutic Presence  Education: Provided education on the importance of mobility in the acute care setting and ADL training      Assessment: Ban Brennan presents with ADL impairments affecting function including balance, endurance / activity tolerance, pain, and strength. Demonstrated functioning below baseline abilities indicate the need for continued skilled intervention while inpatient.  Pt reported 10/10 pain in stomach and not agreeable to get into chair but reports she will sit EOB.  Pt t/f from supine to sitting on EOB with MIN A x 2.  Pt able to sit EOB x ~10 min with SBA-Supervision. Pt completed grooming tasks sitting EOB including washing face, applying deodorant, oral hygiene with SBA.  Pt returned to supine with MIN A x 2. Tolerating session today without incident. Will continue to follow and progress as tolerated.     Plan/Recommendations:   Moderate Intensity Therapy recommended post-acute care. This is recommended as therapy feels the patient would require 3-4 days per week and wouldn't tolerate \"3 hour daily\" rehab intensity. SNF would be the preferred choice. If the patient does not agree to SNF, arrange HH or OP depending on home bound status. If patient is medically complex, consider LTACH.. Pt requires no DME at discharge.     Pt desires Skilled Rehab placement at discharge. Pt cooperative; agreeable to therapeutic " recommendations and plan of care.     Modified Zach: N/A = No pre-op stroke/TIA    Post-Tx Position: Supine with HOB Elevated, Alarms activated, and Call light and personal items within reach  PPE: gloves and surgical mask

## 2024-07-15 NOTE — PLAN OF CARE
Goal Outcome Evaluation:  Plan of Care Reviewed With: patient        Progress: no change  Outcome Evaluation: Patient received medications. Patient also received potassium phosphate. Betadine waws painted on toes today. Patient has new GI and Hem/Onc consults today d/t previous CT results. Plan for MRI of abdomen. Patient is on 3L nc. Patient remains with chest tube, increased output has been noted d/t chest tube being flushed this morning per pulmonologist.

## 2024-07-15 NOTE — THERAPY TREATMENT NOTE
"Subjective: Pt agreeable to therapeutic plan of care. Pt complaining of 10/10 stomach and abdominal pain at beginning of session, agreeable to work with PT staff to sit EOB.    Objective:     Bed mobility - Min-A and Assist x 2 for supine<>sit EOB. Able to sit EOB SBA>IND for ~10 min this date to complete exercises and work on ADL training.  Transfers - N/A or Not attempted.  Ambulation - N/A or Not attempted.    Therapeutic Exercise - 20 Reps B LE AROM unsupported sitting / EOB. Pt completed 2x10 of each of the following exercises sitting at EOB: Seated Alternating Marching, LAQ, Toe Raises, Heel Raises, and Hip ABD Marches.     Vitals: WNL    Pain: 10 VAS   Location: Stomach/Abdomen  Intervention for pain: Repositioned, RN notified, Increased Activity, and Therapeutic Presence    Education: Provided education on the importance of mobility in the acute care setting, Verbal/Tactile Cues, ADL training, and Energy conservation strategies    Assessment: Ban Brennan presents with functional mobility impairments which indicate the need for skilled intervention. Tolerating session today without incident. Pt complaining of 10/10 stomach and abdominal pain at beginning of session, agreeable to work with PT staff to sit EOB. She completes supine<>sit EOB min A x 2 this date, but refusing to exit the bed due to 10/10 pain in abdomen. She was able to tolerate sitting on bed for ~10 min to complete exercises and ADL training. Sitting balance was SBA>IND sitting at EOB during activity completion. PT will continue to progress mobility as appropriate. Will continue to follow and progress as tolerated.     Plan/Recommendations:   If medically appropriate, Moderate Intensity Therapy recommended post-acute care. This is recommended as therapy feels the patient would require 3-4 days per week and wouldn't tolerate \"3 hour daily\" rehab intensity. SNF would be the preferred choice. If the patient does not agree to SNF, arrange HH " or OP depending on home bound status. If patient is medically complex, consider LTACH. Pt requires no DME at discharge.     Pt desires Skilled Rehab placement at discharge. Pt cooperative; agreeable to therapeutic recommendations and plan of care.       Basic Mobility 6-click:  Rollin = Total, A lot = 2, A little = 3; 4 = None  Supine>Sit:   1 = Total, A lot = 2, A little = 3; 4 = None   Sit>Stand with arms:  1 = Total, A lot = 2, A little = 3; 4 = None  Bed>Chair:   1 = Total, A lot = 2, A little = 3; 4 = None  Ambulate in room:  1 = Total, A lot = 2, A little = 3; 4 = None  3-5 Steps with railin = Total, A lot = 2, A little = 3; 4 = None  Score: 11    Modified [place]: N/A = No pre-op stroke/TIA    Post-Tx Position: Supine with HOB Elevated, Alarms activated, and Call light and personal items within reach  PPE: gloves and surgical mask

## 2024-07-15 NOTE — PLAN OF CARE
Goal Outcome Evaluation:  Ban Brennan presents with ADL impairments affecting function including balance, endurance / activity tolerance, pain, and strength. Demonstrated functioning below baseline abilities indicate the need for continued skilled intervention while inpatient.  Pt reported 10/10 pain in stomach and not agreeable to get into chair but reports she will sit EOB.  Pt t/f from supine to sitting on EOB with MIN A x 2.  Pt able to sit EOB x ~10 min with SBA-Supervision. Pt completed grooming tasks sitting EOB including washing face, applying deodorant, oral hygiene with SBA.  Pt returned to supine with MIN A x 2. Tolerating session today without incident. Will continue to follow and progress as tolerated.

## 2024-07-15 NOTE — PROGRESS NOTES
Daily Progress Note          Assessment    Hydropneumothorax s/p chest tube placement  COPD  SEDRICK  Systolic CHF  A-fib with RVR  Aortic stenosis  Mitral valve regurgitation   R BBB  Hx bilateral DVT  HTN  Morbid obesity         PLAN:  The chest x-ray and CT scan are showing persistent pleural effusion and bilateral opacities: I flushed the right chest tube with 20 mL of sterile normal saline and the flow resumed, continue -20 cm H2O suction    Oxygen supplement and titration to maintain saturation 90-95%: Currently requiring 3 L per nasal cannula    Mucinex  Antibiotics  Bronchodilators  Inhaled corticosteroids    Incentive spirometer  Diuresis   Electrolytes/ glycemic control  Chronic anticoagulation: Apixaban     I reviewed the recent clinical results and radiological images               LOS: 15 days     Subjective     Patient reports cough and shortness of breath    Objective     Vital signs for last 24 hours:  Vitals:    07/15/24 0647 07/15/24 0651 07/15/24 0655 07/15/24 0910   BP:   113/41 106/61   BP Location:       Patient Position:   Lying    Pulse: 97 92 88 92   Resp: 16 23 28    Temp:    97.6 °F (36.4 °C)   TempSrc:       SpO2: 100% 100% 100%    Weight:       Height:           Intake/Output last 3 shifts:  I/O last 3 completed shifts:  In: 1060 [P.O.:1060]  Out: 2100 [Urine:2100]  Intake/Output this shift:  No intake/output data recorded.      Radiology  Imaging Results (Last 24 Hours)       Procedure Component Value Units Date/Time    XR Chest 1 View [[id number]] Collected: 07/15/24 0753     Updated: 07/15/24 0758    Narrative:      XR CHEST 1 VW    Date of Exam: 7/15/2024 3:51 AM EDT    Indication: CT    Comparison: X-ray dated 7/14/2024    Findings:  There is cardiomegaly. Interval change in bilateral lower lobe consolidation with moderate bilateral pleural effusions. No evidence of pneumothorax. Again noticed a pigtail catheter in the right upper quadrant      Impression:      Impression:    Stable  chest with cardiomegaly, moderate bilateral effusion and bibasilar consolidations      Electronically Signed: Eliu High MD    7/15/2024 7:56 AM EDT    Workstation ID: [id number]    CT Chest Without Contrast Diagnostic [[id number]] Collected: 07/14/24 1136     Updated: 07/14/24 1146    Narrative:      CT CHEST WO CONTRAST DIAGNOSTIC    Date of Exam: 7/14/2024 11:09 AM EDT    Indication: Lung nodule, 6-8mm, pleural effusion.    Comparison: AP chest x-ray 7/14/2024, CT chest 7/6/2024, CT abdomen and pelvis without contrast 7/13/2024    Technique: Axial CT images were obtained of the chest without contrast administration.  Sagittal and coronal reconstructions were performed.  Automated exposure control and iterative reconstruction methods were used.      Findings:  There is a right basilar pleural catheter in place. There is persistent moderate amount of right pleural fluid. Smaller left pleural effusion appears stable. There is trace right pneumothorax anteriorly, which smaller than on 7/6/2024 CT. Compared to   previous CT, there is improved aeration of the right upper lobe, but there is persistent consolidation and volume loss in the right middle and lower lobes. Left lower lobe consolidation and volume loss appears increased.    Massive cardiomegaly is redemonstrated. No significant pericardial effusion. There are coronary artery calcifications. No lymphadenopathy is seen in the chest.    3 cm pancreatic mass, splenic infarct, and cholelithiasis are included in the upper abdomen, as seen on recent CT abdomen/pelvis. No acute osseous abnormality is identified.      Impression:      Impression:  1.Persistent moderate-sized right pleural effusion despite indwelling pleural catheter. Tiny right pneumothorax. Improved aeration of the right lung with persistent right middle/lower lobe volume loss and consolidation.  2.Stable smaller left pleural effusion with increased left lower lobe volume loss and  consolidation.  3.Stable massive cardiomegaly.  4.Included upper abdominal findings of pancreatic mass, splenic infarct, and cholelithiasis, as described on recent CT abdomen/pelvis.      Electronically Signed: Elise Curranley    7/14/2024 11:44 AM EDT    Workstation ID: [id number]            Labs:  Results from last 7 days   Lab Units 07/15/24  0256   WBC 10*3/mm3 11.06*   HEMOGLOBIN g/dL 10.3*   HEMATOCRIT % 32.2*   PLATELETS 10*3/mm3 163     Results from last 7 days   Lab Units 07/15/24  0519   SODIUM mmol/L 140   POTASSIUM mmol/L 4.3   CHLORIDE mmol/L 99   CO2 mmol/L 36.0*   BUN mg/dL 27*   CREATININE mg/dL 0.99   CALCIUM mg/dL 8.6   BILIRUBIN mg/dL 2.6*   ALK PHOS U/L 117   ALT (SGPT) U/L 26   AST (SGOT) U/L 36*   GLUCOSE mg/dL 89     Results from last 7 days   Lab Units 07/11/24  1045   PH, ARTERIAL pH units 7.539*   PO2 ART mm Hg 66.3*   PCO2, ARTERIAL mm Hg 49.9*   HCO3 ART mmol/L 42.6*     Results from last 7 days   Lab Units 07/15/24  0519 07/14/24  0659 07/13/24  0240   ALBUMIN g/dL 2.1* 2.1* 2.1*             Results from last 7 days   Lab Units 07/15/24  0519   MAGNESIUM mg/dL 1.8                   Meds:   SCHEDULE  amiodarone, 200 mg, Oral, Q12H   Followed by  [START ON 7/23/2024] amiodarone, 200 mg, Oral, Daily  apixaban, 5 mg, Oral, BID  budesonide, 0.5 mg, Nebulization, BID - RT  bumetanide, 1 mg, Oral, Daily  hydrocortisone, 25 mg, Rectal, BID  insulin lispro, 2-7 Units, Subcutaneous, 4x Daily With Meals & Nightly  ipratropium-albuterol, 3 mL, Nebulization, 4x Daily - RT  lidocaine, 20 mL, Infiltration, Once  metoprolol succinate XL, 25 mg, Oral, Q12H  miconazole, 1 Application, Topical, Q12H  pantoprazole, 40 mg, Oral, BID AC  povidone-iodine, , Topical, Daily  sodium chloride, 10 mL, Intravenous, Q12H  [Held by provider] spironolactone, 25 mg, Oral, Daily      Infusions     PRNs    acetaminophen **OR** acetaminophen    Calcium Replacement - Follow Nurse / BPA Driven Protocol    dextrose    dextrose     glucagon (human recombinant)    ipratropium-albuterol    Magnesium Standard Dose Replacement - Follow Nurse / BPA Driven Protocol    nitroglycerin    ondansetron ODT **OR** ondansetron    Phosphorus Replacement - Follow Nurse / BPA Driven Protocol    Potassium Replacement - Follow Nurse / BPA Driven Protocol    [COMPLETED] Insert Peripheral IV **AND** sodium chloride    sodium chloride    sodium chloride    Physical Exam:  General Appearance:  Alert   HEENT:  Normocephalic, without obvious abnormality, Conjunctiva/corneas clear,.   Nares normal, no drainage     Neck:  Supple, symmetrical, trachea midline.   Lungs /Chest wall:   Bilateral basal rhonchi, respirations unlabored, symmetrical wall movement.     Heart:  Regular rate and rhythm, S1 S2 normal  Abdomen: Soft, non-tender, no masses, no organomegaly.    Extremities: Trace edema, no clubbing or cyanosis     ROS  Constitutional: Negative for chills, fever and malaise/fatigue.   HENT: Negative.    Eyes: Negative.    Cardiovascular: Negative.    Respiratory: Positive for cough and shortness of breath.    Skin: Negative.    Musculoskeletal: Negative.    Gastrointestinal: Negative.    Genitourinary: Negative.    Neurological: Generalized weakness      I reviewed the recent clinical results  I personally reviewed the latest radiological studies    Part of this note may be an electronic transcription/translation of spoken language to printed text using the Dragon Dictation System.

## 2024-07-15 NOTE — PLAN OF CARE
"Assessment: Ban Brennan presents with functional mobility impairments which indicate the need for skilled intervention. Tolerating session today without incident. Pt complaining of 10/10 stomach and abdominal pain at beginning of session, agreeable to work with PT staff to sit EOB. She completes supine<>sit EOB min A x 2 this date, but refusing to exit the bed due to 10/10 pain in abdomen. She was able to tolerate sitting on bed for ~10 min to complete exercises and ADL training. Sitting balance was SBA>IND sitting at EOB during activity completion. PT will continue to progress mobility as appropriate. Will continue to follow and progress as tolerated.     Plan/Recommendations:   If medically appropriate, Moderate Intensity Therapy recommended post-acute care. This is recommended as therapy feels the patient would require 3-4 days per week and wouldn't tolerate \"3 hour daily\" rehab intensity. SNF would be the preferred choice. If the patient does not agree to SNF, arrange HH or OP depending on home bound status. If patient is medically complex, consider LTACH. Pt requires no DME at discharge.     Pt desires Skilled Rehab placement at discharge. Pt cooperative; agreeable to therapeutic recommendations and plan of care.   "

## 2024-07-15 NOTE — PROGRESS NOTES
LOS: 15 days   Patient Care Team:  Rut Pierce APRN as PCP - General (Nurse Practitioner)  Austin Brooks MD as Cardiologist (Cardiology)      Subjective     Interval History: GI originally consulted 7/9/2024 for rectal bleeding which was suspected to be hemorrhoidal and this has stopped.  She has been admitted since 6/30/2024 when she had shortness of air and A-fib with RVR.  GI asked to reconsult due to pancreas mass seen on CT.    Subjective: Patient denies any previous pancreatic disease.  Her breathing has improved.  She does complain of right-sided abdominal pain that has been going on for a long time.  She does have right-sided chest tube in place.  She denies nausea or vomiting and has been able to eat some.      ROS:   Review of Systems   Constitutional:  Negative for chills and fever.   Respiratory:  Negative for cough and shortness of breath.    Cardiovascular:  Negative for chest pain.   Gastrointestinal:  Positive for abdominal pain. Negative for blood in stool, nausea and vomiting.   Musculoskeletal:  Positive for arthralgias and back pain.   Neurological:  Positive for weakness. Negative for dizziness.   Psychiatric/Behavioral:  Negative for agitation and confusion.         Medication Review:   Scheduled Meds:amiodarone, 200 mg, Oral, Q12H   Followed by  [START ON 7/23/2024] amiodarone, 200 mg, Oral, Daily  apixaban, 5 mg, Oral, BID  budesonide, 0.5 mg, Nebulization, BID - RT  bumetanide, 1 mg, Oral, Daily  hydrocortisone, 25 mg, Rectal, BID  insulin lispro, 2-7 Units, Subcutaneous, 4x Daily With Meals & Nightly  ipratropium-albuterol, 3 mL, Nebulization, 4x Daily - RT  lidocaine, 20 mL, Infiltration, Once  metoprolol succinate XL, 25 mg, Oral, Q12H  miconazole, 1 Application, Topical, Q12H  pantoprazole, 40 mg, Oral, BID AC  povidone-iodine, , Topical, Daily  sodium chloride, 10 mL, Intravenous, Q12H  [Held by provider] spironolactone, 25 mg, Oral, Daily      Continuous Infusions:   PRN  Meds:.  acetaminophen **OR** acetaminophen    Calcium Replacement - Follow Nurse / BPA Driven Protocol    dextrose    dextrose    glucagon (human recombinant)    ipratropium-albuterol    Magnesium Standard Dose Replacement - Follow Nurse / BPA Driven Protocol    nitroglycerin    ondansetron ODT **OR** ondansetron    Phosphorus Replacement - Follow Nurse / BPA Driven Protocol    Potassium Replacement - Follow Nurse / BPA Driven Protocol    [COMPLETED] Insert Peripheral IV **AND** sodium chloride    sodium chloride    sodium chloride      Objective     Vital Signs  Temp:  [97.6 °F (36.4 °C)-98.7 °F (37.1 °C)] 98.4 °F (36.9 °C)  Heart Rate:  [] 83  Resp:  [16-28] 17  BP: ()/(41-73) 113/62    Physical Exam:    General Appearance:    Awake and alert, in no acute distress   Head:    Normocephalic, without obvious abnormality   Eyes:          Conjunctivae normal, anicteric sclera   Ears:    Hearing intact   Throat:   No oral lesions, no thrush, oral mucosa moist       Lungs:     Respirations regular, even and unlabored       Abdomen:     Soft, right lower quadrant tenderness, no rebound or guarding, non-distended   Rectal:     Deferred   Extremities: 1+ bilateral lower extremity edema, skin ulcers on bilateral feet   Skin:   No bleeding, bruising or rash, no jaundice   Neurologic:   Sensation intact        Results Review:    CBC  Results from last 7 days   Lab Units 07/15/24  0256 07/14/24  0659 07/13/24  0240 07/12/24  0531 07/11/24  0437 07/10/24  0220 07/09/24  2051 07/09/24  0851 07/09/24  0256   RBC 10*6/mm3 3.34* 3.70* 3.72* 3.89 4.10 4.13  --   --  4.15   WBC 10*3/mm3 11.06* 9.61 11.02* 10.25 11.69* 11.11*  --   --  10.66   HEMOGLOBIN g/dL 10.3* 11.2* 11.5* 11.9* 12.4 12.5 12.6   < > 12.6   PLATELETS 10*3/mm3 163 117* 112* 88* 93* 85*  --   --  87*    < > = values in this interval not displayed.       CMP  Results from last 7 days   Lab Units 07/15/24  0519 07/14/24  1918 07/14/24  0659 07/13/24  0240  07/12/24  1619 07/12/24  0531 07/11/24  0437 07/10/24  0220 07/09/24  1507 07/09/24  0256   SODIUM mmol/L 140  --  141 141  --  141 142 139  --  145   POTASSIUM mmol/L 4.3 4.3 3.4* 3.9 4.3 3.5 4.5 4.5   < > 3.6   CHLORIDE mmol/L 99  --  97* 97*  --  97* 95* 96*  --  98   CO2 mmol/L 36.0*  --  38.3* 36.9*  --  38.5* 41.6* 37.9*  --  39.5*   BUN mg/dL 27*  --  30* 38*  --  39* 46* 54*  --  68*   CREATININE mg/dL 0.99  --  0.99 1.11*  --  0.99 1.08* 1.00  --  1.08*   GLUCOSE mg/dL 89  --  75 87  --  82 94 90  --  117*   ALBUMIN g/dL 2.1*  --  2.1* 2.1*  --  1.9* 2.1* 2.0*  --  2.2*   BILIRUBIN mg/dL 2.6*  --  2.7* 2.9*  --  2.8* 2.4* 1.9*  --  1.7*   ALK PHOS U/L 117  --  115 96  --  94 85 80  --  84   AST (SGOT) U/L 36*  --  43* 37*  --  45* 48* 46*  --  37*   ALT (SGPT) U/L 26  --  23 19  --  18 19 17  --  17    < > = values in this interval not displayed.       Amylase and Lipase        CRP         Imaging Results (Last 24 Hours)       Procedure Component Value Units Date/Time    XR Chest 1 View [[id number]] Collected: 07/15/24 0753     Updated: 07/15/24 0758    Narrative:      XR CHEST 1 VW    Date of Exam: 7/15/2024 3:51 AM EDT    Indication: CT    Comparison: X-ray dated 7/14/2024    Findings:  There is cardiomegaly. Interval change in bilateral lower lobe consolidation with moderate bilateral pleural effusions. No evidence of pneumothorax. Again noticed a pigtail catheter in the right upper quadrant      Impression:      Impression:    Stable chest with cardiomegaly, moderate bilateral effusion and bibasilar consolidations      Electronically Signed: Eliu High MD    7/15/2024 7:56 AM EDT    Workstation ID: [id number]              Assessment & Plan   -Abnormal CT suggesting pancreas lesion  -Rectal bleeding -suspect anorectal source such as hemorrhoids  -Elevated LFTs  -A-fib with RVR -on Eliquis  -Acute bilateral lower extremity DVT  -Bilateral lower extremity  cellulitis  -UTI  -Sepsis  -Rhinovirus  -Multifocal pneumonia  -Large right pleural effusion s/p chest tube placement  -Hypertension  -CHF  -COPD  -SEDRICK  -DMII     PLAN:  Patient is a 69-year-old female with history of CHF, COPD, and DMII who presented on 6/30 with complaints of shortness of breath.  She was diagnosed with A-fib with RVR and was treated with Cardizem.  Cardiology is following.  Since admission, the patient has also been diagnosed with bilateral lower extremity cellulitis, UTI, sepsis, acute bilateral lower extremity DVTs, rhinovirus, multifocal pneumonia, and large right pleural effusion s/p chest tube placement.  GI asked to consult on 7/9 due to rectal bleeding which was suspected to be hemorrhoidal and has seemed to stop.  Asked to reconsult 7/15/2024 due to abnormal CT suggesting pancreas lesion.    She has no history of pancreatic disease.  CT chest performed 7/14/2024 and shows moderate right pleural effusion along with cholelithiasis, splenic infarct, and 3 cm pancreas mass which was also seen on noncontrasted CT abdomen/pelvis on 7/13/2024.  She denies pancreas disease.  Oncology following and ordered CEA and CA 19-9 which are pending.  MRI pancreatic protocol also ordered.  We will follow-up on these.  We will also arrange outpatient endoscopic ultrasound.  She also has mildly elevated liver enzymes.  Total bilirubin 2.6 and AST 36.  Alk phos and ALT normal.  She does remain on amiodarone.  Continue diet as tolerated and supportive care.       Senia Bailey, APRN  07/15/24  14:39 EDT

## 2024-07-15 NOTE — PROGRESS NOTES
"Referring Provider: Luisito Power,*    Reason for follow-up: Atrial fibrillation with rapid ventricular rate     Patient Care Team:  Rut Pierce APRN as PCP - General (Nurse Practitioner)  Austin Brooks MD as Cardiologist (Cardiology)      SUBJECTIVE  Remains on 3 L of oxygen via nasal cannula.  Chest tube is in place.     ROS  Review of all systems negative except as indicated.    Since I have last seen, the patient has been without any chest discomfort, shortness of breath, palpitations, dizziness or syncope.  Denies having any headache, abdominal pain, nausea, vomiting, diarrhea, constipation, loss of weight or loss of appetite.  Denies having any excessive bruising, hematuria or blood in the stool.        Personal History:    Past Medical History:   Diagnosis Date    Bilateral leg edema     Chronic respiratory failure with hypoxia, on home O2 therapy 07/01/2024    COPD (chronic obstructive pulmonary disease)     Morbid obesity with BMI of 40.0-44.9, adult 11/08/2010    Primary hypertension 03/01/2017    Pulmonary embolism     \"many years ago, was on warfarin\"    Sleep apnea        History reviewed. No pertinent surgical history.    History reviewed. No pertinent family history.    Social History     Tobacco Use    Smoking status: Never    Smokeless tobacco: Never   Vaping Use    Vaping status: Never Used   Substance Use Topics    Alcohol use: Never    Drug use: Never        Home meds:  Prior to Admission medications    Medication Sig Start Date End Date Taking? Authorizing Provider   Allergy Relief 180 MG tablet Take 1 tablet by mouth Daily. 5/30/24  Yes ProviderChadwick MD   bumetanide (BUMEX) 1 MG tablet Take 1 tablet by mouth 3 times a day. 5/30/24  Yes ProviderChadwick MD   docusate sodium (COLACE) 100 MG capsule Take 1 capsule by mouth Every 12 (Twelve) Hours. 5/30/24  Yes ProviderChadwick MD   furosemide (LASIX) 20 MG tablet Take 1 tablet by mouth Daily.   Yes Provider, " MD Chadwick   HYDROcodone-acetaminophen (NORCO)  MG per tablet Take 1 tablet by mouth 3 times a day. 5/30/24  Yes Chadwick Johnson MD   lisinopril (PRINIVIL,ZESTRIL) 30 MG tablet Take 1 tablet by mouth Daily. 3/18/24  Yes Chadwick Johnson MD   meloxicam (MOBIC) 7.5 MG tablet Take 1 tablet by mouth Daily As Needed. 3/18/24  Yes Chadwick Johnson MD   metoprolol tartrate (LOPRESSOR) 50 MG tablet Take 1 tablet by mouth Daily.   Yes Chadwick Johnson MD   montelukast (SINGULAIR) 10 MG tablet Take 1 tablet by mouth every night at bedtime.   Yes Chadwick Johnson MD   omeprazole (priLOSEC) 20 MG capsule Take 1 capsule by mouth Daily. 3/18/24  Yes Provider, Historical, MD   oxybutynin XL (DITROPAN-XL) 10 MG 24 hr tablet Take 2 tablets by mouth Daily. 3/18/24  Yes Chadwick Johnson MD   potassium chloride (MICRO-K) 10 MEQ CR capsule Take 1 capsule by mouth Every 12 (Twelve) Hours. 3/18/24  Yes Chadwick Johnson MD   vitamin D (ERGOCALCIFEROL) 1.25 MG (69091 UT) capsule capsule Take 1 capsule by mouth 2 (Two) Times a Week. Monday and Thursday. 5/30/24  Yes Chadwick Johnson MD       Allergies:  Patient has no known allergies.    Scheduled Meds:amiodarone, 200 mg, Oral, Q12H   Followed by  [START ON 7/23/2024] amiodarone, 200 mg, Oral, Daily  apixaban, 5 mg, Oral, BID  budesonide, 0.5 mg, Nebulization, BID - RT  bumetanide, 1 mg, Oral, Daily  hydrocortisone, 25 mg, Rectal, BID  insulin lispro, 2-7 Units, Subcutaneous, 4x Daily With Meals & Nightly  ipratropium-albuterol, 3 mL, Nebulization, 4x Daily - RT  lidocaine, 20 mL, Infiltration, Once  metoprolol succinate XL, 25 mg, Oral, Q12H  miconazole, 1 Application, Topical, Q12H  pantoprazole, 40 mg, Oral, BID AC  povidone-iodine, , Topical, Daily  sodium chloride, 10 mL, Intravenous, Q12H  sodium phosphate, 15 mmol, Intravenous, Once  [Held by provider] spironolactone, 25 mg, Oral, Daily      Continuous Infusions:   PRN Meds:.   "acetaminophen **OR** acetaminophen    Calcium Replacement - Follow Nurse / BPA Driven Protocol    dextrose    dextrose    glucagon (human recombinant)    ipratropium-albuterol    Magnesium Standard Dose Replacement - Follow Nurse / BPA Driven Protocol    nitroglycerin    ondansetron ODT **OR** ondansetron    Phosphorus Replacement - Follow Nurse / BPA Driven Protocol    Potassium Replacement - Follow Nurse / BPA Driven Protocol    [COMPLETED] Insert Peripheral IV **AND** sodium chloride    sodium chloride    sodium chloride      OBJECTIVE    Vital Signs  Vitals:    07/15/24 0537 07/15/24 0647 07/15/24 0651 07/15/24 0655   BP: (!) 88/49   113/41   BP Location: Right arm      Patient Position: Lying   Lying   Pulse: 94 97 92 88   Resp: 23 16 23 28   Temp: 97.6 °F (36.4 °C)      TempSrc: Oral      SpO2: 94% 100% 100% 100%   Weight: 79.2 kg (174 lb 9.7 oz)      Height:           Flowsheet Rows      Flowsheet Row First Filed Value   Admission Height 147.3 cm (58\") Documented at 06/30/2024 1650   Admission Weight 108 kg (239 lb) Documented at 06/30/2024 1650              Intake/Output Summary (Last 24 hours) at 7/15/2024 0715  Last data filed at 7/15/2024 0546  Gross per 24 hour   Intake 720 ml   Output 1000 ml   Net -280 ml          Telemetry: Atrial fibrillation with heart rate in the 90s    Physical Exam:  The patient is alert, oriented and in no distress.  Obese  Vital signs as noted above.  Head and neck revealed no carotid bruits or jugular venous distention.  No thyromegaly or lymphadenopathy is present  Lungs clear.  No wheezing.  Breath sounds are normal bilaterally.  Heart normal first and second heart sounds.  No murmur. No precordial rub is present.  No gallop is present.  Abdomen soft and nontender.  No organomegaly is present.  Extremities with good peripheral pulses without any pedal edema.  Skin warm and dry.  Musculoskeletal system is grossly normal.  CNS grossly normal.       Results Review:  I have " personally reviewed the results from the time of this admission to 7/15/2024 07:15 EDT and agree with these findings:  []  Laboratory  []  Microbiology  []  Radiology  []  EKG/Telemetry   []  Cardiology/Vascular   []  Pathology  []  Old records  []  Other:    Most notable findings include:    Lab Results (last 24 hours)       Procedure Component Value Units Date/Time    POC Glucose 4x Daily Before Meals & at Bedtime [[id number]]  (Normal) Collected: 07/15/24 0701    Specimen: Blood Updated: 07/15/24 0703     Glucose 89 mg/dL      Comment: Serial Number: [id number]:  [id number]       Magnesium [[id number]]  (Normal) Collected: 07/15/24 0519    Specimen: Blood Updated: 07/15/24 0556     Magnesium 1.8 mg/dL     Comprehensive Metabolic Panel [[id number]]  (Abnormal) Collected: 07/15/24 0519    Specimen: Blood Updated: 07/15/24 0556     Glucose 89 mg/dL      BUN 27 mg/dL      Creatinine 0.99 mg/dL      Sodium 140 mmol/L      Potassium 4.3 mmol/L      Chloride 99 mmol/L      CO2 36.0 mmol/L      Calcium 8.6 mg/dL      Total Protein 5.0 g/dL      Albumin 2.1 g/dL      ALT (SGPT) 26 U/L      AST (SGOT) 36 U/L      Alkaline Phosphatase 117 U/L      Total Bilirubin 2.6 mg/dL      Globulin 2.9 gm/dL      A/G Ratio 0.7 g/dL      BUN/Creatinine Ratio 27.3     Anion Gap 5.0 mmol/L      eGFR 61.9 mL/min/1.73     Narrative:      GFR Normal >60  Chronic Kidney Disease <60  Kidney Failure <15      Phosphorus [[id number]]  (Abnormal) Collected: 07/15/24 0256    Specimen: Blood Updated: 07/15/24 0334     Phosphorus 2.2 mg/dL     CBC & Differential [[id number]]  (Abnormal) Collected: 07/15/24 0256    Specimen: Blood Updated: 07/15/24 0305    Narrative:      The following orders were created for panel order CBC & Differential.  Procedure                               Abnormality         Status                     ---------                               -----------         ------                     CBC Auto Differential[[id number]]         Abnormal            Final result                 Please view results for these tests on the individual orders.    CBC Auto Differential [[id number]]  (Abnormal) Collected: 07/15/24 0256    Specimen: Blood Updated: 07/15/24 0305     WBC 11.06 10*3/mm3      RBC 3.34 10*6/mm3      Hemoglobin 10.3 g/dL      Hematocrit 32.2 %      MCV 96.4 fL      MCH 30.8 pg      MCHC 32.0 g/dL      RDW 16.8 %      RDW-SD 58.1 fl      MPV 12.0 fL      Platelets 163 10*3/mm3      Neutrophil % 79.6 %      Lymphocyte % 8.9 %      Monocyte % 9.7 %      Eosinophil % 0.8 %      Basophil % 0.2 %      Immature Grans % 0.8 %      Neutrophils, Absolute 8.81 10*3/mm3      Lymphocytes, Absolute 0.98 10*3/mm3      Monocytes, Absolute 1.07 10*3/mm3      Eosinophils, Absolute 0.09 10*3/mm3      Basophils, Absolute 0.02 10*3/mm3      Immature Grans, Absolute 0.09 10*3/mm3      nRBC 0.0 /100 WBC     POC Glucose Once [[id number]]  (Normal) Collected: 07/14/24 2009    Specimen: Blood Updated: 07/14/24 2011     Glucose 102 mg/dL      Comment: Serial Number: [id number]:  [id number]       Potassium [[id number]]  (Normal) Collected: 07/14/24 1918    Specimen: Blood from Arm, Right Updated: 07/14/24 1937     Potassium 4.3 mmol/L     POC Glucose Once [[id number]]  (Normal) Collected: 07/14/24 1711    Specimen: Blood Updated: 07/14/24 1714     Glucose 91 mg/dL      Comment: Serial Number: [id number]:  [id number]       POC Glucose Once [[id number]]  (Abnormal) Collected: 07/14/24 1213    Specimen: Blood Updated: 07/14/24 1215     Glucose 122 mg/dL      Comment: Serial Number: [id number]:  [id number]       Magnesium [[id number]]  (Normal) Collected: 07/14/24 0659    Specimen: Blood from Arm, Right Updated: 07/14/24 0810     Magnesium 1.8 mg/dL     Comprehensive Metabolic Panel [[id number]]  (Abnormal) Collected: 07/14/24 0659    Specimen: Blood from Arm, Right Updated: 07/14/24 0810     Glucose 75 mg/dL      BUN 30 mg/dL      Creatinine 0.99 mg/dL       Sodium 141 mmol/L      Potassium 3.4 mmol/L      Comment: Slight hemolysis detected by analyzer. Result may be falsely elevated.        Chloride 97 mmol/L      CO2 38.3 mmol/L      Calcium 8.8 mg/dL      Total Protein 5.8 g/dL      Albumin 2.1 g/dL      ALT (SGPT) 23 U/L      AST (SGOT) 43 U/L      Alkaline Phosphatase 115 U/L      Total Bilirubin 2.7 mg/dL      Globulin 3.7 gm/dL      A/G Ratio 0.6 g/dL      BUN/Creatinine Ratio 30.3     Anion Gap 5.7 mmol/L      eGFR 61.9 mL/min/1.73     Narrative:      GFR Normal >60  Chronic Kidney Disease <60  Kidney Failure <15      CBC & Differential [[id number]]  (Abnormal) Collected: 07/14/24 0659    Specimen: Blood from Arm, Right Updated: 07/14/24 0746    Narrative:      The following orders were created for panel order CBC & Differential.  Procedure                               Abnormality         Status                     ---------                               -----------         ------                     CBC Auto Differential[[id number]]        Abnormal            Final result               Scan Slide[[id number]]                                       Final result                 Please view results for these tests on the individual orders.    Scan Slide [[id number]] Collected: 07/14/24 0659    Specimen: Blood from Arm, Right Updated: 07/14/24 0746     Anisocytosis Slight/1+     WBC Morphology Normal     Platelet Estimate Decreased    CBC Auto Differential [[id number]]  (Abnormal) Collected: 07/14/24 0659    Specimen: Blood from Arm, Right Updated: 07/14/24 0746     WBC 9.61 10*3/mm3      RBC 3.70 10*6/mm3      Hemoglobin 11.2 g/dL      Hematocrit 36.5 %      MCV 98.6 fL      MCH 30.3 pg      MCHC 30.7 g/dL      RDW 16.9 %      RDW-SD 59.4 fl      MPV 11.9 fL      Platelets 117 10*3/mm3      Neutrophil % 79.4 %      Lymphocyte % 8.9 %      Monocyte % 9.2 %      Eosinophil % 1.8 %      Basophil % 0.1 %      Immature Grans % 0.6 %      Neutrophils, Absolute 7.63 10*3/mm3       Lymphocytes, Absolute 0.86 10*3/mm3      Monocytes, Absolute 0.88 10*3/mm3      Eosinophils, Absolute 0.17 10*3/mm3      Basophils, Absolute 0.01 10*3/mm3      Immature Grans, Absolute 0.06 10*3/mm3      nRBC 0.0 /100 WBC     Phosphorus [[id number]]  (Normal) Collected: 07/14/24 0659    Specimen: Blood from Arm, Right Updated: 07/14/24 0746     Phosphorus 2.5 mg/dL             Imaging Results (Last 24 Hours)       Procedure Component Value Units Date/Time    XR Chest 1 View [[id number]] Resulted: 07/15/24 0400     Updated: 07/15/24 0401    CT Chest Without Contrast Diagnostic [[id number]] Collected: 07/14/24 1136     Updated: 07/14/24 1146    Narrative:      CT CHEST WO CONTRAST DIAGNOSTIC    Date of Exam: 7/14/2024 11:09 AM EDT    Indication: Lung nodule, 6-8mm, pleural effusion.    Comparison: [facility] chest x-ray 7/14/2024, CT chest 7/6/2024, CT abdomen and pelvis without contrast 7/13/2024    Technique: Axial CT images were obtained of the chest without contrast administration.  Sagittal and coronal reconstructions were performed.  Automated exposure control and iterative reconstruction methods were used.      Findings:  There is a right basilar pleural catheter in place. There is persistent moderate amount of right pleural fluid. Smaller left pleural effusion appears stable. There is trace right pneumothorax anteriorly, which smaller than on 7/6/2024 CT. Compared to   previous CT, there is improved aeration of the right upper lobe, but there is persistent consolidation and volume loss in the right middle and lower lobes. Left lower lobe consolidation and volume loss appears increased.    Massive cardiomegaly is redemonstrated. No significant pericardial effusion. There are coronary artery calcifications. No lymphadenopathy is seen in the chest.    3 cm pancreatic mass, splenic infarct, and cholelithiasis are included in the upper abdomen, as seen on recent CT abdomen/pelvis. No acute osseous abnormality is  identified.      Impression:      Impression:  1.Persistent moderate-sized right pleural effusion despite indwelling pleural catheter. Tiny right pneumothorax. Improved aeration of the right lung with persistent right middle/lower lobe volume loss and consolidation.  2.Stable smaller left pleural effusion with increased left lower lobe volume loss and consolidation.  3.Stable massive cardiomegaly.  4.Included upper abdominal findings of pancreatic mass, splenic infarct, and cholelithiasis, as described on recent CT abdomen/pelvis.      Electronically Signed: Elise Castro    7/14/2024 11:44 AM EDT    Workstation ID: [id number]    XR Chest 1 View [[id number]] Collected: 07/14/24 0957     Updated: 07/14/24 1001    Narrative:      XR CHEST 1 VW    Date of Exam: 7/14/2024 8:40 AM EDT    Indication: Chest tube     Comparison: CT chest 7/6/2024, [facility] chest x-ray 7/13/2024    Findings:  Right basilar pleural catheter remains in place. No pneumothorax is seen. There are bilateral perihilar and bibasilar airspace opacities, increased in the right lower lung compared to yesterday's chest x-ray. Cardiac silhouette remains enlarged.      Impression:      Impression:  1.Right basilar pleural catheter remains in place. No visible pneumothorax.  2.Persistent bilateral perihilar and bibasilar airspace opacities, increased in the right lower lung compared to yesterday's chest x-ray.      Electronically Signed: Elise Castro    7/14/2024 9:59 AM EDT    Workstation ID: [id number]            LAB RESULTS (LAST 7 DAYS)    CBC  Results from last 7 days   Lab Units 07/15/24  0256 07/14/24  0659 07/13/24  0240 07/12/24  0531 07/11/24  0437 07/10/24  0220 07/09/24  2051 07/09/24  0851 07/09/24  0256   WBC 10*3/mm3 11.06* 9.61 11.02* 10.25 11.69* 11.11*  --   --  10.66   RBC 10*6/mm3 3.34* 3.70* 3.72* 3.89 4.10 4.13  --   --  4.15   HEMOGLOBIN g/dL 10.3* 11.2* 11.5* 11.9* 12.4 12.5 12.6   < > 12.6   HEMATOCRIT % 32.2* 36.5 36.0 37.5 39.1 38.6 38.9    < > 39.1   MCV fL 96.4 98.6* 96.8 96.4 95.4 93.5  --   --  94.2   PLATELETS 10*3/mm3 163 117* 112* 88* 93* 85*  --   --  87*    < > = values in this interval not displayed.       BMP  Results from last 7 days   Lab Units 07/15/24  0519 07/15/24  0256 07/14/24  1918 07/14/24  0659 07/13/24  0240 07/12/24  1619 07/12/24  0531 07/11/24  0437 07/10/24  0220 07/09/24  1507 07/09/24  0256   SODIUM mmol/L 140  --   --  141 141  --  141 142 139  --  145   POTASSIUM mmol/L 4.3  --  4.3 3.4* 3.9 4.3 3.5 4.5 4.5 3.9 3.6   CHLORIDE mmol/L 99  --   --  97* 97*  --  97* 95* 96*  --  98   CO2 mmol/L 36.0*  --   --  38.3* 36.9*  --  38.5* 41.6* 37.9*  --  39.5*   BUN mg/dL 27*  --   --  30* 38*  --  39* 46* 54*  --  68*   CREATININE mg/dL 0.99  --   --  0.99 1.11*  --  0.99 1.08* 1.00  --  1.08*   GLUCOSE mg/dL 89  --   --  75 87  --  82 94 90  --  117*   MAGNESIUM mg/dL 1.8  --   --  1.8 1.7  --  1.7 1.9 2.3  --  1.3*   PHOSPHORUS mg/dL  --  2.2*  --  2.5 2.4*  --  2.7 2.5 2.5 2.3* 1.8*       CMP   Results from last 7 days   Lab Units 07/15/24  0519 07/14/24  1918 07/14/24  0659 07/13/24  0240 07/12/24  1619 07/12/24  0531 07/11/24  0437 07/10/24  0220 07/09/24  1507 07/09/24  0256   SODIUM mmol/L 140  --  141 141  --  141 142 139  --  145   POTASSIUM mmol/L 4.3 4.3 3.4* 3.9 4.3 3.5 4.5 4.5   < > 3.6   CHLORIDE mmol/L 99  --  97* 97*  --  97* 95* 96*  --  98   CO2 mmol/L 36.0*  --  38.3* 36.9*  --  38.5* 41.6* 37.9*  --  39.5*   BUN mg/dL 27*  --  30* 38*  --  39* 46* 54*  --  68*   CREATININE mg/dL 0.99  --  0.99 1.11*  --  0.99 1.08* 1.00  --  1.08*   GLUCOSE mg/dL 89  --  75 87  --  82 94 90  --  117*   ALBUMIN g/dL 2.1*  --  2.1* 2.1*  --  1.9* 2.1* 2.0*  --  2.2*   BILIRUBIN mg/dL 2.6*  --  2.7* 2.9*  --  2.8* 2.4* 1.9*  --  1.7*   ALK PHOS U/L 117  --  115 96  --  94 85 80  --  84   AST (SGOT) U/L 36*  --  43* 37*  --  45* 48* 46*  --  37*   ALT (SGPT) U/L 26  --  23 19  --  18 19 17  --  17    < > = values in this interval  not displayed.       BNP        TROPONIN          CoAg        Creatinine Clearance  Estimated Creatinine Clearance: 51.6 mL/min (by C-G formula based on SCr of 0.99 mg/dL).    ABG  Results from last 7 days   Lab Units 07/11/24  1045   PH, ARTERIAL pH units 7.539*   PCO2, ARTERIAL mm Hg 49.9*   PO2 ART mm Hg 66.3*   O2 SATURATION ART % 94.6   BASE EXCESS ART mmol/L 17.4*       Radiology  CT Chest Without Contrast Diagnostic    Result Date: 7/14/2024  Impression: 1.Persistent moderate-sized right pleural effusion despite indwelling pleural catheter. Tiny right pneumothorax. Improved aeration of the right lung with persistent right middle/lower lobe volume loss and consolidation. 2.Stable smaller left pleural effusion with increased left lower lobe volume loss and consolidation. 3.Stable massive cardiomegaly. 4.Included upper abdominal findings of pancreatic mass, splenic infarct, and cholelithiasis, as described on recent CT abdomen/pelvis. Electronically Signed: Elise Castro  7/14/2024 11:44 AM EDT  Workstation ID: [id number]    XR Chest 1 View    Result Date: 7/14/2024  Impression: 1.Right basilar pleural catheter remains in place. No visible pneumothorax. 2.Persistent bilateral perihilar and bibasilar airspace opacities, increased in the right lower lung compared to yesterday's chest x-ray. Electronically Signed: Elise Castro  7/14/2024 9:59 AM EDT  Workstation ID: [id number]    CT Abdomen Pelvis Without Contrast    Result Date: 7/13/2024  1. Colonic diverticulosis without evidence of diverticulitis. 2. There is a stable 3 cm soft tissue mass of the pancreas. Pancreatic mass protocol MRI of the abdomen recommended. 3. Focal splenic infarct 4. Small bilateral pleural effusions with trace right-sided pneumothorax. 5. Cholelithiasis without evidence of cholecystitis. Electronically Signed: Eamon Brock MD  7/13/2024 1:51 PM EDT  Workstation ID: [id number]       EKG  I personally viewed and interpreted the patient's  EKG/Telemetry data:  ECG 12 Lead Rhythm Change   Final Result   HEART RATE=92  bpm   RR Wnzqchnl=488  ms   NV Interval=  ms   P Horizontal Axis=  deg   P Front Axis=  deg   QRSD Ybewneir=653  ms   QT Gvjedqcp=935  ms   DLkX=933  ms   QRS Axis=-96  deg   T Wave Axis=65  deg   - ABNORMAL ECG -   Atrial fibrillation   Right bundle branch block   When compared with ECG of 05-Jul-2024 02:24:17,   No significant change   Electronically Signed By: Tee Whitlock ([facility]) 2024-07-07 10:12:55   Date and Time of Study:2024-07-05 06:43:37      ECG 12 Lead Drug Monitoring; amiodarone   Final Result   HEART VJEC=507  bpm   RR Gvokknda=611  ms   NV Interval=  ms   P Horizontal Axis=  deg   P Front Axis=  deg   QRSD Yeagkpwh=490  ms   QT Jbnvxkrc=862  ms   FVoL=571  ms   QRS Axis=-94  deg   T Wave Axis=70  deg   - ABNORMAL ECG -   Atrial fibrillation   Ventricular premature complex   Right bundle branch block   When compared with ECG of 04-Jul-2024 09:17:07,   No change from prior tracing   Electronically Signed By: Tee Whitlock ([facility]) 2024-07-07 10:13:32   Date and Time of Study:2024-07-05 02:24:17      ECG 12 Lead Electrolyte Imbalance   Final Result   HEART SHHL=955  bpm   RR Rfcmrdla=443  ms   NV Kzbqnegf=432  ms   P Horizontal Axis=113  deg   P Front Axis=263  deg   QRSD Ydctrqyj=433  ms   QT Qocvqzod=785  ms   BQwG=495  ms   QRS Axis=227  deg   T Wave Axis=27  deg   - ABNORMAL ECG -   Right bundle branch block   When compared with ECG of 04-Jul-2024 04:20:51,   Significant change in rhythm: previously atrial fibrillation   Significant repolarization change   Atrial fibrillation with rapid V-rate   No change from prior tracing   Electronically Signed By: Tee Whitlock ([facility]) 2024-07-07 10:14:27   Date and Time of Study:2024-07-04 09:17:07      ECG 12 Lead Drug Monitoring; Amiodarone   Preliminary Result   HEART BWMO=506  bpm   RR Rwqmuhpf=961  ms   NV Interval=  ms   P Horizontal Axis=  deg   P Front Axis=  deg   QRSD  Sjsegzdr=761  ms   QT Iztcjdmx=864  ms   KWcM=921  ms   QRS Axis=-90  deg   T Wave Axis=36  deg   - ABNORMAL ECG -   Atrial fibrillation   Right bundle branch block   ST elevation secondary to IVCD   Date and Time of Study:2024-07-04 04:20:51      ECG 12 Lead Chest Pain   Preliminary Result   HEART FFWD=916  bpm   RR Zqiofnna=285  ms   WY Interval=  ms   P Horizontal Axis=  deg   P Front Axis=  deg   QRSD Ctcyrtvx=229  ms   QT Exytoiqb=777  ms   LCeF=422  ms   QRS Axis=-98  deg   T Wave Axis=37  deg   - ABNORMAL ECG -   Atrial fibrillation   Right bundle branch block   Anterolateral infarct, old   Date and Time of Study:2024-07-03 17:38:52      ECG 12 Lead Drug Monitoring; Amiodarone   Preliminary Result   HEART XJWJ=265  bpm   RR Wvrhgzis=140  ms   WY Interval=  ms   P Horizontal Axis=  deg   P Front Axis=  deg   QRSD Sbxyaitq=525  ms   QT Qryojiym=242  ms   QQvO=705  ms   QRS Axis=-90  deg   T Wave Axis=74  deg   - ABNORMAL ECG -   Atrial fibrillation   Right bundle branch block   ST elevation secondary to IVCD   Date and Time of Study:2024-07-03 04:02:32      ECG 12 Lead Drug Monitoring; Amiodarone   Final Result   HEART BJJT=782  bpm   RR Gzbdjdqk=791  ms   WY Interval=  ms   P Horizontal Axis=  deg   P Front Axis=  deg   QRSD Gfutcjlq=700  ms   QT Keqvkmbt=368  ms   TVpA=868  ms   QRS Axis=-93  deg   T Wave Axis=45  deg   - ABNORMAL ECG -   Atrial fibrillation   Right bundle branch block   Inferior  infarct, old   When compared with ECG of 01-Jul-2024 04:42:13,   Nonspecific significant change   Electronically Signed By: Librado Mcdermott ([facility]) 2024-07-10 12:58:26   Date and Time of Study:2024-07-02 15:12:56      ECG 12 Lead Drug Monitoring; Amiodarone   Final Result   HEART DVZL=877  bpm   RR Tawhkiba=882  ms   WY Interval=  ms   P Horizontal Axis=  deg   P Front Axis=  deg   QRSD Flkkhosn=124  ms   QT Pgwecytm=743  ms   LZtY=905  ms   QRS Axis=-80  deg   T Wave Axis=102  deg   - ABNORMAL ECG -   Atrial fibrillation    Right bundle branch block   Inferior  infarct, old   Anterolateral  infarct, age indeterminate   When compared with ECG of 30-Jun-2024 16:58:43,   Significant rate decrease   New or worsened ischemia or infarction   Electronically Signed By: Austin Brooks ([facility]) 2024-07-01 13:11:59   Date and Time of Study:2024-07-01 04:42:13      ECG 12 Lead Dyspnea   Final Result   HEART VGXZ=676  bpm   RR Mpefhmht=688  ms   ID Interval=  ms   P Horizontal Axis=  deg   P Front Axis=  deg   QRSD Rofwamgx=464  ms   QT Nwsgwbki=217  ms   GCkP=195  ms   QRS Axis=-101  deg   T Wave Axis=43  deg   - ABNORMAL ECG -   Atrial fibrillation   Right bundle branch block   ST elevation secondary to IVCD   Electronically Signed By: Jeffery Kwon ([facility]) 2024-07-01 07:37:02   Date and Time of Study:2024-06-30 16:58:43      Telemetry Scan   Final Result      Telemetry Scan   Final Result      Telemetry Scan   Final Result      Telemetry Scan   Final Result      Telemetry Scan   Final Result      Telemetry Scan   Final Result      Telemetry Scan   Final Result      Telemetry Scan   Final Result      Telemetry Scan   Final Result      Telemetry Scan   Final Result      Telemetry Scan   Final Result      Telemetry Scan   Final Result      Telemetry Scan   Final Result      Telemetry Scan   Final Result      Telemetry Scan   Final Result      Telemetry Scan   Final Result      Telemetry Scan   Final Result      Telemetry Scan   Final Result      Telemetry Scan   Final Result      Telemetry Scan   Final Result      Telemetry Scan   Final Result      Telemetry Scan   Final Result      Telemetry Scan   Final Result      Telemetry Scan   Final Result      Telemetry Scan   Final Result      Telemetry Scan   Final Result      Telemetry Scan   Final Result      Telemetry Scan   Final Result      Telemetry Scan   Final Result      Telemetry Scan   Final Result      Telemetry Scan   Final Result      Telemetry Scan   Final Result      Telemetry Scan   Final  Result      Telemetry Scan   Final Result      Telemetry Scan   Final Result      Telemetry Scan   Final Result      Telemetry Scan   Final Result      Telemetry Scan   Final Result      Telemetry Scan   Final Result      Telemetry Scan   Final Result      Telemetry Scan   Final Result      Telemetry Scan   Final Result      Telemetry Scan   Final Result      Telemetry Scan   Final Result      Telemetry Scan   Final Result      Telemetry Scan   Final Result      Telemetry Scan   Final Result      Telemetry Scan   Final Result      Telemetry Scan   Final Result      Telemetry Scan   Final Result      Telemetry Scan   Final Result      Telemetry Scan   Final Result      Telemetry Scan   Final Result      Telemetry Scan   Final Result      Telemetry Scan   Final Result      Telemetry Scan   Final Result      Telemetry Scan   Final Result      Telemetry Scan   Final Result      Telemetry Scan   Final Result      Telemetry Scan   Final Result      Telemetry Scan   Final Result      Telemetry Scan   Final Result      Telemetry Scan   Final Result      Telemetry Scan   Final Result      Telemetry Scan   Final Result      Telemetry Scan   Final Result      Telemetry Scan   Final Result      Telemetry Scan   Final Result      Telemetry Scan   Final Result      Telemetry Scan   Final Result      Telemetry Scan   Final Result      Telemetry Scan   Final Result      Telemetry Scan   Final Result      Telemetry Scan   Final Result      Telemetry Scan   Final Result      Telemetry Scan   Final Result      Telemetry Scan   Final Result      Telemetry Scan   Final Result      Telemetry Scan   Final Result      ECG 12 Lead Electrolyte Imbalance    (Results Pending)         Echocardiogram:    Results for orders placed during the hospital encounter of 06/30/24    Adult Transthoracic Echo Complete w/ Color, Spectral and Contrast if Necessary Per Protocol    Interpretation Summary    Left ventricular ejection fraction appears to be 31 -  35%.    Left ventricular diastolic dysfunction is noted.    The left atrial cavity is dilated.    Moderate aortic valve stenosis is present. Mean/peak gradient of 14/24 mmHg.  Dimensionless index 0.36    Moderate to severe mitral valve regurgitation is present.    Estimated right ventricular systolic pressure from tricuspid regurgitation is normal (<35 mmHg).        Stress Test:         Cardiac Catheterization:  No results found for this or any previous visit.         Other:         ASSESSMENT & PLAN:    Principal Problem:    Atrial fibrillation with RVR  Active Problems:    Primary hypertension    Morbid obesity with BMI of 40.0-44.9, adult    Right bundle branch block    Acute deep vein thrombosis (DVT) of both lower extremities    ARABELLA (acute kidney injury)    Acute hyperkalemia    Sepsis    Acute UTI    Acute systolic congestive heart failure    Rhinovirus infection    Multifocal pneumonia    Chronic respiratory failure with hypoxia, on home O2 therapy    Skin ulcer of right great toe    Skin ulcer of left great toe    SEDRICK (obstructive sleep apnea)    Aortic stenosis    Mitral regurgitation    Acute HFrEF (heart failure with reduced ejection fraction)    COPD (chronic obstructive pulmonary disease)      Acute respiratory failure with hypoxia and hypercapnia  Acute encephalopathy   Currently on 3 L of oxygen via nasal cannula  Chest tube in place for hydropneumothorax  Pulmonology is managing.     Atrial fibrillation with RVR  Newly diagnosed  Electrolytes have been corrected  Rate controlled with heart rate in the 80s and 90s  Continue amiodarone for rhythm control   Continue Toprol-XL 25 mg p.o. twice daily  [id number] score is 7  Continue Eliquis  Rectal bleeding with no drop in H&H likely from hemorrhoids  No plan for endoscopy       Acute systolic CHF  Newly diagnosed  Echocardiogram shows EF of 31 to 35%, moderate aortic stenosis and moderate to severe mitral regurgitation.  Completed IV diuretics now on  oral Bumex  Monitor I's and O's and replace electrolytes as needed.  Switch from metoprolol to tartrate to succinate  Add ACE inhibitor/ARNI if renal function and blood pressure allow  Was requiring midodrine.  Now off.  Blood pressure is still borderline low  Aldactone has been held  Plan to repeat echocardiogram after 3 months of completing GDMT     Acute deep vein thrombosis (DVT) of both lower extremities  Extensive DVT in bilateral lower extremities involving femoral, popliteal and posterior tibial.  Continue anticoagulation as above  She will need lifelong anticoagulation   Rectal bleeding with stable H&H     Thrombocytopenia  Closely monitor platelets while on anticoagulation  Platelets are 93     ARABELLA (acute kidney injury)  Presented with creatinine of 1.4.  Creatinine 0.99 today  Closely monitor renal function while on diuretics  Continue p.o. Bumex  Nephrology following      Acute hyperkalemia  Treated with [facility]  Nephrology following      Sepsis / UTI / Multifocal pneumonia / Rhinovirus infection  Multifactorial secondary to UTI and pneumonia with possible cellulitis  Continue empiric antibiotics      Skin ulcer of right great toe  Skin ulcer of left great toe  Podiatry following   A1c is 6.2%     Primary hypertension, chronic  Now on Toprol-XL  Unable to tolerate Aldactone due to low blood pressure.  Previously required midodrine.  Closely monitor blood pressure      Morbid obesity with BMI of 40.0-44.9, adult  BMI is 35.3.  She weighs 174 pounds.  Lifestyle modifications recommended   LDL 27, HDL 13, triglyceride 86 and total cholesterol 58.  No indication for statin     SEDRICK (obstructive sleep apnea)  Encouraged compliance with CPAP      Austin Brooks MD  07/15/24  07:15 EDT

## 2024-07-15 NOTE — CASE MANAGEMENT/SOCIAL WORK
Continued Stay Note  QIAN Clarke     Patient Name: Ban Brennan  MRN: [id number]  Today's Date: 7/15/2024    Admit Date: 6/30/2024    Plan: [facility] accepted. Precert approved (valid 7/14-7/19). PASRR approved. From home with family.   Discharge Plan       Row Name 07/15/24 1537       Plan    Plan [facility] accepted. Precert approved (valid 7/14-7/19). PASRR approved. From home with family.    Plan Comments DC Barriers: Pulmonology, cardiology, nephrology, oncology, and GI following, MRI abdomen pending             Dea Erwin RN     Office Phone: [phone]  Office Cell: [phone]

## 2024-07-15 NOTE — NURSING NOTE
WOCN note:    69 yr female admitted with increasing shortness of breath and afib with RVR. Patient has a hx of HTN, HF and BLE lymphedema. WOCN re-consulted for a possible hospital acquired pressure injury to the left heel and reassessment of multiple wounds.     Patient presents with blanchable erythema to both heels. There are foam off-loading boots being used. Patient's legs are less red and the swelling is improved. She has several wounds to the left and right lateral legs that are healing. Recommend to continue current treatment with Maxorb and silicone foam dressings.   The eschars to both great toes remain stable and are being painted with Betadine.   Patient also noted to have moisture associated skin damage to her low sacrum and buttocks at my last assessment. Today there is an unstageable pressure injury to the mid sacrum and two partial thickness stage 2 injuries to the left buttock. The unstageable injury is covered with a thin yellow adherent slough. Recommend using Calazime to the area if unable to get a foam dressing to adhere. Will also re-order an Agiliti low air loss bed. A foam wedge is being used for sacral off-loading but would recommend to transition to the black foam wedges for firmer support. Continue pressure injury prevention measures and skin care for any episodes of incontinence. We will continue to follow.

## 2024-07-15 NOTE — PROGRESS NOTES
[facility] MEDICINE SERVICE  DAILY PROGRESS NOTE    NAME: Ban Brennan  : 1955  MRN: [id number]      LOS: 15 days     PROVIDER OF SERVICE: Luisito Power MD    Chief Complaint: Atrial fibrillation with RVR    Subjective:     Interval History:  History taken from: patient chart RN  Constipation   No CP or SOB    Review of Systems:   Review of Systems  All negative except as above   Objective:     Vital Signs  Temp:  [97.6 °F (36.4 °C)-98.7 °F (37.1 °C)] 98.3 °F (36.8 °C)  Heart Rate:  [] 88  Resp:  [15-28] 17  BP: ()/(41-73) 102/56  Flow (L/min):  [3] 3   Body mass index is 35.27 kg/m².    Physical Exam  General: Alert and oriented, no acute distress. obese  HENT: Normocephalic, moist oral mucosa, no scleral icterus.  Neck: Supple, non-tender, no carotid bruits, no JVD, no LAD.  Lungs: Clear to auscultation, non-labored respiration. R sided chest tube  Heart: RRR, no murmur, gallop or edema.  Abdomen: Soft, hypogastric region soft tissue mass and tenderness, non-distended, + bowel sounds.  Musculoskeletal: Normal range of motion and strength, no tenderness or swelling.  Neuro: alert and awake, moving all 4 extremities   Skin: Skin is warm, dry and pink, no rashes or lesions.  Psychiatric: Cooperative, appropriate mood and affect.      Scheduled Meds   amiodarone, 200 mg, Oral, Q12H   Followed by  [START ON 2024] amiodarone, 200 mg, Oral, Daily  apixaban, 5 mg, Oral, BID  budesonide, 0.5 mg, Nebulization, BID - RT  bumetanide, 1 mg, Oral, Daily  gadoteridol, 20 mL, Intravenous, Once in imaging  hydrocortisone, 25 mg, Rectal, BID  insulin lispro, 2-7 Units, Subcutaneous, 4x Daily With Meals & Nightly  ipratropium-albuterol, 3 mL, Nebulization, 4x Daily - RT  lidocaine, 20 mL, Infiltration, Once  metoprolol succinate XL, 25 mg, Oral, Q12H  miconazole, 1 Application, Topical, Q12H  pantoprazole, 40 mg, Oral, BID AC  povidone-iodine, , Topical, Daily  sodium chloride, 10  mL, Intravenous, Q12H  [Held by provider] spironolactone, 25 mg, Oral, Daily       PRN Meds     acetaminophen **OR** acetaminophen    Calcium Replacement - Follow Nurse / BPA Driven Protocol    dextrose    dextrose    glucagon (human recombinant)    ipratropium-albuterol    Magnesium Standard Dose Replacement - Follow Nurse / BPA Driven Protocol    nitroglycerin    ondansetron ODT **OR** ondansetron    Phosphorus Replacement - Follow Nurse / BPA Driven Protocol    Potassium Replacement - Follow Nurse / BPA Driven Protocol    [COMPLETED] Insert Peripheral IV **AND** sodium chloride    sodium chloride    sodium chloride   Infusions         Diagnostic Data    Results from last 7 days   Lab Units 07/15/24  0519 07/15/24  0256   WBC 10*3/mm3  --  11.06*   HEMOGLOBIN g/dL  --  10.3*   HEMATOCRIT %  --  32.2*   PLATELETS 10*3/mm3  --  163   GLUCOSE mg/dL 89  --    CREATININE mg/dL 0.99  --    BUN mg/dL 27*  --    SODIUM mmol/L 140  --    POTASSIUM mmol/L 4.3  --    AST (SGOT) U/L 36*  --    ALT (SGPT) U/L 26  --    ALK PHOS U/L 117  --    BILIRUBIN mg/dL 2.6*  2.6*  --    ANION GAP mmol/L 5.0  --        XR Chest 1 View    Result Date: 7/15/2024  Impression: Stable chest with cardiomegaly, moderate bilateral effusion and bibasilar consolidations Electronically Signed: Eliu High MD  7/15/2024 7:56 AM EDT  Workstation ID: [id number]    CT Chest Without Contrast Diagnostic    Result Date: 7/14/2024  Impression: 1.Persistent moderate-sized right pleural effusion despite indwelling pleural catheter. Tiny right pneumothorax. Improved aeration of the right lung with persistent right middle/lower lobe volume loss and consolidation. 2.Stable smaller left pleural effusion with increased left lower lobe volume loss and consolidation. 3.Stable massive cardiomegaly. 4.Included upper abdominal findings of pancreatic mass, splenic infarct, and cholelithiasis, as described on recent CT abdomen/pelvis. Electronically Signed: Elise  Matthew  7/14/2024 11:44 AM EDT  Workstation ID: [id number]    XR Chest 1 View    Result Date: 7/14/2024  Impression: 1.Right basilar pleural catheter remains in place. No visible pneumothorax. 2.Persistent bilateral perihilar and bibasilar airspace opacities, increased in the right lower lung compared to yesterday's chest x-ray. Electronically Signed: Elise Castro  7/14/2024 9:59 AM EDT  Workstation ID: [id number]       I reviewed the patient's new clinical results.  I reviewed the patient's new imaging results and agree with the interpretation.    Assessment/Plan:     Active and Resolved Problems  Active Hospital Problems    Diagnosis  POA    **Atrial fibrillation with RVR [I48.91]  Yes    COPD (chronic obstructive pulmonary disease) [J44.9]  Yes    Aortic stenosis [I35.0]  Yes    Mitral regurgitation [I34.0]  Yes    Acute HFrEF (heart failure with reduced ejection fraction) [I50.21]  Yes    Right bundle branch block [I45.10]  Yes    Acute deep vein thrombosis (DVT) of both lower extremities [I82.403]  Yes    ARABELLA (acute kidney injury) [N17.9]  Yes    Acute hyperkalemia [E87.5]  Yes    Sepsis [A41.9]  Yes    Acute UTI [N39.0]  Yes    Acute systolic congestive heart failure [I50.21]  Yes    Rhinovirus infection [B34.8]  Yes    Multifocal pneumonia [J18.9]  Yes    Chronic respiratory failure with hypoxia, on home O2 therapy [J96.11, Z99.81]  Not Applicable    Skin ulcer of right great toe [L97.519]  Yes    Skin ulcer of left great toe [L97.529]  Yes    SEDRICK (obstructive sleep apnea) [G47.33]  Yes    Primary hypertension [I10]  Yes    Morbid obesity with BMI of 40.0-44.9, adult [E66.01, Z68.41]  Not Applicable      Resolved Hospital Problems   No resolved problems to display.       69-year-old female with history of hypertension, HFrEF, lower extremity lymphedema admitted to [facility] 6/30 progressive shortness of breath        #Bilateral lower extremity DVT  #History of reported PE  Acute left lower extremity DVT in  the proximal femoral, mid femoral, distal femoral, popliteal, and posterior tibial   Seen by hematology appreciate recommendation.  Factor V and prothrombin gene mutation negative  Continue Eliquis 10 mg twice daily for 7 days followed by 5 mg twice daily  Outpatient follow-up with hematology- will need lifelong AC     #Acute on chronic HFrEF  #A-fib with RVR.  New onset  #Severe MR  Cardiology follow-up  Started on amiodarone GGT transitioned to p.o. tapering dose  TTE with EF 30 to 35%.  Defer ischemic workup to cardiology  On Toprol 25 twice daily with holding parameters   Bumex 1 mg daily  Monitor I's and O's  Hold Aldactone given soft BP   Lisinopril on hold given ARABELLA  Currently she is off Lovenox injections CTM    #Right-sided hydropneumothorax  Chest tube placed in ICU 7/6  Fluid culture from right chest tube growing gram-negative bacilli-need for antibiotics to pulmonary  Noted plan to obtain CT chest  Monitor out put   Pulmonary following       #Severe sepsis  #UTI  #Left lower extremity cellulitis and wound  #Rhinovirus infection  #Multifocal pneumonia  #Chronic venous stasis lower extremity  Seen by infectious disease appreciate recommendations  Finished Keflex and doxycycline for 7 day course   Seen by podiatry no surgical intervention recommended  Midodrine has been weaned off monitor blood pressure     #Acute hypoxic respiratory failure  Multifactorial pneumonia CHF  Antibiotics and diuretics as above  Wean off supplemental oxygen as tolerated.     #DM2  A1c 6.14  Continue ISS lispro  POC ACHS     #ARABELLA  #Metabolic Acidosis   Suspect ATN in setting of sepsis   Diuretics as above  BMP daily  Avoid nephrotoxic drugs   lisinopril on hold   Renal following     #uterine mass  Large subserosal uterine fundal fibroid on pelvic ultrasound concerning for fibroid vs malignancy  Onco recommended GYN evaluation  GYN consulted      #hematochezia  GI on board noted plan for outpatient colonoscopy  Etiology suspected  to be hemorrhoids  Topical steroids started  Monitor H&H     # ?Pancreatic mass  - MRI pancreatic protocol ordered  - GI following    # Panniculitis  - Hypogastric region soft mass, ttp  - GI evaluated and suspect due to panniculitis  - supportive care     VTE Prophylaxis:  Pharmacologic VTE prophylaxis orders are present.         Code status is   Code Status and Medical Interventions:   Ordered at: 06/30/24 2120     Code Status (Patient has no pulse and is not breathing):    CPR (Attempt to Resuscitate)     Medical Interventions (Patient has pulse or is breathing):    Full Support       Plan for disposition:TBD pending clinical progress     Time: 30 minutes    Signature: Electronically signed by Luisito Power MD, 07/15/24, 18:46 EDT.  [facility] Hospitalist Team

## 2024-07-15 NOTE — PLAN OF CARE
Goal Outcome Evaluation:      Daily Care Plan Summary: Heart Failure    Diuretic in use (IV or PO):   PO        Daily weight (up or down):    loss: 3.4 lbs      Output > Intake (yes/no):Yes      O2 Requirements (current, any change?):  3 liters/min via nasal cannula      Symptoms noted with Activity (Respiratory Tolerance, functional state):     Tolerated      Anticipated Discharge Plans:     On going

## 2024-07-15 NOTE — PROGRESS NOTES
Hematology/Oncology Inpatient Progress Note    PATIENT NAME: Ban Brennan  : 1955  MRN: [id number]    CHIEF COMPLAINT: Shortness of breath    HISTORY OF PRESENT ILLNESS:    Ban Brennan is a 69 y.o. female who presented to [facility] on 2024 with complaints of shortness of breath.  Past medical history of hypertension, heart failure, chronic bilateral lower extremity edema, remote history of pulmonary embolism treated with warfarin.  The patient was brought to the ED via EMS.  The patient's daughter reported that the patient does not normally go to the doctor and had not been in the hospital for years.  She was unsure of her full medical history but stated that her legs have been swollen for a long time and that she had not been mobile for approximately 1 year.  She reported that a home health nurse comes to the house and wraps her mother's legs twice a week.  EMS was called due to the worsening shortness of breath and the patient was found to have an EKG showing a wide-complex tachycardia with a heart rate in the 190s.  She was given amiodarone by EMS with rate improvement to the 160s.  Follow-up EKG in the ED at [facility] showed atrial fibrillation with RVR with a heart rate of 145.  Chest x-ray showed fluid overload with bilateral effusions.  The patient had a elevated white blood cell count of 16.8 and 4+ bacteria in her urine.  She was also noted to be hyperkalemic with a potassium of 6.5.  She was diagnosed with cellulitis and a urinary tract infection and initiated on IV antibiotics with cefepime and vancomycin.  She was admitted for further evaluation and treatment.     2024 bilateral venous Doppler ultrasound  -Acute right lower extremity DVT in the common femoral, proximal femoral, mid femoral, distal femoral, and popliteal  -Acute right lower extremity superficial thrombophlebitis in the saphenofemoral junction and great saphenous  -Acute left lower extremity DVT in  the proximal femoral, mid femoral, distal femoral, popliteal, and posterior tibial     7/1/2020 four 2D echocardiogram  -Left ventricular ejection fraction 31-35%  -Left ventricular diastolic dysfunction  -Left atrial cavity dilated  -Moderate aortic valve stenosis  -Moderate to severe mitral valve regurgitation  -Estimated right ventricular systolic pressure from tricuspid regurgitation is normal     7/1/2024 CT chest PE protocol  -No evidence for pulmonary embolus  -Bibasilar airspace disease with right middle lung opacity compatible likely multifocal pneumonia with bilateral pleural effusions  -Cardiomegaly     7/1/2024 CT of the abdomen and pelvis without contrast  -Moderately large right pleural effusion and small left pleural effusion; bibasilar infiltrates suggest atelectasis although associated pneumonia not excluded  -Severe cardiomegaly  -Bilateral flank edema  -Probable fibroid uterus     07/01/24  Hematology/Oncology was consulted for bilateral lower extremity DVT.    7/13/2024 CT of the abdomen and pelvis without contrast  -3 cm x 2.7 cm soft tissue mass involving the body of the pancreas  -Colonic diverticulosis without evidence of diverticulitis  -Focal splenic infarct  -Small bilateral pleural effusions with trace right sided pneumothorax  -Cholelithiasis without evidence of cholecystitis    7/14/2024 CT of the chest without contrast  -Persistent moderate-sized right pleural effusion despite indwelling pleural catheter.  Tiny right pneumothorax.  Improved aeration of the right lung with persistent right middle/lower lobe volume loss and consolidation  -Stable small left pleural effusion with increased left lower lobe volume loss and consolidation  -Stable massive cardiomegaly     He/She  has a past medical history of Bilateral leg edema, Chronic respiratory failure with hypoxia, on home O2 therapy (07/01/2024), COPD (chronic obstructive pulmonary disease), Morbid obesity with BMI of 40.0-44.9, adult  (11/08/2010), Primary hypertension (03/01/2017), Pulmonary embolism, and Sleep apnea.     PCP: Rut Pierce APRN    Subjective   Patient seen today for follow up. She appears clinically stable, although reported having intermittent abdominal pain in periumbilical area. No other symptoms reported presently.     ROS:  Review of Systems   Constitutional:  Positive for fatigue.   HENT: Negative.     Eyes: Negative.    Respiratory: Negative.     Cardiovascular: Negative.    Gastrointestinal: Negative.    Endocrine: Negative.    Genitourinary: Negative.    Musculoskeletal:  Positive for arthralgias, back pain, gait problem and myalgias.   Skin:  Positive for wound (Clean decubitus ulcers noted on medial aspect of bilateral feet).   Allergic/Immunologic: Negative.    Neurological:  Positive for weakness (Bilateral leg weakness).   Hematological: Negative.    Psychiatric/Behavioral: Negative.          MEDICATIONS:    Scheduled Meds:  amiodarone, 200 mg, Oral, Q12H   Followed by  [START ON 7/23/2024] amiodarone, 200 mg, Oral, Daily  apixaban, 5 mg, Oral, BID  budesonide, 0.5 mg, Nebulization, BID - RT  bumetanide, 1 mg, Oral, Daily  hydrocortisone, 25 mg, Rectal, BID  insulin lispro, 2-7 Units, Subcutaneous, 4x Daily With Meals & Nightly  ipratropium-albuterol, 3 mL, Nebulization, 4x Daily - RT  lidocaine, 20 mL, Infiltration, Once  metoprolol succinate XL, 25 mg, Oral, Q12H  miconazole, 1 Application, Topical, Q12H  pantoprazole, 40 mg, Oral, BID AC  povidone-iodine, , Topical, Daily  sodium chloride, 10 mL, Intravenous, Q12H  [Held by provider] spironolactone, 25 mg, Oral, Daily       Continuous Infusions:        PRN Meds:    acetaminophen **OR** acetaminophen    Calcium Replacement - Follow Nurse / BPA Driven Protocol    dextrose    dextrose    glucagon (human recombinant)    ipratropium-albuterol    Magnesium Standard Dose Replacement - Follow Nurse / BPA Driven Protocol    nitroglycerin    ondansetron ODT **OR**  "ondansetron    Phosphorus Replacement - Follow Nurse / BPA Driven Protocol    Potassium Replacement - Follow Nurse / BPA Driven Protocol    [COMPLETED] Insert Peripheral IV **AND** sodium chloride    sodium chloride    sodium chloride     ALLERGIES:  No Known Allergies    Objective    VITALS:   /62   Pulse 83   Temp 98.4 °F (36.9 °C) (Axillary)   Resp 17   Ht 149.9 cm (59\")   Wt 79.2 kg (174 lb 9.7 oz)   SpO2 100%   BMI 35.27 kg/m²     PHYSICAL EXAM: (performed by MD)  Physical Exam  Constitutional:       Appearance: She is normal weight. She is ill-appearing.   HENT:      Head: Normocephalic and atraumatic.      Right Ear: External ear normal.      Left Ear: External ear normal.      Nose: Nose normal.      Mouth/Throat:      Mouth: Mucous membranes are moist.      Pharynx: Oropharynx is clear.   Eyes:      Extraocular Movements: Extraocular movements intact.      Conjunctiva/sclera: Conjunctivae normal.      Pupils: Pupils are equal, round, and reactive to light.   Cardiovascular:      Rate and Rhythm: Normal rate.      Pulses: Normal pulses.   Pulmonary:      Effort: Pulmonary effort is normal.   Abdominal:      General: Abdomen is flat.      Palpations: Abdomen is soft.   Musculoskeletal:         General: Normal range of motion.      Cervical back: Normal range of motion and neck supple.      Right lower leg: Edema present.      Left lower leg: Edema present.   Skin:     General: Skin is warm.   Neurological:      Mental Status: She is alert.   Psychiatric:         Mood and Affect: Mood is depressed.         Speech: Speech is delayed.         Behavior: Behavior normal.         Thought Content: Thought content normal.         Judgment: Judgment normal.           RECENT LABS:  Lab Results (last 24 hours)       Procedure Component Value Units Date/Time    POC Glucose Finger 4x Daily Before Meals & at Bedtime [[id number]]  (Normal) Collected: 07/15/24 1111    Specimen: Blood from Finger Updated: 07/15/24 " 1112     Glucose 75 mg/dL      Comment: Serial Number: [id number]:  [id number]       Wound Culture - Surgical Site, Chest, Right [[id number]]  (Abnormal) Collected: 07/13/24 1425    Specimen: Surgical Site from Chest, Right Updated: 07/15/24 0943     Wound Culture Light growth (2+) Gram Negative Bacilli     Gram Stain Rare (1+) WBCs per low power field      Few (2+) Gram negative bacilli    POC Glucose 4x Daily Before Meals & at Bedtime [[id number]]  (Normal) Collected: 07/15/24 0701    Specimen: Blood Updated: 07/15/24 0703     Glucose 89 mg/dL      Comment: Serial Number: [id number]:  [id number]       Magnesium [[id number]]  (Normal) Collected: 07/15/24 0519    Specimen: Blood Updated: 07/15/24 0556     Magnesium 1.8 mg/dL     Comprehensive Metabolic Panel [[id number]]  (Abnormal) Collected: 07/15/24 0519    Specimen: Blood Updated: 07/15/24 0556     Glucose 89 mg/dL      BUN 27 mg/dL      Creatinine 0.99 mg/dL      Sodium 140 mmol/L      Potassium 4.3 mmol/L      Chloride 99 mmol/L      CO2 36.0 mmol/L      Calcium 8.6 mg/dL      Total Protein 5.0 g/dL      Albumin 2.1 g/dL      ALT (SGPT) 26 U/L      AST (SGOT) 36 U/L      Alkaline Phosphatase 117 U/L      Total Bilirubin 2.6 mg/dL      Globulin 2.9 gm/dL      A/G Ratio 0.7 g/dL      BUN/Creatinine Ratio 27.3     Anion Gap 5.0 mmol/L      eGFR 61.9 mL/min/1.73     Narrative:      GFR Normal >60  Chronic Kidney Disease <60  Kidney Failure <15      Phosphorus [[id number]]  (Abnormal) Collected: 07/15/24 0256    Specimen: Blood Updated: 07/15/24 0334     Phosphorus 2.2 mg/dL     CBC & Differential [[id number]]  (Abnormal) Collected: 07/15/24 0256    Specimen: Blood Updated: 07/15/24 0305    Narrative:      The following orders were created for panel order CBC & Differential.  Procedure                               Abnormality         Status                     ---------                               -----------         ------                     CBC Auto  Differential[[id number]]        Abnormal            Final result                 Please view results for these tests on the individual orders.    CBC Auto Differential [[id number]]  (Abnormal) Collected: 07/15/24 0256    Specimen: Blood Updated: 07/15/24 0305     WBC 11.06 10*3/mm3      RBC 3.34 10*6/mm3      Hemoglobin 10.3 g/dL      Hematocrit 32.2 %      MCV 96.4 fL      MCH 30.8 pg      MCHC 32.0 g/dL      RDW 16.8 %      RDW-SD 58.1 fl      MPV 12.0 fL      Platelets 163 10*3/mm3      Neutrophil % 79.6 %      Lymphocyte % 8.9 %      Monocyte % 9.7 %      Eosinophil % 0.8 %      Basophil % 0.2 %      Immature Grans % 0.8 %      Neutrophils, Absolute 8.81 10*3/mm3      Lymphocytes, Absolute 0.98 10*3/mm3      Monocytes, Absolute 1.07 10*3/mm3      Eosinophils, Absolute 0.09 10*3/mm3      Basophils, Absolute 0.02 10*3/mm3      Immature Grans, Absolute 0.09 10*3/mm3      nRBC 0.0 /100 WBC     POC Glucose Once [[id number]]  (Normal) Collected: 07/14/24 2009    Specimen: Blood Updated: 07/14/24 2011     Glucose 102 mg/dL      Comment: Serial Number: [id number]:  [id number]       Potassium [[id number]]  (Normal) Collected: 07/14/24 1918    Specimen: Blood from Arm, Right Updated: 07/14/24 1937     Potassium 4.3 mmol/L     POC Glucose Once [[id number]]  (Normal) Collected: 07/14/24 1711    Specimen: Blood Updated: 07/14/24 1714     Glucose 91 mg/dL      Comment: Serial Number: [id number]:  [id number]               PENDING RESULTS: PTG, FVL, LDH, Retic, Hapto    IMAGING REVIEWED:  XR Chest 1 View    Result Date: 7/16/2024  Impression: No appreciable change since 1 day prior. Electronically Signed: Krystal Lee MD  7/16/2024 8:08 AM EDT  Workstation ID: [id number]    MRI Abdomen With & Without Contrast    Result Date: 7/15/2024  Impression: Multiseptated pancreatic lesion in the pancreatic neck measuring 2.7 x 2.6 cm. There is suggestion of internal septations as well as faint enhancement of the septa. Findings  suspicious for cystic neoplasm. Brisk enhancement lesion in the anterior superior right hepatic lobe measures 0.9 cm, favoring flash filling hemangioma. Hypervascular metastasis is felt to be less likely but incomplete excluded. Consider short-term follow-up MRI to evaluate for change in size. 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 2.1 cm right adrenal myelolipoma. Small right pleural effusion. Moderate left pleural effusion. Electronically Signed: Gigi Beck MD  7/15/2024 8:38 PM EDT  Workstation ID: [id number]    XR Chest 1 View    Result Date: 7/15/2024  Impression: Stable chest with cardiomegaly, moderate bilateral effusion and bibasilar consolidations Electronically Signed: Eliu High MD  7/15/2024 7:56 AM EDT  Workstation ID: [id number]     Assessment & Plan       Pancreatic mass:  -3 x 2.7 cm soft tissue mass involving the body of the pancreas, This was not reported on prior CT scan from 7/1/24.  -CA 19-9 levels mildly elevated, chromogranin levels pending.  -MRI abdomen reviewed: Multiseptated pancreatic lesion in the pancreatic neck measuring 2.7 x 2.6 cm, concerning for malignancy. Hypervascular lesion on Liver is less likely to be metastatic.  -GI team on board, awaiting further recommendations. She will likely benefit from ERCP/EUS with FNA for tissue diagnosis. Patient is agreeable to it.      Bilateral lower extremity DVT:  Remote history of pulmonary embolism  Splenic Infraction:  -Extensive acute right and left lower extremity DVT noted on presentation  -patient's chronic deconditioning and immobility may have been risk factor for DVT.   -Negative CTA Chest for PE.  -Reportedly treated with warfarin for prior history of PE  -patient was initially started on Lovenox and later transitioned to Eliquis.  She completed Eliquis 10mg BID X 1 week followed by 5mg BID thereafter.  -Will need lifelong anticoagulation in my view due to persistent risk factors  and extensive DVT as well as history of prior pulmonary embolism.  -CT Abdomen/Pelvis on 7/13 showed splenic infract, likely provoked by pancreatic pathology vs acute illness.   -factor V Leiden and prothrombin gene mutation reported negative. Will defer remainder of thrombophilia workup.        Uterine mass  -Transvaginal ultrasound suggestive of presence of a large subserosal uterine fundal fibroid.   -CT abdomen/pelvis showed presence of a lobulated mass protruding from the uterine fundus measuring approximately 8.4 x 5.9 cm  -this is concerning for fibroid vs malignancy.  Will recommend GYN Evaluation for the same as outpatient.  -No vaginal bleeding reported.     Thrombocytopenia  - 119,000 at admission, jacqueline at 85K, improved to 163 today. Baseline WNL  -Acute drop likely secondary to sepsis, rhinovirus  -Hemolysis labs reported negative.      Sepsis  -UA suggestive of Acute cystitis, Urine culture consistent with GNR UTI.  -Respiratory panel positive for Rhinovirus.  -patient has completed antibiotic therapy.     Atrial fibrillation with RVR/paroxysmal atrial fibrillation  Acute on chronic systolic heart failure with diastolic dysfunction,   moderate aortic stenosis, severe mitral valve regurgitation  -On amiodarone drip, diuresis.  Management per cardiology.   Likely candidate for lifelong anticoagulation given Afib and extensive DVT.     Acute kidney injury/hyperkalemia  Resolved.      Pleural Effusion:  Right sided hydropneumothorax:  Likely secondary to CHF and suspected pneumonia.  -patient is status post Chest tube placement.  -management per primary.        Thanks for this consult, Will continue to follow this patient. Please reach out for any further clinical questions/concerns.

## 2024-07-16 ENCOUNTER — APPOINTMENT (OUTPATIENT)
Dept: GENERAL RADIOLOGY | Facility: HOSPITAL | Age: 69
End: 2024-07-16
Payer: MEDICARE

## 2024-07-16 ENCOUNTER — INPATIENT HOSPITAL (OUTPATIENT)
Dept: URBAN - METROPOLITAN AREA HOSPITAL 84 | Facility: HOSPITAL | Age: 69
End: 2024-07-16
Payer: MEDICAID

## 2024-07-16 DIAGNOSIS — K86.89 OTHER SPECIFIED DISEASES OF PANCREAS: ICD-10-CM

## 2024-07-16 DIAGNOSIS — Z79.01 LONG TERM (CURRENT) USE OF ANTICOAGULANTS: ICD-10-CM

## 2024-07-16 DIAGNOSIS — R74.01 ELEVATION OF LEVELS OF LIVER TRANSAMINASE LEVELS: ICD-10-CM

## 2024-07-16 DIAGNOSIS — K62.5 HEMORRHAGE OF ANUS AND RECTUM: ICD-10-CM

## 2024-07-16 DIAGNOSIS — R93.3 ABNORMAL FINDINGS ON DIAGNOSTIC IMAGING OF OTHER PARTS OF DI: ICD-10-CM

## 2024-07-16 LAB
ALBUMIN SERPL-MCNC: 1.9 G/DL (ref 3.5–5.2)
ALBUMIN/GLOB SERPL: 0.6 G/DL
ALP SERPL-CCNC: 108 U/L (ref 39–117)
ALT SERPL W P-5'-P-CCNC: 20 U/L (ref 1–33)
AMYLASE SERPL-CCNC: 17 U/L (ref 28–100)
ANION GAP SERPL CALCULATED.3IONS-SCNC: 4.6 MMOL/L (ref 5–15)
AST SERPL-CCNC: 28 U/L (ref 1–32)
BASOPHILS # BLD AUTO: 0.02 10*3/MM3 (ref 0–0.2)
BASOPHILS NFR BLD AUTO: 0.2 % (ref 0–1.5)
BILIRUB SERPL-MCNC: 2 MG/DL (ref 0–1.2)
BUN SERPL-MCNC: 23 MG/DL (ref 8–23)
BUN/CREAT SERPL: 26.7 (ref 7–25)
CALCIUM SPEC-SCNC: 8.3 MG/DL (ref 8.6–10.5)
CHLORIDE SERPL-SCNC: 99 MMOL/L (ref 98–107)
CO2 SERPL-SCNC: 35.4 MMOL/L (ref 22–29)
CREAT SERPL-MCNC: 0.86 MG/DL (ref 0.57–1)
DEPRECATED RDW RBC AUTO: 58.8 FL (ref 37–54)
EGFRCR SERPLBLD CKD-EPI 2021: 73.2 ML/MIN/1.73
EOSINOPHIL # BLD AUTO: 0.19 10*3/MM3 (ref 0–0.4)
EOSINOPHIL NFR BLD AUTO: 1.9 % (ref 0.3–6.2)
ERYTHROCYTE [DISTWIDTH] IN BLOOD BY AUTOMATED COUNT: 16.5 % (ref 12.3–15.4)
GLOBULIN UR ELPH-MCNC: 3.2 GM/DL
GLUCOSE BLDC GLUCOMTR-MCNC: 143 MG/DL (ref 70–105)
GLUCOSE BLDC GLUCOMTR-MCNC: 72 MG/DL (ref 70–105)
GLUCOSE BLDC GLUCOMTR-MCNC: 94 MG/DL (ref 70–105)
GLUCOSE BLDC GLUCOMTR-MCNC: 94 MG/DL (ref 70–105)
GLUCOSE SERPL-MCNC: 76 MG/DL (ref 65–99)
HCT VFR BLD AUTO: 31 % (ref 34–46.6)
HGB BLD-MCNC: 9.7 G/DL (ref 12–15.9)
IMM GRANULOCYTES # BLD AUTO: 0.09 10*3/MM3 (ref 0–0.05)
IMM GRANULOCYTES NFR BLD AUTO: 0.9 % (ref 0–0.5)
LIPASE SERPL-CCNC: 12 U/L (ref 13–60)
LYMPHOCYTES # BLD AUTO: 0.88 10*3/MM3 (ref 0.7–3.1)
LYMPHOCYTES NFR BLD AUTO: 8.7 % (ref 19.6–45.3)
MAGNESIUM SERPL-MCNC: 1.6 MG/DL (ref 1.6–2.4)
MCH RBC QN AUTO: 30.7 PG (ref 26.6–33)
MCHC RBC AUTO-ENTMCNC: 31.3 G/DL (ref 31.5–35.7)
MCV RBC AUTO: 98.1 FL (ref 79–97)
MONOCYTES # BLD AUTO: 0.79 10*3/MM3 (ref 0.1–0.9)
MONOCYTES NFR BLD AUTO: 7.8 % (ref 5–12)
NEUTROPHILS NFR BLD AUTO: 8.13 10*3/MM3 (ref 1.7–7)
NEUTROPHILS NFR BLD AUTO: 80.5 % (ref 42.7–76)
NRBC BLD AUTO-RTO: 0 /100 WBC (ref 0–0.2)
PHOSPHATE SERPL-MCNC: 2.9 MG/DL (ref 2.5–4.5)
PLATELET # BLD AUTO: 144 10*3/MM3 (ref 140–450)
PMV BLD AUTO: 11.4 FL (ref 6–12)
POTASSIUM SERPL-SCNC: 3.6 MMOL/L (ref 3.5–5.2)
POTASSIUM SERPL-SCNC: 4.3 MMOL/L (ref 3.5–5.2)
PROT SERPL-MCNC: 5.1 G/DL (ref 6–8.5)
RBC # BLD AUTO: 3.16 10*6/MM3 (ref 3.77–5.28)
SODIUM SERPL-SCNC: 139 MMOL/L (ref 136–145)
WBC NRBC COR # BLD AUTO: 10.1 10*3/MM3 (ref 3.4–10.8)

## 2024-07-16 PROCEDURE — 94664 DEMO&/EVAL PT USE INHALER: CPT

## 2024-07-16 PROCEDURE — 84132 ASSAY OF SERUM POTASSIUM: CPT | Performed by: STUDENT IN AN ORGANIZED HEALTH CARE EDUCATION/TRAINING PROGRAM

## 2024-07-16 PROCEDURE — 83690 ASSAY OF LIPASE: CPT | Performed by: NURSE PRACTITIONER

## 2024-07-16 PROCEDURE — 82948 REAGENT STRIP/BLOOD GLUCOSE: CPT | Performed by: HOSPITALIST

## 2024-07-16 PROCEDURE — 71045 X-RAY EXAM CHEST 1 VIEW: CPT

## 2024-07-16 PROCEDURE — 99232 SBSQ HOSP IP/OBS MODERATE 35: CPT | Performed by: STUDENT IN AN ORGANIZED HEALTH CARE EDUCATION/TRAINING PROGRAM

## 2024-07-16 PROCEDURE — 94799 UNLISTED PULMONARY SVC/PX: CPT

## 2024-07-16 PROCEDURE — 94761 N-INVAS EAR/PLS OXIMETRY MLT: CPT

## 2024-07-16 PROCEDURE — 82948 REAGENT STRIP/BLOOD GLUCOSE: CPT

## 2024-07-16 PROCEDURE — 80053 COMPREHEN METABOLIC PANEL: CPT | Performed by: NURSE PRACTITIONER

## 2024-07-16 PROCEDURE — 85025 COMPLETE CBC W/AUTO DIFF WBC: CPT | Performed by: NURSE PRACTITIONER

## 2024-07-16 PROCEDURE — 99232 SBSQ HOSP IP/OBS MODERATE 35: CPT | Performed by: NURSE PRACTITIONER

## 2024-07-16 PROCEDURE — 83735 ASSAY OF MAGNESIUM: CPT | Performed by: NURSE PRACTITIONER

## 2024-07-16 PROCEDURE — 84100 ASSAY OF PHOSPHORUS: CPT | Performed by: NURSE PRACTITIONER

## 2024-07-16 PROCEDURE — 99232 SBSQ HOSP IP/OBS MODERATE 35: CPT | Performed by: INTERNAL MEDICINE

## 2024-07-16 PROCEDURE — 82150 ASSAY OF AMYLASE: CPT | Performed by: NURSE PRACTITIONER

## 2024-07-16 RX ORDER — POTASSIUM CHLORIDE 20 MEQ/1
40 TABLET, EXTENDED RELEASE ORAL EVERY 4 HOURS
Status: COMPLETED | OUTPATIENT
Start: 2024-07-16 | End: 2024-07-16

## 2024-07-16 RX ADMIN — IPRATROPIUM BROMIDE AND ALBUTEROL SULFATE 3 ML: .5; 3 SOLUTION RESPIRATORY (INHALATION) at 11:53

## 2024-07-16 RX ADMIN — AMIODARONE HYDROCHLORIDE 200 MG: 200 TABLET ORAL at 17:34

## 2024-07-16 RX ADMIN — METOPROLOL SUCCINATE 25 MG: 25 TABLET, FILM COATED, EXTENDED RELEASE ORAL at 20:44

## 2024-07-16 RX ADMIN — AMIODARONE HYDROCHLORIDE 200 MG: 200 TABLET ORAL at 05:46

## 2024-07-16 RX ADMIN — Medication 10 ML: at 20:33

## 2024-07-16 RX ADMIN — IPRATROPIUM BROMIDE AND ALBUTEROL SULFATE 3 ML: .5; 3 SOLUTION RESPIRATORY (INHALATION) at 19:25

## 2024-07-16 RX ADMIN — APIXABAN 5 MG: 5 TABLET, FILM COATED ORAL at 08:12

## 2024-07-16 RX ADMIN — Medication 10 ML: at 08:12

## 2024-07-16 RX ADMIN — ANTI-FUNGAL POWDER MICONAZOLE NITRATE TALC FREE 1 APPLICATION: 1.42 POWDER TOPICAL at 08:13

## 2024-07-16 RX ADMIN — IPRATROPIUM BROMIDE AND ALBUTEROL SULFATE 3 ML: .5; 3 SOLUTION RESPIRATORY (INHALATION) at 15:31

## 2024-07-16 RX ADMIN — IPRATROPIUM BROMIDE AND ALBUTEROL SULFATE 3 ML: .5; 3 SOLUTION RESPIRATORY (INHALATION) at 08:03

## 2024-07-16 RX ADMIN — PANTOPRAZOLE SODIUM 40 MG: 40 TABLET, DELAYED RELEASE ORAL at 17:34

## 2024-07-16 RX ADMIN — PANTOPRAZOLE SODIUM 40 MG: 40 TABLET, DELAYED RELEASE ORAL at 08:12

## 2024-07-16 RX ADMIN — ANTI-FUNGAL POWDER MICONAZOLE NITRATE TALC FREE 1 APPLICATION: 1.42 POWDER TOPICAL at 20:34

## 2024-07-16 RX ADMIN — METOPROLOL SUCCINATE 25 MG: 25 TABLET, FILM COATED, EXTENDED RELEASE ORAL at 08:12

## 2024-07-16 RX ADMIN — BUDESONIDE 0.5 MG: 0.5 INHALANT RESPIRATORY (INHALATION) at 08:07

## 2024-07-16 RX ADMIN — POVIDONE-IODINE: 10 SOLUTION TOPICAL at 08:13

## 2024-07-16 RX ADMIN — POTASSIUM CHLORIDE 40 MEQ: 1500 TABLET, EXTENDED RELEASE ORAL at 05:46

## 2024-07-16 RX ADMIN — BUDESONIDE 0.5 MG: 0.5 INHALANT RESPIRATORY (INHALATION) at 19:20

## 2024-07-16 RX ADMIN — POTASSIUM CHLORIDE 40 MEQ: 1500 TABLET, EXTENDED RELEASE ORAL at 09:39

## 2024-07-16 RX ADMIN — APIXABAN 5 MG: 5 TABLET, FILM COATED ORAL at 20:34

## 2024-07-16 RX ADMIN — HYDROCORTISONE ACETATE 25 MG: 25 SUPPOSITORY RECTAL at 20:30

## 2024-07-16 RX ADMIN — BUMETANIDE 1 MG: 1 TABLET ORAL at 08:12

## 2024-07-16 NOTE — PROGRESS NOTES
"    PROGRESS NOTE      Patient Name: Ban Brennan  : 1955  MRN: [id number]  Primary Care Physician: Rut Pierce APRN  Date of admission: 2024    Patient Care Team:  Rut Pierce APRN as PCP - General (Nurse Practitioner)  Austin Brooks MD as Cardiologist (Cardiology)        Reason for Follow up     Acute kidney injury, hyperkalemia      Subjective     Seen and examined, NAD noted, renal functions stable, worsening bicarb level, ordered ABG, good UOP, 1.3L     Review of systems:    ROS was otherwise negative except as mentioned in the [place].       Personal History:     Past Medical History:   Past Medical History:   Diagnosis Date    Bilateral leg edema     Chronic respiratory failure with hypoxia, on home O2 therapy 2024    COPD (chronic obstructive pulmonary disease)     Morbid obesity with BMI of 40.0-44.9, adult 2010    Primary hypertension 2017    Pulmonary embolism     \"many years ago, was on warfarin\"    Sleep apnea        Surgical History:    History reviewed. No pertinent surgical history.    Family History: family history is not on file. Otherwise pertinent FHx was reviewed and unremarkable.     Social History:  reports that she has never smoked. She has never used smokeless tobacco. She reports that she does not drink alcohol and does not use drugs.    Medications:  Prior to Admission medications    Medication Sig Start Date End Date Taking? Authorizing Provider   Allergy Relief 180 MG tablet Take 1 tablet by mouth Daily. 24  Yes ProviderChadwick MD   bumetanide (BUMEX) 1 MG tablet Take 1 tablet by mouth 3 times a day. 24  Yes ProviderChadwick MD   docusate sodium (COLACE) 100 MG capsule Take 1 capsule by mouth Every 12 (Twelve) Hours. 24  Yes ProviderChadwick MD   furosemide (LASIX) 20 MG tablet Take 1 tablet by mouth Daily.   Yes ProviderChadwick MD   HYDROcodone-acetaminophen (NORCO)  MG per tablet Take 1 " tablet by mouth 3 times a day. 5/30/24  Yes Chadwick Johnson MD   lisinopril (PRINIVIL,ZESTRIL) 30 MG tablet Take 1 tablet by mouth Daily. 3/18/24  Yes Chadwick Johnson MD   meloxicam (MOBIC) 7.5 MG tablet Take 1 tablet by mouth Daily As Needed. 3/18/24  Yes Chadwick Johnson MD   metoprolol tartrate (LOPRESSOR) 50 MG tablet Take 1 tablet by mouth Daily.   Yes Chadwick Johnson MD   montelukast (SINGULAIR) 10 MG tablet Take 1 tablet by mouth every night at bedtime.   Yes Chadwick Johnson MD   omeprazole (priLOSEC) 20 MG capsule Take 1 capsule by mouth Daily. 3/18/24  Yes Chadwick Johnson MD   oxybutynin XL (DITROPAN-XL) 10 MG 24 hr tablet Take 2 tablets by mouth Daily. 3/18/24  Yes Chadwick Johnson MD   potassium chloride (MICRO-K) 10 MEQ CR capsule Take 1 capsule by mouth Every 12 (Twelve) Hours. 3/18/24  Yes Chadwick Johnson MD   vitamin D (ERGOCALCIFEROL) 1.25 MG (81141 UT) capsule capsule Take 1 capsule by mouth 2 (Two) Times a Week. Monday and Thursday. 5/30/24  Yes Chadwick Johnson MD     Scheduled Meds:amiodarone, 200 mg, Oral, Q12H   Followed by  [START ON 7/23/2024] amiodarone, 200 mg, Oral, Daily  apixaban, 5 mg, Oral, BID  budesonide, 0.5 mg, Nebulization, BID - RT  bumetanide, 1 mg, Oral, Daily  hydrocortisone, 25 mg, Rectal, BID  insulin lispro, 2-7 Units, Subcutaneous, 4x Daily With Meals & Nightly  ipratropium-albuterol, 3 mL, Nebulization, 4x Daily - RT  lidocaine, 20 mL, Infiltration, Once  metoprolol succinate XL, 25 mg, Oral, Q12H  miconazole, 1 Application, Topical, Q12H  pantoprazole, 40 mg, Oral, BID AC  povidone-iodine, , Topical, Daily  sodium chloride, 10 mL, Intravenous, Q12H  [Held by provider] spironolactone, 25 mg, Oral, Daily      Continuous Infusions:     PRN Meds:  acetaminophen **OR** acetaminophen    Calcium Replacement - Follow Nurse / BPA Driven Protocol    dextrose    dextrose    glucagon (human recombinant)     HYDROcodone-acetaminophen    ipratropium-albuterol    Magnesium Standard Dose Replacement - Follow Nurse / BPA Driven Protocol    nitroglycerin    ondansetron ODT **OR** ondansetron    Phosphorus Replacement - Follow Nurse / BPA Driven Protocol    Potassium Replacement - Follow Nurse / BPA Driven Protocol    [COMPLETED] Insert Peripheral IV **AND** sodium chloride    sodium chloride    sodium chloride  Allergies:  No Known Allergies    Objective   Exam:     Vital Signs  Temp:  [97.8 °F (36.6 °C)-98.6 °F (37 °C)] 98.4 °F (36.9 °C)  Heart Rate:  [78-97] 97  Resp:  [13-27] 22  BP: ()/(54-65) 94/54  SpO2:  [87 %-99 %] 87 %  on  Flow (L/min):  [3-332] 3;   Device (Oxygen Therapy): humidified;nasal cannula  Body mass index is 35.93 kg/m².  EXAM  General:  69 yr old  female, some respiratory distress  Head:      Normocephalic and atraumatic.    Eyes:      PERRL/EOM intact, conjunctivae and sclerae clear without nystagmus.    Neck:      No masses, thyromegaly,  trachea central   Lungs:    Clear bilaterally to auscultation.    Heart:      Regular rate and rhythm, no murmur no gallop  Abd:        Soft, nontender, not distended, bowel sounds positive, no shifting dullness.  Msk:        No deformity or scoliosis noted of thoracic or lumbar spine.    Pulses:   Pulses normal in all 4 extremities.    Extremities:        No cyanosis or clubbing--+ + edema.    Neuro:    Lethargic  Skin:       Intact without lesions or rashes.          Results Review:  I have personally reviewed most recent Data :  BMP @LABRCNT(creatinine:10)  CBC    Results from last 7 days   Lab Units 07/16/24  0347 07/15/24  0256 07/14/24  0659 07/13/24  0240 07/12/24  0531 07/11/24  0437 07/10/24  0220   WBC 10*3/mm3 10.10 11.06* 9.61 11.02* 10.25 11.69* 11.11*   HEMOGLOBIN g/dL 9.7* 10.3* 11.2* 11.5* 11.9* 12.4 12.5   PLATELETS 10*3/mm3 144 163 117* 112* 88* 93* 85*     CMP   Results from last 7 days   Lab Units 07/16/24  1402 07/16/24  0344  07/15/24  0519 07/14/24  1918 07/14/24  0659 07/13/24  0240 07/12/24  1619 07/12/24  0531 07/11/24  0437 07/10/24  0220   SODIUM mmol/L  --  139 140  --  141 141  --  141 142 139   POTASSIUM mmol/L 4.3 3.6 4.3 4.3 3.4* 3.9 4.3 3.5 4.5 4.5   CHLORIDE mmol/L  --  99 99  --  97* 97*  --  97* 95* 96*   CO2 mmol/L  --  35.4* 36.0*  --  38.3* 36.9*  --  38.5* 41.6* 37.9*   BUN mg/dL  --  23 27*  --  30* 38*  --  39* 46* 54*   CREATININE mg/dL  --  0.86 0.99  --  0.99 1.11*  --  0.99 1.08* 1.00   GLUCOSE mg/dL  --  76 89  --  75 87  --  82 94 90   ALBUMIN g/dL  --  1.9* 2.1*  --  2.1* 2.1*  --  1.9* 2.1* 2.0*   BILIRUBIN mg/dL  --  2.0* 2.6*  2.6*  --  2.7* 2.9*  --  2.8* 2.4* 1.9*   ALK PHOS U/L  --  108 117  --  115 96  --  94 85 80   AST (SGOT) U/L  --  28 36*  --  43* 37*  --  45* 48* 46*   ALT (SGPT) U/L  --  20 26  --  23 19  --  18 19 17   AMYLASE U/L  --  17*  --   --   --   --   --   --   --   --    LIPASE U/L  --  12*  --   --   --   --   --   --   --   --      ABG    Results from last 7 days   Lab Units 07/11/24  1045   PH, ARTERIAL pH units 7.539*   PCO2, ARTERIAL mm Hg 49.9*   PO2 ART mm Hg 66.3*   O2 SATURATION ART % 94.6   BASE EXCESS ART mmol/L 17.4*     XR Chest 1 View    Result Date: 7/16/2024  Impression: No appreciable change since 1 day prior. Electronically Signed: Krystal Lee MD  7/16/2024 8:08 AM EDT  Workstation ID: [id number]    MRI Abdomen With & Without Contrast    Result Date: 7/15/2024  Impression: Multiseptated pancreatic lesion in the pancreatic neck measuring 2.7 x 2.6 cm. There is suggestion of internal septations as well as faint enhancement of the septa. Findings suspicious for cystic neoplasm. Brisk enhancement lesion in the anterior superior right hepatic lobe measures 0.9 cm, favoring flash filling hemangioma. Hypervascular metastasis is felt to be less likely but incomplete excluded. Consider short-term follow-up MRI to evaluate for change in size. Findings compatible with splenic  infarction involving approximately 30% of the spleen. 2.1 cm right adrenal myelolipoma. Small right pleural effusion. Moderate left pleural effusion. Electronically Signed: Gigi Beck MD  7/15/2024 8:38 PM EDT  Workstation ID: [id number]    XR Chest 1 View    Result Date: 7/15/2024  Impression: Stable chest with cardiomegaly, moderate bilateral effusion and bibasilar consolidations Electronically Signed: Eliu High MD  7/15/2024 7:56 AM EDT  Workstation ID: [id number]    CT Chest Without Contrast Diagnostic    Result Date: 7/14/2024  Impression: 1.Persistent moderate-sized right pleural effusion despite indwelling pleural catheter. Tiny right pneumothorax. Improved aeration of the right lung with persistent right middle/lower lobe volume loss and consolidation. 2.Stable smaller left pleural effusion with increased left lower lobe volume loss and consolidation. 3.Stable massive cardiomegaly. 4.Included upper abdominal findings of pancreatic mass, splenic infarct, and cholelithiasis, as described on recent CT abdomen/pelvis. Electronically Signed: Elise Castro  7/14/2024 11:44 AM EDT  Workstation ID: [id number]    XR Chest 1 View    Result Date: 7/14/2024  Impression: 1.Right basilar pleural catheter remains in place. No visible pneumothorax. 2.Persistent bilateral perihilar and bibasilar airspace opacities, increased in the right lower lung compared to yesterday's chest x-ray. Electronically Signed: Elise Castro  7/14/2024 9:59 AM EDT  Workstation ID: [id number]    CT Abdomen Pelvis Without Contrast    Result Date: 7/13/2024  1. Colonic diverticulosis without evidence of diverticulitis. 2. There is a stable 3 cm soft tissue mass of the pancreas. Pancreatic mass protocol MRI of the abdomen recommended. 3. Focal splenic infarct 4. Small bilateral pleural effusions with trace right-sided pneumothorax. 5. Cholelithiasis without evidence of cholecystitis. Electronically Signed: Eamon Brock MD  7/13/2024 1:51 PM EDT   Workstation ID: [id number]    XR Chest 1 View    Result Date: 7/13/2024  Impression: 1. Stable chest tube overlying the right lung base. No pneumothorax. 2. Persistent small bilateral pleural effusions with bibasilar airspace disease which may relate to atelectasis and/or pneumonia. 3. Stable cardiomegaly with central pulmonary vascular congestion and findings of pulmonary edema. Electronically Signed: Mina Wheeler MD  7/13/2024 11:24 AM EDT  Workstation ID: [id number]    XR Chest 1 View    Result Date: 7/12/2024  Impression: 1.Right basilar chest tube appears in similar position. No definite pneumothorax. 2.Patchy basilar predominant airspace opacities and possible small pleural effusions, as before. 3.Stable cardiomegaly. Electronically Signed: Tanvir Melgar  7/12/2024 7:18 AM EDT  Workstation ID: [id number]    XR Chest 1 View    Result Date: 7/11/2024  Impression: Similar position of right-sided chest tube. No discrete pneumothorax. Similar small bilateral pleural effusions and bilateral airspace opacities, left greater than right. Electronically Signed: Manan Jefferson MD  7/11/2024 7:48 AM EDT  Workstation ID: [id number]    XR Chest 1 View    Result Date: 7/10/2024  Impression: 1. Stable chest tube overlying the right lung base. No pneumothorax. 2. Persistent bibasilar airspace disease and small bilateral pleural effusions. 3. Stable cardiomegaly. Electronically Signed: Mina Wheeler MD  7/10/2024 7:11 AM EDT  Workstation ID: [id number]    XR Chest 1 View    Result Date: 7/9/2024  Impression: 1.Bilateral airspace disease again noted. 2.Cardiomegaly 3.Pigtail catheter chest tube right lung base. Electronically Signed: Bran Bernal MD  7/9/2024 11:37 AM EDT  Workstation ID: [id number]     Results for orders placed during the hospital encounter of 06/30/24    Adult Transthoracic Echo Complete w/ Color, Spectral and Contrast if Necessary Per Protocol    Interpretation Summary    Left ventricular ejection fraction appears to be 31  - 35%.    Left ventricular diastolic dysfunction is noted.    The left atrial cavity is dilated.    Moderate aortic valve stenosis is present. Mean/peak gradient of 14/24 mmHg.  Dimensionless index 0.36    Moderate to severe mitral valve regurgitation is present.    Estimated right ventricular systolic pressure from tricuspid regurgitation is normal (<35 mmHg).        Assessment & Plan   Assessment and Plan:         Atrial fibrillation with RVR    Primary hypertension    Morbid obesity with BMI of 40.0-44.9, adult    Right bundle branch block    Acute deep vein thrombosis (DVT) of both lower extremities    ARABELLA (acute kidney injury)    Acute hyperkalemia    Sepsis    Acute UTI    Acute systolic congestive heart failure    Rhinovirus infection    Multifocal pneumonia    Chronic respiratory failure with hypoxia, on home O2 therapy    Skin ulcer of right great toe    Skin ulcer of left great toe    SEDRICK (obstructive sleep apnea)    Aortic stenosis    Mitral regurgitation    Acute HFrEF (heart failure with reduced ejection fraction)    COPD (chronic obstructive pulmonary disease)    ASSESSMENT:  Acute kidney injury, with baseline creatinine roughly 0.8-1.0mg/dL  Hyperkalemia  A-fib with RVR  Acute on chronic heart failure, with EF 31-35%, diastolic dysfunction, moderate AS and moderate to severe MR as per echo from 6/30/24  Bilateral DVT  Severe sepsis/LLE cellulitis, acute cystitis  Multifocal pneumonia    PLAN :     Renal function unchanged/stable today from prior reading. Suspect she may initially have had some acute cardiorenal component, now most likely has some ATN secondary to sepsis, elevated Vanc level (was 22.4 on 7/3), and some prolonged prerenal state with hemodynamic insults, arrhythmias. Had no significant proteinuria  Patient renal functions are stable at this time  Creatinine still 0.9.  Continue same dose of diuretics at this time   Potassium level is still acceptable volume status seems  acceptable  Sodium level is acceptable at this time and potassium level is improved  Still has some peripheral edema but improving  Blood pressure reviewed, stable. Continue to monitor   Now not on any abx  Daily labs   We will continue to follow       Charan Krueger MD  [facility]  7/16/2024  16:15 EDT

## 2024-07-16 NOTE — PROGRESS NOTES
Daily Progress Note          Assessment    Hydropneumothorax s/p chest tube placement  COPD  SEDRICK  Systolic CHF  A-fib with RVR  Aortic stenosis  Mitral valve regurgitation   R BBB  bilateral DVT remote history of DVT  HTN  Morbid obesity   Uterine mass and pancreatic mass        PLAN:  Chest tube output 700 mL for the last 24 hours , continue -20 cm H2O suction    Oxygen supplement and titration to maintain saturation 90-95%: Currently requiring 3 L per nasal cannula    Mucinex  Antibiotics  Bronchodilators  Inhaled corticosteroids    Incentive spirometer  Diuresis   Electrolytes/ glycemic control  Chronic anticoagulation: Apixaban     I reviewed the recent clinical results and radiological images                 LOS: 16 days     Subjective     Patient reports cough and shortness of breath    Objective     Vital signs for last 24 hours:  Vitals:    07/16/24 0806 07/16/24 0807 07/16/24 0809 07/16/24 0812   BP:    102/62   BP Location:       Patient Position:       Pulse: 86 82 81 83   Resp: 16 16 16    Temp:       TempSrc:       SpO2: 99% 99% 99%    Weight:       Height:           Intake/Output last 3 shifts:  I/O last 3 completed shifts:  In: 720 [P.O.:720]  Out: 2200 [Urine:1500; Chest Tube:700]  Intake/Output this shift:  No intake/output data recorded.      Radiology  Imaging Results (Last 24 Hours)       Procedure Component Value Units Date/Time    XR Chest 1 View [[id number]] Collected: 07/16/24 0807     Updated: 07/16/24 0810    Narrative:      XR CHEST 1 VW    Date of Exam: 7/16/2024 5:48 AM EDT    Indication: CT    Comparison: 7/15/2024.    Findings:  Right chest tube is unchanged in position. There is no identifiable pneumothorax. There is continued mild bibasilar atelectasis with small effusions. There is stable severe cardiomegaly.      Impression:      Impression:  No appreciable change since 1 day prior.      Electronically Signed: Krystal Lee MD    7/16/2024 8:08 AM EDT    Workstation ID: [id number]     MRI Abdomen With & Without Contrast [[id number]] Collected: 07/15/24 2028     Updated: 07/15/24 2040    Narrative:      MRI ABDOMEN W WO CONTRAST    Date of Exam: 7/15/2024 6:03 PM EDT    Indication: pancreatic protocol.     Comparison: Study was correlated with noncontrast CT of the abdomen and pelvis performed on July 13, 2024    Technique:  Routine multiplanar/multisequence images of the abdomen were obtained before and after the uneventful administration of Prohance.      Findings:  Limited study due to motion artifact.    The liver appears within normal limits in size and contour. There is no enhancing lesion in the superior anterior right hepatic lobe measures 0.9 cm. Lesion is T2 hyperintense. Appearance favors flash filling hemangioma. There is a possible associated   vascular malformation. The portal vein is patent.    The gallbladder contains multiple gallstones. There is suggestion of a gallbladder diverticulum at the fundus measuring 1.5 cm. No evidence of gallbladder wall thickening or pericholecystic inflammatory changes. There is no intra or extrahepatic biliary   ductal dilatation.    There is a multiseptated pancreatic lesion measuring 2.7 x 2.6 cm. Some of the walls of this lesion appear thickened with T2 hypointensity correlating to calcification visualized on prior CT. On postcontrast sequences, there is faint enhancement of the   septa. No peripancreatic inflammatory changes are visualized.    Wedge-shaped area of hypoenhancement is visualized in the mid spleen compatible with splenic infarction. This involves approximately 30% of the volume of the spleen.    There is a right adrenal nodule containing macroscopic fat measuring 2.1 cm, compatible with myolipoma.    The kidneys are in anatomic position without evidence of hydronephrosis or perinephric fat stranding. Kidneys demonstrate symmetric enhancement. No solid renal masses visualized.    No free intraperitoneal fluid is  visualized.    There is no evidence of abdominal lymphadenopathy.    Visualized bowel loops demonstrate no gross evidence of obstruction or inflammatory changes.    The aorta and IVC are normal in caliber.    Visualized osseous structures demonstrate no enhancing lesions.    Partial visualization of small right pleural effusion. Moderate left lower effusion is visualized adjacent atelectasis. Evaluation of the lung parenchyma is limited with MRI.      Impression:      Impression:    Multiseptated pancreatic lesion in the pancreatic neck measuring 2.7 x 2.6 cm. There is suggestion of internal septations as well as faint enhancement of the septa. Findings suspicious for cystic neoplasm.    Brisk enhancement lesion in the anterior superior right hepatic lobe measures 0.9 cm, favoring flash filling hemangioma. Hypervascular metastasis is felt to be less likely but incomplete excluded. Consider short-term follow-up MRI to evaluate for change   in size.    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    2.1 cm right adrenal myelolipoma.    Small right pleural effusion. Moderate left pleural effusion.        Electronically Signed: Gigi Beck MD    7/15/2024 8:38 PM EDT    Workstation ID: [id number]            Labs:  Results from last 7 days   Lab Units 07/16/24  0347   WBC 10*3/mm3 10.10   HEMOGLOBIN g/dL 9.7*   HEMATOCRIT % 31.0*   PLATELETS 10*3/mm3 144     Results from last 7 days   Lab Units 07/16/24  0347   SODIUM mmol/L 139   POTASSIUM mmol/L 3.6   CHLORIDE mmol/L 99   CO2 mmol/L 35.4*   BUN mg/dL 23   CREATININE mg/dL 0.86   CALCIUM mg/dL 8.3*   BILIRUBIN mg/dL 2.0*   ALK PHOS U/L 108   ALT (SGPT) U/L 20   AST (SGOT) U/L 28   GLUCOSE mg/dL 76     Results from last 7 days   Lab Units 07/11/24  1045   PH, ARTERIAL pH units 7.539*   PO2 ART mm Hg 66.3*   PCO2, ARTERIAL mm Hg 49.9*   HCO3 ART mmol/L 42.6*     Results from last 7 days   Lab Units 07/16/24  0347 07/15/24  0519  07/14/24  0659   ALBUMIN g/dL 1.9* 2.1* 2.1*             Results from last 7 days   Lab Units 07/16/24  0347   MAGNESIUM mg/dL 1.6                   Meds:   SCHEDULE  amiodarone, 200 mg, Oral, Q12H   Followed by  [START ON 7/23/2024] amiodarone, 200 mg, Oral, Daily  apixaban, 5 mg, Oral, BID  budesonide, 0.5 mg, Nebulization, BID - RT  bumetanide, 1 mg, Oral, Daily  hydrocortisone, 25 mg, Rectal, BID  insulin lispro, 2-7 Units, Subcutaneous, 4x Daily With Meals & Nightly  ipratropium-albuterol, 3 mL, Nebulization, 4x Daily - RT  lidocaine, 20 mL, Infiltration, Once  metoprolol succinate XL, 25 mg, Oral, Q12H  miconazole, 1 Application, Topical, Q12H  pantoprazole, 40 mg, Oral, BID AC  potassium chloride ER, 40 mEq, Oral, Q4H  povidone-iodine, , Topical, Daily  sodium chloride, 10 mL, Intravenous, Q12H  [Held by provider] spironolactone, 25 mg, Oral, Daily      Infusions     PRNs    acetaminophen **OR** acetaminophen    Calcium Replacement - Follow Nurse / BPA Driven Protocol    dextrose    dextrose    glucagon (human recombinant)    HYDROcodone-acetaminophen    ipratropium-albuterol    Magnesium Standard Dose Replacement - Follow Nurse / BPA Driven Protocol    nitroglycerin    ondansetron ODT **OR** ondansetron    Phosphorus Replacement - Follow Nurse / BPA Driven Protocol    Potassium Replacement - Follow Nurse / BPA Driven Protocol    [COMPLETED] Insert Peripheral IV **AND** sodium chloride    sodium chloride    sodium chloride    Physical Exam:  General Appearance:  Alert   HEENT:  Normocephalic, without obvious abnormality, Conjunctiva/corneas clear,.   Nares normal, no drainage     Neck:  Supple, symmetrical, trachea midline.   Lungs /Chest wall:   Bilateral basal rhonchi, respirations unlabored, symmetrical wall movement.     Heart:  Regular rate and rhythm, S1 S2 normal  Abdomen: Soft, non-tender, no masses, no organomegaly.    Extremities: Trace edema, no clubbing or cyanosis     ROS  Constitutional:  Negative for chills, fever and malaise/fatigue.   HENT: Negative.    Eyes: Negative.    Cardiovascular: Negative.    Respiratory: Positive for cough and shortness of breath.    Skin: Negative.    Musculoskeletal: Negative.    Gastrointestinal: Negative.    Genitourinary: Negative.    Neurological: Generalized weakness      I reviewed the recent clinical results  I personally reviewed the latest radiological studies    Part of this note may be an electronic transcription/translation of spoken language to printed text using the Dragon Dictation System.

## 2024-07-16 NOTE — PROGRESS NOTES
"Referring Provider: Luisito Power,*    Reason for follow-up: Atrial fibrillation with rapid ventricular rate     Patient Care Team:  Rut Pierce APRN as PCP - General (Nurse Practitioner)  Austin Brooks MD as Cardiologist (Cardiology)      SUBJECTIVE  Resting comfortably in bed.  Blood pressure and heart rate are stable.  Chest tube in place     ROS  Review of all systems negative except as indicated.    Since I have last seen, the patient has been without any chest discomfort, shortness of breath, palpitations, dizziness or syncope.  Denies having any headache, abdominal pain, nausea, vomiting, diarrhea, constipation, loss of weight or loss of appetite.  Denies having any excessive bruising, hematuria or blood in the stool.        Personal History:    Past Medical History:   Diagnosis Date    Bilateral leg edema     Chronic respiratory failure with hypoxia, on home O2 therapy 07/01/2024    COPD (chronic obstructive pulmonary disease)     Morbid obesity with BMI of 40.0-44.9, adult 11/08/2010    Primary hypertension 03/01/2017    Pulmonary embolism     \"many years ago, was on warfarin\"    Sleep apnea        History reviewed. No pertinent surgical history.    History reviewed. No pertinent family history.    Social History     Tobacco Use    Smoking status: Never    Smokeless tobacco: Never   Vaping Use    Vaping status: Never Used   Substance Use Topics    Alcohol use: Never    Drug use: Never        Home meds:  Prior to Admission medications    Medication Sig Start Date End Date Taking? Authorizing Provider   Allergy Relief 180 MG tablet Take 1 tablet by mouth Daily. 5/30/24  Yes Chadwick Johnson MD   bumetanide (BUMEX) 1 MG tablet Take 1 tablet by mouth 3 times a day. 5/30/24  Yes Chadwick Johnson MD   docusate sodium (COLACE) 100 MG capsule Take 1 capsule by mouth Every 12 (Twelve) Hours. 5/30/24  Yes Chadwick Johnson MD   furosemide (LASIX) 20 MG tablet Take 1 tablet by mouth " Daily.   Yes Chadwick Johnson MD   HYDROcodone-acetaminophen (NORCO)  MG per tablet Take 1 tablet by mouth 3 times a day. 5/30/24  Yes Chadwick Johnson MD   lisinopril (PRINIVIL,ZESTRIL) 30 MG tablet Take 1 tablet by mouth Daily. 3/18/24  Yes Chadwick Johnson MD   meloxicam (MOBIC) 7.5 MG tablet Take 1 tablet by mouth Daily As Needed. 3/18/24  Yes Chadwick Johnson MD   metoprolol tartrate (LOPRESSOR) 50 MG tablet Take 1 tablet by mouth Daily.   Yes Chadwick Johnson MD   montelukast (SINGULAIR) 10 MG tablet Take 1 tablet by mouth every night at bedtime.   Yes Chadwick Johnson MD   omeprazole (priLOSEC) 20 MG capsule Take 1 capsule by mouth Daily. 3/18/24  Yes Chadwick Johnson MD   oxybutynin XL (DITROPAN-XL) 10 MG 24 hr tablet Take 2 tablets by mouth Daily. 3/18/24  Yes Chadwick Johnson MD   potassium chloride (MICRO-K) 10 MEQ CR capsule Take 1 capsule by mouth Every 12 (Twelve) Hours. 3/18/24  Yes Chadwick Johnson MD   vitamin D (ERGOCALCIFEROL) 1.25 MG (23420 UT) capsule capsule Take 1 capsule by mouth 2 (Two) Times a Week. Monday and Thursday. 5/30/24  Yes Chadwick Johnson MD       Allergies:  Patient has no known allergies.    Scheduled Meds:amiodarone, 200 mg, Oral, Q12H   Followed by  [START ON 7/23/2024] amiodarone, 200 mg, Oral, Daily  apixaban, 5 mg, Oral, BID  budesonide, 0.5 mg, Nebulization, BID - RT  bumetanide, 1 mg, Oral, Daily  hydrocortisone, 25 mg, Rectal, BID  insulin lispro, 2-7 Units, Subcutaneous, 4x Daily With Meals & Nightly  ipratropium-albuterol, 3 mL, Nebulization, 4x Daily - RT  lidocaine, 20 mL, Infiltration, Once  metoprolol succinate XL, 25 mg, Oral, Q12H  miconazole, 1 Application, Topical, Q12H  pantoprazole, 40 mg, Oral, BID AC  potassium chloride ER, 40 mEq, Oral, Q4H  povidone-iodine, , Topical, Daily  sodium chloride, 10 mL, Intravenous, Q12H  [Held by provider] spironolactone, 25 mg, Oral, Daily      Continuous Infusions:  "  PRN Meds:.  acetaminophen **OR** acetaminophen    Calcium Replacement - Follow Nurse / BPA Driven Protocol    dextrose    dextrose    glucagon (human recombinant)    HYDROcodone-acetaminophen    ipratropium-albuterol    Magnesium Standard Dose Replacement - Follow Nurse / BPA Driven Protocol    nitroglycerin    ondansetron ODT **OR** ondansetron    Phosphorus Replacement - Follow Nurse / BPA Driven Protocol    Potassium Replacement - Follow Nurse / BPA Driven Protocol    [COMPLETED] Insert Peripheral IV **AND** sodium chloride    sodium chloride    sodium chloride      OBJECTIVE    Vital Signs  Vitals:    07/16/24 0053 07/16/24 0433 07/16/24 0453 07/16/24 0546   BP: 99/58  98/56 105/61   BP Location: Right arm  Right arm    Patient Position: Lying  Lying    Pulse: 86 78 82 88   Resp: 21  13    Temp: 98.6 °F (37 °C)  97.8 °F (36.6 °C)    TempSrc: Oral  Oral    SpO2: 97% 95% 94%    Weight:   80.7 kg (177 lb 14.6 oz)    Height:           Flowsheet Rows      Flowsheet Row First Filed Value   Admission Height 147.3 cm (58\") Documented at 06/30/2024 1650   Admission Weight 108 kg (239 lb) Documented at 06/30/2024 1650              Intake/Output Summary (Last 24 hours) at 7/16/2024 0737  Last data filed at 7/16/2024 0600  Gross per 24 hour   Intake 480 ml   Output 2000 ml   Net -1520 ml          Telemetry: Atrial fibrillation with heart rate in the 90s    Physical Exam:  The patient is alert, oriented and in no distress.  Obese  Vital signs as noted above.  Head and neck revealed no carotid bruits or jugular venous distention.  No thyromegaly or lymphadenopathy is present  Lungs clear.  No wheezing.  Breath sounds are normal bilaterally.  Heart normal first and second heart sounds.  No murmur. No precordial rub is present.  No gallop is present.  Abdomen soft and nontender.  No organomegaly is present.  Extremities with good peripheral pulses without any pedal edema.  Skin warm and dry.  Musculoskeletal system is grossly " normal.  CNS grossly normal.       Results Review:  I have personally reviewed the results from the time of this admission to 7/16/2024 07:37 EDT and agree with these findings:  []  Laboratory  []  Microbiology  []  Radiology  []  EKG/Telemetry   []  Cardiology/Vascular   []  Pathology  []  Old records  []  Other:    Most notable findings include:    Lab Results (last 24 hours)       Procedure Component Value Units Date/Time    POC Glucose 4x Daily Before Meals & at Bedtime [[id number]]  (Normal) Collected: 07/16/24 0705    Specimen: Blood Updated: 07/16/24 0707     Glucose 72 mg/dL      Comment: Serial Number: [id number]:  [id number]       Magnesium [[id number]]  (Normal) Collected: 07/16/24 0347    Specimen: Blood Updated: 07/16/24 0446     Magnesium 1.6 mg/dL     Amylase [[id number]]  (Abnormal) Collected: 07/16/24 0347    Specimen: Blood Updated: 07/16/24 0446     Amylase 17 U/L     Lipase [[id number]]  (Abnormal) Collected: 07/16/24 0347    Specimen: Blood Updated: 07/16/24 0446     Lipase 12 U/L     Phosphorus [[id number]]  (Normal) Collected: 07/16/24 0347    Specimen: Blood Updated: 07/16/24 0446     Phosphorus 2.9 mg/dL     Comprehensive Metabolic Panel [[id number]]  (Abnormal) Collected: 07/16/24 0347    Specimen: Blood Updated: 07/16/24 0446     Glucose 76 mg/dL      BUN 23 mg/dL      Creatinine 0.86 mg/dL      Sodium 139 mmol/L      Potassium 3.6 mmol/L      Chloride 99 mmol/L      CO2 35.4 mmol/L      Calcium 8.3 mg/dL      Total Protein 5.1 g/dL      Albumin 1.9 g/dL      ALT (SGPT) 20 U/L      AST (SGOT) 28 U/L      Alkaline Phosphatase 108 U/L      Total Bilirubin 2.0 mg/dL      Globulin 3.2 gm/dL      A/G Ratio 0.6 g/dL      BUN/Creatinine Ratio 26.7     Anion Gap 4.6 mmol/L      eGFR 73.2 mL/min/1.73     Narrative:      GFR Normal >60  Chronic Kidney Disease <60  Kidney Failure <15      CBC & Differential [[id number]]  (Abnormal) Collected: 07/16/24 0347    Specimen: Blood Updated: 07/16/24 6781     Narrative:      The following orders were created for panel order CBC & Differential.  Procedure                               Abnormality         Status                     ---------                               -----------         ------                     CBC Auto Differential[[id number]]        Abnormal            Final result                 Please view results for these tests on the individual orders.    CBC Auto Differential [[id number]]  (Abnormal) Collected: 07/16/24 0347    Specimen: Blood Updated: 07/16/24 0421     WBC 10.10 10*3/mm3      RBC 3.16 10*6/mm3      Hemoglobin 9.7 g/dL      Hematocrit 31.0 %      MCV 98.1 fL      MCH 30.7 pg      MCHC 31.3 g/dL      RDW 16.5 %      RDW-SD 58.8 fl      MPV 11.4 fL      Platelets 144 10*3/mm3      Neutrophil % 80.5 %      Lymphocyte % 8.7 %      Monocyte % 7.8 %      Eosinophil % 1.9 %      Basophil % 0.2 %      Immature Grans % 0.9 %      Neutrophils, Absolute 8.13 10*3/mm3      Lymphocytes, Absolute 0.88 10*3/mm3      Monocytes, Absolute 0.79 10*3/mm3      Eosinophils, Absolute 0.19 10*3/mm3      Basophils, Absolute 0.02 10*3/mm3      Immature Grans, Absolute 0.09 10*3/mm3      nRBC 0.0 /100 WBC     Cancer Antigen 19-9 [[id number]]  (Abnormal) Collected: 07/15/24 1335    Specimen: Blood from Arm, Right Updated: 07/15/24 2138     CA 19-9 58.5 U/mL     Narrative:      Results may be falsely decreased if patient taking Biotin.    Testing Method: [employer] Electrochemiluminescence Immunoassay(ECLIA)  Values obtained with different assay methods or kits cannot be used interchangeably.    POC Glucose Once [[id number]]  (Abnormal) Collected: 07/15/24 2019    Specimen: Blood Updated: 07/15/24 2020     Glucose 110 mg/dL      Comment: Serial Number: [id number]:  [id number]       Phosphorus [[id number]]  (Normal) Collected: 07/15/24 1617    Specimen: Blood from Arm, Right Updated: 07/15/24 1643     Phosphorus 2.7 mg/dL     POC Glucose Once [[id number]]   (Abnormal) Collected: 07/15/24 1612    Specimen: Blood Updated: 07/15/24 1613     Glucose 122 mg/dL      Comment: Serial Number: [id number]:  [id number]       Bilirubin, Total & Direct [[id number]]  (Abnormal) Collected: 07/15/24 0519    Specimen: Blood Updated: 07/15/24 1515     Total Bilirubin 2.6 mg/dL      Bilirubin, Direct 1.7 mg/dL      Bilirubin, Indirect 0.9 mg/dL     Chromogranin A [[id number]] Collected: 07/15/24 1335    Specimen: Blood from Arm, Right Updated: 07/15/24 1349    POC Glucose Finger 4x Daily Before Meals & at Bedtime [[id number]]  (Normal) Collected: 07/15/24 1111    Specimen: Blood from Finger Updated: 07/15/24 1112     Glucose 75 mg/dL      Comment: Serial Number: [id number]:  [id number]       Wound Culture - Surgical Site, Chest, Right [[id number]]  (Abnormal) Collected: 07/13/24 1425    Specimen: Surgical Site from Chest, Right Updated: 07/15/24 0943     Wound Culture Light growth (2+) Gram Negative Bacilli     Gram Stain Rare (1+) WBCs per low power field      Few (2+) Gram negative bacilli            Imaging Results (Last 24 Hours)       Procedure Component Value Units Date/Time    XR Chest 1 View [[id number]] Resulted: 07/16/24 0614     Updated: 07/16/24 0616    MRI Abdomen With & Without Contrast [[id number]] Collected: 07/15/24 2028     Updated: 07/15/24 2040    Narrative:      MRI ABDOMEN W WO CONTRAST    Date of Exam: 7/15/2024 6:03 PM EDT    Indication: pancreatic protocol.     Comparison: Study was correlated with noncontrast CT of the abdomen and pelvis performed on July 13, 2024    Technique:  Routine multiplanar/multisequence images of the abdomen were obtained before and after the uneventful administration of Prohance.      Findings:  Limited study due to motion artifact.    The liver appears within normal limits in size and contour. There is no enhancing lesion in the superior anterior right hepatic lobe measures 0.9 cm. Lesion is T2 hyperintense. Appearance favors  flash filling hemangioma. There is a possible associated   vascular malformation. The portal vein is patent.    The gallbladder contains multiple gallstones. There is suggestion of a gallbladder diverticulum at the fundus measuring 1.5 cm. No evidence of gallbladder wall thickening or pericholecystic inflammatory changes. There is no intra or extrahepatic biliary   ductal dilatation.    There is a multiseptated pancreatic lesion measuring 2.7 x 2.6 cm. Some of the walls of this lesion appear thickened with T2 hypointensity correlating to calcification visualized on prior CT. On postcontrast sequences, there is faint enhancement of the   septa. No peripancreatic inflammatory changes are visualized.    Wedge-shaped area of hypoenhancement is visualized in the mid spleen compatible with splenic infarction. This involves approximately 30% of the volume of the spleen.    There is a right adrenal nodule containing macroscopic fat measuring 2.1 cm, compatible with myolipoma.    The kidneys are in anatomic position without evidence of hydronephrosis or perinephric fat stranding. Kidneys demonstrate symmetric enhancement. No solid renal masses visualized.    No free intraperitoneal fluid is visualized.    There is no evidence of abdominal lymphadenopathy.    Visualized bowel loops demonstrate no gross evidence of obstruction or inflammatory changes.    The aorta and IVC are normal in caliber.    Visualized osseous structures demonstrate no enhancing lesions.    Partial visualization of small right pleural effusion. Moderate left lower effusion is visualized adjacent atelectasis. Evaluation of the lung parenchyma is limited with MRI.      Impression:      Impression:    Multiseptated pancreatic lesion in the pancreatic neck measuring 2.7 x 2.6 cm. There is suggestion of internal septations as well as faint enhancement of the septa. Findings suspicious for cystic neoplasm.    Brisk enhancement lesion in the anterior superior  right hepatic lobe measures 0.9 cm, favoring flash filling hemangioma. Hypervascular metastasis is felt to be less likely but incomplete excluded. Consider short-term follow-up MRI to evaluate for change   in size.    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    2.1 cm right adrenal myelolipoma.    Small right pleural effusion. Moderate left pleural effusion.        Electronically Signed: Gigi Beck MD    7/15/2024 8:38 PM EDT    Workstation ID: [id number]    XR Chest 1 View [[id number]] Collected: 07/15/24 0753     Updated: 07/15/24 0758    Narrative:      XR CHEST 1 VW    Date of Exam: 7/15/2024 3:51 AM EDT    Indication: CT    Comparison: X-ray dated 7/14/2024    Findings:  There is cardiomegaly. Interval change in bilateral lower lobe consolidation with moderate bilateral pleural effusions. No evidence of pneumothorax. Again noticed a pigtail catheter in the right upper quadrant      Impression:      Impression:    Stable chest with cardiomegaly, moderate bilateral effusion and bibasilar consolidations      Electronically Signed: Eliu High MD    7/15/2024 7:56 AM EDT    Workstation ID: [id number]            LAB RESULTS (LAST 7 DAYS)    CBC  Results from last 7 days   Lab Units 07/16/24  0347 07/15/24  0256 07/14/24  0659 07/13/24  0240 07/12/24  0531 07/11/24  0437 07/10/24  0220   WBC 10*3/mm3 10.10 11.06* 9.61 11.02* 10.25 11.69* 11.11*   RBC 10*6/mm3 3.16* 3.34* 3.70* 3.72* 3.89 4.10 4.13   HEMOGLOBIN g/dL 9.7* 10.3* 11.2* 11.5* 11.9* 12.4 12.5   HEMATOCRIT % 31.0* 32.2* 36.5 36.0 37.5 39.1 38.6   MCV fL 98.1* 96.4 98.6* 96.8 96.4 95.4 93.5   PLATELETS 10*3/mm3 144 163 117* 112* 88* 93* 85*       BMP  Results from last 7 days   Lab Units 07/16/24  0347 07/15/24  1617 07/15/24  0519 07/15/24  0256 07/14/24  1918 07/14/24  0659 07/13/24  0240 07/12/24  1619 07/12/24  0531 07/11/24  0437 07/10/24  0220   SODIUM mmol/L 139  --  140  --   --  141 141  --  141 142  139   POTASSIUM mmol/L 3.6  --  4.3  --  4.3 3.4* 3.9 4.3 3.5 4.5 4.5   CHLORIDE mmol/L 99  --  99  --   --  97* 97*  --  97* 95* 96*   CO2 mmol/L 35.4*  --  36.0*  --   --  38.3* 36.9*  --  38.5* 41.6* 37.9*   BUN mg/dL 23  --  27*  --   --  30* 38*  --  39* 46* 54*   CREATININE mg/dL 0.86  --  0.99  --   --  0.99 1.11*  --  0.99 1.08* 1.00   GLUCOSE mg/dL 76  --  89  --   --  75 87 --  82 94 90   MAGNESIUM mg/dL 1.6  --  1.8  --   --  1.8 1.7  --  1.7 1.9 2.3   PHOSPHORUS mg/dL 2.9 2.7  --  2.2*  --  2.5 2.4*  --  2.7 2.5 2.5       CMP   Results from last 7 days   Lab Units 07/16/24  0347 07/15/24  0519 07/14/24  1918 07/14/24  0659 07/13/24  0240 07/12/24  1619 07/12/24  0531 07/11/24  0437 07/10/24  0220   SODIUM mmol/L 139 140  --  141 141  --  141 142 139   POTASSIUM mmol/L 3.6 4.3 4.3 3.4* 3.9 4.3 3.5 4.5 4.5   CHLORIDE mmol/L 99 99  --  97* 97*  --  97* 95* 96*   CO2 mmol/L 35.4* 36.0*  --  38.3* 36.9*  --  38.5* 41.6* 37.9*   BUN mg/dL 23 27*  --  30* 38*  --  39* 46* 54*   CREATININE mg/dL 0.86 0.99  --  0.99 1.11*  --  0.99 1.08* 1.00   GLUCOSE mg/dL 76 89  --  75 87  --  82 94 90   ALBUMIN g/dL 1.9* 2.1*  --  2.1* 2.1*  --  1.9* 2.1* 2.0*   BILIRUBIN mg/dL 2.0* 2.6*  2.6*  --  2.7* 2.9*  --  2.8* 2.4* 1.9*   ALK PHOS U/L 108 117  --  115 96  --  94 85 80   AST (SGOT) U/L 28 36*  --  43* 37*  --  45* 48* 46*   ALT (SGPT) U/L 20 26  --  23 19  --  18 19 17   AMYLASE U/L 17*  --   --   --   --   --   --   --   --    LIPASE U/L 12*  --   --   --   --   --   --   --   --        BNP        TROPONIN          CoAg        Creatinine Clearance  Estimated Creatinine Clearance: 60 mL/min (by C-G formula based on SCr of 0.86 mg/dL).    ABG  Results from last 7 days   Lab Units 07/11/24  1045   PH, ARTERIAL pH units 7.539*   PCO2, ARTERIAL mm Hg 49.9*   PO2 ART mm Hg 66.3*   O2 SATURATION ART % 94.6   BASE EXCESS ART mmol/L 17.4*       Radiology  MRI Abdomen With & Without Contrast    Result Date:  7/15/2024  Impression: Multiseptated pancreatic lesion in the pancreatic neck measuring 2.7 x 2.6 cm. There is suggestion of internal septations as well as faint enhancement of the septa. Findings suspicious for cystic neoplasm. Brisk enhancement lesion in the anterior superior right hepatic lobe measures 0.9 cm, favoring flash filling hemangioma. Hypervascular metastasis is felt to be less likely but incomplete excluded. Consider short-term follow-up MRI to evaluate for change in size. 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 2.1 cm right adrenal myelolipoma. Small right pleural effusion. Moderate left pleural effusion. Electronically Signed: Gigi Beck MD  7/15/2024 8:38 PM EDT  Workstation ID: [id number]    XR Chest 1 View    Result Date: 7/15/2024  Impression: Stable chest with cardiomegaly, moderate bilateral effusion and bibasilar consolidations Electronically Signed: Eliu High MD  7/15/2024 7:56 AM EDT  Workstation ID: [id number]    CT Chest Without Contrast Diagnostic    Result Date: 7/14/2024  Impression: 1.Persistent moderate-sized right pleural effusion despite indwelling pleural catheter. Tiny right pneumothorax. Improved aeration of the right lung with persistent right middle/lower lobe volume loss and consolidation. 2.Stable smaller left pleural effusion with increased left lower lobe volume loss and consolidation. 3.Stable massive cardiomegaly. 4.Included upper abdominal findings of pancreatic mass, splenic infarct, and cholelithiasis, as described on recent CT abdomen/pelvis. Electronically Signed: Elise Castro  7/14/2024 11:44 AM EDT  Workstation ID: [id number]    XR Chest 1 View    Result Date: 7/14/2024  Impression: 1.Right basilar pleural catheter remains in place. No visible pneumothorax. 2.Persistent bilateral perihilar and bibasilar airspace opacities, increased in the right lower lung compared to yesterday's chest x-ray. Electronically  Signed: Elise Castro  7/14/2024 9:59 AM EDT  Workstation ID: [id number]       EKG  I personally viewed and interpreted the patient's EKG/Telemetry data:  ECG 12 Lead Rhythm Change   Final Result   HEART RATE=92  bpm   RR Navpuizd=368  ms   NV Interval=  ms   P Horizontal Axis=  deg   P Front Axis=  deg   QRSD Zniydura=044  ms   QT Qhjkurth=653  ms   PSvZ=036  ms   QRS Axis=-96  deg   T Wave Axis=65  deg   - ABNORMAL ECG -   Atrial fibrillation   Right bundle branch block   When compared with ECG of 05-Jul-2024 02:24:17,   No significant change   Electronically Signed By: Tee Whitlock ([facility]) 2024-07-07 10:12:55   Date and Time of Study:2024-07-05 06:43:37      ECG 12 Lead Drug Monitoring; amiodarone   Final Result   HEART URMG=248  bpm   RR Wfayrtgu=598  ms   NV Interval=  ms   P Horizontal Axis=  deg   P Front Axis=  deg   QRSD Qqwthegv=591  ms   QT Gskkftnj=509  ms   BWuG=331  ms   QRS Axis=-94  deg   T Wave Axis=70  deg   - ABNORMAL ECG -   Atrial fibrillation   Ventricular premature complex   Right bundle branch block   When compared with ECG of 04-Jul-2024 09:17:07,   No change from prior tracing   Electronically Signed By: Tee Whitlock ([facility]) 2024-07-07 10:13:32   Date and Time of Study:2024-07-05 02:24:17      ECG 12 Lead Electrolyte Imbalance   Final Result   HEART ZSLB=597  bpm   RR Jlbfskad=406  ms   NV Cywbbqvu=492  ms   P Horizontal Axis=113  deg   P Front Axis=263  deg   QRSD Hzejsooh=381  ms   QT Qefojixw=401  ms   ZDwL=658  ms   QRS Axis=227  deg   T Wave Axis=27  deg   - ABNORMAL ECG -   Right bundle branch block   When compared with ECG of 04-Jul-2024 04:20:51,   Significant change in rhythm: previously atrial fibrillation   Significant repolarization change   Atrial fibrillation with rapid V-rate   No change from prior tracing   Electronically Signed By: Tee Whitlock ([facility]) 2024-07-07 10:14:27   Date and Time of Study:2024-07-04 09:17:07      ECG 12 Lead Drug Monitoring; Amiodarone    Preliminary Result   HEART UEQJ=658  bpm   RR Ewezobga=222  ms   MT Interval=  ms   P Horizontal Axis=  deg   P Front Axis=  deg   QRSD Bppyjzjx=851  ms   QT Gsisnyiw=177  ms   SJyR=872  ms   QRS Axis=-90  deg   T Wave Axis=36  deg   - ABNORMAL ECG -   Atrial fibrillation   Right bundle branch block   ST elevation secondary to IVCD   Date and Time of Study:2024-07-04 04:20:51      ECG 12 Lead Chest Pain   Preliminary Result   HEART TZNX=047  bpm   RR Yjubvqzu=938  ms   MT Interval=  ms   P Horizontal Axis=  deg   P Front Axis=  deg   QRSD Drradkwu=920  ms   QT Llsvdnce=640  ms   IUlX=590  ms   QRS Axis=-98  deg   T Wave Axis=37  deg   - ABNORMAL ECG -   Atrial fibrillation   Right bundle branch block   Anterolateral infarct, old   Date and Time of Study:2024-07-03 17:38:52      ECG 12 Lead Drug Monitoring; Amiodarone   Preliminary Result   HEART DXDP=486  bpm   RR Xrbyrwew=429  ms   MT Interval=  ms   P Horizontal Axis=  deg   P Front Axis=  deg   QRSD Lnrgsgrn=550  ms   QT Hgaywqjn=284  ms   DTcE=073  ms   QRS Axis=-90  deg   T Wave Axis=74  deg   - ABNORMAL ECG -   Atrial fibrillation   Right bundle branch block   ST elevation secondary to IVCD   Date and Time of Study:2024-07-03 04:02:32      ECG 12 Lead Drug Monitoring; Amiodarone   Final Result   HEART KGPX=017  bpm   RR Svqxdjxo=370  ms   MT Interval=  ms   P Horizontal Axis=  deg   P Front Axis=  deg   QRSD Wrvtpwlh=496  ms   QT Lmzawrqh=552  ms   QQqF=408  ms   QRS Axis=-93  deg   T Wave Axis=45  deg   - ABNORMAL ECG -   Atrial fibrillation   Right bundle branch block   Inferior  infarct, old   When compared with ECG of 01-Jul-2024 04:42:13,   Nonspecific significant change   Electronically Signed By: Librado Mcdermott ([facility]) 2024-07-10 12:58:26   Date and Time of Study:2024-07-02 15:12:56      ECG 12 Lead Drug Monitoring; Amiodarone   Final Result   HEART EQYJ=805  bpm   RR Ixnhmshu=577  ms   MT Interval=  ms   P Horizontal Axis=  deg   P Front Axis=  deg   QRSD  Iwoaewmd=939  ms   QT Njanxttu=345  ms   IPdS=683  ms   QRS Axis=-80  deg   T Wave Axis=102  deg   - ABNORMAL ECG -   Atrial fibrillation   Right bundle branch block   Inferior  infarct, old   Anterolateral  infarct, age indeterminate   When compared with ECG of 30-Jun-2024 16:58:43,   Significant rate decrease   New or worsened ischemia or infarction   Electronically Signed By: Austin Brooks ([facility]) 2024-07-01 13:11:59   Date and Time of Study:2024-07-01 04:42:13      ECG 12 Lead Dyspnea   Final Result   HEART JTQY=803  bpm   RR Bcjrwymu=130  ms   MI Interval=  ms   P Horizontal Axis=  deg   P Front Axis=  deg   QRSD Priidcme=673  ms   QT Cbkcfiae=982  ms   ODiX=934  ms   QRS Axis=-101  deg   T Wave Axis=43  deg   - ABNORMAL ECG -   Atrial fibrillation   Right bundle branch block   ST elevation secondary to IVCD   Electronically Signed By: Jeffery Kwon ([facility]) 2024-07-01 07:37:02   Date and Time of Study:2024-06-30 16:58:43      Telemetry Scan   Final Result      Telemetry Scan   Final Result      Telemetry Scan   Final Result      Telemetry Scan   Final Result      Telemetry Scan   Final Result      Telemetry Scan   Final Result      Telemetry Scan   Final Result      Telemetry Scan   Final Result      Telemetry Scan   Final Result      Telemetry Scan   Final Result      Telemetry Scan   Final Result      Telemetry Scan   Final Result      Telemetry Scan   Final Result      Telemetry Scan   Final Result      Telemetry Scan   Final Result      Telemetry Scan   Final Result      Telemetry Scan   Final Result      Telemetry Scan   Final Result      Telemetry Scan   Final Result      Telemetry Scan   Final Result      Telemetry Scan   Final Result      Telemetry Scan   Final Result      Telemetry Scan   Final Result      Telemetry Scan   Final Result      Telemetry Scan   Final Result      Telemetry Scan   Final Result      Telemetry Scan   Final Result      Telemetry Scan   Final Result      Telemetry Scan   Final  Result      Telemetry Scan   Final Result      Telemetry Scan   Final Result      Telemetry Scan   Final Result      Telemetry Scan   Final Result      Telemetry Scan   Final Result      Telemetry Scan   Final Result      Telemetry Scan   Final Result      Telemetry Scan   Final Result      Telemetry Scan   Final Result      Telemetry Scan   Final Result      Telemetry Scan   Final Result      Telemetry Scan   Final Result      Telemetry Scan   Final Result      Telemetry Scan   Final Result      Telemetry Scan   Final Result      Telemetry Scan   Final Result      Telemetry Scan   Final Result      Telemetry Scan   Final Result      Telemetry Scan   Final Result      Telemetry Scan   Final Result      Telemetry Scan   Final Result      Telemetry Scan   Final Result      Telemetry Scan   Final Result      Telemetry Scan   Final Result      Telemetry Scan   Final Result      Telemetry Scan   Final Result      Telemetry Scan   Final Result      Telemetry Scan   Final Result      Telemetry Scan   Final Result      Telemetry Scan   Final Result      Telemetry Scan   Final Result      Telemetry Scan   Final Result      Telemetry Scan   Final Result      Telemetry Scan   Final Result      Telemetry Scan   Final Result      Telemetry Scan   Final Result      Telemetry Scan   Final Result      Telemetry Scan   Final Result      Telemetry Scan   Final Result      Telemetry Scan   Final Result      Telemetry Scan   Final Result      Telemetry Scan   Final Result      Telemetry Scan   Final Result      Telemetry Scan   Final Result      Telemetry Scan   Final Result      Telemetry Scan   Final Result      Telemetry Scan   Final Result      Telemetry Scan   Final Result      Telemetry Scan   Final Result      Telemetry Scan   Final Result      Telemetry Scan   Final Result      Telemetry Scan   Final Result      Telemetry Scan   Final Result      Telemetry Scan   Final Result      Telemetry Scan   Final Result      Telemetry  Scan   Final Result      Telemetry Scan   Final Result      ECG 12 Lead Electrolyte Imbalance    (Results Pending)         Echocardiogram:    Results for orders placed during the hospital encounter of 06/30/24    Adult Transthoracic Echo Complete w/ Color, Spectral and Contrast if Necessary Per Protocol    Interpretation Summary    Left ventricular ejection fraction appears to be 31 - 35%.    Left ventricular diastolic dysfunction is noted.    The left atrial cavity is dilated.    Moderate aortic valve stenosis is present. Mean/peak gradient of 14/24 mmHg.  Dimensionless index 0.36    Moderate to severe mitral valve regurgitation is present.    Estimated right ventricular systolic pressure from tricuspid regurgitation is normal (<35 mmHg).        Stress Test:         Cardiac Catheterization:  No results found for this or any previous visit.         Other:         ASSESSMENT & PLAN:    Principal Problem:    Atrial fibrillation with RVR  Active Problems:    Primary hypertension    Morbid obesity with BMI of 40.0-44.9, adult    Right bundle branch block    Acute deep vein thrombosis (DVT) of both lower extremities    ARABELLA (acute kidney injury)    Acute hyperkalemia    Sepsis    Acute UTI    Acute systolic congestive heart failure    Rhinovirus infection    Multifocal pneumonia    Chronic respiratory failure with hypoxia, on home O2 therapy    Skin ulcer of right great toe    Skin ulcer of left great toe    SEDRICK (obstructive sleep apnea)    Aortic stenosis    Mitral regurgitation    Acute HFrEF (heart failure with reduced ejection fraction)    COPD (chronic obstructive pulmonary disease)      Acute respiratory failure with hypoxia and hypercapnia  Acute encephalopathy   Currently on 2-3 L of oxygen via nasal cannula  Chest tube in place for hydropneumothorax  Pulmonology is managing.     Atrial fibrillation with RVR  Newly diagnosed  Electrolytes have been corrected  Rate controlled with heart rate in the 80s and  90s  Continue amiodarone for rhythm control X 6 weeks  Continue Toprol-XL 25 mg p.o. twice daily  Heart rate is well-controlled  CNS2YZ4-HAEy score is 7  Continue Eliquis  Rectal bleeding with no drop in H&H likely from hemorrhoids  No plan for endoscopy       Acute systolic CHF  Newly diagnosed  Echocardiogram shows EF of 31 to 35%, moderate aortic stenosis and moderate to severe mitral regurgitation.  Currently on stable doses of Bumex  Monitor I's and O's and replace electrolytes as needed.  Switch from metoprolol to tartrate to succinate  Unable to add ACE inhibitor/ARNI due to low blood pressure  Was requiring midodrine.  Now off.  Blood pressure is still borderline low  Aldactone has been held  Plan to repeat echocardiogram after 3 months of completing GDMT     Acute deep vein thrombosis (DVT) of both lower extremities  Extensive DVT in bilateral lower extremities involving femoral, popliteal and posterior tibial.  Continue anticoagulation as above  She will need lifelong anticoagulation   Rectal bleeding with stable H&H     Thrombocytopenia  Closely monitor platelets while on anticoagulation  Platelets recovered 144 now     ARABELLA (acute kidney injury)  Presented with creatinine of 1.4.  Creatinine 0.86 now  Closely monitor renal function while on diuretics  Continue p.o. Bumex  Nephrology following      Acute hyperkalemia  Treated with [facility]  Nephrology following      Sepsis / UTI / Multifocal pneumonia / Rhinovirus infection  Multifactorial secondary to UTI and pneumonia with possible cellulitis  Continue empiric antibiotics      Skin ulcer of right great toe  Skin ulcer of left great toe  Podiatry following   A1c is 6.2%     Primary hypertension, chronic  Now on Toprol-XL  Unable to tolerate Aldactone due to low blood pressure.  Previously required midodrine.  Closely monitor blood pressure      Morbid obesity with BMI of 40.0-44.9, adult  BMI is 35.3.  She weighs 174 pounds.  Lifestyle modifications  recommended   LDL 27, HDL 13, triglyceride 86 and total cholesterol 58.  No indication for statin     SEDRICK (obstructive sleep apnea)  Encouraged compliance with CPAP      Austin Brooks MD  07/16/24  07:37 EDT

## 2024-07-16 NOTE — PROGRESS NOTES
[facility] MEDICINE SERVICE  DAILY PROGRESS NOTE    NAME: Ban Brennan  : 1955  MRN: [id number]      LOS: 16 days     PROVIDER OF SERVICE: Luisito Power MD    Chief Complaint: Atrial fibrillation with RVR    Subjective:     Interval History:  History taken from: patient chart RN  Constipation   No CP or SOB    Review of Systems:   Review of Systems  All negative except as above   Objective:     Vital Signs  Temp:  [97.8 °F (36.6 °C)-98.6 °F (37 °C)] 98.4 °F (36.9 °C)  Heart Rate:  [78-96] 89  Resp:  [13-22] 21  BP: ()/(56-67) 100/59  Flow (L/min):  [3] 3   Body mass index is 35.93 kg/m².    Physical Exam  General: Alert and oriented, no acute distress. obese  HENT: Normocephalic, moist oral mucosa, no scleral icterus.  Neck: Supple, non-tender, no carotid bruits, no JVD, no LAD.  Lungs: Clear to auscultation, non-labored respiration. R sided chest tube  Heart: RRR, no murmur, gallop or edema.  Abdomen: Soft, hypogastric region soft tissue mass and tenderness, non-distended, + bowel sounds.  Musculoskeletal: Normal range of motion and strength, no tenderness or swelling.  Neuro: alert and awake, moving all 4 extremities   Skin: Skin is warm, dry and pink, no rashes or lesions.  Psychiatric: Cooperative, appropriate mood and affect.      Scheduled Meds   amiodarone, 200 mg, Oral, Q12H   Followed by  [START ON 2024] amiodarone, 200 mg, Oral, Daily  apixaban, 5 mg, Oral, BID  budesonide, 0.5 mg, Nebulization, BID - RT  bumetanide, 1 mg, Oral, Daily  hydrocortisone, 25 mg, Rectal, BID  insulin lispro, 2-7 Units, Subcutaneous, 4x Daily With Meals & Nightly  ipratropium-albuterol, 3 mL, Nebulization, 4x Daily - RT  lidocaine, 20 mL, Infiltration, Once  metoprolol succinate XL, 25 mg, Oral, Q12H  miconazole, 1 Application, Topical, Q12H  pantoprazole, 40 mg, Oral, BID AC  povidone-iodine, , Topical, Daily  sodium chloride, 10 mL, Intravenous, Q12H  [Held by provider]  spironolactone, 25 mg, Oral, Daily       PRN Meds     acetaminophen **OR** acetaminophen    Calcium Replacement - Follow Nurse / BPA Driven Protocol    dextrose    dextrose    glucagon (human recombinant)    HYDROcodone-acetaminophen    ipratropium-albuterol    Magnesium Standard Dose Replacement - Follow Nurse / BPA Driven Protocol    nitroglycerin    ondansetron ODT **OR** ondansetron    Phosphorus Replacement - Follow Nurse / BPA Driven Protocol    Potassium Replacement - Follow Nurse / BPA Driven Protocol    [COMPLETED] Insert Peripheral IV **AND** sodium chloride    sodium chloride    sodium chloride   Infusions         Diagnostic Data    Results from last 7 days   Lab Units 07/16/24  0347   WBC 10*3/mm3 10.10   HEMOGLOBIN g/dL 9.7*   HEMATOCRIT % 31.0*   PLATELETS 10*3/mm3 144   GLUCOSE mg/dL 76   CREATININE mg/dL 0.86   BUN mg/dL 23   SODIUM mmol/L 139   POTASSIUM mmol/L 3.6   AST (SGOT) U/L 28   ALT (SGPT) U/L 20   ALK PHOS U/L 108   BILIRUBIN mg/dL 2.0*   ANION GAP mmol/L 4.6*       XR Chest 1 View    Result Date: 7/16/2024  Impression: No appreciable change since 1 day prior. Electronically Signed: Krystal Lee MD  7/16/2024 8:08 AM EDT  Workstation ID: [id number]    MRI Abdomen With & Without Contrast    Result Date: 7/15/2024  Impression: Multiseptated pancreatic lesion in the pancreatic neck measuring 2.7 x 2.6 cm. There is suggestion of internal septations as well as faint enhancement of the septa. Findings suspicious for cystic neoplasm. Brisk enhancement lesion in the anterior superior right hepatic lobe measures 0.9 cm, favoring flash filling hemangioma. Hypervascular metastasis is felt to be less likely but incomplete excluded. Consider short-term follow-up MRI to evaluate for change in size. 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 2.1 cm right adrenal myelolipoma. Small right pleural effusion. Moderate left pleural effusion. Electronically Signed:  Gigi Beck MD  7/15/2024 8:38 PM EDT  Workstation ID: [id number]    XR Chest 1 View    Result Date: 7/15/2024  Impression: Stable chest with cardiomegaly, moderate bilateral effusion and bibasilar consolidations Electronically Signed: Eliu High MD  7/15/2024 7:56 AM EDT  Workstation ID: [id number]       I reviewed the patient's new clinical results.  I reviewed the patient's new imaging results and agree with the interpretation.    Assessment/Plan:     Active and Resolved Problems  Active Hospital Problems    Diagnosis  POA    **Atrial fibrillation with RVR [I48.91]  Yes    COPD (chronic obstructive pulmonary disease) [J44.9]  Yes    Aortic stenosis [I35.0]  Yes    Mitral regurgitation [I34.0]  Yes    Acute HFrEF (heart failure with reduced ejection fraction) [I50.21]  Yes    Right bundle branch block [I45.10]  Yes    Acute deep vein thrombosis (DVT) of both lower extremities [I82.403]  Yes    ARABELLA (acute kidney injury) [N17.9]  Yes    Acute hyperkalemia [E87.5]  Yes    Sepsis [A41.9]  Yes    Acute UTI [N39.0]  Yes    Acute systolic congestive heart failure [I50.21]  Yes    Rhinovirus infection [B34.8]  Yes    Multifocal pneumonia [J18.9]  Yes    Chronic respiratory failure with hypoxia, on home O2 therapy [J96.11, Z99.81]  Not Applicable    Skin ulcer of right great toe [L97.519]  Yes    Skin ulcer of left great toe [L97.529]  Yes    SEDRICK (obstructive sleep apnea) [G47.33]  Yes    Primary hypertension [I10]  Yes    Morbid obesity with BMI of 40.0-44.9, adult [E66.01, Z68.41]  Not Applicable      Resolved Hospital Problems   No resolved problems to display.       69-year-old female with history of hypertension, HFrEF, lower extremity lymphedema admitted to [facility] 6/30 progressive shortness of breath        #Bilateral lower extremity DVT  #History of reported PE  Acute left lower extremity DVT in the proximal femoral, mid femoral, distal femoral, popliteal, and posterior tibial   Seen by hematology  appreciate recommendation.  Factor V and prothrombin gene mutation negative  Continue Eliquis 10 mg twice daily for 7 days followed by 5 mg twice daily  Outpatient follow-up with hematology- will need lifelong AC     #Acute on chronic HFrEF  #A-fib with RVR.  New onset  #Severe MR  Cardiology follow-up  Started on amiodarone GGT transitioned to p.o. tapering dose  TTE with EF 30 to 35%.  Defer ischemic workup to cardiology  On Toprol 25 twice daily with holding parameters   Bumex 1 mg daily  Monitor I's and O's  Hold Aldactone given soft BP   Lisinopril on hold given ARABELLA  Currently she is off Lovenox injections CTM    #Right-sided hydropneumothorax  Chest tube placed in ICU 7/6  Fluid culture from right chest tube growing gram-negative bacilli-will defer need for antibiotics to pulmonary  Noted plan to obtain CT chest  Monitor out put   Pulmonary following       #Severe sepsis  #UTI  #Left lower extremity cellulitis and wound  #Rhinovirus infection  #Multifocal pneumonia  #Chronic venous stasis lower extremity  Seen by infectious disease appreciate recommendations  Finished Keflex and doxycycline for 7 day course   Seen by podiatry no surgical intervention recommended  Midodrine has been weaned off monitor blood pressure     #Acute hypoxic respiratory failure  Multifactorial pneumonia CHF  Antibiotics and diuretics as above  Wean off supplemental oxygen as tolerated.     #DM2  A1c 6.14  Continue ISS lispro  POC ACHS     #ARABELLA  #Metabolic Acidosis   Suspect ATN in setting of sepsis   Diuretics as above  BMP daily  Avoid nephrotoxic drugs   lisinopril on hold   Renal following     #uterine mass  Large subserosal uterine fundal fibroid on pelvic ultrasound concerning for fibroid vs malignancy  Onco recommended GYN evaluation  GYN as outpatient     #hematochezia  GI on board noted plan for outpatient colonoscopy  Etiology suspected to be hemorrhoids  Topical steroids started  Monitor H&H     # ?Pancreatic mass  - MRI  pancreatic protocol ordered  - GI following  - outpatient endoscopic ultrasound to further evaluate pancreas lesion     # Panniculitis  - Hypogastric region soft mass, ttp  - GI evaluated and suspect due to panniculitis  - supportive care     VTE Prophylaxis:  Pharmacologic VTE prophylaxis orders are present.         Code status is   Code Status and Medical Interventions:   Ordered at: 06/30/24 2120     Code Status (Patient has no pulse and is not breathing):    CPR (Attempt to Resuscitate)     Medical Interventions (Patient has pulse or is breathing):    Full Support       Plan for disposition:TBD pending clinical progress     Time: 30 minutes    Signature: Electronically signed by Luisito Power MD, 07/16/24, 12:48 EDT.  [facility] Hospitalist Team

## 2024-07-16 NOTE — PROGRESS NOTES
Nutrition Services    Patient Name: Ban Brennan  YOB: 1955  MRN: [id number]  Admission date: 6/30/2024    PROGRESS NOTE      Encounter Information: Checking in on PO intake. GI re-consulted r/t abnormal CT suggesting pancreas lesion       PO Diet: Diet: Cardiac, Diabetic; Healthy Heart (2-3 Na+); Consistent Carbohydrate; Fluid Consistency: Thin (IDDSI 0)   PO Supplements: Boost Plus BID (Provides 720 kcals, 28 g protein if consumed)      PO Intake:  75%       Current nutrition support: -   Nutrition support review: -       Labs (reviewed below): Reviewed, management per attending        GI Function:  + BM 7/16       Nutrition Intervention Updates: Discontinue consistent CHO diet restriction r/t glucose levels controlled.       Results from last 7 days   Lab Units 07/16/24  0347 07/15/24  0519 07/14/24  1918 07/14/24  0659   SODIUM mmol/L 139 140  --  141   POTASSIUM mmol/L 3.6 4.3 4.3 3.4*   CHLORIDE mmol/L 99 99  --  97*   CO2 mmol/L 35.4* 36.0*  --  38.3*   BUN mg/dL 23 27*  --  30*   CREATININE mg/dL 0.86 0.99  --  0.99   CALCIUM mg/dL 8.3* 8.6  --  8.8   BILIRUBIN mg/dL 2.0* 2.6*  2.6*  --  2.7*   ALK PHOS U/L 108 117  --  115   ALT (SGPT) U/L 20 26  --  23   AST (SGOT) U/L 28 36*  --  43*   GLUCOSE mg/dL 76 89  --  75     Results from last 7 days   Lab Units 07/16/24  0347 07/15/24  1617 07/15/24  0519 07/15/24  0256 07/14/24  0659   MAGNESIUM mg/dL 1.6  --  1.8  --  1.8   PHOSPHORUS mg/dL 2.9   < >  --    < > 2.5   HEMOGLOBIN g/dL 9.7*  --   --    < > 11.2*   HEMATOCRIT % 31.0*  --   --    < > 36.5    < > = values in this interval not displayed.     COVID19   Date Value Ref Range Status   06/30/2024 Not Detected Not Detected - Ref. Range Final     Lab Results   Component Value Date    HGBA1C 6.14 (H) 07/01/2024         RD to follow up per protocol.    Electronically signed by:  Dominga Bustamante RD  07/16/24 09:02 EDT

## 2024-07-16 NOTE — PLAN OF CARE
Goal Outcome Evaluation:      Pt c/o pain in abdomin so home norco dose was ordered. Pt has been resting between care.    Daily Care Plan Summary: Heart Failure    Diuretic in use (IV or PO):   PO        Daily weight (up or down):    gain: 3.3lbs      Output > Intake (yes/no):No      O2 Requirements (current, any change?):  3 liters/min via nasal cannula      Symptoms noted with Activity (Respiratory Tolerance, functional state):     Tolerated      Anticipated Discharge Plans:     Ongoing

## 2024-07-16 NOTE — PROGRESS NOTES
LOS: 16 days   Patient Care Team:  Rut Pierce APRN as PCP - General (Nurse Practitioner)  Austin Brooks MD as Cardiologist (Cardiology)      Subjective     Subjective: Patient reports feeling better this morning.  She does still have some right-sided abdominal tenderness.  Able to eat well without nausea or vomiting.      ROS:   Review of Systems   Constitutional:  Negative for chills and fever.   Respiratory:  Negative for cough and shortness of breath.    Cardiovascular:  Negative for chest pain.   Gastrointestinal:  Positive for abdominal pain. Negative for blood in stool, nausea and vomiting.   Musculoskeletal:  Positive for arthralgias.   Neurological:  Positive for weakness. Negative for dizziness.   Psychiatric/Behavioral:  Negative for agitation and confusion.         Medication Review:   Scheduled Meds:amiodarone, 200 mg, Oral, Q12H   Followed by  [START ON 7/23/2024] amiodarone, 200 mg, Oral, Daily  apixaban, 5 mg, Oral, BID  budesonide, 0.5 mg, Nebulization, BID - RT  bumetanide, 1 mg, Oral, Daily  hydrocortisone, 25 mg, Rectal, BID  insulin lispro, 2-7 Units, Subcutaneous, 4x Daily With Meals & Nightly  ipratropium-albuterol, 3 mL, Nebulization, 4x Daily - RT  lidocaine, 20 mL, Infiltration, Once  metoprolol succinate XL, 25 mg, Oral, Q12H  miconazole, 1 Application, Topical, Q12H  pantoprazole, 40 mg, Oral, BID AC  povidone-iodine, , Topical, Daily  sodium chloride, 10 mL, Intravenous, Q12H  [Held by provider] spironolactone, 25 mg, Oral, Daily      Continuous Infusions:   PRN Meds:.  acetaminophen **OR** acetaminophen    Calcium Replacement - Follow Nurse / BPA Driven Protocol    dextrose    dextrose    glucagon (human recombinant)    HYDROcodone-acetaminophen    ipratropium-albuterol    Magnesium Standard Dose Replacement - Follow Nurse / BPA Driven Protocol    nitroglycerin    ondansetron ODT **OR** ondansetron    Phosphorus Replacement - Follow Nurse / BPA Driven Protocol    Potassium  Replacement - Follow Nurse / BPA Driven Protocol    [COMPLETED] Insert Peripheral IV **AND** sodium chloride    sodium chloride    sodium chloride      Objective     Vital Signs  Temp:  [97.8 °F (36.6 °C)-98.6 °F (37 °C)] 98.4 °F (36.9 °C)  Heart Rate:  [78-96] 90  Resp:  [13-22] 22  BP: ()/(56-67) 100/59    Physical Exam:    General Appearance:    Awake and alert, in no acute distress   Head:    Normocephalic, without obvious abnormality   Eyes:          Conjunctivae normal, anicteric sclera   Ears:    Hearing intact   Throat:   No oral lesions, no thrush, oral mucosa moist       Lungs:     Respirations regular, even and unlabored       Abdomen:     Soft, right-sided tenderness, no rebound or guarding, non-distended, no hepatosplenomegaly   Rectal:     Deferred   Extremities:   No edema, no cyanosis, no redness   Skin:   No bleeding, bruising or rash, no jaundice   Neurologic:   Sensation intact        Results Review:    CBC  Results from last 7 days   Lab Units 07/16/24  0347 07/15/24  0256 07/14/24  0659 07/13/24  0240 07/12/24  0531 07/11/24  0437 07/10/24  0220   RBC 10*6/mm3 3.16* 3.34* 3.70* 3.72* 3.89 4.10 4.13   WBC 10*3/mm3 10.10 11.06* 9.61 11.02* 10.25 11.69* 11.11*   HEMOGLOBIN g/dL 9.7* 10.3* 11.2* 11.5* 11.9* 12.4 12.5   PLATELETS 10*3/mm3 144 163 117* 112* 88* 93* 85*       CMP  Results from last 7 days   Lab Units 07/16/24  0347 07/15/24  0519 07/14/24  1918 07/14/24  0659 07/13/24  0240 07/12/24  1619 07/12/24  0531 07/11/24  0437 07/10/24  0220   SODIUM mmol/L 139 140  --  141 141  --  141 142 139   POTASSIUM mmol/L 3.6 4.3 4.3 3.4* 3.9 4.3 3.5 4.5 4.5   CHLORIDE mmol/L 99 99  --  97* 97*  --  97* 95* 96*   CO2 mmol/L 35.4* 36.0*  --  38.3* 36.9*  --  38.5* 41.6* 37.9*   BUN mg/dL 23 27*  --  30* 38*  --  39* 46* 54*   CREATININE mg/dL 0.86 0.99  --  0.99 1.11*  --  0.99 1.08* 1.00   GLUCOSE mg/dL 76 89  --  75 87  --  82 94 90   ALBUMIN g/dL 1.9* 2.1*  --  2.1* 2.1*  --  1.9* 2.1* 2.0*    BILIRUBIN mg/dL 2.0* 2.6*  2.6*  --  2.7* 2.9*  --  2.8* 2.4* 1.9*   ALK PHOS U/L 108 117  --  115 96  --  94 85 80   AST (SGOT) U/L 28 36*  --  43* 37*  --  45* 48* 46*   ALT (SGPT) U/L 20 26  --  23 19  --  18 19 17       Amylase and Lipase  Results from last 7 days   Lab Units 07/16/24  0347   AMYLASE U/L 17*   LIPASE U/L 12*       CRP         Imaging Results (Last 24 Hours)       Procedure Component Value Units Date/Time    XR Chest 1 View [[id number]] Collected: 07/16/24 0807     Updated: 07/16/24 0810    Narrative:      XR CHEST 1 VW    Date of Exam: 7/16/2024 5:48 AM EDT    Indication: CT    Comparison: 7/15/2024.    Findings:  Right chest tube is unchanged in position. There is no identifiable pneumothorax. There is continued mild bibasilar atelectasis with small effusions. There is stable severe cardiomegaly.      Impression:      Impression:  No appreciable change since 1 day prior.      Electronically Signed: Krystal Lee MD    7/16/2024 8:08 AM EDT    Workstation ID: [id number]    MRI Abdomen With & Without Contrast [[id number]] Collected: 07/15/24 2028     Updated: 07/15/24 2040    Narrative:      MRI ABDOMEN W WO CONTRAST    Date of Exam: 7/15/2024 6:03 PM EDT    Indication: pancreatic protocol.     Comparison: Study was correlated with noncontrast CT of the abdomen and pelvis performed on July 13, 2024    Technique:  Routine multiplanar/multisequence images of the abdomen were obtained before and after the uneventful administration of Prohance.      Findings:  Limited study due to motion artifact.    The liver appears within normal limits in size and contour. There is no enhancing lesion in the superior anterior right hepatic lobe measures 0.9 cm. Lesion is T2 hyperintense. Appearance favors flash filling hemangioma. There is a possible associated   vascular malformation. The portal vein is patent.    The gallbladder contains multiple gallstones. There is suggestion of a gallbladder diverticulum at  the fundus measuring 1.5 cm. No evidence of gallbladder wall thickening or pericholecystic inflammatory changes. There is no intra or extrahepatic biliary   ductal dilatation.    There is a multiseptated pancreatic lesion measuring 2.7 x 2.6 cm. Some of the walls of this lesion appear thickened with T2 hypointensity correlating to calcification visualized on prior CT. On postcontrast sequences, there is faint enhancement of the   septa. No peripancreatic inflammatory changes are visualized.    Wedge-shaped area of hypoenhancement is visualized in the mid spleen compatible with splenic infarction. This involves approximately 30% of the volume of the spleen.    There is a right adrenal nodule containing macroscopic fat measuring 2.1 cm, compatible with myolipoma.    The kidneys are in anatomic position without evidence of hydronephrosis or perinephric fat stranding. Kidneys demonstrate symmetric enhancement. No solid renal masses visualized.    No free intraperitoneal fluid is visualized.    There is no evidence of abdominal lymphadenopathy.    Visualized bowel loops demonstrate no gross evidence of obstruction or inflammatory changes.    The aorta and IVC are normal in caliber.    Visualized osseous structures demonstrate no enhancing lesions.    Partial visualization of small right pleural effusion. Moderate left lower effusion is visualized adjacent atelectasis. Evaluation of the lung parenchyma is limited with MRI.      Impression:      Impression:    Multiseptated pancreatic lesion in the pancreatic neck measuring 2.7 x 2.6 cm. There is suggestion of internal septations as well as faint enhancement of the septa. Findings suspicious for cystic neoplasm.    Brisk enhancement lesion in the anterior superior right hepatic lobe measures 0.9 cm, favoring flash filling hemangioma. Hypervascular metastasis is felt to be less likely but incomplete excluded. Consider short-term follow-up MRI to evaluate for change   in  size.    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    2.1 cm right adrenal myelolipoma.    Small right pleural effusion. Moderate left pleural effusion.        Electronically Signed: Gigi Beck MD    7/15/2024 8:38 PM EDT    Workstation ID: [id number]              Assessment & Plan   -Abnormal CT suggesting pancreas lesion  -Rectal bleeding -suspect anorectal source such as hemorrhoids  -Elevated LFTs  -A-fib with RVR -on Eliquis  -Acute bilateral lower extremity DVT  -Bilateral lower extremity cellulitis  -UTI  -Sepsis  -Rhinovirus  -Multifocal pneumonia  -Large right pleural effusion s/p chest tube placement  -Hypertension  -CHF  -COPD  -SEDRICK  -DMII     PLAN:  Patient is a 69-year-old female with history of CHF, COPD, and DMII who presented on 6/30 with complaints of shortness of breath.  She was diagnosed with A-fib with RVR and was treated with Cardizem.  Cardiology is following.  Since admission, the patient has also been diagnosed with bilateral lower extremity cellulitis, UTI, sepsis, acute bilateral lower extremity DVTs, rhinovirus, multifocal pneumonia, and large right pleural effusion s/p chest tube placement.  GI asked to consult on 7/9 due to rectal bleeding which was suspected to be hemorrhoidal and has seemed to stop.  Asked to reconsult 7/15/2024 due to abnormal CT suggesting pancreas lesion.     Patient is feeling better today.  Still has some right-sided abdominal tenderness.  MRI pancreatic protocol performed and shows 2.7 x 2.6 pancreatic lesion suspicious for cystic neoplasm.  CA 19-9 minimally elevated at 58.5.  Amylase/lipase normal.  Total bilirubin is 2 and is predominantly direct.  She can continue diet as tolerated from a GI standpoint.  Our office will be contacting patient to arrange outpatient endoscopic ultrasound to further evaluate pancreas lesion.  We will see inpatient as needed.    Senia Bailey, APRN  07/16/24  10:48 EDT

## 2024-07-16 NOTE — PLAN OF CARE
Goal Outcome Evaluation:  Plan of Care Reviewed With: patient        Progress: no change  Outcome Evaluation: Patient received scheduled medications. Potassium replaced. Patient is on 3L nc. Patient remains with chest tube. Q2 turns. Patient's toes were painted with betadine this morning. Patient's stomach continues to hurt, MDs aware.

## 2024-07-17 ENCOUNTER — APPOINTMENT (OUTPATIENT)
Dept: GENERAL RADIOLOGY | Facility: HOSPITAL | Age: 69
End: 2024-07-17
Payer: MEDICARE

## 2024-07-17 LAB
ALBUMIN SERPL-MCNC: 2 G/DL (ref 3.5–5.2)
ALBUMIN/GLOB SERPL: 0.6 G/DL
ALP SERPL-CCNC: 127 U/L (ref 39–117)
ALT SERPL W P-5'-P-CCNC: 24 U/L (ref 1–33)
ANION GAP SERPL CALCULATED.3IONS-SCNC: 4.2 MMOL/L (ref 5–15)
AST SERPL-CCNC: 45 U/L (ref 1–32)
BACTERIA SPEC AEROBE CULT: ABNORMAL
BASOPHILS # BLD AUTO: 0.01 10*3/MM3 (ref 0–0.2)
BASOPHILS NFR BLD AUTO: 0.1 % (ref 0–1.5)
BILIRUB SERPL-MCNC: 1.8 MG/DL (ref 0–1.2)
BUN SERPL-MCNC: 22 MG/DL (ref 8–23)
BUN/CREAT SERPL: 23.9 (ref 7–25)
CALCIUM SPEC-SCNC: 8.3 MG/DL (ref 8.6–10.5)
CGA SERPL-MCNC: 1895 NG/ML (ref 0–101.8)
CHLORIDE SERPL-SCNC: 100 MMOL/L (ref 98–107)
CO2 SERPL-SCNC: 33.8 MMOL/L (ref 22–29)
CREAT SERPL-MCNC: 0.92 MG/DL (ref 0.57–1)
DEPRECATED RDW RBC AUTO: 57.6 FL (ref 37–54)
EGFRCR SERPLBLD CKD-EPI 2021: 67.5 ML/MIN/1.73
EOSINOPHIL # BLD AUTO: 0.13 10*3/MM3 (ref 0–0.4)
EOSINOPHIL NFR BLD AUTO: 1.3 % (ref 0.3–6.2)
ERYTHROCYTE [DISTWIDTH] IN BLOOD BY AUTOMATED COUNT: 16.4 % (ref 12.3–15.4)
GLOBULIN UR ELPH-MCNC: 3.3 GM/DL
GLUCOSE BLDC GLUCOMTR-MCNC: 106 MG/DL (ref 70–105)
GLUCOSE BLDC GLUCOMTR-MCNC: 106 MG/DL (ref 70–105)
GLUCOSE BLDC GLUCOMTR-MCNC: 112 MG/DL (ref 70–105)
GLUCOSE BLDC GLUCOMTR-MCNC: 220 MG/DL (ref 70–105)
GLUCOSE SERPL-MCNC: 101 MG/DL (ref 65–99)
GRAM STN SPEC: ABNORMAL
GRAM STN SPEC: ABNORMAL
HCT VFR BLD AUTO: 30.7 % (ref 34–46.6)
HGB BLD-MCNC: 9.6 G/DL (ref 12–15.9)
IMM GRANULOCYTES # BLD AUTO: 0.1 10*3/MM3 (ref 0–0.05)
IMM GRANULOCYTES NFR BLD AUTO: 1 % (ref 0–0.5)
LYMPHOCYTES # BLD AUTO: 0.73 10*3/MM3 (ref 0.7–3.1)
LYMPHOCYTES NFR BLD AUTO: 7.2 % (ref 19.6–45.3)
MAGNESIUM SERPL-MCNC: 1.7 MG/DL (ref 1.6–2.4)
MCH RBC QN AUTO: 30.5 PG (ref 26.6–33)
MCHC RBC AUTO-ENTMCNC: 31.3 G/DL (ref 31.5–35.7)
MCV RBC AUTO: 97.5 FL (ref 79–97)
MONOCYTES # BLD AUTO: 0.83 10*3/MM3 (ref 0.1–0.9)
MONOCYTES NFR BLD AUTO: 8.2 % (ref 5–12)
NEUTROPHILS NFR BLD AUTO: 8.35 10*3/MM3 (ref 1.7–7)
NEUTROPHILS NFR BLD AUTO: 82.2 % (ref 42.7–76)
NRBC BLD AUTO-RTO: 0 /100 WBC (ref 0–0.2)
PHOSPHATE SERPL-MCNC: 2.4 MG/DL (ref 2.5–4.5)
PLATELET # BLD AUTO: 157 10*3/MM3 (ref 140–450)
PMV BLD AUTO: 11.3 FL (ref 6–12)
POTASSIUM SERPL-SCNC: 4.5 MMOL/L (ref 3.5–5.2)
PROT SERPL-MCNC: 5.3 G/DL (ref 6–8.5)
RBC # BLD AUTO: 3.15 10*6/MM3 (ref 3.77–5.28)
SODIUM SERPL-SCNC: 138 MMOL/L (ref 136–145)
WBC NRBC COR # BLD AUTO: 10.15 10*3/MM3 (ref 3.4–10.8)

## 2024-07-17 PROCEDURE — 80053 COMPREHEN METABOLIC PANEL: CPT | Performed by: NURSE PRACTITIONER

## 2024-07-17 PROCEDURE — 94664 DEMO&/EVAL PT USE INHALER: CPT

## 2024-07-17 PROCEDURE — 94761 N-INVAS EAR/PLS OXIMETRY MLT: CPT

## 2024-07-17 PROCEDURE — 99232 SBSQ HOSP IP/OBS MODERATE 35: CPT | Performed by: INTERNAL MEDICINE

## 2024-07-17 PROCEDURE — 85025 COMPLETE CBC W/AUTO DIFF WBC: CPT | Performed by: NURSE PRACTITIONER

## 2024-07-17 PROCEDURE — 97535 SELF CARE MNGMENT TRAINING: CPT

## 2024-07-17 PROCEDURE — 82948 REAGENT STRIP/BLOOD GLUCOSE: CPT

## 2024-07-17 PROCEDURE — 84100 ASSAY OF PHOSPHORUS: CPT | Performed by: NURSE PRACTITIONER

## 2024-07-17 PROCEDURE — 97168 OT RE-EVAL EST PLAN CARE: CPT

## 2024-07-17 PROCEDURE — 97530 THERAPEUTIC ACTIVITIES: CPT

## 2024-07-17 PROCEDURE — 94799 UNLISTED PULMONARY SVC/PX: CPT

## 2024-07-17 PROCEDURE — 63710000001 INSULIN LISPRO (HUMAN) PER 5 UNITS: Performed by: HOSPITALIST

## 2024-07-17 PROCEDURE — 83735 ASSAY OF MAGNESIUM: CPT | Performed by: NURSE PRACTITIONER

## 2024-07-17 PROCEDURE — 71045 X-RAY EXAM CHEST 1 VIEW: CPT

## 2024-07-17 PROCEDURE — 97164 PT RE-EVAL EST PLAN CARE: CPT

## 2024-07-17 PROCEDURE — 99232 SBSQ HOSP IP/OBS MODERATE 35: CPT | Performed by: STUDENT IN AN ORGANIZED HEALTH CARE EDUCATION/TRAINING PROGRAM

## 2024-07-17 RX ADMIN — POVIDONE-IODINE: 10 SOLUTION TOPICAL at 08:10

## 2024-07-17 RX ADMIN — IPRATROPIUM BROMIDE AND ALBUTEROL SULFATE 3 ML: .5; 3 SOLUTION RESPIRATORY (INHALATION) at 11:05

## 2024-07-17 RX ADMIN — BUDESONIDE 0.5 MG: 0.5 INHALANT RESPIRATORY (INHALATION) at 20:07

## 2024-07-17 RX ADMIN — ANTI-FUNGAL POWDER MICONAZOLE NITRATE TALC FREE 1 APPLICATION: 1.42 POWDER TOPICAL at 08:10

## 2024-07-17 RX ADMIN — Medication 10 ML: at 20:59

## 2024-07-17 RX ADMIN — PANTOPRAZOLE SODIUM 40 MG: 40 TABLET, DELAYED RELEASE ORAL at 08:07

## 2024-07-17 RX ADMIN — IPRATROPIUM BROMIDE AND ALBUTEROL SULFATE 3 ML: .5; 3 SOLUTION RESPIRATORY (INHALATION) at 15:37

## 2024-07-17 RX ADMIN — HYDROCODONE BITARTRATE AND ACETAMINOPHEN 1 TABLET: 10; 325 TABLET ORAL at 20:59

## 2024-07-17 RX ADMIN — BUDESONIDE 0.5 MG: 0.5 INHALANT RESPIRATORY (INHALATION) at 07:06

## 2024-07-17 RX ADMIN — IPRATROPIUM BROMIDE AND ALBUTEROL SULFATE 3 ML: .5; 3 SOLUTION RESPIRATORY (INHALATION) at 19:59

## 2024-07-17 RX ADMIN — Medication 10 ML: at 08:07

## 2024-07-17 RX ADMIN — APIXABAN 5 MG: 5 TABLET, FILM COATED ORAL at 20:59

## 2024-07-17 RX ADMIN — ANTI-FUNGAL POWDER MICONAZOLE NITRATE TALC FREE 1 APPLICATION: 1.42 POWDER TOPICAL at 21:00

## 2024-07-17 RX ADMIN — AMIODARONE HYDROCHLORIDE 200 MG: 200 TABLET ORAL at 17:13

## 2024-07-17 RX ADMIN — BUMETANIDE 1 MG: 1 TABLET ORAL at 11:55

## 2024-07-17 RX ADMIN — METOPROLOL SUCCINATE 25 MG: 25 TABLET, FILM COATED, EXTENDED RELEASE ORAL at 20:59

## 2024-07-17 RX ADMIN — PANTOPRAZOLE SODIUM 40 MG: 40 TABLET, DELAYED RELEASE ORAL at 17:13

## 2024-07-17 RX ADMIN — HYDROCORTISONE ACETATE 25 MG: 25 SUPPOSITORY RECTAL at 08:07

## 2024-07-17 RX ADMIN — IPRATROPIUM BROMIDE AND ALBUTEROL SULFATE 3 ML: .5; 3 SOLUTION RESPIRATORY (INHALATION) at 07:02

## 2024-07-17 RX ADMIN — AMIODARONE HYDROCHLORIDE 200 MG: 200 TABLET ORAL at 05:09

## 2024-07-17 RX ADMIN — INSULIN LISPRO 3 UNITS: 100 INJECTION, SOLUTION INTRAVENOUS; SUBCUTANEOUS at 17:11

## 2024-07-17 RX ADMIN — HYDROCORTISONE ACETATE 25 MG: 25 SUPPOSITORY RECTAL at 20:59

## 2024-07-17 RX ADMIN — APIXABAN 5 MG: 5 TABLET, FILM COATED ORAL at 08:08

## 2024-07-17 RX ADMIN — METOPROLOL SUCCINATE 25 MG: 25 TABLET, FILM COATED, EXTENDED RELEASE ORAL at 08:16

## 2024-07-17 NOTE — PROGRESS NOTES
Daily Progress Note          Assessment    Hydropneumothorax s/p chest tube placement 7/6/2024: Exudative fluid but negative cultures and no malignancy  COPD  SEDRICK  Systolic CHF  A-fib   Aortic stenosis  Mitral valve regurgitation   R BBB  bilateral DVT remote history of DVT  HTN  Morbid obesity   Uterine mass and pancreatic mass        PLAN:  Chest tube management, still with significant daily output, continue -20 cm H2O suction    Oxygen supplement and titration to maintain saturation 90-95%: Currently requiring 3 L per nasal cannula    Mucinex  Antibiotics  Bronchodilators  Inhaled corticosteroids    Incentive spirometer  Renal function is improving, diuresis as per nephrology  Electrolytes/ glycemic control  BP/HR control  Chronic anticoagulation: Apixaban     I reviewed the recent clinical results and radiological images                 LOS: 17 days     Subjective     Patient reports cough and shortness of breath    Objective     Vital signs for last 24 hours:  Vitals:    07/17/24 0706 07/17/24 0710 07/17/24 0816 07/17/24 0933   BP:   96/53 95/49   BP Location:    Left arm   Patient Position:    Lying   Pulse: 90 87 93 96   Resp: 19 17 16   Temp:    97.8 °F (36.6 °C)   TempSrc:    Oral   SpO2: 96% 95%  98%   Weight:       Height:           Intake/Output last 3 shifts:  I/O last 3 completed shifts:  In: 120 [P.O.:120]  Out: 1270 [Urine:700; Chest Tube:570]  Intake/Output this shift:  I/O this shift:  In: 100 [P.O.:100]  Out: -       Radiology  Imaging Results (Last 24 Hours)       Procedure Component Value Units Date/Time    XR Chest 1 View [[id number]] Collected: 07/17/24 0727     Updated: 07/17/24 0734    Narrative:      XR CHEST 1 VW    Date of Exam: 7/17/2024 3:16 AM EDT    Indication: CT    Comparison: 7/16/2024    Findings:  Right basilar pleural catheter remains. Minimal pneumothorax, in the apex and lateral inferior thorax. Stable enlargement of the cardiac silhouette and prominence of the central  pulmonary vasculature. Stable left perihilar airspace consolidation. Stable   bibasilar airspace disease. Stable small bilateral pleural effusions      Impression:      Impression:    1. Trace right pneumothorax  2. Otherwise stable chest demonstrating cardiomegaly with pulmonary vascular congestion, small pleural effusions, left perihilar airspace consolidation, and bibasilar airspace disease which could be atelectasis or pneumonia      Electronically Signed: Betito Villeda    7/17/2024 7:32 AM EDT    Workstation ID: [id number]            Labs:  Results from last 7 days   Lab Units 07/17/24  0120   WBC 10*3/mm3 10.15   HEMOGLOBIN g/dL 9.6*   HEMATOCRIT % 30.7*   PLATELETS 10*3/mm3 157     Results from last 7 days   Lab Units 07/17/24  0120   SODIUM mmol/L 138   POTASSIUM mmol/L 4.5   CHLORIDE mmol/L 100   CO2 mmol/L 33.8*   BUN mg/dL 22   CREATININE mg/dL 0.92   CALCIUM mg/dL 8.3*   BILIRUBIN mg/dL 1.8*   ALK PHOS U/L 127*   ALT (SGPT) U/L 24   AST (SGOT) U/L 45*   GLUCOSE mg/dL 101*     Results from last 7 days   Lab Units 07/11/24  1045   PH, ARTERIAL pH units 7.539*   PO2 ART mm Hg 66.3*   PCO2, ARTERIAL mm Hg 49.9*   HCO3 ART mmol/L 42.6*     Results from last 7 days   Lab Units 07/17/24  0120 07/16/24  0347 07/15/24  0519   ALBUMIN g/dL 2.0* 1.9* 2.1*             Results from last 7 days   Lab Units 07/17/24  0120   MAGNESIUM mg/dL 1.7                   Meds:   SCHEDULE  amiodarone, 200 mg, Oral, Q12H   Followed by  [START ON 7/23/2024] amiodarone, 200 mg, Oral, Daily  apixaban, 5 mg, Oral, BID  budesonide, 0.5 mg, Nebulization, BID - RT  bumetanide, 1 mg, Oral, Daily  hydrocortisone, 25 mg, Rectal, BID  insulin lispro, 2-7 Units, Subcutaneous, 4x Daily With Meals & Nightly  ipratropium-albuterol, 3 mL, Nebulization, 4x Daily - RT  lidocaine, 20 mL, Infiltration, Once  metoprolol succinate XL, 25 mg, Oral, Q12H  miconazole, 1 Application, Topical, Q12H  pantoprazole, 40 mg, Oral, BID AC  povidone-iodine, ,  Topical, Daily  sodium chloride, 10 mL, Intravenous, Q12H  [Held by provider] spironolactone, 25 mg, Oral, Daily      Infusions     PRNs    acetaminophen **OR** acetaminophen    Calcium Replacement - Follow Nurse / BPA Driven Protocol    dextrose    dextrose    glucagon (human recombinant)    HYDROcodone-acetaminophen    ipratropium-albuterol    Magnesium Standard Dose Replacement - Follow Nurse / BPA Driven Protocol    nitroglycerin    ondansetron ODT **OR** ondansetron    Phosphorus Replacement - Follow Nurse / BPA Driven Protocol    Potassium Replacement - Follow Nurse / BPA Driven Protocol    [COMPLETED] Insert Peripheral IV **AND** sodium chloride    sodium chloride    sodium chloride    Physical Exam:  General Appearance:  Alert   HEENT:  Normocephalic, without obvious abnormality, Conjunctiva/corneas clear,.   Nares normal, no drainage     Neck:  Supple, symmetrical, trachea midline.   Lungs /Chest wall:   Bilateral basal rhonchi, respirations unlabored, symmetrical wall movement.     Heart:  Regular rate and rhythm, S1 S2 normal  Abdomen: Soft, non-tender, no masses, no organomegaly.    Extremities: Trace edema, no clubbing or cyanosis     ROS  Constitutional: Negative for chills, fever and malaise/fatigue.   HENT: Negative.    Eyes: Negative.    Cardiovascular: Negative.    Respiratory: Positive for cough and shortness of breath.    Skin: Negative.    Musculoskeletal: Negative.    Gastrointestinal: Negative.    Genitourinary: Negative.    Neurological: Generalized weakness      I reviewed the recent clinical results  I personally reviewed the latest radiological studies    Part of this note may be an electronic transcription/translation of spoken language to printed text using the Dragon Dictation System.

## 2024-07-17 NOTE — THERAPY RE-EVALUATION
"Patient Name: Ban Brennan  : 1955    MRN: [id number]                              Today's Date: 2024       Admit Date: 2024    Visit Dx:     ICD-10-CM ICD-9-CM   1. Atrial fibrillation with rapid ventricular response  I48.91 427.31   2. Acute congestive heart failure, unspecified heart failure type  I50.9 428.0   3. Sepsis, due to unspecified organism, unspecified whether acute organ dysfunction present  A41.9 038.9     995.91   4. Urinary tract infection without hematuria, site unspecified  N39.0 599.0   5. Acute kidney injury  N17.9 584.9   6. Bilateral lower leg cellulitis  L03.116 682.6    L03.115    7. Hyperkalemia  E87.5 276.7   8. Multifocal pneumonia  J18.9 486   9. Rhinovirus infection  B34.8 079.3   10. Chronic obstructive pulmonary disease, unspecified COPD type  J44.9 496   11. Hydropneumothorax  J94.8 511.89     Patient Active Problem List   Diagnosis    Primary hypertension    Morbid obesity with BMI of 40.0-44.9, adult    Atrial fibrillation with RVR    Right bundle branch block    Acute deep vein thrombosis (DVT) of both lower extremities    ARABELLA (acute kidney injury)    Acute hyperkalemia    Sepsis    Acute UTI    Acute systolic congestive heart failure    Rhinovirus infection    Multifocal pneumonia    Chronic respiratory failure with hypoxia, on home O2 therapy    Skin ulcer of right great toe    Skin ulcer of left great toe    SEDRICK (obstructive sleep apnea)    Aortic stenosis    Mitral regurgitation    Acute HFrEF (heart failure with reduced ejection fraction)    COPD (chronic obstructive pulmonary disease)     Past Medical History:   Diagnosis Date    Bilateral leg edema     Chronic respiratory failure with hypoxia, on home O2 therapy 2024    COPD (chronic obstructive pulmonary disease)     Morbid obesity with BMI of 40.0-44.9, adult 2010    Primary hypertension 2017    Pulmonary embolism     \"many years ago, was on warfarin\"    Sleep apnea      History " reviewed. No pertinent surgical history.   General Information       Row Name 07/17/24 1610          Physical Therapy Time and Intention    Document Type re-evaluation  -OD     Mode of Treatment physical therapy  -OD       Row Name 07/17/24 1610          General Information    Patient Profile Reviewed yes  -OD     Prior Level of Function independent:;all household mobility  sleeps in recliner  -OD     Existing Precautions/Restrictions fall;oxygen therapy device and L/min;other (see comments)  Chest Tube not on suction but still with significant drainiage  -OD     Barriers to Rehab medically complex  -OD       Row Name 07/17/24 1610          Living Environment    People in Home child(samreen), adult  -OD       Row Name 07/17/24 1610          Home Main Entrance    Number of Stairs, Main Entrance none  -OD       Row Name 07/17/24 1610          Stairs Within Home, Primary    Number of Stairs, Within Home, Primary none  -OD       Row Name 07/17/24 1610          Cognition    Orientation Status (Cognition) oriented x 4  -OD       Row Name 07/17/24 1610          Safety Issues, Functional Mobility    Impairments Affecting Function (Mobility) endurance/activity tolerance;pain;strength;range of motion (ROM)  -OD               User Key  (r) = Recorded By, (t) = Taken By, (c) = Cosigned By      Initials Name Provider Type    OD Stephanie Joseph PT Physical Therapist                   Mobility       Row Name 07/17/24 1610          Bed Mobility    Bed Mobility supine-sit  -OD     All Activities, [place] (Bed Mobility) 2 person assist;moderate assist (50% patient effort)  -OD     Supine-Sit [place] (Bed Mobility) 2 person assist;maximum assist (25% patient effort)  -OD     Assistive Device (Bed Mobility) head of bed elevated;bed rails;draw sheet  -OD       Row Name 07/17/24 1610          Bed-Chair Transfer    Bed-Chair [place] (Transfers) moderate assist (50% patient effort);2 person assist  -OD     Comment, (Bed-Chair  Transfer) stood at EOB w/ RW and was not confident of her ability to maintain balance while pivoiting so RW was removed for SPS assist with 2-persons assisting.  -OD       Row Name 07/17/24 1610          Sit-Stand Transfer    Sit-Stand [place] (Transfers) moderate assist (50% patient effort)  -OD     Assistive Device (Sit-Stand Transfers) walker, front-wheeled  -OD       Row Name 07/17/24 1610          Gait/Stairs (Locomotion)    Patient was able to Ambulate no, other medical factors prevent ambulation  -OD     Reason Patient was unable to Ambulate Excessive Weakness;Dizziness  -OD               User Key  (r) = Recorded By, (t) = Taken By, (c) = Cosigned By      Initials Name Provider Type    OD Stephanie Joseph PT Physical Therapist                   Obj/Interventions       Row Name 07/17/24 1611          Range of Motion Comprehensive    General Range of Motion lower extremity range of motion deficits identified  -OD     Comment, General Range of Motion BLE & pannus pain, edema (and redness) limit flexion 25% to 50% in knees, hips, trunk  -OD       Row Name 07/17/24 1611          Strength Comprehensive (MMT)    General Manual Muscle Testing (MMT) Assessment lower extremity strength deficits identified  -OD     Comment, General Manual Muscle Testing (MMT) Assessment Pt demo significant weakness BLE 3/5 grossly  -OD       Row Name 07/17/24 1611          Motor Skills    Motor Skills functional endurance  -OD     Functional Endurance poor  -OD       Row Name 07/17/24 1611          Balance    Balance Interventions sitting;minimal challenge;standing;supported;static;highly challenging  -OD       Row Name 07/17/24 1611          Sensory Assessment (Somatosensory)    Sensory Assessment (Somatosensory) sensation intact  -OD               User Key  (r) = Recorded By, (t) = Taken By, (c) = Cosigned By      Initials Name Provider Type    Stephanie Sepulveda PT Physical Therapist                   Goals/Plan       Row Name  07/17/24 1620          Bed Mobility Goal 1 (PT)    Activity/Assistive Device (Bed Mobility Goal 1, PT) bed mobility activities, all  -OD     [place] Level/Cues Needed (Bed Mobility Goal 1, PT) minimum assist (75% or more patient effort)  -OD     Time Frame (Bed Mobility Goal 1, PT) long term goal (LTG);2 weeks  -OD     Progress/Outcomes (Bed Mobility Goal 1, PT) unable to make needed progress;goal ongoing  -OD       Row Name 07/17/24 1620          Transfer Goal 1 (PT)    Activity/Assistive Device (Transfer Goal 1, PT) sit-to-stand/stand-to-sit;bed-to-chair/chair-to-bed  -OD     [place] Level/Cues Needed (Transfer Goal 1, PT) minimum assist (75% or more patient effort)  -OD     Time Frame (Transfer Goal 1, PT) long term goal (LTG);2 weeks  -OD     Progress/Outcome (Transfer Goal 1, PT) unable to make needed progress;goal ongoing  -OD       Row Name 07/17/24 1620          Gait Training Goal 1 (PT)    Activity/Assistive Device (Gait Training Goal 1, PT) gait (walking locomotion);assistive device use  -OD     [place] Level (Gait Training Goal 1, PT) minimum assist (75% or more patient effort)  -OD     Distance (Gait Training Goal 1, PT) 25  -OD     Time Frame (Gait Training Goal 1, PT) long term goal (LTG);2 weeks  -OD     Progress/Outcome (Gait Training Goal 1, PT) unable to make needed progress;goal ongoing  -OD       Row Name 07/17/24 1620          Therapy Assessment/Plan (PT)    Planned Therapy Interventions (PT) balance training;bed mobility training;gait training;home exercise program;neuromuscular re-education;ROM (range of motion);stair training;strengthening;stretching;patient/family education;postural re-education;transfer training  -OD               User Key  (r) = Recorded By, (t) = Taken By, (c) = Cosigned By      Initials Name Provider Type    OD Stephanie Joseph, PT Physical Therapist                   Clinical Impression       Row Name 07/17/24 1612          Pain    Additional Documentation  Pain Scale: FACES Pre/Post-Treatment (Group)  -OD       Row Name 07/17/24 1612          Pain Scale: FACES Pre/Post-Treatment    Pain: FACES Scale, Pretreatment 4-->hurts little more  -OD     Posttreatment Pain Rating 2-->hurts little bit  -OD     Pain Location - abdomen;calf  -OD       Row Name 07/17/24 1612          Plan of Care Review    Plan of Care Reviewed With patient  -OD     Progress improving  -OD     Outcome Evaluation Ban Brennan is a 68 y/o F initially admitted to [facility] on 6/30/24 regarding Afib with RVR, and subsequently found hydropneumothorax s/p chest tube placement on 7/6, and LLE DVT. Fluid removed from chest tubes (-) for malignancy. PT present for re-evaluation due to length of stay. At baseline, pt lives with her son, requiring ADL and mobility assist recently d/t decline. While admitted, pt has required maxAx2 for bed mobility and transfers throughout, with minimal ambulation r/t excessive weakness. This date, pt was modA for bed mobility, mod-maxAx2 for STS and stand-pivot to bedside chair. Pt only able to tolerate standing ~ 10 seconds before requring assistance to sit for safety. Uncertain of patient's prognosis, however pt will remain admitted until her chest tubes stop draining. Pt continues to appear far below baseline function and will benefit from SNF upon d/c when appropriate. PT will follow while admitted.  -OD       Row Name 07/17/24 1612          Therapy Assessment/Plan (PT)    Rehab Potential (PT) good, to achieve stated therapy goals  -OD     Criteria for Skilled Interventions Met (PT) yes;skilled treatment is necessary  -OD     Therapy Frequency (PT) 5 times/wk  -OD     Predicted Duration of Therapy Intervention (PT) until d/c  -OD       Row Name 07/17/24 1612          Vital Signs    O2 Delivery Pre Treatment nasal cannula  -OD     O2 Delivery Intra Treatment nasal cannula  -OD     O2 Delivery Post Treatment nasal cannula  -OD     Pre Patient Position Supine  -OD     Intra  Patient Position Standing  -OD     Post Patient Position Sitting  -OD       Row Name 07/17/24 1612          Positioning and Restraints    Pre-Treatment Position in bed  -OD     Post Treatment Position chair  -OD     In Chair notified nsg;sitting;call light within reach;encouraged to call for assist;exit alarm on  kael pad placed under  -OD               User Key  (r) = Recorded By, (t) = Taken By, (c) = Cosigned By      Initials Name Provider Type    OD Stephanie Joseph, PT Physical Therapist                   Outcome Measures       Row Name 07/17/24 1621 07/17/24 1519       How much help from another person do you currently need...    Turning from your back to your side while in flat bed without using bedrails? 3  -OD 2  -MH    Moving from lying on back to sitting on the side of a flat bed without bedrails? 2  -OD 2  -MH    Moving to and from a bed to a chair (including a wheelchair)? 2  -OD 2  -MH    Standing up from a chair using your arms (e.g., wheelchair, bedside chair)? 2  -OD 2  -MH    Climbing 3-5 steps with a railing? 1  -OD 1  -MH    To walk in hospital room? 2  -OD 1  -MH    AM-PAC 6 Clicks Score (PT) 12  -OD 10  -MH    Highest Level of Mobility Goal 4 --> Transfer to chair/commode  -OD 4 --> Transfer to chair/commode  -MH      Row Name 07/17/24 0800          How much help from another person do you currently need...    Turning from your back to your side while in flat bed without using bedrails? 2  -HI     Moving from lying on back to sitting on the side of a flat bed without bedrails? 2  -HI     Moving to and from a bed to a chair (including a wheelchair)? 1  -HI     Standing up from a chair using your arms (e.g., wheelchair, bedside chair)? 1  -HI     Climbing 3-5 steps with a railing? 1  -HI     To walk in hospital room? 1  -HI     AM-PAC 6 Clicks Score (PT) 8  -HI     Highest Level of Mobility Goal 3 --> Sit at edge of bed  -HI       Row Name 07/17/24 1621          Functional Assessment    Outcome  Measure Options AM-PAC 6 Clicks Basic Mobility (PT)  -OD               User Key  (r) = Recorded By, (t) = Taken By, (c) = Cosigned By      Initials Name Provider Type     Janie Bonilla, OT Occupational Therapist    Magali Tovar, RN Registered Nurse    OD Stephanie Joseph, PT Physical Therapist                                 Physical Therapy Education       Title: PT OT SLP Therapies (In Progress)       Topic: Physical Therapy (In Progress)       Point: Mobility training (Done)       Learning Progress Summary             Patient Acceptance, E,TB, VU by CL at 7/3/2024 1056                         Point: Home exercise program (Done)       Learning Progress Summary             Patient Acceptance, E,TB, VU by CL at 7/3/2024 1056                         Point: Body mechanics (Done)       Learning Progress Summary             Patient Acceptance, E,TB, VU by CL at 7/3/2024 1056                         Point: Precautions (Not Started)       Learner Progress:  Not documented in this visit.                              User Key       Initials Effective Dates Name Provider Type Discipline     03/02/22 -  Jerica Parada, PT Physical Therapist PT                  PT Recommendation and Plan  Planned Therapy Interventions (PT): balance training, bed mobility training, gait training, home exercise program, neuromuscular re-education, ROM (range of motion), stair training, strengthening, stretching, patient/family education, postural re-education, transfer training  Plan of Care Reviewed With: patient  Progress: improving  Outcome Evaluation: Ban Brennan is a 70 y/o F initially admitted to [facility] on 6/30/24 regarding Afib with RVR, and subsequently found hydropneumothorax s/p chest tube placement on 7/6, and LLE DVT. Fluid removed from chest tubes (-) for malignancy. PT present for re-evaluation due to length of stay. At baseline, pt lives with her son, requiring ADL and mobility assist recently d/t decline. While  admitted, pt has required maxAx2 for bed mobility and transfers throughout, with minimal ambulation r/t excessive weakness. This date, pt was modA for bed mobility, mod-maxAx2 for STS and stand-pivot to bedside chair. Pt only able to tolerate standing ~ 10 seconds before requring assistance to sit for safety. Uncertain of patient's prognosis, however pt will remain admitted until her chest tubes stop draining. Pt continues to appear far below baseline function and will benefit from SNF upon d/c when appropriate. PT will follow while admitted.     Time Calculation:         PT Charges       Row Name 07/17/24 1621             Time Calculation    Start Time 1107  -OD      Stop Time 1142  -OD      Time Calculation (min) 35 min  -OD      PT Received On 07/17/24  -OD      PT - Next Appointment 07/18/24  -OD      PT Goal Re-Cert Due Date 07/31/24  -OD         Time Calculation- PT    Total Timed Code Minutes- PT 23 minute(s)  -OD                User Key  (r) = Recorded By, (t) = Taken By, (c) = Cosigned By      Initials Name Provider Type    OD Stephanie Joseph, PT Physical Therapist                  Therapy Charges for Today       Code Description Service Date Service Provider Modifiers Qty    [id number]  PT RE-EVAL ESTABLISHED PLAN 2 7/17/2024 Stephanie Joseph, PT GP 1    [id number] HC PT THERAPEUTIC ACT EA 15 MIN 7/17/2024 Stephanie Joseph, PT GP 2            PT G-Codes  Outcome Measure Options: AM-PAC 6 Clicks Basic Mobility (PT)  AM-PAC 6 Clicks Score (PT): 12  PT Discharge Summary  Anticipated Discharge Disposition (PT): skilled nursing facility    Stephanie Joseph PT  7/17/2024

## 2024-07-17 NOTE — THERAPY RE-EVALUATION
"Patient Name: Ban Brennan  : 1955    MRN: [id number]                              Today's Date: 2024       Admit Date: 2024    Visit Dx:     ICD-10-CM ICD-9-CM   1. Atrial fibrillation with rapid ventricular response  I48.91 427.31   2. Acute congestive heart failure, unspecified heart failure type  I50.9 428.0   3. Sepsis, due to unspecified organism, unspecified whether acute organ dysfunction present  A41.9 038.9     995.91   4. Urinary tract infection without hematuria, site unspecified  N39.0 599.0   5. Acute kidney injury  N17.9 584.9   6. Bilateral lower leg cellulitis  L03.116 682.6    L03.115    7. Hyperkalemia  E87.5 276.7   8. Multifocal pneumonia  J18.9 486   9. Rhinovirus infection  B34.8 079.3   10. Chronic obstructive pulmonary disease, unspecified COPD type  J44.9 496   11. Hydropneumothorax  J94.8 511.89     Patient Active Problem List   Diagnosis    Primary hypertension    Morbid obesity with BMI of 40.0-44.9, adult    Atrial fibrillation with RVR    Right bundle branch block    Acute deep vein thrombosis (DVT) of both lower extremities    ARABELLA (acute kidney injury)    Acute hyperkalemia    Sepsis    Acute UTI    Acute systolic congestive heart failure    Rhinovirus infection    Multifocal pneumonia    Chronic respiratory failure with hypoxia, on home O2 therapy    Skin ulcer of right great toe    Skin ulcer of left great toe    SEDRICK (obstructive sleep apnea)    Aortic stenosis    Mitral regurgitation    Acute HFrEF (heart failure with reduced ejection fraction)    COPD (chronic obstructive pulmonary disease)     Past Medical History:   Diagnosis Date    Bilateral leg edema     Chronic respiratory failure with hypoxia, on home O2 therapy 2024    COPD (chronic obstructive pulmonary disease)     Morbid obesity with BMI of 40.0-44.9, adult 2010    Primary hypertension 2017    Pulmonary embolism     \"many years ago, was on warfarin\"    Sleep apnea      History " reviewed. No pertinent surgical history.   General Information       [facility] Name 07/17/24 1405          OT Time and Intention    Document Type re-evaluation  -       Row Name 07/17/24 1405          General Information    Prior Level of Function independent:;all household mobility  sleeps in recliner  -     Existing Precautions/Restrictions fall;oxygen therapy device and L/min;other (see comments)  CT not on suction but still with significant drainiage  -     Barriers to Rehab medically complex  -       Row Name 07/17/24 1405          Living Environment    People in Home child(samreen), adult  -[facility] Name 07/17/24 1405          Home Main Entrance    Number of Stairs, Main Entrance none  -[facility] Name 07/17/24 1405          Stairs Within Home, Primary    Number of Stairs, Within Home, Primary none  -[facility] Name 07/17/24 1405          Cognition    Orientation Status (Cognition) oriented x 4  -[facility] Name 07/17/24 1405          Safety Issues, Functional Mobility    Impairments Affecting Function (Mobility) endurance/activity tolerance;pain;strength;range of motion (ROM)  -               User Key  (r) = Recorded By, (t) = Taken By, (c) = Cosigned By      Initials Name Provider Type     Janie Bonilla OT Occupational Therapist                     Mobility/ADL's       Row Name 07/17/24 1407          Bed Mobility    Bed Mobility supine-sit  -     Supine-Sit Glen (Bed Mobility) 2 person assist;maximum assist (25% patient effort)  -     Assistive Device (Bed Mobility) head of bed elevated;bed rails;draw sheet  -     Comment, (Bed Mobility) sat EOB and maintained bal w/ supervision  -[facility] Name 07/17/24 1407          Transfers    Transfers sit-stand transfer;stand-sit transfer;bed-chair transfer  -MH       Row Name 07/17/24 1407          Bed-Chair Transfer    Bed-Chair [place] (Transfers) moderate assist (50% patient effort);2 person assist  -     Comment, (Bed-Chair  Transfer) stood at EOB w/ RW and was not confident of her ability to maintain balance while pivoiting so RW was removed for SPS assist with 2-persons assisting.  -[facility] Name 07/17/24 1407          Sit-Stand Transfer    Sit-Stand [place] (Transfers) moderate assist (50% patient effort)  -     Assistive Device (Sit-Stand Transfers) walker, front-wheeled  -[facility] Name 07/17/24 1407          Stand-Sit Transfer    Stand-Sit [place] (Transfers) moderate assist (50% patient effort);2 person assist  -[facility] Name 07/17/24 1407          Functional Mobility    Functional Mobility- Ind. Level unable to perform  -[facility] Name 07/17/24 1407          Activities of Daily Living    BADL Assessment/Intervention toileting;grooming;lower body dressing  -MH       Row Name 07/17/24 1407          Toileting Assessment/Training    [place] Level (Toileting) toileting skills;dependent (less than 25% patient effort)  -     Comment, (Toileting) pt reports incontinece of bladder & bowel recently  -[facility] Name 07/17/24 1407          Grooming Assessment/Training    [place] Level (Grooming) hair care, combing/brushing;dependent (less than 25% patient effort);wash face, hands;set up;standby assist  -     Comment, (Grooming) dtr has been coming by to do pt hair. Pt likes this.  -[facility] Name 07/17/24 1407          Lower Body Dressing Assessment/Training    [place] Level (Lower Body Dressing) lower body dressing skills;dependent (less than 25% patient effort)  -     Position (Lower Body Dressing) supine  -               User Key  (r) = Recorded By, (t) = Taken By, (c) = Cosigned By      Initials Name Provider Type     Janie Bonilla OT Occupational Therapist                   Obj/Interventions       [facility] Name 07/17/24 1411          Sensory Assessment (Somatosensory)    Sensory Assessment hypersensitive to pressure on legs and ABD LLQ  -[facility] Name 07/17/24 1411           Vision Assessment/Intervention    Visual Impairment/Limitations WFL  -[facility] Name 07/17/24 1411          Range of Motion Comprehensive    Comment, General Range of Motion BLE & pannus pain, edema (and redness) limit flexion 25% to 50% in knees, hips, trunk  -[facility] Name 07/17/24 1508          Strength Comprehensive (MMT)    Comment, General Manual Muscle Testing (MMT) Assessment BLE 2+/5, BUE 3/5  -[facility] Name 07/17/24 1508          Balance    Balance Assessment sitting static balance;sitting dynamic balance;standing static balance;standing dynamic balance  -     Static Sitting Balance supervision  -     Dynamic Sitting Balance contact guard  -     Static Standing Balance 2-person assist;minimal assist  -     Dynamic Standing Balance 2-person assist;moderate assist;maximum assist  -               User Key  (r) = Recorded By, (t) = Taken By, (c) = Cosigned By      Initials Name Provider Type     Janie Bonilla, OT Occupational Therapist                   Goals/Plan       [facility] Name 07/17/24 1517          Bed Mobility Goal 1 (OT)    Activity/Assistive Device (Bed Mobility Goal 1, OT) bed mobility activities, all  -     [place] Level/Cues Needed (Bed Mobility Goal 1, OT) moderate assist (50-74% patient effort)  -     Time Frame (Bed Mobility Goal 1, OT) 2 weeks  -     Progress/Outcomes (Bed Mobility Goal 1, OT) unable to make needed progress;goal revised this date  -[facility] Name 07/17/24 1517          Transfer Goal 1 (OT)    Activity/Assistive Device (Transfer Goal 1, OT) sit-to-stand/stand-to-sit;bed-to-chair/chair-to-bed;commode, bedside without drop arms  -     [place] Level/Cues Needed (Transfer Goal 1, OT) moderate assist (50-74% patient effort)  -     Time Frame (Transfer Goal 1, OT) 2 weeks  -     Progress/Outcome (Transfer Goal 1, OT) goal ongoing;progress slower than expected  -[facility] Name 07/17/24 1517          Dressing Goal 1 (OT)     Progress/Outcome (Dressing Goal 1, OT) goal no longer appropriate;medical status inhibiting progress  -[facility] Name 07/17/24 1517          Grooming Goal 1 (OT)    Activity/Device (Grooming Goal 1, OT) grooming skills, all  -     [place] (Grooming Goal 1, OT) minimum assist (75% or more patient effort)  -     Time Frame (Grooming Goal 1, OT) 2 weeks  -[facility] Name 07/17/24 1517          Therapy Assessment/Plan (OT)    Planned Therapy Interventions (OT) activity tolerance training;adaptive equipment training;BADL retraining;cognitive/visual perception retraining;passive ROM/stretching;transfer/mobility retraining;ROM/therapeutic exercise;patient/caregiver education/training;occupation/activity based interventions;functional balance retraining  -               User Key  (r) = Recorded By, (t) = Taken By, (c) = Cosigned By      Initials Name Provider Type     Janie Bonilla, OT Occupational Therapist                   Clinical Impression       [facility] Name 07/17/24 1505          Pain Assessment    Additional Documentation Pain Scale: FACES Pre/Post-Treatment (Group)  -MH       Row Name 07/17/24 3545          Pain Scale: FACES Pre/Post-Treatment    Pain: FACES Scale, Pretreatment 4-->hurts little more  -     Posttreatment Pain Rating 2-->hurts little bit  -     Pain Location - Side/Orientation Bilateral  -     Pain Location - calf;abdomen  -MH       Row Name 07/17/24 6336          Plan of Care Review    Plan of Care Reviewed With patient  -     Progress improving  -     Outcome Evaluation Pt is re-evaluated at 2-week LOS and found functionally to be showing some improvement in that she can now get up to the chair for 2 days in a row with mod to max (A) and 2-person assist. Stands w/ min (A) of two at RW but only for a few seconds. Fatigues very quickly. Cognition is also improving but pt does have ST memory loss. She is painful in her lower legs and feet and in her pannus, especially on the  left. Pt is motivated to get up and engage in some activity as able. She will need SNF for rehab at d/c. Unknown prognosis for a functional recovery to living independently with her adult child due to the status of her medical conditions. Pt is not able to leave [facility] until her CT stops draining. She is not found to have malignant process in her lungs at this point, just the effussion of her hydropneumothorax.  -       Row Name 07/17/24 1509          Therapy Assessment/Plan (OT)    Rehab Potential (OT) fair, will monitor progress closely  -     Criteria for Skilled Therapeutic Interventions Met (OT) skilled treatment is necessary  -     Therapy Frequency (OT) 3 times/wk  -       Row Name 07/17/24 1509          Therapy Plan Review/Discharge Plan (OT)    Anticipated Discharge Disposition (OT) skilled nursing facility  -       Row Name 07/17/24 1509          Vital Signs    O2 Delivery Pre Treatment supplemental O2  -     O2 Delivery Intra Treatment supplemental O2  -     O2 Delivery Post Treatment supplemental O2  -     Pre Patient Position Supine  -     Intra Patient Position Standing  -     Post Patient Position Sitting  -       Row Name 07/17/24 1509          Positioning and Restraints    Pre-Treatment Position in bed  -     Post Treatment Position chair  -     In Chair notified nsg;sitting;call light within reach;encouraged to call for assist;exit alarm on  kael pad placed under her  -               User Key  (r) = Recorded By, (t) = Taken By, (c) = Cosigned By      Initials Name Provider Type     Janie Bonilla, OT Occupational Therapist                   Outcome Measures       Row Name 07/17/24 1519 07/17/24 0800       How much help from another person do you currently need...    Turning from your back to your side while in flat bed without using bedrails? 2  - 2  -HI    Moving from lying on back to sitting on the side of a flat bed without bedrails? 2  - 2  -HI    Moving to and  from a bed to a chair (including a wheelchair)? 2  - 1  -HI    Standing up from a chair using your arms (e.g., wheelchair, bedside chair)? 2  - 1  -HI    Climbing 3-5 steps with a railing? 1  - 1  -HI    To walk in hospital room? 1  - 1  -HI    AM-PAC 6 Clicks Score (PT) 10  - 8  -HI    Highest Level of Mobility Goal 4 --> Transfer to chair/commode  - 3 --> Sit at edge of bed  -HI              User Key  (r) = Recorded By, (t) = Taken By, (c) = Cosigned By      Initials Name Provider Type     Janie Bonilla OT Occupational Therapist    HI Magali Jack, RN Registered Nurse                    Occupational Therapy Education       Title: PT OT SLP Therapies (In Progress)       Topic: Occupational Therapy (In Progress)       Point: ADL training (Done)       Description:   Instruct learner(s) on proper safety adaptation and remediation techniques during self care or transfers.   Instruct in proper use of assistive devices.                  Learning Progress Summary             Patient Acceptance, E, VU,NR by  at 7/17/2024 1519                         Point: Home exercise program (Not Started)       Description:   Instruct learner(s) on appropriate technique for monitoring, assisting and/or progressing therapeutic exercises/activities.                  Learner Progress:  Not documented in this visit.              Point: Precautions (Done)       Description:   Instruct learner(s) on prescribed precautions during self-care and functional transfers.                  Learning Progress Summary             Patient Acceptance, E, VU,NR by  at 7/17/2024 1519                         Point: Body mechanics (Done)       Description:   Instruct learner(s) on proper positioning and spine alignment during self-care, functional mobility activities and/or exercises.                  Learning Progress Summary             Patient Acceptance, E, VU,NR by  at 7/17/2024 1519    Acceptance, E,TB, VU by  at 7/2/2024 7410                                          User Key       Initials Effective Dates Name Provider Type Discipline     06/16/21 -  Janie Bonilla OT Occupational Therapist OT     06/16/21 -  Antolin Lopez OT Occupational Therapist OT                  OT Recommendation and Plan  Planned Therapy Interventions (OT): activity tolerance training, adaptive equipment training, BADL retraining, cognitive/visual perception retraining, passive ROM/stretching, transfer/mobility retraining, ROM/therapeutic exercise, patient/caregiver education/training, occupation/activity based interventions, functional balance retraining  Therapy Frequency (OT): 3 times/wk  Plan of Care Review  Plan of Care Reviewed With: patient  Progress: improving  Outcome Evaluation: Pt is re-evaluated at 2-week LOS and found functionally to be showing some improvement in that she can now get up to the chair for 2 days in a row with mod to max (A) and 2-person assist. Stands w/ min (A) of two at RW but only for a few seconds. Fatigues very quickly. Cognition is also improving but pt does have ST memory loss. She is painful in her lower legs and feet and in her pannus, especially on the left. Pt is motivated to get up and engage in some activity as able. She will need SNF for rehab at d/c. Unknown prognosis for a functional recovery to living independently with her adult child due to the status of her medical conditions. Pt is not able to leave [facility] until her CT stops draining. She is not found to have malignant process in her lungs at this point, just the effussion of her hydropneumothorax.     Time Calculation:         Time Calculation- OT       Row Name 07/17/24 1520             Time Calculation-     OT Start Time 1107  -      OT Stop Time 1143  -      OT Time Calculation (min) 36 min  -      Total Timed Code Minutes- OT 15 minute(s)  -      OT Received On 07/17/24  -      OT - Next Appointment 07/19/24  -      OT Goal Re-Cert Due Date  07/31/24  -                User Key  (r) = Recorded By, (t) = Taken By, (c) = Cosigned By      Initials Name Provider Type     Janie Bonilla OT Occupational Therapist                  Therapy Charges for Today       Code Description Service Date Service Provider Modifiers Qty    [id number]  OT SELF CARE/MGMT/TRAIN EA 15 MIN 7/17/2024 Janie Bonilla OT GO 1    [id number]  OT RE-EVAL 2 7/17/2024 Janie Bonilla OT GO 1                 Janie Bonilla OT  7/17/2024

## 2024-07-17 NOTE — PLAN OF CARE
Goal Outcome Evaluation:  Plan of Care Reviewed With: patient        Progress: no change  Outcome Evaluation: Patient received scheduled meds. Patient remains with chest tube, output is steady. Pulm following. Repeat CXR today. Patient remains in precaution for rhinovirus, and contact precaution for CR pseudomonas. Patient remains on 3L nc.

## 2024-07-17 NOTE — CASE MANAGEMENT/SOCIAL WORK
Continued Stay Note  [facility]     Patient Name: Ban Brennan  MRN: [id number]  Today's Date: 7/17/2024    Admit Date: 6/30/2024    Plan: Declined LTACH. [facility] referral pending acceptance. Precert approved for [facility] (valid 7/14-7/19). PASRR approved. From home with family.   Discharge Plan       Row Name 07/17/24 1521       Plan    Plan Declined LTACH. [facility] referral pending acceptance. Precert approved for [facility] (valid 7/14-7/19). PASRR approved. From home with family.    Patient/Family in Agreement with Plan yes    Provided Post Acute Provider List? Yes    Post Acute Provider List Long Term Acute Care    Provided Post Acute Provider Quality & Resource List? Yes    Post Acute Provider Quality and Resource List Long Term Acute Care    Delivered To Patient;Support Person    Support Person Daughter- Chelsi    Method of Delivery In person;Telephone    Plan Comments CM discussed with MD/RN in unit rounds that patient remains with chest tube in place. Inquired if LTACH evaluation would be needed for continued pulmonology management of chest tube. MD agreeable. CM added new referral in basket and sent to [facility] FAT[facility] to review. CM met with patient at bedside. She contacted her daughter, Chelsi and CM discussed with patient and Chelsi over speaker phone regarding chest tube management needs. Discussed LTACH vs. SNFs not being able to accommodate. They declined [place], [place] location and [place] CM discussed alternate locations in [place] such as [place] vs. [place] and they also declined. Reported that instead of [facility], they [place] [facility] or if not [place] then [facility]. CM added in [place] basket and sent to [facility] PELAEZ. CM updated LTACH liarick Hoyt that patient and family not currently agreeable but asked for LTACH to continue to follow. DC Barriers: Chest tube in place.                  Dea Abarca  BERNT Erwin     Office Phone: [phone]  Office Cell: [phone]

## 2024-07-17 NOTE — PROGRESS NOTES
"    PROGRESS NOTE      Patient Name: Ban Brennan  : 1955  MRN: [id number]  Primary Care Physician: Rut Pierce APRN  Date of admission: 2024    Patient Care Team:  Rut Pierce APRN as PCP - General (Nurse Practitioner)  Austin Brooks MD as Cardiologist (Cardiology)        Reason for Follow up     Acute kidney injury, hyperkalemia      Subjective     Seen and examined, NAD noted, renal functions stable, worsening bicarb level, ordered ABG, good UOP, 1.3L     Review of systems:    ROS was otherwise negative except as mentioned in the [place].       Personal History:     Past Medical History:   Past Medical History:   Diagnosis Date    Bilateral leg edema     Chronic respiratory failure with hypoxia, on home O2 therapy 2024    COPD (chronic obstructive pulmonary disease)     Morbid obesity with BMI of 40.0-44.9, adult 2010    Primary hypertension 2017    Pulmonary embolism     \"many years ago, was on warfarin\"    Sleep apnea        Surgical History:    History reviewed. No pertinent surgical history.    Family History: family history is not on file. Otherwise pertinent FHx was reviewed and unremarkable.     Social History:  reports that she has never smoked. She has never used smokeless tobacco. She reports that she does not drink alcohol and does not use drugs.    Medications:  Prior to Admission medications    Medication Sig Start Date End Date Taking? Authorizing Provider   Allergy Relief 180 MG tablet Take 1 tablet by mouth Daily. 24  Yes ProviderChadwick MD   bumetanide (BUMEX) 1 MG tablet Take 1 tablet by mouth 3 times a day. 24  Yes ProviderChadwick MD   docusate sodium (COLACE) 100 MG capsule Take 1 capsule by mouth Every 12 (Twelve) Hours. 24  Yes ProviderChadwick MD   furosemide (LASIX) 20 MG tablet Take 1 tablet by mouth Daily.   Yes ProviderChadwick MD   HYDROcodone-acetaminophen (NORCO)  MG per tablet Take 1 " tablet by mouth 3 times a day. 5/30/24  Yes Chadwick Johnson MD   lisinopril (PRINIVIL,ZESTRIL) 30 MG tablet Take 1 tablet by mouth Daily. 3/18/24  Yes Chadwick Johnson MD   meloxicam (MOBIC) 7.5 MG tablet Take 1 tablet by mouth Daily As Needed. 3/18/24  Yes Chadwick Johnson MD   metoprolol tartrate (LOPRESSOR) 50 MG tablet Take 1 tablet by mouth Daily.   Yes Chadwick Johnson MD   montelukast (SINGULAIR) 10 MG tablet Take 1 tablet by mouth every night at bedtime.   Yes Chadwick Johnson MD   omeprazole (priLOSEC) 20 MG capsule Take 1 capsule by mouth Daily. 3/18/24  Yes Chadwick Johnson MD   oxybutynin XL (DITROPAN-XL) 10 MG 24 hr tablet Take 2 tablets by mouth Daily. 3/18/24  Yes Chadwick Johnson MD   potassium chloride (MICRO-K) 10 MEQ CR capsule Take 1 capsule by mouth Every 12 (Twelve) Hours. 3/18/24  Yes Chadwick Johnson MD   vitamin D (ERGOCALCIFEROL) 1.25 MG (89275 UT) capsule capsule Take 1 capsule by mouth 2 (Two) Times a Week. Monday and Thursday. 5/30/24  Yes Chadwick Johnson MD     Scheduled Meds:amiodarone, 200 mg, Oral, Q12H   Followed by  [START ON 7/23/2024] amiodarone, 200 mg, Oral, Daily  apixaban, 5 mg, Oral, BID  budesonide, 0.5 mg, Nebulization, BID - RT  bumetanide, 1 mg, Oral, Daily  hydrocortisone, 25 mg, Rectal, BID  insulin lispro, 2-7 Units, Subcutaneous, 4x Daily With Meals & Nightly  ipratropium-albuterol, 3 mL, Nebulization, 4x Daily - RT  lidocaine, 20 mL, Infiltration, Once  metoprolol succinate XL, 25 mg, Oral, Q12H  miconazole, 1 Application, Topical, Q12H  pantoprazole, 40 mg, Oral, BID AC  povidone-iodine, , Topical, Daily  sodium chloride, 10 mL, Intravenous, Q12H  [Held by provider] spironolactone, 25 mg, Oral, Daily      Continuous Infusions:     PRN Meds:  acetaminophen **OR** acetaminophen    Calcium Replacement - Follow Nurse / BPA Driven Protocol    dextrose    dextrose    glucagon (human recombinant)     HYDROcodone-acetaminophen    ipratropium-albuterol    Magnesium Standard Dose Replacement - Follow Nurse / BPA Driven Protocol    nitroglycerin    ondansetron ODT **OR** ondansetron    Phosphorus Replacement - Follow Nurse / BPA Driven Protocol    Potassium Replacement - Follow Nurse / BPA Driven Protocol    [COMPLETED] Insert Peripheral IV **AND** sodium chloride    sodium chloride    sodium chloride  Allergies:  No Known Allergies    Objective   Exam:     Vital Signs  Temp:  [97.8 °F (36.6 °C)-98.8 °F (37.1 °C)] 97.8 °F (36.6 °C)  Heart Rate:  [] 100  Resp:  [13-27] 14  BP: ()/(49-60) 95/57  SpO2:  [87 %-98 %] 96 %  on  Flow (L/min):  [3-332] 3;   Device (Oxygen Therapy): humidified;nasal cannula  Body mass index is 35.98 kg/m².  EXAM  General:  69 yr old  female, some respiratory distress  Head:      Normocephalic and atraumatic.    Eyes:      PERRL/EOM intact, conjunctivae and sclerae clear without nystagmus.    Neck:      No masses, thyromegaly,  trachea central   Lungs:    Clear bilaterally to auscultation.    Heart:      Regular rate and rhythm, no murmur no gallop  Abd:        Soft, nontender, not distended, bowel sounds positive, no shifting dullness.  Msk:        No deformity or scoliosis noted of thoracic or lumbar spine.    Pulses:   Pulses normal in all 4 extremities.    Extremities:        No cyanosis or clubbing--+ + edema.    Neuro:    Lethargic  Skin:       Intact without lesions or rashes.          Results Review:  I have personally reviewed most recent Data :  BMP @LABRCNT(creatinine:10)  CBC    Results from last 7 days   Lab Units 07/17/24  0120 07/16/24  0347 07/15/24  0256 07/14/24  0659 07/13/24  0240 07/12/24  0531 07/11/24  0437   WBC 10*3/mm3 10.15 10.10 11.06* 9.61 11.02* 10.25 11.69*   HEMOGLOBIN g/dL 9.6* 9.7* 10.3* 11.2* 11.5* 11.9* 12.4   PLATELETS 10*3/mm3 157 144 163 117* 112* 88* 93*     CMP   Results from last 7 days   Lab Units 07/17/24  0120 07/16/24  1402  07/16/24  0347 07/15/24  0519 07/14/24  1918 07/14/24  0659 07/13/24  0240 07/12/24  1619 07/12/24  0531 07/11/24  0437   SODIUM mmol/L 138  --  139 140  --  141 141  --  141 142   POTASSIUM mmol/L 4.5 4.3 3.6 4.3 4.3 3.4* 3.9   < > 3.5 4.5   CHLORIDE mmol/L 100  --  99 99  --  97* 97*  --  97* 95*   CO2 mmol/L 33.8*  --  35.4* 36.0*  --  38.3* 36.9*  --  38.5* 41.6*   BUN mg/dL 22  --  23 27*  --  30* 38*  --  39* 46*   CREATININE mg/dL 0.92  --  0.86 0.99  --  0.99 1.11*  --  0.99 1.08*   GLUCOSE mg/dL 101*  --  76 89  --  75 87  --  82 94   ALBUMIN g/dL 2.0*  --  1.9* 2.1*  --  2.1* 2.1*  --  1.9* 2.1*   BILIRUBIN mg/dL 1.8*  --  2.0* 2.6*  2.6*  --  2.7* 2.9*  --  2.8* 2.4*   ALK PHOS U/L 127*  --  108 117  --  115 96  --  94 85   AST (SGOT) U/L 45*  --  28 36*  --  43* 37*  --  45* 48*   ALT (SGPT) U/L 24  --  20 26  --  23 19  --  18 19   AMYLASE U/L  --   --  17*  --   --   --   --   --   --   --    LIPASE U/L  --   --  12*  --   --   --   --   --   --   --     < > = values in this interval not displayed.     ABG    Results from last 7 days   Lab Units 07/11/24  1045   PH, ARTERIAL pH units 7.539*   PCO2, ARTERIAL mm Hg 49.9*   PO2 ART mm Hg 66.3*   O2 SATURATION ART % 94.6   BASE EXCESS ART mmol/L 17.4*     XR Chest 1 View    Result Date: 7/17/2024  Impression: 1. Trace right pneumothorax 2. Otherwise stable chest demonstrating cardiomegaly with pulmonary vascular congestion, small pleural effusions, left perihilar airspace consolidation, and bibasilar airspace disease which could be atelectasis or pneumonia Electronically Signed: Betito Villeda  7/17/2024 7:32 AM EDT  Workstation ID: [id number]    XR Chest 1 View    Result Date: 7/16/2024  Impression: No appreciable change since 1 day prior. Electronically Signed: Krystal Lee MD  7/16/2024 8:08 AM EDT  Workstation ID: [id number]    MRI Abdomen With & Without Contrast    Result Date: 7/15/2024  Impression: Multiseptated pancreatic lesion in the pancreatic  neck measuring 2.7 x 2.6 cm. There is suggestion of internal septations as well as faint enhancement of the septa. Findings suspicious for cystic neoplasm. Brisk enhancement lesion in the anterior superior right hepatic lobe measures 0.9 cm, favoring flash filling hemangioma. Hypervascular metastasis is felt to be less likely but incomplete excluded. Consider short-term follow-up MRI to evaluate for change in size. 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 2.1 cm right adrenal myelolipoma. Small right pleural effusion. Moderate left pleural effusion. Electronically Signed: Gigi Beck MD  7/15/2024 8:38 PM EDT  Workstation ID: [id number]    XR Chest 1 View    Result Date: 7/15/2024  Impression: Stable chest with cardiomegaly, moderate bilateral effusion and bibasilar consolidations Electronically Signed: Eliu High MD  7/15/2024 7:56 AM EDT  Workstation ID: [id number]    CT Chest Without Contrast Diagnostic    Result Date: 7/14/2024  Impression: 1.Persistent moderate-sized right pleural effusion despite indwelling pleural catheter. Tiny right pneumothorax. Improved aeration of the right lung with persistent right middle/lower lobe volume loss and consolidation. 2.Stable smaller left pleural effusion with increased left lower lobe volume loss and consolidation. 3.Stable massive cardiomegaly. 4.Included upper abdominal findings of pancreatic mass, splenic infarct, and cholelithiasis, as described on recent CT abdomen/pelvis. Electronically Signed: Elise Castro  7/14/2024 11:44 AM EDT  Workstation ID: [id number]    XR Chest 1 View    Result Date: 7/14/2024  Impression: 1.Right basilar pleural catheter remains in place. No visible pneumothorax. 2.Persistent bilateral perihilar and bibasilar airspace opacities, increased in the right lower lung compared to yesterday's chest x-ray. Electronically Signed: Elise Castro  7/14/2024 9:59 AM EDT  Workstation ID: [id number]    CT  Abdomen Pelvis Without Contrast    Result Date: 7/13/2024  1. Colonic diverticulosis without evidence of diverticulitis. 2. There is a stable 3 cm soft tissue mass of the pancreas. Pancreatic mass protocol MRI of the abdomen recommended. 3. Focal splenic infarct 4. Small bilateral pleural effusions with trace right-sided pneumothorax. 5. Cholelithiasis without evidence of cholecystitis. Electronically Signed: Eamon Brock MD  7/13/2024 1:51 PM EDT  Workstation ID: [id number]    XR Chest 1 View    Result Date: 7/13/2024  Impression: 1. Stable chest tube overlying the right lung base. No pneumothorax. 2. Persistent small bilateral pleural effusions with bibasilar airspace disease which may relate to atelectasis and/or pneumonia. 3. Stable cardiomegaly with central pulmonary vascular congestion and findings of pulmonary edema. Electronically Signed: Mina Wheeler MD  7/13/2024 11:24 AM EDT  Workstation ID: [id number]    XR Chest 1 View    Result Date: 7/12/2024  Impression: 1.Right basilar chest tube appears in similar position. No definite pneumothorax. 2.Patchy basilar predominant airspace opacities and possible small pleural effusions, as before. 3.Stable cardiomegaly. Electronically Signed: Tanvir Melgar  7/12/2024 7:18 AM EDT  Workstation ID: [id number]    XR Chest 1 View    Result Date: 7/11/2024  Impression: Similar position of right-sided chest tube. No discrete pneumothorax. Similar small bilateral pleural effusions and bilateral airspace opacities, left greater than right. Electronically Signed: Manan Jefferson MD  7/11/2024 7:48 AM EDT  Workstation ID: [id number]    XR Chest 1 View    Result Date: 7/10/2024  Impression: 1. Stable chest tube overlying the right lung base. No pneumothorax. 2. Persistent bibasilar airspace disease and small bilateral pleural effusions. 3. Stable cardiomegaly. Electronically Signed: Mina Wheeler MD  7/10/2024 7:11 AM EDT  Workstation ID: [id number]     Results for orders placed during the  hospital encounter of 06/30/24    Adult Transthoracic Echo Complete w/ Color, Spectral and Contrast if Necessary Per Protocol    Interpretation Summary    Left ventricular ejection fraction appears to be 31 - 35%.    Left ventricular diastolic dysfunction is noted.    The left atrial cavity is dilated.    Moderate aortic valve stenosis is present. Mean/peak gradient of 14/24 mmHg.  Dimensionless index 0.36    Moderate to severe mitral valve regurgitation is present.    Estimated right ventricular systolic pressure from tricuspid regurgitation is normal (<35 mmHg).        Assessment & Plan   Assessment and Plan:         Atrial fibrillation with RVR    Primary hypertension    Morbid obesity with BMI of 40.0-44.9, adult    Right bundle branch block    Acute deep vein thrombosis (DVT) of both lower extremities    ARABELLA (acute kidney injury)    Acute hyperkalemia    Sepsis    Acute UTI    Acute systolic congestive heart failure    Rhinovirus infection    Multifocal pneumonia    Chronic respiratory failure with hypoxia, on home O2 therapy    Skin ulcer of right great toe    Skin ulcer of left great toe    SEDRICK (obstructive sleep apnea)    Aortic stenosis    Mitral regurgitation    Acute HFrEF (heart failure with reduced ejection fraction)    COPD (chronic obstructive pulmonary disease)    ASSESSMENT:  Acute kidney injury, with baseline creatinine roughly 0.8-1.0mg/dL  Hyperkalemia  A-fib with RVR  Acute on chronic heart failure, with EF 31-35%, diastolic dysfunction, moderate AS and moderate to severe MR as per echo from 6/30/24  Bilateral DVT  Severe sepsis/LLE cellulitis, acute cystitis  Multifocal pneumonia    PLAN :     Renal function unchanged/stable today from prior reading. Suspect she may initially have had some acute cardiorenal component, now most likely has some ATN secondary to sepsis, elevated Vanc level (was 22.4 on 7/3), and some prolonged prerenal state with hemodynamic insults, arrhythmias. Had no  significant proteinuria  Cont. Bumex blood pressure still on the low side  Will increase to twice daily in 1 to 2 days  Briana still has edema  Patient renal functions are stable at this time  Creatinine still 0.9.  Potassium level is still acceptable volume status seems acceptable  Sodium level is acceptable at this time and potassium level is improved  Still has some peripheral edema but improving  Blood pressure reviewed, stable. Continue to monitor   Now not on any abx  Daily labs   We will continue to follow       Charan Krueger MD  [facility]  7/17/2024  12:45 EDT

## 2024-07-17 NOTE — PROGRESS NOTES
Hematology/Oncology Inpatient Progress Note    PATIENT NAME: Ban Brennan  : 1955  MRN: [id number]    CHIEF COMPLAINT: Shortness of breath    HISTORY OF PRESENT ILLNESS:    Ban Brennan is a 69 y.o. female who presented to [facility] on 2024 with complaints of shortness of breath.  Past medical history of hypertension, heart failure, chronic bilateral lower extremity edema, remote history of pulmonary embolism treated with warfarin.  The patient was brought to the ED via EMS.  The patient's daughter reported that the patient does not normally go to the doctor and had not been in the hospital for years.  She was unsure of her full medical history but stated that her legs have been swollen for a long time and that she had not been mobile for approximately 1 year.  She reported that a home health nurse comes to the house and wraps her mother's legs twice a week.  EMS was called due to the worsening shortness of breath and the patient was found to have an EKG showing a wide-complex tachycardia with a heart rate in the 190s.  She was given amiodarone by EMS with rate improvement to the 160s.  Follow-up EKG in the ED at [facility] showed atrial fibrillation with RVR with a heart rate of 145.  Chest x-ray showed fluid overload with bilateral effusions.  The patient had a elevated white blood cell count of 16.8 and 4+ bacteria in her urine.  She was also noted to be hyperkalemic with a potassium of 6.5.  She was diagnosed with cellulitis and a urinary tract infection and initiated on IV antibiotics with cefepime and vancomycin.  She was admitted for further evaluation and treatment.     2024 bilateral venous Doppler ultrasound  -Acute right lower extremity DVT in the common femoral, proximal femoral, mid femoral, distal femoral, and popliteal  -Acute right lower extremity superficial thrombophlebitis in the saphenofemoral junction and great saphenous  -Acute left lower extremity DVT in  the proximal femoral, mid femoral, distal femoral, popliteal, and posterior tibial     7/1/2020 four 2D echocardiogram  -Left ventricular ejection fraction 31-35%  -Left ventricular diastolic dysfunction  -Left atrial cavity dilated  -Moderate aortic valve stenosis  -Moderate to severe mitral valve regurgitation  -Estimated right ventricular systolic pressure from tricuspid regurgitation is normal     7/1/2024 CT chest PE protocol  -No evidence for pulmonary embolus  -Bibasilar airspace disease with right middle lung opacity compatible likely multifocal pneumonia with bilateral pleural effusions  -Cardiomegaly     7/1/2024 CT of the abdomen and pelvis without contrast  -Moderately large right pleural effusion and small left pleural effusion; bibasilar infiltrates suggest atelectasis although associated pneumonia not excluded  -Severe cardiomegaly  -Bilateral flank edema  -Probable fibroid uterus     07/01/24  Hematology/Oncology was consulted for bilateral lower extremity DVT.    7/13/2024 CT of the abdomen and pelvis without contrast  -3 cm x 2.7 cm soft tissue mass involving the body of the pancreas  -Colonic diverticulosis without evidence of diverticulitis  -Focal splenic infarct  -Small bilateral pleural effusions with trace right sided pneumothorax  -Cholelithiasis without evidence of cholecystitis    7/14/2024 CT of the chest without contrast  -Persistent moderate-sized right pleural effusion despite indwelling pleural catheter.  Tiny right pneumothorax.  Improved aeration of the right lung with persistent right middle/lower lobe volume loss and consolidation  -Stable small left pleural effusion with increased left lower lobe volume loss and consolidation  -Stable massive cardiomegaly     He/She  has a past medical history of Bilateral leg edema, Chronic respiratory failure with hypoxia, on home O2 therapy (07/01/2024), COPD (chronic obstructive pulmonary disease), Morbid obesity with BMI of 40.0-44.9, adult  (11/08/2010), Primary hypertension (03/01/2017), Pulmonary embolism, and Sleep apnea.     PCP: Rut Pierce APRN    Subjective   Patient seen today for follow up. Stable. No new complaints reported. Has indwelling chest tube. Abd pain is controlled.    ROS:  Review of Systems   Constitutional:  Positive for fatigue.   HENT: Negative.     Eyes: Negative.    Respiratory: Negative.          Right sided Chest tube in place     Cardiovascular: Negative.    Gastrointestinal: Negative.    Endocrine: Negative.    Genitourinary: Negative.    Musculoskeletal:  Positive for arthralgias, back pain, gait problem and myalgias.   Skin:  Positive for wound (Clean decubitus ulcers noted on medial aspect of bilateral feet).   Allergic/Immunologic: Negative.    Neurological:  Positive for weakness (Bilateral leg weakness).   Hematological: Negative.    Psychiatric/Behavioral: Negative.          MEDICATIONS:    Scheduled Meds:  amiodarone, 200 mg, Oral, Q12H   Followed by  [START ON 7/23/2024] amiodarone, 200 mg, Oral, Daily  apixaban, 5 mg, Oral, BID  budesonide, 0.5 mg, Nebulization, BID - RT  bumetanide, 1 mg, Oral, Daily  hydrocortisone, 25 mg, Rectal, BID  insulin lispro, 2-7 Units, Subcutaneous, 4x Daily With Meals & Nightly  ipratropium-albuterol, 3 mL, Nebulization, 4x Daily - RT  lidocaine, 20 mL, Infiltration, Once  metoprolol succinate XL, 25 mg, Oral, Q12H  miconazole, 1 Application, Topical, Q12H  pantoprazole, 40 mg, Oral, BID AC  povidone-iodine, , Topical, Daily  sodium chloride, 10 mL, Intravenous, Q12H  [Held by provider] spironolactone, 25 mg, Oral, Daily       Continuous Infusions:        PRN Meds:    acetaminophen **OR** acetaminophen    Calcium Replacement - Follow Nurse / BPA Driven Protocol    dextrose    dextrose    glucagon (human recombinant)    HYDROcodone-acetaminophen    ipratropium-albuterol    Magnesium Standard Dose Replacement - Follow Nurse / BPA Driven Protocol    nitroglycerin    ondansetron  "ODT **OR** ondansetron    Phosphorus Replacement - Follow Nurse / BPA Driven Protocol    Potassium Replacement - Follow Nurse / BPA Driven Protocol    [COMPLETED] Insert Peripheral IV **AND** sodium chloride    sodium chloride    sodium chloride     ALLERGIES:  No Known Allergies    Objective    VITALS:   /63 (BP Location: Left arm, Patient Position: Lying)   Pulse 91   Temp 97.3 °F (36.3 °C) (Axillary)   Resp 16   Ht 149.9 cm (59\")   Wt 80.8 kg (178 lb 2.1 oz)   SpO2 96%   BMI 35.98 kg/m²     PHYSICAL EXAM: (performed by MD)  Physical Exam  Constitutional:       Appearance: She is normal weight. She is ill-appearing.   HENT:      Head: Normocephalic and atraumatic.      Right Ear: External ear normal.      Left Ear: External ear normal.      Nose: Nose normal.      Mouth/Throat:      Mouth: Mucous membranes are moist.      Pharynx: Oropharynx is clear.   Eyes:      Extraocular Movements: Extraocular movements intact.      Conjunctiva/sclera: Conjunctivae normal.      Pupils: Pupils are equal, round, and reactive to light.   Cardiovascular:      Rate and Rhythm: Normal rate.      Pulses: Normal pulses.   Pulmonary:      Effort: Pulmonary effort is normal.   Abdominal:      General: Abdomen is flat.      Palpations: Abdomen is soft.   Musculoskeletal:         General: Normal range of motion.      Cervical back: Normal range of motion and neck supple.      Right lower leg: Edema present.      Left lower leg: Edema present.   Skin:     General: Skin is warm.   Neurological:      Mental Status: She is alert.   Psychiatric:         Mood and Affect: Mood is depressed.         Speech: Speech is delayed.         Behavior: Behavior normal.         Thought Content: Thought content normal.         Judgment: Judgment normal.           RECENT LABS:  Lab Results (last 24 hours)       Procedure Component Value Units Date/Time    POC Glucose Once [[id number]]  (Abnormal) Collected: 07/17/24 1053    Specimen: Blood " Updated: 07/17/24 1055     Glucose 112 mg/dL      Comment: Serial Number: [id number]:  [id number]       Wound Culture - Surgical Site, Chest, Right [[id number]]  (Abnormal)  (Susceptibility) Collected: 07/13/24 1425    Specimen: Surgical Site from Chest, Right Updated: 07/17/24 0849     Wound Culture Light growth (2+) Pseudomonas aeruginosa     Gram Stain Rare (1+) WBCs per low power field      Few (2+) Gram negative bacilli    Susceptibility        Pseudomonas aeruginosa      SIL      Cefepime Susceptible      Ceftazidime Susceptible      Ciprofloxacin Susceptible      Imipenem Resistant      Levofloxacin Susceptible      Piperacillin + Tazobactam Susceptible      Tobramycin Susceptible                       Susceptibility Comments       Pseudomonas aeruginosa    With the exception of urinary-sourced infections, aminoglycosides should not be used as monotherapy.               POC Glucose Finger 4x Daily Before Meals & at Bedtime [[id number]]  (Abnormal) Collected: 07/17/24 0707    Specimen: Blood from Finger Updated: 07/17/24 0710     Glucose 106 mg/dL      Comment: Serial Number: [id number]:  [id number]       Magnesium [[id number]]  (Normal) Collected: 07/17/24 0120    Specimen: Blood from Arm, Right Updated: 07/17/24 0205     Magnesium 1.7 mg/dL     Comprehensive Metabolic Panel [[id number]]  (Abnormal) Collected: 07/17/24 0120    Specimen: Blood from Arm, Right Updated: 07/17/24 0205     Glucose 101 mg/dL      BUN 22 mg/dL      Creatinine 0.92 mg/dL      Sodium 138 mmol/L      Potassium 4.5 mmol/L      Comment: Slight hemolysis detected by analyzer. Result may be falsely elevated.        Chloride 100 mmol/L      CO2 33.8 mmol/L      Calcium 8.3 mg/dL      Total Protein 5.3 g/dL      Albumin 2.0 g/dL      ALT (SGPT) 24 U/L      AST (SGOT) 45 U/L      Alkaline Phosphatase 127 U/L      Total Bilirubin 1.8 mg/dL      Globulin 3.3 gm/dL      A/G Ratio 0.6 g/dL      BUN/Creatinine Ratio 23.9     Anion Gap 4.2  mmol/L      eGFR 67.5 mL/min/1.73     Narrative:      GFR Normal >60  Chronic Kidney Disease <60  Kidney Failure <15      Phosphorus [[id number]]  (Abnormal) Collected: 07/17/24 0120    Specimen: Blood from Arm, Right Updated: 07/17/24 0201     Phosphorus 2.4 mg/dL     CBC & Differential [[id number]]  (Abnormal) Collected: 07/17/24 0120    Specimen: Blood from Arm, Right Updated: 07/17/24 0126    Narrative:      The following orders were created for panel order CBC & Differential.  Procedure                               Abnormality         Status                     ---------                               -----------         ------                     CBC Auto Differential[[id number]]        Abnormal            Final result                 Please view results for these tests on the individual orders.    CBC Auto Differential [[id number]]  (Abnormal) Collected: 07/17/24 0120    Specimen: Blood from Arm, Right Updated: 07/17/24 0126     WBC 10.15 10*3/mm3      RBC 3.15 10*6/mm3      Hemoglobin 9.6 g/dL      Hematocrit 30.7 %      MCV 97.5 fL      MCH 30.5 pg      MCHC 31.3 g/dL      RDW 16.4 %      RDW-SD 57.6 fl      MPV 11.3 fL      Platelets 157 10*3/mm3      Neutrophil % 82.2 %      Lymphocyte % 7.2 %      Monocyte % 8.2 %      Eosinophil % 1.3 %      Basophil % 0.1 %      Immature Grans % 1.0 %      Neutrophils, Absolute 8.35 10*3/mm3      Lymphocytes, Absolute 0.73 10*3/mm3      Monocytes, Absolute 0.83 10*3/mm3      Eosinophils, Absolute 0.13 10*3/mm3      Basophils, Absolute 0.01 10*3/mm3      Immature Grans, Absolute 0.10 10*3/mm3      nRBC 0.0 /100 WBC     POC Glucose Once [[id number]]  (Normal) Collected: 07/16/24 2017    Specimen: Blood Updated: 07/16/24 2019     Glucose 94 mg/dL      Comment: Serial Number: [id number]:  [id number]       POC Glucose Once [[id number]]  (Abnormal) Collected: 07/16/24 1626    Specimen: Blood Updated: 07/16/24 1628     Glucose 143 mg/dL      Comment: Serial Number:  [id number]:  [id number]               PENDING RESULTS: PTG, FVL, LDH, Retic, Hapto    IMAGING REVIEWED:  XR Chest 1 View    Result Date: 7/17/2024  Impression: 1. Trace right pneumothorax 2. Otherwise stable chest demonstrating cardiomegaly with pulmonary vascular congestion, small pleural effusions, left perihilar airspace consolidation, and bibasilar airspace disease which could be atelectasis or pneumonia Electronically Signed: Betito Villeda  7/17/2024 7:32 AM EDT  Workstation ID: [id number]    XR Chest 1 View    Result Date: 7/16/2024  Impression: No appreciable change since 1 day prior. Electronically Signed: Krystal Lee MD  7/16/2024 8:08 AM EDT  Workstation ID: [id number]    MRI Abdomen With & Without Contrast    Result Date: 7/15/2024  Impression: Multiseptated pancreatic lesion in the pancreatic neck measuring 2.7 x 2.6 cm. There is suggestion of internal septations as well as faint enhancement of the septa. Findings suspicious for cystic neoplasm. Brisk enhancement lesion in the anterior superior right hepatic lobe measures 0.9 cm, favoring flash filling hemangioma. Hypervascular metastasis is felt to be less likely but incomplete excluded. Consider short-term follow-up MRI to evaluate for change in size. 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 2.1 cm right adrenal myelolipoma. Small right pleural effusion. Moderate left pleural effusion. Electronically Signed: Gigi Beck MD  7/15/2024 8:38 PM EDT  Workstation ID: [id number]     Assessment & Plan       Pancreatic mass:  -3 x 2.7 cm soft tissue mass involving the body of the pancreas, This was not reported on prior CT scan from 7/1/24.  -CA 19-9 levels mildly elevated, chromogranin levels pending.  -MRI abdomen reviewed: Multiseptated pancreatic lesion in the pancreatic neck measuring 2.7 x 2.6 cm, concerning for malignancy. Hypervascular lesion on Liver is less likely to be metastatic.  -GI team on  board, awaiting further recommendations. She will likely benefit from ERCP/EUS with FNA for tissue diagnosis. Patient is agreeable to it.  -per GI team, likely plan for outpatient ERCP/EUS.    Bilateral lower extremity DVT:  Remote history of pulmonary embolism  Splenic Infraction:  -Extensive acute right and left lower extremity DVT noted on presentation  -patient's chronic deconditioning and immobility may have been risk factor for DVT.   -Negative CTA Chest for PE.  -Reportedly treated with warfarin for prior history of PE  -patient was initially started on Lovenox and later transitioned to Eliquis.  She completed Eliquis 10mg BID X 1 week followed by 5mg BID thereafter.  -Will need lifelong anticoagulation in my view due to persistent risk factors and extensive DVT as well as history of prior pulmonary embolism.  -CT Abdomen/Pelvis on 7/13 showed splenic infract, likely provoked by pancreatic pathology vs acute illness.   -factor V Leiden and prothrombin gene mutation reported negative. Will defer remainder of thrombophilia workup as it does not impact the decision making around anticoagulation.       Uterine mass  -Transvaginal ultrasound suggestive of presence of a large subserosal uterine fundal fibroid.   -CT abdomen/pelvis showed presence of a lobulated mass protruding from the uterine fundus measuring approximately 8.4 x 5.9 cm  -this is concerning for fibroid vs malignancy.  Will recommend GYN Evaluation for the same as outpatient.  -No vaginal bleeding reported.     Thrombocytopenia  - 119,000 at admission, jacqueline at 85K, back to normal now. Baseline WNL  -Acute drop likely secondary to sepsis, rhinovirus  -Hemolysis labs reported negative.      Sepsis  -UA suggestive of Acute cystitis, Urine culture consistent with GNR UTI.  -Respiratory panel positive for Rhinovirus.  -patient has completed antibiotic therapy.     Atrial fibrillation with RVR/paroxysmal atrial fibrillation  Acute on chronic systolic  heart failure with diastolic dysfunction,   moderate aortic stenosis, severe mitral valve regurgitation  -On amiodarone drip, diuresis.  Management per cardiology.   Likely candidate for lifelong anticoagulation given Afib and extensive DVT.     Acute kidney injury/hyperkalemia  Resolved.      Pleural Effusion:  Right sided hydropneumothorax:  Likely secondary to CHF and suspected pneumonia.  -patient is status post Chest tube placement. Ongoing drainage.  -management per primary.        Thanks for this consult, Please reach out for any further clinical questions/concerns.  Will see the patient as needed during hospital admission.

## 2024-07-17 NOTE — PLAN OF CARE
Goal Outcome Evaluation:  Plan of Care Reviewed With: patient        Progress: improving  Outcome Evaluation: Pt is re-evaluated at 2-week LOS and found functionally to be showing some improvement in that she can now get up to the chair for 2 days in a row with mod to max (A) and 2-person assist. Stands w/ min (A) of two at RW but only for a few seconds. Fatigues very quickly. Cognition is also improving but pt does have ST memory loss. She is painful in her lower legs and feet and in her pannus, especially on the left. Pt is motivated to get up and engage in some activity as able. She will need SNF for rehab at d/c. Unknown prognosis for a functional recovery to living independently with her adult child due to the status of her medical conditions. Pt is not able to leave [facility] until her CT stops draining. She is not found to have malignant process in her lungs at this point, just the effussion of her hydropneumothorax.      Anticipated Discharge Disposition (OT): skilled nursing facility

## 2024-07-17 NOTE — PLAN OF CARE
Goal Outcome Evaluation:  Plan of Care Reviewed With: patient        Progress: improving  Outcome Evaluation: Ban Brennan is a 68 y/o F initially admitted to [facility] on 6/30/24 regarding Afib with RVR, and subsequently found hydropneumothorax s/p chest tube placement on 7/6, and LLE DVT. Fluid removed from chest tubes (-) for malignancy. PT present for re-evaluation due to length of stay. At baseline, pt lives with her son, requiring ADL and mobility assist recently d/t decline. While admitted, pt has required maxAx2 for bed mobility and transfers throughout, with minimal ambulation r/t excessive weakness. This date, pt was modA for bed mobility, mod-maxAx2 for STS and stand-pivot to bedside chair. Pt only able to tolerate standing ~ 10 seconds before requring assistance to sit for safety. Uncertain of patient's prognosis, however pt will remain admitted until her chest tubes stop draining. Pt continues to appear far below baseline function and will benefit from SNF upon d/c when appropriate. PT will follow while admitted.      Anticipated Discharge Disposition (PT): skilled nursing facility

## 2024-07-17 NOTE — PROGRESS NOTES
"Referring Provider: Luisito Power,*    Reason for follow-up: Atrial fibrillation with rapid ventricular rate     Patient Care Team:  Rut Pierce APRN as PCP - General (Nurse Practitioner)  Austin Brooks MD as Cardiologist (Cardiology)      SUBJECTIVE  Resting comfortably in bed.  Blood pressure is on the low side.  Chest tube in place.     ROS  Review of all systems negative except as indicated.    Since I have last seen, the patient has been without any chest discomfort, shortness of breath, palpitations, dizziness or syncope.  Denies having any headache, abdominal pain, nausea, vomiting, diarrhea, constipation, loss of weight or loss of appetite.  Denies having any excessive bruising, hematuria or blood in the stool.        Personal History:    Past Medical History:   Diagnosis Date    Bilateral leg edema     Chronic respiratory failure with hypoxia, on home O2 therapy 07/01/2024    COPD (chronic obstructive pulmonary disease)     Morbid obesity with BMI of 40.0-44.9, adult 11/08/2010    Primary hypertension 03/01/2017    Pulmonary embolism     \"many years ago, was on warfarin\"    Sleep apnea        History reviewed. No pertinent surgical history.    History reviewed. No pertinent family history.    Social History     Tobacco Use    Smoking status: Never    Smokeless tobacco: Never   Vaping Use    Vaping status: Never Used   Substance Use Topics    Alcohol use: Never    Drug use: Never        Home meds:  Prior to Admission medications    Medication Sig Start Date End Date Taking? Authorizing Provider   Allergy Relief 180 MG tablet Take 1 tablet by mouth Daily. 5/30/24  Yes Chadwick Johnson MD   bumetanide (BUMEX) 1 MG tablet Take 1 tablet by mouth 3 times a day. 5/30/24  Yes Chadwick Johnson MD   docusate sodium (COLACE) 100 MG capsule Take 1 capsule by mouth Every 12 (Twelve) Hours. 5/30/24  Yes Chadwick Johnson MD   furosemide (LASIX) 20 MG tablet Take 1 tablet by mouth Daily.   " Yes Chadwick Johnson MD   HYDROcodone-acetaminophen (NORCO)  MG per tablet Take 1 tablet by mouth 3 times a day. 5/30/24  Yes Chadwick Johnson MD   lisinopril (PRINIVIL,ZESTRIL) 30 MG tablet Take 1 tablet by mouth Daily. 3/18/24  Yes Chadwick Johnson MD   meloxicam (MOBIC) 7.5 MG tablet Take 1 tablet by mouth Daily As Needed. 3/18/24  Yes Chadwick Johnson MD   metoprolol tartrate (LOPRESSOR) 50 MG tablet Take 1 tablet by mouth Daily.   Yes Chadwick Johnson MD   montelukast (SINGULAIR) 10 MG tablet Take 1 tablet by mouth every night at bedtime.   Yes Chadwick Johnson MD   omeprazole (priLOSEC) 20 MG capsule Take 1 capsule by mouth Daily. 3/18/24  Yes Provider, Historical, MD   oxybutynin XL (DITROPAN-XL) 10 MG 24 hr tablet Take 2 tablets by mouth Daily. 3/18/24  Yes Chadwick Johnson MD   potassium chloride (MICRO-K) 10 MEQ CR capsule Take 1 capsule by mouth Every 12 (Twelve) Hours. 3/18/24  Yes Chadwick Johnson MD   vitamin D (ERGOCALCIFEROL) 1.25 MG (84868 UT) capsule capsule Take 1 capsule by mouth 2 (Two) Times a Week. Monday and Thursday. 5/30/24  Yes Chadwick Johnson MD       Allergies:  Patient has no known allergies.    Scheduled Meds:amiodarone, 200 mg, Oral, Q12H   Followed by  [START ON 7/23/2024] amiodarone, 200 mg, Oral, Daily  apixaban, 5 mg, Oral, BID  budesonide, 0.5 mg, Nebulization, BID - RT  bumetanide, 1 mg, Oral, Daily  hydrocortisone, 25 mg, Rectal, BID  insulin lispro, 2-7 Units, Subcutaneous, 4x Daily With Meals & Nightly  ipratropium-albuterol, 3 mL, Nebulization, 4x Daily - RT  lidocaine, 20 mL, Infiltration, Once  metoprolol succinate XL, 25 mg, Oral, Q12H  miconazole, 1 Application, Topical, Q12H  pantoprazole, 40 mg, Oral, BID AC  povidone-iodine, , Topical, Daily  sodium chloride, 10 mL, Intravenous, Q12H  [Held by provider] spironolactone, 25 mg, Oral, Daily      Continuous Infusions:   PRN Meds:.  acetaminophen **OR** acetaminophen     "Calcium Replacement - Follow Nurse / BPA Driven Protocol    dextrose    dextrose    glucagon (human recombinant)    HYDROcodone-acetaminophen    ipratropium-albuterol    Magnesium Standard Dose Replacement - Follow Nurse / BPA Driven Protocol    nitroglycerin    ondansetron ODT **OR** ondansetron    Phosphorus Replacement - Follow Nurse / BPA Driven Protocol    Potassium Replacement - Follow Nurse / BPA Driven Protocol    [COMPLETED] Insert Peripheral IV **AND** sodium chloride    sodium chloride    sodium chloride      OBJECTIVE    Vital Signs  Vitals:    07/17/24 0702 07/17/24 0705 07/17/24 0706 07/17/24 0710   BP:       BP Location:       Patient Position:       Pulse: 86 87 90 87   Resp: 20 19 19 17   Temp:       TempSrc:       SpO2: 96% 95% 96% 95%   Weight:       Height:           Flowsheet Rows      Flowsheet Row First Filed Value   Admission Height 147.3 cm (58\") Documented at 06/30/2024 1650   Admission Weight 108 kg (239 lb) Documented at 06/30/2024 1650              Intake/Output Summary (Last 24 hours) at 7/17/2024 0734  Last data filed at 7/17/2024 0538  Gross per 24 hour   Intake 120 ml   Output 920 ml   Net -800 ml          Telemetry: Atrial fibrillation with heart rate in the 90s    Physical Exam:  The patient is alert, oriented and in no distress.  Obese  Vital signs as noted above.  Head and neck revealed no carotid bruits or jugular venous distention.  No thyromegaly or lymphadenopathy is present  Lungs clear.  No wheezing.  Breath sounds are normal bilaterally.  Heart normal first and second heart sounds.  No murmur. No precordial rub is present.  No gallop is present.  Abdomen soft and nontender.  No organomegaly is present.  Extremities with good peripheral pulses without any pedal edema.  Skin warm and dry.  Musculoskeletal system is grossly normal.  CNS grossly normal.       Results Review:  I have personally reviewed the results from the time of this admission to 7/17/2024 07:34 EDT and " agree with these findings:  []  Laboratory  []  Microbiology  []  Radiology  []  EKG/Telemetry   []  Cardiology/Vascular   []  Pathology  []  Old records  []  Other:    Most notable findings include:    Lab Results (last 24 hours)       Procedure Component Value Units Date/Time    POC Glucose Finger 4x Daily Before Meals & at Bedtime [[id number]]  (Abnormal) Collected: 07/17/24 0707    Specimen: Blood from Finger Updated: 07/17/24 0710     Glucose 106 mg/dL      Comment: Serial Number: [id number]:  [id number]       Magnesium [[id number]]  (Normal) Collected: 07/17/24 0120    Specimen: Blood from Arm, Right Updated: 07/17/24 0205     Magnesium 1.7 mg/dL     Comprehensive Metabolic Panel [[id number]]  (Abnormal) Collected: 07/17/24 0120    Specimen: Blood from Arm, Right Updated: 07/17/24 0205     Glucose 101 mg/dL      BUN 22 mg/dL      Creatinine 0.92 mg/dL      Sodium 138 mmol/L      Potassium 4.5 mmol/L      Comment: Slight hemolysis detected by analyzer. Result may be falsely elevated.        Chloride 100 mmol/L      CO2 33.8 mmol/L      Calcium 8.3 mg/dL      Total Protein 5.3 g/dL      Albumin 2.0 g/dL      ALT (SGPT) 24 U/L      AST (SGOT) 45 U/L      Alkaline Phosphatase 127 U/L      Total Bilirubin 1.8 mg/dL      Globulin 3.3 gm/dL      A/G Ratio 0.6 g/dL      BUN/Creatinine Ratio 23.9     Anion Gap 4.2 mmol/L      eGFR 67.5 mL/min/1.73     Narrative:      GFR Normal >60  Chronic Kidney Disease <60  Kidney Failure <15      Phosphorus [[id number]]  (Abnormal) Collected: 07/17/24 0120    Specimen: Blood from Arm, Right Updated: 07/17/24 0201     Phosphorus 2.4 mg/dL     CBC & Differential [[id number]]  (Abnormal) Collected: 07/17/24 0120    Specimen: Blood from Arm, Right Updated: 07/17/24 0126    Narrative:      The following orders were created for panel order CBC & Differential.  Procedure                               Abnormality         Status                     ---------                                -----------         ------                     CBC Auto Differential[[id number]]        Abnormal            Final result                 Please view results for these tests on the individual orders.    CBC Auto Differential [[id number]]  (Abnormal) Collected: 07/17/24 0120    Specimen: Blood from Arm, Right Updated: 07/17/24 0126     WBC 10.15 10*3/mm3      RBC 3.15 10*6/mm3      Hemoglobin 9.6 g/dL      Hematocrit 30.7 %      MCV 97.5 fL      MCH 30.5 pg      MCHC 31.3 g/dL      RDW 16.4 %      RDW-SD 57.6 fl      MPV 11.3 fL      Platelets 157 10*3/mm3      Neutrophil % 82.2 %      Lymphocyte % 7.2 %      Monocyte % 8.2 %      Eosinophil % 1.3 %      Basophil % 0.1 %      Immature Grans % 1.0 %      Neutrophils, Absolute 8.35 10*3/mm3      Lymphocytes, Absolute 0.73 10*3/mm3      Monocytes, Absolute 0.83 10*3/mm3      Eosinophils, Absolute 0.13 10*3/mm3      Basophils, Absolute 0.01 10*3/mm3      Immature Grans, Absolute 0.10 10*3/mm3      nRBC 0.0 /100 WBC     POC Glucose Once [[id number]]  (Normal) Collected: 07/16/24 2017    Specimen: Blood Updated: 07/16/24 2019     Glucose 94 mg/dL      Comment: Serial Number: [id number]:  [id number]       POC Glucose Once [[id number]]  (Abnormal) Collected: 07/16/24 1626    Specimen: Blood Updated: 07/16/24 1628     Glucose 143 mg/dL      Comment: Serial Number: [id number]:  [id number]       Potassium [[id number]]  (Normal) Collected: 07/16/24 1402    Specimen: Blood from Arm, Right Updated: 07/16/24 1421     Potassium 4.3 mmol/L     POC Glucose Finger 4x Daily Before Meals & at Bedtime [[id number]]  (Normal) Collected: 07/16/24 1104    Specimen: Blood from Finger Updated: 07/16/24 1105     Glucose 94 mg/dL      Comment: Serial Number: [id number]:  [id number]       Wound Culture - Surgical Site, Chest, Right [[id number]]  (Abnormal) Collected: 07/13/24 1425    Specimen: Surgical Site from Chest, Right Updated: 07/16/24 0834     Wound Culture Light growth (2+)  Pseudomonas aeruginosa     Comment: Pip/tazo to follow        Gram Stain Rare (1+) WBCs per low power field      Few (2+) Gram negative bacilli            Imaging Results (Last 24 Hours)       Procedure Component Value Units Date/Time    XR Chest 1 View [[id number]] Collected: 07/17/24 0727     Updated: 07/17/24 0734    Narrative:      XR CHEST 1 VW    Date of Exam: 7/17/2024 3:16 AM EDT    Indication: CT    Comparison: 7/16/2024    Findings:  Right basilar pleural catheter remains. Minimal pneumothorax, in the apex and lateral inferior thorax. Stable enlargement of the cardiac silhouette and prominence of the central pulmonary vasculature. Stable left perihilar airspace consolidation. Stable   bibasilar airspace disease. Stable small bilateral pleural effusions      Impression:      Impression:    1. Trace right pneumothorax  2. Otherwise stable chest demonstrating cardiomegaly with pulmonary vascular congestion, small pleural effusions, left perihilar airspace consolidation, and bibasilar airspace disease which could be atelectasis or pneumonia      Electronically Signed: Betito Villeda    7/17/2024 7:32 AM EDT    Workstation ID: [id number]    XR Chest 1 View [[id number]] Collected: 07/16/24 0807     Updated: 07/16/24 0810    Narrative:      XR CHEST 1 VW    Date of Exam: 7/16/2024 5:48 AM EDT    Indication: CT    Comparison: 7/15/2024.    Findings:  Right chest tube is unchanged in position. There is no identifiable pneumothorax. There is continued mild bibasilar atelectasis with small effusions. There is stable severe cardiomegaly.      Impression:      Impression:  No appreciable change since 1 day prior.      Electronically Signed: Krystal Lee MD    7/16/2024 8:08 AM EDT    Workstation ID: [id number]            LAB RESULTS (LAST 7 DAYS)    CBC  Results from last 7 days   Lab Units 07/17/24  0120 07/16/24  0347 07/15/24  0256 07/14/24  0659 07/13/24  0240 07/12/24  0531 07/11/24  0437   WBC 10*3/mm3 10.15 10.10  11.06* 9.61 11.02* 10.25 11.69*   RBC 10*6/mm3 3.15* 3.16* 3.34* 3.70* 3.72* 3.89 4.10   HEMOGLOBIN g/dL 9.6* 9.7* 10.3* 11.2* 11.5* 11.9* 12.4   HEMATOCRIT % 30.7* 31.0* 32.2* 36.5 36.0 37.5 39.1   MCV fL 97.5* 98.1* 96.4 98.6* 96.8 96.4 95.4   PLATELETS 10*3/mm3 157 144 163 117* 112* 88* 93*       BMP  Results from last 7 days   Lab Units 07/17/24  0120 07/16/24  1402 07/16/24  0347 07/15/24  1617 07/15/24  0519 07/15/24  0256 07/14/24  1918 07/14/24  0659 07/13/24  0240 07/12/24  1619 07/12/24  0531 07/11/24  0437   SODIUM mmol/L 138  --  139  --  140  --   --  141 141  --  141 142   POTASSIUM mmol/L 4.5 4.3 3.6  --  4.3  --  4.3 3.4* 3.9   < > 3.5 4.5   CHLORIDE mmol/L 100  --  99  --  99  --   --  97* 97*  --  97* 95*   CO2 mmol/L 33.8*  --  35.4*  --  36.0*  --   --  38.3* 36.9*  --  38.5* 41.6*   BUN mg/dL 22  --  23  --  27*  --   --  30* 38*  --  39* 46*   CREATININE mg/dL 0.92  --  0.86  --  0.99  --   --  0.99 1.11*  --  0.99 1.08*   GLUCOSE mg/dL 101*  --  76  --  89  --   --  75 87  --  82 94   MAGNESIUM mg/dL 1.7  --  1.6  --  1.8  --   --  1.8 1.7  --  1.7 1.9   PHOSPHORUS mg/dL 2.4*  --  2.9 2.7  --  2.2*  --  2.5 2.4*  --  2.7 2.5    < > = values in this interval not displayed.       CMP   Results from last 7 days   Lab Units 07/17/24  0120 07/16/24  1402 07/16/24  0347 07/15/24  0519 07/14/24  1918 07/14/24  0659 07/13/24  0240 07/12/24  1619 07/12/24  0531 07/11/24  0437   SODIUM mmol/L 138  --  139 140  --  141 141  --  141 142   POTASSIUM mmol/L 4.5 4.3 3.6 4.3 4.3 3.4* 3.9   < > 3.5 4.5   CHLORIDE mmol/L 100  --  99 99  --  97* 97*  --  97* 95*   CO2 mmol/L 33.8*  --  35.4* 36.0*  --  38.3* 36.9*  --  38.5* 41.6*   BUN mg/dL 22  --  23 27*  --  30* 38*  --  39* 46*   CREATININE mg/dL 0.92  --  0.86 0.99  --  0.99 1.11*  --  0.99 1.08*   GLUCOSE mg/dL 101*  --  76 89  --  75 87  --  82 94   ALBUMIN g/dL 2.0*  --  1.9* 2.1*  --  2.1* 2.1*  --  1.9* 2.1*   BILIRUBIN mg/dL 1.8*  --  2.0* 2.6*   2.6*  --  2.7* 2.9*  --  2.8* 2.4*   ALK PHOS U/L 127*  --  108 117  --  115 96  --  94 85   AST (SGOT) U/L 45*  --  28 36*  --  43* 37*  --  45* 48*   ALT (SGPT) U/L 24  --  20 26  --  23 19  --  18 19   AMYLASE U/L  --   --  17*  --   --   --   --   --   --   --    LIPASE U/L  --   --  12*  --   --   --   --   --   --   --     < > = values in this interval not displayed.       BNP        TROPONIN          CoAg        Creatinine Clearance  Estimated Creatinine Clearance: 56.1 mL/min (by C-G formula based on SCr of 0.92 mg/dL).    ABG  Results from last 7 days   Lab Units 07/11/24  1045   PH, ARTERIAL pH units 7.539*   PCO2, ARTERIAL mm Hg 49.9*   PO2 ART mm Hg 66.3*   O2 SATURATION ART % 94.6   BASE EXCESS ART mmol/L 17.4*       Radiology  XR Chest 1 View    Result Date: 7/17/2024  Impression: 1. Trace right pneumothorax 2. Otherwise stable chest demonstrating cardiomegaly with pulmonary vascular congestion, small pleural effusions, left perihilar airspace consolidation, and bibasilar airspace disease which could be atelectasis or pneumonia Electronically Signed: Betito Villeda  7/17/2024 7:32 AM EDT  Workstation ID: [id number]    XR Chest 1 View    Result Date: 7/16/2024  Impression: No appreciable change since 1 day prior. Electronically Signed: Krystal Lee MD  7/16/2024 8:08 AM EDT  Workstation ID: [id number]    MRI Abdomen With & Without Contrast    Result Date: 7/15/2024  Impression: Multiseptated pancreatic lesion in the pancreatic neck measuring 2.7 x 2.6 cm. There is suggestion of internal septations as well as faint enhancement of the septa. Findings suspicious for cystic neoplasm. Brisk enhancement lesion in the anterior superior right hepatic lobe measures 0.9 cm, favoring flash filling hemangioma. Hypervascular metastasis is felt to be less likely but incomplete excluded. Consider short-term follow-up MRI to evaluate for change in size. 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 2.1 cm right adrenal myelolipoma. Small right pleural effusion. Moderate left pleural effusion. Electronically Signed: Gigi Beck MD  7/15/2024 8:38 PM EDT  Workstation ID: [id number]       EKG  I personally viewed and interpreted the patient's EKG/Telemetry data:  ECG 12 Lead Rhythm Change   Final Result   HEART RATE=92  bpm   RR Rpfkvbua=369  ms   NH Interval=  ms   P Horizontal Axis=  deg   P Front Axis=  deg   QRSD Zckadwcw=809  ms   QT Pylgekod=054  ms   WUnX=395  ms   QRS Axis=-96  deg   T Wave Axis=65  deg   - ABNORMAL ECG -   Atrial fibrillation   Right bundle branch block   When compared with ECG of 05-Jul-2024 02:24:17,   No significant change   Electronically Signed By: Tee Whitlock ([facility]) 2024-07-07 10:12:55   Date and Time of Study:2024-07-05 06:43:37      ECG 12 Lead Drug Monitoring; amiodarone   Final Result   HEART FLIO=525  bpm   RR Phyefeei=347  ms   NH Interval=  ms   P Horizontal Axis=  deg   P Front Axis=  deg   QRSD Dgqjxubs=710  ms   QT Lbnxuthf=287  ms   HMhL=357  ms   QRS Axis=-94  deg   T Wave Axis=70  deg   - ABNORMAL ECG -   Atrial fibrillation   Ventricular premature complex   Right bundle branch block   When compared with ECG of 04-Jul-2024 09:17:07,   No change from prior tracing   Electronically Signed By: Tee Whitlock ([facility]) 2024-07-07 10:13:32   Date and Time of Study:2024-07-05 02:24:17      ECG 12 Lead Electrolyte Imbalance   Final Result   HEART ECCF=525  bpm   RR Wposdgdq=168  ms   NH Qmankrbv=944  ms   P Horizontal Axis=113  deg   P Front Axis=263  deg   QRSD Iobvhzla=924  ms   QT Uuokjfxb=270  ms   GRhK=237  ms   QRS Axis=227  deg   T Wave Axis=27  deg   - ABNORMAL ECG -   Right bundle branch block   When compared with ECG of 04-Jul-2024 04:20:51,   Significant change in rhythm: previously atrial fibrillation   Significant repolarization change   Atrial fibrillation with rapid V-rate   No change from prior tracing   Electronically Signed By:  Tee Whitlock ([facility]) 2024-07-07 10:14:27   Date and Time of Study:2024-07-04 09:17:07      ECG 12 Lead Drug Monitoring; Amiodarone   Preliminary Result   HEART NNHA=607  bpm   RR Khkxmkxr=318  ms   MD Interval=  ms   P Horizontal Axis=  deg   P Front Axis=  deg   QRSD Clygteyh=323  ms   QT Jyeohxrv=768  ms   ETeL=193  ms   QRS Axis=-90  deg   T Wave Axis=36  deg   - ABNORMAL ECG -   Atrial fibrillation   Right bundle branch block   ST elevation secondary to IVCD   Date and Time of Study:2024-07-04 04:20:51      ECG 12 Lead Chest Pain   Preliminary Result   HEART CWIC=863  bpm   RR Fsxzbsxh=802  ms   MD Interval=  ms   P Horizontal Axis=  deg   P Front Axis=  deg   QRSD Fmnlixip=739  ms   QT Ehlwdjha=992  ms   TFcG=556  ms   QRS Axis=-98  deg   T Wave Axis=37  deg   - ABNORMAL ECG -   Atrial fibrillation   Right bundle branch block   Anterolateral infarct, old   Date and Time of Study:2024-07-03 17:38:52      ECG 12 Lead Drug Monitoring; Amiodarone   Preliminary Result   HEART PSRS=164  bpm   RR Aemuisir=948  ms   MD Interval=  ms   P Horizontal Axis=  deg   P Front Axis=  deg   QRSD Vjzztria=542  ms   QT Tfyvmoxq=973  ms   UOpD=344  ms   QRS Axis=-90  deg   T Wave Axis=74  deg   - ABNORMAL ECG -   Atrial fibrillation   Right bundle branch block   ST elevation secondary to IVCD   Date and Time of Study:2024-07-03 04:02:32      ECG 12 Lead Drug Monitoring; Amiodarone   Final Result   HEART CXMW=532  bpm   RR Kdppdeky=315  ms   MD Interval=  ms   P Horizontal Axis=  deg   P Front Axis=  deg   QRSD Labjhgaw=357  ms   QT Mopxpyzo=581  ms   JYrG=206  ms   QRS Axis=-93  deg   T Wave Axis=45  deg   - ABNORMAL ECG -   Atrial fibrillation   Right bundle branch block   Inferior  infarct, old   When compared with ECG of 01-Jul-2024 04:42:13,   Nonspecific significant change   Electronically Signed By: Librado Mcdermott ([facility]) 2024-07-10 12:58:26   Date and Time of Study:2024-07-02 15:12:56      ECG 12 Lead Drug Monitoring; Amiodarone    Final Result   HEART WKTK=579  bpm   RR Xgtkrsej=017  ms   [place] Interval=  ms   P Horizontal Axis=  deg   P Front Axis=  deg   QRSD Mxonazlv=686  ms   QT Qoleccjw=386  ms   MRtL=122  ms   QRS Axis=-80  deg   T Wave Axis=102  deg   - ABNORMAL ECG -   Atrial fibrillation   Right bundle branch block   Inferior  infarct, old   Anterolateral  infarct, age indeterminate   When compared with ECG of 30-Jun-2024 16:58:43,   Significant rate decrease   New or worsened ischemia or infarction   Electronically Signed By: Austin Brooks ([facility]) 2024-07-01 13:11:59   Date and Time of Study:2024-07-01 04:42:13      ECG 12 Lead Dyspnea   Final Result   HEART BQQS=719  bpm   RR Nlslopfb=758  ms   [place] Interval=  ms   P Horizontal Axis=  deg   P Front Axis=  deg   QRSD Mbdogazf=757  ms   QT Xvpjtijz=029  ms   ZRjV=743  ms   QRS Axis=-101  deg   T Wave Axis=43  deg   - ABNORMAL ECG -   Atrial fibrillation   Right bundle branch block   ST elevation secondary to IVCD   Electronically Signed By: Jeffery Kwon ([facility]) 2024-07-01 07:37:02   Date and Time of Study:2024-06-30 16:58:43      Telemetry Scan   Final Result      Telemetry Scan   Final Result      Telemetry Scan   Final Result      Telemetry Scan   Final Result      Telemetry Scan   Final Result      Telemetry Scan   Final Result      Telemetry Scan   Final Result      Telemetry Scan   Final Result      Telemetry Scan   Final Result      Telemetry Scan   Final Result      Telemetry Scan   Final Result      Telemetry Scan   Final Result      Telemetry Scan   Final Result      Telemetry Scan   Final Result      Telemetry Scan   Final Result      Telemetry Scan   Final Result      Telemetry Scan   Final Result      Telemetry Scan   Final Result      Telemetry Scan   Final Result      Telemetry Scan   Final Result      Telemetry Scan   Final Result      Telemetry Scan   Final Result      Telemetry Scan   Final Result      Telemetry Scan   Final Result      Telemetry Scan   Final Result       Telemetry Scan   Final Result      Telemetry Scan   Final Result      Telemetry Scan   Final Result      Telemetry Scan   Final Result      Telemetry Scan   Final Result      Telemetry Scan   Final Result      Telemetry Scan   Final Result      Telemetry Scan   Final Result      Telemetry Scan   Final Result      Telemetry Scan   Final Result      Telemetry Scan   Final Result      Telemetry Scan   Final Result      Telemetry Scan   Final Result      Telemetry Scan   Final Result      Telemetry Scan   Final Result      Telemetry Scan   Final Result      Telemetry Scan   Final Result      Telemetry Scan   Final Result      Telemetry Scan   Final Result      Telemetry Scan   Final Result      Telemetry Scan   Final Result      Telemetry Scan   Final Result      Telemetry Scan   Final Result      Telemetry Scan   Final Result      Telemetry Scan   Final Result      Telemetry Scan   Final Result      Telemetry Scan   Final Result      Telemetry Scan   Final Result      Telemetry Scan   Final Result      Telemetry Scan   Final Result      Telemetry Scan   Final Result      Telemetry Scan   Final Result      Telemetry Scan   Final Result      Telemetry Scan   Final Result      Telemetry Scan   Final Result      Telemetry Scan   Final Result      Telemetry Scan   Final Result      Telemetry Scan   Final Result      Telemetry Scan   Final Result      Telemetry Scan   Final Result      Telemetry Scan   Final Result      Telemetry Scan   Final Result      Telemetry Scan   Final Result      Telemetry Scan   Final Result      Telemetry Scan   Final Result      Telemetry Scan   Final Result      Telemetry Scan   Final Result      Telemetry Scan   Final Result      Telemetry Scan   Final Result      Telemetry Scan   Final Result      Telemetry Scan   Final Result      Telemetry Scan   Final Result      Telemetry Scan   Final Result      Telemetry Scan   Final Result      Telemetry Scan   Final Result      Telemetry Scan   Final  Result      Telemetry Scan   Final Result      Telemetry Scan   Final Result      Telemetry Scan   Final Result      Telemetry Scan   Final Result      Telemetry Scan   Final Result      Telemetry Scan   Final Result      Telemetry Scan   Final Result      Telemetry Scan   Final Result      Telemetry Scan   Final Result      Telemetry Scan   Final Result      ECG 12 Lead Electrolyte Imbalance    (Results Pending)         Echocardiogram:    Results for orders placed during the hospital encounter of 06/30/24    Adult Transthoracic Echo Complete w/ Color, Spectral and Contrast if Necessary Per Protocol    Interpretation Summary    Left ventricular ejection fraction appears to be 31 - 35%.    Left ventricular diastolic dysfunction is noted.    The left atrial cavity is dilated.    Moderate aortic valve stenosis is present. Mean/peak gradient of 14/24 mmHg.  Dimensionless index 0.36    Moderate to severe mitral valve regurgitation is present.    Estimated right ventricular systolic pressure from tricuspid regurgitation is normal (<35 mmHg).        Stress Test:         Cardiac Catheterization:  No results found for this or any previous visit.         Other:         ASSESSMENT & PLAN:    Principal Problem:    Atrial fibrillation with RVR  Active Problems:    Primary hypertension    Morbid obesity with BMI of 40.0-44.9, adult    Right bundle branch block    Acute deep vein thrombosis (DVT) of both lower extremities    ARABELLA (acute kidney injury)    Acute hyperkalemia    Sepsis    Acute UTI    Acute systolic congestive heart failure    Rhinovirus infection    Multifocal pneumonia    Chronic respiratory failure with hypoxia, on home O2 therapy    Skin ulcer of right great toe    Skin ulcer of left great toe    SEDRICK (obstructive sleep apnea)    Aortic stenosis    Mitral regurgitation    Acute HFrEF (heart failure with reduced ejection fraction)    COPD (chronic obstructive pulmonary disease)      Acute respiratory failure with  hypoxia and hypercapnia  Acute encephalopathy   Currently on 2-3 L of oxygen via nasal cannula  Chest tube in place for hydropneumothorax  Chest tube with still significant output  Pulmonology is managing.     Atrial fibrillation with RVR  Newly diagnosed  Electrolytes have been corrected  Rate controlled with heart rate in the 80s and 90s  Continue amiodarone for rhythm control X 6 weeks  Continue Toprol-XL 25 mg p.o. twice daily  Heart rate is well-controlled  [id number] score is 7  Continue Eliquis  Close eye on H&H due to rectal bleeding.  No plan for endoscopy     Acute systolic CHF  Newly diagnosed  Echocardiogram shows EF of 31 to 35%, moderate aortic stenosis and moderate to severe mitral regurgitation.  Currently on stable doses of Bumex  Monitor I's and O's and replace electrolytes as needed.  Switch from metoprolol to tartrate to succinate  Spaced out administration of metoprolol and Bumex due to low blood pressure  Unable to add ACE inhibitor/ARNI due to low blood pressure  Was requiring midodrine.  Now off.  Blood pressure is still borderline low  Aldactone has been held  Plan to repeat echocardiogram after 3 months of completing GDMT     Acute deep vein thrombosis (DVT) of both lower extremities  Extensive DVT in bilateral lower extremities involving femoral, popliteal and posterior tibial.  Continue anticoagulation as above  She will need lifelong anticoagulation   H&H has slowly dropped to 9.6/30.7 now     Thrombocytopenia  Closely monitor platelets while on anticoagulation  Platelets recovered 144 now     ARABELLA (acute kidney injury)  Presented with creatinine of 1.4.  Creatinine 0.9 to now  Closely monitor renal function while on diuretics  Continue p.o. Bumex  Nephrology following      Acute hyperkalemia  Treated with [facility]  Nephrology following      Sepsis / UTI / Multifocal pneumonia / Rhinovirus infection  Multifactorial secondary to UTI and pneumonia with possible cellulitis  Continue empiric  antibiotics      Skin ulcer of right great toe  Skin ulcer of left great toe  Podiatry following   A1c is 6.2%     Primary hypertension, chronic  Now on Toprol-XL  Unable to tolerate Aldactone due to low blood pressure.  Previously required midodrine.  Closely monitor blood pressure      Morbid obesity with BMI of 40.0-44.9, adult  BMI is 35.3.  She weighs 174 pounds.  Lifestyle modifications recommended   LDL 27, HDL 13, triglyceride 86 and total cholesterol 58.  No indication for statin     SEDRICK (obstructive sleep apnea)  Encouraged compliance with CPAP      Austin Brooks MD  07/17/24  07:34 EDT

## 2024-07-17 NOTE — PROGRESS NOTES
[facility] MEDICINE SERVICE  DAILY PROGRESS NOTE    NAME: Ban Brennan  : 1955  MRN: [id number]      LOS: 17 days     PROVIDER OF SERVICE: Luisito Power MD    Chief Complaint: Atrial fibrillation with RVR    Subjective:     Interval History:  History taken from: patient chart RN  Shortness of breath  No CP or SOB    Review of Systems:   Review of Systems  All negative except as above   Objective:     Vital Signs  Temp:  [97.8 °F (36.6 °C)-98.8 °F (37.1 °C)] 97.8 °F (36.6 °C)  Heart Rate:  [] 100  Resp:  [13-27] 14  BP: ()/(49-60) 95/57  Flow (L/min):  [3-332] 3   Body mass index is 35.98 kg/m².    Physical Exam  General: Alert and oriented, no acute distress. obese  HENT: Normocephalic, moist oral mucosa, no scleral icterus.  Neck: Supple, non-tender, no carotid bruits, no JVD, no LAD.  Lungs: Clear to auscultation, non-labored respiration. R sided chest tube  Heart: RRR, no murmur, gallop or edema.  Abdomen: Soft, hypogastric region soft tissue mass and tenderness, non-distended, + bowel sounds.  Musculoskeletal: Normal range of motion and strength, no tenderness or swelling.  Neuro: alert and awake, moving all 4 extremities   Skin: Skin is warm, dry and pink, no rashes or lesions.  Psychiatric: Cooperative, appropriate mood and affect.      Scheduled Meds   amiodarone, 200 mg, Oral, Q12H   Followed by  [START ON 2024] amiodarone, 200 mg, Oral, Daily  apixaban, 5 mg, Oral, BID  budesonide, 0.5 mg, Nebulization, BID - RT  bumetanide, 1 mg, Oral, Daily  hydrocortisone, 25 mg, Rectal, BID  insulin lispro, 2-7 Units, Subcutaneous, 4x Daily With Meals & Nightly  ipratropium-albuterol, 3 mL, Nebulization, 4x Daily - RT  lidocaine, 20 mL, Infiltration, Once  metoprolol succinate XL, 25 mg, Oral, Q12H  miconazole, 1 Application, Topical, Q12H  pantoprazole, 40 mg, Oral, BID AC  povidone-iodine, , Topical, Daily  sodium chloride, 10 mL, Intravenous, Q12H  [Held by provider]  spironolactone, 25 mg, Oral, Daily       PRN Meds     acetaminophen **OR** acetaminophen    Calcium Replacement - Follow Nurse / BPA Driven Protocol    dextrose    dextrose    glucagon (human recombinant)    HYDROcodone-acetaminophen    ipratropium-albuterol    Magnesium Standard Dose Replacement - Follow Nurse / BPA Driven Protocol    nitroglycerin    ondansetron ODT **OR** ondansetron    Phosphorus Replacement - Follow Nurse / BPA Driven Protocol    Potassium Replacement - Follow Nurse / BPA Driven Protocol    [COMPLETED] Insert Peripheral IV **AND** sodium chloride    sodium chloride    sodium chloride   Infusions         Diagnostic Data    Results from last 7 days   Lab Units 07/17/24  0120   WBC 10*3/mm3 10.15   HEMOGLOBIN g/dL 9.6*   HEMATOCRIT % 30.7*   PLATELETS 10*3/mm3 157   GLUCOSE mg/dL 101*   CREATININE mg/dL 0.92   BUN mg/dL 22   SODIUM mmol/L 138   POTASSIUM mmol/L 4.5   AST (SGOT) U/L 45*   ALT (SGPT) U/L 24   ALK PHOS U/L 127*   BILIRUBIN mg/dL 1.8*   ANION GAP mmol/L 4.2*       XR Chest 1 View    Result Date: 7/17/2024  Impression: 1. Trace right pneumothorax 2. Otherwise stable chest demonstrating cardiomegaly with pulmonary vascular congestion, small pleural effusions, left perihilar airspace consolidation, and bibasilar airspace disease which could be atelectasis or pneumonia Electronically Signed: Betito Villeda  7/17/2024 7:32 AM EDT  Workstation ID: [id number]    XR Chest 1 View    Result Date: 7/16/2024  Impression: No appreciable change since 1 day prior. Electronically Signed: Krystal Lee MD  7/16/2024 8:08 AM EDT  Workstation ID: [id number]    MRI Abdomen With & Without Contrast    Result Date: 7/15/2024  Impression: Multiseptated pancreatic lesion in the pancreatic neck measuring 2.7 x 2.6 cm. There is suggestion of internal septations as well as faint enhancement of the septa. Findings suspicious for cystic neoplasm. Brisk enhancement lesion in the anterior superior right hepatic lobe  measures 0.9 cm, favoring flash filling hemangioma. Hypervascular metastasis is felt to be less likely but incomplete excluded. Consider short-term follow-up MRI to evaluate for change in size. 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 2.1 cm right adrenal myelolipoma. Small right pleural effusion. Moderate left pleural effusion. Electronically Signed: Gigi Beck MD  7/15/2024 8:38 PM EDT  Workstation ID: [id number]       I reviewed the patient's new clinical results.  I reviewed the patient's new imaging results and agree with the interpretation.    Assessment/Plan:     Active and Resolved Problems  Active Hospital Problems    Diagnosis  POA    **Atrial fibrillation with RVR [I48.91]  Yes    COPD (chronic obstructive pulmonary disease) [J44.9]  Yes    Aortic stenosis [I35.0]  Yes    Mitral regurgitation [I34.0]  Yes    Acute HFrEF (heart failure with reduced ejection fraction) [I50.21]  Yes    Right bundle branch block [I45.10]  Yes    Acute deep vein thrombosis (DVT) of both lower extremities [I82.403]  Yes    ARABELLA (acute kidney injury) [N17.9]  Yes    Acute hyperkalemia [E87.5]  Yes    Sepsis [A41.9]  Yes    Acute UTI [N39.0]  Yes    Acute systolic congestive heart failure [I50.21]  Yes    Rhinovirus infection [B34.8]  Yes    Multifocal pneumonia [J18.9]  Yes    Chronic respiratory failure with hypoxia, on home O2 therapy [J96.11, Z99.81]  Not Applicable    Skin ulcer of right great toe [L97.519]  Yes    Skin ulcer of left great toe [L97.529]  Yes    SEDRICK (obstructive sleep apnea) [G47.33]  Yes    Primary hypertension [I10]  Yes    Morbid obesity with BMI of 40.0-44.9, adult [E66.01, Z68.41]  Not Applicable      Resolved Hospital Problems   No resolved problems to display.       69-year-old female with history of hypertension, HFrEF, lower extremity lymphedema admitted to [facility] 6/30 progressive shortness of breath        #Bilateral lower extremity DVT  #History  of reported PE  Acute left lower extremity DVT in the proximal femoral, mid femoral, distal femoral, popliteal, and posterior tibial   Seen by hematology appreciate recommendation.  Factor V and prothrombin gene mutation negative  Continue Eliquis 10 mg twice daily for 7 days followed by 5 mg twice daily  Outpatient follow-up with hematology- will need lifelong AC     #Acute on chronic HFrEF  #A-fib with RVR.  New onset  #Severe MR  Cardiology follow-up  Started on amiodarone GGT transitioned to p.o. tapering dose  TTE with EF 30 to 35%.  Defer ischemic workup to cardiology  On Toprol 25 twice daily with holding parameters   Bumex 1 mg daily  Monitor I's and O's  Hold Aldactone given soft BP   Lisinopril on hold given ARABELLA  On Eliquis    #Right-sided hydropneumothorax  Chest tube placed in ICU 7/6  Fluid culture from right chest tube growing gram-negative bacilli-will defer need for antibiotics to pulmonary  Monitor out put   Pulmonary following       #Severe sepsis  #UTI  #Left lower extremity cellulitis and wound  #Rhinovirus infection  #Multifocal pneumonia  #Chronic venous stasis lower extremity  Seen by infectious disease appreciate recommendations  Finished Keflex and doxycycline for 7 day course   Seen by podiatry no surgical intervention recommended  Midodrine has been weaned off monitor blood pressure     #Acute hypoxic respiratory failure  Multifactorial pneumonia CHF  Antibiotics and diuretics as above  Wean off supplemental oxygen as tolerated.     #DM2  A1c 6.14  Continue ISS lispro  POC ACHS     #ARABELLA  #Metabolic Acidosis   Suspect ATN in setting of sepsis   Diuretics as above  BMP daily  Avoid nephrotoxic drugs   Improved     #uterine mass  Large subserosal uterine fundal fibroid on pelvic ultrasound concerning for fibroid vs malignancy  Onco recommended GYN evaluation  GYN as outpatient     #hematochezia  GI on board noted plan for outpatient colonoscopy  Etiology suspected to be hemorrhoids  Topical  steroids started  Monitor H&H     # ?Pancreatic mass  - MRI pancreatic protocol ordered  - GI following  - outpatient endoscopic ultrasound to further evaluate pancreas lesion     # Panniculitis  - Hypogastric region soft mass, ttp  - GI evaluated and suspect due to panniculitis  - supportive care     VTE Prophylaxis:  Pharmacologic VTE prophylaxis orders are present.         Code status is   Code Status and Medical Interventions:   Ordered at: 06/30/24 2120     Code Status (Patient has no pulse and is not breathing):    CPR (Attempt to Resuscitate)     Medical Interventions (Patient has pulse or is breathing):    Full Support       Plan for disposition:TBD pending clinical progress     Time: 30 minutes    Signature: Electronically signed by Luisito Power MD, 07/17/24, 12:34 EDT.  [facility] Hospitalist Team

## 2024-07-17 NOTE — PLAN OF CARE
Goal Outcome Evaluation:  Plan of Care Reviewed With: patient        Progress: no change          Pt pleasant with care and interactions. VSS on 3L NC. Chest tube continues to put out serosanguinous fluid. Pt is being repositioned Q2 hrs. Drsg was changes to L thigh yesterday evening.      Daily Care Plan Summary: Heart Failure    Diuretic in use (IV or PO):   PO        Daily weight (up or down):    gain: 0.22lbs      Output > Intake (yes/no):Yes      O2 Requirements (current, any change?):  3 liters/min via nasal cannula      Symptoms noted with Activity (Respiratory Tolerance, functional state):     Pt experiences SOA when turning in bed. Chest tube still remains in place at this time.       Anticipated Discharge Plans:     Will D/C to [place] when chest tube is removed, and O2 needs are decreased.

## 2024-07-17 NOTE — DISCHARGE PLACEMENT REQUEST
"Ban Jones (69 y.o. Female)       Date of Birth   1955    Social Security Number       Address   [address] [place] [address] [place] [facility]    Home Phone   [phone]    MRN   [id number]       Yazdanism   None    Marital Status                               Admission Date   6/30/24    Admission Type   Emergency    Admitting Provider   Sesar Solorzano DO    Attending Provider   Luisito Power MD    Department, Room/Bed   [facility], 2117/1       Discharge Date       Discharge Disposition       Discharge Destination                                 Attending Provider: Luisito Power MD    Allergies: No Known Allergies    Isolation: Droplet, Contact   Infection: Rhinovirus  (07/01/24), MRSA No Isolation this Admit (07/08/24), CR Pseudomonas CRPA (07/17/24)   Code Status: CPR    Ht: 149.9 cm (59\")   Wt: 80.8 kg (178 lb 2.1 oz)    Admission Cmt: None   Principal Problem: Atrial fibrillation with RVR [I48.91]                   Active Insurance as of 6/30/2024       Primary Coverage       Payor Plan Insurance Group Employer/Plan Group    ANTHEM MEDICARE REPLACEMENT ANTHEM MEDICARE ADVANTAGE [id number]       Payor Plan Address Payor Plan Phone Number Payor Plan Fax Number Effective Dates    [address] [phone]  5/1/2024 - None Entered    [facility] [id number]         Subscriber Name Subscriber Birth Date Member ID       BAN JONES 1955 [id number]               Secondary Coverage       Payor Plan Insurance Group Employer/Plan Group    [place]        Payor Plan Address Payor Plan Phone Number Payor Plan Fax Number Effective Dates    [address]   10/15/2023 - None Entered    [place] [address]         Subscriber Name Subscriber Birth Date Member ID       BAN JONES 1955 [id number]                     Emergency Contacts        (Rel.) Home Phone Work Phone " Mobile Phone    Chelsi Valle (Daughter) -- -- [phone]    Jose Brennan (Son) -- -- [phone]                 History & Physical        Enedina Molina APRN at 24       Attestation signed by Sesar Solorzano DO at 24 1926    I have reviewed this documentation and agree.                    Critical Care History and Physical     Ban Brennan : 1955 MRN:[id number] LOS:0 ROOM:      Reason for admission: Atrial fibrillation with RVR     Assessment / Plan     Atrial fibrillation with RVR, new onset  Initial rhythm reported as wide-complex tachycardia with max heart rate 190s  Patient started on Cardizem in ED, switched to amiodarone due to borderline hypotension  Likely precipitated by hyperkalemia  Will check troponin  ECHO pending    Severe Sepsis: ( WBC>12 or <4 or >10% bands, HR>90, and Lactic acid>2 )  Urinary tract infection  Cellulitis bilateral lower extremities  Blood cultures pending  Ua + UTI; culture pending  RVP pending  MRSA swab pending  Fluid resuscitation contraindicated  Borderline hypotensive  Started on cefepime and vancomycin  CT bilateral lower extremities pending  Target MAP of 65     Fluid overload  Acute on chronic congestive heart failure, history unknown  Family reported history of CHF  BNP noted at 19,860  No history in chart or care everywhere, EF% unknown  Resume home furosemide  Echo pending  Avoid fluid overload    Acute kidney injury  Hyperkalemia  Creatinine 1.38, baseline unknown  Initial potassium 6.5; treated with sling/D50, calcium gluconate, and Lasix  IV fluids contraindicated  Avoid nephrotoxic agents  Avoid hypotension  Consider nephrology consult if no improvement    Essential hypertension  Hold home lisinopril and metoprolol as patient is borderline hypotensive  Resume when clinically appropriate    Nutrition:   NPO Diet NPO Type: Strict NPO     VTE Prophylaxis:  Pharmacologic VTE prophylaxis orders are present.          History of Present illness     Ban Brennan is a 69 y.o. female with known PMH of hypertension, heart failure, chronic bilateral lower extremity edema who presented to the hospital for creasing shortness of breath, and was admitted with a principal diagnosis of Atrial fibrillation with RVR.  Patient is alert however lethargic and attempts to participate with HPI however she is a poor historian.  Supplemental information for HPI taken from conversation with daughter at bedside who states patient does not go to the doctor normally and has not been in the hospital for years.  She is unsure of what her full medical history is at this time but states that her legs have been swollen for a long time and that she has not been up and moving for approximately 1 year.  Patient has a home health nurse that comes out and wraps her legs twice a week.  On assessment patient's legs are both extremely red and swollen, left > right.  Patient's daughter states that redness of her legs is new however she is not sure how long it has been this bad.  EMS was called when she had worsening shortness of breath and found the patient with a wide complex tachycardia with heart rate of 190s.  She was given 150 mg of amiodarone by EMS and heart rate improved to 160s.  She is found to have a right bundle branch block however it is uncertain due to her limited history if this is chronic.    ED course: On arrival to emergency department EKG showed atrial fibrillation with RVR, heart rate 145.  CXR showed fluid overload with bilateral effusions.  Lactic acid was 3.1 and BNP 19,860.  Patient had a white count of 16.8 and 4+ bacteria in her urine.  Also noted to be hyperkalemic with a potassium of 6.5 in which she was treated with insulin D50, calcium gluconate, and Lasix.  She was started on cefepime and vancomycin for cellulitis of her legs and UTI.    ACP: Patient is alert with some intermittent confusion.  Her daughter is at bedside and  is her decision-maker along with her brother.  Daughter states patient is a full code.    Patient was seen and examined on 06/30/24 at 21:22 EDT .    Subjective / Review of systems     Review of Systems   Constitutional:  Negative for chills and fever.   HENT:  Negative for congestion and sore throat.    Respiratory:  Positive for shortness of breath. Negative for cough.    Cardiovascular:  Positive for leg swelling. Negative for chest pain and palpitations.   Gastrointestinal:  Negative for abdominal pain and nausea.   Endocrine: Negative for heat intolerance and polyuria.   Genitourinary:  Negative for dysuria and urgency.   Musculoskeletal:  Positive for myalgias. Negative for arthralgias.   Skin:  Negative for rash and wound.   Neurological:  Positive for weakness. Negative for numbness.   Psychiatric/Behavioral:  Negative for suicidal ideas. The patient is not nervous/anxious.         Past Medical/Surgical/Social/Family History & Allergies     No past medical history on file.   No past surgical history on file.   Social History     Socioeconomic History    Marital status:       No family history on file.   No Known Allergies   Social Determinants of Health     Tobacco Use: Not on file (12/17/2017)   Alcohol Use: Not on file   Financial Resource Strain: Not on file   Food Insecurity: Not on file   Transportation Needs: Not on file   Physical Activity: Not on file   Stress: Not on file   Social Connections: Unknown (10/11/2023)    Family and Community Support     Help with Day-to-Day Activities: Not on file     Lonely or Isolated: Not on file   Interpersonal Safety: Not At Risk (6/30/2024)    Abuse Screen     Unsafe at Home or Work/School: no     Feels Threatened by Someone?: no     Does Anyone Keep You from Contacting Others or Doint Things Outside the Home?: no     Physical Sign of Abuse Present: no   Depression: Not on file   Housing Stability: Unknown (10/11/2023)    Housing Stability     Current  Living Arrangements: Not on file     Potentially Unsafe Housing Conditions: Not on file   Utilities: Not on file   Health Literacy: Unknown (10/11/2023)    Education     Help with school or training?: Not on file     Preferred Language: Not on file   Employment: Unknown (10/11/2023)    Employment     Do you want help finding or keeping work or a job?: Not on file   Disabilities: Unknown (10/11/2023)    Disabilities     Concentrating, Remembering, or Making Decisions Difficulty: Not on file     Doing Errands Independently Difficulty: Not on file        Home Medications     Prior to Admission medications    Not on File        Objective / Physical Exam     Vital signs:  Temp: 98.6 °F (37 °C)  BP: 114/65  Heart Rate: 104  Resp: 22  SpO2: 95 %  Weight: 108 kg (239 lb)    Admission Weight: Weight: 108 kg (239 lb)    Physical Exam  Constitutional:       Appearance: Normal appearance. She is morbidly obese. She is toxic-appearing.   HENT:      Head: Normocephalic.      Nose: Nose normal.      Mouth/Throat:      Mouth: Mucous membranes are moist.   Eyes:      Extraocular Movements: Extraocular movements intact.      Pupils: Pupils are equal, round, and reactive to light.   Cardiovascular:      Rate and Rhythm: Tachycardia present. Rhythm irregular.      Pulses: Normal pulses.      Heart sounds: Normal heart sounds.   Pulmonary:      Effort: Pulmonary effort is normal.      Breath sounds: Normal breath sounds. Decreased air movement present.   Abdominal:      General: Bowel sounds are normal.      Palpations: Abdomen is soft.   Musculoskeletal:         General: Normal range of motion.      Right lower le+      Left lower le+   Skin:     General: Skin is warm.      Coloration: Skin is pale.      Findings: Erythema present.      Comments: Severe erythema to bilateral lower extremities   Neurological:      Mental Status: She is easily aroused. She is lethargic and disoriented.   Psychiatric:         Attention and  Perception: She is inattentive.         Mood and Affect: Mood normal.         Behavior: Behavior normal.        Labs     Results from last 7 days   Lab Units 06/30/24  1858 06/30/24  1732   WBC 10*3/mm3  --  16.80*   HEMATOCRIT %  --  49.0*   HEMATOCRIT POC % 50  --    PLATELETS 10*3/mm3  --  119*      Results from last 7 days   Lab Units 06/30/24  2043 06/30/24  1858   SODIUM mmol/L 131*  --    POTASSIUM mmol/L 6.5*  --    CHLORIDE mmol/L 101  --    CO2 mmol/L 24.4  --    BUN mg/dL 50*  --    CREATININE mg/dL 1.38* 1.40*        Imaging     Chest X ray: My independent assessment showed vascular congestion, bilateral small effusions    EKG: My independent evaluation showed atrial fibrillation, RBBB, no ST -T changes    Current Medications     Scheduled Meds:  calcium gluconate, 1,000 mg, Intravenous, Once  enoxaparin, 1 mg/kg, Subcutaneous, Once  sodium chloride, 10 mL, Intravenous, Q12H  vancomycin, 20 mg/kg (Adjusted), Intravenous, Once         Continuous Infusions:  dilTIAZem, 5-15 mg/hr, Last Rate: 5 mg/hr (06/30/24 2008)       Patient continues to be critically ill, remains at risk of clinical deterioration or death and needed high complexity decision making. I have spent a total of 45 minutes providing critical care services to this patient including but not limited to: review of labs/ microbiology/imaging/medications, serial monitoring of vital signs, review of other consultant's notes, review of events in the last 24 hrs, monitoring input/output, review of treatment plan with bedside nurse, RT and other treatment team, management of life support and nutrition needs. I also spoke with patient daughter about the plan of care and answered all questions.    Time spent in performing separately billable procedures and updating family is not included in the critical care time.       MANJU Isaacs   Critical Care  06/30/24   21:22 EDT      Electronically signed by Sesar Solorzano DO at 07/01/24  [id number]

## 2024-07-18 ENCOUNTER — APPOINTMENT (OUTPATIENT)
Dept: GENERAL RADIOLOGY | Facility: HOSPITAL | Age: 69
End: 2024-07-18
Payer: MEDICARE

## 2024-07-18 LAB
ALBUMIN SERPL-MCNC: 2 G/DL (ref 3.5–5.2)
ALBUMIN/GLOB SERPL: 0.6 G/DL
ALP SERPL-CCNC: 128 U/L (ref 39–117)
ALT SERPL W P-5'-P-CCNC: 23 U/L (ref 1–33)
ANION GAP SERPL CALCULATED.3IONS-SCNC: 5.5 MMOL/L (ref 5–15)
AST SERPL-CCNC: 57 U/L (ref 1–32)
BASOPHILS # BLD AUTO: 0.01 10*3/MM3 (ref 0–0.2)
BASOPHILS NFR BLD AUTO: 0.1 % (ref 0–1.5)
BILIRUB SERPL-MCNC: 1.7 MG/DL (ref 0–1.2)
BUN SERPL-MCNC: 23 MG/DL (ref 8–23)
BUN/CREAT SERPL: 21.1 (ref 7–25)
CALCIUM SPEC-SCNC: 8.1 MG/DL (ref 8.6–10.5)
CHLORIDE SERPL-SCNC: 98 MMOL/L (ref 98–107)
CO2 SERPL-SCNC: 32.5 MMOL/L (ref 22–29)
CREAT SERPL-MCNC: 1.09 MG/DL (ref 0.57–1)
DEPRECATED RDW RBC AUTO: 58.7 FL (ref 37–54)
EGFRCR SERPLBLD CKD-EPI 2021: 55.1 ML/MIN/1.73
EOSINOPHIL # BLD AUTO: 0.13 10*3/MM3 (ref 0–0.4)
EOSINOPHIL NFR BLD AUTO: 1.5 % (ref 0.3–6.2)
ERYTHROCYTE [DISTWIDTH] IN BLOOD BY AUTOMATED COUNT: 16.4 % (ref 12.3–15.4)
GLOBULIN UR ELPH-MCNC: 3.1 GM/DL
GLUCOSE BLDC GLUCOMTR-MCNC: 108 MG/DL (ref 70–105)
GLUCOSE BLDC GLUCOMTR-MCNC: 137 MG/DL (ref 70–105)
GLUCOSE BLDC GLUCOMTR-MCNC: 84 MG/DL (ref 70–105)
GLUCOSE BLDC GLUCOMTR-MCNC: 90 MG/DL (ref 70–105)
GLUCOSE SERPL-MCNC: 98 MG/DL (ref 65–99)
HCT VFR BLD AUTO: 29.5 % (ref 34–46.6)
HGB BLD-MCNC: 9.3 G/DL (ref 12–15.9)
IMM GRANULOCYTES # BLD AUTO: 0.06 10*3/MM3 (ref 0–0.05)
IMM GRANULOCYTES NFR BLD AUTO: 0.7 % (ref 0–0.5)
LYMPHOCYTES # BLD AUTO: 0.75 10*3/MM3 (ref 0.7–3.1)
LYMPHOCYTES NFR BLD AUTO: 8.4 % (ref 19.6–45.3)
MAGNESIUM SERPL-MCNC: 1.6 MG/DL (ref 1.6–2.4)
MCH RBC QN AUTO: 30.8 PG (ref 26.6–33)
MCHC RBC AUTO-ENTMCNC: 31.5 G/DL (ref 31.5–35.7)
MCV RBC AUTO: 97.7 FL (ref 79–97)
MONOCYTES # BLD AUTO: 0.78 10*3/MM3 (ref 0.1–0.9)
MONOCYTES NFR BLD AUTO: 8.8 % (ref 5–12)
NEUTROPHILS NFR BLD AUTO: 7.15 10*3/MM3 (ref 1.7–7)
NEUTROPHILS NFR BLD AUTO: 80.5 % (ref 42.7–76)
NRBC BLD AUTO-RTO: 0 /100 WBC (ref 0–0.2)
PHOSPHATE SERPL-MCNC: 2.9 MG/DL (ref 2.5–4.5)
PLATELET # BLD AUTO: 157 10*3/MM3 (ref 140–450)
PMV BLD AUTO: 10.9 FL (ref 6–12)
POTASSIUM SERPL-SCNC: 4 MMOL/L (ref 3.5–5.2)
PROT SERPL-MCNC: 5.1 G/DL (ref 6–8.5)
QT INTERVAL: 395 MS
QT INTERVAL: 405 MS
QT INTERVAL: 428 MS
QTC INTERVAL: 528 MS
QTC INTERVAL: 547 MS
QTC INTERVAL: 586 MS
RBC # BLD AUTO: 3.02 10*6/MM3 (ref 3.77–5.28)
SODIUM SERPL-SCNC: 136 MMOL/L (ref 136–145)
WBC NRBC COR # BLD AUTO: 8.88 10*3/MM3 (ref 3.4–10.8)

## 2024-07-18 PROCEDURE — 85025 COMPLETE CBC W/AUTO DIFF WBC: CPT | Performed by: NURSE PRACTITIONER

## 2024-07-18 PROCEDURE — 80053 COMPREHEN METABOLIC PANEL: CPT | Performed by: NURSE PRACTITIONER

## 2024-07-18 PROCEDURE — 97535 SELF CARE MNGMENT TRAINING: CPT

## 2024-07-18 PROCEDURE — 71045 X-RAY EXAM CHEST 1 VIEW: CPT

## 2024-07-18 PROCEDURE — 84100 ASSAY OF PHOSPHORUS: CPT | Performed by: NURSE PRACTITIONER

## 2024-07-18 PROCEDURE — 94799 UNLISTED PULMONARY SVC/PX: CPT

## 2024-07-18 PROCEDURE — 94761 N-INVAS EAR/PLS OXIMETRY MLT: CPT

## 2024-07-18 PROCEDURE — 94664 DEMO&/EVAL PT USE INHALER: CPT

## 2024-07-18 PROCEDURE — 83735 ASSAY OF MAGNESIUM: CPT | Performed by: NURSE PRACTITIONER

## 2024-07-18 PROCEDURE — 97110 THERAPEUTIC EXERCISES: CPT

## 2024-07-18 PROCEDURE — 97551 CAREGIVER TRAING EA ADDL 15: CPT

## 2024-07-18 PROCEDURE — 97530 THERAPEUTIC ACTIVITIES: CPT

## 2024-07-18 PROCEDURE — 82948 REAGENT STRIP/BLOOD GLUCOSE: CPT | Performed by: HOSPITALIST

## 2024-07-18 PROCEDURE — 99232 SBSQ HOSP IP/OBS MODERATE 35: CPT | Performed by: INTERNAL MEDICINE

## 2024-07-18 PROCEDURE — 82948 REAGENT STRIP/BLOOD GLUCOSE: CPT

## 2024-07-18 RX ORDER — BUMETANIDE 1 MG/1
1 TABLET ORAL
Status: DISCONTINUED | OUTPATIENT
Start: 2024-07-18 | End: 2024-07-23 | Stop reason: HOSPADM

## 2024-07-18 RX ADMIN — IPRATROPIUM BROMIDE AND ALBUTEROL SULFATE 3 ML: .5; 3 SOLUTION RESPIRATORY (INHALATION) at 15:47

## 2024-07-18 RX ADMIN — ANTI-FUNGAL POWDER MICONAZOLE NITRATE TALC FREE 1 APPLICATION: 1.42 POWDER TOPICAL at 22:19

## 2024-07-18 RX ADMIN — IPRATROPIUM BROMIDE AND ALBUTEROL SULFATE 3 ML: .5; 3 SOLUTION RESPIRATORY (INHALATION) at 11:26

## 2024-07-18 RX ADMIN — Medication 10 ML: at 22:19

## 2024-07-18 RX ADMIN — APIXABAN 5 MG: 5 TABLET, FILM COATED ORAL at 22:17

## 2024-07-18 RX ADMIN — BUMETANIDE 1 MG: 1 TABLET ORAL at 17:19

## 2024-07-18 RX ADMIN — BUDESONIDE 0.5 MG: 0.5 INHALANT RESPIRATORY (INHALATION) at 07:15

## 2024-07-18 RX ADMIN — IPRATROPIUM BROMIDE AND ALBUTEROL SULFATE 3 ML: .5; 3 SOLUTION RESPIRATORY (INHALATION) at 07:10

## 2024-07-18 RX ADMIN — IPRATROPIUM BROMIDE AND ALBUTEROL SULFATE 3 ML: .5; 3 SOLUTION RESPIRATORY (INHALATION) at 19:57

## 2024-07-18 RX ADMIN — AMIODARONE HYDROCHLORIDE 200 MG: 200 TABLET ORAL at 17:19

## 2024-07-18 RX ADMIN — BUDESONIDE 0.5 MG: 0.5 INHALANT RESPIRATORY (INHALATION) at 20:03

## 2024-07-18 RX ADMIN — AMIODARONE HYDROCHLORIDE 200 MG: 200 TABLET ORAL at 04:57

## 2024-07-18 RX ADMIN — HYDROCORTISONE ACETATE 25 MG: 25 SUPPOSITORY RECTAL at 08:22

## 2024-07-18 RX ADMIN — POVIDONE-IODINE: 10 SOLUTION TOPICAL at 08:36

## 2024-07-18 RX ADMIN — BUMETANIDE 1 MG: 1 TABLET ORAL at 08:22

## 2024-07-18 RX ADMIN — METOPROLOL SUCCINATE 25 MG: 25 TABLET, FILM COATED, EXTENDED RELEASE ORAL at 08:22

## 2024-07-18 RX ADMIN — Medication 10 ML: at 08:22

## 2024-07-18 RX ADMIN — APIXABAN 5 MG: 5 TABLET, FILM COATED ORAL at 08:22

## 2024-07-18 RX ADMIN — METOPROLOL SUCCINATE 25 MG: 25 TABLET, FILM COATED, EXTENDED RELEASE ORAL at 22:17

## 2024-07-18 RX ADMIN — HYDROCODONE BITARTRATE AND ACETAMINOPHEN 1 TABLET: 10; 325 TABLET ORAL at 13:11

## 2024-07-18 RX ADMIN — PANTOPRAZOLE SODIUM 40 MG: 40 TABLET, DELAYED RELEASE ORAL at 08:22

## 2024-07-18 RX ADMIN — PANTOPRAZOLE SODIUM 40 MG: 40 TABLET, DELAYED RELEASE ORAL at 17:19

## 2024-07-18 RX ADMIN — ANTI-FUNGAL POWDER MICONAZOLE NITRATE TALC FREE 1 APPLICATION: 1.42 POWDER TOPICAL at 08:23

## 2024-07-18 RX ADMIN — HYDROCORTISONE ACETATE 25 MG: 25 SUPPOSITORY RECTAL at 22:17

## 2024-07-18 NOTE — PROGRESS NOTES
"    PROGRESS NOTE      Patient Name: Ban Brennan  : 1955  MRN: [id number]  Primary Care Physician: Rut Pierce APRN  Date of admission: 2024    Patient Care Team:  Rut Pierce APRN as PCP - General (Nurse Practitioner)  Austin Brooks MD as Cardiologist (Cardiology)        Reason for Follow up     Acute kidney injury, hyperkalemia      Subjective     Seen and examined, NAD noted, renal functions stable, worsening bicarb level, ordered ABG, good UOP, 1.3L     Review of systems:    ROS was otherwise negative except as mentioned in the [place].       Personal History:     Past Medical History:   Past Medical History:   Diagnosis Date    Bilateral leg edema     Chronic respiratory failure with hypoxia, on home O2 therapy 2024    COPD (chronic obstructive pulmonary disease)     Morbid obesity with BMI of 40.0-44.9, adult 2010    Primary hypertension 2017    Pulmonary embolism     \"many years ago, was on warfarin\"    Sleep apnea        Surgical History:    History reviewed. No pertinent surgical history.    Family History: family history is not on file. Otherwise pertinent FHx was reviewed and unremarkable.     Social History:  reports that she has never smoked. She has never used smokeless tobacco. She reports that she does not drink alcohol and does not use drugs.    Medications:  Prior to Admission medications    Medication Sig Start Date End Date Taking? Authorizing Provider   Allergy Relief 180 MG tablet Take 1 tablet by mouth Daily. 24  Yes ProviderChadwick MD   bumetanide (BUMEX) 1 MG tablet Take 1 tablet by mouth 3 times a day. 24  Yes ProviderChadwick MD   docusate sodium (COLACE) 100 MG capsule Take 1 capsule by mouth Every 12 (Twelve) Hours. 24  Yes ProviderChadwick MD   furosemide (LASIX) 20 MG tablet Take 1 tablet by mouth Daily.   Yes ProviderChadwick MD   HYDROcodone-acetaminophen (NORCO)  MG per tablet Take 1 " tablet by mouth 3 times a day. 5/30/24  Yes Chadwick Johnson MD   lisinopril (PRINIVIL,ZESTRIL) 30 MG tablet Take 1 tablet by mouth Daily. 3/18/24  Yes Chadwick Johnson MD   meloxicam (MOBIC) 7.5 MG tablet Take 1 tablet by mouth Daily As Needed. 3/18/24  Yes Chadwick Johnson MD   metoprolol tartrate (LOPRESSOR) 50 MG tablet Take 1 tablet by mouth Daily.   Yes Chadwick Johnson MD   montelukast (SINGULAIR) 10 MG tablet Take 1 tablet by mouth every night at bedtime.   Yes Chadwick Johnson MD   omeprazole (priLOSEC) 20 MG capsule Take 1 capsule by mouth Daily. 3/18/24  Yes Chadwick Johnson MD   oxybutynin XL (DITROPAN-XL) 10 MG 24 hr tablet Take 2 tablets by mouth Daily. 3/18/24  Yes Chadwick Johnson MD   potassium chloride (MICRO-K) 10 MEQ CR capsule Take 1 capsule by mouth Every 12 (Twelve) Hours. 3/18/24  Yes Chadwick Johnson MD   vitamin D (ERGOCALCIFEROL) 1.25 MG (35124 UT) capsule capsule Take 1 capsule by mouth 2 (Two) Times a Week. Monday and Thursday. 5/30/24  Yes Chadwick Johnson MD     Scheduled Meds:amiodarone, 200 mg, Oral, Q12H   Followed by  [START ON 7/23/2024] amiodarone, 200 mg, Oral, Daily  apixaban, 5 mg, Oral, BID  budesonide, 0.5 mg, Nebulization, BID - RT  bumetanide, 1 mg, Oral, Daily  hydrocortisone, 25 mg, Rectal, BID  insulin lispro, 2-7 Units, Subcutaneous, 4x Daily With Meals & Nightly  ipratropium-albuterol, 3 mL, Nebulization, 4x Daily - RT  lidocaine, 20 mL, Infiltration, Once  metoprolol succinate XL, 25 mg, Oral, Q12H  miconazole, 1 Application, Topical, Q12H  pantoprazole, 40 mg, Oral, BID AC  povidone-iodine, , Topical, Daily  sodium chloride, 10 mL, Intravenous, Q12H  [Held by provider] spironolactone, 25 mg, Oral, Daily      Continuous Infusions:     PRN Meds:  acetaminophen **OR** acetaminophen    Calcium Replacement - Follow Nurse / BPA Driven Protocol    dextrose    dextrose    glucagon (human recombinant)     HYDROcodone-acetaminophen    ipratropium-albuterol    Magnesium Standard Dose Replacement - Follow Nurse / BPA Driven Protocol    nitroglycerin    ondansetron ODT **OR** ondansetron    Phosphorus Replacement - Follow Nurse / BPA Driven Protocol    Potassium Replacement - Follow Nurse / BPA Driven Protocol    [COMPLETED] Insert Peripheral IV **AND** sodium chloride    sodium chloride    sodium chloride  Allergies:  No Known Allergies    Objective   Exam:     Vital Signs  Temp:  [97.3 °F (36.3 °C)-98.5 °F (36.9 °C)] 98.2 °F (36.8 °C)  Heart Rate:  [] 85  Resp:  [14-25] 19  BP: ()/(49-67) 89/53  SpO2:  [92 %-100 %] 100 %  on  Flow (L/min):  [2-3] 2;   Device (Oxygen Therapy): nasal cannula  Body mass index is 35.27 kg/m².  EXAM  General:  69 yr old  female, some respiratory distress  Head:      Normocephalic and atraumatic.    Eyes:      PERRL/EOM intact, conjunctivae and sclerae clear without nystagmus.    Neck:      No masses, thyromegaly,  trachea central   Lungs:    Clear bilaterally to auscultation.    Heart:      Regular rate and rhythm, no murmur no gallop  Abd:        Soft, nontender, not distended, bowel sounds positive, no shifting dullness.  Msk:        No deformity or scoliosis noted of thoracic or lumbar spine.    Pulses:   Pulses normal in all 4 extremities.    Extremities:        No cyanosis or clubbing--+ + edema.    Neuro:    Lethargic  Skin:       Intact without lesions or rashes.          Results Review:  I have personally reviewed most recent Data :  BMP @LABRCNT(creatinine:10)  CBC    Results from last 7 days   Lab Units 07/18/24  0205 07/17/24  0120 07/16/24  0347 07/15/24  0256 07/14/24  0659 07/13/24  0240 07/12/24  0531   WBC 10*3/mm3 8.88 10.15 10.10 11.06* 9.61 11.02* 10.25   HEMOGLOBIN g/dL 9.3* 9.6* 9.7* 10.3* 11.2* 11.5* 11.9*   PLATELETS 10*3/mm3 157 157 144 163 117* 112* 88*     CMP   Results from last 7 days   Lab Units 07/18/24  0205 07/17/24  0120 07/16/24  1402  07/16/24  0347 07/15/24  0519 07/14/24  1918 07/14/24  0659 07/13/24  0240 07/12/24  1619 07/12/24  0531   SODIUM mmol/L 136 138  --  139 140  --  141 141  --  141   POTASSIUM mmol/L 4.0 4.5 4.3 3.6 4.3 4.3 3.4* 3.9   < > 3.5   CHLORIDE mmol/L 98 100  --  99 99  --  97* 97*  --  97*   CO2 mmol/L 32.5* 33.8*  --  35.4* 36.0*  --  38.3* 36.9*  --  38.5*   BUN mg/dL 23 22  --  23 27*  --  30* 38*  --  39*   CREATININE mg/dL 1.09* 0.92  --  0.86 0.99  --  0.99 1.11*  --  0.99   GLUCOSE mg/dL 98 101*  --  76 89  --  75 87  --  82   ALBUMIN g/dL 2.0* 2.0*  --  1.9* 2.1*  --  2.1* 2.1*  --  1.9*   BILIRUBIN mg/dL 1.7* 1.8*  --  2.0* 2.6*  2.6*  --  2.7* 2.9*  --  2.8*   ALK PHOS U/L 128* 127*  --  108 117  --  115 96  --  94   AST (SGOT) U/L 57* 45*  --  28 36*  --  43* 37*  --  45*   ALT (SGPT) U/L 23 24  --  20 26  --  23 19  --  18   AMYLASE U/L  --   --   --  17*  --   --   --   --   --   --    LIPASE U/L  --   --   --  12*  --   --   --   --   --   --     < > = values in this interval not displayed.     ABG    Results from last 7 days   Lab Units 07/11/24  1045   PH, ARTERIAL pH units 7.539*   PCO2, ARTERIAL mm Hg 49.9*   PO2 ART mm Hg 66.3*   O2 SATURATION ART % 94.6   BASE EXCESS ART mmol/L 17.4*     XR Chest 1 View    Result Date: 7/17/2024  Impression: 1. Trace right pneumothorax 2. Otherwise stable chest demonstrating cardiomegaly with pulmonary vascular congestion, small pleural effusions, left perihilar airspace consolidation, and bibasilar airspace disease which could be atelectasis or pneumonia Electronically Signed: Betito Villeda  7/17/2024 7:32 AM EDT  Workstation ID: [id number]    XR Chest 1 View    Result Date: 7/16/2024  Impression: No appreciable change since 1 day prior. Electronically Signed: Krystal Lee MD  7/16/2024 8:08 AM EDT  Workstation ID: [id number]    MRI Abdomen With & Without Contrast    Result Date: 7/15/2024  Impression: Multiseptated pancreatic lesion in the pancreatic neck measuring  2.7 x 2.6 cm. There is suggestion of internal septations as well as faint enhancement of the septa. Findings suspicious for cystic neoplasm. Brisk enhancement lesion in the anterior superior right hepatic lobe measures 0.9 cm, favoring flash filling hemangioma. Hypervascular metastasis is felt to be less likely but incomplete excluded. Consider short-term follow-up MRI to evaluate for change in size. 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 2.1 cm right adrenal myelolipoma. Small right pleural effusion. Moderate left pleural effusion. Electronically Signed: Gigi Beck MD  7/15/2024 8:38 PM EDT  Workstation ID: [id number]    XR Chest 1 View    Result Date: 7/15/2024  Impression: Stable chest with cardiomegaly, moderate bilateral effusion and bibasilar consolidations Electronically Signed: Eliu High MD  7/15/2024 7:56 AM EDT  Workstation ID: [id number]    CT Chest Without Contrast Diagnostic    Result Date: 7/14/2024  Impression: 1.Persistent moderate-sized right pleural effusion despite indwelling pleural catheter. Tiny right pneumothorax. Improved aeration of the right lung with persistent right middle/lower lobe volume loss and consolidation. 2.Stable smaller left pleural effusion with increased left lower lobe volume loss and consolidation. 3.Stable massive cardiomegaly. 4.Included upper abdominal findings of pancreatic mass, splenic infarct, and cholelithiasis, as described on recent CT abdomen/pelvis. Electronically Signed: Elise Castro  7/14/2024 11:44 AM EDT  Workstation ID: [id number]    XR Chest 1 View    Result Date: 7/14/2024  Impression: 1.Right basilar pleural catheter remains in place. No visible pneumothorax. 2.Persistent bilateral perihilar and bibasilar airspace opacities, increased in the right lower lung compared to yesterday's chest x-ray. Electronically Signed: Elise Castro  7/14/2024 9:59 AM EDT  Workstation ID: [id number]    CT Abdomen Pelvis  Without Contrast    Result Date: 7/13/2024  1. Colonic diverticulosis without evidence of diverticulitis. 2. There is a stable 3 cm soft tissue mass of the pancreas. Pancreatic mass protocol MRI of the abdomen recommended. 3. Focal splenic infarct 4. Small bilateral pleural effusions with trace right-sided pneumothorax. 5. Cholelithiasis without evidence of cholecystitis. Electronically Signed: Eamon Brock MD  7/13/2024 1:51 PM EDT  Workstation ID: [id number]    XR Chest 1 View    Result Date: 7/13/2024  Impression: 1. Stable chest tube overlying the right lung base. No pneumothorax. 2. Persistent small bilateral pleural effusions with bibasilar airspace disease which may relate to atelectasis and/or pneumonia. 3. Stable cardiomegaly with central pulmonary vascular congestion and findings of pulmonary edema. Electronically Signed: Mina Wheeler MD  7/13/2024 11:24 AM EDT  Workstation ID: [id number]    XR Chest 1 View    Result Date: 7/12/2024  Impression: 1.Right basilar chest tube appears in similar position. No definite pneumothorax. 2.Patchy basilar predominant airspace opacities and possible small pleural effusions, as before. 3.Stable cardiomegaly. Electronically Signed: Tanvir Melgar  7/12/2024 7:18 AM EDT  Workstation ID: [id number]    XR Chest 1 View    Result Date: 7/11/2024  Impression: Similar position of right-sided chest tube. No discrete pneumothorax. Similar small bilateral pleural effusions and bilateral airspace opacities, left greater than right. Electronically Signed: Manan Jefferson MD  7/11/2024 7:48 AM EDT  Workstation ID: [id number]     Results for orders placed during the hospital encounter of 06/30/24    Adult Transthoracic Echo Complete w/ Color, Spectral and Contrast if Necessary Per Protocol    Interpretation Summary    Left ventricular ejection fraction appears to be 31 - 35%.    Left ventricular diastolic dysfunction is noted.    The left atrial cavity is dilated.    Moderate aortic valve stenosis  is present. Mean/peak gradient of 14/24 mmHg.  Dimensionless index 0.36    Moderate to severe mitral valve regurgitation is present.    Estimated right ventricular systolic pressure from tricuspid regurgitation is normal (<35 mmHg).        Assessment & Plan   Assessment and Plan:         Atrial fibrillation with RVR    Primary hypertension    Morbid obesity with BMI of 40.0-44.9, adult    Right bundle branch block    Acute deep vein thrombosis (DVT) of both lower extremities    ARABELLA (acute kidney injury)    Acute hyperkalemia    Sepsis    Acute UTI    Acute systolic congestive heart failure    Rhinovirus infection    Multifocal pneumonia    Chronic respiratory failure with hypoxia, on home O2 therapy    Skin ulcer of right great toe    Skin ulcer of left great toe    SEDRICK (obstructive sleep apnea)    Aortic stenosis    Mitral regurgitation    Acute HFrEF (heart failure with reduced ejection fraction)    COPD (chronic obstructive pulmonary disease)    ASSESSMENT:  Acute kidney injury, with baseline creatinine roughly 0.8-1.0mg/dL  Hyperkalemia  A-fib with RVR  Acute on chronic heart failure, with EF 31-35%, diastolic dysfunction, moderate AS and moderate to severe MR as per echo from 6/30/24  Bilateral DVT  Severe sepsis/LLE cellulitis, acute cystitis  Multifocal pneumonia    PLAN :     Renal function unchanged/stable today from prior reading. Suspect she may initially have had some acute cardiorenal component, now most likely has some ATN secondary to sepsis, elevated Vanc level (was 22.4 on 7/3), and some prolonged prerenal state with hemodynamic insults, arrhythmias. Had no significant proteinuria  Chest tube is out at this time  Electrolytes and renal functions are acceptable  Patient still has significant edema will increase Bumex to twice a day follow-up  Patient renal functions are stable at this time  Creatinine still 0.9.  Still has some peripheral edema but improving  Blood pressure reviewed, stable. Continue  to monitor   Now not on any abx  Daily labs   We will continue to follow       Charan Krueger MD  [facility]  7/18/2024  09:54 EDT

## 2024-07-18 NOTE — PLAN OF CARE
"Assessment: Ban Brennan presents with ADL impairments affecting function including balance, endurance / activity tolerance, and strength. Demonstrated functioning below baseline abilities indicate the need for continued skilled intervention while inpatient. Tolerating session today without incident. PT unable to transfer to chair this date with reports of lightheadedness upon attempts. She did however complete several sit to stands with Mod Ax2 and grooming while in seated position. OT spoke to POA daughter about exercises to complete on times outside of that designated to therapy. Will continue to follow and progress as tolerated.      Plan/Recommendations:   Moderate Intensity Therapy recommended post-acute care. This is recommended as therapy feels the patient would require 3-4 days per week and wouldn't tolerate \"3 hour daily\" rehab intensity. SNF would be the preferred choice. If the patient does not agree to SNF, arrange HH or OP depending on home bound status. If patient is medically complex, consider LTACH.. Pt requires no DME at discharge.      Pt desires Skilled Rehab placement at discharge. Pt cooperative; agreeable to therapeutic recommendations and plan of care.     "

## 2024-07-18 NOTE — PLAN OF CARE
Goal Outcome Evaluation:              Outcome Evaluation: Pt a/ox4. Chest tube was removed this morning. Pt continues to complain of abd pain, prn meds given. Pt remains on 2L O2.

## 2024-07-18 NOTE — SIGNIFICANT NOTE
07/18/24 1416   Post Acute Pre-Cert #2 Documentation   Accepting Facility [facility] (update reflecting change in facility, see comment).   Post Acute Pre-Cert Initiated Comment LSW contacted Tim ([phone], spoke to Carmen) to change SNF request previously approved for [facility] to [facility]. Per Carmen, request was able to be change to [facility], with precert valid through admission through 7/19/24. [facility] portal auth page updated to [place] per LSW review. Requested call reference number, but Carmen reports not supplying these. CM made aware.

## 2024-07-18 NOTE — PLAN OF CARE
Goal Outcome Evaluation:  Plan of Care Reviewed With: patient        Progress: no change     Pt resting well throughout the night. She received a bath before bed as well as a pain pill d/t increased pain located in her lower abd. PRN pain medications were effective. Morning labs were stable. Pt refusing multiple turns throughout the night. She was educated on the importance of turning to aid in healing to her sacral/coccyx wounds but she stated that she was comfortable the way she was.       Daily Care Plan Summary: Heart Failure    Diuretic in use (IV or PO):   IV        Daily weight (up or down):    loss: 3.52lbs      Output > Intake (yes/no):Yes      O2 Requirements (current, any change?):  2 liters/min via nasal cannula      Symptoms noted with Activity (Respiratory Tolerance, functional state):     SOA with exertion. Chest tube remains in place.      Anticipated Discharge Plans:     TBD based on removal of chest tube.

## 2024-07-18 NOTE — THERAPY TREATMENT NOTE
"Subjective: Pt agreeable to therapeutic plan of care.    Objective:     Bed mobility - Mod-A, Max-A, and Assist x 2  Transfers - Mod-A, Assist x 2, and with rolling walker; max-mod A x2 with B HHA  Ambulation -  N/A or Not attempted.    Therapeutic Exercise - 5 Reps- 10 reps B LE AROM lying supine and unsupported sitting / EOB    Vitals: WNL    Pain: Pt did not rate pain  Intervention for pain: N/A    Education: Provided education on the importance of mobility in the acute care setting, Verbal/Tactile Cues, Transfer Training, Gait Training, and Energy conservation strategies    Assessment: Ban Brennan presents with functional mobility impairments which indicate the need for skilled intervention. Pt required mod-max A x2 to safely perform bed mobility on this date. Pt performed 3 STS with RW/mod A x2. Pt able to stand ~30 seconds to 1 minute during standing trials before requesting to return seated due to feeling dizzy. Pt's BP assessed with BP reading WNL. Pt attempted bed to chair transfer with max-mod A x2/ B HHA with inability to transfer to chair. Pt expressed not able to sit in chair today due to dizziness upon standing. Tolerating session today without incident. Will continue to follow and progress as tolerated.     Plan/Recommendations:   If medically appropriate, Moderate Intensity Therapy recommended post-acute care. This is recommended as therapy feels the patient would require 3-4 days per week and wouldn't tolerate \"3 hour daily\" rehab intensity. SNF would be the preferred choice. If the patient does not agree to SNF, arrange HH or OP depending on home bound status. If patient is medically complex, consider LTACH. Pt requires no DME at discharge.     Pt desires Skilled Rehab placement at discharge. Pt cooperative; agreeable to therapeutic recommendations and plan of care.         Basic Mobility 6-click:  Rollin = Total, A lot = 2, A little = 3; 4 = None  Supine>Sit:   1 = Total, A lot " = 2, A little = 3; 4 = None   Sit>Stand with arms:  1 = Total, A lot = 2, A little = 3; 4 = None  Bed>Chair:   1 = Total, A lot = 2, A little = 3; 4 = None  Ambulate in room:  1 = Total, A lot = 2, A little = 3; 4 = None  3-5 Steps with railin = Total, A lot = 2, A little = 3; 4 = None  Score: 10    Modified [place]: N/A = No pre-op stroke/TIA    Post-Tx Position: Supine with HOB Elevated, Alarms activated, and Call light and personal items within reach  PPE: gloves, surgical mask, and gown

## 2024-07-18 NOTE — THERAPY TREATMENT NOTE
Subjective: Pt agreeable to therapeutic plan of care.  Cognition: oriented to Person, Place, Time, and Situation    Objective:     Bed Mobility: Mod-A, Max-A, Assist x 2, and with rolling walker   Functional Transfers: Mod-A and Assist x 2 sit to stand with RW, Mod-Max Ax2 stand with intentions of pivoting to chair with bilateral hand held assistance. Unable to pivot to chair with pt reporting severe lightheadedness. Vital stable however.     Balance: supported, static, and standing Mod-A, Assist x 2, and with rolling walker  Functional Ambulation: N/A or Not attempted.    Grooming: SBA  ADL Position: edge of bed sitting  ADL Comments: Hair care completed while sitting EOB. Pt styled hair with brush and clip.    Therapeutic Exercise - 10 Reps B UE AROM lying supine - educated pt on exercise completion throughout the day and spoke with daughter who is POA about exercises to complete throughout the day for maintenance of strength.     Vitals: WNL - BP stable despite reports of lightheadedness.    Pain: 6 VAS  Location: generalized  Interventions for pain: Repositioned, Increased Activity, and Therapeutic Presence  Education: Provided education on the importance of mobility in the acute care setting, Verbal/Tactile Cues, ADL training, and Transfer Training      Assessment: Ban Brennan presents with ADL impairments affecting function including balance, endurance / activity tolerance, and strength. Demonstrated functioning below baseline abilities indicate the need for continued skilled intervention while inpatient. Tolerating session today without incident. PT unable to transfer to chair this date with reports of lightheadedness upon attempts. She did however complete several sit to stands with Mod Ax2 and grooming while in seated position. OT spoke to POA daughter about exercises to complete on times outside of that designated to therapy. Will continue to follow and progress as tolerated.  "    Plan/Recommendations:   Moderate Intensity Therapy recommended post-acute care. This is recommended as therapy feels the patient would require 3-4 days per week and wouldn't tolerate \"3 hour daily\" rehab intensity. SNF would be the preferred choice. If the patient does not agree to SNF, arrange HH or OP depending on home bound status. If patient is medically complex, consider LTACH.. Pt requires no DME at discharge.     Pt desires Skilled Rehab placement at discharge. Pt cooperative; agreeable to therapeutic recommendations and plan of care.     Modified [place]: N/A = No pre-op stroke/TIA    Post-Tx Position: Supine with HOB Elevated, Alarms activated, and Call light and personal items within reach  PPE: gloves, surgical mask, and gown    "

## 2024-07-18 NOTE — CASE MANAGEMENT/SOCIAL WORK
Discharge Planning Assessment  [facility]     Patient Name: Ban Brennan  MRN: [id number]  Today's Date: 7/18/2024    Admit Date: 6/30/2024    Plan: Declined LTACH. [facility] accepted. Precert approved (valid 7/14-7/19). PASRR approved. From home with family.       Discharge Plan       Row Name 07/18/24 1426       Plan    Plan Declined LTACH. [facility] accepted. Precert approved (valid 7/14-7/19). PASRR approved. From home with family.    Patient/Family in Agreement with Plan yes    Plan Comments CM notified by [facility] they can accept. LSW updated precert for [facility], liaison notified. CM updated patient at bedside with Chelsi THAO, on speaker phone. [facility] letter reviewed, verbal consent and copy left at bedside. Barrier to D/C: pulmonology following, nephrology adjusting meds, 2L O2.                      Expected Discharge Date and Time       Expected Discharge Date Expected Discharge Time    Jul 19, 2024                Patient Forms       Row Name 07/18/24 1448       Patient Forms    Important Message from Medicare ([facility]) Delivered  7/18/24    Delivered to Support person  Chelsi THAO    Method of delivery Telephone                  Met with patient in room wearing PPE: mask.  Maintained distance greater than six feet and spent less than 15 minutes in the room.       LIANG CerdaN, RN    [address] [address]    Office: [phone]  Fax: [phone]

## 2024-07-18 NOTE — PROGRESS NOTES
[facility] MEDICINE SERVICE  DAILY PROGRESS NOTE    NAME: Ban Brennan  : 1955  MRN: [id number]      LOS: 18 days     PROVIDER OF SERVICE: Luisito Power MD    Chief Complaint: Atrial fibrillation with RVR    Subjective:     Interval History:  History taken from: patient chart RN  Shortness of breath, chest tube fell off last night  No CP or SOB    Review of Systems:   Review of Systems  All negative except as above   Objective:     Vital Signs  Temp:  [97.3 °F (36.3 °C)-98.5 °F (36.9 °C)] 98.2 °F (36.8 °C)  Heart Rate:  [] 89  Resp:  [16-25] 20  BP: ()/(49-67) 89/53  Flow (L/min):  [2-3] 2   Body mass index is 35.27 kg/m².    Physical Exam  General: Alert and oriented, no acute distress. obese  HENT: Normocephalic, moist oral mucosa, no scleral icterus.  Neck: Supple, non-tender, no carotid bruits, no JVD, no LAD.  Lungs: Clear to auscultation, non-labored respiration. R sided chest tube [out]  Heart: RRR, no murmur, gallop or edema.  Abdomen: Soft, hypogastric region soft tissue mass and tenderness, non-distended, + bowel sounds.  Musculoskeletal: Normal range of motion and strength, no tenderness or swelling.  Neuro: alert and awake, moving all 4 extremities   Skin: Skin is warm, dry and pink, no rashes or lesions.  Psychiatric: Cooperative, appropriate mood and affect.      Scheduled Meds   amiodarone, 200 mg, Oral, Q12H   Followed by  [START ON 2024] amiodarone, 200 mg, Oral, Daily  apixaban, 5 mg, Oral, BID  budesonide, 0.5 mg, Nebulization, BID - RT  bumetanide, 1 mg, Oral, BID  hydrocortisone, 25 mg, Rectal, BID  insulin lispro, 2-7 Units, Subcutaneous, 4x Daily With Meals & Nightly  ipratropium-albuterol, 3 mL, Nebulization, 4x Daily - RT  lidocaine, 20 mL, Infiltration, Once  metoprolol succinate XL, 25 mg, Oral, Q12H  miconazole, 1 Application, Topical, Q12H  pantoprazole, 40 mg, Oral, BID AC  povidone-iodine, , Topical, Daily  sodium chloride, 10 mL,  Intravenous, Q12H  [Held by provider] spironolactone, 25 mg, Oral, Daily       PRN Meds     acetaminophen **OR** acetaminophen    Calcium Replacement - Follow Nurse / BPA Driven Protocol    dextrose    dextrose    glucagon (human recombinant)    HYDROcodone-acetaminophen    ipratropium-albuterol    Magnesium Standard Dose Replacement - Follow Nurse / BPA Driven Protocol    nitroglycerin    ondansetron ODT **OR** ondansetron    Phosphorus Replacement - Follow Nurse / BPA Driven Protocol    Potassium Replacement - Follow Nurse / BPA Driven Protocol    [COMPLETED] Insert Peripheral IV **AND** sodium chloride    sodium chloride    sodium chloride   Infusions         Diagnostic Data    Results from last 7 days   Lab Units 07/18/24  0205   WBC 10*3/mm3 8.88   HEMOGLOBIN g/dL 9.3*   HEMATOCRIT % 29.5*   PLATELETS 10*3/mm3 157   GLUCOSE mg/dL 98   CREATININE mg/dL 1.09*   BUN mg/dL 23   SODIUM mmol/L 136   POTASSIUM mmol/L 4.0   AST (SGOT) U/L 57*   ALT (SGPT) U/L 23   ALK PHOS U/L 128*   BILIRUBIN mg/dL 1.7*   ANION GAP mmol/L 5.5       XR Chest 1 View    Result Date: 7/18/2024  Stable tiny right apical pneumothorax status post thoracotomy tube removal. Otherwise stable radiographic appearance of the chest.   Electronically Signed By-Jerzy White MD On:7/18/2024 11:41 AM      XR Chest 1 View    Result Date: 7/17/2024  Impression: 1. Trace right pneumothorax 2. Otherwise stable chest demonstrating cardiomegaly with pulmonary vascular congestion, small pleural effusions, left perihilar airspace consolidation, and bibasilar airspace disease which could be atelectasis or pneumonia Electronically Signed: Betito Villeda  7/17/2024 7:32 AM EDT  Workstation ID: [id number]       I reviewed the patient's new clinical results.  I reviewed the patient's new imaging results and agree with the interpretation.    Assessment/Plan:     Active and Resolved Problems  Active Hospital Problems    Diagnosis  POA    **Atrial fibrillation with  RVR [I48.91]  Yes    COPD (chronic obstructive pulmonary disease) [J44.9]  Yes    Aortic stenosis [I35.0]  Yes    Mitral regurgitation [I34.0]  Yes    Acute HFrEF (heart failure with reduced ejection fraction) [I50.21]  Yes    Right bundle branch block [I45.10]  Yes    Acute deep vein thrombosis (DVT) of both lower extremities [I82.403]  Yes    ARABELLA (acute kidney injury) [N17.9]  Yes    Acute hyperkalemia [E87.5]  Yes    Sepsis [A41.9]  Yes    Acute UTI [N39.0]  Yes    Acute systolic congestive heart failure [I50.21]  Yes    Rhinovirus infection [B34.8]  Yes    Multifocal pneumonia [J18.9]  Yes    Chronic respiratory failure with hypoxia, on home O2 therapy [J96.11, Z99.81]  Not Applicable    Skin ulcer of right great toe [L97.519]  Yes    Skin ulcer of left great toe [L97.529]  Yes    SEDRICK (obstructive sleep apnea) [G47.33]  Yes    Primary hypertension [I10]  Yes    Morbid obesity with BMI of 40.0-44.9, adult [E66.01, Z68.41]  Not Applicable      Resolved Hospital Problems   No resolved problems to display.       69-year-old female with history of hypertension, HFrEF, lower extremity lymphedema admitted to [facility] 6/30 progressive shortness of breath        #Bilateral lower extremity DVT  #History of reported PE  Acute left lower extremity DVT in the proximal femoral, mid femoral, distal femoral, popliteal, and posterior tibial   Seen by hematology appreciate recommendation.  Factor V and prothrombin gene mutation negative  Continue Eliquis 10 mg twice daily for 7 days followed by 5 mg twice daily  Outpatient follow-up with hematology- will need lifelong AC     #Acute on chronic HFrEF  #A-fib with RVR.  New onset  #Severe MR  Cardiology follow-up  Started on amiodarone GGT transitioned to p.o. tapering dose  TTE with EF 30 to 35%.  Defer ischemic workup to cardiology  On Toprol 25 twice daily with holding parameters   Bumex 1 mg daily  Monitor I's and O's  Hold Aldactone given soft BP   Lisinopril on hold given  ARABELLA  On Eliquis    #Right-sided hydropneumothorax  Chest tube placed in ICU 7/6  Fluid culture from right chest tube growing gram-negative bacilli-will defer need for antibiotics to pulmonary  Monitor out put   Chest tube came out last night  Closely monitor respiratory status  Pulmonary following  Patient stable from pulmonary standpoint to be discharged       #Severe sepsis  #UTI  #Left lower extremity cellulitis and wound  #Rhinovirus infection  #Multifocal pneumonia  #Chronic venous stasis lower extremity  Seen by infectious disease appreciate recommendations  Finished Keflex and doxycycline for 7 day course   Seen by podiatry no surgical intervention recommended  Midodrine has been weaned off monitor blood pressure     #Acute hypoxic respiratory failure  Multifactorial pneumonia CHF  Antibiotics and diuretics as above  Wean off supplemental oxygen as tolerated.  Bumex increased to 1 mg twice daily as per renal  Closely monitor blood pressure     #DM2  A1c 6.14  Continue ISS lispro  POC ACHS     #ARABELLA  #Metabolic Acidosis   Suspect ATN in setting of sepsis   Diuretics as above  BMP daily  Avoid nephrotoxic drugs   Improved     #uterine mass  Large subserosal uterine fundal fibroid on pelvic ultrasound concerning for fibroid vs malignancy  Onco recommended GYN evaluation  GYN as outpatient     #hematochezia  GI on board noted plan for outpatient colonoscopy  Etiology suspected to be hemorrhoids  Topical steroids started  Monitor H&H     # ?Pancreatic mass  - MRI pancreatic protocol ordered  - GI following  - outpatient endoscopic ultrasound to further evaluate pancreas lesion     # Panniculitis  - Hypogastric region soft mass, ttp  - GI evaluated and suspect due to panniculitis  - supportive care     VTE Prophylaxis:  Pharmacologic VTE prophylaxis orders are present.         Code status is   Code Status and Medical Interventions:   Ordered at: 06/30/24 6503     Code Status (Patient has no pulse and is not  breathing):    CPR (Attempt to Resuscitate)     Medical Interventions (Patient has pulse or is breathing):    Full Support       Plan for disposition: 2 to 3 days pending medical clearance    Time: 30 minutes    Signature: Electronically signed by Luisito Power MD, 07/18/24, 12:33 EDT.  [facility] Hospitalist Team

## 2024-07-18 NOTE — PROGRESS NOTES
Daily Progress Note          Assessment    Hydropneumothorax s/p chest tube placement 7/6/2024: Exudative fluid but negative cultures and no malignancy  COPD  SEDRICK  Systolic CHF  A-fib   Aortic stenosis  Mitral valve regurgitation   R BBB  bilateral DVT remote history of DVT  HTN  Morbid obesity   Uterine mass and pancreatic mass        PLAN:  Chest tube management, I noticed the chest tube sideport to be outside the chest wall so I removed the chest tube and applied sterile dressing    Patient stable from pulmonary standpoint to be discharged    Oxygen supplement and titration to maintain saturation 90-95%: Currently requiring 2 L per nasal cannula    Mucinex  Antibiotics completed  Bronchodilators  Inhaled corticosteroids    Incentive spirometer  diuresis as per nephrology  Electrolytes/ glycemic control  BP/HR control  Chronic anticoagulation: Apixaban     I reviewed the recent clinical results and radiological images                 LOS: 18 days     Subjective     Patient reports gradual improvement in the cough and shortness of breath    Objective     Vital signs for last 24 hours:  Vitals:    07/18/24 0716 07/18/24 0718 07/18/24 0822 07/18/24 0906   BP:   112/51 (!) 89/53   BP Location:    Right arm   Patient Position:    Lying   Pulse: 78 81 88 85   Resp:  20  19   Temp:    98.2 °F (36.8 °C)   TempSrc:    Oral   SpO2:  97%  100%   Weight:       Height:           Intake/Output last 3 shifts:  I/O last 3 completed shifts:  In: 340 [P.O.:340]  Out: 1530 [Urine:900; Chest Tube:630]  Intake/Output this shift:  No intake/output data recorded.      Radiology  Imaging Results (Last 24 Hours)       Procedure Component Value Units Date/Time    XR Chest 1 View [[id number]] Resulted: 07/18/24 0628     Updated: 07/18/24 0628            Labs:  Results from last 7 days   Lab Units 07/18/24  0205   WBC 10*3/mm3 8.88   HEMOGLOBIN g/dL 9.3*   HEMATOCRIT % 29.5*   PLATELETS 10*3/mm3 157     Results from last 7 days   Lab Units  07/18/24  0205   SODIUM mmol/L 136   POTASSIUM mmol/L 4.0   CHLORIDE mmol/L 98   CO2 mmol/L 32.5*   BUN mg/dL 23   CREATININE mg/dL 1.09*   CALCIUM mg/dL 8.1*   BILIRUBIN mg/dL 1.7*   ALK PHOS U/L 128*   ALT (SGPT) U/L 23   AST (SGOT) U/L 57*   GLUCOSE mg/dL 98     Results from last 7 days   Lab Units 07/11/24  1045   PH, ARTERIAL pH units 7.539*   PO2 ART mm Hg 66.3*   PCO2, ARTERIAL mm Hg 49.9*   HCO3 ART mmol/L 42.6*     Results from last 7 days   Lab Units 07/18/24  0205 07/17/24  0120 07/16/24  0347   ALBUMIN g/dL 2.0* 2.0* 1.9*             Results from last 7 days   Lab Units 07/18/24  0205   MAGNESIUM mg/dL 1.6                   Meds:   SCHEDULE  amiodarone, 200 mg, Oral, Q12H   Followed by  [START ON 7/23/2024] amiodarone, 200 mg, Oral, Daily  apixaban, 5 mg, Oral, BID  budesonide, 0.5 mg, Nebulization, BID - RT  bumetanide, 1 mg, Oral, Daily  hydrocortisone, 25 mg, Rectal, BID  insulin lispro, 2-7 Units, Subcutaneous, 4x Daily With Meals & Nightly  ipratropium-albuterol, 3 mL, Nebulization, 4x Daily - RT  lidocaine, 20 mL, Infiltration, Once  metoprolol succinate XL, 25 mg, Oral, Q12H  miconazole, 1 Application, Topical, Q12H  pantoprazole, 40 mg, Oral, BID AC  povidone-iodine, , Topical, Daily  sodium chloride, 10 mL, Intravenous, Q12H  [Held by provider] spironolactone, 25 mg, Oral, Daily      Infusions     PRNs    acetaminophen **OR** acetaminophen    Calcium Replacement - Follow Nurse / BPA Driven Protocol    dextrose    dextrose    glucagon (human recombinant)    HYDROcodone-acetaminophen    ipratropium-albuterol    Magnesium Standard Dose Replacement - Follow Nurse / BPA Driven Protocol    nitroglycerin    ondansetron ODT **OR** ondansetron    Phosphorus Replacement - Follow Nurse / BPA Driven Protocol    Potassium Replacement - Follow Nurse / BPA Driven Protocol    [COMPLETED] Insert Peripheral IV **AND** sodium chloride    sodium chloride    sodium chloride    Physical Exam:  General Appearance:   Alert   HEENT:  Normocephalic, without obvious abnormality, Conjunctiva/corneas clear,.   Nares normal, no drainage     Neck:  Supple, symmetrical, trachea midline.   Lungs /Chest wall: Good air entry with mild bilateral basal rhonchi, respirations unlabored, symmetrical wall movement.     Heart:  Regular rate and rhythm, S1 S2 normal  Abdomen: Soft, non-tender, no masses, no organomegaly.    Extremities: Trace edema, no clubbing or cyanosis     ROS  Constitutional: Negative for chills, fever and malaise/fatigue.   HENT: Negative.    Eyes: Negative.    Cardiovascular: Negative.    Respiratory: Positive for mild cough and shortness of breath.    Skin: Negative.    Musculoskeletal: Negative.    Gastrointestinal: Negative.    Genitourinary: Negative.    Neurological: Generalized weakness      I reviewed the recent clinical results  I personally reviewed the latest radiological studies    Part of this note may be an electronic transcription/translation of spoken language to printed text using the Dragon Dictation System.

## 2024-07-18 NOTE — PLAN OF CARE
Goal Outcome Evaluation:            Ban Brennan presents with functional mobility impairments which indicate the need for skilled intervention. Pt required mod-max A x2 to safely perform bed mobility on this date. Pt performed 3 STS with RW/mod A x2. Pt able to stand ~30 seconds to 1 minute during standing trials before requesting to return seated due to feeling dizzy. Pt's BP assessed with BP reading WNL. Pt attempted bed to chair transfer with max-mod A x2/ B HHA with inability to transfer to chair. Pt expressed not able to sit in chair today due to dizziness upon standing. Tolerating session today without incident. Will continue to follow and progress as tolerated.

## 2024-07-18 NOTE — PROGRESS NOTES
"Referring Provider: Luisito Power,*    Reason for follow-up: Atrial fibrillation with rapid ventricular rate     Patient Care Team:  Rut Pierce APRN as PCP - General (Nurse Practitioner)  Austin Brooks MD as Cardiologist (Cardiology)      SUBJECTIVE  Patient is resting comfortably in bed.  Chest tube has been removed today.  She is now on 2 L of oxygen     ROS  Review of all systems negative except as indicated.    Since I have last seen, the patient has been without any chest discomfort, shortness of breath, palpitations, dizziness or syncope.  Denies having any headache, abdominal pain, nausea, vomiting, diarrhea, constipation, loss of weight or loss of appetite.  Denies having any excessive bruising, hematuria or blood in the stool.        Personal History:    Past Medical History:   Diagnosis Date    Bilateral leg edema     Chronic respiratory failure with hypoxia, on home O2 therapy 07/01/2024    COPD (chronic obstructive pulmonary disease)     Morbid obesity with BMI of 40.0-44.9, adult 11/08/2010    Primary hypertension 03/01/2017    Pulmonary embolism     \"many years ago, was on warfarin\"    Sleep apnea        History reviewed. No pertinent surgical history.    History reviewed. No pertinent family history.    Social History     Tobacco Use    Smoking status: Never    Smokeless tobacco: Never   Vaping Use    Vaping status: Never Used   Substance Use Topics    Alcohol use: Never    Drug use: Never        Home meds:  Prior to Admission medications    Medication Sig Start Date End Date Taking? Authorizing Provider   Allergy Relief 180 MG tablet Take 1 tablet by mouth Daily. 5/30/24  Yes Chadwick Johnson MD   bumetanide (BUMEX) 1 MG tablet Take 1 tablet by mouth 3 times a day. 5/30/24  Yes Chadwick Johnson MD   docusate sodium (COLACE) 100 MG capsule Take 1 capsule by mouth Every 12 (Twelve) Hours. 5/30/24  Yes Chadwick Johnson MD   furosemide (LASIX) 20 MG tablet Take 1 tablet " by mouth Daily.   Yes Chadwick Johnson MD   HYDROcodone-acetaminophen (NORCO)  MG per tablet Take 1 tablet by mouth 3 times a day. 5/30/24  Yes Chadwick Johnson MD   lisinopril (PRINIVIL,ZESTRIL) 30 MG tablet Take 1 tablet by mouth Daily. 3/18/24  Yes Provider, Historical, MD   meloxicam (MOBIC) 7.5 MG tablet Take 1 tablet by mouth Daily As Needed. 3/18/24  Yes Chadwick Johnson MD   metoprolol tartrate (LOPRESSOR) 50 MG tablet Take 1 tablet by mouth Daily.   Yes Chadwick Johnson MD   montelukast (SINGULAIR) 10 MG tablet Take 1 tablet by mouth every night at bedtime.   Yes Chadwick Johnson MD   omeprazole (priLOSEC) 20 MG capsule Take 1 capsule by mouth Daily. 3/18/24  Yes Provider, Historical, MD   oxybutynin XL (DITROPAN-XL) 10 MG 24 hr tablet Take 2 tablets by mouth Daily. 3/18/24  Yes Chadwick Johnson MD   potassium chloride (MICRO-K) 10 MEQ CR capsule Take 1 capsule by mouth Every 12 (Twelve) Hours. 3/18/24  Yes Chadwick Johnson MD   vitamin D (ERGOCALCIFEROL) 1.25 MG (56992 UT) capsule capsule Take 1 capsule by mouth 2 (Two) Times a Week. Monday and Thursday. 5/30/24  Yes Chadwick Johnson MD       Allergies:  Patient has no known allergies.    Scheduled Meds:amiodarone, 200 mg, Oral, Q12H   Followed by  [START ON 7/23/2024] amiodarone, 200 mg, Oral, Daily  apixaban, 5 mg, Oral, BID  budesonide, 0.5 mg, Nebulization, BID - RT  bumetanide, 1 mg, Oral, Daily  hydrocortisone, 25 mg, Rectal, BID  insulin lispro, 2-7 Units, Subcutaneous, 4x Daily With Meals & Nightly  ipratropium-albuterol, 3 mL, Nebulization, 4x Daily - RT  lidocaine, 20 mL, Infiltration, Once  metoprolol succinate XL, 25 mg, Oral, Q12H  miconazole, 1 Application, Topical, Q12H  pantoprazole, 40 mg, Oral, BID AC  povidone-iodine, , Topical, Daily  sodium chloride, 10 mL, Intravenous, Q12H  [Held by provider] spironolactone, 25 mg, Oral, Daily      Continuous Infusions:   PRN Meds:.  acetaminophen **OR**  "acetaminophen    Calcium Replacement - Follow Nurse / BPA Driven Protocol    dextrose    dextrose    glucagon (human recombinant)    HYDROcodone-acetaminophen    ipratropium-albuterol    Magnesium Standard Dose Replacement - Follow Nurse / BPA Driven Protocol    nitroglycerin    ondansetron ODT **OR** ondansetron    Phosphorus Replacement - Follow Nurse / BPA Driven Protocol    Potassium Replacement - Follow Nurse / BPA Driven Protocol    [COMPLETED] Insert Peripheral IV **AND** sodium chloride    sodium chloride    sodium chloride      OBJECTIVE    Vital Signs  Vitals:    07/17/24 2040 07/18/24 0137 07/18/24 0452 07/18/24 0457   BP: 113/67 96/49 101/59 101/59   BP Location: Right arm Right arm Right arm    Patient Position: Lying Lying Lying    Pulse: 106 89 85 91   Resp: 25 20 19    Temp: 98.2 °F (36.8 °C) 98.5 °F (36.9 °C) 97.9 °F (36.6 °C)    TempSrc: Oral Oral Oral    SpO2: 95% 96% 97%    Weight:   79.2 kg (174 lb 9.7 oz)    Height:           Flowsheet Rows      Flowsheet Row First Filed Value   Admission Height 147.3 cm (58\") Documented at 06/30/2024 1650   Admission Weight 108 kg (239 lb) Documented at 06/30/2024 1650              Intake/Output Summary (Last 24 hours) at 7/18/2024 0655  Last data filed at 7/18/2024 0452  Gross per 24 hour   Intake 340 ml   Output 1080 ml   Net -740 ml          Telemetry: Atrial fibrillation with heart rate in the 90s    Physical Exam:  The patient is alert, oriented and in no distress.  Obese  Vital signs as noted above.  Head and neck revealed no carotid bruits or jugular venous distention.  No thyromegaly or lymphadenopathy is present  Lungs clear.  No wheezing.  Breath sounds are normal bilaterally.  Heart normal first and second heart sounds.  No murmur. No precordial rub is present.  No gallop is present.  Abdomen soft and nontender.  No organomegaly is present.  Extremities with good peripheral pulses without any pedal edema.  Skin warm and dry.  Musculoskeletal system " is grossly normal.  CNS grossly normal.       Results Review:  I have personally reviewed the results from the time of this admission to 7/18/2024 06:55 EDT and agree with these findings:  []  Laboratory  []  Microbiology  []  Radiology  []  EKG/Telemetry   []  Cardiology/Vascular   []  Pathology  []  Old records  []  Other:    Most notable findings include:    Lab Results (last 24 hours)       Procedure Component Value Units Date/Time    Magnesium [[id number]]  (Normal) Collected: 07/18/24 0205    Specimen: Blood from Arm, Right Updated: 07/18/24 0251     Magnesium 1.6 mg/dL     Comprehensive Metabolic Panel [[id number]]  (Abnormal) Collected: 07/18/24 0205    Specimen: Blood from Arm, Right Updated: 07/18/24 0251     Glucose 98 mg/dL      BUN 23 mg/dL      Creatinine 1.09 mg/dL      Sodium 136 mmol/L      Potassium 4.0 mmol/L      Comment: Slight hemolysis detected by analyzer. Result may be falsely elevated.        Chloride 98 mmol/L      CO2 32.5 mmol/L      Calcium 8.1 mg/dL      Total Protein 5.1 g/dL      Albumin 2.0 g/dL      ALT (SGPT) 23 U/L      AST (SGOT) 57 U/L      Comment: Slight hemolysis detected by analyzer. Result may be falsely elevated.        Alkaline Phosphatase 128 U/L      Total Bilirubin 1.7 mg/dL      Globulin 3.1 gm/dL      A/G Ratio 0.6 g/dL      BUN/Creatinine Ratio 21.1     Anion Gap 5.5 mmol/L      eGFR 55.1 mL/min/1.73     Narrative:      GFR Normal >60  Chronic Kidney Disease <60  Kidney Failure <15      Phosphorus [[id number]]  (Normal) Collected: 07/18/24 0205    Specimen: Blood from Arm, Right Updated: 07/18/24 0251     Phosphorus 2.9 mg/dL     CBC & Differential [[id number]]  (Abnormal) Collected: 07/18/24 0205    Specimen: Blood from Arm, Right Updated: 07/18/24 0210    Narrative:      The following orders were created for panel order CBC & Differential.  Procedure                               Abnormality         Status                     ---------                                -----------         ------                     CBC Auto Differential[[id number]]        Abnormal            Final result                 Please view results for these tests on the individual orders.    CBC Auto Differential [[id number]]  (Abnormal) Collected: 07/18/24 0205    Specimen: Blood from Arm, Right Updated: 07/18/24 0210     WBC 8.88 10*3/mm3      RBC 3.02 10*6/mm3      Hemoglobin 9.3 g/dL      Hematocrit 29.5 %      MCV 97.7 fL      MCH 30.8 pg      MCHC 31.5 g/dL      RDW 16.4 %      RDW-SD 58.7 fl      MPV 10.9 fL      Platelets 157 10*3/mm3      Neutrophil % 80.5 %      Lymphocyte % 8.4 %      Monocyte % 8.8 %      Eosinophil % 1.5 %      Basophil % 0.1 %      Immature Grans % 0.7 %      Neutrophils, Absolute 7.15 10*3/mm3      Lymphocytes, Absolute 0.75 10*3/mm3      Monocytes, Absolute 0.78 10*3/mm3      Eosinophils, Absolute 0.13 10*3/mm3      Basophils, Absolute 0.01 10*3/mm3      Immature Grans, Absolute 0.06 10*3/mm3      nRBC 0.0 /100 WBC     POC Glucose Once [[id number]]  (Abnormal) Collected: 07/17/24 2005    Specimen: Blood Updated: 07/17/24 2006     Glucose 106 mg/dL      Comment: Serial Number: [id number]:  [id number]       Chromogranin A [[id number]]  (Abnormal) Collected: 07/15/24 1335    Specimen: Blood from Arm, Right Updated: 07/17/24 1709     CHROMOGRANIN A 1895.0 ng/mL      Comment: Chromogranin A performed by [employer] KRYPTOR methodology  Values obtained with different assay methods or kits cannot be used  interchangeably.       Narrative:      Performed at:  [address] [place] [address]  [id number]  : Jaycee Eddy MD, Phone:  [phone]    POC Glucose Once [[id number]]  (Abnormal) Collected: 07/17/24 1634    Specimen: Blood Updated: 07/17/24 1636     Glucose 220 mg/dL      Comment: Serial Number: [id number]:  [id number]       POC Glucose Once [[id number]]  (Abnormal) Collected: 07/17/24 1053    Specimen: Blood Updated:  07/17/24 1055     Glucose 112 mg/dL      Comment: Serial Number: [id number]:  [id number]       Wound Culture - Surgical Site, Chest, Right [[id number]]  (Abnormal)  (Susceptibility) Collected: 07/13/24 1425    Specimen: Surgical Site from Chest, Right Updated: 07/17/24 0849     Wound Culture Light growth (2+) Pseudomonas aeruginosa     Gram Stain Rare (1+) WBCs per low power field      Few (2+) Gram negative bacilli    Susceptibility        Pseudomonas aeruginosa      SIL      Cefepime Susceptible      Ceftazidime Susceptible      Ciprofloxacin Susceptible      Imipenem Resistant      Levofloxacin Susceptible      Piperacillin + Tazobactam Susceptible      Tobramycin Susceptible                       Susceptibility Comments       Pseudomonas aeruginosa    With the exception of urinary-sourced infections, aminoglycosides should not be used as monotherapy.               POC Glucose Finger 4x Daily Before Meals & at Bedtime [[id number]]  (Abnormal) Collected: 07/17/24 0707    Specimen: Blood from Finger Updated: 07/17/24 0710     Glucose 106 mg/dL      Comment: Serial Number: [id number]:  [id number]               Imaging Results (Last 24 Hours)       Procedure Component Value Units Date/Time    XR Chest 1 View [[id number]] Resulted: 07/18/24 0628     Updated: 07/18/24 0628    XR Chest 1 View [[id number]] Collected: 07/17/24 0727     Updated: 07/17/24 0734    Narrative:      XR CHEST 1 VW    Date of Exam: 7/17/2024 3:16 AM EDT    Indication: CT    Comparison: 7/16/2024    Findings:  Right basilar pleural catheter remains. Minimal pneumothorax, in the apex and lateral inferior thorax. Stable enlargement of the cardiac silhouette and prominence of the central pulmonary vasculature. Stable left perihilar airspace consolidation. Stable   bibasilar airspace disease. Stable small bilateral pleural effusions      Impression:      Impression:    1. Trace right pneumothorax  2. Otherwise stable chest demonstrating  cardiomegaly with pulmonary vascular congestion, small pleural effusions, left perihilar airspace consolidation, and bibasilar airspace disease which could be atelectasis or pneumonia      Electronically Signed: Betito Villeda    7/17/2024 7:32 AM EDT    Workstation ID: [id number]            LAB RESULTS (LAST 7 DAYS)    CBC  Results from last 7 days   Lab Units 07/18/24  0205 07/17/24  0120 07/16/24  0347 07/15/24  0256 07/14/24  0659 07/13/24  0240 07/12/24  0531   WBC 10*3/mm3 8.88 10.15 10.10 11.06* 9.61 11.02* 10.25   RBC 10*6/mm3 3.02* 3.15* 3.16* 3.34* 3.70* 3.72* 3.89   HEMOGLOBIN g/dL 9.3* 9.6* 9.7* 10.3* 11.2* 11.5* 11.9*   HEMATOCRIT % 29.5* 30.7* 31.0* 32.2* 36.5 36.0 37.5   MCV fL 97.7* 97.5* 98.1* 96.4 98.6* 96.8 96.4   PLATELETS 10*3/mm3 157 157 144 163 117* 112* 88*       BMP  Results from last 7 days   Lab Units 07/18/24  0205 07/17/24  0120 07/16/24  1402 07/16/24  0347 07/15/24  1617 07/15/24  0519 07/15/24  0256 07/14/24  1918 07/14/24  0659 07/13/24  0240 07/12/24  1619 07/12/24  0531   SODIUM mmol/L 136 138  --  139  --  140  --   --  141 141  --  141   POTASSIUM mmol/L 4.0 4.5 4.3 3.6  --  4.3  --  4.3 3.4* 3.9   < > 3.5   CHLORIDE mmol/L 98 100  --  99  --  99  --   --  97* 97*  --  97*   CO2 mmol/L 32.5* 33.8*  --  35.4*  --  36.0*  --   --  38.3* 36.9*  --  38.5*   BUN mg/dL 23 22  --  23  --  27*  --   --  30* 38*  --  39*   CREATININE mg/dL 1.09* 0.92  --  0.86  --  0.99  --   --  0.99 1.11*  --  0.99   GLUCOSE mg/dL 98 101*  --  76  --  89  --   --  75 87  --  82   MAGNESIUM mg/dL 1.6 1.7  --  1.6  --  1.8  --   --  1.8 1.7  --  1.7   PHOSPHORUS mg/dL 2.9 2.4*  --  2.9 2.7  --  2.2*  --  2.5 2.4*  --  2.7    < > = values in this interval not displayed.       CMP   Results from last 7 days   Lab Units 07/18/24  0205 07/17/24  0120 07/16/24  1402 07/16/24  0347 07/15/24  0519 07/14/24  1918 07/14/24  0659 07/13/24  0240 07/12/24  1619 07/12/24  0531   SODIUM mmol/L 136 138  --  139 140  --   141 141  --  141   POTASSIUM mmol/L 4.0 4.5 4.3 3.6 4.3 4.3 3.4* 3.9   < > 3.5   CHLORIDE mmol/L 98 100  --  99 99  --  97* 97*  --  97*   CO2 mmol/L 32.5* 33.8*  --  35.4* 36.0*  --  38.3* 36.9*  --  38.5*   BUN mg/dL 23 22  --  23 27*  --  30* 38*  --  39*   CREATININE mg/dL 1.09* 0.92  --  0.86 0.99  --  0.99 1.11*  --  0.99   GLUCOSE mg/dL 98 101*  --  76 89  --  75 87  --  82   ALBUMIN g/dL 2.0* 2.0*  --  1.9* 2.1*  --  2.1* 2.1*  --  1.9*   BILIRUBIN mg/dL 1.7* 1.8*  --  2.0* 2.6*  2.6*  --  2.7* 2.9*  --  2.8*   ALK PHOS U/L 128* 127*  --  108 117  --  115 96  --  94   AST (SGOT) U/L 57* 45*  --  28 36*  --  43* 37*  --  45*   ALT (SGPT) U/L 23 24  --  20 26  --  23 19  --  18   AMYLASE U/L  --   --   --  17*  --   --   --   --   --   --    LIPASE U/L  --   --   --  12*  --   --   --   --   --   --     < > = values in this interval not displayed.       BNP        TROPONIN          CoAg        Creatinine Clearance  Estimated Creatinine Clearance: 46.9 mL/min (A) (by C-G formula based on SCr of 1.09 mg/dL (H)).    ABG  Results from last 7 days   Lab Units 07/11/24  1045   PH, ARTERIAL pH units 7.539*   PCO2, ARTERIAL mm Hg 49.9*   PO2 ART mm Hg 66.3*   O2 SATURATION ART % 94.6   BASE EXCESS ART mmol/L 17.4*       Radiology  XR Chest 1 View    Result Date: 7/17/2024  Impression: 1. Trace right pneumothorax 2. Otherwise stable chest demonstrating cardiomegaly with pulmonary vascular congestion, small pleural effusions, left perihilar airspace consolidation, and bibasilar airspace disease which could be atelectasis or pneumonia Electronically Signed: Betito Villeda  7/17/2024 7:32 AM EDT  Workstation ID: [id number]       EKG  I personally viewed and interpreted the patient's EKG/Telemetry data:  ECG 12 Lead Rhythm Change   Final Result   HEART RATE=92  bpm   RR Fluplgqb=482  ms   LA Interval=  ms   P Horizontal Axis=  deg   P Front Axis=  deg   QRSD Skwvwwck=359  ms   QT Xduxbayg=784  ms   ZXcC=927  ms   QRS  Axis=-96  deg   T Wave Axis=65  deg   - ABNORMAL ECG -   Atrial fibrillation   Right bundle branch block   When compared with ECG of 05-Jul-2024 02:24:17,   No significant change   Electronically Signed By: Tee Whitlock ([facility]) 2024-07-07 10:12:55   Date and Time of Study:2024-07-05 06:43:37      ECG 12 Lead Drug Monitoring; amiodarone   Final Result   HEART NCDZ=072  bpm   RR Zscwxurg=008  ms   MD Interval=  ms   P Horizontal Axis=  deg   P Front Axis=  deg   QRSD Pfyvqfqh=109  ms   QT Vozjskla=046  ms   EXjN=159  ms   QRS Axis=-94  deg   T Wave Axis=70  deg   - ABNORMAL ECG -   Atrial fibrillation   Ventricular premature complex   Right bundle branch block   When compared with ECG of 04-Jul-2024 09:17:07,   No change from prior tracing   Electronically Signed By: Tee Whitlock ([facility]) 2024-07-07 10:13:32   Date and Time of Study:2024-07-05 02:24:17      ECG 12 Lead Electrolyte Imbalance   Final Result   HEART WHZK=333  bpm   RR Nvvkmdcg=685  ms   MD Tflupbwd=682  ms   P Horizontal Axis=113  deg   P Front Axis=263  deg   QRSD Okjekkhk=212  ms   QT Chssazys=744  ms   WEbL=887  ms   QRS Axis=227  deg   T Wave Axis=27  deg   - ABNORMAL ECG -   Right bundle branch block   When compared with ECG of 04-Jul-2024 04:20:51,   Significant change in rhythm: previously atrial fibrillation   Significant repolarization change   Atrial fibrillation with rapid V-rate   No change from prior tracing   Electronically Signed By: Tee Whitlock ([facility]) 2024-07-07 10:14:27   Date and Time of Study:2024-07-04 09:17:07      ECG 12 Lead Drug Monitoring; Amiodarone   Preliminary Result   HEART IXVH=021  bpm   RR Fypvmmkh=565  ms   MD Interval=  ms   P Horizontal Axis=  deg   P Front Axis=  deg   QRSD Texdcrei=810  ms   QT Uvntrbcj=985  ms   TIfE=106  ms   QRS Axis=-90  deg   T Wave Axis=36  deg   - ABNORMAL ECG -   Atrial fibrillation   Right bundle branch block   ST elevation secondary to IVCD   Date and Time of Study:2024-07-04  04:20:51      ECG 12 Lead Chest Pain   Preliminary Result   HEART BEEI=051  bpm   RR Jbujscao=117  ms   MT Interval=  ms   P Horizontal Axis=  deg   P Front Axis=  deg   QRSD Mcmdimtv=670  ms   QT Jawfuadv=460  ms   IHjG=148  ms   QRS Axis=-98  deg   T Wave Axis=37  deg   - ABNORMAL ECG -   Atrial fibrillation   Right bundle branch block   Anterolateral infarct, old   Date and Time of Study:2024-07-03 17:38:52      ECG 12 Lead Drug Monitoring; Amiodarone   Preliminary Result   HEART LCEB=912  bpm   RR Aacvqpbm=202  ms   MT Interval=  ms   P Horizontal Axis=  deg   P Front Axis=  deg   QRSD Nugareqy=719  ms   QT Aaacczsu=755  ms   LOmS=517  ms   QRS Axis=-90  deg   T Wave Axis=74  deg   - ABNORMAL ECG -   Atrial fibrillation   Right bundle branch block   ST elevation secondary to IVCD   Date and Time of Study:2024-07-03 04:02:32      ECG 12 Lead Drug Monitoring; Amiodarone   Final Result   HEART FVFK=498  bpm   RR Owtxbvpe=363  ms   MT Interval=  ms   P Horizontal Axis=  deg   P Front Axis=  deg   QRSD Bvovklxt=317  ms   QT Hsocusts=203  ms   RFgW=799  ms   QRS Axis=-93  deg   T Wave Axis=45  deg   - ABNORMAL ECG -   Atrial fibrillation   Right bundle branch block   Inferior  infarct, old   When compared with ECG of 01-Jul-2024 04:42:13,   Nonspecific significant change   Electronically Signed By: Librado Mcdermott ([facility]) 2024-07-10 12:58:26   Date and Time of Study:2024-07-02 15:12:56      ECG 12 Lead Drug Monitoring; Amiodarone   Final Result   HEART BBVP=903  bpm   RR Rfuqgooa=497  ms   MT Interval=  ms   P Horizontal Axis=  deg   P Front Axis=  deg   QRSD Dxdqjexd=130  ms   QT Qrkybsnw=465  ms   ONsD=621  ms   QRS Axis=-80  deg   T Wave Axis=102  deg   - ABNORMAL ECG -   Atrial fibrillation   Right bundle branch block   Inferior  infarct, old   Anterolateral  infarct, age indeterminate   When compared with ECG of 30-Jun-2024 16:58:43,   Significant rate decrease   New or worsened ischemia or infarction   Electronically  Signed By: Austin Brooks ([facility]) 2024-07-01 13:11:59   Date and Time of Study:2024-07-01 04:42:13      ECG 12 Lead Dyspnea   Final Result   HEART IQGL=423  bpm   RR Bbkntpnf=094  ms   ME Interval=  ms   P Horizontal Axis=  deg   P Front Axis=  deg   QRSD Uaawcvfk=615  ms   QT Euregddx=778  ms   VJjK=726  ms   QRS Axis=-101  deg   T Wave Axis=43  deg   - ABNORMAL ECG -   Atrial fibrillation   Right bundle branch block   ST elevation secondary to IVCD   Electronically Signed By: Jefefry Kwon ([facility]) 2024-07-01 07:37:02   Date and Time of Study:2024-06-30 16:58:43      Telemetry Scan   Final Result      Telemetry Scan   Final Result      Telemetry Scan   Final Result      Telemetry Scan   Final Result      Telemetry Scan   Final Result      Telemetry Scan   Final Result      Telemetry Scan   Final Result      Telemetry Scan   Final Result      Telemetry Scan   Final Result      Telemetry Scan   Final Result      Telemetry Scan   Final Result      Telemetry Scan   Final Result      Telemetry Scan   Final Result      Telemetry Scan   Final Result      Telemetry Scan   Final Result      Telemetry Scan   Final Result      Telemetry Scan   Final Result      Telemetry Scan   Final Result      Telemetry Scan   Final Result      Telemetry Scan   Final Result      Telemetry Scan   Final Result      Telemetry Scan   Final Result      Telemetry Scan   Final Result      Telemetry Scan   Final Result      Telemetry Scan   Final Result      Telemetry Scan   Final Result      Telemetry Scan   Final Result      Telemetry Scan   Final Result      Telemetry Scan   Final Result      Telemetry Scan   Final Result      Telemetry Scan   Final Result      Telemetry Scan   Final Result      Telemetry Scan   Final Result      Telemetry Scan   Final Result      Telemetry Scan   Final Result      Telemetry Scan   Final Result      Telemetry Scan   Final Result      Telemetry Scan   Final Result      Telemetry Scan   Final Result      Telemetry Scan    Final Result      Telemetry Scan   Final Result      Telemetry Scan   Final Result      Telemetry Scan   Final Result      Telemetry Scan   Final Result      Telemetry Scan   Final Result      Telemetry Scan   Final Result      Telemetry Scan   Final Result      Telemetry Scan   Final Result      Telemetry Scan   Final Result      Telemetry Scan   Final Result      Telemetry Scan   Final Result      Telemetry Scan   Final Result      Telemetry Scan   Final Result      Telemetry Scan   Final Result      Telemetry Scan   Final Result      Telemetry Scan   Final Result      Telemetry Scan   Final Result      Telemetry Scan   Final Result      Telemetry Scan   Final Result      Telemetry Scan   Final Result      Telemetry Scan   Final Result      Telemetry Scan   Final Result      Telemetry Scan   Final Result      Telemetry Scan   Final Result      Telemetry Scan   Final Result      Telemetry Scan   Final Result      Telemetry Scan   Final Result      Telemetry Scan   Final Result      Telemetry Scan   Final Result      Telemetry Scan   Final Result      Telemetry Scan   Final Result      Telemetry Scan   Final Result      Telemetry Scan   Final Result      Telemetry Scan   Final Result      Telemetry Scan   Final Result      Telemetry Scan   Final Result      Telemetry Scan   Final Result      Telemetry Scan   Final Result      Telemetry Scan   Final Result      Telemetry Scan   Final Result      Telemetry Scan   Final Result      Telemetry Scan   Final Result      Telemetry Scan   Final Result      Telemetry Scan   Final Result      Telemetry Scan   Final Result      Telemetry Scan   Final Result      Telemetry Scan   Final Result      Telemetry Scan   Final Result      Telemetry Scan   Final Result      Telemetry Scan   Final Result      Telemetry Scan   Final Result      Telemetry Scan   Final Result      Telemetry Scan   Final Result      Telemetry Scan   Final Result      Telemetry Scan   Final Result       Telemetry Scan   Final Result      Telemetry Scan   Final Result      ECG 12 Lead Electrolyte Imbalance    (Results Pending)         Echocardiogram:    Results for orders placed during the hospital encounter of 06/30/24    Adult Transthoracic Echo Complete w/ Color, Spectral and Contrast if Necessary Per Protocol    Interpretation Summary    Left ventricular ejection fraction appears to be 31 - 35%.    Left ventricular diastolic dysfunction is noted.    The left atrial cavity is dilated.    Moderate aortic valve stenosis is present. Mean/peak gradient of 14/24 mmHg.  Dimensionless index 0.36    Moderate to severe mitral valve regurgitation is present.    Estimated right ventricular systolic pressure from tricuspid regurgitation is normal (<35 mmHg).        Stress Test:         Cardiac Catheterization:  No results found for this or any previous visit.         Other:         ASSESSMENT & PLAN:    Principal Problem:    Atrial fibrillation with RVR  Active Problems:    Primary hypertension    Morbid obesity with BMI of 40.0-44.9, adult    Right bundle branch block    Acute deep vein thrombosis (DVT) of both lower extremities    ARABELLA (acute kidney injury)    Acute hyperkalemia    Sepsis    Acute UTI    Acute systolic congestive heart failure    Rhinovirus infection    Multifocal pneumonia    Chronic respiratory failure with hypoxia, on home O2 therapy    Skin ulcer of right great toe    Skin ulcer of left great toe    SEDRICK (obstructive sleep apnea)    Aortic stenosis    Mitral regurgitation    Acute HFrEF (heart failure with reduced ejection fraction)    COPD (chronic obstructive pulmonary disease)      Acute respiratory failure with hypoxia and hypercapnia  Acute encephalopathy   Currently on 2 L of oxygen via nasal cannula  Chest tube in place for hydropneumothorax  Chest tube removed today 7/18/2024  Pulmonology is managing.     Atrial fibrillation with RVR  Newly diagnosed  Electrolytes have been corrected  Rate  controlled with heart rate in the 80s and 90s  Continue amiodarone for rhythm control X 6 weeks  Continue Toprol-XL 25 mg p.o. twice daily  Heart rate is well-controlled  [id number] score is 7  Continue Eliquis  Close eye on H&H due to rectal bleeding.  No plan for endoscopy     Acute systolic CHF  Newly diagnosed  Echocardiogram shows EF of 31 to 35%, moderate aortic stenosis and moderate to severe mitral regurgitation.  Currently on Bumex  Monitor I's and O's and replace electrolytes as needed.  Switch from metoprolol to tartrate to succinate  Spaced out administration of metoprolol and Bumex due to low blood pressure  Unable to add ACE inhibitor/ARNI due to low blood pressure  Was requiring midodrine.  Now off.  Blood pressure is still borderline low  Aldactone has been held  Plan to repeat echocardiogram after 3 months of completing GDMT     Acute deep vein thrombosis (DVT) of both lower extremities  Extensive DVT in bilateral lower extremities involving femoral, popliteal and posterior tibial.  Continue anticoagulation as above  She will need lifelong anticoagulation   H&H has slowly dropped to 9.3/29.5 now 99 no problem yes thank you thanks     Thrombocytopenia  Closely monitor platelets while on anticoagulation  Platelets recovered 157 now     ARABELLA (acute kidney injury)  Presented with creatinine of 1.4.  Creatinine 1.09.  Bicarb is 32.5  Closely monitor renal function while on diuretics  Continue p.o. Bumex  Nephrology following      Acute hyperkalemia  Treated with [facility]  Nephrology following      Sepsis / UTI / Multifocal pneumonia / Rhinovirus infection  Multifactorial secondary to UTI and pneumonia with possible cellulitis  Continue empiric antibiotics      Skin ulcer of right great toe  Skin ulcer of left great toe  Podiatry following   A1c is 6.2%     Primary hypertension, chronic  Now on Toprol-XL  Unable to tolerate Aldactone due to low blood pressure.  Previously required midodrine.  Closely  monitor blood pressure      Morbid obesity with BMI of 40.0-44.9, adult  BMI is 35.3.  She weighs 174 pounds.  Lifestyle modifications recommended   LDL 27, HDL 13, triglyceride 86 and total cholesterol 58.  No indication for statin     SEDRICK (obstructive sleep apnea)  Encouraged compliance with CPAP      Austin Brooks MD  07/18/24  06:55 EDT

## 2024-07-19 ENCOUNTER — APPOINTMENT (OUTPATIENT)
Dept: GENERAL RADIOLOGY | Facility: HOSPITAL | Age: 69
End: 2024-07-19
Payer: MEDICARE

## 2024-07-19 LAB
ALBUMIN SERPL-MCNC: 2.2 G/DL (ref 3.5–5.2)
ALBUMIN/GLOB SERPL: 0.6 G/DL
ALP SERPL-CCNC: 136 U/L (ref 39–117)
ALT SERPL W P-5'-P-CCNC: 25 U/L (ref 1–33)
ANION GAP SERPL CALCULATED.3IONS-SCNC: 7 MMOL/L (ref 5–15)
AST SERPL-CCNC: 31 U/L (ref 1–32)
BASOPHILS # BLD AUTO: 0.02 10*3/MM3 (ref 0–0.2)
BASOPHILS NFR BLD AUTO: 0.2 % (ref 0–1.5)
BILIRUB SERPL-MCNC: 1.6 MG/DL (ref 0–1.2)
BUN SERPL-MCNC: 20 MG/DL (ref 8–23)
BUN/CREAT SERPL: 20.8 (ref 7–25)
CALCIUM SPEC-SCNC: 8.2 MG/DL (ref 8.6–10.5)
CHLORIDE SERPL-SCNC: 93 MMOL/L (ref 98–107)
CO2 SERPL-SCNC: 34 MMOL/L (ref 22–29)
CREAT SERPL-MCNC: 0.96 MG/DL (ref 0.57–1)
DEPRECATED RDW RBC AUTO: 56.2 FL (ref 37–54)
EGFRCR SERPLBLD CKD-EPI 2021: 64.2 ML/MIN/1.73
EOSINOPHIL # BLD AUTO: 0.25 10*3/MM3 (ref 0–0.4)
EOSINOPHIL NFR BLD AUTO: 3 % (ref 0.3–6.2)
ERYTHROCYTE [DISTWIDTH] IN BLOOD BY AUTOMATED COUNT: 15.8 % (ref 12.3–15.4)
GLOBULIN UR ELPH-MCNC: 3.4 GM/DL
GLUCOSE BLDC GLUCOMTR-MCNC: 114 MG/DL (ref 70–105)
GLUCOSE BLDC GLUCOMTR-MCNC: 171 MG/DL (ref 70–105)
GLUCOSE BLDC GLUCOMTR-MCNC: 177 MG/DL (ref 70–105)
GLUCOSE BLDC GLUCOMTR-MCNC: 92 MG/DL (ref 70–105)
GLUCOSE SERPL-MCNC: 82 MG/DL (ref 65–99)
HCT VFR BLD AUTO: 31.8 % (ref 34–46.6)
HGB BLD-MCNC: 9.9 G/DL (ref 12–15.9)
IMM GRANULOCYTES # BLD AUTO: 0.06 10*3/MM3 (ref 0–0.05)
IMM GRANULOCYTES NFR BLD AUTO: 0.7 % (ref 0–0.5)
LYMPHOCYTES # BLD AUTO: 0.96 10*3/MM3 (ref 0.7–3.1)
LYMPHOCYTES NFR BLD AUTO: 11.6 % (ref 19.6–45.3)
MAGNESIUM SERPL-MCNC: 1.5 MG/DL (ref 1.6–2.4)
MCH RBC QN AUTO: 30.4 PG (ref 26.6–33)
MCHC RBC AUTO-ENTMCNC: 31.1 G/DL (ref 31.5–35.7)
MCV RBC AUTO: 97.5 FL (ref 79–97)
MONOCYTES # BLD AUTO: 0.68 10*3/MM3 (ref 0.1–0.9)
MONOCYTES NFR BLD AUTO: 8.2 % (ref 5–12)
NEUTROPHILS NFR BLD AUTO: 6.32 10*3/MM3 (ref 1.7–7)
NEUTROPHILS NFR BLD AUTO: 76.3 % (ref 42.7–76)
NRBC BLD AUTO-RTO: 0 /100 WBC (ref 0–0.2)
PHOSPHATE SERPL-MCNC: 2.7 MG/DL (ref 2.5–4.5)
PLATELET # BLD AUTO: 184 10*3/MM3 (ref 140–450)
PMV BLD AUTO: 10.8 FL (ref 6–12)
POTASSIUM SERPL-SCNC: 3.2 MMOL/L (ref 3.5–5.2)
PROT SERPL-MCNC: 5.6 G/DL (ref 6–8.5)
RBC # BLD AUTO: 3.26 10*6/MM3 (ref 3.77–5.28)
SODIUM SERPL-SCNC: 134 MMOL/L (ref 136–145)
WBC NRBC COR # BLD AUTO: 8.29 10*3/MM3 (ref 3.4–10.8)

## 2024-07-19 PROCEDURE — 99232 SBSQ HOSP IP/OBS MODERATE 35: CPT | Performed by: INTERNAL MEDICINE

## 2024-07-19 PROCEDURE — 94799 UNLISTED PULMONARY SVC/PX: CPT

## 2024-07-19 PROCEDURE — 25010000002 MAGNESIUM SULFATE 2 GM/50ML SOLUTION: Performed by: STUDENT IN AN ORGANIZED HEALTH CARE EDUCATION/TRAINING PROGRAM

## 2024-07-19 PROCEDURE — 84100 ASSAY OF PHOSPHORUS: CPT | Performed by: NURSE PRACTITIONER

## 2024-07-19 PROCEDURE — 94664 DEMO&/EVAL PT USE INHALER: CPT

## 2024-07-19 PROCEDURE — 80053 COMPREHEN METABOLIC PANEL: CPT | Performed by: NURSE PRACTITIONER

## 2024-07-19 PROCEDURE — 85025 COMPLETE CBC W/AUTO DIFF WBC: CPT | Performed by: NURSE PRACTITIONER

## 2024-07-19 PROCEDURE — 63710000001 INSULIN LISPRO (HUMAN) PER 5 UNITS: Performed by: HOSPITALIST

## 2024-07-19 PROCEDURE — 94761 N-INVAS EAR/PLS OXIMETRY MLT: CPT

## 2024-07-19 PROCEDURE — 82948 REAGENT STRIP/BLOOD GLUCOSE: CPT

## 2024-07-19 PROCEDURE — 71045 X-RAY EXAM CHEST 1 VIEW: CPT

## 2024-07-19 PROCEDURE — 83735 ASSAY OF MAGNESIUM: CPT | Performed by: NURSE PRACTITIONER

## 2024-07-19 RX ORDER — POTASSIUM CHLORIDE 20 MEQ/1
40 TABLET, EXTENDED RELEASE ORAL EVERY 4 HOURS
Status: DISPENSED | OUTPATIENT
Start: 2024-07-19 | End: 2024-07-19

## 2024-07-19 RX ORDER — MAGNESIUM SULFATE HEPTAHYDRATE 40 MG/ML
2 INJECTION, SOLUTION INTRAVENOUS
Status: DISPENSED | OUTPATIENT
Start: 2024-07-19 | End: 2024-07-19

## 2024-07-19 RX ADMIN — INSULIN LISPRO 2 UNITS: 100 INJECTION, SOLUTION INTRAVENOUS; SUBCUTANEOUS at 21:23

## 2024-07-19 RX ADMIN — IPRATROPIUM BROMIDE AND ALBUTEROL SULFATE 3 ML: .5; 3 SOLUTION RESPIRATORY (INHALATION) at 07:40

## 2024-07-19 RX ADMIN — POTASSIUM CHLORIDE 40 MEQ: 1500 TABLET, EXTENDED RELEASE ORAL at 09:00

## 2024-07-19 RX ADMIN — HYDROCODONE BITARTRATE AND ACETAMINOPHEN 1 TABLET: 10; 325 TABLET ORAL at 22:19

## 2024-07-19 RX ADMIN — AMIODARONE HYDROCHLORIDE 200 MG: 200 TABLET ORAL at 09:00

## 2024-07-19 RX ADMIN — PANTOPRAZOLE SODIUM 40 MG: 40 TABLET, DELAYED RELEASE ORAL at 09:00

## 2024-07-19 RX ADMIN — PANTOPRAZOLE SODIUM 40 MG: 40 TABLET, DELAYED RELEASE ORAL at 17:12

## 2024-07-19 RX ADMIN — POVIDONE-IODINE: 10 SOLUTION TOPICAL at 09:00

## 2024-07-19 RX ADMIN — MAGNESIUM SULFATE HEPTAHYDRATE 2 G: 40 INJECTION, SOLUTION INTRAVENOUS at 06:08

## 2024-07-19 RX ADMIN — ANTI-FUNGAL POWDER MICONAZOLE NITRATE TALC FREE 1 APPLICATION: 1.42 POWDER TOPICAL at 21:23

## 2024-07-19 RX ADMIN — Medication 10 ML: at 21:23

## 2024-07-19 RX ADMIN — BUDESONIDE 0.5 MG: 0.5 INHALANT RESPIRATORY (INHALATION) at 07:45

## 2024-07-19 RX ADMIN — APIXABAN 5 MG: 5 TABLET, FILM COATED ORAL at 21:23

## 2024-07-19 RX ADMIN — HYDROCODONE BITARTRATE AND ACETAMINOPHEN 1 TABLET: 10; 325 TABLET ORAL at 09:00

## 2024-07-19 RX ADMIN — HYDROCORTISONE ACETATE 25 MG: 25 SUPPOSITORY RECTAL at 21:23

## 2024-07-19 RX ADMIN — BUMETANIDE 1 MG: 1 TABLET ORAL at 09:00

## 2024-07-19 RX ADMIN — METOPROLOL SUCCINATE 25 MG: 25 TABLET, FILM COATED, EXTENDED RELEASE ORAL at 21:23

## 2024-07-19 RX ADMIN — METOPROLOL SUCCINATE 25 MG: 25 TABLET, FILM COATED, EXTENDED RELEASE ORAL at 09:00

## 2024-07-19 RX ADMIN — BUDESONIDE 0.5 MG: 0.5 INHALANT RESPIRATORY (INHALATION) at 19:04

## 2024-07-19 RX ADMIN — AMIODARONE HYDROCHLORIDE 200 MG: 200 TABLET ORAL at 17:12

## 2024-07-19 RX ADMIN — IPRATROPIUM BROMIDE AND ALBUTEROL SULFATE 3 ML: .5; 3 SOLUTION RESPIRATORY (INHALATION) at 11:13

## 2024-07-19 RX ADMIN — IPRATROPIUM BROMIDE AND ALBUTEROL SULFATE 3 ML: .5; 3 SOLUTION RESPIRATORY (INHALATION) at 19:00

## 2024-07-19 RX ADMIN — HYDROCODONE BITARTRATE AND ACETAMINOPHEN 1 TABLET: 10; 325 TABLET ORAL at 00:14

## 2024-07-19 RX ADMIN — APIXABAN 5 MG: 5 TABLET, FILM COATED ORAL at 09:00

## 2024-07-19 RX ADMIN — INSULIN LISPRO 2 UNITS: 100 INJECTION, SOLUTION INTRAVENOUS; SUBCUTANEOUS at 17:11

## 2024-07-19 RX ADMIN — Medication 10 ML: at 09:00

## 2024-07-19 RX ADMIN — IPRATROPIUM BROMIDE AND ALBUTEROL SULFATE 3 ML: .5; 3 SOLUTION RESPIRATORY (INHALATION) at 15:01

## 2024-07-19 RX ADMIN — ANTI-FUNGAL POWDER MICONAZOLE NITRATE TALC FREE 1 APPLICATION: 1.42 POWDER TOPICAL at 09:00

## 2024-07-19 RX ADMIN — HYDROCORTISONE ACETATE 25 MG: 25 SUPPOSITORY RECTAL at 09:00

## 2024-07-19 RX ADMIN — MAGNESIUM SULFATE HEPTAHYDRATE 2 G: 40 INJECTION, SOLUTION INTRAVENOUS at 16:32

## 2024-07-19 NOTE — PROGRESS NOTES
"Referring Provider: Luisito Power,*    Reason for follow-up: Atrial fibrillation with rapid ventricular rate     Patient Care Team:  Rut Pierce APRN as PCP - General (Nurse Practitioner)  Austin Brooks MD as Cardiologist (Cardiology)      SUBJECTIVE  Vital signs are stable.  She is currently on 2 L of oxygen.  She feels well however does complain of lower abdominal pain.     ROS  Review of all systems negative except as indicated.    Since I have last seen, the patient has been without any chest discomfort, shortness of breath, palpitations, dizziness or syncope.  Denies having any headache, abdominal pain, nausea, vomiting, diarrhea, constipation, loss of weight or loss of appetite.  Denies having any excessive bruising, hematuria or blood in the stool.        Personal History:    Past Medical History:   Diagnosis Date    Bilateral leg edema     Chronic respiratory failure with hypoxia, on home O2 therapy 07/01/2024    COPD (chronic obstructive pulmonary disease)     Morbid obesity with BMI of 40.0-44.9, adult 11/08/2010    Primary hypertension 03/01/2017    Pulmonary embolism     \"many years ago, was on warfarin\"    Sleep apnea        History reviewed. No pertinent surgical history.    History reviewed. No pertinent family history.    Social History     Tobacco Use    Smoking status: Never    Smokeless tobacco: Never   Vaping Use    Vaping status: Never Used   Substance Use Topics    Alcohol use: Never    Drug use: Never        Home meds:  Prior to Admission medications    Medication Sig Start Date End Date Taking? Authorizing Provider   Allergy Relief 180 MG tablet Take 1 tablet by mouth Daily. 5/30/24  Yes Chadwick Johnson MD   bumetanide (BUMEX) 1 MG tablet Take 1 tablet by mouth 3 times a day. 5/30/24  Yes Chadwick Johnson MD   docusate sodium (COLACE) 100 MG capsule Take 1 capsule by mouth Every 12 (Twelve) Hours. 5/30/24  Yes Chadwick Johnson MD   furosemide (LASIX) 20 MG " tablet Take 1 tablet by mouth Daily.   Yes Chadwick Johnson MD   HYDROcodone-acetaminophen (NORCO)  MG per tablet Take 1 tablet by mouth 3 times a day. 5/30/24  Yes Chadwick Johnson MD   lisinopril (PRINIVIL,ZESTRIL) 30 MG tablet Take 1 tablet by mouth Daily. 3/18/24  Yes Chadwick Johnson MD   meloxicam (MOBIC) 7.5 MG tablet Take 1 tablet by mouth Daily As Needed. 3/18/24  Yes Chadwick Johnson MD   metoprolol tartrate (LOPRESSOR) 50 MG tablet Take 1 tablet by mouth Daily.   Yes Chadwick Johnson MD   montelukast (SINGULAIR) 10 MG tablet Take 1 tablet by mouth every night at bedtime.   Yes Chadwick Johnson MD   omeprazole (priLOSEC) 20 MG capsule Take 1 capsule by mouth Daily. 3/18/24  Yes Provider, Historical, MD   oxybutynin XL (DITROPAN-XL) 10 MG 24 hr tablet Take 2 tablets by mouth Daily. 3/18/24  Yes Chadwick Johnson MD   potassium chloride (MICRO-K) 10 MEQ CR capsule Take 1 capsule by mouth Every 12 (Twelve) Hours. 3/18/24  Yes Chadwick Johnson MD   vitamin D (ERGOCALCIFEROL) 1.25 MG (46241 UT) capsule capsule Take 1 capsule by mouth 2 (Two) Times a Week. Monday and Thursday. 5/30/24  Yes Chadwick Johnson MD       Allergies:  Patient has no known allergies.    Scheduled Meds:amiodarone, 200 mg, Oral, Q12H   Followed by  [START ON 7/23/2024] amiodarone, 200 mg, Oral, Daily  apixaban, 5 mg, Oral, BID  budesonide, 0.5 mg, Nebulization, BID - RT  bumetanide, 1 mg, Oral, BID  hydrocortisone, 25 mg, Rectal, BID  insulin lispro, 2-7 Units, Subcutaneous, 4x Daily With Meals & Nightly  ipratropium-albuterol, 3 mL, Nebulization, 4x Daily - RT  lidocaine, 20 mL, Infiltration, Once  magnesium sulfate, 2 g, Intravenous, Q2H  metoprolol succinate XL, 25 mg, Oral, Q12H  miconazole, 1 Application, Topical, Q12H  pantoprazole, 40 mg, Oral, BID AC  potassium chloride ER, 40 mEq, Oral, Q4H  povidone-iodine, , Topical, Daily  sodium chloride, 10 mL, Intravenous, Q12H  [Held by  "provider] spironolactone, 25 mg, Oral, Daily      Continuous Infusions:   PRN Meds:.  acetaminophen **OR** acetaminophen    Calcium Replacement - Follow Nurse / BPA Driven Protocol    dextrose    dextrose    glucagon (human recombinant)    HYDROcodone-acetaminophen    ipratropium-albuterol    Magnesium Standard Dose Replacement - Follow Nurse / BPA Driven Protocol    nitroglycerin    ondansetron ODT **OR** ondansetron    Phosphorus Replacement - Follow Nurse / BPA Driven Protocol    Potassium Replacement - Follow Nurse / BPA Driven Protocol    [COMPLETED] Insert Peripheral IV **AND** sodium chloride    sodium chloride    sodium chloride      OBJECTIVE    Vital Signs  Vitals:    07/18/24 2006 07/18/24 2105 07/19/24 0111 07/19/24 0529   BP:  105/53 110/67 101/49   BP Location:  Right arm Right arm Right arm   Patient Position:  Lying Lying Lying   Pulse: 89 92 94 85   Resp: 20 16 14 16   Temp:  98.1 °F (36.7 °C) 98.2 °F (36.8 °C) 98.2 °F (36.8 °C)   TempSrc:  Oral Oral Oral   SpO2: 100% 95% 100% 97%   Weight:    80 kg (176 lb 5.9 oz)   Height:           Flowsheet Rows      Flowsheet Row First Filed Value   Admission Height 147.3 cm (58\") Documented at 06/30/2024 1650   Admission Weight 108 kg (239 lb) Documented at 06/30/2024 1650              Intake/Output Summary (Last 24 hours) at 7/19/2024 0649  Last data filed at 7/19/2024 0529  Gross per 24 hour   Intake 120 ml   Output 1200 ml   Net -1080 ml          Telemetry: Atrial fibrillation with heart rate in the 90s    Physical Exam:  The patient is alert, oriented and in no distress.  Obese  Vital signs as noted above.  Head and neck revealed no carotid bruits or jugular venous distention.  No thyromegaly or lymphadenopathy is present  Lungs clear.  No wheezing.  Breath sounds are normal bilaterally.  Heart normal first and second heart sounds.  No murmur. No precordial rub is present.  No gallop is present.  Abdomen soft and nontender.  No organomegaly is " present.  Extremities with good peripheral pulses without any pedal edema.  Skin warm and dry.  Musculoskeletal system is grossly normal.  CNS grossly normal.       Results Review:  I have personally reviewed the results from the time of this admission to 7/19/2024 06:49 EDT and agree with these findings:  []  Laboratory  []  Microbiology  []  Radiology  []  EKG/Telemetry   []  Cardiology/Vascular   []  Pathology  []  Old records  []  Other:    Most notable findings include:    Lab Results (last 24 hours)       Procedure Component Value Units Date/Time    Magnesium [[id number]]  (Abnormal) Collected: 07/19/24 0121    Specimen: Blood Updated: 07/19/24 0155     Magnesium 1.5 mg/dL     Phosphorus [[id number]]  (Normal) Collected: 07/19/24 0121    Specimen: Blood Updated: 07/19/24 0155     Phosphorus 2.7 mg/dL     Comprehensive Metabolic Panel [[id number]]  (Abnormal) Collected: 07/19/24 0121    Specimen: Blood Updated: 07/19/24 0155     Glucose 82 mg/dL      BUN 20 mg/dL      Creatinine 0.96 mg/dL      Sodium 134 mmol/L      Potassium 3.2 mmol/L      Chloride 93 mmol/L      CO2 34.0 mmol/L      Calcium 8.2 mg/dL      Total Protein 5.6 g/dL      Albumin 2.2 g/dL      ALT (SGPT) 25 U/L      AST (SGOT) 31 U/L      Alkaline Phosphatase 136 U/L      Total Bilirubin 1.6 mg/dL      Globulin 3.4 gm/dL      A/G Ratio 0.6 g/dL      BUN/Creatinine Ratio 20.8     Anion Gap 7.0 mmol/L      eGFR 64.2 mL/min/1.73     Narrative:      GFR Normal >60  Chronic Kidney Disease <60  Kidney Failure <15      CBC & Differential [[id number]]  (Abnormal) Collected: 07/19/24 0121    Specimen: Blood Updated: 07/19/24 0127    Narrative:      The following orders were created for panel order CBC & Differential.  Procedure                               Abnormality         Status                     ---------                               -----------         ------                     CBC Auto Differential[[id number]]        Abnormal            Final  result                 Please view results for these tests on the individual orders.    CBC Auto Differential [[id number]]  (Abnormal) Collected: 07/19/24 0121    Specimen: Blood Updated: 07/19/24 0127     WBC 8.29 10*3/mm3      RBC 3.26 10*6/mm3      Hemoglobin 9.9 g/dL      Hematocrit 31.8 %      MCV 97.5 fL      MCH 30.4 pg      MCHC 31.1 g/dL      RDW 15.8 %      RDW-SD 56.2 fl      MPV 10.8 fL      Platelets 184 10*3/mm3      Neutrophil % 76.3 %      Lymphocyte % 11.6 %      Monocyte % 8.2 %      Eosinophil % 3.0 %      Basophil % 0.2 %      Immature Grans % 0.7 %      Neutrophils, Absolute 6.32 10*3/mm3      Lymphocytes, Absolute 0.96 10*3/mm3      Monocytes, Absolute 0.68 10*3/mm3      Eosinophils, Absolute 0.25 10*3/mm3      Basophils, Absolute 0.02 10*3/mm3      Immature Grans, Absolute 0.06 10*3/mm3      nRBC 0.0 /100 WBC     POC Glucose 4x Daily Before Meals & at Bedtime [[id number]]  (Normal) Collected: 07/18/24 2206    Specimen: Blood Updated: 07/18/24 2208     Glucose 90 mg/dL      Comment: Serial Number: [id number]:  [id number]       POC Glucose Once [[id number]]  (Normal) Collected: 07/18/24 1607    Specimen: Blood Updated: 07/18/24 1608     Glucose 84 mg/dL      Comment: Serial Number: [id number]:  [id number]       POC Glucose 4x Daily Before Meals & at Bedtime [[id number]]  (Abnormal) Collected: 07/18/24 1127    Specimen: Blood Updated: 07/18/24 1133     Glucose 137 mg/dL      Comment: Serial Number: [id number]:  [id number]       POC Glucose Once [[id number]]  (Abnormal) Collected: 07/18/24 0659    Specimen: Blood Updated: 07/18/24 0703     Glucose 108 mg/dL      Comment: Serial Number: [id number]:  [id number]               Imaging Results (Last 24 Hours)       Procedure Component Value Units Date/Time    XR Chest 1 View [[id number]] Resulted: 07/19/24 0648     Updated: 07/19/24 0648    XR Chest 1 View [[id number]] Collected: 07/18/24 1140     Updated: 07/18/24 1144    Narrative:       DATE OF EXAM:  7/18/2024 5:54 AM     PROCEDURE:  XR CHEST 1 VW-     INDICATIONS:  CT; I48.91-Unspecified atrial fibrillation; I50.9-Heart failure,  unspecified; A41.9-Sepsis, unspecified organism; N39.0-Urinary tract  infection, site not specified; N17.9-Acute kidney failure, unspecified;  L03.116-Cellulitis of left lower limb; L03.115-Cellulitis of right lower  limb; E87.5-Hyperkalemia; J18.9-Pneumonia, unspecified organism;  B34.8-Other viral infections of unspecified site; J44.9     COMPARISON:  Chest radiograph 7/17/2024     TECHNIQUE:   Single radiographic AP view of the chest was obtained.     FINDINGS:  Stable tiny right apical pneumothorax measuring 3 mm. Stable  cardiomegaly. Similar bilateral perihilar and bibasilar airspace  opacities in the setting of small pleural effusions. Findings could  reflect atelectasis versus infectious airspace process. No significant  edema or left pneumothorax. Previously noted right thoracotomy catheter  has been removed. Aortic atherosclerotic disease. Degenerative related  osseous changes.       Impression:      Stable tiny right apical pneumothorax status post thoracotomy tube  removal. Otherwise stable radiographic appearance of the chest.        Electronically Signed By-Jerzy White MD On:7/18/2024 11:41 AM               LAB RESULTS (LAST 7 DAYS)    CBC  Results from last 7 days   Lab Units 07/19/24  0121 07/18/24  0205 07/17/24  0120 07/16/24  0347 07/15/24  0256 07/14/24  0659 07/13/24  0240   WBC 10*3/mm3 8.29 8.88 10.15 10.10 11.06* 9.61 11.02*   RBC 10*6/mm3 3.26* 3.02* 3.15* 3.16* 3.34* 3.70* 3.72*   HEMOGLOBIN g/dL 9.9* 9.3* 9.6* 9.7* 10.3* 11.2* 11.5*   HEMATOCRIT % 31.8* 29.5* 30.7* 31.0* 32.2* 36.5 36.0   MCV fL 97.5* 97.7* 97.5* 98.1* 96.4 98.6* 96.8   PLATELETS 10*3/mm3 184 157 157 144 163 117* 112*       BMP  Results from last 7 days   Lab Units 07/19/24  0121 07/18/24  0205 07/17/24  0120 07/16/24  1402 07/16/24  0347 07/15/24  1617 07/15/24  0519  07/15/24  0256 07/14/24 1918 07/14/24 0659 07/13/24  0240   SODIUM mmol/L 134* 136 138  --  139  --  140  --   --  141 141   POTASSIUM mmol/L 3.2* 4.0 4.5 4.3 3.6  --  4.3  --  4.3 3.4* 3.9   CHLORIDE mmol/L 93* 98 100  --  99  --  99  --   --  97* 97*   CO2 mmol/L 34.0* 32.5* 33.8*  --  35.4*  --  36.0*  --   --  38.3* 36.9*   BUN mg/dL 20 23 22  --  23  --  27*  --   --  30* 38*   CREATININE mg/dL 0.96 1.09* 0.92  --  0.86  --  0.99  --   --  0.99 1.11*   GLUCOSE mg/dL 82 98 101*  --  76  --  89  --   --  75 87   MAGNESIUM mg/dL 1.5* 1.6 1.7  --  1.6  --  1.8  --   --  1.8 1.7   PHOSPHORUS mg/dL 2.7 2.9 2.4*  --  2.9 2.7  --  2.2*  --  2.5 2.4*       CMP   Results from last 7 days   Lab Units 07/19/24  0121 07/18/24  0205 07/17/24  0120 07/16/24  1402 07/16/24  0347 07/15/24  0519 07/14/24 1918 07/14/24 0659 07/13/24  0240   SODIUM mmol/L 134* 136 138  --  139 140  --  141 141   POTASSIUM mmol/L 3.2* 4.0 4.5 4.3 3.6 4.3 4.3 3.4* 3.9   CHLORIDE mmol/L 93* 98 100  --  99 99  --  97* 97*   CO2 mmol/L 34.0* 32.5* 33.8*  --  35.4* 36.0*  --  38.3* 36.9*   BUN mg/dL 20 23 22  --  23 27*  --  30* 38*   CREATININE mg/dL 0.96 1.09* 0.92  --  0.86 0.99  --  0.99 1.11*   GLUCOSE mg/dL 82 98 101*  --  76 89  --  75 87   ALBUMIN g/dL 2.2* 2.0* 2.0*  --  1.9* 2.1*  --  2.1* 2.1*   BILIRUBIN mg/dL 1.6* 1.7* 1.8*  --  2.0* 2.6*  2.6*  --  2.7* 2.9*   ALK PHOS U/L 136* 128* 127*  --  108 117  --  115 96   AST (SGOT) U/L 31 57* 45*  --  28 36*  --  43* 37*   ALT (SGPT) U/L 25 23 24  --  20 26  --  23 19   AMYLASE U/L  --   --   --   --  17*  --   --   --   --    LIPASE U/L  --   --   --   --  12*  --   --   --   --        BNP        TROPONIN          CoAg        Creatinine Clearance  Estimated Creatinine Clearance: 53.5 mL/min (by C-G formula based on SCr of 0.96 mg/dL).    ABG          Radiology  XR Chest 1 View    Result Date: 7/18/2024  Stable tiny right apical pneumothorax status post thoracotomy tube removal. Otherwise  stable radiographic appearance of the chest.   Electronically Signed By-Jerzy White MD On:7/18/2024 11:41 AM         EKG  I personally viewed and interpreted the patient's EKG/Telemetry data:  ECG 12 Lead Rhythm Change   Final Result   HEART RATE=92  bpm   RR Eoqraymi=027  ms   ND Interval=  ms   P Horizontal Axis=  deg   P Front Axis=  deg   QRSD Oyvmxpuj=286  ms   QT Iknmitef=978  ms   TLbO=429  ms   QRS Axis=-96  deg   T Wave Axis=65  deg   - ABNORMAL ECG -   Atrial fibrillation   Right bundle branch block   When compared with ECG of 05-Jul-2024 02:24:17,   No significant change   Electronically Signed By: Tee Whitlock ([facility]) 2024-07-07 10:12:55   Date and Time of Study:2024-07-05 06:43:37      ECG 12 Lead Drug Monitoring; amiodarone   Final Result   HEART IZSD=282  bpm   RR Zpacsony=213  ms   ND Interval=  ms   P Horizontal Axis=  deg   P Front Axis=  deg   QRSD Gvkuethr=778  ms   QT Hyqalzuv=856  ms   ZDjE=896  ms   QRS Axis=-94  deg   T Wave Axis=70  deg   - ABNORMAL ECG -   Atrial fibrillation   Ventricular premature complex   Right bundle branch block   When compared with ECG of 04-Jul-2024 09:17:07,   No change from prior tracing   Electronically Signed By: Tee Whitlock ([facility]) 2024-07-07 10:13:32   Date and Time of Study:2024-07-05 02:24:17      ECG 12 Lead Electrolyte Imbalance   Final Result   HEART WETR=764  bpm   RR Kdmpzyuu=157  ms   ND Njkdvedv=549  ms   P Horizontal Axis=113  deg   P Front Axis=263  deg   QRSD Kzeklead=876  ms   QT Csruqhvi=462  ms   LVmE=721  ms   QRS Axis=227  deg   T Wave Axis=27  deg   - ABNORMAL ECG -   Right bundle branch block   When compared with ECG of 04-Jul-2024 04:20:51,   Significant change in rhythm: previously atrial fibrillation   Significant repolarization change   Atrial fibrillation with rapid V-rate   No change from prior tracing   Electronically Signed By: Tee Whitlock ([facility]) 2024-07-07 10:14:27   Date and Time of Study:2024-07-04 09:17:07      ECG  12 Lead Drug Monitoring; Amiodarone   Final Result   HEART HMKQ=176  bpm   RR Ehjjirmx=476  ms   AZ Interval=  ms   P Horizontal Axis=  deg   P Front Axis=  deg   QRSD Tkupcqca=433  ms   QT Lkqyztbg=706  ms   RDcW=396  ms   QRS Axis=-90  deg   T Wave Axis=36  deg   - ABNORMAL ECG -   Atrial fibrillation   Right bundle branch block   ST elevation secondary to IVCD   When compared with ECG of 03-Jul-2024 17:38:52,   No significant change   Electronically Signed By: Austin Brooks ([facility]) 2024-07-18 19:41:04   Date and Time of Study:2024-07-04 04:20:51      ECG 12 Lead Chest Pain   Final Result   HEART KTDA=878  bpm   RR Hfsfqtuk=158  ms   AZ Interval=  ms   P Horizontal Axis=  deg   P Front Axis=  deg   QRSD Crfxanbm=568  ms   QT Toszdibk=639  ms   HOdL=539  ms   QRS Axis=-98  deg   T Wave Axis=37  deg   - ABNORMAL ECG -   Atrial fibrillation   Right bundle branch block   Anterolateral  infarct, old   When compared with ECG of 03-Jul-2024 04:02:32,   New or worsened ischemia or infarction   Significant repolarization change   Electronically Signed By: Austin Brooks ([facility]) 2024-07-18 19:41:09   Date and Time of Study:2024-07-03 17:38:52      ECG 12 Lead Drug Monitoring; Amiodarone   Final Result   HEART NLWL=813  bpm   RR Jqvmbvrk=394  ms   AZ Interval=  ms   P Horizontal Axis=  deg   P Front Axis=  deg   QRSD Mdovejhv=618  ms   QT Sjabvveh=374  ms   AScL=535  ms   QRS Axis=-90  deg   T Wave Axis=74  deg   - ABNORMAL ECG -   Atrial fibrillation   Right bundle branch block   ST elevation secondary to IVCD   When compared with ECG of 02-Jul-2024 15:12:56,   No significant change   Electronically Signed By: Austin Brooks ([facility]) 2024-07-18 22:04:29   Date and Time of Study:2024-07-03 04:02:32      ECG 12 Lead Drug Monitoring; Amiodarone   Final Result   HEART BQLL=507  bpm   RR Rgqmdljw=697  ms   AZ Interval=  ms   P Horizontal Axis=  deg   P Front Axis=  deg   QRSD Kjodbjqz=176  ms   QT Yrfjzplq=617  ms   LYpQ=191  ms   QRS  Axis=-93  deg   T Wave Axis=45  deg   - ABNORMAL ECG -   Atrial fibrillation   Right bundle branch block   Inferior  infarct, old   When compared with ECG of 01-Jul-2024 04:42:13,   Nonspecific significant change   Electronically Signed By: Librado Mcdermott ([facility]) 2024-07-10 12:58:26   Date and Time of Study:2024-07-02 15:12:56      ECG 12 Lead Drug Monitoring; Amiodarone   Final Result   HEART SYGY=364  bpm   RR Qnxdmnph=718  ms   WA Interval=  ms   P Horizontal Axis=  deg   P Front Axis=  deg   QRSD Lrkprlvo=380  ms   QT Fmqxqtuc=409  ms   WQbT=669  ms   QRS Axis=-80  deg   T Wave Axis=102  deg   - ABNORMAL ECG -   Atrial fibrillation   Right bundle branch block   Inferior  infarct, old   Anterolateral  infarct, age indeterminate   When compared with ECG of 30-Jun-2024 16:58:43,   Significant rate decrease   New or worsened ischemia or infarction   Electronically Signed By: Austin Brooks ([facility]) 2024-07-01 13:11:59   Date and Time of Study:2024-07-01 04:42:13      ECG 12 Lead Dyspnea   Final Result   HEART GBRU=390  bpm   RR Uhdfewra=888  ms   WA Interval=  ms   P Horizontal Axis=  deg   P Front Axis=  deg   QRSD Rvfuolcx=218  ms   QT Pbcoeyfn=365  ms   XSaI=154  ms   QRS Axis=-101  deg   T Wave Axis=43  deg   - ABNORMAL ECG -   Atrial fibrillation   Right bundle branch block   ST elevation secondary to IVCD   Electronically Signed By: Jeffery Kwon ([facility]) 2024-07-01 07:37:02   Date and Time of Study:2024-06-30 16:58:43      Telemetry Scan   Final Result      Telemetry Scan   Final Result      Telemetry Scan   Final Result      Telemetry Scan   Final Result      Telemetry Scan   Final Result      Telemetry Scan   Final Result      Telemetry Scan   Final Result      Telemetry Scan   Final Result      Telemetry Scan   Final Result      Telemetry Scan   Final Result      Telemetry Scan   Final Result      Telemetry Scan   Final Result      Telemetry Scan   Final Result      Telemetry Scan   Final Result      Telemetry Scan    Final Result      Telemetry Scan   Final Result      Telemetry Scan   Final Result      Telemetry Scan   Final Result      Telemetry Scan   Final Result      Telemetry Scan   Final Result      Telemetry Scan   Final Result      Telemetry Scan   Final Result      Telemetry Scan   Final Result      Telemetry Scan   Final Result      Telemetry Scan   Final Result      Telemetry Scan   Final Result      Telemetry Scan   Final Result      Telemetry Scan   Final Result      Telemetry Scan   Final Result      Telemetry Scan   Final Result      Telemetry Scan   Final Result      Telemetry Scan   Final Result      Telemetry Scan   Final Result      Telemetry Scan   Final Result      Telemetry Scan   Final Result      Telemetry Scan   Final Result      Telemetry Scan   Final Result      Telemetry Scan   Final Result      Telemetry Scan   Final Result      Telemetry Scan   Final Result      Telemetry Scan   Final Result      Telemetry Scan   Final Result      Telemetry Scan   Final Result      Telemetry Scan   Final Result      Telemetry Scan   Final Result      Telemetry Scan   Final Result      Telemetry Scan   Final Result      Telemetry Scan   Final Result      Telemetry Scan   Final Result      Telemetry Scan   Final Result      Telemetry Scan   Final Result      Telemetry Scan   Final Result      Telemetry Scan   Final Result      Telemetry Scan   Final Result      Telemetry Scan   Final Result      Telemetry Scan   Final Result      Telemetry Scan   Final Result      Telemetry Scan   Final Result      Telemetry Scan   Final Result      Telemetry Scan   Final Result      Telemetry Scan   Final Result      Telemetry Scan   Final Result      Telemetry Scan   Final Result      Telemetry Scan   Final Result      Telemetry Scan   Final Result      Telemetry Scan   Final Result      Telemetry Scan   Final Result      Telemetry Scan   Final Result      Telemetry Scan   Final Result      Telemetry Scan   Final Result       Telemetry Scan   Final Result      Telemetry Scan   Final Result      Telemetry Scan   Final Result      Telemetry Scan   Final Result      Telemetry Scan   Final Result      Telemetry Scan   Final Result      Telemetry Scan   Final Result      Telemetry Scan   Final Result      Telemetry Scan   Final Result      Telemetry Scan   Final Result      Telemetry Scan   Final Result      Telemetry Scan   Final Result      Telemetry Scan   Final Result      Telemetry Scan   Final Result      Telemetry Scan   Final Result      Telemetry Scan   Final Result      Telemetry Scan   Final Result      Telemetry Scan   Final Result      Telemetry Scan   Final Result      Telemetry Scan   Final Result      Telemetry Scan   Final Result      Telemetry Scan   Final Result      Telemetry Scan   Final Result      Telemetry Scan   Final Result      Telemetry Scan   Final Result      Telemetry Scan   Final Result      Telemetry Scan   Final Result      Telemetry Scan   Final Result      Telemetry Scan   Final Result      Telemetry Scan   Final Result      Telemetry Scan   Final Result      Telemetry Scan   Final Result      ECG 12 Lead Electrolyte Imbalance    (Results Pending)         Echocardiogram:    Results for orders placed during the hospital encounter of 06/30/24    Adult Transthoracic Echo Complete w/ Color, Spectral and Contrast if Necessary Per Protocol    Interpretation Summary    Left ventricular ejection fraction appears to be 31 - 35%.    Left ventricular diastolic dysfunction is noted.    The left atrial cavity is dilated.    Moderate aortic valve stenosis is present. Mean/peak gradient of 14/24 mmHg.  Dimensionless index 0.36    Moderate to severe mitral valve regurgitation is present.    Estimated right ventricular systolic pressure from tricuspid regurgitation is normal (<35 mmHg).        Stress Test:         Cardiac Catheterization:  No results found for this or any previous visit.         Other:         ASSESSMENT  & PLAN:    Principal Problem:    Atrial fibrillation with RVR  Active Problems:    Primary hypertension    Morbid obesity with BMI of 40.0-44.9, adult    Right bundle branch block    Acute deep vein thrombosis (DVT) of both lower extremities    ARABELLA (acute kidney injury)    Acute hyperkalemia    Sepsis    Acute UTI    Acute systolic congestive heart failure    Rhinovirus infection    Multifocal pneumonia    Chronic respiratory failure with hypoxia, on home O2 therapy    Skin ulcer of right great toe    Skin ulcer of left great toe    SEDRICK (obstructive sleep apnea)    Aortic stenosis    Mitral regurgitation    Acute HFrEF (heart failure with reduced ejection fraction)    COPD (chronic obstructive pulmonary disease)      Acute respiratory failure with hypoxia and hypercapnia  Acute encephalopathy   Currently on 2 L of oxygen via nasal cannula  Chest tube in place for hydropneumothorax  Chest tube removed 7/18/2024  Pulmonology is managing.     Atrial fibrillation with RVR  Newly diagnosed  Electrolytes have been corrected  Rate controlled with heart rate in the 80s and 90s  Continue amiodarone for rhythm control X 6 weeks  Continue Toprol-XL 25 mg p.o. twice daily  Heart rate is well-controlled  [id number] score is 7  Continue Eliquis  Close eye on H&H due to rectal bleeding.  No plan for endoscopy     Acute systolic CHF  Newly diagnosed  Echocardiogram shows EF of 31 to 35%, moderate aortic stenosis and moderate to severe mitral regurgitation.  Currently on Bumex  Monitor I's and O's and replace electrolytes as needed.  Switch from metoprolol to tartrate to succinate  Spaced out administration of metoprolol and Bumex due to low blood pressure  Unable to add ACE inhibitor/ARNI due to low blood pressure  Was requiring midodrine.  Now off.  Blood pressure is still borderline low  Aldactone has been held  Plan to repeat echocardiogram after 3 months of completing GDMT     Acute deep vein thrombosis (DVT) of both lower  extremities  Extensive DVT in bilateral lower extremities involving femoral, popliteal and posterior tibial.  Continue anticoagulation as above  She will need lifelong anticoagulation   H&H 9.9/31.8.     Thrombocytopenia  Closely monitor platelets while on anticoagulation  Platelets recovered 157 now     ARABELLA (acute kidney injury)  Presented with creatinine of 1.4.  Creatinine 0.96, bicarb is increasing 34  Closely monitor renal function while on diuretics  Continue p.o. Bumex  Nephrology following      Acute hyperkalemia  Treated with [facility]  Nephrology following      Sepsis / UTI / Multifocal pneumonia / Rhinovirus infection  Multifactorial secondary to UTI and pneumonia with possible cellulitis  Continue empiric antibiotics      Skin ulcer of right great toe  Skin ulcer of left great toe  Podiatry following   A1c is 6.2%     Primary hypertension, chronic  Now on Toprol-XL  Unable to tolerate Aldactone due to low blood pressure.  Previously required midodrine.  Closely monitor blood pressure      Morbid obesity with BMI of 40.0-44.9, adult  BMI is 35.3.  She weighs 174 pounds.  Lifestyle modifications recommended   LDL 27, HDL 13, triglyceride 86 and total cholesterol 58.  No indication for statin     SEDRICK (obstructive sleep apnea)  Encouraged compliance with CPAP      Austin Brooks MD  07/19/24  06:49 EDT

## 2024-07-19 NOTE — PLAN OF CARE
Goal Outcome Evaluation:  Plan of Care Reviewed With: patient        Progress: no change     Pt noted to have dark red blood blood in her urine from ext cath. MD noted of pt changes and states to straight cath pt to obtain a urine sample. If urine is bloody then  to send it for a UA and if not, then the blood must be coming from her vaginal cannal. Sample obtained and urine was clear yellow with no traces of blood. MD notified of pt findings. Ordered to consult gynecology d/t previous finding of uterine mass and new onset pf bleeding. Pt VSS. She did complain of lower abd pain. PRN pain medication administered per request. Pt continuing to refuse some turns. Electrolyte replacement initiated based off am labs.

## 2024-07-19 NOTE — PROGRESS NOTES
Daily Progress Note          Assessment    Hydropneumothorax s/p chest tube placement 7/6/2024: Exudative fluid but negative cultures and no malignancy  COPD  SEDRICK  Systolic CHF  A-fib   Aortic stenosis  Mitral valve regurgitation   R BBB  bilateral DVT remote history of DVT  HTN  Morbid obesity   Uterine mass and pancreatic mass        PLAN:  Chest tube removed 7/18/2024    Patient stable from pulmonary standpoint to be discharged    Oxygen supplement and titration to maintain saturation 90-95%: Currently requiring 2 L per nasal cannula    Mucinex  Antibiotics completed  Bronchodilators  Inhaled corticosteroids    Incentive spirometer  diuresis as per nephrology  Electrolytes/ glycemic control  BP/HR control  Chronic anticoagulation: Apixaban     I reviewed the recent clinical results and radiological images                 LOS: 19 days     Subjective     Patient reports mild cough and shortness of breath    Objective     Vital signs for last 24 hours:  Vitals:    07/19/24 1113 07/19/24 1118 07/19/24 1501 07/19/24 1505   BP:       BP Location:       Patient Position:       Pulse: 88 84 86 90   Resp: 16 19 14 15   Temp:       TempSrc:       SpO2: 98% 97% 96% 97%   Weight:       Height:           Intake/Output last 3 shifts:  I/O last 3 completed shifts:  In: 360 [P.O.:360]  Out: 1350 [Urine:1350]  Intake/Output this shift:  No intake/output data recorded.      Radiology  Imaging Results (Last 24 Hours)       Procedure Component Value Units Date/Time    XR Chest 1 View [[id number]] Collected: 07/19/24 0714     Updated: 07/19/24 0718    Narrative:      XR CHEST 1 VW    Date of Exam: 7/19/2024 6:05 AM EDT    Indication: Chest tube    Comparison: 7/18/2024    Findings:  Stable cardiomegaly with bilateral perihilar and bibasilar airspace disease suggesting pulmonary edema. No definite pneumothorax. Small bilateral pleural effusions left greater than right not significantly changed. No residual chest tube identified. The    osseous structures appear intact.      Impression:      Impression:  1. No definite residual pneumothorax.  2. Stable cardiomegaly with bilateral perihilar and bibasilar airspace disease suggesting pulmonary edema.  3. Small bilateral pleural effusions left greater than right not significantly changed.        Electronically Signed: Mina Wheeler MD    7/19/2024 7:16 AM EDT    Workstation ID: [id number]            Labs:  Results from last 7 days   Lab Units 07/19/24  0121   WBC 10*3/mm3 8.29   HEMOGLOBIN g/dL 9.9*   HEMATOCRIT % 31.8*   PLATELETS 10*3/mm3 184     Results from last 7 days   Lab Units 07/19/24  0121   SODIUM mmol/L 134*   POTASSIUM mmol/L 3.2*   CHLORIDE mmol/L 93*   CO2 mmol/L 34.0*   BUN mg/dL 20   CREATININE mg/dL 0.96   CALCIUM mg/dL 8.2*   BILIRUBIN mg/dL 1.6*   ALK PHOS U/L 136*   ALT (SGPT) U/L 25   AST (SGOT) U/L 31   GLUCOSE mg/dL 82           Results from last 7 days   Lab Units 07/19/24  0121 07/18/24  0205 07/17/24  0120   ALBUMIN g/dL 2.2* 2.0* 2.0*             Results from last 7 days   Lab Units 07/19/24  0121   MAGNESIUM mg/dL 1.5*                   Meds:   SCHEDULE  amiodarone, 200 mg, Oral, Q12H   Followed by  [START ON 7/23/2024] amiodarone, 200 mg, Oral, Daily  apixaban, 5 mg, Oral, BID  budesonide, 0.5 mg, Nebulization, BID - RT  bumetanide, 1 mg, Oral, BID  hydrocortisone, 25 mg, Rectal, BID  insulin lispro, 2-7 Units, Subcutaneous, 4x Daily With Meals & Nightly  ipratropium-albuterol, 3 mL, Nebulization, 4x Daily - RT  lidocaine, 20 mL, Infiltration, Once  metoprolol succinate XL, 25 mg, Oral, Q12H  miconazole, 1 Application, Topical, Q12H  pantoprazole, 40 mg, Oral, BID AC  povidone-iodine, , Topical, Daily  sodium chloride, 10 mL, Intravenous, Q12H  [Held by provider] spironolactone, 25 mg, Oral, Daily      Infusions     PRNs    acetaminophen **OR** acetaminophen    Calcium Replacement - Follow Nurse / BPA Driven Protocol    dextrose    dextrose    glucagon (human recombinant)     HYDROcodone-acetaminophen    ipratropium-albuterol    Magnesium Standard Dose Replacement - Follow Nurse / BPA Driven Protocol    nitroglycerin    ondansetron ODT **OR** ondansetron    Phosphorus Replacement - Follow Nurse / BPA Driven Protocol    Potassium Replacement - Follow Nurse / BPA Driven Protocol    [COMPLETED] Insert Peripheral IV **AND** sodium chloride    sodium chloride    sodium chloride    Physical Exam:  General Appearance:  Alert   HEENT:  Normocephalic, without obvious abnormality, Conjunctiva/corneas clear,.   Nares normal, no drainage     Neck:  Supple, symmetrical, trachea midline.   Lungs /Chest wall: Good air entry with mild bilateral basal rhonchi, respirations unlabored, symmetrical wall movement.     Heart:  Regular rate and rhythm, S1 S2 normal  Abdomen: Soft, non-tender, no masses, no organomegaly.    Extremities: Trace edema, no clubbing or cyanosis     ROS  Constitutional: Negative for chills, fever and malaise/fatigue.   HENT: Negative.    Eyes: Negative.    Cardiovascular: Negative.    Respiratory: Positive for mild cough and shortness of breath.    Skin: Negative.    Musculoskeletal: Negative.    Gastrointestinal: Negative.    Genitourinary: Negative.    Neurological: Generalized weakness      I reviewed the recent clinical results  I personally reviewed the latest radiological studies    Part of this note may be an electronic transcription/translation of spoken language to printed text using the Dragon Dictation System.

## 2024-07-19 NOTE — PLAN OF CARE
Goal Outcome Evaluation:              Outcome Evaluation: Pt continues to complain of abd pain and is still having vaginal bleeding. Gynecology consulted last night. Pt remains on 2L o2. Care ongoing.

## 2024-07-20 LAB
ALBUMIN SERPL-MCNC: 1.9 G/DL (ref 3.5–5.2)
ALBUMIN/GLOB SERPL: 0.6 G/DL
ALP SERPL-CCNC: 164 U/L (ref 39–117)
ALT SERPL W P-5'-P-CCNC: 32 U/L (ref 1–33)
ANION GAP SERPL CALCULATED.3IONS-SCNC: 5.3 MMOL/L (ref 5–15)
AST SERPL-CCNC: 64 U/L (ref 1–32)
BASOPHILS # BLD AUTO: 0.02 10*3/MM3 (ref 0–0.2)
BASOPHILS NFR BLD AUTO: 0.3 % (ref 0–1.5)
BILIRUB SERPL-MCNC: 1.4 MG/DL (ref 0–1.2)
BUN SERPL-MCNC: 21 MG/DL (ref 8–23)
BUN/CREAT SERPL: 24.4 (ref 7–25)
CALCIUM SPEC-SCNC: 8.2 MG/DL (ref 8.6–10.5)
CHLORIDE SERPL-SCNC: 92 MMOL/L (ref 98–107)
CO2 SERPL-SCNC: 32.7 MMOL/L (ref 22–29)
CREAT SERPL-MCNC: 0.86 MG/DL (ref 0.57–1)
DEPRECATED RDW RBC AUTO: 56.7 FL (ref 37–54)
EGFRCR SERPLBLD CKD-EPI 2021: 73.2 ML/MIN/1.73
EOSINOPHIL # BLD AUTO: 0.18 10*3/MM3 (ref 0–0.4)
EOSINOPHIL NFR BLD AUTO: 2.5 % (ref 0.3–6.2)
ERYTHROCYTE [DISTWIDTH] IN BLOOD BY AUTOMATED COUNT: 15.8 % (ref 12.3–15.4)
GLOBULIN UR ELPH-MCNC: 3.4 GM/DL
GLUCOSE BLDC GLUCOMTR-MCNC: 115 MG/DL (ref 70–105)
GLUCOSE BLDC GLUCOMTR-MCNC: 138 MG/DL (ref 70–105)
GLUCOSE BLDC GLUCOMTR-MCNC: 154 MG/DL (ref 70–105)
GLUCOSE BLDC GLUCOMTR-MCNC: 77 MG/DL (ref 70–105)
GLUCOSE SERPL-MCNC: 80 MG/DL (ref 65–99)
HCT VFR BLD AUTO: 30.1 % (ref 34–46.6)
HGB BLD-MCNC: 9.4 G/DL (ref 12–15.9)
IMM GRANULOCYTES # BLD AUTO: 0.04 10*3/MM3 (ref 0–0.05)
IMM GRANULOCYTES NFR BLD AUTO: 0.6 % (ref 0–0.5)
LYMPHOCYTES # BLD AUTO: 0.79 10*3/MM3 (ref 0.7–3.1)
LYMPHOCYTES NFR BLD AUTO: 11 % (ref 19.6–45.3)
MAGNESIUM SERPL-MCNC: 2.4 MG/DL (ref 1.6–2.4)
MCH RBC QN AUTO: 30.7 PG (ref 26.6–33)
MCHC RBC AUTO-ENTMCNC: 31.2 G/DL (ref 31.5–35.7)
MCV RBC AUTO: 98.4 FL (ref 79–97)
MONOCYTES # BLD AUTO: 0.78 10*3/MM3 (ref 0.1–0.9)
MONOCYTES NFR BLD AUTO: 10.8 % (ref 5–12)
NEUTROPHILS NFR BLD AUTO: 5.39 10*3/MM3 (ref 1.7–7)
NEUTROPHILS NFR BLD AUTO: 74.8 % (ref 42.7–76)
NRBC BLD AUTO-RTO: 0 /100 WBC (ref 0–0.2)
PHOSPHATE SERPL-MCNC: 2.7 MG/DL (ref 2.5–4.5)
PLATELET # BLD AUTO: 167 10*3/MM3 (ref 140–450)
PMV BLD AUTO: 11 FL (ref 6–12)
POTASSIUM SERPL-SCNC: 3.8 MMOL/L (ref 3.5–5.2)
PROT SERPL-MCNC: 5.3 G/DL (ref 6–8.5)
RBC # BLD AUTO: 3.06 10*6/MM3 (ref 3.77–5.28)
SODIUM SERPL-SCNC: 130 MMOL/L (ref 136–145)
WBC NRBC COR # BLD AUTO: 7.2 10*3/MM3 (ref 3.4–10.8)

## 2024-07-20 PROCEDURE — 94664 DEMO&/EVAL PT USE INHALER: CPT

## 2024-07-20 PROCEDURE — 94761 N-INVAS EAR/PLS OXIMETRY MLT: CPT

## 2024-07-20 PROCEDURE — 84100 ASSAY OF PHOSPHORUS: CPT | Performed by: NURSE PRACTITIONER

## 2024-07-20 PROCEDURE — 63710000001 INSULIN LISPRO (HUMAN) PER 5 UNITS: Performed by: HOSPITALIST

## 2024-07-20 PROCEDURE — 94799 UNLISTED PULMONARY SVC/PX: CPT

## 2024-07-20 PROCEDURE — 80053 COMPREHEN METABOLIC PANEL: CPT | Performed by: NURSE PRACTITIONER

## 2024-07-20 PROCEDURE — 82948 REAGENT STRIP/BLOOD GLUCOSE: CPT

## 2024-07-20 PROCEDURE — 82948 REAGENT STRIP/BLOOD GLUCOSE: CPT | Performed by: HOSPITALIST

## 2024-07-20 PROCEDURE — 97110 THERAPEUTIC EXERCISES: CPT

## 2024-07-20 PROCEDURE — 97530 THERAPEUTIC ACTIVITIES: CPT

## 2024-07-20 PROCEDURE — 83735 ASSAY OF MAGNESIUM: CPT | Performed by: NURSE PRACTITIONER

## 2024-07-20 PROCEDURE — 85025 COMPLETE CBC W/AUTO DIFF WBC: CPT | Performed by: NURSE PRACTITIONER

## 2024-07-20 RX ADMIN — HYDROCORTISONE ACETATE 25 MG: 25 SUPPOSITORY RECTAL at 20:59

## 2024-07-20 RX ADMIN — IPRATROPIUM BROMIDE AND ALBUTEROL SULFATE 3 ML: .5; 3 SOLUTION RESPIRATORY (INHALATION) at 15:09

## 2024-07-20 RX ADMIN — IPRATROPIUM BROMIDE AND ALBUTEROL SULFATE 3 ML: .5; 3 SOLUTION RESPIRATORY (INHALATION) at 11:07

## 2024-07-20 RX ADMIN — BUDESONIDE 0.5 MG: 0.5 INHALANT RESPIRATORY (INHALATION) at 07:28

## 2024-07-20 RX ADMIN — PANTOPRAZOLE SODIUM 40 MG: 40 TABLET, DELAYED RELEASE ORAL at 17:59

## 2024-07-20 RX ADMIN — IPRATROPIUM BROMIDE AND ALBUTEROL SULFATE 3 ML: .5; 3 SOLUTION RESPIRATORY (INHALATION) at 07:32

## 2024-07-20 RX ADMIN — ANTI-FUNGAL POWDER MICONAZOLE NITRATE TALC FREE 1 APPLICATION: 1.42 POWDER TOPICAL at 21:12

## 2024-07-20 RX ADMIN — AMIODARONE HYDROCHLORIDE 200 MG: 200 TABLET ORAL at 05:34

## 2024-07-20 RX ADMIN — HYDROCODONE BITARTRATE AND ACETAMINOPHEN 1 TABLET: 10; 325 TABLET ORAL at 22:13

## 2024-07-20 RX ADMIN — Medication 10 ML: at 09:15

## 2024-07-20 RX ADMIN — PANTOPRAZOLE SODIUM 40 MG: 40 TABLET, DELAYED RELEASE ORAL at 09:15

## 2024-07-20 RX ADMIN — INSULIN LISPRO 2 UNITS: 100 INJECTION, SOLUTION INTRAVENOUS; SUBCUTANEOUS at 12:35

## 2024-07-20 RX ADMIN — BUMETANIDE 1 MG: 1 TABLET ORAL at 09:15

## 2024-07-20 RX ADMIN — METOPROLOL SUCCINATE 25 MG: 25 TABLET, FILM COATED, EXTENDED RELEASE ORAL at 09:15

## 2024-07-20 RX ADMIN — AMIODARONE HYDROCHLORIDE 200 MG: 200 TABLET ORAL at 17:59

## 2024-07-20 RX ADMIN — BUDESONIDE 0.5 MG: 0.5 INHALANT RESPIRATORY (INHALATION) at 19:09

## 2024-07-20 RX ADMIN — BUMETANIDE 1 MG: 1 TABLET ORAL at 17:58

## 2024-07-20 RX ADMIN — POVIDONE-IODINE: 10 SOLUTION TOPICAL at 09:16

## 2024-07-20 RX ADMIN — HYDROCORTISONE ACETATE 25 MG: 25 SUPPOSITORY RECTAL at 09:15

## 2024-07-20 RX ADMIN — APIXABAN 5 MG: 5 TABLET, FILM COATED ORAL at 20:59

## 2024-07-20 RX ADMIN — Medication 10 ML: at 21:12

## 2024-07-20 RX ADMIN — ANTI-FUNGAL POWDER MICONAZOLE NITRATE TALC FREE 1 APPLICATION: 1.42 POWDER TOPICAL at 09:16

## 2024-07-20 RX ADMIN — APIXABAN 5 MG: 5 TABLET, FILM COATED ORAL at 09:15

## 2024-07-20 RX ADMIN — IPRATROPIUM BROMIDE AND ALBUTEROL SULFATE 3 ML: .5; 3 SOLUTION RESPIRATORY (INHALATION) at 19:05

## 2024-07-20 NOTE — PROGRESS NOTES
"    PROGRESS NOTE      Patient Name: Ban Brennan  : 1955  MRN: [id number]  Primary Care Physician: Rut Pierce APRN  Date of admission: 2024    Patient Care Team:  Rut Pierce APRN as PCP - General (Nurse Practitioner)  Austin Brooks MD as Cardiologist (Cardiology)        Reason for Follow up     Acute kidney injury, hyperkalemia      Subjective     Seen and examined, NAD noted, renal functions stable, worsening bicarb level, ordered ABG, good UOP, 1.3L     Review of systems:    ROS was otherwise negative except as mentioned in the Native.       Personal History:     Past Medical History:   Past Medical History:   Diagnosis Date    Bilateral leg edema     Chronic respiratory failure with hypoxia, on home O2 therapy 2024    COPD (chronic obstructive pulmonary disease)     Morbid obesity with BMI of 40.0-44.9, adult 2010    Primary hypertension 2017    Pulmonary embolism     \"many years ago, was on warfarin\"    Sleep apnea        Surgical History:    History reviewed. No pertinent surgical history.    Family History: family history is not on file. Otherwise pertinent FHx was reviewed and unremarkable.     Social History:  reports that she has never smoked. She has never used smokeless tobacco. She reports that she does not drink alcohol and does not use drugs.    Medications:  Prior to Admission medications    Medication Sig Start Date End Date Taking? Authorizing Provider   Allergy Relief 180 MG tablet Take 1 tablet by mouth Daily. 24  Yes ProviderChadwick MD   bumetanide (BUMEX) 1 MG tablet Take 1 tablet by mouth 3 times a day. 24  Yes ProviderChadwick MD   docusate sodium (COLACE) 100 MG capsule Take 1 capsule by mouth Every 12 (Twelve) Hours. 24  Yes ProviderChadwick MD   furosemide (LASIX) 20 MG tablet Take 1 tablet by mouth Daily.   Yes ProviderChadwick MD   HYDROcodone-acetaminophen (NORCO)  MG per tablet Take 1 " tablet by mouth 3 times a day. 5/30/24  Yes Chadwick Johnson MD   lisinopril (PRINIVIL,ZESTRIL) 30 MG tablet Take 1 tablet by mouth Daily. 3/18/24  Yes Chadwick Johnson MD   meloxicam (MOBIC) 7.5 MG tablet Take 1 tablet by mouth Daily As Needed. 3/18/24  Yes Chadwick Johnson MD   metoprolol tartrate (LOPRESSOR) 50 MG tablet Take 1 tablet by mouth Daily.   Yes Chadwick Johnson MD   montelukast (SINGULAIR) 10 MG tablet Take 1 tablet by mouth every night at bedtime.   Yes Chadwick Johnson MD   omeprazole (priLOSEC) 20 MG capsule Take 1 capsule by mouth Daily. 3/18/24  Yes Chadwick Johnson MD   oxybutynin XL (DITROPAN-XL) 10 MG 24 hr tablet Take 2 tablets by mouth Daily. 3/18/24  Yes Chadwick Johnson MD   potassium chloride (MICRO-K) 10 MEQ CR capsule Take 1 capsule by mouth Every 12 (Twelve) Hours. 3/18/24  Yes Chadwick Johnson MD   vitamin D (ERGOCALCIFEROL) 1.25 MG (17024 UT) capsule capsule Take 1 capsule by mouth 2 (Two) Times a Week. Monday and Thursday. 5/30/24  Yes Chadwick Johnson MD     Scheduled Meds:amiodarone, 200 mg, Oral, Q12H   Followed by  [START ON 7/23/2024] amiodarone, 200 mg, Oral, Daily  apixaban, 5 mg, Oral, BID  budesonide, 0.5 mg, Nebulization, BID - RT  bumetanide, 1 mg, Oral, BID  hydrocortisone, 25 mg, Rectal, BID  insulin lispro, 2-7 Units, Subcutaneous, 4x Daily With Meals & Nightly  ipratropium-albuterol, 3 mL, Nebulization, 4x Daily - RT  lidocaine, 20 mL, Infiltration, Once  metoprolol succinate XL, 25 mg, Oral, Q12H  miconazole, 1 Application, Topical, Q12H  pantoprazole, 40 mg, Oral, BID AC  povidone-iodine, , Topical, Daily  sodium chloride, 10 mL, Intravenous, Q12H  [Held by provider] spironolactone, 25 mg, Oral, Daily      Continuous Infusions:     PRN Meds:  acetaminophen **OR** acetaminophen    Calcium Replacement - Follow Nurse / BPA Driven Protocol    dextrose    dextrose    glucagon (human recombinant)    HYDROcodone-acetaminophen     ipratropium-albuterol    Magnesium Standard Dose Replacement - Follow Nurse / BPA Driven Protocol    nitroglycerin    ondansetron ODT **OR** ondansetron    Phosphorus Replacement - Follow Nurse / BPA Driven Protocol    Potassium Replacement - Follow Nurse / BPA Driven Protocol    [COMPLETED] Insert Peripheral IV **AND** sodium chloride    sodium chloride    sodium chloride  Allergies:  No Known Allergies    Objective   Exam:     Vital Signs  Temp:  [98.1 °F (36.7 °C)-98.2 °F (36.8 °C)] 98.2 °F (36.8 °C)  Heart Rate:  [76-94] 94  Resp:  [14-24] 16  BP: ()/(48-67) 103/51  SpO2:  [95 %-100 %] 95 %  on  Flow (L/min):  [2] 2;   Device (Oxygen Therapy): humidified;nasal cannula  Body mass index is 35.62 kg/m².  EXAM  General:  69 yr old  female, some respiratory distress  Head:      Normocephalic and atraumatic.    Eyes:      PERRL/EOM intact, conjunctivae and sclerae clear without nystagmus.    Neck:      No masses, thyromegaly,  trachea central   Lungs:    Clear bilaterally to auscultation.    Heart:      Regular rate and rhythm, no murmur no gallop  Abd:        Soft, nontender, not distended, bowel sounds positive, no shifting dullness.  Msk:        No deformity or scoliosis noted of thoracic or lumbar spine.    Pulses:   Pulses normal in all 4 extremities.    Extremities:        No cyanosis or clubbing--+ + edema.    Neuro:    Lethargic  Skin:       Intact without lesions or rashes.          Results Review:  I have personally reviewed most recent Data :  BMP @LABRCNT(creatinine:10)  CBC    Results from last 7 days   Lab Units 07/19/24  0121 07/18/24  0205 07/17/24  0120 07/16/24  0347 07/15/24  0256 07/14/24  0659 07/13/24  0240   WBC 10*3/mm3 8.29 8.88 10.15 10.10 11.06* 9.61 11.02*   HEMOGLOBIN g/dL 9.9* 9.3* 9.6* 9.7* 10.3* 11.2* 11.5*   PLATELETS 10*3/mm3 184 157 157 144 163 117* 112*     CMP   Results from last 7 days   Lab Units 07/19/24  0121 07/18/24  0205 07/17/24  0120 07/16/24  1402 07/16/24  0347  07/15/24  0519 07/14/24  1918 07/14/24  0659 07/13/24  0240   SODIUM mmol/L 134* 136 138  --  139 140  --  141 141   POTASSIUM mmol/L 3.2* 4.0 4.5 4.3 3.6 4.3 4.3 3.4* 3.9   CHLORIDE mmol/L 93* 98 100  --  99 99  --  97* 97*   CO2 mmol/L 34.0* 32.5* 33.8*  --  35.4* 36.0*  --  38.3* 36.9*   BUN mg/dL 20 23 22  --  23 27*  --  30* 38*   CREATININE mg/dL 0.96 1.09* 0.92  --  0.86 0.99  --  0.99 1.11*   GLUCOSE mg/dL 82 98 101*  --  76 89  --  75 87   ALBUMIN g/dL 2.2* 2.0* 2.0*  --  1.9* 2.1*  --  2.1* 2.1*   BILIRUBIN mg/dL 1.6* 1.7* 1.8*  --  2.0* 2.6*  2.6*  --  2.7* 2.9*   ALK PHOS U/L 136* 128* 127*  --  108 117  --  115 96   AST (SGOT) U/L 31 57* 45*  --  28 36*  --  43* 37*   ALT (SGPT) U/L 25 23 24  --  20 26  --  23 19   AMYLASE U/L  --   --   --   --  17*  --   --   --   --    LIPASE U/L  --   --   --   --  12*  --   --   --   --      ABG          XR Chest 1 View    Result Date: 7/19/2024  Impression: 1. No definite residual pneumothorax. 2. Stable cardiomegaly with bilateral perihilar and bibasilar airspace disease suggesting pulmonary edema. 3. Small bilateral pleural effusions left greater than right not significantly changed. Electronically Signed: Mina Wheeler MD  7/19/2024 7:16 AM EDT  Workstation ID: [id number]    XR Chest 1 View    Result Date: 7/18/2024  Stable tiny right apical pneumothorax status post thoracotomy tube removal. Otherwise stable radiographic appearance of the chest.   Electronically Signed By-Jerzy White MD On:7/18/2024 11:41 AM      XR Chest 1 View    Result Date: 7/17/2024  Impression: 1. Trace right pneumothorax 2. Otherwise stable chest demonstrating cardiomegaly with pulmonary vascular congestion, small pleural effusions, left perihilar airspace consolidation, and bibasilar airspace disease which could be atelectasis or pneumonia Electronically Signed: Betito Villeda  7/17/2024 7:32 AM EDT  Workstation ID: [id number]    XR Chest 1 View    Result Date: 7/16/2024  Impression:  No appreciable change since 1 day prior. Electronically Signed: Krystal Lee MD  7/16/2024 8:08 AM EDT  Workstation ID: [id number]    MRI Abdomen With & Without Contrast    Result Date: 7/15/2024  Impression: Multiseptated pancreatic lesion in the pancreatic neck measuring 2.7 x 2.6 cm. There is suggestion of internal septations as well as faint enhancement of the septa. Findings suspicious for cystic neoplasm. Brisk enhancement lesion in the anterior superior right hepatic lobe measures 0.9 cm, favoring flash filling hemangioma. Hypervascular metastasis is felt to be less likely but incomplete excluded. Consider short-term follow-up MRI to evaluate for change in size. 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 2.1 cm right adrenal myelolipoma. Small right pleural effusion. Moderate left pleural effusion. Electronically Signed: Gigi Beck MD  7/15/2024 8:38 PM EDT  Workstation ID: [id number]    XR Chest 1 View    Result Date: 7/15/2024  Impression: Stable chest with cardiomegaly, moderate bilateral effusion and bibasilar consolidations Electronically Signed: Eliu High MD  7/15/2024 7:56 AM EDT  Workstation ID: [id number]    CT Chest Without Contrast Diagnostic    Result Date: 7/14/2024  Impression: 1.Persistent moderate-sized right pleural effusion despite indwelling pleural catheter. Tiny right pneumothorax. Improved aeration of the right lung with persistent right middle/lower lobe volume loss and consolidation. 2.Stable smaller left pleural effusion with increased left lower lobe volume loss and consolidation. 3.Stable massive cardiomegaly. 4.Included upper abdominal findings of pancreatic mass, splenic infarct, and cholelithiasis, as described on recent CT abdomen/pelvis. Electronically Signed: Elise Castro  7/14/2024 11:44 AM EDT  Workstation ID: [id number]    XR Chest 1 View    Result Date: 7/14/2024  Impression: 1.Right basilar pleural catheter remains in  place. No visible pneumothorax. 2.Persistent bilateral perihilar and bibasilar airspace opacities, increased in the right lower lung compared to yesterday's chest x-ray. Electronically Signed: Elise Castro  7/14/2024 9:59 AM EDT  Workstation ID: [id number]    CT Abdomen Pelvis Without Contrast    Result Date: 7/13/2024  1. Colonic diverticulosis without evidence of diverticulitis. 2. There is a stable 3 cm soft tissue mass of the pancreas. Pancreatic mass protocol MRI of the abdomen recommended. 3. Focal splenic infarct 4. Small bilateral pleural effusions with trace right-sided pneumothorax. 5. Cholelithiasis without evidence of cholecystitis. Electronically Signed: Eamon Brock MD  7/13/2024 1:51 PM EDT  Workstation ID: [id number]    XR Chest 1 View    Result Date: 7/13/2024  Impression: 1. Stable chest tube overlying the right lung base. No pneumothorax. 2. Persistent small bilateral pleural effusions with bibasilar airspace disease which may relate to atelectasis and/or pneumonia. 3. Stable cardiomegaly with central pulmonary vascular congestion and findings of pulmonary edema. Electronically Signed: Mina Wheeler MD  7/13/2024 11:24 AM EDT  Workstation ID: [id number]    XR Chest 1 View    Result Date: 7/12/2024  Impression: 1.Right basilar chest tube appears in similar position. No definite pneumothorax. 2.Patchy basilar predominant airspace opacities and possible small pleural effusions, as before. 3.Stable cardiomegaly. Electronically Signed: Tanvir Melgar  7/12/2024 7:18 AM EDT  Workstation ID: [id number]     Results for orders placed during the hospital encounter of 06/30/24    Adult Transthoracic Echo Complete w/ Color, Spectral and Contrast if Necessary Per Protocol    Interpretation Summary    Left ventricular ejection fraction appears to be 31 - 35%.    Left ventricular diastolic dysfunction is noted.    The left atrial cavity is dilated.    Moderate aortic valve stenosis is present. Mean/peak gradient of 14/24  mmHg.  Dimensionless index 0.36    Moderate to severe mitral valve regurgitation is present.    Estimated right ventricular systolic pressure from tricuspid regurgitation is normal (<35 mmHg).        Assessment & Plan   Assessment and Plan:         Atrial fibrillation with RVR    Primary hypertension    Morbid obesity with BMI of 40.0-44.9, adult    Right bundle branch block    Acute deep vein thrombosis (DVT) of both lower extremities    ARABELLA (acute kidney injury)    Acute hyperkalemia    Sepsis    Acute UTI    Acute systolic congestive heart failure    Rhinovirus infection    Multifocal pneumonia    Chronic respiratory failure with hypoxia, on home O2 therapy    Skin ulcer of right great toe    Skin ulcer of left great toe    SEDRICK (obstructive sleep apnea)    Aortic stenosis    Mitral regurgitation    Acute HFrEF (heart failure with reduced ejection fraction)    COPD (chronic obstructive pulmonary disease)    ASSESSMENT:  Acute kidney injury, with baseline creatinine roughly 0.8-1.0mg/dL  Hyperkalemia  A-fib with RVR  Acute on chronic heart failure, with EF 31-35%, diastolic dysfunction, moderate AS and moderate to severe MR as per echo from 6/30/24  Bilateral DVT  Severe sepsis/LLE cellulitis, acute cystitis  Multifocal pneumonia    PLAN :     Renal function unchanged/stable today from prior reading. Suspect she may initially have had some acute cardiorenal component, now most likely has some ATN secondary to sepsis, elevated Vanc level (was 22.4 on 7/3), and some prolonged prerenal state with hemodynamic insults, arrhythmias. Had no significant proteinuria  Patient electrolytes and respiratory condition is stable  Slight hyponatremia and hypokalemia noted  Continue same dose of diuretics as patient blood pressure is acceptable  Patient still has significant edema will increase Bumex to twice a day follow-up  Patient renal functions are stable at this time  Creatinine still 0.9.  Still has some peripheral edema but  improving  Blood pressure reviewed, stable. Continue to monitor   Now not on any abx  Daily labs   We will continue to follow       Charan Krueger MD  [facility]  7/19/2024  22:00 EDT

## 2024-07-20 NOTE — PLAN OF CARE
Goal Outcome Evaluation:        VSS, 4L at this time, patient sleeping in between care. Urine canister has blood-tinged urine d/t possible vaginal bleeding. The jolene under the patient had bright red blood on it.

## 2024-07-20 NOTE — CONSULTS
"Nutrition Services    Patient Name: Ban Brennan  YOB: 1955  MRN: [id number]  Admission date: 6/30/2024    Comment:  -- Continue current diet and encourage good PO intakes       CLINICAL NUTRITION ASSESSMENT      Reason for Assessment 7/19: Follow-up protocol   7/13: Follow up protocol   7/5: wounds     H&P      Past Medical History:   Diagnosis Date    Bilateral leg edema     Chronic respiratory failure with hypoxia, on home O2 therapy 07/01/2024    COPD (chronic obstructive pulmonary disease)     Morbid obesity with BMI of 40.0-44.9, adult 11/08/2010    Primary hypertension 03/01/2017    Pulmonary embolism     \"many years ago, was on warfarin\"    Sleep apnea        History reviewed. No pertinent surgical history.     Current Problems   A-fib with RVR  -Bipap  -Code 7/5    ARABELLA  - Nephrology following    SOB  - pulmonology following    Chronic leg edema  -immobile x 1 year    Multiple wounds  -WOCN following  -podiatry following    Possible uterine mass     CHF  HTN     Encounter Information        Trending Narrative     7/19: Checking in on pt to monitor Nutrition POC and diet tolerance. Pt remains on 2L O2 NC. Pt is tolerating po diet and ONS without noted issues at this time. RD will continue to encourage good po intake and monitor per protocol.     7/13: Since last RD review, patient diet advanced to solids.  RD unable to see patient in person on this date.      7/5:  Pt presented to ED via EMS on 6/30 with complaints of increasing SOB over the few days prior to admit as well as chronic leg edema with worsening swelling and redness. Upon assessment, pt found to be in A-fib and hyperkalemic. Pt mental status progressively worsened overnight per MD and pt put on bipap. Code called this am and pt possible intubation. Pt is NPO currently due to change in mental status. NGT in place. MAP avg 68.5, Lactate 1.8. On Levo. RD will provide EN recommendations and est needs if EN is desired. See below.  " "    Anthropometrics        Current Height, Weight Height: 149.9 cm (59\")  Weight: 80 kg (176 lb 5.9 oz) (07/19/24 0529)       Usual Body Weight (UBW) Unable to obtain from patient.        Trending Weight Hx     This admission: 7/19: 176#, weight consistent with 7/13.   7/13: 176#, 14.5% weight loss since last RD review, -22.0L since admission per I/Os  7/5: 206#             PTA: 7/5: No recent weight history documented PTA    Wt Readings from Last 30 Encounters:   07/19/24 0529 80 kg (176 lb 5.9 oz)   07/18/24 0452 79.2 kg (174 lb 9.7 oz)   07/17/24 0509 80.8 kg (178 lb 2.1 oz)   07/16/24 0453 80.7 kg (177 lb 14.6 oz)   07/15/24 0537 79.2 kg (174 lb 9.7 oz)   07/14/24 0614 82.6 kg (182 lb 1.6 oz)   07/13/24 0500 80 kg (176 lb 5.9 oz)   07/12/24 0500 82 kg (180 lb 12.4 oz)   07/11/24 0500 83.3 kg (183 lb 10.3 oz)   07/10/24 0456 85.9 kg (189 lb 6 oz)   07/09/24 0500 87 kg (191 lb 12.8 oz)   07/08/24 0535 85.3 kg (188 lb 0.8 oz)   07/07/24 2116 88.1 kg (194 lb 3.6 oz)   07/07/24 0400 88.1 kg (194 lb 3.6 oz)   07/06/24 0500 95 kg (209 lb 7 oz)   07/05/24 0554 93.7 kg (206 lb 9.1 oz)   07/04/24 0532 90.9 kg (200 lb 6.4 oz)   07/03/24 0544 90.2 kg (198 lb 13.7 oz)   07/02/24 0600 92.3 kg (203 lb 7.8 oz)   07/01/24 0832 96.2 kg (212 lb)   06/30/24 2121 96.4 kg (212 lb 8.4 oz)   06/30/24 1650 108 kg (239 lb)   03/01/17 1306 112 kg (247 lb)      BMI kg/m2 Body mass index is 35.62 kg/m².       Labs        Pertinent Labs Hyponatremia, Hypokalemia, Hypomagnesemia - electrolytes replaced today.    Results from last 7 days   Lab Units 07/19/24  0121 07/18/24  0205 07/17/24  0120   SODIUM mmol/L 134* 136 138   POTASSIUM mmol/L 3.2* 4.0 4.5   CHLORIDE mmol/L 93* 98 100   CO2 mmol/L 34.0* 32.5* 33.8*   BUN mg/dL 20 23 22   CREATININE mg/dL 0.96 1.09* 0.92   CALCIUM mg/dL 8.2* 8.1* 8.3*   BILIRUBIN mg/dL 1.6* 1.7* 1.8*   ALK PHOS U/L 136* 128* 127*   ALT (SGPT) U/L 25 23 24   AST (SGOT) U/L 31 57* 45*   GLUCOSE mg/dL 82 98 101* "     Results from last 7 days   Lab Units 07/19/24  0121 07/18/24  0205 07/17/24  0120   MAGNESIUM mg/dL 1.5* 1.6 1.7   PHOSPHORUS mg/dL 2.7 2.9 2.4*   HEMOGLOBIN g/dL 9.9* 9.3* 9.6*   HEMATOCRIT % 31.8* 29.5* 30.7*     Lab Results   Component Value Date    HGBA1C 6.14 (H) 07/01/2024        Medications    Scheduled Medications amiodarone, 200 mg, Oral, Q12H   Followed by  [START ON 7/23/2024] amiodarone, 200 mg, Oral, Daily  apixaban, 5 mg, Oral, BID  budesonide, 0.5 mg, Nebulization, BID - RT  bumetanide, 1 mg, Oral, BID  hydrocortisone, 25 mg, Rectal, BID  insulin lispro, 2-7 Units, Subcutaneous, 4x Daily With Meals & Nightly  ipratropium-albuterol, 3 mL, Nebulization, 4x Daily - RT  lidocaine, 20 mL, Infiltration, Once  metoprolol succinate XL, 25 mg, Oral, Q12H  miconazole, 1 Application, Topical, Q12H  pantoprazole, 40 mg, Oral, BID AC  povidone-iodine, , Topical, Daily  sodium chloride, 10 mL, Intravenous, Q12H  [Held by provider] spironolactone, 25 mg, Oral, Daily        Infusions        PRN Medications   acetaminophen **OR** acetaminophen    Calcium Replacement - Follow Nurse / BPA Driven Protocol    dextrose    dextrose    glucagon (human recombinant)    HYDROcodone-acetaminophen    ipratropium-albuterol    Magnesium Standard Dose Replacement - Follow Nurse / BPA Driven Protocol    nitroglycerin    ondansetron ODT **OR** ondansetron    Phosphorus Replacement - Follow Nurse / BPA Driven Protocol    Potassium Replacement - Follow Nurse / BPA Driven Protocol    [COMPLETED] Insert Peripheral IV **AND** sodium chloride    sodium chloride    sodium chloride     Physical Findings        Trending Physical   Appearance, NFPE 7/19: NAV    7/13: NAV    7/5: Unable to accurately assess due to severe level of edema   --  Edema  3+ abdomen, L/R legs, knees, ankles, feet  2+ L/R hips  1+ R arm, L/R hands     Bowel Function Last documented BM 7/17 (x 2 days ago)     Tubes No feeding tube      Chewing/Swallowing Unknown  baseline      Skin R foot ulcer  R great toe - eschar  L upper thigh - 2 open wounds  R post thigh - mid dermal wound  Abdominal fold, perineal, sacral, buttocks - MASD  Sacrum - Deep tissue PI    WOCN notes 7/1, 7/10, 7/15     --  Current Nutrition Orders & Evaluation of Intake       Oral Nutrition     Food Allergies NKFA   Current PO Diet Diet: Cardiac; Healthy Heart (2-3 Na+); Fluid Consistency: Thin (IDDSI 0)   Supplement Boost Plus BID   PO Evaluation     Trending % PO Intake 7/19: 80% average po intake x last 8 documented meals  7/13: 50% x 2 documented meals since diet advancement  7/5: NPO   --  Nutritional Risk Screening        NRS-2002 Score          Nutrition Diagnosis         Nutrition Dx Problem 1 Inadequate energy intakes related to intake less than needs as evidenced by PO intakes 50% since admission.        Nutrition Dx Problem 2        Intervention Goal         Intervention Goal(s) PO intakes at least 75%  Tolerate po intake     Nutrition Intervention        RD Action Monitor PO intakes     Continue to encourage good po intake     Nutrition Prescription          Diet Prescription Heart Healthy   Supplement Prescription Boost Plus BID    --  Monitor/Evaluation        Monitor Per protocol, I&O, Pertinent labs, Weight, Skin status, GI status, Symptoms, POC/GOC, Hemodynamic stability     Electronically signed by:  Shawna Miramontes RD  07/19/24 22:32 EDT

## 2024-07-20 NOTE — PROGRESS NOTES
"    PROGRESS NOTE      Patient Name: Ban Brennan  : 1955  MRN: [id number]  Primary Care Physician: Rut Pierce APRN  Date of admission: 2024    Patient Care Team:  Rut Pierce APRN as PCP - General (Nurse Practitioner)  Austin Brooks MD as Cardiologist (Cardiology)        Reason for Follow up     Acute kidney injury, hyperkalemia      Subjective     Seen and examined, NAD noted, renal functions stable, worsening bicarb level, ordered ABG, good UOP, 1.3L     Review of systems:    ROS was otherwise negative except as mentioned in the [place].       Personal History:     Past Medical History:   Past Medical History:   Diagnosis Date    Bilateral leg edema     Chronic respiratory failure with hypoxia, on home O2 therapy 2024    COPD (chronic obstructive pulmonary disease)     Morbid obesity with BMI of 40.0-44.9, adult 2010    Primary hypertension 2017    Pulmonary embolism     \"many years ago, was on warfarin\"    Sleep apnea        Surgical History:    History reviewed. No pertinent surgical history.    Family History: family history is not on file. Otherwise pertinent FHx was reviewed and unremarkable.     Social History:  reports that she has never smoked. She has never used smokeless tobacco. She reports that she does not drink alcohol and does not use drugs.    Medications:  Prior to Admission medications    Medication Sig Start Date End Date Taking? Authorizing Provider   Allergy Relief 180 MG tablet Take 1 tablet by mouth Daily. 24  Yes ProviderChadwick MD   bumetanide (BUMEX) 1 MG tablet Take 1 tablet by mouth 3 times a day. 24  Yes ProviderChadwick MD   docusate sodium (COLACE) 100 MG capsule Take 1 capsule by mouth Every 12 (Twelve) Hours. 24  Yes ProviderChadwick MD   furosemide (LASIX) 20 MG tablet Take 1 tablet by mouth Daily.   Yes ProviderChadwick MD   HYDROcodone-acetaminophen (NORCO)  MG per tablet Take 1 " tablet by mouth 3 times a day. 5/30/24  Yes Chadwick Johnson MD   lisinopril (PRINIVIL,ZESTRIL) 30 MG tablet Take 1 tablet by mouth Daily. 3/18/24  Yes Chadwick Johnson MD   meloxicam (MOBIC) 7.5 MG tablet Take 1 tablet by mouth Daily As Needed. 3/18/24  Yes Chadwick Johnson MD   metoprolol tartrate (LOPRESSOR) 50 MG tablet Take 1 tablet by mouth Daily.   Yes Chadwick Johnson MD   montelukast (SINGULAIR) 10 MG tablet Take 1 tablet by mouth every night at bedtime.   Yes Chadwick Johnson MD   omeprazole (priLOSEC) 20 MG capsule Take 1 capsule by mouth Daily. 3/18/24  Yes Chadwick Johnson MD   oxybutynin XL (DITROPAN-XL) 10 MG 24 hr tablet Take 2 tablets by mouth Daily. 3/18/24  Yes Chadwick Johnson MD   potassium chloride (MICRO-K) 10 MEQ CR capsule Take 1 capsule by mouth Every 12 (Twelve) Hours. 3/18/24  Yes Chadwick Johnson MD   vitamin D (ERGOCALCIFEROL) 1.25 MG (69453 UT) capsule capsule Take 1 capsule by mouth 2 (Two) Times a Week. Monday and Thursday. 5/30/24  Yes Chadwick Johnson MD     Scheduled Meds:amiodarone, 200 mg, Oral, Q12H   Followed by  [START ON 7/23/2024] amiodarone, 200 mg, Oral, Daily  apixaban, 5 mg, Oral, BID  budesonide, 0.5 mg, Nebulization, BID - RT  bumetanide, 1 mg, Oral, BID  hydrocortisone, 25 mg, Rectal, BID  insulin lispro, 2-7 Units, Subcutaneous, 4x Daily With Meals & Nightly  ipratropium-albuterol, 3 mL, Nebulization, 4x Daily - RT  lidocaine, 20 mL, Infiltration, Once  metoprolol succinate XL, 25 mg, Oral, Q12H  miconazole, 1 Application, Topical, Q12H  pantoprazole, 40 mg, Oral, BID AC  povidone-iodine, , Topical, Daily  sodium chloride, 10 mL, Intravenous, Q12H      Continuous Infusions:     PRN Meds:  acetaminophen **OR** acetaminophen    Calcium Replacement - Follow Nurse / BPA Driven Protocol    dextrose    dextrose    glucagon (human recombinant)    HYDROcodone-acetaminophen    ipratropium-albuterol    Magnesium Standard Dose  Replacement - Follow Nurse / BPA Driven Protocol    nitroglycerin    ondansetron ODT **OR** ondansetron    Phosphorus Replacement - Follow Nurse / BPA Driven Protocol    Potassium Replacement - Follow Nurse / BPA Driven Protocol    [COMPLETED] Insert Peripheral IV **AND** sodium chloride    sodium chloride    sodium chloride  Allergies:  No Known Allergies    Objective   Exam:     Vital Signs  Temp:  [97.7 °F (36.5 °C)-98.6 °F (37 °C)] 98.2 °F (36.8 °C)  Heart Rate:  [72-94] 93  Resp:  [14-24] 20  BP: ()/(48-77) 109/58  SpO2:  [95 %-100 %] 97 %  on  Flow (L/min):  [2-4] 4;   Device (Oxygen Therapy): humidified;nasal cannula  Body mass index is 40.88 kg/m².  EXAM  General:  69 yr old  female, some respiratory distress  Head:      Normocephalic and atraumatic.    Eyes:      PERRL/EOM intact, conjunctivae and sclerae clear without nystagmus.    Neck:      No masses, thyromegaly,  trachea central   Lungs:    Clear bilaterally to auscultation.    Heart:      Regular rate and rhythm, no murmur no gallop  Abd:        Soft, nontender, not distended, bowel sounds positive, no shifting dullness.  Msk:        No deformity or scoliosis noted of thoracic or lumbar spine.    Pulses:   Pulses normal in all 4 extremities.    Extremities:        No cyanosis or clubbing--+ + edema.    Neuro:    Lethargic  Skin:       Intact without lesions or rashes.          Results Review:  I have personally reviewed most recent Data :  [facility](creatinine:10)  CBC    Results from last 7 days   Lab Units 07/20/24  0140 07/19/24  0121 07/18/24  0205 07/17/24  0120 07/16/24  0347 07/15/24  0256 07/14/24  0659   WBC 10*3/mm3 7.20 8.29 8.88 10.15 10.10 11.06* 9.61   HEMOGLOBIN g/dL 9.4* 9.9* 9.3* 9.6* 9.7* 10.3* 11.2*   PLATELETS 10*3/mm3 167 184 157 157 144 163 117*     CMP   Results from last 7 days   Lab Units 07/20/24  0140 07/19/24  0121 07/18/24  0205 07/17/24  0120 07/16/24  1402 07/16/24  0347 07/15/24  0519 07/14/24  1918  07/14/24  0659   SODIUM mmol/L 130* 134* 136 138  --  139 140  --  141   POTASSIUM mmol/L 3.8 3.2* 4.0 4.5 4.3 3.6 4.3   < > 3.4*   CHLORIDE mmol/L 92* 93* 98 100  --  99 99  --  97*   CO2 mmol/L 32.7* 34.0* 32.5* 33.8*  --  35.4* 36.0*  --  38.3*   BUN mg/dL 21 20 23 22  --  23 27*  --  30*   CREATININE mg/dL 0.86 0.96 1.09* 0.92  --  0.86 0.99  --  0.99   GLUCOSE mg/dL 80 82 98 101*  --  76 89  --  75   ALBUMIN g/dL 1.9* 2.2* 2.0* 2.0*  --  1.9* 2.1*  --  2.1*   BILIRUBIN mg/dL 1.4* 1.6* 1.7* 1.8*  --  2.0* 2.6*  2.6*  --  2.7*   ALK PHOS U/L 164* 136* 128* 127*  --  108 117  --  115   AST (SGOT) U/L 64* 31 57* 45*  --  28 36*  --  43*   ALT (SGPT) U/L 32 25 23 24  --  20 26  --  23   AMYLASE U/L  --   --   --   --   --  17*  --   --   --    LIPASE U/L  --   --   --   --   --  12*  --   --   --     < > = values in this interval not displayed.     ABG          XR Chest 1 View    Result Date: 7/19/2024  Impression: 1. No definite residual pneumothorax. 2. Stable cardiomegaly with bilateral perihilar and bibasilar airspace disease suggesting pulmonary edema. 3. Small bilateral pleural effusions left greater than right not significantly changed. Electronically Signed: Mina Wheeler MD  7/19/2024 7:16 AM EDT  Workstation ID: [id number]    XR Chest 1 View    Result Date: 7/18/2024  Stable tiny right apical pneumothorax status post thoracotomy tube removal. Otherwise stable radiographic appearance of the chest.   Electronically Signed By-Jerzy White MD On:7/18/2024 11:41 AM      XR Chest 1 View    Result Date: 7/17/2024  Impression: 1. Trace right pneumothorax 2. Otherwise stable chest demonstrating cardiomegaly with pulmonary vascular congestion, small pleural effusions, left perihilar airspace consolidation, and bibasilar airspace disease which could be atelectasis or pneumonia Electronically Signed: Betito Villeda  7/17/2024 7:32 AM EDT  Workstation ID: [id number]    XR Chest 1 View    Result Date:  7/16/2024  Impression: No appreciable change since 1 day prior. Electronically Signed: Krystal Lee MD  7/16/2024 8:08 AM EDT  Workstation ID: [id number]    MRI Abdomen With & Without Contrast    Result Date: 7/15/2024  Impression: Multiseptated pancreatic lesion in the pancreatic neck measuring 2.7 x 2.6 cm. There is suggestion of internal septations as well as faint enhancement of the septa. Findings suspicious for cystic neoplasm. Brisk enhancement lesion in the anterior superior right hepatic lobe measures 0.9 cm, favoring flash filling hemangioma. Hypervascular metastasis is felt to be less likely but incomplete excluded. Consider short-term follow-up MRI to evaluate for change in size. 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 2.1 cm right adrenal myelolipoma. Small right pleural effusion. Moderate left pleural effusion. Electronically Signed: Gigi Beck MD  7/15/2024 8:38 PM EDT  Workstation ID: [id number]    XR Chest 1 View    Result Date: 7/15/2024  Impression: Stable chest with cardiomegaly, moderate bilateral effusion and bibasilar consolidations Electronically Signed: Eliu High MD  7/15/2024 7:56 AM EDT  Workstation ID: [id number]    CT Chest Without Contrast Diagnostic    Result Date: 7/14/2024  Impression: 1.Persistent moderate-sized right pleural effusion despite indwelling pleural catheter. Tiny right pneumothorax. Improved aeration of the right lung with persistent right middle/lower lobe volume loss and consolidation. 2.Stable smaller left pleural effusion with increased left lower lobe volume loss and consolidation. 3.Stable massive cardiomegaly. 4.Included upper abdominal findings of pancreatic mass, splenic infarct, and cholelithiasis, as described on recent CT abdomen/pelvis. Electronically Signed: Elise Castro  7/14/2024 11:44 AM EDT  Workstation ID: [id number]    XR Chest 1 View    Result Date: 7/14/2024  Impression: 1.Right basilar pleural  catheter remains in place. No visible pneumothorax. 2.Persistent bilateral perihilar and bibasilar airspace opacities, increased in the right lower lung compared to yesterday's chest x-ray. Electronically Signed: Elisedonte Castro  7/14/2024 9:59 AM EDT  Workstation ID: [id number]    CT Abdomen Pelvis Without Contrast    Result Date: 7/13/2024  1. Colonic diverticulosis without evidence of diverticulitis. 2. There is a stable 3 cm soft tissue mass of the pancreas. Pancreatic mass protocol MRI of the abdomen recommended. 3. Focal splenic infarct 4. Small bilateral pleural effusions with trace right-sided pneumothorax. 5. Cholelithiasis without evidence of cholecystitis. Electronically Signed: Eamon Brock MD  7/13/2024 1:51 PM EDT  Workstation ID: [id number]    XR Chest 1 View    Result Date: 7/13/2024  Impression: 1. Stable chest tube overlying the right lung base. No pneumothorax. 2. Persistent small bilateral pleural effusions with bibasilar airspace disease which may relate to atelectasis and/or pneumonia. 3. Stable cardiomegaly with central pulmonary vascular congestion and findings of pulmonary edema. Electronically Signed: Mina Wheeler MD  7/13/2024 11:24 AM EDT  Workstation ID: [id number]     Results for orders placed during the hospital encounter of 06/30/24    Adult Transthoracic Echo Complete w/ Color, Spectral and Contrast if Necessary Per Protocol    Interpretation Summary    Left ventricular ejection fraction appears to be 31 - 35%.    Left ventricular diastolic dysfunction is noted.    The left atrial cavity is dilated.    Moderate aortic valve stenosis is present. Mean/peak gradient of 14/24 mmHg.  Dimensionless index 0.36    Moderate to severe mitral valve regurgitation is present.    Estimated right ventricular systolic pressure from tricuspid regurgitation is normal (<35 mmHg).        Assessment & Plan   Assessment and Plan:         Atrial fibrillation with RVR    Primary hypertension    Morbid obesity with  BMI of 40.0-44.9, adult    Right bundle branch block    Acute deep vein thrombosis (DVT) of both lower extremities    ARABELLA (acute kidney injury)    Acute hyperkalemia    Sepsis    Acute UTI    Acute systolic congestive heart failure    Rhinovirus infection    Multifocal pneumonia    Chronic respiratory failure with hypoxia, on home O2 therapy    Skin ulcer of right great toe    Skin ulcer of left great toe    SEDRICK (obstructive sleep apnea)    Aortic stenosis    Mitral regurgitation    Acute HFrEF (heart failure with reduced ejection fraction)    COPD (chronic obstructive pulmonary disease)    ASSESSMENT:  Acute kidney injury, with baseline creatinine roughly 0.8-1.0mg/dL  Hyperkalemia  A-fib with RVR  Acute on chronic heart failure, with EF 31-35%, diastolic dysfunction, moderate AS and moderate to severe MR as per echo from 6/30/24  Bilateral DVT  Severe sepsis/LLE cellulitis, acute cystitis  Multifocal pneumonia    PLAN :     Renal function unchanged/stable today from prior reading. Suspect she may initially have had some acute cardiorenal component, now most likely has some ATN secondary to sepsis, elevated Vanc level (was 22.4 on 7/3), and some prolonged prerenal state with hemodynamic insults, arrhythmias. Had no significant proteinuria  Patient electrolytes and respiratory condition is stable  Slight hyponatremia and hypokalemia noted  Continue same dose of diuretics as patient blood pressure is acceptable  Patient still has significant edema will increase Bumex to twice a day follow-up  Patient renal functions are stable at this time  Creatinine still 0.9.  Still has some peripheral edema but improving  Continue Bumex 1 mg twice daily  Blood pressure reviewed, stable. Continue to monitor   Now not on any abx  Daily labs   We will continue to follow       Charan Krueger MD  [facility]  7/20/2024  14:58 EDT

## 2024-07-20 NOTE — PLAN OF CARE
Problem: Adult Inpatient Plan of Care  Goal: Plan of Care Review  Outcome: Ongoing, Progressing  Flowsheets (Taken 7/20/2024 5236)  Outcome Evaluation: Pt states she feels better today.  Pt c/o to MD of right wrist pain and tingling.  new order for wrist brace and placed on right wrist. No other c/o noted.Pt continues in isolation for rhino virus.  will cont to monitor   Goal Outcome Evaluation:              Outcome Evaluation: Pt states she feels better today.  Pt c/o to MD of right wrist pain and tingling.  new order for wrist brace and placed on right wrist. No other c/o noted.Pt continues in isolation for rhino virus.  will cont to monitor

## 2024-07-20 NOTE — PROGRESS NOTES
[facility] MEDICINE SERVICE  DAILY PROGRESS NOTE    NAME: aBn Brennan  : 1955  MRN: [id number]      LOS: 20 days     PROVIDER OF SERVICE: Luisito Power MD    Chief Complaint: Atrial fibrillation with RVR    Subjective:     Interval History:  History taken from: patient chart RN  Shortness of breath, vaginal bleeding   No CP or SOB    Review of Systems:   Review of Systems  All negative except as above   Objective:     Vital Signs  Temp:  [97.7 °F (36.5 °C)-98.6 °F (37 °C)] 97.9 °F (36.6 °C)  Heart Rate:  [72-94] 92  Resp:  [14-24] 22  BP: ()/(48-77) 124/77  Flow (L/min):  [2-4] 4   Body mass index is 40.88 kg/m².    Physical Exam  General: Alert and oriented, no acute distress. obese  HENT: Normocephalic, moist oral mucosa, no scleral icterus.  Neck: Supple, non-tender, no carotid bruits, no JVD, no LAD.  Lungs: Clear to auscultation, non-labored respiration. R sided chest tube [out]  Heart: RRR, no murmur, gallop or edema.  Abdomen: Soft, hypogastric region soft tissue mass and tenderness, non-distended, + bowel sounds.  Musculoskeletal: Normal range of motion and strength, no tenderness or swelling.  Neuro: alert and awake, moving all 4 extremities   Skin: Skin is warm, dry and pink, no rashes or lesions.  Psychiatric: Cooperative, appropriate mood and affect.      Scheduled Meds   amiodarone, 200 mg, Oral, Q12H   Followed by  [START ON 2024] amiodarone, 200 mg, Oral, Daily  apixaban, 5 mg, Oral, BID  budesonide, 0.5 mg, Nebulization, BID - RT  bumetanide, 1 mg, Oral, BID  hydrocortisone, 25 mg, Rectal, BID  insulin lispro, 2-7 Units, Subcutaneous, 4x Daily With Meals & Nightly  ipratropium-albuterol, 3 mL, Nebulization, 4x Daily - RT  lidocaine, 20 mL, Infiltration, Once  metoprolol succinate XL, 25 mg, Oral, Q12H  miconazole, 1 Application, Topical, Q12H  pantoprazole, 40 mg, Oral, BID AC  povidone-iodine, , Topical, Daily  sodium chloride, 10 mL, Intravenous,  Q12H       PRN Meds     acetaminophen **OR** acetaminophen    Calcium Replacement - Follow Nurse / BPA Driven Protocol    dextrose    dextrose    glucagon (human recombinant)    HYDROcodone-acetaminophen    ipratropium-albuterol    Magnesium Standard Dose Replacement - Follow Nurse / BPA Driven Protocol    nitroglycerin    ondansetron ODT **OR** ondansetron    Phosphorus Replacement - Follow Nurse / BPA Driven Protocol    Potassium Replacement - Follow Nurse / BPA Driven Protocol    [COMPLETED] Insert Peripheral IV **AND** sodium chloride    sodium chloride    sodium chloride   Infusions         Diagnostic Data    Results from last 7 days   Lab Units 07/20/24  0140   WBC 10*3/mm3 7.20   HEMOGLOBIN g/dL 9.4*   HEMATOCRIT % 30.1*   PLATELETS 10*3/mm3 167   GLUCOSE mg/dL 80   CREATININE mg/dL 0.86   BUN mg/dL 21   SODIUM mmol/L 130*   POTASSIUM mmol/L 3.8   AST (SGOT) U/L 64*   ALT (SGPT) U/L 32   ALK PHOS U/L 164*   BILIRUBIN mg/dL 1.4*   ANION GAP mmol/L 5.3       XR Chest 1 View    Result Date: 7/19/2024  Impression: 1. No definite residual pneumothorax. 2. Stable cardiomegaly with bilateral perihilar and bibasilar airspace disease suggesting pulmonary edema. 3. Small bilateral pleural effusions left greater than right not significantly changed. Electronically Signed: Mina Wheeler MD  7/19/2024 7:16 AM EDT  Workstation ID: [id number]       I reviewed the patient's new clinical results.  I reviewed the patient's new imaging results and agree with the interpretation.    Assessment/Plan:     Active and Resolved Problems  Active Hospital Problems    Diagnosis  POA    **Atrial fibrillation with RVR [I48.91]  Yes    COPD (chronic obstructive pulmonary disease) [J44.9]  Yes    Aortic stenosis [I35.0]  Yes    Mitral regurgitation [I34.0]  Yes    Acute HFrEF (heart failure with reduced ejection fraction) [I50.21]  Yes    Right bundle branch block [I45.10]  Yes    Acute deep vein thrombosis (DVT) of both lower extremities  [I82.403]  Yes    ARABELLA (acute kidney injury) [N17.9]  Yes    Acute hyperkalemia [E87.5]  Yes    Sepsis [A41.9]  Yes    Acute UTI [N39.0]  Yes    Acute systolic congestive heart failure [I50.21]  Yes    Rhinovirus infection [B34.8]  Yes    Multifocal pneumonia [J18.9]  Yes    Chronic respiratory failure with hypoxia, on home O2 therapy [J96.11, Z99.81]  Not Applicable    Skin ulcer of right great toe [L97.519]  Yes    Skin ulcer of left great toe [L97.529]  Yes    SEDRICK (obstructive sleep apnea) [G47.33]  Yes    Primary hypertension [I10]  Yes    Morbid obesity with BMI of 40.0-44.9, adult [E66.01, Z68.41]  Not Applicable      Resolved Hospital Problems   No resolved problems to display.       69-year-old female with history of hypertension, HFrEF, lower extremity lymphedema admitted to [facility] 6/30 progressive shortness of breath        #Bilateral lower extremity DVT  #History of reported PE  Acute left lower extremity DVT in the proximal femoral, mid femoral, distal femoral, popliteal, and posterior tibial   Seen by hematology appreciate recommendation.  Factor V and prothrombin gene mutation negative  Continue Eliquis 10 mg twice daily for 7 days followed by 5 mg twice daily  Outpatient follow-up with hematology- will need lifelong AC     #Acute on chronic HFrEF  #A-fib with RVR.  New onset  #Severe MR  Cardiology follow-up  Started on amiodarone GGT transitioned to p.o. tapering dose  TTE with EF 30 to 35%.  Defer ischemic workup to cardiology  On Toprol 25 twice daily with holding parameters   Bumex 1 mg daily  Monitor I's and O's  Hold Aldactone given soft BP   Lisinopril on hold given ARABELLA  On Eliquis    #Right-sided hydropneumothorax  Chest tube placed in ICU 7/6  Fluid culture from right chest tube growing gram-negative bacilli-will defer need for antibiotics to pulmonary  Monitor out put   Chest tube came out last night  Closely monitor respiratory status  Pulmonary following  Patient stable from  pulmonary standpoint to be discharged       #Severe sepsis  #UTI  #Left lower extremity cellulitis and wound  #Rhinovirus infection  #Multifocal pneumonia  #Chronic venous stasis lower extremity  Seen by infectious disease appreciate recommendations  Finished Keflex and doxycycline for 7 day course   Seen by podiatry no surgical intervention recommended  Midodrine has been weaned off monitor blood pressure     #Acute hypoxic respiratory failure  Multifactorial pneumonia CHF  Antibiotics and diuretics as above  Wean off supplemental oxygen as tolerated.  Bumex increased to 1 mg twice daily as per renal  Closely monitor blood pressure     #DM2  A1c 6.14  Continue ISS lispro  POC ACHS     #ARABELLA  #Metabolic Acidosis   Suspect ATN in setting of sepsis   Diuretics as above  BMP daily  Avoid nephrotoxic drugs   Improved     #uterine mass  Large subserosal uterine fundal fibroid on pelvic ultrasound concerning for fibroid vs malignancy  Onco recommended GYN evaluation  Patient had vaginal bleeding   GYN consulted for post menopausal bleed      #hematochezia  GI on board noted plan for outpatient colonoscopy  Etiology suspected to be hemorrhoids  Topical steroids started  Monitor H&H     # ?Pancreatic mass  - MRI pancreatic protocol ordered  - GI following  - outpatient endoscopic ultrasound to further evaluate pancreas lesion     # Panniculitis  - Hypogastric region soft mass, ttp  - GI evaluated and suspect due to panniculitis  - supportive care     VTE Prophylaxis:  Pharmacologic VTE prophylaxis orders are present.         Code status is   Code Status and Medical Interventions:   Ordered at: 06/30/24 2120     Code Status (Patient has no pulse and is not breathing):    CPR (Attempt to Resuscitate)     Medical Interventions (Patient has pulse or is breathing):    Full Support       Plan for disposition: 2 to 3 days pending medical clearance    Time: 30 minutes    Signature: Electronically signed by Luisito Power  MD, 07/20/24, 11:36 EDT.  [facility] Team

## 2024-07-20 NOTE — PROGRESS NOTES
Daily Progress Note          Assessment    Hydropneumothorax s/p chest tube placement 7/6/2024: Exudative fluid but negative cultures and no malignancy  COPD  SEDRICK  Systolic CHF  A-fib   Aortic stenosis  Mitral valve regurgitation   R BBB  bilateral DVT remote history of DVT  HTN  Morbid obesity   Uterine mass and pancreatic mass        PLAN:  Chest tube removed 7/18/2024    Oxygen supplement and titration to maintain saturation 90-95%: Currently requiring 2-4 L per nasal cannula    Mucinex  Antibiotics completed  Bronchodilators  Inhaled corticosteroids    Incentive spirometer  diuresis as per nephrology  Electrolytes/ glycemic control  BP/HR control  Chronic anticoagulation: Apixaban     I reviewed the recent clinical results and radiological images                 LOS: 20 days     Subjective     Patient reports mild cough and shortness of breath    Objective     Vital signs for last 24 hours:  Vitals:    07/20/24 0736 07/20/24 0937 07/20/24 1107 07/20/24 1110   BP:  124/77     BP Location:  Right arm     Patient Position:  Lying     Pulse: 82 93 88 92   Resp: 22 22 22 22   Temp:  97.9 °F (36.6 °C)     TempSrc:  Oral     SpO2: 96% 97% 98% 99%   Weight:       Height:           Intake/Output last 3 shifts:  I/O last 3 completed shifts:  In: -   Out: 1200 [Urine:1200]  Intake/Output this shift:  No intake/output data recorded.      Radiology  Imaging Results (Last 24 Hours)       ** No results found for the last 24 hours. **            Labs:  Results from last 7 days   Lab Units 07/20/24  0140   WBC 10*3/mm3 7.20   HEMOGLOBIN g/dL 9.4*   HEMATOCRIT % 30.1*   PLATELETS 10*3/mm3 167     Results from last 7 days   Lab Units 07/20/24  0140   SODIUM mmol/L 130*   POTASSIUM mmol/L 3.8   CHLORIDE mmol/L 92*   CO2 mmol/L 32.7*   BUN mg/dL 21   CREATININE mg/dL 0.86   CALCIUM mg/dL 8.2*   BILIRUBIN mg/dL 1.4*   ALK PHOS U/L 164*   ALT (SGPT) U/L 32   AST (SGOT) U/L 64*   GLUCOSE mg/dL 80           Results from last 7 days    Lab Units 07/20/24  0140 07/19/24  0121 07/18/24  0205   ALBUMIN g/dL 1.9* 2.2* 2.0*             Results from last 7 days   Lab Units 07/20/24  0140   MAGNESIUM mg/dL 2.4                   Meds:   SCHEDULE  amiodarone, 200 mg, Oral, Q12H   Followed by  [START ON 7/23/2024] amiodarone, 200 mg, Oral, Daily  apixaban, 5 mg, Oral, BID  budesonide, 0.5 mg, Nebulization, BID - RT  bumetanide, 1 mg, Oral, BID  hydrocortisone, 25 mg, Rectal, BID  insulin lispro, 2-7 Units, Subcutaneous, 4x Daily With Meals & Nightly  ipratropium-albuterol, 3 mL, Nebulization, 4x Daily - RT  lidocaine, 20 mL, Infiltration, Once  metoprolol succinate XL, 25 mg, Oral, Q12H  miconazole, 1 Application, Topical, Q12H  pantoprazole, 40 mg, Oral, BID AC  povidone-iodine, , Topical, Daily  sodium chloride, 10 mL, Intravenous, Q12H      Infusions     PRNs    acetaminophen **OR** acetaminophen    Calcium Replacement - Follow Nurse / BPA Driven Protocol    dextrose    dextrose    glucagon (human recombinant)    HYDROcodone-acetaminophen    ipratropium-albuterol    Magnesium Standard Dose Replacement - Follow Nurse / BPA Driven Protocol    nitroglycerin    ondansetron ODT **OR** ondansetron    Phosphorus Replacement - Follow Nurse / BPA Driven Protocol    Potassium Replacement - Follow Nurse / BPA Driven Protocol    [COMPLETED] Insert Peripheral IV **AND** sodium chloride    sodium chloride    sodium chloride    Physical Exam:  General Appearance:  Alert   HEENT:  Normocephalic, without obvious abnormality, Conjunctiva/corneas clear,.   Nares normal, no drainage     Neck:  Supple, symmetrical, trachea midline.   Lungs /Chest wall: Good air entry with mild bilateral basal rhonchi, respirations unlabored, symmetrical wall movement.     Heart:  Regular rate and rhythm, S1 S2 normal  Abdomen: Soft, non-tender, no masses, no organomegaly.    Extremities: + Bilateral lower extremity edema, no clubbing or cyanosis     ROS  Constitutional: Negative for  chills, fever and malaise/fatigue.   HENT: Negative.    Eyes: Negative.    Cardiovascular: Lower extremity edema  Respiratory: Positive for mild cough and shortness of breath.    Skin: Negative.    Musculoskeletal: Negative.    Gastrointestinal: Negative.    Genitourinary: Negative.    Neurological: Generalized weakness      I reviewed the recent clinical results  I personally reviewed the latest radiological studies    Part of this note may be an electronic transcription/translation of spoken language to printed text using the Dragon Dictation System.

## 2024-07-21 ENCOUNTER — ANESTHESIA EVENT (OUTPATIENT)
Dept: PERIOP | Facility: HOSPITAL | Age: 69
End: 2024-07-21
Payer: MEDICARE

## 2024-07-21 ENCOUNTER — ANESTHESIA (OUTPATIENT)
Dept: PERIOP | Facility: HOSPITAL | Age: 69
End: 2024-07-21
Payer: MEDICARE

## 2024-07-21 LAB
ALBUMIN SERPL-MCNC: 2 G/DL (ref 3.5–5.2)
ALBUMIN/GLOB SERPL: 0.6 G/DL
ALP SERPL-CCNC: 193 U/L (ref 39–117)
ALT SERPL W P-5'-P-CCNC: 45 U/L (ref 1–33)
ANION GAP SERPL CALCULATED.3IONS-SCNC: 4.1 MMOL/L (ref 5–15)
AST SERPL-CCNC: 87 U/L (ref 1–32)
BASOPHILS # BLD AUTO: 0.02 10*3/MM3 (ref 0–0.2)
BASOPHILS NFR BLD AUTO: 0.3 % (ref 0–1.5)
BILIRUB SERPL-MCNC: 1.4 MG/DL (ref 0–1.2)
BUN SERPL-MCNC: 21 MG/DL (ref 8–23)
BUN/CREAT SERPL: 27.3 (ref 7–25)
CALCIUM SPEC-SCNC: 8 MG/DL (ref 8.6–10.5)
CHLORIDE SERPL-SCNC: 96 MMOL/L (ref 98–107)
CO2 SERPL-SCNC: 35.9 MMOL/L (ref 22–29)
CREAT SERPL-MCNC: 0.77 MG/DL (ref 0.57–1)
DEPRECATED RDW RBC AUTO: 55.4 FL (ref 37–54)
EGFRCR SERPLBLD CKD-EPI 2021: 83.6 ML/MIN/1.73
EOSINOPHIL # BLD AUTO: 0.12 10*3/MM3 (ref 0–0.4)
EOSINOPHIL NFR BLD AUTO: 2.1 % (ref 0.3–6.2)
ERYTHROCYTE [DISTWIDTH] IN BLOOD BY AUTOMATED COUNT: 15.6 % (ref 12.3–15.4)
GLOBULIN UR ELPH-MCNC: 3.1 GM/DL
GLUCOSE BLDC GLUCOMTR-MCNC: 139 MG/DL (ref 70–105)
GLUCOSE BLDC GLUCOMTR-MCNC: 78 MG/DL (ref 70–105)
GLUCOSE BLDC GLUCOMTR-MCNC: 84 MG/DL (ref 70–105)
GLUCOSE BLDC GLUCOMTR-MCNC: 84 MG/DL (ref 70–105)
GLUCOSE SERPL-MCNC: 87 MG/DL (ref 65–99)
HCT VFR BLD AUTO: 28 % (ref 34–46.6)
HGB BLD-MCNC: 8.7 G/DL (ref 12–15.9)
IMM GRANULOCYTES # BLD AUTO: 0.04 10*3/MM3 (ref 0–0.05)
IMM GRANULOCYTES NFR BLD AUTO: 0.7 % (ref 0–0.5)
LYMPHOCYTES # BLD AUTO: 0.91 10*3/MM3 (ref 0.7–3.1)
LYMPHOCYTES NFR BLD AUTO: 15.7 % (ref 19.6–45.3)
MAGNESIUM SERPL-MCNC: 2 MG/DL (ref 1.6–2.4)
MCH RBC QN AUTO: 30.4 PG (ref 26.6–33)
MCHC RBC AUTO-ENTMCNC: 31.1 G/DL (ref 31.5–35.7)
MCV RBC AUTO: 97.9 FL (ref 79–97)
MONOCYTES # BLD AUTO: 0.62 10*3/MM3 (ref 0.1–0.9)
MONOCYTES NFR BLD AUTO: 10.7 % (ref 5–12)
NEUTROPHILS NFR BLD AUTO: 4.1 10*3/MM3 (ref 1.7–7)
NEUTROPHILS NFR BLD AUTO: 70.5 % (ref 42.7–76)
NRBC BLD AUTO-RTO: 0 /100 WBC (ref 0–0.2)
PHOSPHATE SERPL-MCNC: 2.8 MG/DL (ref 2.5–4.5)
PLATELET # BLD AUTO: 150 10*3/MM3 (ref 140–450)
PMV BLD AUTO: 10.4 FL (ref 6–12)
POTASSIUM SERPL-SCNC: 3.2 MMOL/L (ref 3.5–5.2)
POTASSIUM SERPL-SCNC: 4 MMOL/L (ref 3.5–5.2)
PROT SERPL-MCNC: 5.1 G/DL (ref 6–8.5)
RBC # BLD AUTO: 2.86 10*6/MM3 (ref 3.77–5.28)
SODIUM SERPL-SCNC: 136 MMOL/L (ref 136–145)
WBC NRBC COR # BLD AUTO: 5.81 10*3/MM3 (ref 3.4–10.8)

## 2024-07-21 PROCEDURE — 82948 REAGENT STRIP/BLOOD GLUCOSE: CPT | Performed by: HOSPITALIST

## 2024-07-21 PROCEDURE — 94664 DEMO&/EVAL PT USE INHALER: CPT

## 2024-07-21 PROCEDURE — 80053 COMPREHEN METABOLIC PANEL: CPT | Performed by: NURSE PRACTITIONER

## 2024-07-21 PROCEDURE — 94799 UNLISTED PULMONARY SVC/PX: CPT

## 2024-07-21 PROCEDURE — 82948 REAGENT STRIP/BLOOD GLUCOSE: CPT

## 2024-07-21 PROCEDURE — 84132 ASSAY OF SERUM POTASSIUM: CPT | Performed by: STUDENT IN AN ORGANIZED HEALTH CARE EDUCATION/TRAINING PROGRAM

## 2024-07-21 PROCEDURE — 85025 COMPLETE CBC W/AUTO DIFF WBC: CPT | Performed by: NURSE PRACTITIONER

## 2024-07-21 PROCEDURE — 83735 ASSAY OF MAGNESIUM: CPT | Performed by: NURSE PRACTITIONER

## 2024-07-21 PROCEDURE — 94761 N-INVAS EAR/PLS OXIMETRY MLT: CPT

## 2024-07-21 PROCEDURE — 84100 ASSAY OF PHOSPHORUS: CPT | Performed by: NURSE PRACTITIONER

## 2024-07-21 RX ORDER — POTASSIUM CHLORIDE 20 MEQ/1
40 TABLET, EXTENDED RELEASE ORAL EVERY 4 HOURS
Status: COMPLETED | OUTPATIENT
Start: 2024-07-21 | End: 2024-07-21

## 2024-07-21 RX ADMIN — Medication 10 ML: at 21:15

## 2024-07-21 RX ADMIN — POVIDONE-IODINE: 10 SOLUTION TOPICAL at 08:19

## 2024-07-21 RX ADMIN — IPRATROPIUM BROMIDE AND ALBUTEROL SULFATE 3 ML: .5; 3 SOLUTION RESPIRATORY (INHALATION) at 07:37

## 2024-07-21 RX ADMIN — HYDROCORTISONE ACETATE 25 MG: 25 SUPPOSITORY RECTAL at 21:06

## 2024-07-21 RX ADMIN — METOPROLOL SUCCINATE 25 MG: 25 TABLET, FILM COATED, EXTENDED RELEASE ORAL at 21:06

## 2024-07-21 RX ADMIN — BUMETANIDE 1 MG: 1 TABLET ORAL at 08:15

## 2024-07-21 RX ADMIN — ANTI-FUNGAL POWDER MICONAZOLE NITRATE TALC FREE 1 APPLICATION: 1.42 POWDER TOPICAL at 21:14

## 2024-07-21 RX ADMIN — IPRATROPIUM BROMIDE AND ALBUTEROL SULFATE 3 ML: .5; 3 SOLUTION RESPIRATORY (INHALATION) at 18:58

## 2024-07-21 RX ADMIN — POTASSIUM CHLORIDE 40 MEQ: 1500 TABLET, EXTENDED RELEASE ORAL at 11:19

## 2024-07-21 RX ADMIN — Medication 10 ML: at 08:20

## 2024-07-21 RX ADMIN — IPRATROPIUM BROMIDE AND ALBUTEROL SULFATE 3 ML: .5; 3 SOLUTION RESPIRATORY (INHALATION) at 15:45

## 2024-07-21 RX ADMIN — ANTI-FUNGAL POWDER MICONAZOLE NITRATE TALC FREE 1 APPLICATION: 1.42 POWDER TOPICAL at 08:19

## 2024-07-21 RX ADMIN — APIXABAN 5 MG: 5 TABLET, FILM COATED ORAL at 21:06

## 2024-07-21 RX ADMIN — PANTOPRAZOLE SODIUM 40 MG: 40 TABLET, DELAYED RELEASE ORAL at 17:51

## 2024-07-21 RX ADMIN — BUDESONIDE 0.5 MG: 0.5 INHALANT RESPIRATORY (INHALATION) at 18:58

## 2024-07-21 RX ADMIN — AMIODARONE HYDROCHLORIDE 200 MG: 200 TABLET ORAL at 17:51

## 2024-07-21 RX ADMIN — IPRATROPIUM BROMIDE AND ALBUTEROL SULFATE 3 ML: .5; 3 SOLUTION RESPIRATORY (INHALATION) at 11:08

## 2024-07-21 RX ADMIN — BUDESONIDE 0.5 MG: 0.5 INHALANT RESPIRATORY (INHALATION) at 07:31

## 2024-07-21 RX ADMIN — POTASSIUM CHLORIDE 40 MEQ: 1500 TABLET, EXTENDED RELEASE ORAL at 07:09

## 2024-07-21 RX ADMIN — BUMETANIDE 1 MG: 1 TABLET ORAL at 17:52

## 2024-07-21 RX ADMIN — APIXABAN 5 MG: 5 TABLET, FILM COATED ORAL at 08:15

## 2024-07-21 RX ADMIN — HYDROCORTISONE ACETATE 25 MG: 25 SUPPOSITORY RECTAL at 08:15

## 2024-07-21 RX ADMIN — AMIODARONE HYDROCHLORIDE 200 MG: 200 TABLET ORAL at 06:03

## 2024-07-21 RX ADMIN — PANTOPRAZOLE SODIUM 40 MG: 40 TABLET, DELAYED RELEASE ORAL at 07:09

## 2024-07-21 NOTE — PLAN OF CARE
Problem: Adult Inpatient Plan of Care  Goal: Plan of Care Review  Outcome: Ongoing, Progressing  Flowsheets (Taken 7/21/2024 1706)  Outcome Evaluation: Pt scheduled for D&C today.  Postponed until tomorrow.  Pt aware and agreeable. Pt will be NPO after MN again and is aware.  Pt cont to have small amt of vaginal bleeding.  States brace seems to be helping her right wrist at times and sometimes not per pt. Treated K+ today and now 4.0. will cont to monitor   Goal Outcome Evaluation:              Outcome Evaluation: Pt scheduled for D&C today.  Postponed until tomorrow.  Pt aware and agreeable. Pt will be NPO after MN again and is aware.  Pt cont to have small amt of vaginal bleeding.  States brace seems to be helping her right wrist at times and sometimes not per pt. Treated K+ today and now 4.0. will cont to monitor

## 2024-07-21 NOTE — ANESTHESIA PREPROCEDURE EVALUATION
Anesthesia Evaluation     Patient summary reviewed and Nursing notes reviewed   no history of anesthetic complications:   NPO Solid Status: > 8 hours  NPO Liquid Status: > 2 hours           Airway   Dental      Pulmonary    (+) pneumonia , pulmonary embolism, COPD,home oxygen, sleep apnea  Cardiovascular     ECG reviewed  PT is on anticoagulation therapy  Patient on routine beta blocker    (+) hypertension, valvular problems/murmurs MR, TI and AS, dysrhythmias Atrial Fib, CHF Systolic <55%, DVT      Neuro/Psych  GI/Hepatic/Renal/Endo    (+) morbid obesity, liver disease history of elevated LFT, renal disease- ARF    Musculoskeletal     (+) chronic pain  Abdominal    Substance History      OB/GYN          Other   blood dyscrasia anemia,     ROS/Med Hx Other: Additional History:  RBBB, hypokalemia, hypocalcemia, low protein/albumin, elevated bilirubin, Afib/RVR, chronic respiratory failure with hypoxemia, allergies, edema, sepsis, toe ulcer    Echo:    Echocardiogram Findings    Left Ventricle Left ventricular ejection fraction appears to be 31 - 35%.     Normal left ventricular cavity size and wall thickness noted. There is left ventricular global hypokinesis noted. Left ventricular diastolic dysfunction is noted.  Right Ventricle Normal right ventricular cavity size, wall thickness, systolic function and septal motion noted.  Left Atrium The left atrial cavity is dilated.  Right Atrium Normal right atrial cavity size noted.  Aortic Valve The aortic valve is abnormal in structure. There is calcification of the aortic valve. There is thickening of the aortic valve. Trace aortic valve regurgitation is present. Moderate aortic valve stenosis is present. Mean/peak gradient of 14/24 mmHg. Dimensionless index 0.36  Mitral Valve The mitral valve is grossly normal in structure. Moderate to severe mitral valve regurgitation is present. No significant mitral valve stenosis is present.  Tricuspid Valve The tricuspid valve is  grossly normal in structure. Mild tricuspid valve regurgitation is present. Estimated right ventricular systolic pressure from tricuspid regurgitation is normal (<35 mmHg). No evidence of pulmonary hypertension is present. No tricuspid valve stenosis is present.  Pulmonic Valve The pulmonic valve is not well visualized. The pulmonic valve is grossly normal in structure. There is trace pulmonic valve regurgitation present. There is no pulmonic valve stenosis present.  Greater Vessels No dilation of the aortic root is present. No dilation of the sinuses of Valsalva is present.  Pericardium There is no evidence of pericardial effusion. .        Stress Test: (2017)  CONCLUSION:  1. Myocardial ischemia is absent in the all segments.  2. Small fixed apical defect: Mi versus attenuation artifact.  3. Ejection fraction on Gated SPECT is 72%.  4. The left ventricle wall motion is normal ..  5. The chronotropic response is normal and the hemodynamic response  is normal.     IMPRESSION:  This is a stress test with radionuclide imaging which is normal         RECOMMENDATION:  I would recommend medical treatment.      PSH:                  Anesthesia Plan    ASA 4     general     (Patient identified; pre-operative vital signs, all relevant labs/studies, complete medical/surgical/anesthetic history, full medication list, full allergy list, and NPO status obtained/reviewed; physical assessment performed; anesthetic options, side effects, potential complications, risks, and benefits discussed; questions answered; written anesthesia consent obtained; patient cleared for procedure; anesthesia machine and equipment checked and functioning)  intravenous induction     Anesthetic plan, risks, benefits, and alternatives have been provided, discussed and informed consent has been obtained with: patient.    Plan discussed with CRNA and CAA.    CODE STATUS:    Code Status (Patient has no pulse and is not breathing): CPR (Attempt to  Resuscitate)  Medical Interventions (Patient has pulse or is breathing): Full Support

## 2024-07-21 NOTE — PROGRESS NOTES
"    PROGRESS NOTE      Patient Name: Ban Brennan  : 1955  MRN: [id number]  Primary Care Physician: Rut Pierce APRN  Date of admission: 2024    Patient Care Team:  Rut Pierce APRN as PCP - General (Nurse Practitioner)  Austin Brooks MD as Cardiologist (Cardiology)        Reason for Follow up     Acute kidney injury, hyperkalemia      Subjective     Seen and examined, NAD noted, renal functions stable, worsening bicarb level, ordered ABG, good UOP, 1.3L     Review of systems:    ROS was otherwise negative except as mentioned in the [place].       Personal History:     Past Medical History:   Past Medical History:   Diagnosis Date    Bilateral leg edema     Chronic respiratory failure with hypoxia, on home O2 therapy 2024    COPD (chronic obstructive pulmonary disease)     Morbid obesity with BMI of 40.0-44.9, adult 2010    Primary hypertension 2017    Pulmonary embolism     \"many years ago, was on warfarin\"    Sleep apnea        Surgical History:    History reviewed. No pertinent surgical history.    Family History: family history is not on file. Otherwise pertinent FHx was reviewed and unremarkable.     Social History:  reports that she has never smoked. She has never used smokeless tobacco. She reports that she does not drink alcohol and does not use drugs.    Medications:  Prior to Admission medications    Medication Sig Start Date End Date Taking? Authorizing Provider   Allergy Relief 180 MG tablet Take 1 tablet by mouth Daily. 24  Yes ProviderChadwick MD   bumetanide (BUMEX) 1 MG tablet Take 1 tablet by mouth 3 times a day. 24  Yes ProviderChadwick MD   docusate sodium (COLACE) 100 MG capsule Take 1 capsule by mouth Every 12 (Twelve) Hours. 24  Yes ProviderChadwick MD   furosemide (LASIX) 20 MG tablet Take 1 tablet by mouth Daily.   Yes ProviderChadwick MD   HYDROcodone-acetaminophen (NORCO)  MG per tablet Take 1 " tablet by mouth 3 times a day. 5/30/24  Yes Chadwick Johnson MD   lisinopril (PRINIVIL,ZESTRIL) 30 MG tablet Take 1 tablet by mouth Daily. 3/18/24  Yes Chadwick Johnson MD   meloxicam (MOBIC) 7.5 MG tablet Take 1 tablet by mouth Daily As Needed. 3/18/24  Yes Chadwick Johnson MD   metoprolol tartrate (LOPRESSOR) 50 MG tablet Take 1 tablet by mouth Daily.   Yes Chadwick Johnson MD   montelukast (SINGULAIR) 10 MG tablet Take 1 tablet by mouth every night at bedtime.   Yes Chadwick Johnson MD   omeprazole (priLOSEC) 20 MG capsule Take 1 capsule by mouth Daily. 3/18/24  Yes Chadwick Johnson MD   oxybutynin XL (DITROPAN-XL) 10 MG 24 hr tablet Take 2 tablets by mouth Daily. 3/18/24  Yes Chadwick Johnson MD   potassium chloride (MICRO-K) 10 MEQ CR capsule Take 1 capsule by mouth Every 12 (Twelve) Hours. 3/18/24  Yes Chadwick Johnson MD   vitamin D (ERGOCALCIFEROL) 1.25 MG (89976 UT) capsule capsule Take 1 capsule by mouth 2 (Two) Times a Week. Monday and Thursday. 5/30/24  Yes Chadwick Johnson MD     Scheduled Meds:amiodarone, 200 mg, Oral, Q12H   Followed by  [START ON 7/23/2024] amiodarone, 200 mg, Oral, Daily  apixaban, 5 mg, Oral, BID  budesonide, 0.5 mg, Nebulization, BID - RT  bumetanide, 1 mg, Oral, BID  hydrocortisone, 25 mg, Rectal, BID  insulin lispro, 2-7 Units, Subcutaneous, 4x Daily With Meals & Nightly  ipratropium-albuterol, 3 mL, Nebulization, 4x Daily - RT  lidocaine, 20 mL, Infiltration, Once  metoprolol succinate XL, 25 mg, Oral, Q12H  miconazole, 1 Application, Topical, Q12H  pantoprazole, 40 mg, Oral, BID AC  povidone-iodine, , Topical, Daily  sodium chloride, 10 mL, Intravenous, Q12H      Continuous Infusions:     PRN Meds:  acetaminophen **OR** acetaminophen    Calcium Replacement - Follow Nurse / BPA Driven Protocol    dextrose    dextrose    glucagon (human recombinant)    HYDROcodone-acetaminophen    ipratropium-albuterol    Magnesium Standard Dose  Replacement - Follow Nurse / BPA Driven Protocol    nitroglycerin    ondansetron ODT **OR** ondansetron    Phosphorus Replacement - Follow Nurse / BPA Driven Protocol    Potassium Replacement - Follow Nurse / BPA Driven Protocol    [COMPLETED] Insert Peripheral IV **AND** sodium chloride    sodium chloride    sodium chloride  Allergies:  No Known Allergies    Objective   Exam:     Vital Signs  Temp:  [97.7 °F (36.5 °C)-98.6 °F (37 °C)] 98.6 °F (37 °C)  Heart Rate:  [] 88  Resp:  [12-21] 21  BP: ()/(46-57) 90/52  SpO2:  [94 %-100 %] 98 %  on  Flow (L/min):  [2] 2;   Device (Oxygen Therapy): nasal cannula;humidified  Body mass index is 40.65 kg/m².  EXAM  General:  69 yr old  female, some respiratory distress  Head:      Normocephalic and atraumatic.    Eyes:      PERRL/EOM intact, conjunctivae and sclerae clear without nystagmus.    Neck:      No masses, thyromegaly,  trachea central   Lungs:    Clear bilaterally to auscultation.    Heart:      Regular rate and rhythm, no murmur no gallop  Abd:        Soft, nontender, not distended, bowel sounds positive, no shifting dullness.  Msk:        No deformity or scoliosis noted of thoracic or lumbar spine.    Pulses:   Pulses normal in all 4 extremities.    Extremities:        No cyanosis or clubbing--+ + edema.    Neuro:    Lethargic  Skin:       Intact without lesions or rashes.          Results Review:  I have personally reviewed most recent Data :  [facility]  CBC    Results from last 7 days   Lab Units 07/21/24  0452 07/20/24  0140 07/19/24  0121 07/18/24  0205 07/17/24  0120 07/16/24  0347 07/15/24  0256   WBC 10*3/mm3 5.81 7.20 8.29 8.88 10.15 10.10 11.06*   HEMOGLOBIN g/dL 8.7* 9.4* 9.9* 9.3* 9.6* 9.7* 10.3*   PLATELETS 10*3/mm3 150 167 184 157 157 144 163     CMP   Results from last 7 days   Lab Units 07/21/24  0452 07/20/24  0140 07/19/24  0121 07/18/24  0205 07/17/24  0120 07/16/24  1402 07/16/24  0347 07/15/24  0519   SODIUM mmol/L  136 130* 134* 136 138  --  139 140   POTASSIUM mmol/L 3.2* 3.8 3.2* 4.0 4.5 4.3 3.6 4.3   CHLORIDE mmol/L 96* 92* 93* 98 100  --  99 99   CO2 mmol/L 35.9* 32.7* 34.0* 32.5* 33.8*  --  35.4* 36.0*   BUN mg/dL 21 21 20 23 22  --  23 27*   CREATININE mg/dL 0.77 0.86 0.96 1.09* 0.92  --  0.86 0.99   GLUCOSE mg/dL 87 80 82 98 101*  --  76 89   ALBUMIN g/dL 2.0* 1.9* 2.2* 2.0* 2.0*  --  1.9* 2.1*   BILIRUBIN mg/dL 1.4* 1.4* 1.6* 1.7* 1.8*  --  2.0* 2.6*  2.6*   ALK PHOS U/L 193* 164* 136* 128* 127*  --  108 117   AST (SGOT) U/L 87* 64* 31 57* 45*  --  28 36*   ALT (SGPT) U/L 45* 32 25 23 24  --  20 26   AMYLASE U/L  --   --   --   --   --   --  17*  --    LIPASE U/L  --   --   --   --   --   --  12*  --      ABG          XR Chest 1 View    Result Date: 7/19/2024  Impression: 1. No definite residual pneumothorax. 2. Stable cardiomegaly with bilateral perihilar and bibasilar airspace disease suggesting pulmonary edema. 3. Small bilateral pleural effusions left greater than right not significantly changed. Electronically Signed: Mina Wheeler MD  7/19/2024 7:16 AM EDT  Workstation ID: [id number]    XR Chest 1 View    Result Date: 7/18/2024  Stable tiny right apical pneumothorax status post thoracotomy tube removal. Otherwise stable radiographic appearance of the chest.   Electronically Signed By-Jerzy White MD On:7/18/2024 11:41 AM      XR Chest 1 View    Result Date: 7/17/2024  Impression: 1. Trace right pneumothorax 2. Otherwise stable chest demonstrating cardiomegaly with pulmonary vascular congestion, small pleural effusions, left perihilar airspace consolidation, and bibasilar airspace disease which could be atelectasis or pneumonia Electronically Signed: Betito Villeda  7/17/2024 7:32 AM EDT  Workstation ID: [id number]    XR Chest 1 View    Result Date: 7/16/2024  Impression: No appreciable change since 1 day prior. Electronically Signed: Krystal Lee MD  7/16/2024 8:08 AM EDT  Workstation ID: [id number]    MRI  Abdomen With & Without Contrast    Result Date: 7/15/2024  Impression: Multiseptated pancreatic lesion in the pancreatic neck measuring 2.7 x 2.6 cm. There is suggestion of internal septations as well as faint enhancement of the septa. Findings suspicious for cystic neoplasm. Brisk enhancement lesion in the anterior superior right hepatic lobe measures 0.9 cm, favoring flash filling hemangioma. Hypervascular metastasis is felt to be less likely but incomplete excluded. Consider short-term follow-up MRI to evaluate for change in size. 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 2.1 cm right adrenal myelolipoma. Small right pleural effusion. Moderate left pleural effusion. Electronically Signed: Gigi Beck MD  7/15/2024 8:38 PM EDT  Workstation ID: [id number]    XR Chest 1 View    Result Date: 7/15/2024  Impression: Stable chest with cardiomegaly, moderate bilateral effusion and bibasilar consolidations Electronically Signed: Eliu High MD  7/15/2024 7:56 AM EDT  Workstation ID: [id number]    CT Chest Without Contrast Diagnostic    Result Date: 7/14/2024  Impression: 1.Persistent moderate-sized right pleural effusion despite indwelling pleural catheter. Tiny right pneumothorax. Improved aeration of the right lung with persistent right middle/lower lobe volume loss and consolidation. 2.Stable smaller left pleural effusion with increased left lower lobe volume loss and consolidation. 3.Stable massive cardiomegaly. 4.Included upper abdominal findings of pancreatic mass, splenic infarct, and cholelithiasis, as described on recent CT abdomen/pelvis. Electronically Signed: Elise Castro  7/14/2024 11:44 AM EDT  Workstation ID: [id number]    XR Chest 1 View    Result Date: 7/14/2024  Impression: 1.Right basilar pleural catheter remains in place. No visible pneumothorax. 2.Persistent bilateral perihilar and bibasilar airspace opacities, increased in the right lower lung compared  to yesterday's chest x-ray. Electronically Signed: Elise Castro  7/14/2024 9:59 AM EDT  Workstation ID: [id number]     Results for orders placed during the hospital encounter of 06/30/24    Adult Transthoracic Echo Complete w/ Color, Spectral and Contrast if Necessary Per Protocol    Interpretation Summary    Left ventricular ejection fraction appears to be 31 - 35%.    Left ventricular diastolic dysfunction is noted.    The left atrial cavity is dilated.    Moderate aortic valve stenosis is present. Mean/peak gradient of 14/24 mmHg.  Dimensionless index 0.36    Moderate to severe mitral valve regurgitation is present.    Estimated right ventricular systolic pressure from tricuspid regurgitation is normal (<35 mmHg).        Assessment & Plan   Assessment and Plan:         Atrial fibrillation with RVR    Primary hypertension    Morbid obesity with BMI of 40.0-44.9, adult    Right bundle branch block    Acute deep vein thrombosis (DVT) of both lower extremities    ARABELLA (acute kidney injury)    Acute hyperkalemia    Sepsis    Acute UTI    Acute systolic congestive heart failure    Rhinovirus infection    Multifocal pneumonia    Chronic respiratory failure with hypoxia, on home O2 therapy    Skin ulcer of right great toe    Skin ulcer of left great toe    SEDRICK (obstructive sleep apnea)    Aortic stenosis    Mitral regurgitation    Acute HFrEF (heart failure with reduced ejection fraction)    COPD (chronic obstructive pulmonary disease)    ASSESSMENT:  Acute kidney injury, with baseline creatinine roughly 0.8-1.0mg/dL  Hyperkalemia  A-fib with RVR  Acute on chronic heart failure, with EF 31-35%, diastolic dysfunction, moderate AS and moderate to severe MR as per echo from 6/30/24  Bilateral DVT  Severe sepsis/LLE cellulitis, acute cystitis  Multifocal pneumonia    PLAN :     Renal function unchanged/stable today from prior reading. Suspect she may initially have had some acute cardiorenal component, now most likely has some  ATN secondary to sepsis, elevated Vanc level (was 22.4 on 7/3), and some prolonged prerenal state with hemodynamic insults, arrhythmias. Had no significant proteinuria  Patient electrolytes and respiratory condition is stable  Slight hyponatremia and hypokalemia noted  Continue same dose of diuretics patient still has peripheral edema  Renal functions continue to be stable  Creatinine still 0.9.  Mild hypokalemia noted will replace  Still has some peripheral edema but improving  Continue Bumex 1 mg twice daily  Blood pressure reviewed, stable. Continue to monitor   Now not on any abx  Daily labs   We will continue to follow       Charan Krueger MD  [facility]  7/21/2024  14:46 EDT

## 2024-07-21 NOTE — PLAN OF CARE
Goal Outcome Evaluation:      Patient is alert and oriented, no complaints voiced at this time, vitals this morning 109/56 73. Call light in reach and encouraged use for all needs, will continue to monitor.

## 2024-07-21 NOTE — PROGRESS NOTES
Daily Progress Note          Assessment    Hydropneumothorax s/p chest tube placement 7/6/2024: Exudative fluid but negative cultures and no malignancy  COPD  SEDRICK  Systolic CHF  A-fib   Aortic stenosis  Mitral valve regurgitation   R BBB  bilateral DVT remote history of DVT  HTN  Morbid obesity   Uterine mass and pancreatic mass        PLAN:  Chest tube removed 7/18/2024    Oxygen supplement and titration to maintain saturation 90-95%: Currently requiring 2 L per nasal cannula    Mucinex  Antibiotics completed  Bronchodilators  Inhaled corticosteroids    Incentive spirometer  diuresis as per nephrology  Electrolytes/ glycemic control  BP/HR control  Chronic anticoagulation: Apixaban     I reviewed the recent clinical results and radiological images                 LOS: 21 days     Subjective     Patient reports mild cough and shortness of breath    Objective     Vital signs for last 24 hours:  Vitals:    07/21/24 0917 07/21/24 1108 07/21/24 1111 07/21/24 1339   BP: 102/51   90/52   BP Location: Left arm   Left arm   Patient Position: Lying   Lying   Pulse: 96 85 85 88   Resp: 13 13 19 21   Temp: 98.3 °F (36.8 °C)   98.6 °F (37 °C)   TempSrc: Oral   Oral   SpO2: 95% 99% 100% 98%   Weight:       Height:           Intake/Output last 3 shifts:  I/O last 3 completed shifts:  In: 480 [P.O.:480]  Out: -   Intake/Output this shift:  I/O this shift:  In: -   Out: 1300 [Urine:1300]      Radiology  Imaging Results (Last 24 Hours)       ** No results found for the last 24 hours. **            Labs:  Results from last 7 days   Lab Units 07/21/24  0452   WBC 10*3/mm3 5.81   HEMOGLOBIN g/dL 8.7*   HEMATOCRIT % 28.0*   PLATELETS 10*3/mm3 150     Results from last 7 days   Lab Units 07/21/24  0452   SODIUM mmol/L 136   POTASSIUM mmol/L 3.2*   CHLORIDE mmol/L 96*   CO2 mmol/L 35.9*   BUN mg/dL 21   CREATININE mg/dL 0.77   CALCIUM mg/dL 8.0*   BILIRUBIN mg/dL 1.4*   ALK PHOS U/L 193*   ALT (SGPT) U/L 45*   AST (SGOT) U/L 87*   GLUCOSE  mg/dL 87           Results from last 7 days   Lab Units 07/21/24  0452 07/20/24  0140 07/19/24  0121   ALBUMIN g/dL 2.0* 1.9* 2.2*             Results from last 7 days   Lab Units 07/21/24  0452   MAGNESIUM mg/dL 2.0                   Meds:   SCHEDULE  amiodarone, 200 mg, Oral, Q12H   Followed by  [START ON 7/23/2024] amiodarone, 200 mg, Oral, Daily  apixaban, 5 mg, Oral, BID  budesonide, 0.5 mg, Nebulization, BID - RT  bumetanide, 1 mg, Oral, BID  hydrocortisone, 25 mg, Rectal, BID  insulin lispro, 2-7 Units, Subcutaneous, 4x Daily With Meals & Nightly  ipratropium-albuterol, 3 mL, Nebulization, 4x Daily - RT  lidocaine, 20 mL, Infiltration, Once  metoprolol succinate XL, 25 mg, Oral, Q12H  miconazole, 1 Application, Topical, Q12H  pantoprazole, 40 mg, Oral, BID AC  povidone-iodine, , Topical, Daily  sodium chloride, 10 mL, Intravenous, Q12H      Infusions     PRNs    acetaminophen **OR** acetaminophen    Calcium Replacement - Follow Nurse / BPA Driven Protocol    dextrose    dextrose    glucagon (human recombinant)    HYDROcodone-acetaminophen    ipratropium-albuterol    Magnesium Standard Dose Replacement - Follow Nurse / BPA Driven Protocol    nitroglycerin    ondansetron ODT **OR** ondansetron    Phosphorus Replacement - Follow Nurse / BPA Driven Protocol    Potassium Replacement - Follow Nurse / BPA Driven Protocol    [COMPLETED] Insert Peripheral IV **AND** sodium chloride    sodium chloride    sodium chloride    Physical Exam:  General Appearance:  Alert   HEENT:  Normocephalic, without obvious abnormality, Conjunctiva/corneas clear,.   Nares normal, no drainage     Neck:  Supple, symmetrical, trachea midline.   Lungs /Chest wall: Good air entry with mild bilateral basal rhonchi, respirations unlabored, symmetrical wall movement.     Heart:  Regular rate and rhythm, S1 S2 normal  Abdomen: Soft, non-tender, no masses, no organomegaly.    Extremities: + Bilateral lower extremity edema, no clubbing or cyanosis      ROS  Constitutional: Negative for chills, fever and malaise/fatigue.   HENT: Negative.    Eyes: Negative.    Cardiovascular: Lower extremity edema  Respiratory: Positive for mild cough and shortness of breath.    Skin: Negative.    Musculoskeletal: Negative.    Gastrointestinal: Negative.    Genitourinary: Negative.    Neurological: Generalized weakness      I reviewed the recent clinical results  I personally reviewed the latest radiological studies    Part of this note may be an electronic transcription/translation of spoken language to printed text using the Dragon Dictation System.

## 2024-07-21 NOTE — PROGRESS NOTES
[facility] MEDICINE SERVICE  DAILY PROGRESS NOTE    NAME: Ban Brennan  : 1955  MRN: [id number]      LOS: 21 days     PROVIDER OF SERVICE: Luisito Power MD    Chief Complaint: Atrial fibrillation with RVR    Subjective:     Interval History:  History taken from: patient chart RN  Shortness of breath, vaginal bleeding, scheduled for hysteroscopy and D&C today   no CP or SOB    Review of Systems:   Review of Systems  All negative except as above   Objective:     Vital Signs  Temp:  [97.7 °F (36.5 °C)-98.5 °F (36.9 °C)] 98.3 °F (36.8 °C)  Heart Rate:  [] 85  Resp:  [12-20] 19  BP: ()/(46-58) 102/51  Flow (L/min):  [2-4] 2   Body mass index is 40.65 kg/m².    Physical Exam  General: Alert and oriented, no acute distress. obese  HENT: Normocephalic, moist oral mucosa, no scleral icterus.  Neck: Supple, non-tender, no carotid bruits, no JVD, no LAD.  Lungs: Clear to auscultation, non-labored respiration. R sided chest tube [out]  Heart: RRR, no murmur, gallop or edema.  Abdomen: Soft, hypogastric region soft tissue mass and tenderness, non-distended, + bowel sounds.  Musculoskeletal: Normal range of motion and strength, no tenderness or swelling.  Neuro: alert and awake, moving all 4 extremities   Skin: Skin is warm, dry and pink, no rashes or lesions.  Psychiatric: Cooperative, appropriate mood and affect.      Scheduled Meds   amiodarone, 200 mg, Oral, Q12H   Followed by  [START ON 2024] amiodarone, 200 mg, Oral, Daily  apixaban, 5 mg, Oral, BID  budesonide, 0.5 mg, Nebulization, BID - RT  bumetanide, 1 mg, Oral, BID  hydrocortisone, 25 mg, Rectal, BID  insulin lispro, 2-7 Units, Subcutaneous, 4x Daily With Meals & Nightly  ipratropium-albuterol, 3 mL, Nebulization, 4x Daily - RT  lidocaine, 20 mL, Infiltration, Once  metoprolol succinate XL, 25 mg, Oral, Q12H  miconazole, 1 Application, Topical, Q12H  pantoprazole, 40 mg, Oral, BID AC  povidone-iodine, , Topical,  Daily  sodium chloride, 10 mL, Intravenous, Q12H       PRN Meds     acetaminophen **OR** acetaminophen    Calcium Replacement - Follow Nurse / BPA Driven Protocol    dextrose    dextrose    glucagon (human recombinant)    HYDROcodone-acetaminophen    ipratropium-albuterol    Magnesium Standard Dose Replacement - Follow Nurse / BPA Driven Protocol    nitroglycerin    ondansetron ODT **OR** ondansetron    Phosphorus Replacement - Follow Nurse / BPA Driven Protocol    Potassium Replacement - Follow Nurse / BPA Driven Protocol    [COMPLETED] Insert Peripheral IV **AND** sodium chloride    sodium chloride    sodium chloride   Infusions         Diagnostic Data    Results from last 7 days   Lab Units 07/21/24  0452   WBC 10*3/mm3 5.81   HEMOGLOBIN g/dL 8.7*   HEMATOCRIT % 28.0*   PLATELETS 10*3/mm3 150   GLUCOSE mg/dL 87   CREATININE mg/dL 0.77   BUN mg/dL 21   SODIUM mmol/L 136   POTASSIUM mmol/L 3.2*   AST (SGOT) U/L 87*   ALT (SGPT) U/L 45*   ALK PHOS U/L 193*   BILIRUBIN mg/dL 1.4*   ANION GAP mmol/L 4.1*       No radiology results for the last day      I reviewed the patient's new clinical results.  I reviewed the patient's new imaging results and agree with the interpretation.    Assessment/Plan:     Active and Resolved Problems  Active Hospital Problems    Diagnosis  POA    **Atrial fibrillation with RVR [I48.91]  Yes    COPD (chronic obstructive pulmonary disease) [J44.9]  Yes    Aortic stenosis [I35.0]  Yes    Mitral regurgitation [I34.0]  Yes    Acute HFrEF (heart failure with reduced ejection fraction) [I50.21]  Yes    Right bundle branch block [I45.10]  Yes    Acute deep vein thrombosis (DVT) of both lower extremities [I82.403]  Yes    ARABELLA (acute kidney injury) [N17.9]  Yes    Acute hyperkalemia [E87.5]  Yes    Sepsis [A41.9]  Yes    Acute UTI [N39.0]  Yes    Acute systolic congestive heart failure [I50.21]  Yes    Rhinovirus infection [B34.8]  Yes    Multifocal pneumonia [J18.9]  Yes    Chronic respiratory  failure with hypoxia, on home O2 therapy [J96.11, Z99.81]  Not Applicable    Skin ulcer of right great toe [L97.519]  Yes    Skin ulcer of left great toe [L97.529]  Yes    SEDRICK (obstructive sleep apnea) [G47.33]  Yes    Primary hypertension [I10]  Yes    Morbid obesity with BMI of 40.0-44.9, adult [E66.01, Z68.41]  Not Applicable      Resolved Hospital Problems   No resolved problems to display.       69-year-old female with history of hypertension, HFrEF, lower extremity lymphedema admitted to [facility] 6/30 progressive shortness of breath        #Bilateral lower extremity DVT  #History of reported PE  Acute left lower extremity DVT in the proximal femoral, mid femoral, distal femoral, popliteal, and posterior tibial   Seen by hematology appreciate recommendation.  Factor V and prothrombin gene mutation negative  Continue Eliquis 10 mg twice daily for 7 days followed by 5 mg twice daily  Outpatient follow-up with hematology- will need lifelong AC     #Acute on chronic HFrEF  #A-fib with RVR.  New onset  #Severe MR  Cardiology follow-up  Started on amiodarone GGT transitioned to p.o. tapering dose  TTE with EF 30 to 35%.  Defer ischemic workup to cardiology  On Toprol 25 twice daily with holding parameters   Bumex 1 mg daily  Monitor I's and O's  Hold Aldactone given soft BP   Lisinopril on hold given ARABELLA  On Eliquis    #Right-sided hydropneumothorax  Chest tube placed in ICU 7/6  Fluid culture from right chest tube growing gram-negative bacilli-will defer need for antibiotics to pulmonary  Monitor out put   Chest tube came out last night  Closely monitor respiratory status  Pulmonary following  Patient stable from pulmonary standpoint to be discharged       #Severe sepsis  #UTI  #Left lower extremity cellulitis and wound  #Rhinovirus infection  #Multifocal pneumonia  #Chronic venous stasis lower extremity  Seen by infectious disease appreciate recommendations  Finished Keflex and doxycycline for 7 day course    Seen by podiatry no surgical intervention recommended  Midodrine has been weaned off monitor blood pressure     #Acute hypoxic respiratory failure  Multifactorial pneumonia CHF  Antibiotics and diuretics as above  Wean off supplemental oxygen as tolerated.  Bumex increased to 1 mg twice daily as per renal  Closely monitor blood pressure     #DM2  A1c 6.14  Continue ISS lispro  POC ACHS     #ARABELLA  #Metabolic Acidosis   Suspect ATN in setting of sepsis   Diuretics as above  BMP daily  Avoid nephrotoxic drugs   Improved     #uterine mass  Large subserosal uterine fundal fibroid on pelvic ultrasound concerning for fibroid vs malignancy  Onco recommended GYN evaluation  Patient had vaginal bleeding   GYN consulted for post menopausal bleed-noted plan for hysteroscopy and D&C today     #hematochezia  GI on board noted plan for outpatient colonoscopy  Etiology suspected to be hemorrhoids  Topical steroids started  Monitor H&H     # ?Pancreatic mass  - MRI pancreatic protocol ordered  - GI following  - outpatient endoscopic ultrasound to further evaluate pancreas lesion     # Panniculitis  - Hypogastric region soft mass, ttp  - GI evaluated and suspect due to panniculitis  - supportive care     VTE Prophylaxis:  Pharmacologic VTE prophylaxis orders are present.         Code status is   Code Status and Medical Interventions:   Ordered at: 06/30/24 2120     Code Status (Patient has no pulse and is not breathing):    CPR (Attempt to Resuscitate)     Medical Interventions (Patient has pulse or is breathing):    Full Support       Plan for disposition: 2 to 3 days pending medical clearance    Time: 30 minutes    Signature: Electronically signed by Luisito Power MD, 07/21/24, 12:01 EDT.  [facility] Hospitalist Team

## 2024-07-22 LAB
ALBUMIN SERPL-MCNC: 2 G/DL (ref 3.5–5.2)
ALBUMIN/GLOB SERPL: 0.6 G/DL
ALP SERPL-CCNC: 218 U/L (ref 39–117)
ALT SERPL W P-5'-P-CCNC: 49 U/L (ref 1–33)
ANION GAP SERPL CALCULATED.3IONS-SCNC: 5.3 MMOL/L (ref 5–15)
AST SERPL-CCNC: 98 U/L (ref 1–32)
BASOPHILS # BLD AUTO: 0.01 10*3/MM3 (ref 0–0.2)
BASOPHILS NFR BLD AUTO: 0.2 % (ref 0–1.5)
BILIRUB SERPL-MCNC: 1.1 MG/DL (ref 0–1.2)
BUN SERPL-MCNC: 22 MG/DL (ref 8–23)
BUN/CREAT SERPL: 25.3 (ref 7–25)
CALCIUM SPEC-SCNC: 8.4 MG/DL (ref 8.6–10.5)
CHLORIDE SERPL-SCNC: 99 MMOL/L (ref 98–107)
CO2 SERPL-SCNC: 34.7 MMOL/L (ref 22–29)
CREAT SERPL-MCNC: 0.87 MG/DL (ref 0.57–1)
DEPRECATED RDW RBC AUTO: 58.5 FL (ref 37–54)
EGFRCR SERPLBLD CKD-EPI 2021: 72.2 ML/MIN/1.73
EOSINOPHIL # BLD AUTO: 0.1 10*3/MM3 (ref 0–0.4)
EOSINOPHIL NFR BLD AUTO: 1.7 % (ref 0.3–6.2)
ERYTHROCYTE [DISTWIDTH] IN BLOOD BY AUTOMATED COUNT: 16 % (ref 12.3–15.4)
GLOBULIN UR ELPH-MCNC: 3.2 GM/DL
GLUCOSE BLDC GLUCOMTR-MCNC: 104 MG/DL (ref 70–105)
GLUCOSE BLDC GLUCOMTR-MCNC: 137 MG/DL (ref 70–105)
GLUCOSE BLDC GLUCOMTR-MCNC: 187 MG/DL (ref 70–105)
GLUCOSE BLDC GLUCOMTR-MCNC: 88 MG/DL (ref 70–105)
GLUCOSE SERPL-MCNC: 114 MG/DL (ref 65–99)
HCT VFR BLD AUTO: 29.6 % (ref 34–46.6)
HGB BLD-MCNC: 9 G/DL (ref 12–15.9)
IMM GRANULOCYTES # BLD AUTO: 0.04 10*3/MM3 (ref 0–0.05)
IMM GRANULOCYTES NFR BLD AUTO: 0.7 % (ref 0–0.5)
LYMPHOCYTES # BLD AUTO: 0.9 10*3/MM3 (ref 0.7–3.1)
LYMPHOCYTES NFR BLD AUTO: 15 % (ref 19.6–45.3)
MAGNESIUM SERPL-MCNC: 1.8 MG/DL (ref 1.6–2.4)
MCH RBC QN AUTO: 30.1 PG (ref 26.6–33)
MCHC RBC AUTO-ENTMCNC: 30.4 G/DL (ref 31.5–35.7)
MCV RBC AUTO: 99 FL (ref 79–97)
MONOCYTES # BLD AUTO: 0.64 10*3/MM3 (ref 0.1–0.9)
MONOCYTES NFR BLD AUTO: 10.7 % (ref 5–12)
NEUTROPHILS NFR BLD AUTO: 4.31 10*3/MM3 (ref 1.7–7)
NEUTROPHILS NFR BLD AUTO: 71.7 % (ref 42.7–76)
NRBC BLD AUTO-RTO: 0 /100 WBC (ref 0–0.2)
PHOSPHATE SERPL-MCNC: 3 MG/DL (ref 2.5–4.5)
PLATELET # BLD AUTO: 150 10*3/MM3 (ref 140–450)
PMV BLD AUTO: 10.3 FL (ref 6–12)
POTASSIUM SERPL-SCNC: 4 MMOL/L (ref 3.5–5.2)
PROT SERPL-MCNC: 5.2 G/DL (ref 6–8.5)
RBC # BLD AUTO: 2.99 10*6/MM3 (ref 3.77–5.28)
SODIUM SERPL-SCNC: 139 MMOL/L (ref 136–145)
WBC NRBC COR # BLD AUTO: 6 10*3/MM3 (ref 3.4–10.8)

## 2024-07-22 PROCEDURE — 94799 UNLISTED PULMONARY SVC/PX: CPT

## 2024-07-22 PROCEDURE — 25810000003 LACTATED RINGERS PER 1000 ML: Performed by: OBSTETRICS & GYNECOLOGY

## 2024-07-22 PROCEDURE — 82948 REAGENT STRIP/BLOOD GLUCOSE: CPT | Performed by: HOSPITALIST

## 2024-07-22 PROCEDURE — 63710000001 INSULIN LISPRO (HUMAN) PER 5 UNITS: Performed by: OBSTETRICS & GYNECOLOGY

## 2024-07-22 PROCEDURE — 94664 DEMO&/EVAL PT USE INHALER: CPT

## 2024-07-22 PROCEDURE — 83735 ASSAY OF MAGNESIUM: CPT | Performed by: NURSE PRACTITIONER

## 2024-07-22 PROCEDURE — 80053 COMPREHEN METABOLIC PANEL: CPT | Performed by: NURSE PRACTITIONER

## 2024-07-22 PROCEDURE — 99232 SBSQ HOSP IP/OBS MODERATE 35: CPT | Performed by: NURSE PRACTITIONER

## 2024-07-22 PROCEDURE — 25810000003 LACTATED RINGERS PER 1000 ML: Performed by: NURSE ANESTHETIST, CERTIFIED REGISTERED

## 2024-07-22 PROCEDURE — 25010000002 ONDANSETRON PER 1 MG: Performed by: NURSE ANESTHETIST, CERTIFIED REGISTERED

## 2024-07-22 PROCEDURE — 85025 COMPLETE CBC W/AUTO DIFF WBC: CPT | Performed by: NURSE PRACTITIONER

## 2024-07-22 PROCEDURE — 25010000002 DEXAMETHASONE PER 1 MG: Performed by: NURSE ANESTHETIST, CERTIFIED REGISTERED

## 2024-07-22 PROCEDURE — 0UDB8ZZ EXTRACTION OF ENDOMETRIUM, VIA NATURAL OR ARTIFICIAL OPENING ENDOSCOPIC: ICD-10-PCS | Performed by: OBSTETRICS & GYNECOLOGY

## 2024-07-22 PROCEDURE — 84100 ASSAY OF PHOSPHORUS: CPT | Performed by: NURSE PRACTITIONER

## 2024-07-22 PROCEDURE — 88305 TISSUE EXAM BY PATHOLOGIST: CPT | Performed by: OBSTETRICS & GYNECOLOGY

## 2024-07-22 PROCEDURE — 25010000002 PROPOFOL 200 MG/20ML EMULSION: Performed by: NURSE ANESTHETIST, CERTIFIED REGISTERED

## 2024-07-22 PROCEDURE — 25010000002 PHENYLEPHRINE 10 MG/ML SOLUTION: Performed by: NURSE ANESTHETIST, CERTIFIED REGISTERED

## 2024-07-22 PROCEDURE — 25010000002 PROPOFOL 1000 MG/100ML EMULSION: Performed by: NURSE ANESTHETIST, CERTIFIED REGISTERED

## 2024-07-22 PROCEDURE — 82948 REAGENT STRIP/BLOOD GLUCOSE: CPT

## 2024-07-22 PROCEDURE — 94761 N-INVAS EAR/PLS OXIMETRY MLT: CPT

## 2024-07-22 RX ORDER — DIPHENHYDRAMINE HYDROCHLORIDE 50 MG/ML
12.5 INJECTION INTRAMUSCULAR; INTRAVENOUS
Status: DISCONTINUED | OUTPATIENT
Start: 2024-07-22 | End: 2024-07-22 | Stop reason: HOSPADM

## 2024-07-22 RX ORDER — ONDANSETRON 2 MG/ML
4 INJECTION INTRAMUSCULAR; INTRAVENOUS ONCE AS NEEDED
Status: DISCONTINUED | OUTPATIENT
Start: 2024-07-22 | End: 2024-07-22 | Stop reason: HOSPADM

## 2024-07-22 RX ORDER — ACETAMINOPHEN 325 MG/1
650 TABLET ORAL EVERY 4 HOURS PRN
Status: DISCONTINUED | OUTPATIENT
Start: 2024-07-22 | End: 2024-07-23 | Stop reason: HOSPADM

## 2024-07-22 RX ORDER — ONDANSETRON 2 MG/ML
INJECTION INTRAMUSCULAR; INTRAVENOUS AS NEEDED
Status: DISCONTINUED | OUTPATIENT
Start: 2024-07-22 | End: 2024-07-22 | Stop reason: SURG

## 2024-07-22 RX ORDER — LIDOCAINE HYDROCHLORIDE 20 MG/ML
INJECTION, SOLUTION EPIDURAL; INFILTRATION; INTRACAUDAL; PERINEURAL AS NEEDED
Status: DISCONTINUED | OUTPATIENT
Start: 2024-07-22 | End: 2024-07-22 | Stop reason: SURG

## 2024-07-22 RX ORDER — SODIUM CHLORIDE, SODIUM LACTATE, POTASSIUM CHLORIDE, CALCIUM CHLORIDE 600; 310; 30; 20 MG/100ML; MG/100ML; MG/100ML; MG/100ML
9 INJECTION, SOLUTION INTRAVENOUS CONTINUOUS PRN
Status: DISCONTINUED | OUTPATIENT
Start: 2024-07-22 | End: 2024-07-23 | Stop reason: HOSPADM

## 2024-07-22 RX ORDER — PROMETHAZINE HYDROCHLORIDE 25 MG/1
25 SUPPOSITORY RECTAL ONCE AS NEEDED
Status: DISCONTINUED | OUTPATIENT
Start: 2024-07-22 | End: 2024-07-22 | Stop reason: HOSPADM

## 2024-07-22 RX ORDER — LABETALOL HYDROCHLORIDE 5 MG/ML
5 INJECTION, SOLUTION INTRAVENOUS
Status: DISCONTINUED | OUTPATIENT
Start: 2024-07-22 | End: 2024-07-22 | Stop reason: HOSPADM

## 2024-07-22 RX ORDER — DROPERIDOL 2.5 MG/ML
0.62 INJECTION, SOLUTION INTRAMUSCULAR; INTRAVENOUS ONCE AS NEEDED
Status: DISCONTINUED | OUTPATIENT
Start: 2024-07-22 | End: 2024-07-22 | Stop reason: HOSPADM

## 2024-07-22 RX ORDER — HYDRALAZINE HYDROCHLORIDE 20 MG/ML
5 INJECTION INTRAMUSCULAR; INTRAVENOUS
Status: DISCONTINUED | OUTPATIENT
Start: 2024-07-22 | End: 2024-07-22 | Stop reason: HOSPADM

## 2024-07-22 RX ORDER — ACETAMINOPHEN 650 MG/1
650 SUPPOSITORY RECTAL EVERY 4 HOURS PRN
Status: DISCONTINUED | OUTPATIENT
Start: 2024-07-22 | End: 2024-07-23 | Stop reason: HOSPADM

## 2024-07-22 RX ORDER — HYDROCODONE BITARTRATE AND ACETAMINOPHEN 5; 325 MG/1; MG/1
1 TABLET ORAL ONCE AS NEEDED
Status: DISCONTINUED | OUTPATIENT
Start: 2024-07-22 | End: 2024-07-22 | Stop reason: HOSPADM

## 2024-07-22 RX ORDER — PHENYLEPHRINE HYDROCHLORIDE 10 MG/ML
INJECTION INTRAVENOUS AS NEEDED
Status: DISCONTINUED | OUTPATIENT
Start: 2024-07-22 | End: 2024-07-22 | Stop reason: SURG

## 2024-07-22 RX ORDER — SODIUM CHLORIDE 0.9 % (FLUSH) 0.9 %
10 SYRINGE (ML) INJECTION AS NEEDED
Status: DISCONTINUED | OUTPATIENT
Start: 2024-07-22 | End: 2024-07-22 | Stop reason: HOSPADM

## 2024-07-22 RX ORDER — ALBUTEROL SULFATE 2.5 MG/3ML
2.5 SOLUTION RESPIRATORY (INHALATION) ONCE AS NEEDED
Status: DISCONTINUED | OUTPATIENT
Start: 2024-07-22 | End: 2024-07-22 | Stop reason: HOSPADM

## 2024-07-22 RX ORDER — SODIUM CHLORIDE 0.9 % (FLUSH) 0.9 %
10 SYRINGE (ML) INJECTION EVERY 12 HOURS SCHEDULED
Status: DISCONTINUED | OUTPATIENT
Start: 2024-07-22 | End: 2024-07-22 | Stop reason: HOSPADM

## 2024-07-22 RX ORDER — MEPERIDINE HYDROCHLORIDE 25 MG/ML
12.5 INJECTION INTRAMUSCULAR; INTRAVENOUS; SUBCUTANEOUS
Status: DISCONTINUED | OUTPATIENT
Start: 2024-07-22 | End: 2024-07-22 | Stop reason: HOSPADM

## 2024-07-22 RX ORDER — DEXAMETHASONE SODIUM PHOSPHATE 4 MG/ML
INJECTION, SOLUTION INTRA-ARTICULAR; INTRALESIONAL; INTRAMUSCULAR; INTRAVENOUS; SOFT TISSUE AS NEEDED
Status: DISCONTINUED | OUTPATIENT
Start: 2024-07-22 | End: 2024-07-22 | Stop reason: SURG

## 2024-07-22 RX ORDER — NALOXONE HCL 0.4 MG/ML
0.4 VIAL (ML) INJECTION AS NEEDED
Status: DISCONTINUED | OUTPATIENT
Start: 2024-07-22 | End: 2024-07-22 | Stop reason: HOSPADM

## 2024-07-22 RX ORDER — SODIUM CHLORIDE, SODIUM LACTATE, POTASSIUM CHLORIDE, CALCIUM CHLORIDE 600; 310; 30; 20 MG/100ML; MG/100ML; MG/100ML; MG/100ML
1000 INJECTION, SOLUTION INTRAVENOUS CONTINUOUS
Status: DISCONTINUED | OUTPATIENT
Start: 2024-07-22 | End: 2024-07-23 | Stop reason: HOSPADM

## 2024-07-22 RX ORDER — SODIUM CHLORIDE, SODIUM LACTATE, POTASSIUM CHLORIDE, CALCIUM CHLORIDE 600; 310; 30; 20 MG/100ML; MG/100ML; MG/100ML; MG/100ML
INJECTION, SOLUTION INTRAVENOUS CONTINUOUS PRN
Status: DISCONTINUED | OUTPATIENT
Start: 2024-07-22 | End: 2024-07-22 | Stop reason: SURG

## 2024-07-22 RX ORDER — PROPOFOL 10 MG/ML
INJECTION, EMULSION INTRAVENOUS CONTINUOUS PRN
Status: DISCONTINUED | OUTPATIENT
Start: 2024-07-22 | End: 2024-07-22 | Stop reason: SURG

## 2024-07-22 RX ORDER — FENTANYL CITRATE 50 UG/ML
50 INJECTION, SOLUTION INTRAMUSCULAR; INTRAVENOUS
Status: DISCONTINUED | OUTPATIENT
Start: 2024-07-22 | End: 2024-07-22 | Stop reason: HOSPADM

## 2024-07-22 RX ORDER — ACETAMINOPHEN 325 MG/1
650 TABLET ORAL ONCE AS NEEDED
Status: DISCONTINUED | OUTPATIENT
Start: 2024-07-22 | End: 2024-07-22 | Stop reason: HOSPADM

## 2024-07-22 RX ORDER — DIPHENHYDRAMINE HYDROCHLORIDE 50 MG/ML
12.5 INJECTION INTRAMUSCULAR; INTRAVENOUS ONCE AS NEEDED
Status: DISCONTINUED | OUTPATIENT
Start: 2024-07-22 | End: 2024-07-22 | Stop reason: HOSPADM

## 2024-07-22 RX ORDER — ACETAMINOPHEN 650 MG/1
650 SUPPOSITORY RECTAL EVERY 4 HOURS PRN
Status: DISCONTINUED | OUTPATIENT
Start: 2024-07-22 | End: 2024-07-22 | Stop reason: HOSPADM

## 2024-07-22 RX ORDER — PROPOFOL 10 MG/ML
INJECTION, EMULSION INTRAVENOUS AS NEEDED
Status: DISCONTINUED | OUTPATIENT
Start: 2024-07-22 | End: 2024-07-22 | Stop reason: SURG

## 2024-07-22 RX ORDER — PROMETHAZINE HYDROCHLORIDE 25 MG/1
25 TABLET ORAL ONCE AS NEEDED
Status: DISCONTINUED | OUTPATIENT
Start: 2024-07-22 | End: 2024-07-22 | Stop reason: HOSPADM

## 2024-07-22 RX ADMIN — HYDROCORTISONE ACETATE 25 MG: 25 SUPPOSITORY RECTAL at 21:47

## 2024-07-22 RX ADMIN — Medication 10 ML: at 15:06

## 2024-07-22 RX ADMIN — ANTI-FUNGAL POWDER MICONAZOLE NITRATE TALC FREE 1 APPLICATION: 1.42 POWDER TOPICAL at 21:48

## 2024-07-22 RX ADMIN — IPRATROPIUM BROMIDE AND ALBUTEROL SULFATE 3 ML: .5; 3 SOLUTION RESPIRATORY (INHALATION) at 19:22

## 2024-07-22 RX ADMIN — PROPOFOL INJECTABLE EMULSION 150 MCG/KG/MIN: 10 INJECTION, EMULSION INTRAVENOUS at 09:49

## 2024-07-22 RX ADMIN — APIXABAN 5 MG: 5 TABLET, FILM COATED ORAL at 21:47

## 2024-07-22 RX ADMIN — PHENYLEPHRINE HYDROCHLORIDE 100 MCG: 10 INJECTION INTRAVENOUS at 10:13

## 2024-07-22 RX ADMIN — PHENYLEPHRINE HYDROCHLORIDE 100 MCG: 10 INJECTION INTRAVENOUS at 09:47

## 2024-07-22 RX ADMIN — IPRATROPIUM BROMIDE AND ALBUTEROL SULFATE 3 ML: .5; 3 SOLUTION RESPIRATORY (INHALATION) at 14:45

## 2024-07-22 RX ADMIN — METOPROLOL SUCCINATE 25 MG: 25 TABLET, FILM COATED, EXTENDED RELEASE ORAL at 21:47

## 2024-07-22 RX ADMIN — LIDOCAINE HYDROCHLORIDE 100 MG: 20 INJECTION, SOLUTION EPIDURAL; INFILTRATION; INTRACAUDAL; PERINEURAL at 09:47

## 2024-07-22 RX ADMIN — ONDANSETRON 4 MG: 2 INJECTION INTRAMUSCULAR; INTRAVENOUS at 10:10

## 2024-07-22 RX ADMIN — BUMETANIDE 1 MG: 1 TABLET ORAL at 17:56

## 2024-07-22 RX ADMIN — PHENYLEPHRINE HYDROCHLORIDE 100 MCG: 10 INJECTION INTRAVENOUS at 09:56

## 2024-07-22 RX ADMIN — BUDESONIDE 0.5 MG: 0.5 INHALANT RESPIRATORY (INHALATION) at 07:09

## 2024-07-22 RX ADMIN — PROPOFOL 150 MG: 10 INJECTION, EMULSION INTRAVENOUS at 09:47

## 2024-07-22 RX ADMIN — IPRATROPIUM BROMIDE AND ALBUTEROL SULFATE 3 ML: .5; 3 SOLUTION RESPIRATORY (INHALATION) at 07:05

## 2024-07-22 RX ADMIN — AMIODARONE HYDROCHLORIDE 200 MG: 200 TABLET ORAL at 06:07

## 2024-07-22 RX ADMIN — PHENYLEPHRINE HYDROCHLORIDE 100 MCG: 10 INJECTION INTRAVENOUS at 10:24

## 2024-07-22 RX ADMIN — POVIDONE-IODINE: 10 SOLUTION TOPICAL at 15:07

## 2024-07-22 RX ADMIN — PANTOPRAZOLE SODIUM 40 MG: 40 TABLET, DELAYED RELEASE ORAL at 17:56

## 2024-07-22 RX ADMIN — INSULIN LISPRO 2 UNITS: 100 INJECTION, SOLUTION INTRAVENOUS; SUBCUTANEOUS at 21:47

## 2024-07-22 RX ADMIN — BUDESONIDE 0.5 MG: 0.5 INHALANT RESPIRATORY (INHALATION) at 19:27

## 2024-07-22 RX ADMIN — ANTI-FUNGAL POWDER MICONAZOLE NITRATE TALC FREE 1 APPLICATION: 1.42 POWDER TOPICAL at 08:56

## 2024-07-22 RX ADMIN — SODIUM CHLORIDE, SODIUM LACTATE, POTASSIUM CHLORIDE, AND CALCIUM CHLORIDE: .6; .31; .03; .02 INJECTION, SOLUTION INTRAVENOUS at 09:45

## 2024-07-22 RX ADMIN — PHENYLEPHRINE HYDROCHLORIDE 100 MCG: 10 INJECTION INTRAVENOUS at 10:07

## 2024-07-22 RX ADMIN — SODIUM CHLORIDE, POTASSIUM CHLORIDE, SODIUM LACTATE AND CALCIUM CHLORIDE 1000 ML: 600; 310; 30; 20 INJECTION, SOLUTION INTRAVENOUS at 09:32

## 2024-07-22 RX ADMIN — AMIODARONE HYDROCHLORIDE 200 MG: 200 TABLET ORAL at 17:56

## 2024-07-22 RX ADMIN — Medication 10 ML: at 21:48

## 2024-07-22 RX ADMIN — METOPROLOL SUCCINATE 25 MG: 25 TABLET, FILM COATED, EXTENDED RELEASE ORAL at 08:56

## 2024-07-22 RX ADMIN — DEXAMETHASONE SODIUM PHOSPHATE 8 MG: 4 INJECTION, SOLUTION INTRAMUSCULAR; INTRAVENOUS at 10:10

## 2024-07-22 NOTE — PROGRESS NOTES
[facility] MEDICINE SERVICE  DAILY PROGRESS NOTE    NAME: Ban Brennan  : 1955  MRN: [id number]      LOS: 22 days     PROVIDER OF SERVICE: Librado Villagomez MD    Chief Complaint: Atrial fibrillation with RVR    Subjective:     Interval History:  History taken from: patient chart RN  Afebrile   For D&C with GYN today     Review of Systems:   Review of Systems  All negative except as above   Objective:     Vital Signs  Temp:  [97.4 °F (36.3 °C)-98.6 °F (37 °C)] 97.8 °F (36.6 °C)  Heart Rate:  [] 76  Resp:  [13-21] 15  BP: ()/(44-61) 90/55  Flow (L/min):  [2-8] 3   Body mass index is 40.65 kg/m².    Physical Exam  Physical Exam  AOx3 NAD  RRR S1-S2 audible  Lungs with fair air entry  Abdomen soft nontender nondistended  Scheduled Meds   amiodarone, 200 mg, Oral, Q12H   Followed by  [START ON 2024] amiodarone, 200 mg, Oral, Daily  apixaban, 5 mg, Oral, BID  budesonide, 0.5 mg, Nebulization, BID - RT  bumetanide, 1 mg, Oral, BID  hydrocortisone, 25 mg, Rectal, BID  insulin lispro, 2-7 Units, Subcutaneous, 4x Daily With Meals & Nightly  ipratropium-albuterol, 3 mL, Nebulization, 4x Daily - RT  lidocaine, 20 mL, Infiltration, Once  metoprolol succinate XL, 25 mg, Oral, Q12H  miconazole, 1 Application, Topical, Q12H  pantoprazole, 40 mg, Oral, BID AC  povidone-iodine, , Topical, Daily  sodium chloride, 10 mL, Intravenous, Q12H       PRN Meds     acetaminophen **OR** acetaminophen    Calcium Replacement - Follow Nurse / BPA Driven Protocol    dextrose    dextrose    glucagon (human recombinant)    HYDROcodone-acetaminophen    ipratropium-albuterol    lactated ringers    Magnesium Standard Dose Replacement - Follow Nurse / BPA Driven Protocol    niCARdipine    nitroglycerin    ondansetron ODT **OR** ondansetron    Phosphorus Replacement - Follow Nurse / BPA Driven Protocol    Potassium Replacement - Follow Nurse / BPA Driven Protocol    [COMPLETED] Insert Peripheral IV **AND** sodium  chloride    sodium chloride    sodium chloride   Infusions  lactated ringers, 1,000 mL, Last Rate: 1,000 mL (07/22/24 0932)  lactated ringers, 9 mL/hr  niCARdipine, 5-15 mg/hr          Diagnostic Data    Results from last 7 days   Lab Units 07/22/24  0140   WBC 10*3/mm3 6.00   HEMOGLOBIN g/dL 9.0*   HEMATOCRIT % 29.6*   PLATELETS 10*3/mm3 150   GLUCOSE mg/dL 114*   CREATININE mg/dL 0.87   BUN mg/dL 22   SODIUM mmol/L 139   POTASSIUM mmol/L 4.0   AST (SGOT) U/L 98*   ALT (SGPT) U/L 49*   ALK PHOS U/L 218*   BILIRUBIN mg/dL 1.1   ANION GAP mmol/L 5.3       No radiology results for the last day      I reviewed the patient's new clinical results.  I reviewed the patient's new imaging results and agree with the interpretation.    Assessment/Plan:     Active and Resolved Problems  Active Hospital Problems    Diagnosis  POA    **Atrial fibrillation with RVR [I48.91]  Yes    COPD (chronic obstructive pulmonary disease) [J44.9]  Yes    Aortic stenosis [I35.0]  Yes    Mitral regurgitation [I34.0]  Yes    Acute HFrEF (heart failure with reduced ejection fraction) [I50.21]  Yes    Right bundle branch block [I45.10]  Yes    Acute deep vein thrombosis (DVT) of both lower extremities [I82.403]  Yes    ARABELLA (acute kidney injury) [N17.9]  Yes    Acute hyperkalemia [E87.5]  Yes    Sepsis [A41.9]  Yes    Acute UTI [N39.0]  Yes    Acute systolic congestive heart failure [I50.21]  Yes    Rhinovirus infection [B34.8]  Yes    Multifocal pneumonia [J18.9]  Yes    Chronic respiratory failure with hypoxia, on home O2 therapy [J96.11, Z99.81]  Not Applicable    Skin ulcer of right great toe [L97.519]  Yes    Skin ulcer of left great toe [L97.529]  Yes    SEDRICK (obstructive sleep apnea) [G47.33]  Yes    Primary hypertension [I10]  Yes    Morbid obesity with BMI of 40.0-44.9, adult [E66.01, Z68.41]  Not Applicable      Resolved Hospital Problems   No resolved problems to display.       69-year-old female with history of hypertension, HFrEF, lower  extremity lymphedema admitted to [facility] 6/30 progressive shortness of breath        #Bilateral lower extremity DVT  #History of reported PE  Acute left lower extremity DVT in the proximal femoral, mid femoral, distal femoral, popliteal, and posterior tibial   Seen by hematology appreciate recommendation.  Factor V and prothrombin gene mutation negative  Status post Eliquis 10 mg twice daily for 7 days currently on 5 mg twice daily  Outpatient follow-up with hematology- will need lifelong AC     #Acute on chronic HFrEF  #A-fib with RVR.  New onset  #Severe MR  Cardiology follow-up  Started on amiodarone GGT transitioned to p.o. tapering dose  TTE with EF 30 to 35%.  Defer ischemic workup to cardiology  On Toprol 25 twice daily with holding parameters   Bumex 1 mg twice daily  Monitor I's and O's  Lisinopril on hold given ARABELLA  On Eliquis     #Right-sided hydropneumothorax  Chest tube placed in ICU 7/6.  Pulmonary following on floors.  Chest tube removed 7/18  Continue supplemental oxygen as needed to keep SpO2 more than 90%  Currently continue current nebs  Pleural fluid cultures Pseudomonas defer need for antibiotics to pulmonary          #Severe sepsis  #UTI  #Left lower extremity cellulitis and wound  #Rhinovirus infection  #Multifocal pneumonia  #Chronic venous stasis lower extremity  Seen by infectious disease appreciate recommendations  Finished Keflex and doxycycline for 7 day course   Seen by podiatry no surgical intervention recommended  Midodrine has been weaned off monitor blood pressure     #Acute hypoxic respiratory failure  Multifactorial pneumonia CHF  Antibiotics and diuretics as above  Wean off supplemental oxygen as tolerated.  Closely monitor blood pressure     #DM2  A1c 6.14  Continue ISS lispro  POC ACHS     #ARABELLA  #Metabolic Acidosis   Suspect ATN in setting of sepsis   Diuretics as above  BMP daily  Avoid nephrotoxic drugs   Improved     #uterine mass  #Postmenopausal bleeding  Large  subserosal uterine fundal fibroid on pelvic ultrasound concerning for fibroid vs malignancy  GYN on board for D&C today   Monitor H/H    #hematochezia  GI on board noted plan for outpatient colonoscopy  Etiology suspected to be hemorrhoids  Monitor H&H      #Pancreatic mass  Seen by GI   outpatient endoscopic ultrasound to further evaluate pancreas lesion      # Panniculitis  - Hypogastric region soft mass, ttp  - GI evaluated and suspect due to panniculitis  - supportive care     VTE Prophylaxis:  Pharmacologic VTE prophylaxis orders are present.         Code status is   Code Status and Medical Interventions:   Ordered at: 06/30/24 2120     Code Status (Patient has no pulse and is not breathing):    CPR (Attempt to Resuscitate)     Medical Interventions (Patient has pulse or is breathing):    Full Support       Plan for disposition:24 to 48 days    Time: 30 minutes    Signature: Electronically signed by Librado Villagomez MD, 07/22/24, 13:25 EDT.  [facility] Hospitalist Team

## 2024-07-22 NOTE — SIGNIFICANT NOTE
07/22/24 1523   OTHER   Discipline occupational therapist   Rehab Time/Intention   Session Not Performed patient/family declined treatment   Therapy Assessment/Plan (PT)   Criteria for Skilled Interventions Met (PT) yes   Recommendation   OT - Next Appointment 07/23/24

## 2024-07-22 NOTE — BRIEF OP NOTE
DILATATION AND CURETTAGE HYSTEROSCOPY  Progress Note    Ban Brennan  7/22/2024    Pre-op Diagnosis:   PMB       Post-Op Diagnosis Codes:   same    Procedure/CPT® Codes:      Procedure(s):  DILATATION AND CURETTAGE HYSTEROSCOPY      Surgeon(s):  Sosa Newell MD    Anesthesia: General    Staff:   Circulator: Tam Maradiaga RN  Scrub Person: Vanessa Thompson         Estimated Blood Loss: minimal    Urine Voided: * No values recorded between 7/22/2024  9:37 AM and 7/22/2024 10:34 AM *    Specimens:                          Drains:   External Urinary Catheter (Active)   Daily Indications Pressure ulcers/Compromised skin 07/22/24 0400   Site Assessment Clean;Skin intact 07/21/24 2010   Application/Removal external catheter changed 07/22/24 0800   Collection Container Wall suction 07/22/24 0400   Wall suction (mmHG) 120 mmHG 07/22/24 0400   Securement Method Securing device 07/22/24 0400   Catheter care complete Yes 07/20/24 0000   Output (mL) 650 mL 07/22/24 0600       [REMOVED] Chest Tube 1 Right Midaxillary (Removed)   Function To water seal 07/18/24 0433   Air Leak/Fluctuation dependent drainage cleared;fluctuation present;air leak not present 07/18/24 0433   Patency Intervention Tip/tilt 07/18/24 0433   Drainage Description Serosanguineous 07/18/24 0433   Dressing Type Gauze 07/18/24 0433   Dressing Status Clean;Dry;Intact 07/18/24 0433   Dressing Intervention New dressing 07/13/24 1230   Site Assessment Clean;Intact;Dry 07/18/24 0433   Surrounding Skin Dry;Intact 07/18/24 0433   Securement tubing anchored to body distal to insertion site with tape 07/18/24 0433   Left Subcutaneous Emphysema none present 07/18/24 0433   Right Subcutaneous Emphysema none present 07/18/24 0433   Safety all connections secured;all tubing connections taped;petroleum gauze dressing with patient 07/18/24 0433   Output (mL) 280 mL 07/17/24 1800       [REMOVED] NG/OG Tube Other (Comment) Right nostril (Removed)    Placement Verification Other (Comment) 07/07/24 1200   Site Assessment Intact 07/07/24 1200   Securement taped to nostril center 07/07/24 1200   Secured at (cm) 60 07/07/24 0820   NG/OG Site Interventions Site assessed 07/07/24 0820   Dressing Intervention Dressing reinforced 07/07/24 0406   Status Clamped 07/07/24 1200   Surrounding Skin Dry;Intact 07/07/24 1200   Intake (mL) 120 mL 07/06/24 0430       [REMOVED] Urethral Catheter Temperature probe 16 Fr. (Removed)   Daily Indications Hourly Output in Critical Unstable Patient requiring Frequent Intervention (hemodynamics, titration or life supportive therapy) 07/10/24 0800   Site Assessment Clean;Skin intact 07/10/24 0800   Collection Container Standard drainage bag 07/10/24 0800   Securement Method Securing device 07/10/24 0800   Catheter care complete Yes 07/10/24 0400   Output (mL) 400 mL 07/10/24 0917       [REMOVED] External Urinary Catheter (Removed)   Daily Indications Daily output 07/05/24 0400   Site Assessment Red;Pink 07/05/24 0400   Application/Removal external catheter changed 07/04/24 1700   Collection Container Wall suction 07/05/24 0400   Wall suction (mmHG) 140 mmHG 07/05/24 0400   Securement Method Securing device 07/05/24 0400   Catheter care complete Yes 07/04/24 1700   Output (mL) 50 mL 07/05/24 0600       Findings: Blood noted in uterine cavity, unable to see ostia on R, L visualized      Complications: none      Procedure: The patient was taken to the operating room where general anesthesia was obtained.  She was prepped and draped in the usual sterile fashion in the dorsolithotomy position yellowfin stirrups.  Her bladder had been drained.  A single-tooth tenaculum was placed on the anterior lip of the cervix.  Her cervix was dilated easily.  The hysteroscope was placed within the uterus.  Blood was noted as well as clots.  The left ostia was visualized but not the right.  There was possible tumor noted at the fundus.  However this was  hard to distinguish without the ostia being identified.  The hysteroscope was removed, and a sharp but gentle curettage was done.  Moderate amount of clots were removed with a small amount of endometrium.  The hysteroscope was replaced and no other abnormalities were noted.  All instruments removed from the patient's vagina.  The tenaculum sites were hemostatic.  Sponge counts were correct.  The patient was taken to the recovery room in stable condition.        Sosa Newell MD     Date: 7/22/2024  Time: 10:44 EDT

## 2024-07-22 NOTE — CASE MANAGEMENT/SOCIAL WORK
Continued Stay Note  [facility]     Patient Name: Ban Brennan  MRN: [id number]  Today's Date: 2024    Admit Date: 2024    Plan: [facility] accepted. Precert  . Will require new precert to be started. PASRR approved. From home with family. Declined LTACH.   Discharge Plan       Row Name 24 0953       Plan    Plan [facility] accepted. Precert  . Will require new precert to be started. PASRR approved. From home with family. Declined LTACH.    Plan Comments DC Barriers: Scheduled to have hysteroscopy D&C surgery performed today, . Surgery noted to be scheduled originally for yesterday, . Avoidable day placed on delay.                  Dea Erwin RN     Office Phone: [phone]  Office Cell: [phone]

## 2024-07-22 NOTE — ANESTHESIA PROCEDURE NOTES
Airway  Urgency: elective    Date/Time: 7/22/2024 9:48 AM  Airway not difficult    General Information and Staff    Patient location during procedure: OR  CRNA/CAA: Jessenia Paul CRNA    Indications and Patient Condition  Indications for airway management: airway protection    Preoxygenated: yes  MILS not maintained throughout  Mask difficulty assessment: 0 - not attempted    Final Airway Details  Final airway type: supraglottic airway      Successful airway: I-gel  Size 4     Number of attempts at approach: 1  Assessment: lips, teeth, and gum same as pre-op and atraumatic intubation    Additional Comments  LMA inserted with ease, assist to SV

## 2024-07-22 NOTE — SIGNIFICANT NOTE
07/22/24 0940   OTHER   Discipline physical therapy assistant   Rehab Time/Intention   Session Not Performed other (see comments);patient unavailable for treatment  (Pt off unit for procedure; will f/u in PM or next tx date)   Recommendation   PT - Next Appointment 07/23/24

## 2024-07-22 NOTE — PLAN OF CARE
Problem: Adult Inpatient Plan of Care  Goal: Absence of Hospital-Acquired Illness or Injury  Intervention: Prevent Skin Injury  Recent Flowsheet Documentation  Taken 7/22/2024 1900 by Sosa Clancy RN  Body Position:   turned   left  Taken 7/22/2024 1600 by Sosa Clancy RN  Body Position:   turned   supine  Taken 7/22/2024 1400 by Sosa Clancy RN  Body Position:   turned   left  Taken 7/22/2024 1200 by Sosa Clancy RN  Body Position:   turned   right     Problem: Adult Inpatient Plan of Care  Goal: Optimal Comfort and Wellbeing  Intervention: Provide Person-Centered Care  Recent Flowsheet Documentation  Taken 7/22/2024 1200 by Sosa Clancy RN  Trust Relationship/Rapport:   care explained   questions answered     Problem: Cardiac Output Decreased (Heart Failure)  Goal: Optimal Cardiac Output  Intervention: Optimize Cardiac Output  Recent Flowsheet Documentation  Taken 7/22/2024 1600 by Sosa Clancy RN  Stabilization Measures: legs elevated     Problem: Fall Injury Risk  Goal: Absence of Fall and Fall-Related Injury  Intervention: Promote Injury-Free Environment  Recent Flowsheet Documentation  Taken 7/22/2024 1900 by Sosa Clancy RN  Safety Promotion/Fall Prevention:   safety round/check completed   room organization consistent   clutter free environment maintained  Taken 7/22/2024 1600 by Sosa Clancy RN  Safety Promotion/Fall Prevention:   safety round/check completed   room organization consistent   clutter free environment maintained  Taken 7/22/2024 1400 by Sosa Clancy RN  Safety Promotion/Fall Prevention:   safety round/check completed   room organization consistent   clutter free environment maintained  Taken 7/22/2024 1200 by Sosa Clancy RN  Safety Promotion/Fall Prevention:   safety round/check completed   clutter free environment maintained  Taken 7/22/2024 1000 by Sosa Clancy RN  Safety Promotion/Fall Prevention: patient off unit  Taken 7/22/2024 0800 by Sosa Clancy  RN  Safety Promotion/Fall Prevention:   safety round/check completed   room organization consistent   clutter free environment maintained   Goal Outcome Evaluation:patient alert and oriented, went down for a d & C today, slept most of the afternoon, patient was turned Q2, bed alarm on, call light within reach

## 2024-07-22 NOTE — PROGRESS NOTES
Daily Progress Note          Assessment    Hydropneumothorax s/p chest tube placement 7/6/2024: Exudative fluid but negative cultures and no malignancy  Chest tube removed 7/18/2024    COPD    SEDRICK    Wound cultures positive for Pseudomonas on 7/13/2024    Systolic CHF  A-fib   Aortic stenosis  Mitral valve regurgitation   R BBB  bilateral DVT remote history of DVT  HTN  Morbid obesity   Uterine mass and pancreatic mass        PLAN:        Oxygen supplement and titration to maintain saturation 90-95%: Currently requiring 2 L per nasal cannula    Mucinex  Antibiotics completed  Bronchodilators  Inhaled corticosteroids    Incentive spirometer  diuresis as per nephrology  Electrolytes/ glycemic control  BP/HR control  Chronic anticoagulation: Apixaban     I reviewed the recent clinical results and radiological images                 LOS: 22 days     Subjective     Patient reports mild cough and shortness of breath    Objective     Vital signs for last 24 hours:  Vitals:    07/22/24 0705 07/22/24 0708 07/22/24 0709 07/22/24 0712   BP:       BP Location:       Patient Position:       Pulse: 82 80 76 79   Resp: 15 15 15 15   Temp:       TempSrc:       SpO2: 100% 96% 96% 96%   Weight:       Height:           Intake/Output last 3 shifts:  I/O last 3 completed shifts:  In: 120 [P.O.:120]  Out: 2200 [Urine:2200]  Intake/Output this shift:  No intake/output data recorded.      Radiology  Imaging Results (Last 24 Hours)       ** No results found for the last 24 hours. **            Labs:  Results from last 7 days   Lab Units 07/22/24  0140   WBC 10*3/mm3 6.00   HEMOGLOBIN g/dL 9.0*   HEMATOCRIT % 29.6*   PLATELETS 10*3/mm3 150     Results from last 7 days   Lab Units 07/22/24  0140   SODIUM mmol/L 139   POTASSIUM mmol/L 4.0   CHLORIDE mmol/L 99   CO2 mmol/L 34.7*   BUN mg/dL 22   CREATININE mg/dL 0.87   CALCIUM mg/dL 8.4*   BILIRUBIN mg/dL 1.1   ALK PHOS U/L 218*   ALT (SGPT) U/L 49*   AST (SGOT) U/L 98*   GLUCOSE mg/dL 114*            Results from last 7 days   Lab Units 07/22/24  0140 07/21/24  0452 07/20/24  0140   ALBUMIN g/dL 2.0* 2.0* 1.9*             Results from last 7 days   Lab Units 07/22/24  0140   MAGNESIUM mg/dL 1.8                   Meds:   SCHEDULE  amiodarone, 200 mg, Oral, Q12H   Followed by  [START ON 7/23/2024] amiodarone, 200 mg, Oral, Daily  apixaban, 5 mg, Oral, BID  budesonide, 0.5 mg, Nebulization, BID - RT  bumetanide, 1 mg, Oral, BID  hydrocortisone, 25 mg, Rectal, BID  insulin lispro, 2-7 Units, Subcutaneous, 4x Daily With Meals & Nightly  ipratropium-albuterol, 3 mL, Nebulization, 4x Daily - RT  lidocaine, 20 mL, Infiltration, Once  metoprolol succinate XL, 25 mg, Oral, Q12H  miconazole, 1 Application, Topical, Q12H  pantoprazole, 40 mg, Oral, BID AC  povidone-iodine, , Topical, Daily  sodium chloride, 10 mL, Intravenous, Q12H      Infusions     PRNs    acetaminophen **OR** acetaminophen    Calcium Replacement - Follow Nurse / BPA Driven Protocol    dextrose    dextrose    glucagon (human recombinant)    HYDROcodone-acetaminophen    ipratropium-albuterol    Magnesium Standard Dose Replacement - Follow Nurse / BPA Driven Protocol    nitroglycerin    ondansetron ODT **OR** ondansetron    Phosphorus Replacement - Follow Nurse / BPA Driven Protocol    Potassium Replacement - Follow Nurse / BPA Driven Protocol    [COMPLETED] Insert Peripheral IV **AND** sodium chloride    sodium chloride    sodium chloride    Physical Exam:  General Appearance:  Alert   HEENT:  Normocephalic, without obvious abnormality, Conjunctiva/corneas clear,.   Nares normal, no drainage     Neck:  Supple, symmetrical, trachea midline.   Lungs /Chest wall: Good air entry with mild bilateral basal rhonchi, respirations unlabored, symmetrical wall movement.     Heart:  Regular rate and rhythm, S1 S2 normal  Abdomen: Soft, non-tender, no masses, no organomegaly.    Extremities: + Bilateral lower extremity edema, no clubbing or cyanosis      ROS  Constitutional: Negative for chills, fever and malaise/fatigue.   HENT: Negative.    Eyes: Negative.    Cardiovascular: Lower extremity edema  Respiratory: Positive for mild cough and shortness of breath.    Skin: Negative.    Musculoskeletal: Negative.    Gastrointestinal: Negative.    Genitourinary: Negative.    Neurological: Generalized weakness      I reviewed the recent clinical results  I personally reviewed the latest radiological studies    Part of this note may be an electronic transcription/translation of spoken language to printed text using the Dragon Dictation System.

## 2024-07-22 NOTE — ANESTHESIA POSTPROCEDURE EVALUATION
Patient: Ban Brennan    Procedure Summary       Date: 07/22/24 Room / Location: [facility] OR 12 / [facility] MAIN OR    Anesthesia Start: 0937 Anesthesia Stop: 1042    Procedure: DILATATION AND CURETTAGE HYSTEROSCOPY (Uterus) Diagnosis:     Surgeons: Sosa Newell MD Provider: Miguel Garces MD    Anesthesia Type: general ASA Status: 4            Anesthesia Type: general    Vitals  Vitals Value Taken Time   /61 07/22/24 1130   Temp 97.4 °F (36.3 °C) 07/22/24 1130   Pulse 75 07/22/24 1132   Resp 15 07/22/24 1130   SpO2 99 % 07/22/24 1132   Vitals shown include unfiled device data.        Post Anesthesia Care and Evaluation    Patient location during evaluation: PACU  Patient participation: complete - patient participated  Level of consciousness: awake  Pain management: adequate    Airway patency: patent  Anesthetic complications: No anesthetic complications  PONV Status: none  Cardiovascular status: acceptable  Respiratory status: acceptable  Hydration status: acceptable    Comments: Patient seen and examined postoperatively; vital signs stable; SpO2 greater than or equal to 90%; cardiopulmonary status stable; nausea/vomiting adequately controlled; pain adequately controlled; no apparent anesthesia complications; patient discharged from anesthesia care when discharge criteria were met

## 2024-07-22 NOTE — PROGRESS NOTES
"Referring Provider: Librado Villagomez MD    Reason for follow-up: a-fib     Patient Care Team:  Rut Pierce APRN as PCP - General (Nurse Practitioner)  Austin Brooks MD as Cardiologist (Cardiology)      SUBJECTIVE    The patient is asleep. No acute distress. She is scheduled for a D&C today.     ROS  Review of all systems negative except as indicated.    Since I have last seen, the patient has been without any chest discomfort, shortness of breath, palpitations, dizziness or syncope.  Denies having any headache, abdominal pain, nausea, vomiting, diarrhea, constipation, loss of weight or loss of appetite.  Denies having any excessive bruising, hematuria or blood in the stool.        Personal History:    Past Medical History:   Diagnosis Date    Bilateral leg edema     Chronic respiratory failure with hypoxia, on home O2 therapy 07/01/2024    COPD (chronic obstructive pulmonary disease)     Morbid obesity with BMI of 40.0-44.9, adult 11/08/2010    Primary hypertension 03/01/2017    Pulmonary embolism     \"many years ago, was on warfarin\"    Sleep apnea        History reviewed. No pertinent surgical history.    History reviewed. No pertinent family history.    Social History     Tobacco Use    Smoking status: Never    Smokeless tobacco: Never   Vaping Use    Vaping status: Never Used   Substance Use Topics    Alcohol use: Never    Drug use: Never        Home meds:  Prior to Admission medications    Medication Sig Start Date End Date Taking? Authorizing Provider   Allergy Relief 180 MG tablet Take 1 tablet by mouth Daily. 5/30/24  Yes Chadwick Johnson MD   bumetanide (BUMEX) 1 MG tablet Take 1 tablet by mouth 3 times a day. 5/30/24  Yes Chadwick Johnson MD   docusate sodium (COLACE) 100 MG capsule Take 1 capsule by mouth Every 12 (Twelve) Hours. 5/30/24  Yes Chadwick Johnson MD   furosemide (LASIX) 20 MG tablet Take 1 tablet by mouth Daily.   Yes Chadwick Johnson MD "   HYDROcodone-acetaminophen (NORCO)  MG per tablet Take 1 tablet by mouth 3 times a day. 5/30/24  Yes Chadwick Johnson MD   lisinopril (PRINIVIL,ZESTRIL) 30 MG tablet Take 1 tablet by mouth Daily. 3/18/24  Yes Chadwick Johnson MD   meloxicam (MOBIC) 7.5 MG tablet Take 1 tablet by mouth Daily As Needed. 3/18/24  Yes Chadwick Johnson MD   metoprolol tartrate (LOPRESSOR) 50 MG tablet Take 1 tablet by mouth Daily.   Yes Chadwick Johnson MD   montelukast (SINGULAIR) 10 MG tablet Take 1 tablet by mouth every night at bedtime.   Yes Chadwick Johnson MD   omeprazole (priLOSEC) 20 MG capsule Take 1 capsule by mouth Daily. 3/18/24  Yes Chadwick Johnson MD   oxybutynin XL (DITROPAN-XL) 10 MG 24 hr tablet Take 2 tablets by mouth Daily. 3/18/24  Yes Chadwick Johnson MD   potassium chloride (MICRO-K) 10 MEQ CR capsule Take 1 capsule by mouth Every 12 (Twelve) Hours. 3/18/24  Yes Chadwick Johnson MD   vitamin D (ERGOCALCIFEROL) 1.25 MG (55885 UT) capsule capsule Take 1 capsule by mouth 2 (Two) Times a Week. Monday and Thursday. 5/30/24  Yes Chadwick Johnson MD       Allergies:  Patient has no known allergies.    Scheduled Meds:amiodarone, 200 mg, Oral, Q12H   Followed by  [START ON 7/23/2024] amiodarone, 200 mg, Oral, Daily  apixaban, 5 mg, Oral, BID  budesonide, 0.5 mg, Nebulization, BID - RT  bumetanide, 1 mg, Oral, BID  hydrocortisone, 25 mg, Rectal, BID  insulin lispro, 2-7 Units, Subcutaneous, 4x Daily With Meals & Nightly  ipratropium-albuterol, 3 mL, Nebulization, 4x Daily - RT  lidocaine, 20 mL, Infiltration, Once  metoprolol succinate XL, 25 mg, Oral, Q12H  miconazole, 1 Application, Topical, Q12H  pantoprazole, 40 mg, Oral, BID AC  povidone-iodine, , Topical, Daily  sodium chloride, 10 mL, Intravenous, Q12H      Continuous Infusions:   PRN Meds:.  acetaminophen **OR** acetaminophen    Calcium Replacement - Follow Nurse / BPA Driven Protocol    dextrose    dextrose     "glucagon (human recombinant)    HYDROcodone-acetaminophen    ipratropium-albuterol    Magnesium Standard Dose Replacement - Follow Nurse / BPA Driven Protocol    nitroglycerin    ondansetron ODT **OR** ondansetron    Phosphorus Replacement - Follow Nurse / BPA Driven Protocol    Potassium Replacement - Follow Nurse / BPA Driven Protocol    [COMPLETED] Insert Peripheral IV **AND** sodium chloride    sodium chloride    sodium chloride      OBJECTIVE    Vital Signs  Vitals:    07/22/24 0705 07/22/24 0708 07/22/24 0709 07/22/24 0712   BP:       BP Location:       Patient Position:       Pulse: 82 80 76 79   Resp: 15 15 15 15   Temp:       TempSrc:       SpO2: 100% 96% 96% 96%   Weight:       Height:           Flowsheet Rows      Flowsheet Row First Filed Value   Admission Height 147.3 cm (58\") Documented at 06/30/2024 1650   Admission Weight 108 kg (239 lb) Documented at 06/30/2024 1650              Intake/Output Summary (Last 24 hours) at 7/22/2024 0854  Last data filed at 7/22/2024 0600  Gross per 24 hour   Intake 120 ml   Output 1100 ml   Net -980 ml          Telemetry:  atrial fibrillation     Physical Exam:  The patient is asleep. No acute distress noted. Morbidly obese.   Vital signs as noted above.  Head and neck revealed no carotid bruits or jugular venous distention.   Lungs with diminished breath sounds. No wheezing. On oxygen at 2 liters per nasal cannula.   Heart:  rhythm irregularly irregular, normal first and second heart sounds.  No precordial rub is present.  No gallop is present.  Abdomen soft and nontender.   Extremities with good peripheral pulses with BLE edema  Skin:  multiple wounds on BLE.  Musculoskeletal system is grossly normal.  CNS:  unable to assess      Results Review:  I have personally reviewed the results from the time of this admission to 7/22/2024 08:54 EDT and agree with these findings:  [x]  Laboratory  [x]  Microbiology  [x]  Radiology  [x]  EKG/Telemetry   [x]  Cardiology/Vascular "   []  Pathology  [x]  Old records  []  Other:    Most notable findings include:    Lab Results (last 24 hours)       Procedure Component Value Units Date/Time    POC Glucose 4x Daily Before Meals & at Bedtime [[id number]]  (Normal) Collected: 07/22/24 0704    Specimen: Blood Updated: 07/22/24 0705     Glucose 88 mg/dL      Comment: Serial Number: [id number]:  [id number]       Magnesium [[id number]]  (Normal) Collected: 07/22/24 0140    Specimen: Blood from Arm, Left Updated: 07/22/24 0205     Magnesium 1.8 mg/dL     Comprehensive Metabolic Panel [[id number]]  (Abnormal) Collected: 07/22/24 0140    Specimen: Blood from Arm, Left Updated: 07/22/24 0205     Glucose 114 mg/dL      BUN 22 mg/dL      Creatinine 0.87 mg/dL      Sodium 139 mmol/L      Potassium 4.0 mmol/L      Chloride 99 mmol/L      CO2 34.7 mmol/L      Calcium 8.4 mg/dL      Total Protein 5.2 g/dL      Albumin 2.0 g/dL      ALT (SGPT) 49 U/L      AST (SGOT) 98 U/L      Alkaline Phosphatase 218 U/L      Total Bilirubin 1.1 mg/dL      Globulin 3.2 gm/dL      A/G Ratio 0.6 g/dL      BUN/Creatinine Ratio 25.3     Anion Gap 5.3 mmol/L      eGFR 72.2 mL/min/1.73     Narrative:      GFR Normal >60  Chronic Kidney Disease <60  Kidney Failure <15      Phosphorus [[id number]]  (Normal) Collected: 07/22/24 0140    Specimen: Blood from Arm, Left Updated: 07/22/24 0205     Phosphorus 3.0 mg/dL     CBC & Differential [[id number]]  (Abnormal) Collected: 07/22/24 0140    Specimen: Blood from Arm, Left Updated: 07/22/24 0146    Narrative:      The following orders were created for panel order CBC & Differential.  Procedure                               Abnormality         Status                     ---------                               -----------         ------                     CBC Auto Differential[[id number]]        Abnormal            Final result                 Please view results for these tests on the individual orders.    CBC Auto Differential [[id number]]   (Abnormal) Collected: 07/22/24 0140    Specimen: Blood from Arm, Left Updated: 07/22/24 0146     WBC 6.00 10*3/mm3      RBC 2.99 10*6/mm3      Hemoglobin 9.0 g/dL      Hematocrit 29.6 %      MCV 99.0 fL      MCH 30.1 pg      MCHC 30.4 g/dL      RDW 16.0 %      RDW-SD 58.5 fl      MPV 10.3 fL      Platelets 150 10*3/mm3      Neutrophil % 71.7 %      Lymphocyte % 15.0 %      Monocyte % 10.7 %      Eosinophil % 1.7 %      Basophil % 0.2 %      Immature Grans % 0.7 %      Neutrophils, Absolute 4.31 10*3/mm3      Lymphocytes, Absolute 0.90 10*3/mm3      Monocytes, Absolute 0.64 10*3/mm3      Eosinophils, Absolute 0.10 10*3/mm3      Basophils, Absolute 0.01 10*3/mm3      Immature Grans, Absolute 0.04 10*3/mm3      nRBC 0.0 /100 WBC     POC Glucose Once [[id number]]  (Abnormal) Collected: 07/21/24 2021    Specimen: Blood Updated: 07/21/24 2022     Glucose 139 mg/dL      Comment: Serial Number: [id number]:  [id number]       POC Glucose Once [[id number]]  (Normal) Collected: 07/21/24 1608    Specimen: Blood Updated: 07/21/24 1609     Glucose 84 mg/dL      Comment: Serial Number: [id number]:  [id number]       Potassium [[id number]]  (Normal) Collected: 07/21/24 1512    Specimen: Blood from Arm, Right Updated: 07/21/24 1540     Potassium 4.0 mmol/L      Comment: Result checked         POC Glucose 4x Daily Before Meals & at Bedtime [[id number]]  (Normal) Collected: 07/21/24 1117    Specimen: Blood Updated: 07/21/24 1119     Glucose 78 mg/dL      Comment: Serial Number: [id number]:  [id number]               Imaging Results (Last 24 Hours)       ** No results found for the last 24 hours. **            LAB RESULTS (LAST 7 DAYS)    CBC  Results from last 7 days   Lab Units 07/22/24  0140 07/21/24  0452 07/20/24  0140 07/19/24  0121 07/18/24  0205 07/17/24  0120 07/16/24  0347   WBC 10*3/mm3 6.00 5.81 7.20 8.29 8.88 10.15 10.10   RBC 10*6/mm3 2.99* 2.86* 3.06* 3.26* 3.02* 3.15* 3.16*   HEMOGLOBIN g/dL 9.0* 8.7* 9.4*  9.9* 9.3* 9.6* 9.7*   HEMATOCRIT % 29.6* 28.0* 30.1* 31.8* 29.5* 30.7* 31.0*   MCV fL 99.0* 97.9* 98.4* 97.5* 97.7* 97.5* 98.1*   PLATELETS 10*3/mm3 150 150 167 184 157 157 144       BMP  Results from last 7 days   Lab Units 07/22/24 0140 07/21/24  1512 07/21/24 0452 07/20/24 0140 07/19/24 0121 07/18/24 0205 07/17/24 0120 07/16/24  1402 07/16/24  0347   SODIUM mmol/L 139  --  136 130* 134* 136 138  --  139   POTASSIUM mmol/L 4.0 4.0 3.2* 3.8 3.2* 4.0 4.5   < > 3.6   CHLORIDE mmol/L 99  --  96* 92* 93* 98 100  --  99   CO2 mmol/L 34.7*  --  35.9* 32.7* 34.0* 32.5* 33.8*  --  35.4*   BUN mg/dL 22  --  21 21 20 23 22  --  23   CREATININE mg/dL 0.87  --  0.77 0.86 0.96 1.09* 0.92  --  0.86   GLUCOSE mg/dL 114*  --  87 80 82 98 101*  --  76   MAGNESIUM mg/dL 1.8  --  2.0 2.4 1.5* 1.6 1.7  --  1.6   PHOSPHORUS mg/dL 3.0  --  2.8 2.7 2.7 2.9 2.4*  --  2.9    < > = values in this interval not displayed.       CMP   Results from last 7 days   Lab Units 07/22/24 0140 07/21/24 1512 07/21/24 0452 07/20/24 0140 07/19/24 0121 07/18/24 0205 07/17/24 0120 07/16/24  1402 07/16/24  0347   SODIUM mmol/L 139  --  136 130* 134* 136 138  --  139   POTASSIUM mmol/L 4.0 4.0 3.2* 3.8 3.2* 4.0 4.5   < > 3.6   CHLORIDE mmol/L 99  --  96* 92* 93* 98 100  --  99   CO2 mmol/L 34.7*  --  35.9* 32.7* 34.0* 32.5* 33.8*  --  35.4*   BUN mg/dL 22  --  21 21 20 23 22  --  23   CREATININE mg/dL 0.87  --  0.77 0.86 0.96 1.09* 0.92  --  0.86   GLUCOSE mg/dL 114*  --  87 80 82 98 101*  --  76   ALBUMIN g/dL 2.0*  --  2.0* 1.9* 2.2* 2.0* 2.0*  --  1.9*   BILIRUBIN mg/dL 1.1  --  1.4* 1.4* 1.6* 1.7* 1.8*  --  2.0*   ALK PHOS U/L 218*  --  193* 164* 136* 128* 127*  --  108   AST (SGOT) U/L 98*  --  87* 64* 31 57* 45*  --  28   ALT (SGPT) U/L 49*  --  45* 32 25 23 24  --  20   AMYLASE U/L  --   --   --   --   --   --   --   --  17*   LIPASE U/L  --   --   --   --   --   --   --   --  12*    < > = values in this interval not displayed.       BNP         TROPONIN        CoAg        Creatinine Clearance  Estimated Creatinine Clearance: 63.4 mL/min (by C-G formula based on SCr of 0.87 mg/dL).    ABG        Radiology  No radiology results for the last day      EKG  I personally viewed and interpreted the patient's EKG/Telemetry data:  ECG 12 Lead Rhythm Change   Final Result   HEART RATE=92  bpm   RR Vgxjphve=587  ms   HI Interval=  ms   P Horizontal Axis=  deg   P Front Axis=  deg   QRSD Uqbxqkga=009  ms   QT Kurmrabc=137  ms   QAyQ=913  ms   QRS Axis=-96  deg   T Wave Axis=65  deg   - ABNORMAL ECG -   Atrial fibrillation   Right bundle branch block   When compared with ECG of 05-Jul-2024 02:24:17,   No significant change   Electronically Signed By: Tee Whitlock ([facility]) 2024-07-07 10:12:55   Date and Time of Study:2024-07-05 06:43:37      ECG 12 Lead Drug Monitoring; amiodarone   Final Result   HEART EYNN=799  bpm   RR Qushysqv=438  ms   HI Interval=  ms   P Horizontal Axis=  deg   P Front Axis=  deg   QRSD Sluylclg=017  ms   QT Ffjujsrq=099  ms   PNnI=202  ms   QRS Axis=-94  deg   T Wave Axis=70  deg   - ABNORMAL ECG -   Atrial fibrillation   Ventricular premature complex   Right bundle branch block   When compared with ECG of 04-Jul-2024 09:17:07,   No change from prior tracing   Electronically Signed By: Tee Whitlock ([facility]) 2024-07-07 10:13:32   Date and Time of Study:2024-07-05 02:24:17      ECG 12 Lead Electrolyte Imbalance   Final Result   HEART DHLT=944  bpm   RR Yjbmlihc=874  ms   HI Vlclcghe=859  ms   P Horizontal Axis=113  deg   P Front Axis=263  deg   QRSD Lempbyqo=884  ms   QT Zrhzdtpj=736  ms   WOdO=992  ms   QRS Axis=227  deg   T Wave Axis=27  deg   - ABNORMAL ECG -   Right bundle branch block   When compared with ECG of 04-Jul-2024 04:20:51,   Significant change in rhythm: previously atrial fibrillation   Significant repolarization change   Atrial fibrillation with rapid V-rate   No change from prior tracing   Electronically Signed By:  Tee Whitlock ([facility]) 2024-07-07 10:14:27   Date and Time of Study:2024-07-04 09:17:07      ECG 12 Lead Drug Monitoring; Amiodarone   Final Result   HEART PIET=608  bpm   RR Rinpenvf=734  ms   OR Interval=  ms   P Horizontal Axis=  deg   P Front Axis=  deg   QRSD Xxvgnrtk=949  ms   QT Mqtblkvl=765  ms   UIiE=094  ms   QRS Axis=-90  deg   T Wave Axis=36  deg   - ABNORMAL ECG -   Atrial fibrillation   Right bundle branch block   ST elevation secondary to IVCD   When compared with ECG of 03-Jul-2024 17:38:52,   No significant change   Electronically Signed By: Austin Brooks ([facility]) 2024-07-18 19:41:04   Date and Time of Study:2024-07-04 04:20:51      ECG 12 Lead Chest Pain   Final Result   HEART UAKK=706  bpm   RR Gyiqxboe=324  ms   OR Interval=  ms   P Horizontal Axis=  deg   P Front Axis=  deg   QRSD Vngaiebu=772  ms   QT Biukgnkg=216  ms   WBjQ=756  ms   QRS Axis=-98  deg   T Wave Axis=37  deg   - ABNORMAL ECG -   Atrial fibrillation   Right bundle branch block   Anterolateral  infarct, old   When compared with ECG of 03-Jul-2024 04:02:32,   New or worsened ischemia or infarction   Significant repolarization change   Electronically Signed By: Austin Brooks ([facility]) 2024-07-18 19:41:09   Date and Time of Study:2024-07-03 17:38:52      ECG 12 Lead Drug Monitoring; Amiodarone   Final Result   HEART OMXM=374  bpm   RR Uoptrnbl=349  ms   OR Interval=  ms   P Horizontal Axis=  deg   P Front Axis=  deg   QRSD Ialbdvbp=588  ms   QT Tcbuutmg=266  ms   MDfM=484  ms   QRS Axis=-90  deg   T Wave Axis=74  deg   - ABNORMAL ECG -   Atrial fibrillation   Right bundle branch block   ST elevation secondary to IVCD   When compared with ECG of 02-Jul-2024 15:12:56,   No significant change   Electronically Signed By: Austin Brooks ([facility]) 2024-07-18 22:04:29   Date and Time of Study:2024-07-03 04:02:32      ECG 12 Lead Drug Monitoring; Amiodarone   Final Result   HEART FYAJ=140  bpm   RR Syuydqta=590  ms   OR Interval=  ms   P Horizontal Axis=   deg   P Front Axis=  deg   QRSD Jyflflit=788  ms   QT Chfqfafc=837  ms   VKrJ=676  ms   QRS Axis=-93  deg   T Wave Axis=45  deg   - ABNORMAL ECG -   Atrial fibrillation   Right bundle branch block   Inferior  infarct, old   When compared with ECG of 01-Jul-2024 04:42:13,   Nonspecific significant change   Electronically Signed By: Librado Mcdermott ([facility]) 2024-07-10 12:58:26   Date and Time of Study:2024-07-02 15:12:56      ECG 12 Lead Drug Monitoring; Amiodarone   Final Result   HEART DYUU=693  bpm   RR Pqpgaonx=261  ms   AK Interval=  ms   P Horizontal Axis=  deg   P Front Axis=  deg   QRSD Soqdhyfa=680  ms   QT Qjncmjyx=811  ms   ZRwB=980  ms   QRS Axis=-80  deg   T Wave Axis=102  deg   - ABNORMAL ECG -   Atrial fibrillation   Right bundle branch block   Inferior  infarct, old   Anterolateral  infarct, age indeterminate   When compared with ECG of 30-Jun-2024 16:58:43,   Significant rate decrease   New or worsened ischemia or infarction   Electronically Signed By: Austin Brooks ([facility]) 2024-07-01 13:11:59   Date and Time of Study:2024-07-01 04:42:13      ECG 12 Lead Dyspnea   Final Result   HEART RQEH=780  bpm   RR Gbqnoodt=283  ms   AK Interval=  ms   P Horizontal Axis=  deg   P Front Axis=  deg   QRSD Nyvkjkaw=088  ms   QT Xqsukmux=267  ms   EMpY=788  ms   QRS Axis=-101  deg   T Wave Axis=43  deg   - ABNORMAL ECG -   Atrial fibrillation   Right bundle branch block   ST elevation secondary to IVCD   Electronically Signed By: Jeffery Kwon ([facility]) 2024-07-01 07:37:02   Date and Time of Study:2024-06-30 16:58:43      Telemetry Scan   Final Result      Telemetry Scan   Final Result      Telemetry Scan   Final Result      Telemetry Scan   Final Result      Telemetry Scan   Final Result      Telemetry Scan   Final Result      Telemetry Scan   Final Result      Telemetry Scan   Final Result      Telemetry Scan   Final Result      Telemetry Scan   Final Result      Telemetry Scan   Final Result      Telemetry Scan   Final Result       Telemetry Scan   Final Result      Telemetry Scan   Final Result      Telemetry Scan   Final Result      Telemetry Scan   Final Result      Telemetry Scan   Final Result      Telemetry Scan   Final Result      Telemetry Scan   Final Result      Telemetry Scan   Final Result      Telemetry Scan   Final Result      Telemetry Scan   Final Result      Telemetry Scan   Final Result      Telemetry Scan   Final Result      Telemetry Scan   Final Result      Telemetry Scan   Final Result      Telemetry Scan   Final Result      Telemetry Scan   Final Result      Telemetry Scan   Final Result      Telemetry Scan   Final Result      Telemetry Scan   Final Result      Telemetry Scan   Final Result      Telemetry Scan   Final Result      Telemetry Scan   Final Result      Telemetry Scan   Final Result      Telemetry Scan   Final Result      Telemetry Scan   Final Result      Telemetry Scan   Final Result      Telemetry Scan   Final Result      Telemetry Scan   Final Result      Telemetry Scan   Final Result      Telemetry Scan   Final Result      Telemetry Scan   Final Result      Telemetry Scan   Final Result      Telemetry Scan   Final Result      Telemetry Scan   Final Result      Telemetry Scan   Final Result      Telemetry Scan   Final Result      Telemetry Scan   Final Result      Telemetry Scan   Final Result      Telemetry Scan   Final Result      Telemetry Scan   Final Result      Telemetry Scan   Final Result      Telemetry Scan   Final Result      Telemetry Scan   Final Result      Telemetry Scan   Final Result      Telemetry Scan   Final Result      Telemetry Scan   Final Result      Telemetry Scan   Final Result      Telemetry Scan   Final Result      Telemetry Scan   Final Result      Telemetry Scan   Final Result      Telemetry Scan   Final Result      Telemetry Scan   Final Result      Telemetry Scan   Final Result      Telemetry Scan   Final Result      Telemetry Scan   Final Result      Telemetry Scan    Final Result      Telemetry Scan   Final Result      Telemetry Scan   Final Result      Telemetry Scan   Final Result      Telemetry Scan   Final Result      Telemetry Scan   Final Result      Telemetry Scan   Final Result      Telemetry Scan   Final Result      Telemetry Scan   Final Result      Telemetry Scan   Final Result      Telemetry Scan   Final Result      Telemetry Scan   Final Result      Telemetry Scan   Final Result      Telemetry Scan   Final Result      Telemetry Scan   Final Result      Telemetry Scan   Final Result      Telemetry Scan   Final Result      Telemetry Scan   Final Result      Telemetry Scan   Final Result      Telemetry Scan   Final Result      Telemetry Scan   Final Result      Telemetry Scan   Final Result      Telemetry Scan   Final Result      Telemetry Scan   Final Result      Telemetry Scan   Final Result      Telemetry Scan   Final Result      Telemetry Scan   Final Result      Telemetry Scan   Final Result      Telemetry Scan   Final Result      Telemetry Scan   Final Result      Telemetry Scan   Final Result      Telemetry Scan   Final Result      Telemetry Scan   Final Result      Telemetry Scan   Final Result      Telemetry Scan   Final Result      Telemetry Scan   Final Result      Telemetry Scan   Final Result      Telemetry Scan   Final Result      Telemetry Scan   Final Result      Telemetry Scan   Final Result      Telemetry Scan   Final Result      Telemetry Scan   Final Result      Telemetry Scan   Final Result      Telemetry Scan   Final Result      Telemetry Scan   Final Result      Telemetry Scan   Final Result      Telemetry Scan   Final Result      Telemetry Scan   Final Result      Telemetry Scan   Final Result      Telemetry Scan   Final Result      ECG 12 Lead Electrolyte Imbalance    (Results Pending)         Echocardiogram:    Results for orders placed during the hospital encounter of 06/30/24    Adult Transthoracic Echo Complete w/ Color, Spectral and  Contrast if Necessary Per Protocol    Interpretation Summary    Left ventricular ejection fraction appears to be 31 - 35%.    Left ventricular diastolic dysfunction is noted.    The left atrial cavity is dilated.    Moderate aortic valve stenosis is present. Mean/peak gradient of 14/24 mmHg.  Dimensionless index 0.36    Moderate to severe mitral valve regurgitation is present.    Estimated right ventricular systolic pressure from tricuspid regurgitation is normal (<35 mmHg).        Stress Test:         Cardiac Catheterization:  No results found for this or any previous visit.         Other:         ASSESSMENT & PLAN:    Principal Problem:    Atrial fibrillation with RVR  Active Problems:    Primary hypertension    Morbid obesity with BMI of 40.0-44.9, adult    Right bundle branch block    Acute deep vein thrombosis (DVT) of both lower extremities    ARABELLA (acute kidney injury)    Acute hyperkalemia    Sepsis    Acute UTI    Acute systolic congestive heart failure    Rhinovirus infection    Multifocal pneumonia    Chronic respiratory failure with hypoxia, on home O2 therapy    Skin ulcer of right great toe    Skin ulcer of left great toe    SEDRICK (obstructive sleep apnea)    Aortic stenosis    Mitral regurgitation    Acute HFrEF (heart failure with reduced ejection fraction)    COPD (chronic obstructive pulmonary disease)      Acute respiratory failure with hypoxia and hypercapnia  Acute encephalopathy   Status post chest tube placement for hydropneumothorax  Chest tube removed 7/18/2024  Pulmonology is managing.  Improved, now on oxygen at 2 liters per nasal cannula     Atrial fibrillation with RVR  Newly diagnosed  Electrolytes have been corrected  Rate controlled with heart rate in the 80s and 90s  Continue amiodarone for rhythm control X 6 weeks  Continue Toprol-XL 25 mg p.o. twice daily  [id number] score is 7  Continue Eliquis  Patient was evaluated by GI for possible GI bleeding, but no plans for  endoscopy.  Patient was then evaluated by GYN for vaginal bleeding and planning D&C today.      Acute systolic CHF  Newly diagnosed  Echocardiogram shows EF of 31 to 35%, moderate aortic stenosis and moderate to severe mitral regurgitation.  Continue Bumex 1 mg oral twice daily   Monitor I's and O's and replace electrolytes as needed.  Continue Toprol XL  Unable to add ACE-I/ARB/ARNI due to low blood pressure  Was requiring midodrine.  Now off.  Blood pressure is still borderline low.  Aldactone has been held  Plan to repeat echocardiogram after 3 months of GDMT     Acute deep vein thrombosis (DVT) of both lower extremities  Extensive DVT in bilateral lower extremities involving femoral, popliteal and posterior tibial.  Continue anticoagulation as above  She will need lifelong anticoagulation      ARABELLA (acute kidney injury)  Resolved      Acute hyperkalemia  Resolved     Acute thrombocytopenia  Resolved      Sepsis / UTI / Multifocal pneumonia / Rhinovirus infection  Multifactorial secondary to UTI and pneumonia with possible cellulitis  Completed antibiotic course      Skin ulcer of right great toe  Skin ulcer of left great toe  Podiatry following   A1c is 6.2%     Primary hypertension, chronic  Patient was hypotensive due to sepsis and required midodrine, but now off midodrine.  Continue Toprol XL  Blood pressure remains soft     Morbid obesity with BMI of 40.0-44.9, adult  BMI 40.65  Lifestyle modifications recommended   LDL 27, HDL 13, triglyceride 86 and total cholesterol 58.  No indication for statin     SEDRICK (obstructive sleep apnea)  Encouraged compliance with CPAP       Electronically signed by MANJU Kennedy, 07/22/24, 6:08 PM EDT.

## 2024-07-22 NOTE — PROGRESS NOTES
"    PROGRESS NOTE      Patient Name: Ban Brennan  : 1955  MRN: [id number]  Primary Care Physician: Rut Pierce APRN  Date of admission: 2024    Patient Care Team:  Rut Pierce APRN as PCP - General (Nurse Practitioner)  Austin Brooks MD as Cardiologist (Cardiology)        Reason for Follow up     Acute kidney injury, hyperkalemia      Subjective     Seen and examined, NAD noted, renal functions stable, worsening bicarb level, ordered ABG, good UOP, 1.3L     Review of systems:    ROS was otherwise negative except as mentioned in the [place].       Personal History:     Past Medical History:   Past Medical History:   Diagnosis Date    Bilateral leg edema     Chronic respiratory failure with hypoxia, on home O2 therapy 2024    COPD (chronic obstructive pulmonary disease)     Morbid obesity with BMI of 40.0-44.9, adult 2010    Primary hypertension 2017    Pulmonary embolism     \"many years ago, was on warfarin\"    Sleep apnea        Surgical History:    History reviewed. No pertinent surgical history.    Family History: family history is not on file. Otherwise pertinent FHx was reviewed and unremarkable.     Social History:  reports that she has never smoked. She has never used smokeless tobacco. She reports that she does not drink alcohol and does not use drugs.    Medications:  Prior to Admission medications    Medication Sig Start Date End Date Taking? Authorizing Provider   Allergy Relief 180 MG tablet Take 1 tablet by mouth Daily. 24  Yes ProviderChadwick MD   bumetanide (BUMEX) 1 MG tablet Take 1 tablet by mouth 3 times a day. 24  Yes ProviderChadwick MD   docusate sodium (COLACE) 100 MG capsule Take 1 capsule by mouth Every 12 (Twelve) Hours. 24  Yes ProviderChadwick MD   furosemide (LASIX) 20 MG tablet Take 1 tablet by mouth Daily.   Yes ProviderChadwick MD   HYDROcodone-acetaminophen (NORCO)  MG per tablet Take 1 " tablet by mouth 3 times a day. 5/30/24  Yes Chadwick Johnson MD   lisinopril (PRINIVIL,ZESTRIL) 30 MG tablet Take 1 tablet by mouth Daily. 3/18/24  Yes Chadwick Johnson MD   meloxicam (MOBIC) 7.5 MG tablet Take 1 tablet by mouth Daily As Needed. 3/18/24  Yes Chadwick Johnson MD   metoprolol tartrate (LOPRESSOR) 50 MG tablet Take 1 tablet by mouth Daily.   Yes Chadwick Johnson MD   montelukast (SINGULAIR) 10 MG tablet Take 1 tablet by mouth every night at bedtime.   Yes Chadwick Johnson MD   omeprazole (priLOSEC) 20 MG capsule Take 1 capsule by mouth Daily. 3/18/24  Yes Provider, Historical, MD   oxybutynin XL (DITROPAN-XL) 10 MG 24 hr tablet Take 2 tablets by mouth Daily. 3/18/24  Yes Chadwick Johnson MD   potassium chloride (MICRO-K) 10 MEQ CR capsule Take 1 capsule by mouth Every 12 (Twelve) Hours. 3/18/24  Yes Chadwick Johnson MD   vitamin D (ERGOCALCIFEROL) 1.25 MG (47638 UT) capsule capsule Take 1 capsule by mouth 2 (Two) Times a Week. Monday and Thursday. 5/30/24  Yes Chadwick Johnson MD     Scheduled Meds:amiodarone, 200 mg, Oral, Q12H   Followed by  [START ON 7/23/2024] amiodarone, 200 mg, Oral, Daily  [Transfer Hold] apixaban, 5 mg, Oral, BID  [Transfer Hold] budesonide, 0.5 mg, Nebulization, BID - RT  [Transfer Hold] bumetanide, 1 mg, Oral, BID  [Transfer Hold] hydrocortisone, 25 mg, Rectal, BID  [Transfer Hold] insulin lispro, 2-7 Units, Subcutaneous, 4x Daily With Meals & Nightly  [Transfer Hold] ipratropium-albuterol, 3 mL, Nebulization, 4x Daily - RT  [Transfer Hold] lidocaine, 20 mL, Infiltration, Once  metoprolol succinate XL, 25 mg, Oral, Q12H  [Transfer Hold] miconazole, 1 Application, Topical, Q12H  [Transfer Hold] pantoprazole, 40 mg, Oral, BID AC  [Transfer Hold] povidone-iodine, , Topical, Daily  [Transfer Hold] sodium chloride, 10 mL, Intravenous, Q12H      Continuous Infusions:     PRN Meds:  [Transfer Hold] acetaminophen **OR** [Transfer Hold]  acetaminophen    [Transfer Hold] Calcium Replacement - Follow Nurse / BPA Driven Protocol    [Transfer Hold] dextrose    [Transfer Hold] dextrose    [Transfer Hold] glucagon (human recombinant)    [Transfer Hold] HYDROcodone-acetaminophen    [Transfer Hold] ipratropium-albuterol    [Transfer Hold] Magnesium Standard Dose Replacement - Follow Nurse / BPA Driven Protocol    [Transfer Hold] nitroglycerin    [Transfer Hold] ondansetron ODT **OR** [Transfer Hold] ondansetron    [Transfer Hold] Phosphorus Replacement - Follow Nurse / BPA Driven Protocol    Potassium Replacement - Follow Nurse / BPA Driven Protocol    [COMPLETED] Insert Peripheral IV **AND** [Transfer Hold] sodium chloride    [Transfer Hold] sodium chloride    [Transfer Hold] sodium chloride  Allergies:  No Known Allergies    Objective   Exam:     Vital Signs  Temp:  [98.1 °F (36.7 °C)-98.6 °F (37 °C)] 98.6 °F (37 °C)  Heart Rate:  [] 79  Resp:  [13-21] 15  BP: ()/(49-58) 105/58  SpO2:  [90 %-100 %] 96 %  on  Flow (L/min):  [2] 2;   Device (Oxygen Therapy): nasal cannula;humidified  Body mass index is 40.65 kg/m².  EXAM  General:  69 yr old  female, some respiratory distress  Head:      Normocephalic and atraumatic.    Eyes:      PERRL/EOM intact, conjunctivae and sclerae clear without nystagmus.    Neck:      No masses, thyromegaly,  trachea central   Lungs:    Clear bilaterally to auscultation.    Heart:      Regular rate and rhythm, no murmur no gallop  Abd:        Soft, nontender, not distended, bowel sounds positive, no shifting dullness.  Msk:        No deformity or scoliosis noted of thoracic or lumbar spine.    Pulses:   Pulses normal in all 4 extremities.    Extremities:        No cyanosis or clubbing--+ + edema.    Neuro:    Lethargic  Skin:       Intact without lesions or rashes.          Results Review:  I have personally reviewed most recent Data :  BMP @LABRCNTIP(creatinine:10)  CBC    Results from last 7 days   Lab Units  07/22/24  0140 07/21/24  0452 07/20/24  0140 07/19/24  0121 07/18/24  0205 07/17/24  0120 07/16/24  0347   WBC 10*3/mm3 6.00 5.81 7.20 8.29 8.88 10.15 10.10   HEMOGLOBIN g/dL 9.0* 8.7* 9.4* 9.9* 9.3* 9.6* 9.7*   PLATELETS 10*3/mm3 150 150 167 184 157 157 144     CMP   Results from last 7 days   Lab Units 07/22/24  0140 07/21/24  1512 07/21/24  0452 07/20/24  0140 07/19/24  0121 07/18/24  0205 07/17/24  0120 07/16/24  1402 07/16/24  0347   SODIUM mmol/L 139  --  136 130* 134* 136 138  --  139   POTASSIUM mmol/L 4.0 4.0 3.2* 3.8 3.2* 4.0 4.5   < > 3.6   CHLORIDE mmol/L 99  --  96* 92* 93* 98 100  --  99   CO2 mmol/L 34.7*  --  35.9* 32.7* 34.0* 32.5* 33.8*  --  35.4*   BUN mg/dL 22  --  21 21 20 23 22  --  23   CREATININE mg/dL 0.87  --  0.77 0.86 0.96 1.09* 0.92  --  0.86   GLUCOSE mg/dL 114*  --  87 80 82 98 101*  --  76   ALBUMIN g/dL 2.0*  --  2.0* 1.9* 2.2* 2.0* 2.0*  --  1.9*   BILIRUBIN mg/dL 1.1  --  1.4* 1.4* 1.6* 1.7* 1.8*  --  2.0*   ALK PHOS U/L 218*  --  193* 164* 136* 128* 127*  --  108   AST (SGOT) U/L 98*  --  87* 64* 31 57* 45*  --  28   ALT (SGPT) U/L 49*  --  45* 32 25 23 24  --  20   AMYLASE U/L  --   --   --   --   --   --   --   --  17*   LIPASE U/L  --   --   --   --   --   --   --   --  12*    < > = values in this interval not displayed.     ABG          XR Chest 1 View    Result Date: 7/19/2024  Impression: 1. No definite residual pneumothorax. 2. Stable cardiomegaly with bilateral perihilar and bibasilar airspace disease suggesting pulmonary edema. 3. Small bilateral pleural effusions left greater than right not significantly changed. Electronically Signed: Mina Wheeler MD  7/19/2024 7:16 AM EDT  Workstation ID: [id number]    XR Chest 1 View    Result Date: 7/18/2024  Stable tiny right apical pneumothorax status post thoracotomy tube removal. Otherwise stable radiographic appearance of the chest.   Electronically Signed By-Jerzy White MD On:7/18/2024 11:41 AM      XR Chest 1  View    Result Date: 7/17/2024  Impression: 1. Trace right pneumothorax 2. Otherwise stable chest demonstrating cardiomegaly with pulmonary vascular congestion, small pleural effusions, left perihilar airspace consolidation, and bibasilar airspace disease which could be atelectasis or pneumonia Electronically Signed: Betito Villeda  7/17/2024 7:32 AM EDT  Workstation ID: [id number]    XR Chest 1 View    Result Date: 7/16/2024  Impression: No appreciable change since 1 day prior. Electronically Signed: Krystal Lee MD  7/16/2024 8:08 AM EDT  Workstation ID: [id number]    MRI Abdomen With & Without Contrast    Result Date: 7/15/2024  Impression: Multiseptated pancreatic lesion in the pancreatic neck measuring 2.7 x 2.6 cm. There is suggestion of internal septations as well as faint enhancement of the septa. Findings suspicious for cystic neoplasm. Brisk enhancement lesion in the anterior superior right hepatic lobe measures 0.9 cm, favoring flash filling hemangioma. Hypervascular metastasis is felt to be less likely but incomplete excluded. Consider short-term follow-up MRI to evaluate for change in size. 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 2.1 cm right adrenal myelolipoma. Small right pleural effusion. Moderate left pleural effusion. Electronically Signed: Gigi Beck MD  7/15/2024 8:38 PM EDT  Workstation ID: [id number]    XR Chest 1 View    Result Date: 7/15/2024  Impression: Stable chest with cardiomegaly, moderate bilateral effusion and bibasilar consolidations Electronically Signed: Eliu High MD  7/15/2024 7:56 AM EDT  Workstation ID: [id number]    CT Chest Without Contrast Diagnostic    Result Date: 7/14/2024  Impression: 1.Persistent moderate-sized right pleural effusion despite indwelling pleural catheter. Tiny right pneumothorax. Improved aeration of the right lung with persistent right middle/lower lobe volume loss and consolidation. 2.Stable smaller  left pleural effusion with increased left lower lobe volume loss and consolidation. 3.Stable massive cardiomegaly. 4.Included upper abdominal findings of pancreatic mass, splenic infarct, and cholelithiasis, as described on recent CT abdomen/pelvis. Electronically Signed: Elise Castro  7/14/2024 11:44 AM EDT  Workstation ID: [id number]     Results for orders placed during the hospital encounter of 06/30/24    Adult Transthoracic Echo Complete w/ Color, Spectral and Contrast if Necessary Per Protocol    Interpretation Summary    Left ventricular ejection fraction appears to be 31 - 35%.    Left ventricular diastolic dysfunction is noted.    The left atrial cavity is dilated.    Moderate aortic valve stenosis is present. Mean/peak gradient of 14/24 mmHg.  Dimensionless index 0.36    Moderate to severe mitral valve regurgitation is present.    Estimated right ventricular systolic pressure from tricuspid regurgitation is normal (<35 mmHg).        Assessment & Plan   Assessment and Plan:         Atrial fibrillation with RVR    Primary hypertension    Morbid obesity with BMI of 40.0-44.9, adult    Right bundle branch block    Acute deep vein thrombosis (DVT) of both lower extremities    ARABELLA (acute kidney injury)    Acute hyperkalemia    Sepsis    Acute UTI    Acute systolic congestive heart failure    Rhinovirus infection    Multifocal pneumonia    Chronic respiratory failure with hypoxia, on home O2 therapy    Skin ulcer of right great toe    Skin ulcer of left great toe    SEDRICK (obstructive sleep apnea)    Aortic stenosis    Mitral regurgitation    Acute HFrEF (heart failure with reduced ejection fraction)    COPD (chronic obstructive pulmonary disease)    ASSESSMENT:  Acute kidney injury, with baseline creatinine roughly 0.8-1.0mg/dL  Hyperkalemia  A-fib with RVR  Acute on chronic heart failure, with EF 31-35%, diastolic dysfunction, moderate AS and moderate to severe MR as per echo from 6/30/24  Bilateral DVT  Severe  sepsis/LLE cellulitis, acute cystitis  Multifocal pneumonia    PLAN :     Renal function unchanged/stable today from prior reading. Suspect she may initially have had some acute cardiorenal component, now most likely has some ATN secondary to sepsis, elevated Vanc level (was 22.4 on 7/3), and some prolonged prerenal state with hemodynamic insults, arrhythmias. Had no significant proteinuria  Patient renal functions are stable sodium level is better   Potassium level is stable at this time   Going for surgery   continue same dose of diuretics patient still has peripheral edema  Renal functions continue to be stable  Creatinine still 0.9.  Mild hypokalemia noted will replace  Still has some peripheral edema but improving  Continue Bumex 1 mg twice daily  Blood pressure reviewed, stable. Continue to monitor   Now not on any abx  Daily labs   We will continue to follow       Charan Krueger MD  [facility]  7/22/2024  09:14 EDT

## 2024-07-22 NOTE — PLAN OF CARE
Goal Outcome Evaluation:            Patient had a good night tonight, no complaints of pain voiced to this nurse, VSS she remains NPO for procedure at 1 PM. Call light in reach, will continue to monitor.

## 2024-07-23 VITALS
BODY MASS INDEX: 38.93 KG/M2 | SYSTOLIC BLOOD PRESSURE: 106 MMHG | DIASTOLIC BLOOD PRESSURE: 67 MMHG | TEMPERATURE: 98.1 F | HEIGHT: 59 IN | RESPIRATION RATE: 20 BRPM | OXYGEN SATURATION: 95 % | HEART RATE: 84 BPM | WEIGHT: 193.12 LBS

## 2024-07-23 PROBLEM — J18.9 PNEUMONIA, UNSPECIFIED ORGANISM: Status: ACTIVE | Noted: 2024-07-23

## 2024-07-23 LAB
ALBUMIN SERPL-MCNC: 2 G/DL (ref 3.5–5.2)
ALBUMIN/GLOB SERPL: 0.6 G/DL
ALP SERPL-CCNC: 206 U/L (ref 39–117)
ALT SERPL W P-5'-P-CCNC: 41 U/L (ref 1–33)
ANION GAP SERPL CALCULATED.3IONS-SCNC: 3.7 MMOL/L (ref 5–15)
AST SERPL-CCNC: 57 U/L (ref 1–32)
BASOPHILS # BLD AUTO: 0.01 10*3/MM3 (ref 0–0.2)
BASOPHILS NFR BLD AUTO: 0.2 % (ref 0–1.5)
BILIRUB SERPL-MCNC: 1.1 MG/DL (ref 0–1.2)
BUN SERPL-MCNC: 21 MG/DL (ref 8–23)
BUN/CREAT SERPL: 25.6 (ref 7–25)
CALCIUM SPEC-SCNC: 8.7 MG/DL (ref 8.6–10.5)
CHLORIDE SERPL-SCNC: 99 MMOL/L (ref 98–107)
CO2 SERPL-SCNC: 37.3 MMOL/L (ref 22–29)
CREAT SERPL-MCNC: 0.82 MG/DL (ref 0.57–1)
DEPRECATED RDW RBC AUTO: 57.1 FL (ref 37–54)
EGFRCR SERPLBLD CKD-EPI 2021: 77.5 ML/MIN/1.73
EOSINOPHIL # BLD AUTO: 0 10*3/MM3 (ref 0–0.4)
EOSINOPHIL NFR BLD AUTO: 0 % (ref 0.3–6.2)
ERYTHROCYTE [DISTWIDTH] IN BLOOD BY AUTOMATED COUNT: 15.4 % (ref 12.3–15.4)
GLOBULIN UR ELPH-MCNC: 3.5 GM/DL
GLUCOSE BLDC GLUCOMTR-MCNC: 114 MG/DL (ref 70–105)
GLUCOSE BLDC GLUCOMTR-MCNC: 156 MG/DL (ref 70–105)
GLUCOSE BLDC GLUCOMTR-MCNC: 164 MG/DL (ref 70–105)
GLUCOSE BLDC GLUCOMTR-MCNC: 190 MG/DL (ref 70–105)
GLUCOSE SERPL-MCNC: 122 MG/DL (ref 65–99)
HCT VFR BLD AUTO: 29.5 % (ref 34–46.6)
HGB BLD-MCNC: 8.9 G/DL (ref 12–15.9)
IMM GRANULOCYTES # BLD AUTO: 0.05 10*3/MM3 (ref 0–0.05)
IMM GRANULOCYTES NFR BLD AUTO: 0.9 % (ref 0–0.5)
LYMPHOCYTES # BLD AUTO: 0.68 10*3/MM3 (ref 0.7–3.1)
LYMPHOCYTES NFR BLD AUTO: 11.8 % (ref 19.6–45.3)
MAGNESIUM SERPL-MCNC: 1.9 MG/DL (ref 1.6–2.4)
MCH RBC QN AUTO: 30.1 PG (ref 26.6–33)
MCHC RBC AUTO-ENTMCNC: 30.2 G/DL (ref 31.5–35.7)
MCV RBC AUTO: 99.7 FL (ref 79–97)
MONOCYTES # BLD AUTO: 0.25 10*3/MM3 (ref 0.1–0.9)
MONOCYTES NFR BLD AUTO: 4.3 % (ref 5–12)
NEUTROPHILS NFR BLD AUTO: 4.78 10*3/MM3 (ref 1.7–7)
NEUTROPHILS NFR BLD AUTO: 82.8 % (ref 42.7–76)
NRBC BLD AUTO-RTO: 0 /100 WBC (ref 0–0.2)
PHOSPHATE SERPL-MCNC: 3.3 MG/DL (ref 2.5–4.5)
PLATELET # BLD AUTO: 160 10*3/MM3 (ref 140–450)
PMV BLD AUTO: 10.5 FL (ref 6–12)
POTASSIUM SERPL-SCNC: 3.7 MMOL/L (ref 3.5–5.2)
PROT SERPL-MCNC: 5.5 G/DL (ref 6–8.5)
RBC # BLD AUTO: 2.96 10*6/MM3 (ref 3.77–5.28)
SODIUM SERPL-SCNC: 140 MMOL/L (ref 136–145)
WBC NRBC COR # BLD AUTO: 5.77 10*3/MM3 (ref 3.4–10.8)

## 2024-07-23 PROCEDURE — 85025 COMPLETE CBC W/AUTO DIFF WBC: CPT | Performed by: OBSTETRICS & GYNECOLOGY

## 2024-07-23 PROCEDURE — 94664 DEMO&/EVAL PT USE INHALER: CPT

## 2024-07-23 PROCEDURE — 99232 SBSQ HOSP IP/OBS MODERATE 35: CPT | Performed by: NURSE PRACTITIONER

## 2024-07-23 PROCEDURE — 94799 UNLISTED PULMONARY SVC/PX: CPT

## 2024-07-23 PROCEDURE — 83735 ASSAY OF MAGNESIUM: CPT | Performed by: OBSTETRICS & GYNECOLOGY

## 2024-07-23 PROCEDURE — 97530 THERAPEUTIC ACTIVITIES: CPT

## 2024-07-23 PROCEDURE — 80053 COMPREHEN METABOLIC PANEL: CPT | Performed by: OBSTETRICS & GYNECOLOGY

## 2024-07-23 PROCEDURE — 82948 REAGENT STRIP/BLOOD GLUCOSE: CPT

## 2024-07-23 PROCEDURE — 94761 N-INVAS EAR/PLS OXIMETRY MLT: CPT

## 2024-07-23 PROCEDURE — 63710000001 INSULIN LISPRO (HUMAN) PER 5 UNITS: Performed by: OBSTETRICS & GYNECOLOGY

## 2024-07-23 PROCEDURE — 97110 THERAPEUTIC EXERCISES: CPT

## 2024-07-23 PROCEDURE — 82948 REAGENT STRIP/BLOOD GLUCOSE: CPT | Performed by: OBSTETRICS & GYNECOLOGY

## 2024-07-23 PROCEDURE — 84100 ASSAY OF PHOSPHORUS: CPT | Performed by: OBSTETRICS & GYNECOLOGY

## 2024-07-23 PROCEDURE — 97535 SELF CARE MNGMENT TRAINING: CPT

## 2024-07-23 RX ORDER — BUMETANIDE 1 MG/1
1 TABLET ORAL 2 TIMES DAILY
Start: 2024-07-23

## 2024-07-23 RX ORDER — HYDROCODONE BITARTRATE AND ACETAMINOPHEN 10; 325 MG/1; MG/1
1 TABLET ORAL EVERY 8 HOURS PRN
Qty: 9 TABLET | Refills: 0 | Status: SHIPPED | OUTPATIENT
Start: 2024-07-23 | End: 2024-07-26

## 2024-07-23 RX ORDER — IPRATROPIUM BROMIDE AND ALBUTEROL SULFATE 2.5; .5 MG/3ML; MG/3ML
3 SOLUTION RESPIRATORY (INHALATION) EVERY 4 HOURS PRN
Start: 2024-07-23

## 2024-07-23 RX ORDER — METOPROLOL SUCCINATE 25 MG/1
25 TABLET, EXTENDED RELEASE ORAL EVERY 12 HOURS SCHEDULED
Start: 2024-07-23

## 2024-07-23 RX ORDER — AMIODARONE HYDROCHLORIDE 200 MG/1
200 TABLET ORAL DAILY
Start: 2024-07-24

## 2024-07-23 RX ADMIN — HYDROCORTISONE ACETATE 25 MG: 25 SUPPOSITORY RECTAL at 10:01

## 2024-07-23 RX ADMIN — IPRATROPIUM BROMIDE AND ALBUTEROL SULFATE 3 ML: .5; 3 SOLUTION RESPIRATORY (INHALATION) at 07:00

## 2024-07-23 RX ADMIN — PANTOPRAZOLE SODIUM 40 MG: 40 TABLET, DELAYED RELEASE ORAL at 10:01

## 2024-07-23 RX ADMIN — INSULIN LISPRO 2 UNITS: 100 INJECTION, SOLUTION INTRAVENOUS; SUBCUTANEOUS at 13:47

## 2024-07-23 RX ADMIN — AMIODARONE HYDROCHLORIDE 200 MG: 200 TABLET ORAL at 10:00

## 2024-07-23 RX ADMIN — HYDROCODONE BITARTRATE AND ACETAMINOPHEN 1 TABLET: 10; 325 TABLET ORAL at 20:39

## 2024-07-23 RX ADMIN — BUDESONIDE 0.5 MG: 0.5 INHALANT RESPIRATORY (INHALATION) at 07:04

## 2024-07-23 RX ADMIN — METOPROLOL SUCCINATE 25 MG: 25 TABLET, FILM COATED, EXTENDED RELEASE ORAL at 10:01

## 2024-07-23 RX ADMIN — IPRATROPIUM BROMIDE AND ALBUTEROL SULFATE 3 ML: .5; 3 SOLUTION RESPIRATORY (INHALATION) at 15:30

## 2024-07-23 RX ADMIN — HYDROCORTISONE ACETATE 25 MG: 25 SUPPOSITORY RECTAL at 20:25

## 2024-07-23 RX ADMIN — APIXABAN 5 MG: 5 TABLET, FILM COATED ORAL at 10:01

## 2024-07-23 RX ADMIN — INSULIN LISPRO 2 UNITS: 100 INJECTION, SOLUTION INTRAVENOUS; SUBCUTANEOUS at 10:00

## 2024-07-23 RX ADMIN — METOPROLOL SUCCINATE 25 MG: 25 TABLET, FILM COATED, EXTENDED RELEASE ORAL at 20:25

## 2024-07-23 RX ADMIN — Medication 10 ML: at 13:48

## 2024-07-23 RX ADMIN — ANTI-FUNGAL POWDER MICONAZOLE NITRATE TALC FREE 1 APPLICATION: 1.42 POWDER TOPICAL at 10:01

## 2024-07-23 RX ADMIN — POVIDONE-IODINE: 10 SOLUTION TOPICAL at 10:01

## 2024-07-23 RX ADMIN — APIXABAN 5 MG: 5 TABLET, FILM COATED ORAL at 20:25

## 2024-07-23 RX ADMIN — BUMETANIDE 1 MG: 1 TABLET ORAL at 10:01

## 2024-07-23 RX ADMIN — IPRATROPIUM BROMIDE AND ALBUTEROL SULFATE 3 ML: .5; 3 SOLUTION RESPIRATORY (INHALATION) at 11:30

## 2024-07-23 NOTE — DISCHARGE SUMMARY
[facility] Medicine Services  Discharge Summary    Date of Service: 24  Patient Name: Ban Brennan  : 1955  MRN: [id number]    Date of Admission: 2024  Discharge Diagnosis: #Bilateral lower extremity DVT  #Acute on chronic HFrEF  #A-fib with RVR.  New onset  #Severe MR  #Right-sided hydropneumothorax  #Severe sepsis  #UTI  #Left lower extremity cellulitis and wound  #Rhinovirus infection  #Multifocal pneumonia  #Acute hypoxic respiratory failure   #ARABELLA  #Metabolic Acidosis   #uterine mass  #Postmenopausal bleeding  #hematochezia   #Pancreatic mass   # Panniculitis   Date of Discharge:  24  Primary Care Physician: Rut Pierce APRN      Presenting Problem:   Hyperkalemia [E87.5]  Wide-complex tachycardia [R00.0]  Atrial fibrillation with rapid ventricular response [I48.91]  Acute kidney injury [N17.9]  Bilateral lower leg cellulitis [L03.116, L03.115]  Urinary tract infection without hematuria, site unspecified [N39.0]  Acute congestive heart failure, unspecified heart failure type [I50.9]  Sepsis, due to unspecified organism, unspecified whether acute organ dysfunction present [A41.9]  Acute HFrEF (heart failure with reduced ejection fraction) [I50.21]    Active and Resolved Hospital Problems:  Active Hospital Problems    Diagnosis POA    **Atrial fibrillation with RVR [I48.91] Yes    COPD (chronic obstructive pulmonary disease) [J44.9] Yes    Aortic stenosis [I35.0] Yes    Mitral regurgitation [I34.0] Yes    Acute HFrEF (heart failure with reduced ejection fraction) [I50.21] Yes    Right bundle branch block [I45.10] Yes    Acute deep vein thrombosis (DVT) of both lower extremities [I82.403] Yes    ARABELLA (acute kidney injury) [N17.9] Yes    Acute hyperkalemia [E87.5] Yes    Sepsis [A41.9] Yes    Acute UTI [N39.0] Yes    Acute systolic congestive heart failure [I50.21] Yes    Rhinovirus infection [B34.8] Yes    Multifocal pneumonia [J18.9] Yes    Chronic  respiratory failure with hypoxia, on home O2 therapy [J96.11, Z99.81] Not Applicable    Skin ulcer of right great toe [L97.519] Yes    Skin ulcer of left great toe [L97.529] Yes    SEDRICK (obstructive sleep apnea) [G47.33] Yes    Primary hypertension [I10] Yes    Morbid obesity with BMI of 40.0-44.9, adult [E66.01, Z68.41] Not Applicable      Resolved Hospital Problems   No resolved problems to display.         Hospital Course     HPI:  Admitting team HPI   Ban Brennan is a 69 y.o. female with known PMH of hypertension, heart failure, chronic bilateral lower extremity edema who presented to the hospital for creasing shortness of breath, and was admitted with a principal diagnosis of Atrial fibrillation with RVR.  Patient is alert however lethargic and attempts to participate with HPI however she is a poor historian.  Supplemental information for HPI taken from conversation with daughter at bedside who states patient does not go to the doctor normally and has not been in the hospital for years.  She is unsure of what her full medical history is at this time but states that her legs have been swollen for a long time and that she has not been up and moving for approximately 1 year.  Patient has a home health nurse that comes out and wraps her legs twice a week.  On assessment patient's legs are both extremely red and swollen, left > right.  Patient's daughter states that redness of her legs is new however she is not sure how long it has been this bad.  EMS was called when she had worsening shortness of breath and found the patient with a wide complex tachycardia with heart rate of 190s.  She was given 150 mg of amiodarone by EMS and heart rate improved to 160s.  She is found to have a right bundle branch block however it is uncertain due to her limited history if this is chronic.     Hospital Course:  69-year-old female with history of hypertension, HFrEF, lower extremity lymphedema admitted to [facility]30  progressive shortness of breath        #Bilateral lower extremity DVT  #History of reported PE  Acute left lower extremity DVT in the proximal femoral, mid femoral, distal femoral, popliteal, and posterior tibial   Seen by hematology appreciate recommendation.  Factor V and prothrombin gene mutation negative  Status post Eliquis 10 mg twice daily for 7 days currently on 5 mg twice daily  Outpatient follow-up with hematology- will need lifelong AC     #Acute on chronic HFrEF  #A-fib with RVR.  New onset  #Severe MR  Cardiology follow-up  Started on amiodarone GGT transitioned to p.o. tapering dose  TTE with EF 30 to 35%.  Defer ischemic workup to cardiology  On Toprol 25 twice daily   Bumex 1 mg twice daily  Lisinopril on hold given ARABELLA and hypotension  On Eliquis     #Right-sided hydropneumothorax  Chest tube placed in ICU 7/6.  Pulmonary following on floors.  Chest tube removed 7/18  Continue supplemental oxygen as needed to keep SpO2 more than 90%  Currently home nebs  Pleural fluid cultures Pseudomonas       #Severe sepsis  #UTI  #Left lower extremity cellulitis and wound  #Rhinovirus infection  #Multifocal pneumonia  #Chronic venous stasis lower extremity  Seen by infectious disease appreciate recommendations  Finished Keflex and doxycycline for 7 day course   Seen by podiatry no surgical intervention recommended  Midodrine has been weaned off monitor blood pressure     #Acute hypoxic respiratory failure  Multifactorial pneumonia CHF  Antibiotics and diuretics as above  Wean off supplemental oxygen as tolerated.  Closely monitor blood pressure     #DM2  A1c 6.14  Not on any home meds     #ARABELLA  #Metabolic Acidosis   Suspect ATN in setting of sepsis   Diuretics as above  Avoid nephrotoxic drugs   Improved     #uterine mass  #Postmenopausal bleeding  Large subserosal uterine fundal fibroid on pelvic ultrasound concerning for fibroid vs malignancy  Seen by GYN status post D&C 7/22 with removal of blood clots.   Follow pathology report  Monitor H/H     #hematochezia  Seen by GI plan for outpatient colonoscopy  Etiology suspected to be hemorrhoids       #Pancreatic mass  Seen by GI   outpatient endoscopic ultrasound to further evaluate pancreas lesion      # Panniculitis  - Hypogastric region soft mass, ttp  - GI evaluated and suspect due to panniculitis  - supportive care            DISCHARGE Follow Up Recommendations for labs and diagnostics: follow up path report. Follow up with GI and cardiology       Reasons For Change In Medications and Indications for New Medications:      Day of Discharge     Vital Signs:  Temp:  [97.5 °F (36.4 °C)-98.8 °F (37.1 °C)] 98.3 °F (36.8 °C)  Heart Rate:  [67-90] 90  Resp:  [12-22] 21  BP: ()/(48-67) 109/55  Flow (L/min):  [3] 3    Physical Exam:  Physical Exam AOx3 NAD  RRR S1-S2 audible  Lungs with fair air entry  Abdomen soft nontender nondistended      Pertinent  and/or Most Recent Results     LAB RESULTS:      Lab 07/23/24  0405 07/22/24  0140 07/21/24  0452 07/20/24  0140 07/19/24  0121   WBC 5.77 6.00 5.81 7.20 8.29   HEMOGLOBIN 8.9* 9.0* 8.7* 9.4* 9.9*   HEMATOCRIT 29.5* 29.6* 28.0* 30.1* 31.8*   PLATELETS 160 150 150 167 184   NEUTROS ABS 4.78 4.31 4.10 5.39 6.32   IMMATURE GRANS (ABS) 0.05 0.04 0.04 0.04 0.06*   LYMPHS ABS 0.68* 0.90 0.91 0.79 0.96   MONOS ABS 0.25 0.64 0.62 0.78 0.68   EOS ABS 0.00 0.10 0.12 0.18 0.25   MCV 99.7* 99.0* 97.9* 98.4* 97.5*         Lab 07/23/24  0405 07/22/24  0140 07/21/24  1512 07/21/24  0452 07/20/24  0140 07/19/24  0121   SODIUM 140 139  --  136 130* 134*   POTASSIUM 3.7 4.0 4.0 3.2* 3.8 3.2*   CHLORIDE 99 99  --  96* 92* 93*   CO2 37.3* 34.7*  --  35.9* 32.7* 34.0*   ANION GAP 3.7* 5.3  --  4.1* 5.3 7.0   BUN 21 22  --  21 21 20   CREATININE 0.82 0.87  --  0.77 0.86 0.96   EGFR 77.5 72.2  --  83.6 73.2 64.2   GLUCOSE 122* 114*  --  87 80 82   CALCIUM 8.7 8.4*  --  8.0* 8.2* 8.2*   MAGNESIUM 1.9 1.8  --  2.0 2.4 1.5*   PHOSPHORUS 3.3 3.0   --  2.8 2.7 2.7         Lab 07/23/24  0405 07/22/24  0140 07/21/24  0452 07/20/24  0140 07/19/24  0121   TOTAL PROTEIN 5.5* 5.2* 5.1* 5.3* 5.6*   ALBUMIN 2.0* 2.0* 2.0* 1.9* 2.2*   GLOBULIN 3.5 3.2 3.1 3.4 3.4   ALT (SGPT) 41* 49* 45* 32 25   AST (SGOT) 57* 98* 87* 64* 31   BILIRUBIN 1.1 1.1 1.4* 1.4* 1.6*   ALK PHOS 206* 218* 193* 164* 136*                     Brief Urine Lab Results  (Last result in the past 365 days)        Color   Clarity   Blood   Leuk Est   Nitrite   Protein   CREAT   Urine HCG        07/04/24 1717             72.1               Microbiology Results (last 10 days)       ** No results found for the last 240 hours. **            XR Chest 1 View    Result Date: 7/19/2024  Impression: Impression: 1. No definite residual pneumothorax. 2. Stable cardiomegaly with bilateral perihilar and bibasilar airspace disease suggesting pulmonary edema. 3. Small bilateral pleural effusions left greater than right not significantly changed. Electronically Signed: Mina hWeeler MD  7/19/2024 7:16 AM EDT  Workstation ID: [id number]    XR Chest 1 View    Result Date: 7/18/2024  Impression: Stable tiny right apical pneumothorax status post thoracotomy tube removal. Otherwise stable radiographic appearance of the chest.   Electronically Signed ByTheodore White MD On:7/18/2024 11:41 AM      XR Chest 1 View    Result Date: 7/17/2024  Impression: Impression: 1. Trace right pneumothorax 2. Otherwise stable chest demonstrating cardiomegaly with pulmonary vascular congestion, small pleural effusions, left perihilar airspace consolidation, and bibasilar airspace disease which could be atelectasis or pneumonia Electronically Signed: Betito Villeda  7/17/2024 7:32 AM EDT  Workstation ID: [id number]    XR Chest 1 View    Result Date: 7/16/2024  Impression: Impression: No appreciable change since 1 day prior. Electronically Signed: Krystal Lee MD  7/16/2024 8:08 AM EDT  Workstation ID: [id number]    MRI Abdomen With & Without  Contrast    Result Date: 7/15/2024  Impression: Impression: Multiseptated pancreatic lesion in the pancreatic neck measuring 2.7 x 2.6 cm. There is suggestion of internal septations as well as faint enhancement of the septa. Findings suspicious for cystic neoplasm. Brisk enhancement lesion in the anterior superior right hepatic lobe measures 0.9 cm, favoring flash filling hemangioma. Hypervascular metastasis is felt to be less likely but incomplete excluded. Consider short-term follow-up MRI to evaluate for change in size. 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 2.1 cm right adrenal myelolipoma. Small right pleural effusion. Moderate left pleural effusion. Electronically Signed: Gigi Beck MD  7/15/2024 8:38 PM EDT  Workstation ID: [id number]    XR Chest 1 View    Result Date: 7/15/2024  Impression: Impression: Stable chest with cardiomegaly, moderate bilateral effusion and bibasilar consolidations Electronically Signed: Eliu High MD  7/15/2024 7:56 AM EDT  Workstation ID: [id number]    CT Chest Without Contrast Diagnostic    Result Date: 7/14/2024  Impression: Impression: 1.Persistent moderate-sized right pleural effusion despite indwelling pleural catheter. Tiny right pneumothorax. Improved aeration of the right lung with persistent right middle/lower lobe volume loss and consolidation. 2.Stable smaller left pleural effusion with increased left lower lobe volume loss and consolidation. 3.Stable massive cardiomegaly. 4.Included upper abdominal findings of pancreatic mass, splenic infarct, and cholelithiasis, as described on recent CT abdomen/pelvis. Electronically Signed: Elise Castro  7/14/2024 11:44 AM EDT  Workstation ID: [id number]    XR Chest 1 View    Result Date: 7/14/2024  Impression: Impression: 1.Right basilar pleural catheter remains in place. No visible pneumothorax. 2.Persistent bilateral perihilar and bibasilar airspace opacities, increased in the  right lower lung compared to yesterday's chest x-ray. Electronically Signed: Elise Castro  7/14/2024 9:59 AM EDT  Workstation ID: [id number]    CT Abdomen Pelvis Without Contrast    Result Date: 7/13/2024  Impression: 1. Colonic diverticulosis without evidence of diverticulitis. 2. There is a stable 3 cm soft tissue mass of the pancreas. Pancreatic mass protocol MRI of the abdomen recommended. 3. Focal splenic infarct 4. Small bilateral pleural effusions with trace right-sided pneumothorax. 5. Cholelithiasis without evidence of cholecystitis. Electronically Signed: Eamon Brock MD  7/13/2024 1:51 PM EDT  Workstation ID: [id number]    XR Chest 1 View    Result Date: 7/13/2024  Impression: Impression: 1. Stable chest tube overlying the right lung base. No pneumothorax. 2. Persistent small bilateral pleural effusions with bibasilar airspace disease which may relate to atelectasis and/or pneumonia. 3. Stable cardiomegaly with central pulmonary vascular congestion and findings of pulmonary edema. Electronically Signed: Mina Wheeler MD  7/13/2024 11:24 AM EDT  Workstation ID: [id number]    XR Chest 1 View    Result Date: 7/12/2024  Impression: Impression: 1.Right basilar chest tube appears in similar position. No definite pneumothorax. 2.Patchy basilar predominant airspace opacities and possible small pleural effusions, as before. 3.Stable cardiomegaly. Electronically Signed: Tanvir Melgar  7/12/2024 7:18 AM EDT  Workstation ID: [id number]    XR Chest 1 View    Result Date: 7/11/2024  Impression: Impression: Similar position of right-sided chest tube. No discrete pneumothorax. Similar small bilateral pleural effusions and bilateral airspace opacities, left greater than right. Electronically Signed: Manan Jefferson MD  7/11/2024 7:48 AM EDT  Workstation ID: [id number]    XR Chest 1 View    Result Date: 7/10/2024  Impression: Impression: 1. Stable chest tube overlying the right lung base. No pneumothorax. 2. Persistent bibasilar  airspace disease and small bilateral pleural effusions. 3. Stable cardiomegaly. Electronically Signed: Mina Wheeler MD  7/10/2024 7:11 AM EDT  Workstation ID: [id number]    XR Chest 1 View    Result Date: 7/9/2024  Impression: Impression: 1.Bilateral airspace disease again noted. 2.Cardiomegaly 3.Pigtail catheter chest tube right lung base. Electronically Signed: Bran Bernal MD  7/9/2024 11:37 AM EDT  Workstation ID: [id number]    XR Chest 1 View    Result Date: 7/6/2024  Impression: Impression: Successful placement of a right chest tube for right-sided hydropneumothorax, which demonstrates decreased/trace fluid component and similar/small gas component. Electronically Signed: Manan Jefferson MD  7/6/2024 2:35 PM EDT  Workstation ID: [id number]    CT Chest Without Contrast Diagnostic    Result Date: 7/6/2024  Impression: Impression: Moderate right-sided hydropneumothorax as seen on same-day radiograph. Adjacent partial collapse of the right lung with multifocal peripheral bronchial obstruction. There are dense airspace consolidations throughout the right lung, and findings are likely a combination of lung collapse and worsening infection. Persistent airspace opacities in the left lung consistent with infection, slightly worsened from prior CT. Small left pleural effusion. Prominent four-chamber cardiomegaly. Pulmonary artery enlargement consistent with pulmonary hypertension. Trace perihepatic ascites. Persistent bilateral flank and right chest wall edema. Electronically Signed: Javon Mortensen MD  7/6/2024 11:19 AM EDT  Workstation ID: [id number]    XR Chest 1 View    Result Date: 7/6/2024  Impression: Impression: 1. There is a definite small right-sided pneumothorax. The right lung is partially collapsed and consolidated. A small to moderate right pleural effusion is present indicating a hydropneumothorax. 2. Unchanged consolidation in the left lung with a small pleural effusion. 3. Cardiomegaly. 4.The abnormal results were  discussed with the nurse (Dianne) by telephone. The referring clinician will be contacted. Electronically Signed: Jean Claude Bobby MD  7/6/2024 9:56 AM EDT  Workstation ID: [id number]    XR Chest 1 View    Result Date: 7/6/2024  Impression: Impression: 1. Questionable right-sided pneumothorax. I would recommend a repeat chest x-ray as the patient is rotated. 2. Persistent patchy consolidation in the left mid and lower lung zones suspicious for pneumonia. There is a small right pleural effusion. 3. Abnormal consolidation in the right lung with a small to moderate pleural effusion. 4. Cardiomegaly. 5. The abnormal results were discussed with the nurse in the CVICU (Dianne) by telephone and the referring clinician will be contacted. Electronically Signed: Jean Claude Bobby MD  7/6/2024 9:27 AM EDT  Workstation ID: [id number]    XR Chest 1 View    Result Date: 7/5/2024  Impression: Impression: Marked cardiomegaly with bilateral patchy airspace disease and pleural effusions. Findings are similar to the previous exam. Electronically Signed: Amirah Coto MD  7/5/2024 9:14 AM EDT  Workstation ID: [id number]    XR Abdomen KUB    Result Date: 7/4/2024  Impression: Impression: Feeding tube is in the stomach. Electronically Signed: Obed Sidhu MD  7/4/2024 9:59 PM EDT  Workstation ID: [id number]    US Renal Bilateral    Result Date: 7/4/2024  Impression: Impression: No acute process nor significant abnormality identified. Electronically Signed: Beto Kaur MD  7/4/2024 4:14 PM EDT  Workstation ID: [id number]    XR Chest 1 View    Result Date: 7/4/2024  Impression: Impression: Cardiomegaly with pulmonary vascular congestion and bilateral pleural effusions. Bibasilar airspace disease could be atelectasis or pneumonia. Electronically Signed: Obed Sidhu MD  7/4/2024 2:34 AM EDT  Workstation ID: [id number]    XR Chest 1 View    Result Date: 7/3/2024  Impression: Impression: No appreciable change in bibasilar lung infiltrates with small effusions.  Continued cardiomegaly. Electronically Signed: Krystal Lee MD  7/3/2024 7:57 AM EDT  Workstation ID: [id number]    XR Chest 1 View    Result Date: 7/2/2024  Impression: Impression: Some improvement in bibasilar lung infiltrates with small effusions. Continued cardiomegaly. Electronically Signed: Krystal Lee MD  7/2/2024 12:46 PM EDT  Workstation ID: [id number]    US Pelvis Complete    Result Date: 7/1/2024  Impression: Impression: Sonographic features suggest the presence of a large subserosal uterine fundal fibroid. Electronically Signed: Ann Marie Maloney MD  7/1/2024 1:43 PM EDT  Workstation ID: [id number]    CT Abdomen Pelvis Without Contrast    Result Date: 7/1/2024  Impression: Impression: Moderately large right pleural effusion and small left pleural effusion. Bibasilar infiltrates suggest atelectasis although associated pneumonia is not excluded. Severe cardiomegaly. Bilateral flank edema, greatest on the right. Probable fibroid uterus. This could be confirmed with ultrasound. Electronically Signed: Krystal Lee MD  7/1/2024 11:39 AM EDT  Workstation ID: [id number]    Adult Transthoracic Echo Complete w/ Color, Spectral and Contrast if Necessary Per Protocol    Addendum Date: 7/1/2024      Left ventricular ejection fraction appears to be 31 - 35%.   Left ventricular diastolic dysfunction is noted.   The left atrial cavity is dilated.   Moderate aortic valve stenosis is present. Mean/peak gradient of 14/24 mmHg.  Dimensionless index 0.36   Moderate to severe mitral valve regurgitation is present.   Estimated right ventricular systolic pressure from tricuspid regurgitation is normal (<35 mmHg).     CT Angiogram Chest Pulmonary Embolism    Result Date: 7/1/2024  Impression: Impression: 1. No evidence for pulmonary embolus. 2. Bibasilar airspace disease and right middle lung opacity compatible likely multifocal pneumonia with bilateral pleural effusions. 3. Cardiomegaly. Electronically Signed: Shari Kaur  MD  7/1/2024 10:14 AM EDT  Workstation ID: [id number]    CT Lower Extremity Bilateral Without Contrast    Result Date: 7/1/2024  Impression: Impression: Bilateral lower extremity soft tissue swelling greater on the left than the right. There is no soft tissue gas or well-formed abscess. Electronically Signed: Obed Sidhu MD  7/1/2024 1:53 AM EDT  Workstation ID: [id number]    XR Chest 1 View    Result Date: 6/30/2024  Impression: Vascular congestion with small effusions Electronically Signed: Eamon Brock MD  6/30/2024 7:27 PM EDT  Workstation ID: [id number]     Results for orders placed during the hospital encounter of 06/30/24    Duplex Venous Lower Extremity - Bilateral CAR    Interpretation Summary    Acute right lower extremity deep vein thrombosis noted in the common femoral, proximal femoral, mid femoral, distal femoral and popliteal.    Acute right lower extremity superficial thrombophlebitis noted in the saphenofemoral junction and great saphenous (below knee).    Acute left lower extremity deep vein thrombosis noted in the proximal femoral, mid femoral, distal femoral, popliteal and posterial tibial.    Right and left popliteal fossa fluid collection.    All other visualized veins appeared normal bilaterally but calf veins were poorly visualized bilaterally.      Results for orders placed during the hospital encounter of 06/30/24    Duplex Venous Lower Extremity - Bilateral CAR    Interpretation Summary    Acute right lower extremity deep vein thrombosis noted in the common femoral, proximal femoral, mid femoral, distal femoral and popliteal.    Acute right lower extremity superficial thrombophlebitis noted in the saphenofemoral junction and great saphenous (below knee).    Acute left lower extremity deep vein thrombosis noted in the proximal femoral, mid femoral, distal femoral, popliteal and posterial tibial.    Right and left popliteal fossa fluid collection.    All other visualized veins appeared normal  bilaterally but calf veins were poorly visualized bilaterally.      Results for orders placed during the hospital encounter of 06/30/24    Adult Transthoracic Echo Complete w/ Color, Spectral and Contrast if Necessary Per Protocol    Interpretation Summary    Left ventricular ejection fraction appears to be 31 - 35%.    Left ventricular diastolic dysfunction is noted.    The left atrial cavity is dilated.    Moderate aortic valve stenosis is present. Mean/peak gradient of 14/24 mmHg.  Dimensionless index 0.36    Moderate to severe mitral valve regurgitation is present.    Estimated right ventricular systolic pressure from tricuspid regurgitation is normal (<35 mmHg).      Labs Pending at Discharge:  Pending Labs       Order Current Status    Tissue Pathology Exam In process            Procedures Performed  Procedure(s):  DILATATION AND CURETTAGE HYSTEROSCOPY         Consults:   Consults       Date and Time Order Name Status Description    7/19/2024  5:58 AM Inpatient Gynecology Consult Completed     7/15/2024  1:28 PM Inpatient Gastroenterology Consult      7/15/2024 10:04 AM Hematology & Oncology Inpatient Consult      7/9/2024  1:21 PM Inpatient Gastroenterology Consult Completed     7/8/2024 11:51 AM Inpatient Pulmonology Consult Completed     7/7/2024  9:50 AM Inpatient Hospitalist Consult      7/4/2024  5:15 AM Inpatient Nephrology Consult Completed     7/2/2024 10:51 AM Inpatient Hospitalist Consult      7/1/2024 10:48 AM Inpatient Infectious Diseases Consult Completed     7/1/2024 10:48 AM Inpatient Podiatry Consult Completed     7/1/2024 10:32 AM Inpatient Cardiology Consult      7/1/2024  8:21 AM Hematology & Oncology Inpatient Consult Completed     6/30/2024  8:26 PM Cardiology (on-call MD unless specified) Completed               Discharge Details        Discharge Medications        ASK your doctor about these medications        Instructions Start Date   Allergy Relief 180 MG tablet  Generic drug:  fexofenadine   180 mg, Oral, Daily      bumetanide 1 MG tablet  Commonly known as: BUMEX   1 tablet, Oral, 3 times daily      docusate sodium 100 MG capsule  Commonly known as: COLACE   1 capsule, Oral, Every 12 Hours Scheduled      furosemide 20 MG tablet  Commonly known as: LASIX   20 mg, Oral, Daily      HYDROcodone-acetaminophen  MG per tablet  Commonly known as: NORCO   1 tablet, Oral, 3 times daily      lisinopril 30 MG tablet  Commonly known as: PRINIVIL,ZESTRIL   1 tablet, Oral, Daily      meloxicam 7.5 MG tablet  Commonly known as: MOBIC   1 tablet, Oral, Daily PRN      metoprolol tartrate 50 MG tablet  Commonly known as: LOPRESSOR   50 mg, Oral, Daily      montelukast 10 MG tablet  Commonly known as: SINGULAIR   10 mg, Oral, Every Night at Bedtime      omeprazole 20 MG capsule  Commonly known as: priLOSEC   1 capsule, Oral, Daily      oxybutynin XL 10 MG 24 hr tablet  Commonly known as: DITROPAN-XL   2 tablets, Oral, Daily      potassium chloride 10 MEQ CR capsule  Commonly known as: MICRO-K   1 capsule, Oral, Every 12 Hours Scheduled      vitamin D 1.25 MG (39462 UT) capsule capsule  Commonly known as: ERGOCALCIFEROL   50,000 Units, Oral, 2 Times Weekly, Monday and Thursday.               No Known Allergies      Discharge Disposition: Rehab      Diet:  Hospital:  Diet Order   Procedures    Diet: Cardiac, Diabetic; Healthy Heart (2-3 Na+); Consistent Carbohydrate; Fluid Consistency: Thin (IDDSI 0)         Discharge Activity:         CODE STATUS:  Code Status and Medical Interventions:   Ordered at: 06/30/24 2120     Code Status (Patient has no pulse and is not breathing):    CPR (Attempt to Resuscitate)     Medical Interventions (Patient has pulse or is breathing):    Full Support         No future appointments.        Time spent on Discharge including face to face service:  >30 minutes    Signature: Electronically signed by Librado Villagomez MD, 07/23/24, 14:53 EDT.  [facility] Hospitalist Team

## 2024-07-23 NOTE — THERAPY TREATMENT NOTE
"Subjective: Pt agreeable to therapeutic plan of care.    Objective:     Bed mobility - Mod-A and Assist x 2 - cues for log roll/abdominal sparing.   Transfers - Mod-A, Max-A, Assist x 2, and with rolling walker MOD A x2 with sit to stand (2 trials), MAX A x2 with pivot bed to chair.   Ambulation -   N/A or Not attempted.    Vitals: WNL    Pain: 8 VAS   Location: abdomen  Intervention for pain: Repositioned and Therapeutic Presence    Education: Provided education on the importance of mobility in the acute care setting, Verbal/Tactile Cues, Transfer Training, Energy conservation strategies, and Post-Op Precautions    Assessment: Ban Brennan presents with functional mobility impairments which indicate the need for skilled intervention. Tolerating session today without incident. Pt educated on abdominal sparing post op hysterectomy; pt with no significant change in mobility status.  Will continue to follow and progress as tolerated.     Plan/Recommendations:   If medically appropriate, Moderate Intensity Therapy recommended post-acute care. This is recommended as therapy feels the patient would require 3-4 days per week and wouldn't tolerate \"3 hour daily\" rehab intensity. SNF would be the preferred choice. If the patient does not agree to SNF, arrange HH or OP depending on home bound status. If patient is medically complex, consider LTACH. Pt requires rolling walker at discharge.     Pt desires Skilled Rehab placement at discharge. Pt cooperative; agreeable to therapeutic recommendations and plan of care.     Post-Tx Position: Up in Chair, Staff Present, Alarms activated, and Call light and personal items within reach  PPE: gloves, surgical mask, and gown   "

## 2024-07-23 NOTE — CASE MANAGEMENT/SOCIAL WORK
"Physicians Statement of Medical Necessity for  Ambulance Transportation    GENERAL INFORMATION     Name: Ban Brennan  YOB: 1955  Medicare #: [id number]   Transport Date: 7/23/24 (Valid for round trips this date, or for scheduled repetitive trips for 60 days from the date signed below.)  Origin: [facility]  Destination: [facility]  Is the Patient's stay covered under Medicare Part A (PPS/DRG?)Yes  Closest appropriate facility? Yes  If this a hosp-hosp transfer? No  Is this a hospice patient? No    MEDICAL NECESSITY QUESTIONAIRE    Ambulance Transportation is medically necessary only if other means of transportation are contraindicated or would be potentially harmful to the patient.  To meet this requirement, the patient must be either \"bed confined\" or suffer from a condition such that transport by means other than an ambulance is contraindicated by the patient's condition.  The following questions must be answered by the healthcare professional signing below for this form to be valid:     1) Describe the MEDICAL CONDITION (physical and/or mental) of this patient AT THE TIME OF AMBULANCE TRANSPORT that requires the patient to be transported in an ambulance, and why transport by other means is contraindicated by the patient's condition: High falls risk, 3L O2, Contact and droplet precautions for CR pseudomonas infection and rhinovirus, wounds on toes, unstageable wound sacrum.   Past Medical History:   Diagnosis Date    Bilateral leg edema     Chronic respiratory failure with hypoxia, on home O2 therapy 07/01/2024    COPD (chronic obstructive pulmonary disease)     Morbid obesity with BMI of 40.0-44.9, adult 11/08/2010    Primary hypertension 03/01/2017    Pulmonary embolism     \"many years ago, was on warfarin\"    Sleep apnea       History reviewed. No pertinent surgical history.   2) Is this patient \"bed confined\" as defined below?No    To be \"bed confined\" the patient must " satisfy all three of the following criteria:  (1) unable to get up from bed without assistance; AND (2) unable to ambulate;  AND (3) unable to sit in a chair or wheelchair.  3) Can this patient safely be transported by car or wheelchair van (I.e., may safely sit during transport, without an attendant or monitoring?)No   4. In addition to completing questions 1-3 above, please check any of the following conditions that apply*:          *Note: supporting documentation for any boxes checked must be maintained in the patient's medical records Special handling/isolation/infection control precautions required, Unable to tolerate seated position for time needed to transport, and Unable to sit in a chair or wheelchair due to decubitus ulcers or other wounds      SIGNATURE OF PHYSICIAN OR OTHER AUTHORIZED HEALTHCARE PROFESSIONAL    I certify that the above information is true and correct based on my evaluation of this patient, and represent that the patient requires transport by ambulance and that other forms of transport are contraindicated.  I understand that this information will be used by the Centers for Medicare and Medicaid Services (CMS) to support the determiniation of medical necessity for ambulance services, and I represent that I have personal knowledge of the patient's condition at the time of transport.    RANI Erwin RN   If this box is checked, I also certify that the patient is physically or mentally incapable of signing the ambulance service's claim form and that the institution with which I am affiliated has furnished care, services or assistance to the patient.  My signature below is made on behalf of the patient pursuant to 42 .36(b)(4). In accordance with 42 .37, the specific reason(s) that the patient is physically or mentally incapable of signing the claim for is as follows: Dr. Villagomez    Signature of Physician or Healthcare Professional  Date/Time:   7/23/24     (For Scheduled repetitive  transport, this form is not valid for transports performed more than 60 days after this date).                                                                                                                                            --------------------------------------------------------------------------------------------  Printed Name and Credentials of Physician or Authorized Healthcare Professional     *Form must be signed by patient's attending physician for scheduled, repetitive transports,.  For non-repetitive ambulance transports, if unable to obtain the signature of the attending physician, any of the following may sign (please select below):     Physician  Clinical Nurse Specialist  Registered Nurse     Physician Assistant  Discharge Planner  Licensed Practical Nurse     Nurse Practitioner   X

## 2024-07-23 NOTE — PLAN OF CARE
Goal Outcome Evaluation:      Pt has refused to wear boots and only allows a pillow under her feet. Pt hasn't c/o pain and has been resting through the night.      Daily Care Plan Summary: Heart Failure    Diuretic in use (IV or PO):   PO        Daily weight (up or down):    loss: 8.14lbs      Output > Intake (yes/no):Yes      O2 Requirements (current, any change?):  3 liters/min via nasal cannula      Symptoms noted with Activity (Respiratory Tolerance, functional state):     Tolerated      Anticipated Discharge Plans:     [place]

## 2024-07-23 NOTE — PROGRESS NOTES
Daily Progress Note          Assessment    Hydropneumothorax s/p chest tube placement 7/6/2024: Exudative fluid but negative cultures and no malignancy  Chest tube removed 7/18/2024    COPD    SEDRICK    Wound cultures positive for Pseudomonas on 7/13/2024    Systolic CHF  A-fib   Aortic stenosis  Mitral valve regurgitation   R BBB  bilateral DVT remote history of DVT  HTN  Morbid obesity   Uterine mass and pancreatic mass        PLAN:        Oxygen supplement and titration to maintain saturation 90-95%: Currently requiring 2 L per nasal cannula    Mucinex  Antibiotics completed  Bronchodilators  Inhaled corticosteroids    Incentive spirometer  diuresis as per nephrology  Electrolytes/ glycemic control  BP/HR control  Chronic anticoagulation: Apixaban     I reviewed the recent clinical results and radiological images                 LOS: 23 days     Subjective     Patient reports mild cough and shortness of breath    Objective     Vital signs for last 24 hours:  Vitals:    07/23/24 0907 07/23/24 1000 07/23/24 1130 07/23/24 1134   BP: 91/48 109/64     BP Location: Left arm      Patient Position: Lying      Pulse: 77 72 90 82   Resp: 12 22 22   Temp: 98.2 °F (36.8 °C)      TempSrc: Oral      SpO2: 95%  98% 98%   Weight:       Height:           Intake/Output last 3 shifts:  I/O last 3 completed shifts:  In: 240 [P.O.:240]  Out: 850 [Urine:850]  Intake/Output this shift:  I/O this shift:  In: -   Out: 250 [Urine:250]      Radiology  Imaging Results (Last 24 Hours)       ** No results found for the last 24 hours. **            Labs:  Results from last 7 days   Lab Units 07/23/24  0405   WBC 10*3/mm3 5.77   HEMOGLOBIN g/dL 8.9*   HEMATOCRIT % 29.5*   PLATELETS 10*3/mm3 160     Results from last 7 days   Lab Units 07/23/24  0405   SODIUM mmol/L 140   POTASSIUM mmol/L 3.7   CHLORIDE mmol/L 99   CO2 mmol/L 37.3*   BUN mg/dL 21   CREATININE mg/dL 0.82   CALCIUM mg/dL 8.7   BILIRUBIN mg/dL 1.1   ALK PHOS U/L 206*   ALT (SGPT) U/L  41*   AST (SGOT) U/L 57*   GLUCOSE mg/dL 122*           Results from last 7 days   Lab Units 07/23/24  0405 07/22/24  0140 07/21/24  0452   ALBUMIN g/dL 2.0* 2.0* 2.0*             Results from last 7 days   Lab Units 07/23/24  0405   MAGNESIUM mg/dL 1.9                   Meds:   SCHEDULE  amiodarone, 200 mg, Oral, Daily  apixaban, 5 mg, Oral, BID  budesonide, 0.5 mg, Nebulization, BID - RT  bumetanide, 1 mg, Oral, BID  hydrocortisone, 25 mg, Rectal, BID  insulin lispro, 2-7 Units, Subcutaneous, 4x Daily With Meals & Nightly  ipratropium-albuterol, 3 mL, Nebulization, 4x Daily - RT  metoprolol succinate XL, 25 mg, Oral, Q12H  miconazole, 1 Application, Topical, Q12H  pantoprazole, 40 mg, Oral, BID AC  povidone-iodine, , Topical, Daily  sodium chloride, 10 mL, Intravenous, Q12H      Infusions  lactated ringers, 1,000 mL, Last Rate: 1,000 mL (07/22/24 0932)  lactated ringers, 9 mL/hr  niCARdipine, 5-15 mg/hr      PRNs    acetaminophen **OR** acetaminophen    Calcium Replacement - Follow Nurse / BPA Driven Protocol    dextrose    dextrose    glucagon (human recombinant)    HYDROcodone-acetaminophen    ipratropium-albuterol    lactated ringers    Magnesium Standard Dose Replacement - Follow Nurse / BPA Driven Protocol    niCARdipine    nitroglycerin    ondansetron ODT **OR** ondansetron    Phosphorus Replacement - Follow Nurse / BPA Driven Protocol    Potassium Replacement - Follow Nurse / BPA Driven Protocol    [COMPLETED] Insert Peripheral IV **AND** sodium chloride    sodium chloride    sodium chloride    Physical Exam:  General Appearance:  Alert   HEENT:  Normocephalic, without obvious abnormality, Conjunctiva/corneas clear,.   Nares normal, no drainage     Neck:  Supple, symmetrical, trachea midline.   Lungs /Chest wall: Good air entry with mild bilateral basal rhonchi, respirations unlabored, symmetrical wall movement.     Heart:  Regular rate and rhythm, S1 S2 normal  Abdomen: Soft, non-tender, no masses, no  organomegaly.    Extremities: + Bilateral lower extremity edema, no clubbing or cyanosis     ROS  Constitutional: Negative for chills, fever and malaise/fatigue.   HENT: Negative.    Eyes: Negative.    Cardiovascular: Lower extremity edema  Respiratory: Positive for mild cough and shortness of breath.    Skin: Negative.    Musculoskeletal: Negative.    Gastrointestinal: Negative.    Genitourinary: Negative.    Neurological: Generalized weakness      I reviewed the recent clinical results  I personally reviewed the latest radiological studies    Part of this note may be an electronic transcription/translation of spoken language to printed text using the Dragon Dictation System.

## 2024-07-23 NOTE — PROGRESS NOTES
"    PROGRESS NOTE      Patient Name: Ban Brennan  : 1955  MRN: [id number]  Primary Care Physician: Rut Pierce APRN  Date of admission: 2024    Patient Care Team:  Rut Pierce APRN as PCP - General (Nurse Practitioner)  Austin Brooks MD as Cardiologist (Cardiology)        Reason for Follow up     Acute kidney injury, hyperkalemia      Subjective     Seen and examined, NAD noted, renal functions stable, worsening bicarb level, ordered ABG, good UOP, 1.3L     Review of systems:    ROS was otherwise negative except as mentioned in the [place].       Personal History:     Past Medical History:   Past Medical History:   Diagnosis Date    Bilateral leg edema     Chronic respiratory failure with hypoxia, on home O2 therapy 2024    COPD (chronic obstructive pulmonary disease)     Morbid obesity with BMI of 40.0-44.9, adult 2010    Primary hypertension 2017    Pulmonary embolism     \"many years ago, was on warfarin\"    Sleep apnea        Surgical History:    History reviewed. No pertinent surgical history.    Family History: family history is not on file. Otherwise pertinent FHx was reviewed and unremarkable.     Social History:  reports that she has never smoked. She has never used smokeless tobacco. She reports that she does not drink alcohol and does not use drugs.    Medications:  Prior to Admission medications    Medication Sig Start Date End Date Taking? Authorizing Provider   Allergy Relief 180 MG tablet Take 1 tablet by mouth Daily. 24  Yes ProviderChadwick MD   bumetanide (BUMEX) 1 MG tablet Take 1 tablet by mouth 3 times a day. 24  Yes ProviderChadwick MD   docusate sodium (COLACE) 100 MG capsule Take 1 capsule by mouth Every 12 (Twelve) Hours. 24  Yes ProviderChadwick MD   furosemide (LASIX) 20 MG tablet Take 1 tablet by mouth Daily.   Yes ProviderChadwick MD   HYDROcodone-acetaminophen (NORCO)  MG per tablet Take 1 " tablet by mouth 3 times a day. 5/30/24  Yes Chadwick Johnson MD   lisinopril (PRINIVIL,ZESTRIL) 30 MG tablet Take 1 tablet by mouth Daily. 3/18/24  Yes Chadwick Johnson MD   meloxicam (MOBIC) 7.5 MG tablet Take 1 tablet by mouth Daily As Needed. 3/18/24  Yes Chadwick Johnson MD   metoprolol tartrate (LOPRESSOR) 50 MG tablet Take 1 tablet by mouth Daily.   Yes Chadwick Johnson MD   montelukast (SINGULAIR) 10 MG tablet Take 1 tablet by mouth every night at bedtime.   Yes Chadwick Johnson MD   omeprazole (priLOSEC) 20 MG capsule Take 1 capsule by mouth Daily. 3/18/24  Yes Chadwick Johnson MD   oxybutynin XL (DITROPAN-XL) 10 MG 24 hr tablet Take 2 tablets by mouth Daily. 3/18/24  Yes Chadwick Johnson MD   potassium chloride (MICRO-K) 10 MEQ CR capsule Take 1 capsule by mouth Every 12 (Twelve) Hours. 3/18/24  Yes Chadwick Johnson MD   vitamin D (ERGOCALCIFEROL) 1.25 MG (92254 UT) capsule capsule Take 1 capsule by mouth 2 (Two) Times a Week. Monday and Thursday. 5/30/24  Yes Chadwick Johnson MD     Scheduled Meds:amiodarone, 200 mg, Oral, Daily  apixaban, 5 mg, Oral, BID  budesonide, 0.5 mg, Nebulization, BID - RT  bumetanide, 1 mg, Oral, BID  hydrocortisone, 25 mg, Rectal, BID  insulin lispro, 2-7 Units, Subcutaneous, 4x Daily With Meals & Nightly  ipratropium-albuterol, 3 mL, Nebulization, 4x Daily - RT  metoprolol succinate XL, 25 mg, Oral, Q12H  miconazole, 1 Application, Topical, Q12H  pantoprazole, 40 mg, Oral, BID AC  povidone-iodine, , Topical, Daily  sodium chloride, 10 mL, Intravenous, Q12H      Continuous Infusions:lactated ringers, 1,000 mL, Last Rate: 1,000 mL (07/22/24 0932)  lactated ringers, 9 mL/hr  niCARdipine, 5-15 mg/hr        PRN Meds:  acetaminophen **OR** acetaminophen    Calcium Replacement - Follow Nurse / BPA Driven Protocol    dextrose    dextrose    glucagon (human recombinant)    HYDROcodone-acetaminophen    ipratropium-albuterol    lactated  ringers    Magnesium Standard Dose Replacement - Follow Nurse / BPA Driven Protocol    niCARdipine    nitroglycerin    ondansetron ODT **OR** ondansetron    Phosphorus Replacement - Follow Nurse / BPA Driven Protocol    Potassium Replacement - Follow Nurse / BPA Driven Protocol    [COMPLETED] Insert Peripheral IV **AND** sodium chloride    sodium chloride    sodium chloride  Allergies:  No Known Allergies    Objective   Exam:     Vital Signs  Temp:  [97.4 °F (36.3 °C)-98.8 °F (37.1 °C)] 98.2 °F (36.8 °C)  Heart Rate:  [67-86] 72  Resp:  [12-18] 12  BP: ()/(44-67) 109/64  SpO2:  [93 %-100 %] 95 %  on  Flow (L/min):  [3-8] 3;   Device (Oxygen Therapy): nasal cannula;humidified  Body mass index is 39.01 kg/m².  EXAM  General:  69 yr old  female, some respiratory distress  Head:      Normocephalic and atraumatic.    Eyes:      PERRL/EOM intact, conjunctivae and sclerae clear without nystagmus.    Neck:      No masses, thyromegaly,  trachea central   Lungs:    Clear bilaterally to auscultation.    Heart:      Regular rate and rhythm, no murmur no gallop  Abd:        Soft, nontender, not distended, bowel sounds positive, no shifting dullness.  Msk:        No deformity or scoliosis noted of thoracic or lumbar spine.    Pulses:   Pulses normal in all 4 extremities.    Extremities:        No cyanosis or clubbing--+ + edema.    Neuro:    Lethargic  Skin:       Intact without lesions or rashes.          Results Review:  I have personally reviewed most recent Data :  BMP @LABRCNT(creatinine:10)  CBC    Results from last 7 days   Lab Units 07/23/24  0405 07/22/24  0140 07/21/24  0452 07/20/24  0140 07/19/24  0121 07/18/24  0205 07/17/24  0120   WBC 10*3/mm3 5.77 6.00 5.81 7.20 8.29 8.88 10.15   HEMOGLOBIN g/dL 8.9* 9.0* 8.7* 9.4* 9.9* 9.3* 9.6*   PLATELETS 10*3/mm3 160 150 150 167 184 157 157     CMP   Results from last 7 days   Lab Units 07/23/24  0405 07/22/24  0140 07/21/24  1512 07/21/24  0452 07/20/24  0140  07/19/24  0121 07/18/24  0205 07/17/24  0120   SODIUM mmol/L 140 139  --  136 130* 134* 136 138   POTASSIUM mmol/L 3.7 4.0 4.0 3.2* 3.8 3.2* 4.0 4.5   CHLORIDE mmol/L 99 99  --  96* 92* 93* 98 100   CO2 mmol/L 37.3* 34.7*  --  35.9* 32.7* 34.0* 32.5* 33.8*   BUN mg/dL 21 22  --  21 21 20 23 22   CREATININE mg/dL 0.82 0.87  --  0.77 0.86 0.96 1.09* 0.92   GLUCOSE mg/dL 122* 114*  --  87 80 82 98 101*   ALBUMIN g/dL 2.0* 2.0*  --  2.0* 1.9* 2.2* 2.0* 2.0*   BILIRUBIN mg/dL 1.1 1.1  --  1.4* 1.4* 1.6* 1.7* 1.8*   ALK PHOS U/L 206* 218*  --  193* 164* 136* 128* 127*   AST (SGOT) U/L 57* 98*  --  87* 64* 31 57* 45*   ALT (SGPT) U/L 41* 49*  --  45* 32 25 23 24     ABG          XR Chest 1 View    Result Date: 7/19/2024  Impression: 1. No definite residual pneumothorax. 2. Stable cardiomegaly with bilateral perihilar and bibasilar airspace disease suggesting pulmonary edema. 3. Small bilateral pleural effusions left greater than right not significantly changed. Electronically Signed: Mina Wheeler MD  7/19/2024 7:16 AM EDT  Workstation ID: [id number]    XR Chest 1 View    Result Date: 7/18/2024  Stable tiny right apical pneumothorax status post thoracotomy tube removal. Otherwise stable radiographic appearance of the chest.   Electronically Signed By-Jerzy White MD On:7/18/2024 11:41 AM      XR Chest 1 View    Result Date: 7/17/2024  Impression: 1. Trace right pneumothorax 2. Otherwise stable chest demonstrating cardiomegaly with pulmonary vascular congestion, small pleural effusions, left perihilar airspace consolidation, and bibasilar airspace disease which could be atelectasis or pneumonia Electronically Signed: Betito Villeda  7/17/2024 7:32 AM EDT  Workstation ID: [id number]    XR Chest 1 View    Result Date: 7/16/2024  Impression: No appreciable change since 1 day prior. Electronically Signed: Krystal Lee MD  7/16/2024 8:08 AM EDT  Workstation ID: [id number]    MRI Abdomen With & Without Contrast    Result Date:  7/15/2024  Impression: Multiseptated pancreatic lesion in the pancreatic neck measuring 2.7 x 2.6 cm. There is suggestion of internal septations as well as faint enhancement of the septa. Findings suspicious for cystic neoplasm. Brisk enhancement lesion in the anterior superior right hepatic lobe measures 0.9 cm, favoring flash filling hemangioma. Hypervascular metastasis is felt to be less likely but incomplete excluded. Consider short-term follow-up MRI to evaluate for change in size. 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 2.1 cm right adrenal myelolipoma. Small right pleural effusion. Moderate left pleural effusion. Electronically Signed: Gigi Beck MD  7/15/2024 8:38 PM EDT  Workstation ID: [id number]     Results for orders placed during the hospital encounter of 06/30/24    Adult Transthoracic Echo Complete w/ Color, Spectral and Contrast if Necessary Per Protocol    Interpretation Summary    Left ventricular ejection fraction appears to be 31 - 35%.    Left ventricular diastolic dysfunction is noted.    The left atrial cavity is dilated.    Moderate aortic valve stenosis is present. Mean/peak gradient of 14/24 mmHg.  Dimensionless index 0.36    Moderate to severe mitral valve regurgitation is present.    Estimated right ventricular systolic pressure from tricuspid regurgitation is normal (<35 mmHg).        Assessment & Plan   Assessment and Plan:         Atrial fibrillation with RVR    Primary hypertension    Morbid obesity with BMI of 40.0-44.9, adult    Right bundle branch block    Acute deep vein thrombosis (DVT) of both lower extremities    ARABELLA (acute kidney injury)    Acute hyperkalemia    Sepsis    Acute UTI    Acute systolic congestive heart failure    Rhinovirus infection    Multifocal pneumonia    Chronic respiratory failure with hypoxia, on home O2 therapy    Skin ulcer of right great toe    Skin ulcer of left great toe    SEDRICK (obstructive sleep  apnea)    Aortic stenosis    Mitral regurgitation    Acute HFrEF (heart failure with reduced ejection fraction)    COPD (chronic obstructive pulmonary disease)    ASSESSMENT:  Acute kidney injury, with baseline creatinine roughly 0.8-1.0mg/dL  Hyperkalemia  A-fib with RVR  Acute on chronic heart failure, with EF 31-35%, diastolic dysfunction, moderate AS and moderate to severe MR as per echo from 6/30/24  Bilateral DVT  Severe sepsis/LLE cellulitis, acute cystitis  Multifocal pneumonia    PLAN :     Renal function unchanged/stable today from prior reading. Suspect she may initially have had some acute cardiorenal component, now most likely has some ATN secondary to sepsis, elevated Vanc level (was 22.4 on 7/3), and some prolonged prerenal state with hemodynamic insults, arrhythmias. Had no significant proteinuria  Patient renal functions are stable sodium level is better   Potassium level is stable at this time   Volume betetr still has edema   continue same dose of diuretics patient still has peripheral edema  Cr at  0.8  Hypokalemia better   Blood pressure reviewed, stable. Continue to monitor   Now not on any abx  Daily labs   We will continue to follow       Charan Krueger MD  [facility]  7/23/2024  10:11 EDT

## 2024-07-23 NOTE — CASE MANAGEMENT/SOCIAL WORK
Continued Stay Note   Vince     Patient Name: Ban Brennan  MRN: [id number]  Today's Date: 7/23/2024    Admit Date: 6/30/2024    Plan: [facility] accepted. Precert approved (valid 7/23-7/29). PASRR approved. From home with family. Declined LTACH.   Discharge Plan       Row Name 07/23/24 1438       Plan    Plan [facility] accepted. Precert approved (valid 7/23-7/29). PASRR approved. From home with family. Declined LTACH.    Patient/Family in Agreement with Plan yes    Plan Comments Updated therapy notes available from today, 7/23. CM requested for CMA to start precert. Received update that precert auto approved. Dates valid and auth ID provided to [facility] liaison Edda LAN and confirmed bed ready at facility for patient to d/c. CM provided update to MD/RN via secure chat. CM met with patient at bedside to discuss dc planning and IMM letter. Patient contacted her daughter, Chelsi to be on speaker phone during discussion. Patient reported that she did sit up in the chair today but unable to tolerate seated position for duration of transportation to [facility]. Reported that EMS transportation would be the safest option for her. CM completed EMS Medical Necessity and information packet provided to RN for EMS. [facility] transportation scheduled via ad hoc as will call once d/c orders are available.  DC orders noted. CM provided update to [facility], Nydia CARLSON who confirmed EMS request and marked status as ready for pick-up.                  Dea Erwin RN     Office Phone: [phone]  Office Cell: [phone]

## 2024-07-23 NOTE — PROGRESS NOTES
[facility] MEDICINE SERVICE  DAILY PROGRESS NOTE    NAME: Ban Brennan  : 1955  MRN: [id number]      LOS: 23 days     PROVIDER OF SERVICE: Librado Villagomez MD    Chief Complaint: Atrial fibrillation with RVR    Subjective:     Interval History:  History taken from: patient chart RN  Afebrile no CP or SOB     Review of Systems:   Review of Systems  All negative except as above  Objective:     Vital Signs  Temp:  [97.4 °F (36.3 °C)-98.8 °F (37.1 °C)] 98.2 °F (36.8 °C)  Heart Rate:  [67-86] 72  Resp:  [12-18] 12  BP: ()/(44-67) 109/64  Flow (L/min):  [3] 3   Body mass index is 39.01 kg/m².    Physical Exam  Physical Exam  AOx3 NAD  RRR S1-S2 audible  Lungs with fair air entry  Abdomen soft nontender nondistended  Scheduled Meds   amiodarone, 200 mg, Oral, Daily  apixaban, 5 mg, Oral, BID  budesonide, 0.5 mg, Nebulization, BID - RT  bumetanide, 1 mg, Oral, BID  hydrocortisone, 25 mg, Rectal, BID  insulin lispro, 2-7 Units, Subcutaneous, 4x Daily With Meals & Nightly  ipratropium-albuterol, 3 mL, Nebulization, 4x Daily - RT  metoprolol succinate XL, 25 mg, Oral, Q12H  miconazole, 1 Application, Topical, Q12H  pantoprazole, 40 mg, Oral, BID AC  povidone-iodine, , Topical, Daily  sodium chloride, 10 mL, Intravenous, Q12H       PRN Meds     acetaminophen **OR** acetaminophen    Calcium Replacement - Follow Nurse / BPA Driven Protocol    dextrose    dextrose    glucagon (human recombinant)    HYDROcodone-acetaminophen    ipratropium-albuterol    lactated ringers    Magnesium Standard Dose Replacement - Follow Nurse / BPA Driven Protocol    niCARdipine    nitroglycerin    ondansetron ODT **OR** ondansetron    Phosphorus Replacement - Follow Nurse / BPA Driven Protocol    Potassium Replacement - Follow Nurse / BPA Driven Protocol    [COMPLETED] Insert Peripheral IV **AND** sodium chloride    sodium chloride    sodium chloride   Infusions  lactated ringers, 1,000 mL, Last Rate: 1,000 mL (24  0932)  lactated ringers, 9 mL/hr  niCARdipine, 5-15 mg/hr          Diagnostic Data    Results from last 7 days   Lab Units 07/23/24  0405   WBC 10*3/mm3 5.77   HEMOGLOBIN g/dL 8.9*   HEMATOCRIT % 29.5*   PLATELETS 10*3/mm3 160   GLUCOSE mg/dL 122*   CREATININE mg/dL 0.82   BUN mg/dL 21   SODIUM mmol/L 140   POTASSIUM mmol/L 3.7   AST (SGOT) U/L 57*   ALT (SGPT) U/L 41*   ALK PHOS U/L 206*   BILIRUBIN mg/dL 1.1   ANION GAP mmol/L 3.7*       No radiology results for the last day      I reviewed the patient's new clinical results.  I reviewed the patient's new imaging results and agree with the interpretation.    Assessment/Plan:     Active and Resolved Problems  Active Hospital Problems    Diagnosis  POA    **Atrial fibrillation with RVR [I48.91]  Yes    COPD (chronic obstructive pulmonary disease) [J44.9]  Yes    Aortic stenosis [I35.0]  Yes    Mitral regurgitation [I34.0]  Yes    Acute HFrEF (heart failure with reduced ejection fraction) [I50.21]  Yes    Right bundle branch block [I45.10]  Yes    Acute deep vein thrombosis (DVT) of both lower extremities [I82.403]  Yes    ARABELLA (acute kidney injury) [N17.9]  Yes    Acute hyperkalemia [E87.5]  Yes    Sepsis [A41.9]  Yes    Acute UTI [N39.0]  Yes    Acute systolic congestive heart failure [I50.21]  Yes    Rhinovirus infection [B34.8]  Yes    Multifocal pneumonia [J18.9]  Yes    Chronic respiratory failure with hypoxia, on home O2 therapy [J96.11, Z99.81]  Not Applicable    Skin ulcer of right great toe [L97.519]  Yes    Skin ulcer of left great toe [L97.529]  Yes    SEDRICK (obstructive sleep apnea) [G47.33]  Yes    Primary hypertension [I10]  Yes    Morbid obesity with BMI of 40.0-44.9, adult [E66.01, Z68.41]  Not Applicable      Resolved Hospital Problems   No resolved problems to display.       69-year-old female with history of hypertension, HFrEF, lower extremity lymphedema admitted to [facility] 6/30 progressive shortness of breath        #Bilateral lower extremity  DVT  #History of reported PE  Acute left lower extremity DVT in the proximal femoral, mid femoral, distal femoral, popliteal, and posterior tibial   Seen by hematology appreciate recommendation.  Factor V and prothrombin gene mutation negative  Status post Eliquis 10 mg twice daily for 7 days currently on 5 mg twice daily  Outpatient follow-up with hematology- will need lifelong AC     #Acute on chronic HFrEF  #A-fib with RVR.  New onset  #Severe MR  Cardiology follow-up  Started on amiodarone GGT transitioned to p.o. tapering dose  TTE with EF 30 to 35%.  Defer ischemic workup to cardiology  On Toprol 25 twice daily with holding parameters   Bumex 1 mg twice daily  Monitor I's and O's  Lisinopril on hold given ARABELLA and hypotension  On Eliquis     #Right-sided hydropneumothorax  Chest tube placed in ICU 7/6.  Pulmonary following on floors.  Chest tube removed 7/18  Continue supplemental oxygen as needed to keep SpO2 more than 90%  Currently continue current nebs  Pleural fluid cultures Pseudomonas defer need for antibiotics to pulmonary           #Severe sepsis  #UTI  #Left lower extremity cellulitis and wound  #Rhinovirus infection  #Multifocal pneumonia  #Chronic venous stasis lower extremity  Seen by infectious disease appreciate recommendations  Finished Keflex and doxycycline for 7 day course   Seen by podiatry no surgical intervention recommended  Midodrine has been weaned off monitor blood pressure     #Acute hypoxic respiratory failure  Multifactorial pneumonia CHF  Antibiotics and diuretics as above  Wean off supplemental oxygen as tolerated.  Closely monitor blood pressure     #DM2  A1c 6.14  Continue ISS lispro  POC ACHS     #ARABELLA  #Metabolic Acidosis   Suspect ATN in setting of sepsis   Diuretics as above  BMP daily  Avoid nephrotoxic drugs   Improved     #uterine mass  #Postmenopausal bleeding  Large subserosal uterine fundal fibroid on pelvic ultrasound concerning for fibroid vs malignancy  Seen by GYN  status post D&C 7/22 with removal of blood clots.  Follow pathology report  Monitor H/H     #hematochezia  Seen by GI plan for outpatient colonoscopy  Etiology suspected to be hemorrhoids  Monitor H&H      #Pancreatic mass  Seen by GI   outpatient endoscopic ultrasound to further evaluate pancreas lesion      # Panniculitis  - Hypogastric region soft mass, ttp  - GI evaluated and suspect due to panniculitis  - supportive care     VTE Prophylaxis:  Pharmacologic VTE prophylaxis orders are present.         Code status is   Code Status and Medical Interventions:   Ordered at: 06/30/24 2120     Code Status (Patient has no pulse and is not breathing):    CPR (Attempt to Resuscitate)     Medical Interventions (Patient has pulse or is breathing):    Full Support       Plan for disposition: Rehab placement    Time: 30 minutes    Signature: Electronically signed by Librado Villagomez MD, 07/23/24, 10:54 EDT.  [facility] Hospitalist Team

## 2024-07-23 NOTE — THERAPY TREATMENT NOTE
"Subjective: Pt agreeable to therapeutic plan of care.  Cognition: oriented to Person, Place, Time, and Situation    Objective:     Bed Mobility: Mod-A and Assist x 2   Functional Transfers: Mod-A, Max-A, Assist x 2, and with rolling walker     Balance: supported, static, dynamic, and standing Mod-A, Assist x 2, and with rolling walker  Functional Ambulation: N/A or Not attempted.    Lower Body Dressing: Dependent  ADL Position: unsupported sitting  ADL Comments: Lower body dressing completed at EOB. Dependent for completion.    Therapeutic Exercise - 10 Reps B UE and B LE AROM supported sitting / chair    Vitals: WNL    Pain: 7 VAS  Location: abdomen  Interventions for pain: Repositioned  Education: Provided education on the importance of mobility in the acute care setting, Verbal/Tactile Cues, ADL training, and Transfer Training      Assessment: Ban Brennan presents with ADL impairments affecting function including balance, endurance / activity tolerance, pain, and strength. Demonstrated functioning below baseline abilities indicate the need for continued skilled intervention while inpatient. Tolerating session today without incident. Pt had hysterectomy yesterday, and was educated this date on abdominal sparing to decrease pain levels. She was able to get to the chair, as well as complete BUE and BLE exercises. Will continue to follow and progress as tolerated.     Plan/Recommendations:   Moderate Intensity Therapy recommended post-acute care. This is recommended as therapy feels the patient would require 3-4 days per week and wouldn't tolerate \"3 hour daily\" rehab intensity. SNF would be the preferred choice. If the patient does not agree to SNF, arrange HH or OP depending on home bound status. If patient is medically complex, consider LTACH.. Pt requires no DME at discharge.     Pt desires Skilled Rehab placement at discharge. Pt cooperative; agreeable to therapeutic recommendations and plan of care. "     Modified Zach: N/A = No pre-op stroke/TIA    Post-Tx Position: Up in Chair, Alarms activated, and Call light and personal items within reach  PPE: gloves, surgical mask, and gown

## 2024-07-23 NOTE — SIGNIFICANT NOTE
Case Management/Social Work    Patient Name:  Ban Brennan  YOB: 1955  MRN: [id number]  Admit Date:  6/30/2024 07/23/24 1340   Post Acute Pre-Cert #2 Documentation   New Pre-Cert Needed? Yes   Post Acute Pre-Cert #3 Documentation   Request Submitted by Facility - Type: Hospital   Post-Acute Authorization Type Submitted: SNF   Date Post Acute Pre-Cert Inititated per Facility 07/23/24   Verification from Payer Yes   Date Post Acute Pre-Cert Completed 07/23/24   Accepting Facility [facility]   Hospital Discharge Date Requested 07/23/24   All Clinicals Submitted? Yes   Had Accepting Facility at Time of Submission Yes   Response Received from Insurance? Approval   Response Communicated to:    Authorization Number: [id number]   Post Acute Pre-Cert Initiated Comment [employer] submitted SNF precert for [facility] via [employer] portal. Authorization ID [id number]. SNF precert auto approved. Valid 7/23-7/29. Approval letter indexed to media. CM made aware.           Electronically signed by:  Ronni Hamilton CMA  07/23/24 13:43 EDT    Ronni Hamilton  Case Management Associate  [address] [address]  P: [phone]  F: [phone]

## 2024-07-23 NOTE — PLAN OF CARE
"Assessment: Ban Brennan presents with functional mobility impairments which indicate the need for skilled intervention. Tolerating session today without incident. Pt educated on abdominal sparing post op hysterectomy; pt with no significant change in mobility status.  Will continue to follow and progress as tolerated.     Plan/Recommendations:   If medically appropriate, Moderate Intensity Therapy recommended post-acute care. This is recommended as therapy feels the patient would require 3-4 days per week and wouldn't tolerate \"3 hour daily\" rehab intensity. SNF would be the preferred choice. If the patient does not agree to SNF, arrange HH or OP depending on home bound status. If patient is medically complex, consider LTACH. Pt requires rolling walker at discharge.     Pt desires Skilled Rehab placement at discharge. Pt cooperative; agreeable to therapeutic recommendations and plan of care.                                               "

## 2024-07-23 NOTE — PLAN OF CARE
"Assessment: Ban Brennan presents with ADL impairments affecting function including balance, endurance / activity tolerance, pain, and strength. Demonstrated functioning below baseline abilities indicate the need for continued skilled intervention while inpatient. Tolerating session today without incident. Pt had hysterectomy yesterday, and was educated this date on abdominal sparing to decrease pain levels. She was able to get to the chair, as well as complete BUE and BLE exercises. Will continue to follow and progress as tolerated.      Plan/Recommendations:   Moderate Intensity Therapy recommended post-acute care. This is recommended as therapy feels the patient would require 3-4 days per week and wouldn't tolerate \"3 hour daily\" rehab intensity. SNF would be the preferred choice. If the patient does not agree to SNF, arrange HH or OP depending on home bound status. If patient is medically complex, consider LTACH.. Pt requires no DME at discharge.      Pt desires Skilled Rehab placement at discharge. Pt cooperative; agreeable to therapeutic recommendations and plan of care.        "

## 2024-07-23 NOTE — PROGRESS NOTES
Nutrition Services    Patient Name: Ban Brennan  YOB: 1955  MRN: [id number]  Admission date: 6/30/2024    PROGRESS NOTE      Encounter Information: Checking in on PO intake. Pt s/p D&C on 7/22.        PO Diet: Diet: Cardiac, Diabetic; Healthy Heart (2-3 Na+); Consistent Carbohydrate; Fluid Consistency: Thin (IDDSI 0)   PO Supplements: Boost Plus BID (Provides 720 kcals, 28 g protein if consumed)      PO Intake:  25% recently, some ONS consumed       Current nutrition support: -   Nutrition support review: -       Labs (reviewed below): Reviewed, management per attending        GI Function:  + BM 7/20       Nutrition Intervention Updates: Continue current diet. Recent hyperglycemia, monitor ability to remove consistent CHO restriction again.        Results from last 7 days   Lab Units 07/23/24  0405 07/22/24  0140 07/21/24  1512 07/21/24  0452   SODIUM mmol/L 140 139  --  136   POTASSIUM mmol/L 3.7 4.0 4.0 3.2*   CHLORIDE mmol/L 99 99  --  96*   CO2 mmol/L 37.3* 34.7*  --  35.9*   BUN mg/dL 21 22  --  21   CREATININE mg/dL 0.82 0.87  --  0.77   CALCIUM mg/dL 8.7 8.4*  --  8.0*   BILIRUBIN mg/dL 1.1 1.1  --  1.4*   ALK PHOS U/L 206* 218*  --  193*   ALT (SGPT) U/L 41* 49*  --  45*   AST (SGOT) U/L 57* 98*  --  87*   GLUCOSE mg/dL 122* 114*  --  87     Results from last 7 days   Lab Units 07/23/24  0405 07/22/24  0140 07/21/24  0452   MAGNESIUM mg/dL 1.9 1.8 2.0   PHOSPHORUS mg/dL 3.3 3.0 2.8   HEMOGLOBIN g/dL 8.9* 9.0* 8.7*   HEMATOCRIT % 29.5* 29.6* 28.0*     COVID19   Date Value Ref Range Status   06/30/2024 Not Detected Not Detected - Ref. Range Final     Lab Results   Component Value Date    HGBA1C 6.14 (H) 07/01/2024     RD to follow up per protocol.    Electronically signed by:  Dominga Bustamante RD  07/23/24 09:01 EDT

## 2024-07-23 NOTE — PROGRESS NOTES
"Referring Provider: Librado Villagomez MD    Reason for follow-up: a-fib     Patient Care Team:  Rut Pierce APRN as PCP - General (Nurse Practitioner)  Austin Brooks MD as Cardiologist (Cardiology)      SUBJECTIVE    The patient is resting comfortably in bed. She denies any new complaints.       ROS  Review of all systems negative except as indicated.    Since I have last seen, the patient has been without any chest discomfort, shortness of breath, palpitations, dizziness or syncope.  Denies having any headache, abdominal pain, nausea, vomiting, diarrhea, constipation, loss of weight or loss of appetite.  Denies having any excessive bruising, hematuria or blood in the stool.        Personal History:    Past Medical History:   Diagnosis Date    Bilateral leg edema     Chronic respiratory failure with hypoxia, on home O2 therapy 07/01/2024    COPD (chronic obstructive pulmonary disease)     Morbid obesity with BMI of 40.0-44.9, adult 11/08/2010    Primary hypertension 03/01/2017    Pulmonary embolism     \"many years ago, was on warfarin\"    Sleep apnea        History reviewed. No pertinent surgical history.    History reviewed. No pertinent family history.    Social History     Tobacco Use    Smoking status: Never    Smokeless tobacco: Never   Vaping Use    Vaping status: Never Used   Substance Use Topics    Alcohol use: Never    Drug use: Never        Home meds:  Prior to Admission medications    Medication Sig Start Date End Date Taking? Authorizing Provider   Allergy Relief 180 MG tablet Take 1 tablet by mouth Daily. 5/30/24  Yes Chadwick Johnson MD   bumetanide (BUMEX) 1 MG tablet Take 1 tablet by mouth 3 times a day. 5/30/24  Yes Chadwick Johnson MD   docusate sodium (COLACE) 100 MG capsule Take 1 capsule by mouth Every 12 (Twelve) Hours. 5/30/24  Yes Chadwick Johnson MD   furosemide (LASIX) 20 MG tablet Take 1 tablet by mouth Daily.   Yes Chadwick Johnson MD "   HYDROcodone-acetaminophen (NORCO)  MG per tablet Take 1 tablet by mouth 3 times a day. 5/30/24  Yes Chadwick Johnson MD   lisinopril (PRINIVIL,ZESTRIL) 30 MG tablet Take 1 tablet by mouth Daily. 3/18/24  Yes Chadwick Johnson MD   meloxicam (MOBIC) 7.5 MG tablet Take 1 tablet by mouth Daily As Needed. 3/18/24  Yes Chadwick Johnson MD   metoprolol tartrate (LOPRESSOR) 50 MG tablet Take 1 tablet by mouth Daily.   Yes Chadwick Johnson MD   montelukast (SINGULAIR) 10 MG tablet Take 1 tablet by mouth every night at bedtime.   Yes Chadwick Johnson MD   omeprazole (priLOSEC) 20 MG capsule Take 1 capsule by mouth Daily. 3/18/24  Yes Chadwick Johnson MD   oxybutynin XL (DITROPAN-XL) 10 MG 24 hr tablet Take 2 tablets by mouth Daily. 3/18/24  Yes Chadwick Johnson MD   potassium chloride (MICRO-K) 10 MEQ CR capsule Take 1 capsule by mouth Every 12 (Twelve) Hours. 3/18/24  Yes Chadwick Johnson MD   vitamin D (ERGOCALCIFEROL) 1.25 MG (33815 UT) capsule capsule Take 1 capsule by mouth 2 (Two) Times a Week. Monday and Thursday. 5/30/24  Yes Chadwick Johnson MD       Allergies:  Patient has no known allergies.    Scheduled Meds:amiodarone, 200 mg, Oral, Daily  apixaban, 5 mg, Oral, BID  budesonide, 0.5 mg, Nebulization, BID - RT  bumetanide, 1 mg, Oral, BID  hydrocortisone, 25 mg, Rectal, BID  insulin lispro, 2-7 Units, Subcutaneous, 4x Daily With Meals & Nightly  ipratropium-albuterol, 3 mL, Nebulization, 4x Daily - RT  metoprolol succinate XL, 25 mg, Oral, Q12H  miconazole, 1 Application, Topical, Q12H  pantoprazole, 40 mg, Oral, BID AC  povidone-iodine, , Topical, Daily  sodium chloride, 10 mL, Intravenous, Q12H      Continuous Infusions:lactated ringers, 1,000 mL, Last Rate: 1,000 mL (07/22/24 0932)  lactated ringers, 9 mL/hr  niCARdipine, 5-15 mg/hr      PRN Meds:.  acetaminophen **OR** acetaminophen    Calcium Replacement - Follow Nurse / BPA Driven Protocol    dextrose     "dextrose    glucagon (human recombinant)    HYDROcodone-acetaminophen    ipratropium-albuterol    lactated ringers    Magnesium Standard Dose Replacement - Follow Nurse / BPA Driven Protocol    niCARdipine    nitroglycerin    ondansetron ODT **OR** ondansetron    Phosphorus Replacement - Follow Nurse / BPA Driven Protocol    Potassium Replacement - Follow Nurse / BPA Driven Protocol    [COMPLETED] Insert Peripheral IV **AND** sodium chloride    sodium chloride    sodium chloride      OBJECTIVE    Vital Signs  Vitals:    07/22/24 2138 07/22/24 2147 07/23/24 0500 07/23/24 0605   BP: 92/50 104/60  110/58   BP Location: Left arm   Left arm   Patient Position: Lying   Lying   Pulse: 85 86  69   Resp: 14   15   Temp: 98.8 °F (37.1 °C)   97.5 °F (36.4 °C)   TempSrc: Axillary   Oral   SpO2: 95%   95%   Weight:   87.6 kg (193 lb 2 oz)    Height:           Flowsheet Rows      Flowsheet Row First Filed Value   Admission Height 147.3 cm (58\") Documented at 06/30/2024 1650   Admission Weight 108 kg (239 lb) Documented at 06/30/2024 1650              Intake/Output Summary (Last 24 hours) at 7/23/2024 0654  Last data filed at 7/22/2024 2138  Gross per 24 hour   Intake 120 ml   Output 200 ml   Net -80 ml          Telemetry:  atrial fibrillation with controlled ventricular response    Physical Exam:  The patient is alert and oriented. No acute distress noted. Morbidly obese.   Vital signs as noted above.  Head and neck revealed no carotid bruits or jugular venous distention.   Lungs with diminished breath sounds. No wheezing. On oxygen at 3 liters per nasal cannula.   Heart:  rhythm irregularly irregular, normal first and second heart sounds.  No precordial rub is present.  No gallop is present.  Abdomen soft and nontender.   Extremities with good peripheral pulses with BLE edema  Skin:  multiple wounds on BLE.  Musculoskeletal system is grossly normal.  CNS is normal.       Results Review:  I have personally reviewed the results " from the time of this admission to 7/23/2024 06:54 EDT and agree with these findings:  [x]  Laboratory  [x]  Microbiology  [x]  Radiology  [x]  EKG/Telemetry   [x]  Cardiology/Vascular   []  Pathology  [x]  Old records  []  Other:    Most notable findings include:    Lab Results (last 24 hours)       Procedure Component Value Units Date/Time    Magnesium [[id number]]  (Normal) Collected: 07/23/24 0405    Specimen: Blood Updated: 07/23/24 0441     Magnesium 1.9 mg/dL     Phosphorus [[id number]]  (Normal) Collected: 07/23/24 0405    Specimen: Blood Updated: 07/23/24 0441     Phosphorus 3.3 mg/dL     Comprehensive Metabolic Panel [[id number]]  (Abnormal) Collected: 07/23/24 0405    Specimen: Blood Updated: 07/23/24 0441     Glucose 122 mg/dL      BUN 21 mg/dL      Creatinine 0.82 mg/dL      Sodium 140 mmol/L      Potassium 3.7 mmol/L      Chloride 99 mmol/L      CO2 37.3 mmol/L      Calcium 8.7 mg/dL      Total Protein 5.5 g/dL      Albumin 2.0 g/dL      ALT (SGPT) 41 U/L      AST (SGOT) 57 U/L      Alkaline Phosphatase 206 U/L      Total Bilirubin 1.1 mg/dL      Globulin 3.5 gm/dL      A/G Ratio 0.6 g/dL      BUN/Creatinine Ratio 25.6     Anion Gap 3.7 mmol/L      eGFR 77.5 mL/min/1.73     Narrative:      GFR Normal >60  Chronic Kidney Disease <60  Kidney Failure <15      CBC & Differential [[id number]]  (Abnormal) Collected: 07/23/24 0405    Specimen: Blood Updated: 07/23/24 0412    Narrative:      The following orders were created for panel order CBC & Differential.  Procedure                               Abnormality         Status                     ---------                               -----------         ------                     CBC Auto Differential[[id number]]        Abnormal            Final result                 Please view results for these tests on the individual orders.    CBC Auto Differential [[id number]]  (Abnormal) Collected: 07/23/24 0405    Specimen: Blood Updated: 07/23/24 0412     WBC 5.77  10*3/mm3      RBC 2.96 10*6/mm3      Hemoglobin 8.9 g/dL      Hematocrit 29.5 %      MCV 99.7 fL      MCH 30.1 pg      MCHC 30.2 g/dL      RDW 15.4 %      RDW-SD 57.1 fl      MPV 10.5 fL      Platelets 160 10*3/mm3      Neutrophil % 82.8 %      Lymphocyte % 11.8 %      Monocyte % 4.3 %      Eosinophil % 0.0 %      Basophil % 0.2 %      Immature Grans % 0.9 %      Neutrophils, Absolute 4.78 10*3/mm3      Lymphocytes, Absolute 0.68 10*3/mm3      Monocytes, Absolute 0.25 10*3/mm3      Eosinophils, Absolute 0.00 10*3/mm3      Basophils, Absolute 0.01 10*3/mm3      Immature Grans, Absolute 0.05 10*3/mm3      nRBC 0.0 /100 WBC     POC Glucose Once [[id number]]  (Abnormal) Collected: 07/22/24 2137    Specimen: Blood Updated: 07/22/24 2139     Glucose 187 mg/dL      Comment: Serial Number: [id number]:  [id number]       POC Glucose Once [[id number]]  (Abnormal) Collected: 07/22/24 1636    Specimen: Blood Updated: 07/22/24 1638     Glucose 137 mg/dL      Comment: Serial Number: [id number]:  [id number]       POC Glucose Once [[id number]]  (Normal) Collected: 07/22/24 1258    Specimen: Blood Updated: 07/22/24 1300     Glucose 104 mg/dL      Comment: Serial Number: [id number]:  [id number]       Tissue Pathology Exam [[id number]] Collected: 07/22/24 1036    Specimen: Tissue from Endometrial Curettings Updated: 07/22/24 1129    POC Glucose 4x Daily Before Meals & at Bedtime [[id number]]  (Normal) Collected: 07/22/24 0704    Specimen: Blood Updated: 07/22/24 0705     Glucose 88 mg/dL      Comment: Serial Number: [id number]:  [id number]               Imaging Results (Last 24 Hours)       ** No results found for the last 24 hours. **            LAB RESULTS (LAST 7 DAYS)    CBC  Results from last 7 days   Lab Units 07/23/24  0405 07/22/24  0140 07/21/24  0452 07/20/24  0140 07/19/24  0121 07/18/24  0205 07/17/24  0120   WBC 10*3/mm3 5.77 6.00 5.81 7.20 8.29 8.88 10.15   RBC 10*6/mm3 2.96* 2.99* 2.86* 3.06* 3.26*  3.02* 3.15*   HEMOGLOBIN g/dL 8.9* 9.0* 8.7* 9.4* 9.9* 9.3* 9.6*   HEMATOCRIT % 29.5* 29.6* 28.0* 30.1* 31.8* 29.5* 30.7*   MCV fL 99.7* 99.0* 97.9* 98.4* 97.5* 97.7* 97.5*   PLATELETS 10*3/mm3 160 150 150 167 184 157 157       BMP  Results from last 7 days   Lab Units 07/23/24  0405 07/22/24  0140 07/21/24  1512 07/21/24  0452 07/20/24  0140 07/19/24  0121 07/18/24  0205 07/17/24  0120   SODIUM mmol/L 140 139  --  136 130* 134* 136 138   POTASSIUM mmol/L 3.7 4.0 4.0 3.2* 3.8 3.2* 4.0 4.5   CHLORIDE mmol/L 99 99  --  96* 92* 93* 98 100   CO2 mmol/L 37.3* 34.7*  --  35.9* 32.7* 34.0* 32.5* 33.8*   BUN mg/dL 21 22  --  21 21 20 23 22   CREATININE mg/dL 0.82 0.87  --  0.77 0.86 0.96 1.09* 0.92   GLUCOSE mg/dL 122* 114*  --  87 80 82 98 101*   MAGNESIUM mg/dL 1.9 1.8  --  2.0 2.4 1.5* 1.6 1.7   PHOSPHORUS mg/dL 3.3 3.0  --  2.8 2.7 2.7 2.9 2.4*       CMP   Results from last 7 days   Lab Units 07/23/24  0405 07/22/24  0140 07/21/24  1512 07/21/24  0452 07/20/24  0140 07/19/24  0121 07/18/24  0205 07/17/24  0120   SODIUM mmol/L 140 139  --  136 130* 134* 136 138   POTASSIUM mmol/L 3.7 4.0 4.0 3.2* 3.8 3.2* 4.0 4.5   CHLORIDE mmol/L 99 99  --  96* 92* 93* 98 100   CO2 mmol/L 37.3* 34.7*  --  35.9* 32.7* 34.0* 32.5* 33.8*   BUN mg/dL 21 22  --  21 21 20 23 22   CREATININE mg/dL 0.82 0.87  --  0.77 0.86 0.96 1.09* 0.92   GLUCOSE mg/dL 122* 114*  --  87 80 82 98 101*   ALBUMIN g/dL 2.0* 2.0*  --  2.0* 1.9* 2.2* 2.0* 2.0*   BILIRUBIN mg/dL 1.1 1.1  --  1.4* 1.4* 1.6* 1.7* 1.8*   ALK PHOS U/L 206* 218*  --  193* 164* 136* 128* 127*   AST (SGOT) U/L 57* 98*  --  87* 64* 31 57* 45*   ALT (SGPT) U/L 41* 49*  --  45* 32 25 23 24       BNP        TROPONIN        CoAg        Creatinine Clearance  Estimated Creatinine Clearance: 65.7 mL/min (by C-G formula based on SCr of 0.82 mg/dL).    ABG        Radiology  No radiology results for the last day      EKG  I personally viewed and interpreted the patient's EKG/Telemetry data:  ECG 12  Lead Rhythm Change   Final Result   HEART RATE=92  bpm   RR Drfjiigk=559  ms   VA Interval=  ms   P Horizontal Axis=  deg   P Front Axis=  deg   QRSD Bbrtpkal=612  ms   QT Hexkshsq=539  ms   XFmY=948  ms   QRS Axis=-96  deg   T Wave Axis=65  deg   - ABNORMAL ECG -   Atrial fibrillation   Right bundle branch block   When compared with ECG of 05-Jul-2024 02:24:17,   No significant change   Electronically Signed By: Tee Whitlock ([facility]) 2024-07-07 10:12:55   Date and Time of Study:2024-07-05 06:43:37      ECG 12 Lead Drug Monitoring; amiodarone   Final Result   HEART EOUD=090  bpm   RR Sqvgjexz=602  ms   VA Interval=  ms   P Horizontal Axis=  deg   P Front Axis=  deg   QRSD Gzmlzduf=965  ms   QT Njgatkuy=885  ms   NTuP=155  ms   QRS Axis=-94  deg   T Wave Axis=70  deg   - ABNORMAL ECG -   Atrial fibrillation   Ventricular premature complex   Right bundle branch block   When compared with ECG of 04-Jul-2024 09:17:07,   No change from prior tracing   Electronically Signed By: Tee Whitlock ([facility]) 2024-07-07 10:13:32   Date and Time of Study:2024-07-05 02:24:17      ECG 12 Lead Electrolyte Imbalance   Final Result   HEART LLWY=037  bpm   RR Vcfutkyr=266  ms   VA Relooqfx=211  ms   P Horizontal Axis=113  deg   P Front Axis=263  deg   QRSD Ngunaikt=975  ms   QT Qwupwwfk=818  ms   DGfV=822  ms   QRS Axis=227  deg   T Wave Axis=27  deg   - ABNORMAL ECG -   Right bundle branch block   When compared with ECG of 04-Jul-2024 04:20:51,   Significant change in rhythm: previously atrial fibrillation   Significant repolarization change   Atrial fibrillation with rapid V-rate   No change from prior tracing   Electronically Signed By: Tee Whitlock ([facility]) 2024-07-07 10:14:27   Date and Time of Study:2024-07-04 09:17:07      ECG 12 Lead Drug Monitoring; Amiodarone   Final Result   HEART PNFP=188  bpm   RR Zglhdlnh=963  ms   VA Interval=  ms   P Horizontal Axis=  deg   P Front Axis=  deg   QRSD Cyhhblbq=285  ms   QT Sfholkeb=513   ms   HOqH=372  ms   QRS Axis=-90  deg   T Wave Axis=36  deg   - ABNORMAL ECG -   Atrial fibrillation   Right bundle branch block   ST elevation secondary to IVCD   When compared with ECG of 03-Jul-2024 17:38:52,   No significant change   Electronically Signed By: Austin Brooks ([facility]) 2024-07-18 19:41:04   Date and Time of Study:2024-07-04 04:20:51      ECG 12 Lead Chest Pain   Final Result   HEART DRUI=137  bpm   RR Xkjvvrgq=775  ms   RI Interval=  ms   P Horizontal Axis=  deg   P Front Axis=  deg   QRSD Kwtmkpcf=745  ms   QT Lnhmsrug=606  ms   NHmS=622  ms   QRS Axis=-98  deg   T Wave Axis=37  deg   - ABNORMAL ECG -   Atrial fibrillation   Right bundle branch block   Anterolateral  infarct, old   When compared with ECG of 03-Jul-2024 04:02:32,   New or worsened ischemia or infarction   Significant repolarization change   Electronically Signed By: Austin Brooks ([facility]) 2024-07-18 19:41:09   Date and Time of Study:2024-07-03 17:38:52      ECG 12 Lead Drug Monitoring; Amiodarone   Final Result   HEART HTOX=299  bpm   RR Dpvxzutu=473  ms   RI Interval=  ms   P Horizontal Axis=  deg   P Front Axis=  deg   QRSD Urykhyqn=839  ms   QT Ogxeqozg=142  ms   SBmE=413  ms   QRS Axis=-90  deg   T Wave Axis=74  deg   - ABNORMAL ECG -   Atrial fibrillation   Right bundle branch block   ST elevation secondary to IVCD   When compared with ECG of 02-Jul-2024 15:12:56,   No significant change   Electronically Signed By: Austin Brooks ([facility]) 2024-07-18 22:04:29   Date and Time of Study:2024-07-03 04:02:32      ECG 12 Lead Drug Monitoring; Amiodarone   Final Result   HEART MKNG=369  bpm   RR Kigchjkp=734  ms   RI Interval=  ms   P Horizontal Axis=  deg   P Front Axis=  deg   QRSD Rjbjxmyd=082  ms   QT Fbkrqwnm=061  ms   EZwP=381  ms   QRS Axis=-93  deg   T Wave Axis=45  deg   - ABNORMAL ECG -   Atrial fibrillation   Right bundle branch block   Inferior  infarct, old   When compared with ECG of 01-Jul-2024 04:42:13,   Nonspecific significant  change   Electronically Signed By: Librado Mcdermott ([facility]) 2024-07-10 12:58:26   Date and Time of Study:2024-07-02 15:12:56      ECG 12 Lead Drug Monitoring; Amiodarone   Final Result   HEART XORL=685  bpm   RR Eutfarut=988  ms   DE Interval=  ms   P Horizontal Axis=  deg   P Front Axis=  deg   QRSD Miaiezkk=218  ms   QT Tcwejeld=225  ms   ROcG=299  ms   QRS Axis=-80  deg   T Wave Axis=102  deg   - ABNORMAL ECG -   Atrial fibrillation   Right bundle branch block   Inferior  infarct, old   Anterolateral  infarct, age indeterminate   When compared with ECG of 30-Jun-2024 16:58:43,   Significant rate decrease   New or worsened ischemia or infarction   Electronically Signed By: Austin Brooks ([facility]) 2024-07-01 13:11:59   Date and Time of Study:2024-07-01 04:42:13      ECG 12 Lead Dyspnea   Final Result   HEART FOEK=355  bpm   RR Uhjhlsxq=839  ms   DE Interval=  ms   P Horizontal Axis=  deg   P Front Axis=  deg   QRSD Anmvmyak=937  ms   QT Fqjxaesf=348  ms   DSdX=006  ms   QRS Axis=-101  deg   T Wave Axis=43  deg   - ABNORMAL ECG -   Atrial fibrillation   Right bundle branch block   ST elevation secondary to IVCD   Electronically Signed By: Jeffery Kwon ([facility]) 2024-07-01 07:37:02   Date and Time of Study:2024-06-30 16:58:43      Telemetry Scan   Final Result      Telemetry Scan   Final Result      Telemetry Scan   Final Result      Telemetry Scan   Final Result      Telemetry Scan   Final Result      Telemetry Scan   Final Result      Telemetry Scan   Final Result      Telemetry Scan   Final Result      Telemetry Scan   Final Result      Telemetry Scan   Final Result      Telemetry Scan   Final Result      Telemetry Scan   Final Result      Telemetry Scan   Final Result      Telemetry Scan   Final Result      Telemetry Scan   Final Result      Telemetry Scan   Final Result      Telemetry Scan   Final Result      Telemetry Scan   Final Result      Telemetry Scan   Final Result      Telemetry Scan   Final Result      Telemetry Scan    Final Result      Telemetry Scan   Final Result      Telemetry Scan   Final Result      Telemetry Scan   Final Result      Telemetry Scan   Final Result      Telemetry Scan   Final Result      Telemetry Scan   Final Result      Telemetry Scan   Final Result      Telemetry Scan   Final Result      Telemetry Scan   Final Result      Telemetry Scan   Final Result      Telemetry Scan   Final Result      Telemetry Scan   Final Result      Telemetry Scan   Final Result      Telemetry Scan   Final Result      Telemetry Scan   Final Result      Telemetry Scan   Final Result      Telemetry Scan   Final Result      Telemetry Scan   Final Result      Telemetry Scan   Final Result      Telemetry Scan   Final Result      Telemetry Scan   Final Result      Telemetry Scan   Final Result      Telemetry Scan   Final Result      Telemetry Scan   Final Result      Telemetry Scan   Final Result      Telemetry Scan   Final Result      Telemetry Scan   Final Result      Telemetry Scan   Final Result      Telemetry Scan   Final Result      Telemetry Scan   Final Result      Telemetry Scan   Final Result      Telemetry Scan   Final Result      Telemetry Scan   Final Result      Telemetry Scan   Final Result      Telemetry Scan   Final Result      Telemetry Scan   Final Result      Telemetry Scan   Final Result      Telemetry Scan   Final Result      Telemetry Scan   Final Result      Telemetry Scan   Final Result      Telemetry Scan   Final Result      Telemetry Scan   Final Result      Telemetry Scan   Final Result      Telemetry Scan   Final Result      Telemetry Scan   Final Result      Telemetry Scan   Final Result      Telemetry Scan   Final Result      Telemetry Scan   Final Result      Telemetry Scan   Final Result      Telemetry Scan   Final Result      Telemetry Scan   Final Result      Telemetry Scan   Final Result      Telemetry Scan   Final Result      Telemetry Scan   Final Result      Telemetry Scan   Final Result       Telemetry Scan   Final Result      Telemetry Scan   Final Result      Telemetry Scan   Final Result      Telemetry Scan   Final Result      Telemetry Scan   Final Result      Telemetry Scan   Final Result      Telemetry Scan   Final Result      Telemetry Scan   Final Result      Telemetry Scan   Final Result      Telemetry Scan   Final Result      Telemetry Scan   Final Result      Telemetry Scan   Final Result      Telemetry Scan   Final Result      Telemetry Scan   Final Result      Telemetry Scan   Final Result      Telemetry Scan   Final Result      Telemetry Scan   Final Result      Telemetry Scan   Final Result      Telemetry Scan   Final Result      Telemetry Scan   Final Result      Telemetry Scan   Final Result      Telemetry Scan   Final Result      Telemetry Scan   Final Result      Telemetry Scan   Final Result      Telemetry Scan   Final Result      Telemetry Scan   Final Result      Telemetry Scan   Final Result      Telemetry Scan   Final Result      Telemetry Scan   Final Result      Telemetry Scan   Final Result      Telemetry Scan   Final Result      Telemetry Scan   Final Result      Telemetry Scan   Final Result      Telemetry Scan   Final Result      Telemetry Scan   Final Result      Telemetry Scan   Final Result      Telemetry Scan   Final Result      Telemetry Scan   Final Result      Telemetry Scan   Final Result      Telemetry Scan   Final Result      Telemetry Scan   Final Result      Telemetry Scan   Final Result      Telemetry Scan   Final Result      Telemetry Scan   Final Result      Telemetry Scan   Final Result      Telemetry Scan   Final Result      ECG 12 Lead Electrolyte Imbalance    (Results Pending)         Echocardiogram:    Results for orders placed during the hospital encounter of 06/30/24    Adult Transthoracic Echo Complete w/ Color, Spectral and Contrast if Necessary Per Protocol    Interpretation Summary    Left ventricular ejection fraction appears to be 31 - 35%.     Left ventricular diastolic dysfunction is noted.    The left atrial cavity is dilated.    Moderate aortic valve stenosis is present. Mean/peak gradient of 14/24 mmHg.  Dimensionless index 0.36    Moderate to severe mitral valve regurgitation is present.    Estimated right ventricular systolic pressure from tricuspid regurgitation is normal (<35 mmHg).        Stress Test:         Cardiac Catheterization:  No results found for this or any previous visit.         Other:         ASSESSMENT & PLAN:    Principal Problem:    Atrial fibrillation with RVR  Active Problems:    Primary hypertension    Morbid obesity with BMI of 40.0-44.9, adult    Right bundle branch block    Acute deep vein thrombosis (DVT) of both lower extremities    ARABELLA (acute kidney injury)    Acute hyperkalemia    Sepsis    Acute UTI    Acute systolic congestive heart failure    Rhinovirus infection    Multifocal pneumonia    Chronic respiratory failure with hypoxia, on home O2 therapy    Skin ulcer of right great toe    Skin ulcer of left great toe    SEDRICK (obstructive sleep apnea)    Aortic stenosis    Mitral regurgitation    Acute HFrEF (heart failure with reduced ejection fraction)    COPD (chronic obstructive pulmonary disease)      Acute respiratory failure with hypoxia and hypercapnia  Acute encephalopathy   Status post chest tube placement for hydropneumothorax  Chest tube removed 7/18/2024  Pulmonology is managing.  Improved  Currently on oxygen at 3 liters per nasal cannula     Atrial fibrillation with RVR  Newly diagnosed  Electrolytes have been corrected  Rate controlled  Continue amiodarone for rhythm control X 6 weeks  Continue Toprol-XL 25 mg p.o. twice daily  [id number] score is 7  Continue Eliquis  Patient was evaluated by GI for possible GI bleeding, but no plans for endoscopy.  Status post D&C on 7/22/24  Hemoglobin stable at 8.9 today     Acute systolic CHF  Newly diagnosed  Echocardiogram shows EF of 31 to 35%, moderate aortic  stenosis and moderate to severe mitral regurgitation.  Continue Bumex 1 mg oral twice daily   Monitor I's and O's and replace electrolytes as needed.  Continue Toprol XL  Unable to add ACE-I/ARB/ARNI due to low blood pressure  Was requiring midodrine.  Now off.  Blood pressure is still borderline low.  Aldactone has been held  Plan to repeat echocardiogram after 3 months of GDMT     Acute deep vein thrombosis (DVT) of both lower extremities  Extensive DVT in bilateral lower extremities involving femoral, popliteal and posterior tibial.  Continue Eliquis   She will need lifelong anticoagulation      ARABELLA (acute kidney injury)  Resolved      Acute hyperkalemia  Resolved     Acute thrombocytopenia  Resolved      Sepsis / UTI / Multifocal pneumonia / Rhinovirus infection  Multifactorial secondary to UTI and pneumonia with possible cellulitis  Completed antibiotic course      Skin ulcer of right great toe  Skin ulcer of left great toe  Podiatry following   A1c is 6.2%     Primary hypertension, chronic  Patient was hypotensive due to sepsis and required midodrine, but now off midodrine.  Continue Toprol XL  Blood pressure remains soft     Morbid obesity with BMI of 40.0-44.9, adult  BMI 40.65  Lifestyle modifications recommended   LDL 27, HDL 13, triglyceride 86 and total cholesterol 58.  No indication for statin     SEDRICK (obstructive sleep apnea)  Encouraged compliance with CPAP       Electronically signed by MANJU Kennedy, 07/23/24, 1:29 PM EDT.

## 2024-07-23 NOTE — PLAN OF CARE
Goal Outcome Evaluation:         Pt OOB to chair with PT.  Returned to bed per RN + 1.  Continue to monitor BG.  Preparing for discharge to [facility].  Cardiology signed off earlier today.  Patient informed.  Daughter will be notified of ETA once EMS arrives.

## 2024-07-24 LAB
LAB AP CASE REPORT: NORMAL
LAB AP DIAGNOSIS COMMENT: NORMAL
PATH REPORT.FINAL DX SPEC: NORMAL
PATH REPORT.GROSS SPEC: NORMAL

## 2024-07-24 NOTE — CASE MANAGEMENT/SOCIAL WORK
Case Management Discharge Note      Final Note: [facility]      Selected Continued Care - Discharged on 7/23/2024 Admission date: 6/30/2024 - Discharge disposition: Short Term Hospital (DC - External)      Destination Coordination complete.      Service Provider Selected Services Address Phone Fax Patient Preferred    [facility] [address], [place] [address] [phone]                     Transportation Services  Ambulance: [facility]    Final Discharge Disposition Code: 03 - skilled nursing facility (SNF)

## 2024-08-27 ENCOUNTER — ON CAMPUS - OUTPATIENT (OUTPATIENT)
Age: 69
End: 2024-08-27
Payer: MEDICAID

## 2024-08-27 ENCOUNTER — ON CAMPUS - OUTPATIENT (OUTPATIENT)
Dept: URBAN - METROPOLITAN AREA HOSPITAL 77 | Facility: HOSPITAL | Age: 69
End: 2024-08-27
Payer: MEDICAID

## 2024-08-27 DIAGNOSIS — K86.9 DISEASE OF PANCREAS, UNSPECIFIED: ICD-10-CM

## 2024-08-27 PROCEDURE — 43242 EGD US FINE NEEDLE BX/ASPIR: CPT | Performed by: INTERNAL MEDICINE

## 2024-09-05 NOTE — PROGRESS NOTES
Hematology/Oncology Outpatient Consultation    Patient name: Ban Brennan  : 1955  MRN: [id number]  Primary Care Physician: Rut Pierce APRN  Referring Physician: Saurabh Merida MD  Reason For Consult:     Chief Complaint   Patient presents with    Appointment     Pancreatic Mass       History of Present Illness:    Ban Brennan is a 69 y.o. female who presented to [facility] on 2024 with complaints of shortness of breath.  Past medical history of hypertension, heart failure, chronic bilateral lower extremity edema, remote history of pulmonary embolism treated with warfarin.  The patient was brought to the ED via EMS.  The patient's daughter reported that the patient does not normally go to the doctor and had not been in the hospital for years.  She was unsure of her full medical history but stated that her legs have been swollen for a long time and that she had not been mobile for approximately 1 year.  She reported that a home health nurse comes to the house and wraps her mother's legs twice a week.  EMS was called due to the worsening shortness of breath and the patient was found to have an EKG showing a wide-complex tachycardia with a heart rate in the 190s.  She was given amiodarone by EMS with rate improvement to the 160s.  Follow-up EKG in the ED at [facility] showed atrial fibrillation with RVR with a heart rate of 145.  Chest x-ray showed fluid overload with bilateral effusions.  The patient had a elevated white blood cell count of 16.8 and 4+ bacteria in her urine.  She was also noted to be hyperkalemic with a potassium of 6.5.  She was diagnosed with cellulitis and a urinary tract infection and initiated on IV antibiotics with cefepime and vancomycin.  She was admitted for further evaluation and treatment.     2024 bilateral venous Doppler ultrasound  -Acute right lower extremity DVT in the common femoral, proximal femoral, mid femoral, distal femoral, and  popliteal  -Acute right lower extremity superficial thrombophlebitis in the saphenofemoral junction and great saphenous  -Acute left lower extremity DVT in the proximal femoral, mid femoral, distal femoral, popliteal, and posterior tibial     7/1/2020 four 2D echocardiogram  -Left ventricular ejection fraction 31-35%  -Left ventricular diastolic dysfunction  -Left atrial cavity dilated  -Moderate aortic valve stenosis  -Moderate to severe mitral valve regurgitation  -Estimated right ventricular systolic pressure from tricuspid regurgitation is normal     7/1/2024 CT chest PE protocol  -No evidence for pulmonary embolus  -Bibasilar airspace disease with right middle lung opacity compatible likely multifocal pneumonia with bilateral pleural effusions  -Cardiomegaly     7/1/2024 CT of the abdomen and pelvis without contrast  -Moderately large right pleural effusion and small left pleural effusion; bibasilar infiltrates suggest atelectasis although associated pneumonia not excluded  -Severe cardiomegaly  -Bilateral flank edema  -Probable fibroid uterus     07/01/24  Hematology/Oncology was consulted for bilateral lower extremity DVT.     7/13/2024 CT of the abdomen and pelvis without contrast  -3 cm x 2.7 cm soft tissue mass involving the body of the pancreas  -Colonic diverticulosis without evidence of diverticulitis  -Focal splenic infarct  -Small bilateral pleural effusions with trace right sided pneumothorax  -Cholelithiasis without evidence of cholecystitis     7/14/2024 CT of the chest without contrast  -Persistent moderate-sized right pleural effusion despite indwelling pleural catheter.  Tiny right pneumothorax.  Improved aeration of the right lung with persistent right middle/lower lobe volume loss and consolidation  -Stable small left pleural effusion with increased left lower lobe volume loss and consolidation  -Stable massive cardiomegaly      8/27/2024 patient had EUS with cyst aspiration and FNP impression  "is possible cirrhosis adenoma versus questionable IPMN patient had both the cystic as well as solid component with microcystic areas most likely suggestive of cirrhosis.  Cyst was aspirated was sent for CEA.  Solid component of the cyst was also separately sampled, mild prominent common bile duct  Cytology showed hypocellular specimen containing a few benign glandular cells insufficient for further diagnosis.  The smears and cell block show very low cellularity.  Patient has not followed up with Dr. Colunga       He/She  has a past medical history of Bilateral leg edema, Chronic respiratory failure with hypoxia, on home O2 therapy (07/01/2024), COPD (chronic obstructive pulmonary disease), Morbid obesity with BMI of 40.0-44.9, adult (11/08/2010), Primary hypertension (03/01/2017), Pulmonary embolism, and Sleep apnea.     PCP: Rut Pierce APRN              Past Medical History:   Diagnosis Date    Bilateral leg edema     Chronic respiratory failure with hypoxia, on home O2 therapy 07/01/2024    COPD (chronic obstructive pulmonary disease)     Morbid obesity with BMI of 40.0-44.9, adult 11/08/2010    Primary hypertension 03/01/2017    Pulmonary embolism     \"many years ago, was on warfarin\"    Sleep apnea        Past Surgical History:   Procedure Laterality Date    D & C HYSTEROSCOPY N/A 7/22/2024    Procedure: DILATATION AND CURETTAGE HYSTEROSCOPY;  Surgeon: Sosa Newell MD;  Location: [facility] OR;  Service: Gynecology;  Laterality: N/A;         Current Outpatient Medications:     levothyroxine (SYNTHROID, LEVOTHROID) 25 MCG tablet, , Disp: , Rfl:     predniSONE (DELTASONE) 10 MG tablet, , Disp: , Rfl:     Allergy Relief 180 MG tablet, Take 1 tablet by mouth Daily., Disp: , Rfl:     amiodarone (PACERONE) 200 MG tablet, Take 1 tablet by mouth Daily., Disp: , Rfl:     apixaban (ELIQUIS) 5 MG tablet tablet, Take 1 tablet by mouth 2 (Two) Times a Day. Indications: DVT/PE (active thrombosis), Disp: , " Rfl:     bumetanide (BUMEX) 1 MG tablet, Take 1 tablet by mouth 2 (Two) Times a Day., Disp: , Rfl:     docusate sodium (COLACE) 100 MG capsule, Take 1 capsule by mouth Every 12 (Twelve) Hours., Disp: , Rfl:     furosemide (LASIX) 20 MG tablet, 30, Disp: , Rfl:     HYDROcodone-acetaminophen (NORCO)  MG per tablet, 90, Disp: , Rfl:     ipratropium-albuterol (DUO-NEB) 0.5-2.5 mg/3 ml nebulizer, Take 3 mL by nebulization Every 4 (Four) Hours As Needed for Shortness of Air or Wheezing., Disp: , Rfl:     lisinopril (PRINIVIL,ZESTRIL) 30 MG tablet, 90, Disp: , Rfl:     meloxicam (MOBIC) 7.5 MG tablet, 90, Disp: , Rfl:     metoprolol succinate XL (TOPROL-XL) 25 MG 24 hr tablet, Take 1 tablet by mouth Every 12 (Twelve) Hours., Disp: , Rfl:     metoprolol tartrate (LOPRESSOR) 50 MG tablet, 180, Disp: , Rfl:     montelukast (SINGULAIR) 10 MG tablet, Take 1 tablet by mouth every night at bedtime., Disp: , Rfl:     omeprazole (priLOSEC) 20 MG capsule, Take 1 capsule by mouth Daily., Disp: , Rfl:     oxybutynin XL (DITROPAN-XL) 10 MG 24 hr tablet, Take 2 tablets by mouth Daily., Disp: , Rfl:     potassium chloride (MICRO-K) 10 MEQ CR capsule, 180, Disp: , Rfl:     vitamin D (ERGOCALCIFEROL) 1.25 MG (51515 UT) capsule capsule, Take 1 capsule by mouth 2 (Two) Times a Week. Monday and Thursday., Disp: , Rfl:     No Known Allergies    Immunization History   Administered Date(s) Administered    COVID-19 (MODERNA) 1st,2nd,3rd Dose Monovalent 10/11/2021, 12/31/2021    COVID-19 (MODERNA) BIVALENT 12+YRS 11/14/2022    Flu Vaccine Quad PF 6-35MO 08/13/2015, 10/05/2017    Fluzone High-Dose 65+yrs 11/14/2022    Hepatitis A 01/08/2019    Pneumococcal Conjugate 13-Valent (PCV13) 08/13/2015    Pneumococcal Polysaccharide (PPSV23) 10/07/2016    Tdap 08/13/2015    Zostavax 08/13/2015       No family history on file.    Cancer-related family history is not on file.    Social History     Tobacco Use    Smoking status: Never    Smokeless  "tobacco: Never   Vaping Use    Vaping status: Never Used   Substance Use Topics    Alcohol use: Never    Drug use: Never       ROS:    Review of Systems   Constitutional:  Positive for fatigue. Negative for chills and fever.   HENT:  Negative for congestion, drooling, ear discharge, rhinorrhea, sinus pressure and tinnitus.    Eyes:  Negative for photophobia, pain and discharge.   Respiratory:  Negative for apnea, choking and stridor.    Cardiovascular:  Negative for palpitations.   Gastrointestinal:  Negative for abdominal distention, abdominal pain and anal bleeding.   Endocrine: Negative for polydipsia and polyphagia.   Genitourinary:  Negative for decreased urine volume, flank pain and genital sores.   Musculoskeletal:  Negative for gait problem, neck pain and neck stiffness.   Skin:  Negative for color change, rash and wound.   Neurological:  Positive for weakness. Negative for tremors, seizures, syncope, facial asymmetry and speech difficulty.   Hematological:  Negative for adenopathy.   Psychiatric/Behavioral:  Negative for agitation, confusion, hallucinations and self-injury. The patient is not hyperactive.        Objective:    Vitals:    09/06/24 0910   BP: 97/54   Pulse: 69   Resp: 20   Temp: 98.3 °F (36.8 °C)   TempSrc: Infrared   SpO2: 92%   Height: 149.9 cm (59\")   PainSc: 10-Worst pain ever   PainLoc: Comment: stomach, back, legs     Body mass index is 38.98 kg/m².    ECOG  (2) Ambulatory and capable of self care, unable to carry out work activity, up and about > 50% or waking hours    Physical Exam:  Physical Exam  Vitals and nursing note reviewed.   Constitutional:       General: She is in acute distress.      Appearance: She is ill-appearing. She is not diaphoretic.   HENT:      Head: Normocephalic and atraumatic.   Eyes:      General: No scleral icterus.        Right eye: No discharge.         Left eye: No discharge.      Conjunctiva/sclera: Conjunctivae normal.   Neck:      Thyroid: No thyromegaly. "   Cardiovascular:      Rate and Rhythm: Normal rate and regular rhythm.      Heart sounds: Normal heart sounds.      No friction rub. No gallop.   Pulmonary:      Effort: Pulmonary effort is normal. No respiratory distress.      Breath sounds: No stridor. No wheezing.   Abdominal:      General: Bowel sounds are normal.      Palpations: Abdomen is soft. There is no mass.      Tenderness: There is no abdominal tenderness. There is no guarding or rebound.      Comments: Abdominal  discomfort ,  nodules on right  abdominal wall, left upper abdomen   Musculoskeletal:         General: No tenderness. Normal range of motion.      Cervical back: Normal range of motion and neck supple.   Lymphadenopathy:      Cervical: No cervical adenopathy.   Skin:     General: Skin is warm.      Findings: Bruising present. No erythema or rash.   Neurological:      Mental Status: She is alert and oriented to person, place, and time.      Motor: No abnormal muscle tone.   Psychiatric:         Behavior: Behavior normal.         RECENT LABS  WBC   Date Value Ref Range Status   09/06/2024 9.45 3.40 - 10.80 10*3/mm3 Final     RBC   Date Value Ref Range Status   09/06/2024 3.38 (L) 3.77 - 5.28 10*6/mm3 Final     Hemoglobin   Date Value Ref Range Status   09/06/2024 10.5 (L) 12.0 - 15.9 g/dL Final     Hematocrit   Date Value Ref Range Status   09/06/2024 34.5 34.0 - 46.6 % Final     MCV   Date Value Ref Range Status   09/06/2024 102.1 (H) 79.0 - 97.0 fL Final     MCH   Date Value Ref Range Status   09/06/2024 31.1 26.6 - 33.0 pg Final     MCHC   Date Value Ref Range Status   09/06/2024 30.4 (L) 31.5 - 35.7 g/dL Final     RDW   Date Value Ref Range Status   09/06/2024 14.9 12.3 - 15.4 % Final     RDW-SD   Date Value Ref Range Status   09/06/2024 54.5 (H) 37.0 - 54.0 fl Final     MPV   Date Value Ref Range Status   09/06/2024 8.7 6.0 - 12.0 fL Final     Platelets   Date Value Ref Range Status   09/06/2024 415 140 - 450 10*3/mm3 Final     Neutrophil  %   Date Value Ref Range Status   09/06/2024 53.2 42.7 - 76.0 % Final     Lymphocyte %   Date Value Ref Range Status   09/06/2024 32.1 19.6 - 45.3 % Final     Monocyte %   Date Value Ref Range Status   09/06/2024 12.8 (H) 5.0 - 12.0 % Final     Eosinophil %   Date Value Ref Range Status   09/06/2024 1.5 0.3 - 6.2 % Final     Basophil %   Date Value Ref Range Status   09/06/2024 0.4 0.0 - 1.5 % Final     Immature Grans %   Date Value Ref Range Status   07/23/2024 0.9 (H) 0.0 - 0.5 % Final     Neutrophils, Absolute   Date Value Ref Range Status   09/06/2024 5.03 1.70 - 7.00 10*3/mm3 Final     Lymphocytes, Absolute   Date Value Ref Range Status   09/06/2024 3.03 0.70 - 3.10 10*3/mm3 Final     Monocytes, Absolute   Date Value Ref Range Status   09/06/2024 1.21 (H) 0.10 - 0.90 10*3/mm3 Final     Eosinophils, Absolute   Date Value Ref Range Status   09/06/2024 0.14 0.00 - 0.40 10*3/mm3 Final     Basophils, Absolute   Date Value Ref Range Status   09/06/2024 0.04 0.00 - 0.20 10*3/mm3 Final     Immature Grans, Absolute   Date Value Ref Range Status   07/23/2024 0.05 0.00 - 0.05 10*3/mm3 Final     nRBC   Date Value Ref Range Status   07/23/2024 0.0 0.0 - 0.2 /100 WBC Final       Lab Results   Component Value Date    GLUCOSE 122 (H) 07/23/2024    BUN 21 07/23/2024    CREATININE 0.82 07/23/2024    BCR 25.6 (H) 07/23/2024    K 3.7 07/23/2024    CO2 37.3 (H) 07/23/2024    CALCIUM 8.7 07/23/2024    ALBUMIN 2.0 (L) 07/23/2024    LABIL2 1.1 06/19/2018    AST 57 (H) 07/23/2024    ALT 41 (H) 07/23/2024         Assessment & Plan   Pancreatic mass  - CBC & Differential      Pancreatic mass:  -3 x 2.7 cm soft tissue mass involving the body of the pancreas, This was not reported on prior CT scan from 7/1/24.  -CA 19-9 levels mildly elevated, chromogranin levels pending.  -MRI abdomen reviewed: Multiseptated pancreatic lesion in the pancreatic neck measuring 2.7 x 2.6 cm, concerning for malignancy. Hypervascular lesion on Liver is less  likely to be metastatic.  -GI team on board, awaiting further recommendations. She will likely benefit from ERCP/EUS with FNA for tissue diagnosis. Patient is agreeable to it.  -per GI team, likely plan for outpatient ERCP/EUS.  -August 2024: EUS cyst aspiration: Cytology was negative for malignancy: Per Dr. Colunga.  Patient to follow-up with him     Bilateral lower extremity DVT:  Remote history of pulmonary embolism  Splenic Infraction:  -Extensive acute right and left lower extremity DVT noted on presentation  -patient's chronic deconditioning and immobility may have been risk factor for DVT.   -Negative CTA Chest for PE.  -Reportedly treated with warfarin for prior history of PE  -patient was initially started on Lovenox and later transitioned to Eliquis.  She completed Eliquis 10mg BID X 1 week followed by 5mg BID thereafter.  -Will need lifelong anticoagulation in my view due to persistent risk factors and extensive DVT as well as history of prior pulmonary embolism.  -CT Abdomen/Pelvis on 7/13 showed splenic infract, likely provoked by pancreatic pathology vs acute illness.   -factor V Leiden and prothrombin gene mutation reported negative. Will defer remainder of thrombophilia workup as it does not impact the decision making around anticoagulation.    -She is currently off anticoagulation therapy unclear reasons.  She may also have abdominal hematomas therefore have ordered CT scan of the abdomen to further evaluate        Uterine mass    -Transvaginal ultrasound suggestive of presence of a large subserosal uterine fundal fibroid.   -CT abdomen/pelvis showed presence of a lobulated mass protruding from the uterine fundus measuring approximately 8.4 x 5.9 cm  -this is concerning for fibroid vs malignancy.  Will recommend GYN Evaluation for the same as outpatient.  -No vaginal bleeding reported.  -She saw gynecologist Dr Sosa Newell and had  D/C no evidence of malignancy.  Has been encouraged to follow-up  with Dr. Newell     Thrombocytopenia  - 119,000 at admission, jacqueline at 85K, back to normal now. Baseline WNL  -Acute drop likely secondary to sepsis, rhinovirus  -Hemolysis labs reported negative.   This has resolved.  Platelets 4515     Sepsis  -UA suggestive of Acute cystitis, Urine culture consistent with GNR UTI.  -Respiratory panel positive for Rhinovirus.  -patient has completed antibiotic therapy.    Resolved     Atrial fibrillation with RVR/paroxysmal atrial fibrillation  Acute on chronic systolic heart failure with diastolic dysfunction,   moderate aortic stenosis, severe mitral valve regurgitation  -On amiodarone drip, diuresis.  Management per cardiology.   Likely candidate for lifelong anticoagulation given Afib and extensive DVT.     Acute kidney injury/hyperkalemia  Resolved.        Pleural Effusion:  Right sided hydropneumothorax:  Likely secondary to CHF and suspected pneumonia.  -patient is status post Chest tube placement. Ongoing drainage.  -management per primary.      .  CMP CEA CA 19-9 today.  CT scan of the abdomen and pelvis as well  Follow-up in 2 weeks            I spent 40 total minutes, face-to-face, caring for Ban today. 90% of this time involved counseling and/or coordination of care as documented within this note.

## 2024-09-06 ENCOUNTER — CONSULT (OUTPATIENT)
Dept: ONCOLOGY | Facility: CLINIC | Age: 69
End: 2024-09-06
Payer: MEDICARE

## 2024-09-06 ENCOUNTER — LAB (OUTPATIENT)
Dept: LAB | Facility: HOSPITAL | Age: 69
End: 2024-09-06
Payer: MEDICARE

## 2024-09-06 VITALS
RESPIRATION RATE: 20 BRPM | HEIGHT: 59 IN | SYSTOLIC BLOOD PRESSURE: 97 MMHG | TEMPERATURE: 98.3 F | BODY MASS INDEX: 38.98 KG/M2 | HEART RATE: 69 BPM | OXYGEN SATURATION: 92 % | DIASTOLIC BLOOD PRESSURE: 54 MMHG

## 2024-09-06 DIAGNOSIS — K86.89 PANCREATIC MASS: Primary | ICD-10-CM

## 2024-09-06 DIAGNOSIS — R79.89 OTHER SPECIFIED ABNORMAL FINDINGS OF BLOOD CHEMISTRY: ICD-10-CM

## 2024-09-06 DIAGNOSIS — R97.8 OTHER ABNORMAL TUMOR MARKERS: ICD-10-CM

## 2024-09-06 PROBLEM — K86.9 PANCREATIC LESION: Status: ACTIVE | Noted: 2024-09-06

## 2024-09-06 LAB
BASOPHILS # BLD AUTO: 0.04 10*3/MM3 (ref 0–0.2)
BASOPHILS NFR BLD AUTO: 0.4 % (ref 0–1.5)
CANCER AG19-9 SERPL-ACNC: 53.2 U/ML
CEA SERPL-MCNC: 10.7 NG/ML
DEPRECATED RDW RBC AUTO: 54.5 FL (ref 37–54)
EOSINOPHIL # BLD AUTO: 0.14 10*3/MM3 (ref 0–0.4)
EOSINOPHIL NFR BLD AUTO: 1.5 % (ref 0.3–6.2)
ERYTHROCYTE [DISTWIDTH] IN BLOOD BY AUTOMATED COUNT: 14.9 % (ref 12.3–15.4)
HCT VFR BLD AUTO: 34.5 % (ref 34–46.6)
HGB BLD-MCNC: 10.5 G/DL (ref 12–15.9)
HOLD SPECIMEN: NORMAL
HOLD SPECIMEN: NORMAL
LYMPHOCYTES # BLD AUTO: 3.03 10*3/MM3 (ref 0.7–3.1)
LYMPHOCYTES NFR BLD AUTO: 32.1 % (ref 19.6–45.3)
MCH RBC QN AUTO: 31.1 PG (ref 26.6–33)
MCHC RBC AUTO-ENTMCNC: 30.4 G/DL (ref 31.5–35.7)
MCV RBC AUTO: 102.1 FL (ref 79–97)
MONOCYTES # BLD AUTO: 1.21 10*3/MM3 (ref 0.1–0.9)
MONOCYTES NFR BLD AUTO: 12.8 % (ref 5–12)
NEUTROPHILS NFR BLD AUTO: 5.03 10*3/MM3 (ref 1.7–7)
NEUTROPHILS NFR BLD AUTO: 53.2 % (ref 42.7–76)
PLATELET # BLD AUTO: 415 10*3/MM3 (ref 140–450)
PMV BLD AUTO: 8.7 FL (ref 6–12)
RBC # BLD AUTO: 3.38 10*6/MM3 (ref 3.77–5.28)
WBC NRBC COR # BLD AUTO: 9.45 10*3/MM3 (ref 3.4–10.8)

## 2024-09-06 PROCEDURE — 85025 COMPLETE CBC W/AUTO DIFF WBC: CPT

## 2024-09-06 PROCEDURE — 82378 CARCINOEMBRYONIC ANTIGEN: CPT | Performed by: INTERNAL MEDICINE

## 2024-09-06 PROCEDURE — 36415 COLL VENOUS BLD VENIPUNCTURE: CPT

## 2024-09-06 PROCEDURE — 86301 IMMUNOASSAY TUMOR CA 19-9: CPT | Performed by: INTERNAL MEDICINE

## 2024-09-06 RX ORDER — LEVOTHYROXINE SODIUM 25 UG/1
TABLET ORAL
COMMUNITY
Start: 2024-09-03

## 2024-09-06 RX ORDER — LISINOPRIL 30 MG/1
TABLET ORAL
COMMUNITY

## 2024-09-06 RX ORDER — MELOXICAM 7.5 MG/1
TABLET ORAL
COMMUNITY

## 2024-09-06 RX ORDER — HYDROCODONE BITARTRATE AND ACETAMINOPHEN 10; 325 MG/1; MG/1
TABLET ORAL
COMMUNITY

## 2024-09-06 RX ORDER — METOPROLOL TARTRATE 50 MG
TABLET ORAL
COMMUNITY

## 2024-09-06 RX ORDER — FUROSEMIDE 20 MG
TABLET ORAL
COMMUNITY

## 2024-09-06 RX ORDER — POTASSIUM CHLORIDE 750 MG/1
CAPSULE, EXTENDED RELEASE ORAL
COMMUNITY

## 2024-09-06 RX ORDER — PREDNISONE 10 MG/1
TABLET ORAL
COMMUNITY
Start: 2024-08-16

## 2024-09-09 ENCOUNTER — PATIENT ROUNDING (BHMG ONLY) (OUTPATIENT)
Dept: ONCOLOGY | Facility: CLINIC | Age: 69
End: 2024-09-09
Payer: MEDICAID

## 2024-09-09 NOTE — PROGRESS NOTES
September 9, 2024    Hello, may I speak with Ban Brennan?    My name is Aneta Newell      I am  with [facility]  [address] [facility] [place] [address] [phone].    Before we get started may I verify your date of birth? 1955    I am calling to officially welcome you to our practice and ask about your recent visit. Is this a good time to talk? no    Tell me about your visit with us. What things went well?  A My Chart message was sent to the patient.         We're always looking for ways to make our patients' experiences even better. Do you have recommendations on ways we may improve?  no    Overall were you satisfied with your first visit to our practice? yes       I appreciate you taking the time to speak with me today. Is there anything else I can do for you? no      Thank you, and have a great day.

## 2024-09-23 ENCOUNTER — APPOINTMENT (OUTPATIENT)
Dept: GENERAL RADIOLOGY | Facility: HOSPITAL | Age: 69
End: 2024-09-23
Payer: MEDICARE

## 2024-09-23 ENCOUNTER — OFFICE VISIT (OUTPATIENT)
Dept: CARDIOLOGY | Facility: CLINIC | Age: 69
End: 2024-09-23
Payer: MEDICAID

## 2024-09-23 ENCOUNTER — HOSPITAL ENCOUNTER (INPATIENT)
Facility: HOSPITAL | Age: 69
LOS: 3 days | Discharge: HOME | End: 2024-09-26
Attending: INTERNAL MEDICINE | Admitting: INTERNAL MEDICINE
Payer: MEDICARE

## 2024-09-23 ENCOUNTER — PATIENT ROUNDING (BHMG ONLY) (OUTPATIENT)
Dept: CARDIOLOGY | Facility: CLINIC | Age: 69
End: 2024-09-23
Payer: MEDICAID

## 2024-09-23 VITALS
OXYGEN SATURATION: 89 % | HEIGHT: 59 IN | DIASTOLIC BLOOD PRESSURE: 60 MMHG | WEIGHT: 193 LBS | BODY MASS INDEX: 38.91 KG/M2 | HEART RATE: 66 BPM | SYSTOLIC BLOOD PRESSURE: 88 MMHG | RESPIRATION RATE: 16 BRPM

## 2024-09-23 DIAGNOSIS — I50.9 ACUTE ON CHRONIC CONGESTIVE HEART FAILURE, UNSPECIFIED HEART FAILURE TYPE: ICD-10-CM

## 2024-09-23 DIAGNOSIS — I82.423 ACUTE DEEP VEIN THROMBOSIS (DVT) OF ILIAC VEIN OF BOTH LOWER EXTREMITIES: ICD-10-CM

## 2024-09-23 DIAGNOSIS — I50.21 ACUTE HFREF (HEART FAILURE WITH REDUCED EJECTION FRACTION): ICD-10-CM

## 2024-09-23 DIAGNOSIS — I48.91 ATRIAL FIBRILLATION WITH RVR: Primary | ICD-10-CM

## 2024-09-23 DIAGNOSIS — I50.21 ACUTE SYSTOLIC CONGESTIVE HEART FAILURE: ICD-10-CM

## 2024-09-23 DIAGNOSIS — I48.20 CHRONIC A-FIB: Primary | ICD-10-CM

## 2024-09-23 DIAGNOSIS — R97.8 ELEVATED CA 19-9 LEVEL: ICD-10-CM

## 2024-09-23 LAB
ALBUMIN SERPL-MCNC: 2.8 G/DL (ref 3.5–5.2)
ALBUMIN/GLOB SERPL: 0.7 G/DL
ALP SERPL-CCNC: 169 U/L (ref 39–117)
ALT SERPL W P-5'-P-CCNC: 13 U/L (ref 1–33)
ANION GAP SERPL CALCULATED.3IONS-SCNC: 8.2 MMOL/L (ref 5–15)
APTT PPP: 34.2 SECONDS (ref 61–76.5)
AST SERPL-CCNC: 34 U/L (ref 1–32)
BASOPHILS # BLD AUTO: 0.06 10*3/MM3 (ref 0–0.2)
BASOPHILS NFR BLD AUTO: 0.6 % (ref 0–1.5)
BILIRUB SERPL-MCNC: 0.4 MG/DL (ref 0–1.2)
BUN SERPL-MCNC: 20 MG/DL (ref 8–23)
BUN/CREAT SERPL: 20.4 (ref 7–25)
CALCIUM SPEC-SCNC: 9.3 MG/DL (ref 8.6–10.5)
CHLORIDE SERPL-SCNC: 102 MMOL/L (ref 98–107)
CO2 SERPL-SCNC: 31.8 MMOL/L (ref 22–29)
CREAT SERPL-MCNC: 0.98 MG/DL (ref 0.57–1)
D-LACTATE SERPL-SCNC: 1.2 MMOL/L (ref 0.3–2)
DEPRECATED RDW RBC AUTO: 50.7 FL (ref 37–54)
EGFRCR SERPLBLD CKD-EPI 2021: 62.6 ML/MIN/1.73
EOSINOPHIL # BLD AUTO: 0.13 10*3/MM3 (ref 0–0.4)
EOSINOPHIL NFR BLD AUTO: 1.4 % (ref 0.3–6.2)
ERYTHROCYTE [DISTWIDTH] IN BLOOD BY AUTOMATED COUNT: 13.8 % (ref 12.3–15.4)
GLOBULIN UR ELPH-MCNC: 4.2 GM/DL
GLUCOSE SERPL-MCNC: 76 MG/DL (ref 65–99)
HCT VFR BLD AUTO: 33.2 % (ref 34–46.6)
HGB BLD-MCNC: 10.1 G/DL (ref 12–15.9)
IMM GRANULOCYTES # BLD AUTO: 0.05 10*3/MM3 (ref 0–0.05)
IMM GRANULOCYTES NFR BLD AUTO: 0.5 % (ref 0–0.5)
INR PPP: 1.28 (ref 0.93–1.1)
LYMPHOCYTES # BLD AUTO: 1.89 10*3/MM3 (ref 0.7–3.1)
LYMPHOCYTES NFR BLD AUTO: 20.4 % (ref 19.6–45.3)
MCH RBC QN AUTO: 30.3 PG (ref 26.6–33)
MCHC RBC AUTO-ENTMCNC: 30.4 G/DL (ref 31.5–35.7)
MCV RBC AUTO: 99.7 FL (ref 79–97)
MONOCYTES # BLD AUTO: 0.82 10*3/MM3 (ref 0.1–0.9)
MONOCYTES NFR BLD AUTO: 8.9 % (ref 5–12)
NEUTROPHILS NFR BLD AUTO: 6.31 10*3/MM3 (ref 1.7–7)
NEUTROPHILS NFR BLD AUTO: 68.2 % (ref 42.7–76)
NRBC BLD AUTO-RTO: 0 /100 WBC (ref 0–0.2)
NT-PROBNP SERPL-MCNC: ABNORMAL PG/ML (ref 0–900)
PLATELET # BLD AUTO: 293 10*3/MM3 (ref 140–450)
PMV BLD AUTO: 9.5 FL (ref 6–12)
POTASSIUM SERPL-SCNC: 4 MMOL/L (ref 3.5–5.2)
PROT SERPL-MCNC: 7 G/DL (ref 6–8.5)
PROTHROMBIN TIME: 13.7 SECONDS (ref 9.6–11.7)
RBC # BLD AUTO: 3.33 10*6/MM3 (ref 3.77–5.28)
SODIUM SERPL-SCNC: 142 MMOL/L (ref 136–145)
WBC NRBC COR # BLD AUTO: 9.26 10*3/MM3 (ref 3.4–10.8)

## 2024-09-23 PROCEDURE — 85730 THROMBOPLASTIN TIME PARTIAL: CPT | Performed by: PHYSICIAN ASSISTANT

## 2024-09-23 PROCEDURE — 71045 X-RAY EXAM CHEST 1 VIEW: CPT

## 2024-09-23 PROCEDURE — 99285 EMERGENCY DEPT VISIT HI MDM: CPT

## 2024-09-23 PROCEDURE — 36415 COLL VENOUS BLD VENIPUNCTURE: CPT

## 2024-09-23 PROCEDURE — 3074F SYST BP LT 130 MM HG: CPT | Performed by: INTERNAL MEDICINE

## 2024-09-23 PROCEDURE — 85610 PROTHROMBIN TIME: CPT | Performed by: PHYSICIAN ASSISTANT

## 2024-09-23 PROCEDURE — 87040 BLOOD CULTURE FOR BACTERIA: CPT | Performed by: PHYSICIAN ASSISTANT

## 2024-09-23 PROCEDURE — 80053 COMPREHEN METABOLIC PANEL: CPT | Performed by: PHYSICIAN ASSISTANT

## 2024-09-23 PROCEDURE — 93005 ELECTROCARDIOGRAM TRACING: CPT | Performed by: INTERNAL MEDICINE

## 2024-09-23 PROCEDURE — 25010000002 BUMETANIDE PER 0.5 MG: Performed by: PHYSICIAN ASSISTANT

## 2024-09-23 PROCEDURE — 85025 COMPLETE CBC W/AUTO DIFF WBC: CPT | Performed by: PHYSICIAN ASSISTANT

## 2024-09-23 PROCEDURE — 83880 ASSAY OF NATRIURETIC PEPTIDE: CPT | Performed by: PHYSICIAN ASSISTANT

## 2024-09-23 PROCEDURE — 93005 ELECTROCARDIOGRAM TRACING: CPT

## 2024-09-23 PROCEDURE — 83605 ASSAY OF LACTIC ACID: CPT

## 2024-09-23 PROCEDURE — 3078F DIAST BP <80 MM HG: CPT | Performed by: INTERNAL MEDICINE

## 2024-09-23 PROCEDURE — 99214 OFFICE O/P EST MOD 30 MIN: CPT | Performed by: INTERNAL MEDICINE

## 2024-09-23 RX ORDER — AMOXICILLIN 250 MG
2 CAPSULE ORAL 2 TIMES DAILY PRN
Status: DISCONTINUED | OUTPATIENT
Start: 2024-09-23 | End: 2024-09-26 | Stop reason: HOSPADM

## 2024-09-23 RX ORDER — POLYETHYLENE GLYCOL 3350 17 G/17G
17 POWDER, FOR SOLUTION ORAL DAILY PRN
Status: DISCONTINUED | OUTPATIENT
Start: 2024-09-23 | End: 2024-09-26 | Stop reason: HOSPADM

## 2024-09-23 RX ORDER — BUMETANIDE 0.25 MG/ML
2 INJECTION INTRAMUSCULAR; INTRAVENOUS ONCE
Status: COMPLETED | OUTPATIENT
Start: 2024-09-23 | End: 2024-09-23

## 2024-09-23 RX ORDER — NITROGLYCERIN 0.4 MG/1
0.4 TABLET SUBLINGUAL
Status: DISCONTINUED | OUTPATIENT
Start: 2024-09-23 | End: 2024-09-26 | Stop reason: HOSPADM

## 2024-09-23 RX ORDER — FUROSEMIDE 10 MG/ML
40 INJECTION INTRAMUSCULAR; INTRAVENOUS EVERY 12 HOURS
Status: DISCONTINUED | OUTPATIENT
Start: 2024-09-24 | End: 2024-09-26 | Stop reason: HOSPADM

## 2024-09-23 RX ORDER — FERROUS SULFATE 325(65) MG
325 TABLET ORAL
COMMUNITY
Start: 2024-09-03

## 2024-09-23 RX ORDER — HYDROCODONE BITARTRATE AND ACETAMINOPHEN 10; 325 MG/1; MG/1
1 TABLET ORAL EVERY 8 HOURS PRN
Status: DISCONTINUED | OUTPATIENT
Start: 2024-09-23 | End: 2024-09-26 | Stop reason: HOSPADM

## 2024-09-23 RX ORDER — BISACODYL 5 MG/1
5 TABLET, DELAYED RELEASE ORAL DAILY PRN
Status: DISCONTINUED | OUTPATIENT
Start: 2024-09-23 | End: 2024-09-26 | Stop reason: HOSPADM

## 2024-09-23 RX ORDER — POLYETHYLENE GLYCOL 3350 17 G/17G
17 POWDER, FOR SOLUTION ORAL DAILY
COMMUNITY
Start: 2024-09-03

## 2024-09-23 RX ORDER — BISACODYL 10 MG
10 SUPPOSITORY, RECTAL RECTAL DAILY PRN
Status: DISCONTINUED | OUTPATIENT
Start: 2024-09-23 | End: 2024-09-26 | Stop reason: HOSPADM

## 2024-09-23 RX ORDER — HEPARIN SODIUM 10000 [USP'U]/100ML
17.1 INJECTION, SOLUTION INTRAVENOUS
Status: DISCONTINUED | OUTPATIENT
Start: 2024-09-23 | End: 2024-09-26 | Stop reason: HOSPADM

## 2024-09-23 RX ORDER — HYDROCODONE BITARTRATE AND ACETAMINOPHEN 10; 325 MG/1; MG/1
1 TABLET ORAL ONCE
Status: DISCONTINUED | OUTPATIENT
Start: 2024-09-24 | End: 2024-09-23

## 2024-09-23 RX ORDER — MINERAL OIL/HYDROPHIL PETROLAT
1 OINTMENT (GRAM) TOPICAL AS NEEDED
COMMUNITY
Start: 2024-09-03

## 2024-09-23 RX ADMIN — BUMETANIDE 2 MG: 0.25 INJECTION INTRAMUSCULAR; INTRAVENOUS at 19:09

## 2024-09-23 NOTE — Clinical Note
Level of Care: Telemetry [5]   Diagnosis: CHF exacerbation [139491]   Admitting Physician: JUNE MENDOZA [[id number]]   Certification: I Certify That Inpatient Hospital Services Are Medically Necessary For Greater Than 2 Midnights

## 2024-09-23 NOTE — H&P
"[facility] Medicine Services  History & Physical    Patient Name: Ban Brennan  : 1955  MRN: [id number]  Primary Care Physician:  Rut Pierce APRN  Date of admission: 2024  Date and Time of Service: 2024 at 1800    Subjective      Chief Complaint: leg swelling    History of Present Illness: Ban Brennan is a 69 y.o. female with a CMH of atrial fibrillation, CHF, CKD, COPD, DM, HTN who presented to [facility] on 2024 from her cardiologist's office with possible cellulitis, CHF exacerbation and atrial fibrillation.     On ED evaluation, proBNP 10,613, alk phos 169, EKG shows atrial fibrillation. Lower extremities with edema and erythema. ED gave 2mg IV bumex. Cardiology consulted. Hospitalist team to admit for further medical management.       Review of Systems   Constitutional:  Positive for fatigue.   Respiratory:  Positive for shortness of breath.    Cardiovascular:  Positive for leg swelling. Negative for chest pain and palpitations.   Neurological:  Positive for weakness. Negative for syncope and headaches.       Personal History     Past Medical History:   Diagnosis Date    Asthma 24    Atrial fibrillation 24    Bilateral leg edema     CHF (congestive heart failure) 24    Chronic kidney disease 24    Chronic respiratory failure with hypoxia, on home O2 therapy 2024    Congenital heart disease 24    COPD (chronic obstructive pulmonary disease)     Diabetes mellitus 24    Morbid obesity with BMI of 40.0-44.9, adult 2010    Myocardial infarction 35 yrs ago    Primary hypertension 2017    Pulmonary embolism     \"many years ago, was on warfarin\"    Sleep apnea        Past Surgical History:   Procedure Laterality Date    D & C HYSTEROSCOPY N/A 2024    Procedure: DILATATION AND CURETTAGE HYSTEROSCOPY;  Surgeon: Sosa Newell MD;  Location: [facility];  Service: Gynecology;  Laterality: N/A; "       Family History: family history includes Asthma in her brother, sister, and sister; Heart attack in her brother, father, and mother; Heart disease in her maternal aunt; Heart failure in her maternal aunt and maternal aunt; Hypertension in her brother, brother, father, and mother. Otherwise pertinent FHx was reviewed and not pertinent to current issue.    Social History:  reports that she has never smoked. She has never been exposed to tobacco smoke. She has never used smokeless tobacco. She reports that she does not drink alcohol and does not use drugs.    Home Medications:  Prior to Admission Medications       Prescriptions Last Dose Informant Patient Reported? Taking?    Allergy Relief 180 MG tablet   Yes No    Take 1 tablet by mouth Daily.    bumetanide (BUMEX) 1 MG tablet   No No    Take 1 tablet by mouth 2 (Two) Times a Day.    docusate sodium (COLACE) 100 MG capsule   Yes No    Take 1 capsule by mouth Every 12 (Twelve) Hours.    FeroSul 325 (65 Fe) MG tablet   Yes No    Take 1 tablet by mouth Daily With Breakfast.    furosemide (LASIX) 20 MG tablet   Yes No    30    GoodSense ClearLax 17 GM/SCOOP powder   Yes No    Take 17 g by mouth Daily.    HYDROcodone-acetaminophen (NORCO)  MG per tablet   Yes No    90    levothyroxine (SYNTHROID, LEVOTHROID) 25 MCG tablet   Yes No    lisinopril (PRINIVIL,ZESTRIL) 30 MG tablet   Yes No    90    meloxicam (MOBIC) 7.5 MG tablet   Yes No    90    metoprolol tartrate (LOPRESSOR) 50 MG tablet   Yes No    180    mineral oil-hydrophilic petrolatum (AQUAPHOR) ointment   Yes No    Apply 1 Application topically to the appropriate area as directed As Needed.    montelukast (SINGULAIR) 10 MG tablet   Yes No    Take 1 tablet by mouth every night at bedtime.    omeprazole (priLOSEC) 20 MG capsule   Yes No    Take 1 capsule by mouth Daily.    oxybutynin XL (DITROPAN-XL) 10 MG 24 hr tablet   Yes No    Take 2 tablets by mouth Daily.    potassium chloride (MICRO-K) 10 MEQ CR  capsule   Yes No    180    vitamin D (ERGOCALCIFEROL) 1.25 MG (02095 UT) capsule capsule   Yes No    Take 1 capsule by mouth 2 (Two) Times a Week. Monday and Thursday.              Allergies:  No Known Allergies    Objective      Vitals:   Temp:  [98 °F (36.7 °C)] 98 °F (36.7 °C)  Heart Rate:  [] 133  Resp:  [14-18] 18  BP: ()/(47-96) 113/67  Body mass index is 38.94 kg/m².  Physical Exam  Vitals and nursing note reviewed.   Constitutional:       Appearance: Normal appearance.   HENT:      Head: Normocephalic and atraumatic.      Nose: Nose normal.      Mouth/Throat:      Mouth: Mucous membranes are moist.   Eyes:      Extraocular Movements: Extraocular movements intact.      Pupils: Pupils are equal, round, and reactive to light.   Cardiovascular:      Rate and Rhythm: Tachycardia present. Rhythm irregular.      Pulses: Normal pulses.   Pulmonary:      Effort: Pulmonary effort is normal.      Breath sounds: Rales present.   Abdominal:      General: Bowel sounds are normal.      Palpations: Abdomen is soft.   Musculoskeletal:         General: Normal range of motion.      Cervical back: Normal range of motion and neck supple.      Right lower leg: Edema (with bilateral erythema) present.      Left lower leg: Edema present.   Skin:     General: Skin is warm.      Capillary Refill: Capillary refill takes less than 2 seconds.   Neurological:      Mental Status: She is alert and oriented to person, place, and time.         Diagnostic Data:  Lab Results (last 24 hours)       Procedure Component Value Units Date/Time    Blood Culture - Blood, Arm, Left [[id number]] Collected: 09/23/24 1914    Specimen: Blood from Arm, Left Updated: 09/23/24 1917    Comprehensive Metabolic Panel [[id number]]  (Abnormal) Collected: 09/23/24 1700    Specimen: Blood Updated: 09/23/24 1741     Glucose 76 mg/dL      BUN 20 mg/dL      Creatinine 0.98 mg/dL      Sodium 142 mmol/L      Potassium 4.0 mmol/L      Comment: Slight hemolysis  detected by analyzer. Result may be falsely elevated.        Chloride 102 mmol/L      CO2 31.8 mmol/L      Calcium 9.3 mg/dL      Total Protein 7.0 g/dL      Albumin 2.8 g/dL      ALT (SGPT) 13 U/L      AST (SGOT) 34 U/L      Comment: Slight hemolysis detected by analyzer. Result may be falsely elevated.        Alkaline Phosphatase 169 U/L      Total Bilirubin 0.4 mg/dL      Globulin 4.2 gm/dL      A/G Ratio 0.7 g/dL      BUN/Creatinine Ratio 20.4     Anion Gap 8.2 mmol/L      eGFR 62.6 mL/min/1.73     Narrative:      GFR Normal >60  Chronic Kidney Disease <60  Kidney Failure <15      BNP [[id number]]  (Abnormal) Collected: 09/23/24 1700    Specimen: Blood Updated: 09/23/24 1739     proBNP 10,613.0 pg/mL     Narrative:      This assay is used as an aid in the diagnosis of individuals suspected of having heart failure. It can be used as an aid in the diagnosis of acute decompensated heart failure (ADHF) in patients presenting with signs and symptoms of ADHF to the emergency department (ED). In addition, NT-proBNP of <300 pg/mL indicates ADHF is not likely.    Age Range Result Interpretation  NT-proBNP Concentration (pg/mL:      <50             Positive            >450                   Gray                 300-450                    Negative             <300    50-75           Positive            >900                  Gray                300-900                  Negative            <300      >75             Positive            >1800                  Gray                300-1800                  Negative            <300    Protime-INR [[id number]]  (Abnormal) Collected: 09/23/24 1700    Specimen: Blood Updated: 09/23/24 1724     Protime 13.7 Seconds      INR 1.28    aPTT [[id number]]  (Abnormal) Collected: 09/23/24 1700    Specimen: Blood Updated: 09/23/24 1724     PTT 34.2 seconds     CBC & Differential [[id number]]  (Abnormal) Collected: 09/23/24 1700    Specimen: Blood Updated: 09/23/24 1711    Narrative:      The  following orders were created for panel order CBC & Differential.  Procedure                               Abnormality         Status                     ---------                               -----------         ------                     CBC Auto Differential[[id number]]        Abnormal            Final result                 Please view results for these tests on the individual orders.    CBC Auto Differential [[id number]]  (Abnormal) Collected: 09/23/24 1700    Specimen: Blood Updated: 09/23/24 1711     WBC 9.26 10*3/mm3      RBC 3.33 10*6/mm3      Hemoglobin 10.1 g/dL      Hematocrit 33.2 %      MCV 99.7 fL      MCH 30.3 pg      MCHC 30.4 g/dL      RDW 13.8 %      RDW-SD 50.7 fl      MPV 9.5 fL      Platelets 293 10*3/mm3      Neutrophil % 68.2 %      Lymphocyte % 20.4 %      Monocyte % 8.9 %      Eosinophil % 1.4 %      Basophil % 0.6 %      Immature Grans % 0.5 %      Neutrophils, Absolute 6.31 10*3/mm3      Lymphocytes, Absolute 1.89 10*3/mm3      Monocytes, Absolute 0.82 10*3/mm3      Eosinophils, Absolute 0.13 10*3/mm3      Basophils, Absolute 0.06 10*3/mm3      Immature Grans, Absolute 0.05 10*3/mm3      nRBC 0.0 /100 WBC     POC Lactate [[id number]]  (Normal) Collected: 09/23/24 1703    Specimen: Blood Updated: 09/23/24 1706     Lactate 1.2 mmol/L      Comment: Serial Number: [id number]:  [id number]       Blood Culture - Blood, Arm, Left [[id number]] Collected: 09/23/24 1700    Specimen: Blood from Arm, Left Updated: 09/23/24 1703             Imaging Results (Last 24 Hours)       Procedure Component Value Units Date/Time    XR Chest 1 View [[id number]] Collected: 09/23/24 1724     Updated: 09/23/24 1727    Narrative:      XR CHEST 1 VW    Date of Exam: 9/23/2024 5:14 PM EDT    Indication: Shortness of breath    Comparison: Chest radiograph 7/19/2024.    Findings:  Enlarged cardiac silhouette, unchanged. Pulmonary vascular congestion. Diffuse interstitial opacities. Small bilateral pleural effusions,  "right greater than left. No pneumothorax. Osseous structures are unchanged.      Impression:      Impression:  Features of CHF with cardiomegaly and mild/moderate pulmonary edema with small bilateral pleural effusions.      Electronically Signed: Manan Jefferson MD    9/23/2024 5:25 PM EDT    Workstation ID: [id number]              Assessment & Plan        This is a 69 y.o. female with:    Active and Resolved Problems  Active Hospital Problems    Diagnosis  POA    **CHF exacerbation [I50.9]  Yes      Resolved Hospital Problems   No resolved problems to display.       Atrial fibrillation   - JUAN JOSE?DS?-VASc Score for Atrial Fibrillation Stroke Risk - MDCalc   6 points -> a score ?2 for men or ?3 for women is “moderate-high” risk and should otherwise be an anticoagulation candidate.  - started heparin gtt  - EKG shows RVR, but ED provider reports periods of bradycardia   - admit to tele  - cardiology consulted    CHF exacerbation  - Echo: EF 31-35%  - BNP: 10,613  - ED gave 2mg IV bumex  - start lasix 40 mg BID  - Daily weights  - Strict I&O's  - 1.5L fluid restriction   - Cardiac diet, Sodium < 2g  - heart failure pathway initiated    Leg swelling  - venous duplex: \"Acute right lower extremity deep vein thrombosis noted in the common femoral, proximal femoral, mid femoral, distal femoral and popliteal. Acute right lower extremity superficial thrombophlebitis noted in the saphenofemoral junction and great saphenous (below knee). Acute left lower extremity deep vein thrombosis noted in the proximal femoral, mid femoral, distal femoral, popliteal and posterial tibial.Right and left popliteal fossa fluid collection.\"  - heparin gtt started    COPD  - Not in acute exacerbation  - Continue home inhalers once reconciled        Hypertension   - BP stable  - Resume home medications once reconciled     Hypothyroidism   - continue synthroid     VTE Prophylaxis:  Pharmacologic VTE prophylaxis orders are present.        The patient " desires to be as follows:    CODE STATUS:    Code Status (Patient has no pulse and is not breathing): CPR (Attempt to Resuscitate)  Medical Interventions (Patient has pulse or is breathing): Full Support        Chelsi Leonard, who can be contacted at [phone], is the designated person to make medical decisions on the patient's behalf if She is incapable of doing so. This was clarified with patient and/or next of kin on 9/23/2024 during the course of this H&P.    Admission Status:  I believe this patient meets inpatient status.    Expected Length of Stay: > 24 hours     PDMP and Medication Dispenses via Sidebar reviewed and consistent with patient reported medications.    I discussed the patient's findings and my recommendations with patient.      Signature:     This document has been electronically signed by MANJU English on September 23, 2024 19:55 EDT   [facility]

## 2024-09-23 NOTE — ED PROVIDER NOTES
"Subjective   History of Present Illness  69-year-old female presents emergency department complaints of increased leg swelling and shortness of breath.  Patient saw Dr. Bird today in the office and he advised ER visit for admission for possible congestive heart failure versus cellulitis.         Review of Systems   Constitutional:  Positive for fatigue. Negative for chills and fever.   Respiratory:  Positive for shortness of breath. Negative for cough.    Cardiovascular:  Positive for palpitations and leg swelling. Negative for chest pain.   Gastrointestinal:  Negative for abdominal pain, nausea and vomiting.   Skin:  Positive for color change.       Past Medical History:   Diagnosis Date    Asthma 7/1/24    Atrial fibrillation 7/1/24    Bilateral leg edema     CHF (congestive heart failure) 7/1/24    Chronic kidney disease 7/1/24    Chronic respiratory failure with hypoxia, on home O2 therapy 07/01/2024    Congenital heart disease 7/1/24    COPD (chronic obstructive pulmonary disease)     Diabetes mellitus 7/1/24    Morbid obesity with BMI of 40.0-44.9, adult 11/08/2010    Myocardial infarction 35 yrs ago    Primary hypertension 03/01/2017    Pulmonary embolism     \"many years ago, was on warfarin\"    Sleep apnea        No Known Allergies    Past Surgical History:   Procedure Laterality Date    D & C HYSTEROSCOPY N/A 07/22/2024    Procedure: DILATATION AND CURETTAGE HYSTEROSCOPY;  Surgeon: Sosa Newell MD;  Location: [facility] MAIN OR;  Service: Gynecology;  Laterality: N/A;       Family History   Problem Relation Age of Onset    Heart attack Mother     Hypertension Mother     Heart attack Father     Hypertension Father     Asthma Sister     Asthma Sister     Asthma Brother     Heart attack Brother     Hypertension Brother     Hypertension Brother     Heart disease Maternal Aunt     Heart failure Maternal Aunt     Heart failure Maternal Aunt        Social History     Socioeconomic History    Marital status: " "   Tobacco Use    Smoking status: Never     Passive exposure: Never    Smokeless tobacco: Never   Vaping Use    Vaping status: Never Used   Substance and Sexual Activity    Alcohol use: Never    Drug use: Never    Sexual activity: Not Currently     Partners: Male     Birth control/protection: Tubal ligation, Hysterectomy           Objective   Physical Exam  Vitals and nursing note reviewed.   Constitutional:       Appearance: Normal appearance.   Cardiovascular:      Rate and Rhythm: Tachycardia present. Rhythm irregular.   Pulmonary:      Effort: Pulmonary effort is normal.      Breath sounds: Rales present.   Abdominal:      General: Bowel sounds are normal.      Tenderness: There is abdominal tenderness. There is no guarding or rebound.   Musculoskeletal:      Right lower leg: Edema present.      Left lower leg: Edema present.   Skin:     Findings: Erythema present.   Neurological:      General: No focal deficit present.      Mental Status: She is alert and oriented to person, place, and time.   Psychiatric:         Mood and Affect: Mood normal.         Behavior: Behavior normal.         Procedures           ED Course   /96 (BP Location: Right arm, Patient Position: Lying)   Pulse (!) 126   Temp 98 °F (36.7 °C)   Resp 18   Ht 149.9 cm (59.02\")   Wt 87.5 kg (192 lb 14.4 oz)   SpO2 93%   BMI 38.94 kg/m²   Labs Reviewed   COMPREHENSIVE METABOLIC PANEL - Abnormal; Notable for the following components:       Result Value    CO2 31.8 (*)     Albumin 2.8 (*)     AST (SGOT) 34 (*)     Alkaline Phosphatase 169 (*)     All other components within normal limits    Narrative:     GFR Normal >60  Chronic Kidney Disease <60  Kidney Failure <15     BNP (IN-HOUSE) - Abnormal; Notable for the following components:    proBNP 10,613.0 (*)     All other components within normal limits    Narrative:     This assay is used as an aid in the diagnosis of individuals suspected of having heart failure. It can be used " as an aid in the diagnosis of acute decompensated heart failure (ADHF) in patients presenting with signs and symptoms of ADHF to the emergency department (ED). In addition, NT-proBNP of <300 pg/mL indicates ADHF is not likely.    Age Range Result Interpretation  NT-proBNP Concentration (pg/mL:      <50             Positive            >450                   Gray                 300-450                    Negative             <300    50-75           Positive            >900                  Gray                300-900                  Negative            <300      >75             Positive            >1800                  Gray                300-1800                  Negative            <300   CBC WITH AUTO DIFFERENTIAL - Abnormal; Notable for the following components:    RBC 3.33 (*)     Hemoglobin 10.1 (*)     Hematocrit 33.2 (*)     MCV 99.7 (*)     MCHC 30.4 (*)     All other components within normal limits   PROTIME-INR - Abnormal; Notable for the following components:    Protime 13.7 (*)     INR 1.28 (*)     All other components within normal limits   APTT - Abnormal; Notable for the following components:    PTT 34.2 (*)     All other components within normal limits   POC LACTATE - Normal   BLOOD CULTURE   BLOOD CULTURE   POC LACTATE   CBC AND DIFFERENTIAL    Narrative:     The following orders were created for panel order CBC & Differential.  Procedure                               Abnormality         Status                     ---------                               -----------         ------                     CBC Auto Differential[[id number]]        Abnormal            Final result                 Please view results for these tests on the individual orders.     Medications   bumetanide (BUMEX) injection 2 mg (has no administration in time range)     XR Chest 1 View    Result Date: 9/23/2024  Impression: Features of CHF with cardiomegaly and mild/moderate pulmonary edema with small bilateral pleural  effusions. Electronically Signed: Manan Jefferson MD  9/23/2024 5:25 PM EDT  Workstation ID: [id number]                                             Medical Decision Making  69-year-old female presents the emergency department complaints of increased shortness of breath leg swelling.  Patient states saw Dr. Bird today in the office he advised emergency room visit for admission for possible CHF versus cellulitis of lower extremities.  Patient denies any fevers or chills.  Patient does admit to orthopnea.  Does admit to dyspnea on exertion.  Patient denies chest pain.  Physical exam HEENT is normocephalic nontraumatic.  Lungs: Bilateral crackles to the bases.  Heart: Irregular irregular rhythm, A-fib.  Abdomen is diffusely tender however she states she has painful abdomen chronically.  Normal bowel sounds all 4 quad.  Extremities 2+ pitting edema bilaterally skins mildly erythematous bilaterally.  Vital signs: BP is 114/76, temperature is 98, heart rate 129, respirations 18, SpO2 on 4 L is 97%.  EKG: Rate 118 atrial fibrillation with RVR.  No ST elevation or depression read by Dr. Kwon.  CBC: WBCs 9.26 hemoglobin 10.1 hematocrit 33.2.  CMP glucose is 76.  PT is 34.2 INR is 1.28.  Lactate 1.2.  BNP is 10,613.  Chest x-ray shows increased pulmonary congestion consistent with congestive heart failure.  Patient was given 2 mg Bumex IV, patient's diagnosis of congestive heart failure discussed with patient she agrees to admission patient was admitted in stable condition with diagnosis of congestive heart failure to hospitalist.  Patient's ER course treatment plan and disposition discussed with Dr Kwon.    Problems Addressed:  Acute on chronic congestive heart failure, unspecified heart failure type: complicated acute illness or injury  Chronic a-fib: complicated acute illness or injury    Amount and/or Complexity of Data Reviewed  External Data Reviewed: labs and ECG.  Labs: ordered. Decision-making details documented in  ED Course.  Radiology: ordered. Decision-making details documented in ED Course.  ECG/medicine tests: ordered and independent interpretation performed. Decision-making details documented in ED Course.    Risk  Decision regarding hospitalization.        Final diagnoses:   Chronic a-fib   Acute on chronic congestive heart failure, unspecified heart failure type       ED Disposition  ED Disposition       ED Disposition   Decision to Admit    Condition   --    Comment   Level of Care: Telemetry [5]   Admitting Physician: JUNE MENDOZA [[id number]]   Attending Physician: JUNE MENDOZA [[id number]]                 No follow-up provider specified.       Medication List      No changes were made to your prescriptions during this visit.            Andi Watt PA-C  09/23/24 1805

## 2024-09-24 ENCOUNTER — APPOINTMENT (OUTPATIENT)
Dept: CT IMAGING | Facility: HOSPITAL | Age: 69
End: 2024-09-24
Payer: MEDICARE

## 2024-09-24 ENCOUNTER — APPOINTMENT (OUTPATIENT)
Dept: CARDIOLOGY | Facility: HOSPITAL | Age: 69
End: 2024-09-24
Payer: MEDICARE

## 2024-09-24 PROBLEM — L97.911 NON-PRESSURE CHRONIC ULCER RIGHT LOWER LEG, LIMITED TO BREAKDOWN SKIN: Status: ACTIVE | Noted: 2024-09-24

## 2024-09-24 PROBLEM — L30.8 DERMATITIS ASSOCIATED WITH MOISTURE: Status: ACTIVE | Noted: 2024-09-24

## 2024-09-24 LAB
ANION GAP SERPL CALCULATED.3IONS-SCNC: 5.6 MMOL/L (ref 5–15)
AORTIC DIMENSIONLESS INDEX: 0.51 (DI)
APTT PPP: 130.8 SECONDS (ref 61–76.5)
APTT PPP: 40.6 SECONDS (ref 61–76.5)
APTT PPP: >139 SECONDS (ref 61–76.5)
BASOPHILS # BLD AUTO: 0.05 10*3/MM3 (ref 0–0.2)
BASOPHILS NFR BLD AUTO: 0.5 % (ref 0–1.5)
BH CV ECHO MEAS - AO MAX PG: 32.7 MMHG
BH CV ECHO MEAS - AO MEAN PG: 19 MMHG
BH CV ECHO MEAS - AO V2 MAX: 286 CM/SEC
BH CV ECHO MEAS - AO V2 VTI: 54.3 CM
BH CV ECHO MEAS - AVA(I,D): 0.95 CM2
BH CV ECHO MEAS - EDV(CUBED): 79.5 ML
BH CV ECHO MEAS - EDV(MOD-SP4): 111 ML
BH CV ECHO MEAS - EF(MOD-SP4): 37.9 %
BH CV ECHO MEAS - ESV(CUBED): 27 ML
BH CV ECHO MEAS - ESV(MOD-SP4): 68.9 ML
BH CV ECHO MEAS - FS: 30.2 %
BH CV ECHO MEAS - IVS/LVPW: 0.9 CM
BH CV ECHO MEAS - IVSD: 0.9 CM
BH CV ECHO MEAS - LA DIMENSION: 4.7 CM
BH CV ECHO MEAS - LAT PEAK E' VEL: 8.9 CM/SEC
BH CV ECHO MEAS - LV DIASTOLIC VOL/BSA (35-75): 64.5 CM2
BH CV ECHO MEAS - LV MASS(C)D: 132.7 GRAMS
BH CV ECHO MEAS - LV MAX PG: 8.5 MMHG
BH CV ECHO MEAS - LV MEAN PG: 5 MMHG
BH CV ECHO MEAS - LV SYSTOLIC VOL/BSA (12-30): 40 CM2
BH CV ECHO MEAS - LV V1 MAX: 146 CM/SEC
BH CV ECHO MEAS - LV V1 VTI: 22.7 CM
BH CV ECHO MEAS - LVIDD: 4.3 CM
BH CV ECHO MEAS - LVIDS: 3 CM
BH CV ECHO MEAS - LVOT AREA: 2.27 CM2
BH CV ECHO MEAS - LVOT DIAM: 1.7 CM
BH CV ECHO MEAS - LVPWD: 1 CM
BH CV ECHO MEAS - MED PEAK E' VEL: 8.5 CM/SEC
BH CV ECHO MEAS - MR MAX PG: 92.2 MMHG
BH CV ECHO MEAS - MR MAX VEL: 480 CM/SEC
BH CV ECHO MEAS - MV A MAX VEL: 85.2 CM/SEC
BH CV ECHO MEAS - MV DEC SLOPE: 661 CM/SEC2
BH CV ECHO MEAS - MV DEC TIME: 0.21 SEC
BH CV ECHO MEAS - MV E MAX VEL: 174 CM/SEC
BH CV ECHO MEAS - MV E/A: 2.04
BH CV ECHO MEAS - MV MAX PG: 14.4 MMHG
BH CV ECHO MEAS - MV MEAN PG: 7 MMHG
BH CV ECHO MEAS - MV P1/2T: 84.6 MSEC
BH CV ECHO MEAS - MV V2 VTI: 47.9 CM
BH CV ECHO MEAS - MVA(P1/2T): 2.6 CM2
BH CV ECHO MEAS - MVA(VTI): 1.08 CM2
BH CV ECHO MEAS - PA ACC TIME: 0.11 SEC
BH CV ECHO MEAS - PA V2 MAX: 114 CM/SEC
BH CV ECHO MEAS - RAP SYSTOLE: 8 MMHG
BH CV ECHO MEAS - RV MAX PG: 1.98 MMHG
BH CV ECHO MEAS - RV V1 MAX: 70.3 CM/SEC
BH CV ECHO MEAS - RV V1 VTI: 10.4 CM
BH CV ECHO MEAS - RVSP: 38.7 MMHG
BH CV ECHO MEAS - SV(LVOT): 51.5 ML
BH CV ECHO MEAS - SV(MOD-SP4): 42.1 ML
BH CV ECHO MEAS - SVI(LVOT): 29.9 ML/M2
BH CV ECHO MEAS - SVI(MOD-SP4): 24.5 ML/M2
BH CV ECHO MEAS - TAPSE (>1.6): 1.42 CM
BH CV ECHO MEAS - TR MAX PG: 30.7 MMHG
BH CV ECHO MEAS - TR MAX VEL: 277 CM/SEC
BH CV ECHO MEASUREMENTS AVERAGE E/E' RATIO: 20
BH CV LOWER VASCULAR LEFT COMMON FEMORAL AUGMENT: NORMAL
BH CV LOWER VASCULAR LEFT COMMON FEMORAL COMPETENT: NORMAL
BH CV LOWER VASCULAR LEFT COMMON FEMORAL COMPRESS: NORMAL
BH CV LOWER VASCULAR LEFT COMMON FEMORAL PHASIC: NORMAL
BH CV LOWER VASCULAR LEFT COMMON FEMORAL SPONT: NORMAL
BH CV LOWER VASCULAR LEFT DISTAL FEMORAL COMPRESS: NORMAL
BH CV LOWER VASCULAR LEFT GASTRONEMIUS COMPRESS: NORMAL
BH CV LOWER VASCULAR LEFT GREATER SAPH AK COMPRESS: NORMAL
BH CV LOWER VASCULAR LEFT GREATER SAPH BK COMPRESS: NORMAL
BH CV LOWER VASCULAR LEFT LESSER SAPH COMPRESS: NORMAL
BH CV LOWER VASCULAR LEFT MID FEMORAL AUGMENT: NORMAL
BH CV LOWER VASCULAR LEFT MID FEMORAL COMPETENT: NORMAL
BH CV LOWER VASCULAR LEFT MID FEMORAL COMPRESS: NORMAL
BH CV LOWER VASCULAR LEFT MID FEMORAL PHASIC: NORMAL
BH CV LOWER VASCULAR LEFT MID FEMORAL SPONT: NORMAL
BH CV LOWER VASCULAR LEFT PERONEAL COMPRESS: NORMAL
BH CV LOWER VASCULAR LEFT POPLITEAL AUGMENT: NORMAL
BH CV LOWER VASCULAR LEFT POPLITEAL COMPETENT: NORMAL
BH CV LOWER VASCULAR LEFT POPLITEAL COMPRESS: NORMAL
BH CV LOWER VASCULAR LEFT POPLITEAL PHASIC: NORMAL
BH CV LOWER VASCULAR LEFT POPLITEAL SPONT: NORMAL
BH CV LOWER VASCULAR LEFT POSTERIOR TIBIAL COMPRESS: NORMAL
BH CV LOWER VASCULAR LEFT PROXIMAL FEMORAL COMPRESS: NORMAL
BH CV LOWER VASCULAR LEFT SAPHENOFEMORAL JUNCTION COMPRESS: NORMAL
BH CV LOWER VASCULAR RIGHT COMMON FEMORAL AUGMENT: NORMAL
BH CV LOWER VASCULAR RIGHT COMMON FEMORAL COMPETENT: NORMAL
BH CV LOWER VASCULAR RIGHT COMMON FEMORAL COMPRESS: NORMAL
BH CV LOWER VASCULAR RIGHT COMMON FEMORAL PHASIC: NORMAL
BH CV LOWER VASCULAR RIGHT COMMON FEMORAL SPONT: NORMAL
BH CV LOWER VASCULAR RIGHT GASTRONEMIUS COMPRESS: NORMAL
BH CV LOWER VASCULAR RIGHT GREATER SAPH AK COMPRESS: NORMAL
BH CV LOWER VASCULAR RIGHT GREATER SAPH BK COMPRESS: NORMAL
BH CV LOWER VASCULAR RIGHT LESSER SAPH COMPRESS: NORMAL
BH CV LOWER VASCULAR RIGHT MID FEMORAL AUGMENT: NORMAL
BH CV LOWER VASCULAR RIGHT MID FEMORAL COMPETENT: NORMAL
BH CV LOWER VASCULAR RIGHT MID FEMORAL COMPRESS: NORMAL
BH CV LOWER VASCULAR RIGHT MID FEMORAL PHASIC: NORMAL
BH CV LOWER VASCULAR RIGHT MID FEMORAL SPONT: NORMAL
BH CV LOWER VASCULAR RIGHT PERONEAL COMPRESS: NORMAL
BH CV LOWER VASCULAR RIGHT POPLITEAL AUGMENT: NORMAL
BH CV LOWER VASCULAR RIGHT POPLITEAL COMPETENT: NORMAL
BH CV LOWER VASCULAR RIGHT POPLITEAL COMPRESS: NORMAL
BH CV LOWER VASCULAR RIGHT POPLITEAL PHASIC: NORMAL
BH CV LOWER VASCULAR RIGHT POPLITEAL SPONT: NORMAL
BH CV LOWER VASCULAR RIGHT POSTERIOR TIBIAL COMPRESS: NORMAL
BH CV LOWER VASCULAR RIGHT PROXIMAL FEMORAL COMPRESS: NORMAL
BH CV LOWER VASCULAR RIGHT SAPHENOFEMORAL JUNCTION COMPRESS: NORMAL
BH CV VAS POP FLUID COLLECTED: 1
BH CV XLRA - TDI S': 12.3 CM/SEC
BUN SERPL-MCNC: 21 MG/DL (ref 8–23)
BUN/CREAT SERPL: 24.4 (ref 7–25)
CALCIUM SPEC-SCNC: 8.8 MG/DL (ref 8.6–10.5)
CHLORIDE SERPL-SCNC: 105 MMOL/L (ref 98–107)
CO2 SERPL-SCNC: 35.4 MMOL/L (ref 22–29)
CREAT SERPL-MCNC: 0.86 MG/DL (ref 0.57–1)
DEPRECATED RDW RBC AUTO: 49.1 FL (ref 37–54)
EGFRCR SERPLBLD CKD-EPI 2021: 73.2 ML/MIN/1.73
EOSINOPHIL # BLD AUTO: 0.12 10*3/MM3 (ref 0–0.4)
EOSINOPHIL NFR BLD AUTO: 1.3 % (ref 0.3–6.2)
ERYTHROCYTE [DISTWIDTH] IN BLOOD BY AUTOMATED COUNT: 13.8 % (ref 12.3–15.4)
FERRITIN SERPL-MCNC: 272 NG/ML (ref 13–150)
GLUCOSE SERPL-MCNC: 93 MG/DL (ref 65–99)
HCT VFR BLD AUTO: 27.1 % (ref 34–46.6)
HGB BLD-MCNC: 8.2 G/DL (ref 12–15.9)
IMM GRANULOCYTES # BLD AUTO: 0.04 10*3/MM3 (ref 0–0.05)
IMM GRANULOCYTES NFR BLD AUTO: 0.4 % (ref 0–0.5)
IRON 24H UR-MRATE: 29 MCG/DL (ref 37–145)
IRON SATN MFR SERPL: 16 % (ref 20–50)
LDH SERPL-CCNC: 189 U/L (ref 135–214)
LEFT ATRIUM VOLUME INDEX: 48.7 ML/M2
LV EF 2D ECHO EST: 42 %
LYMPHOCYTES # BLD AUTO: 2.12 10*3/MM3 (ref 0.7–3.1)
LYMPHOCYTES NFR BLD AUTO: 22.4 % (ref 19.6–45.3)
MCH RBC QN AUTO: 29.6 PG (ref 26.6–33)
MCHC RBC AUTO-ENTMCNC: 30.3 G/DL (ref 31.5–35.7)
MCV RBC AUTO: 97.8 FL (ref 79–97)
MONOCYTES # BLD AUTO: 0.89 10*3/MM3 (ref 0.1–0.9)
MONOCYTES NFR BLD AUTO: 9.4 % (ref 5–12)
NEUTROPHILS NFR BLD AUTO: 6.25 10*3/MM3 (ref 1.7–7)
NEUTROPHILS NFR BLD AUTO: 66 % (ref 42.7–76)
NRBC BLD AUTO-RTO: 0 /100 WBC (ref 0–0.2)
PLATELET # BLD AUTO: 238 10*3/MM3 (ref 140–450)
PMV BLD AUTO: 9 FL (ref 6–12)
POTASSIUM SERPL-SCNC: 3.5 MMOL/L (ref 3.5–5.2)
POTASSIUM SERPL-SCNC: 4.1 MMOL/L (ref 3.5–5.2)
QT INTERVAL: 372 MS
QTC INTERVAL: 521 MS
RBC # BLD AUTO: 2.77 10*6/MM3 (ref 3.77–5.28)
RETICS # AUTO: 0.06 10*6/MM3 (ref 0.02–0.13)
RETICS/RBC NFR AUTO: 2.1 % (ref 0.7–1.9)
SINUS: 2.7 CM
SODIUM SERPL-SCNC: 146 MMOL/L (ref 136–145)
STJ: 2 CM
TIBC SERPL-MCNC: 186 MCG/DL (ref 298–536)
TRANSFERRIN SERPL-MCNC: 125 MG/DL (ref 200–360)
WBC NRBC COR # BLD AUTO: 9.47 10*3/MM3 (ref 3.4–10.8)

## 2024-09-24 PROCEDURE — 84466 ASSAY OF TRANSFERRIN: CPT | Performed by: NURSE PRACTITIONER

## 2024-09-24 PROCEDURE — 25010000002 HEPARIN (PORCINE) 25000-0.45 UT/250ML-% SOLUTION

## 2024-09-24 PROCEDURE — 74170 CT ABD WO CNTRST FLWD CNTRST: CPT

## 2024-09-24 PROCEDURE — 25010000002 FUROSEMIDE PER 20 MG

## 2024-09-24 PROCEDURE — 85730 THROMBOPLASTIN TIME PARTIAL: CPT | Performed by: INTERNAL MEDICINE

## 2024-09-24 PROCEDURE — 85045 AUTOMATED RETICULOCYTE COUNT: CPT | Performed by: NURSE PRACTITIONER

## 2024-09-24 PROCEDURE — 82607 VITAMIN B-12: CPT | Performed by: NURSE PRACTITIONER

## 2024-09-24 PROCEDURE — 82746 ASSAY OF FOLIC ACID SERUM: CPT | Performed by: NURSE PRACTITIONER

## 2024-09-24 PROCEDURE — 99222 1ST HOSP IP/OBS MODERATE 55: CPT | Performed by: NURSE PRACTITIONER

## 2024-09-24 PROCEDURE — 25010000002 DIGOXIN PER 500 MCG: Performed by: INTERNAL MEDICINE

## 2024-09-24 PROCEDURE — 80048 BASIC METABOLIC PNL TOTAL CA: CPT

## 2024-09-24 PROCEDURE — 25510000001 IOPAMIDOL PER 1 ML: Performed by: INTERNAL MEDICINE

## 2024-09-24 PROCEDURE — 86316 IMMUNOASSAY TUMOR OTHER: CPT | Performed by: NURSE PRACTITIONER

## 2024-09-24 PROCEDURE — 93970 EXTREMITY STUDY: CPT

## 2024-09-24 PROCEDURE — 93970 EXTREMITY STUDY: CPT | Performed by: SURGERY

## 2024-09-24 PROCEDURE — 25010000002 SULFUR HEXAFLUORIDE MICROSPH 60.7-25 MG RECONSTITUTED SUSPENSION: Performed by: INTERNAL MEDICINE

## 2024-09-24 PROCEDURE — 87481 CANDIDA DNA AMP PROBE: CPT

## 2024-09-24 PROCEDURE — 93306 TTE W/DOPPLER COMPLETE: CPT | Performed by: INTERNAL MEDICINE

## 2024-09-24 PROCEDURE — 83010 ASSAY OF HAPTOGLOBIN QUANT: CPT | Performed by: NURSE PRACTITIONER

## 2024-09-24 PROCEDURE — 82728 ASSAY OF FERRITIN: CPT | Performed by: NURSE PRACTITIONER

## 2024-09-24 PROCEDURE — 93306 TTE W/DOPPLER COMPLETE: CPT

## 2024-09-24 PROCEDURE — 85025 COMPLETE CBC W/AUTO DIFF WBC: CPT

## 2024-09-24 PROCEDURE — 99222 1ST HOSP IP/OBS MODERATE 55: CPT | Performed by: INTERNAL MEDICINE

## 2024-09-24 PROCEDURE — 83540 ASSAY OF IRON: CPT | Performed by: NURSE PRACTITIONER

## 2024-09-24 PROCEDURE — 83615 LACTATE (LD) (LDH) ENZYME: CPT | Performed by: NURSE PRACTITIONER

## 2024-09-24 PROCEDURE — 84132 ASSAY OF SERUM POTASSIUM: CPT | Performed by: INTERNAL MEDICINE

## 2024-09-24 RX ORDER — DIGOXIN 0.25 MG/ML
500 INJECTION INTRAMUSCULAR; INTRAVENOUS ONCE
Status: COMPLETED | OUTPATIENT
Start: 2024-09-24 | End: 2024-09-24

## 2024-09-24 RX ORDER — POTASSIUM CHLORIDE 1500 MG/1
40 TABLET, EXTENDED RELEASE ORAL EVERY 4 HOURS
Status: COMPLETED | OUTPATIENT
Start: 2024-09-24 | End: 2024-09-24

## 2024-09-24 RX ORDER — IOPAMIDOL 755 MG/ML
100 INJECTION, SOLUTION INTRAVASCULAR
Status: COMPLETED | OUTPATIENT
Start: 2024-09-24 | End: 2024-09-24

## 2024-09-24 RX ORDER — METOPROLOL SUCCINATE 50 MG/1
50 TABLET, EXTENDED RELEASE ORAL EVERY 12 HOURS SCHEDULED
Status: DISCONTINUED | OUTPATIENT
Start: 2024-09-24 | End: 2024-09-26 | Stop reason: HOSPADM

## 2024-09-24 RX ORDER — DIGOXIN 125 MCG
125 TABLET ORAL
Status: DISCONTINUED | OUTPATIENT
Start: 2024-09-24 | End: 2024-09-26 | Stop reason: HOSPADM

## 2024-09-24 RX ORDER — FEXOFENADINE HCL AND PSEUDOEPHEDRINE HCL 180; 240 MG/1; MG/1
1 TABLET, EXTENDED RELEASE ORAL DAILY
COMMUNITY

## 2024-09-24 RX ORDER — ACETAMINOPHEN 650 MG
TABLET, EXTENDED RELEASE ORAL ONCE
Status: COMPLETED | OUTPATIENT
Start: 2024-09-24 | End: 2024-09-24

## 2024-09-24 RX ADMIN — IOPAMIDOL 100 ML: 755 INJECTION, SOLUTION INTRAVENOUS at 22:08

## 2024-09-24 RX ADMIN — DIGOXIN 125 MCG: 125 TABLET ORAL at 11:26

## 2024-09-24 RX ADMIN — SULFUR HEXAFLUORIDE 2 ML: KIT at 10:20

## 2024-09-24 RX ADMIN — FUROSEMIDE 40 MG: 10 INJECTION, SOLUTION INTRAMUSCULAR; INTRAVENOUS at 20:13

## 2024-09-24 RX ADMIN — POTASSIUM CHLORIDE 40 MEQ: 1500 TABLET, EXTENDED RELEASE ORAL at 04:59

## 2024-09-24 RX ADMIN — HEPARIN SODIUM 14.1 UNITS/KG/HR: 10000 INJECTION, SOLUTION INTRAVENOUS at 09:28

## 2024-09-24 RX ADMIN — METOPROLOL SUCCINATE 50 MG: 50 TABLET, EXTENDED RELEASE ORAL at 09:11

## 2024-09-24 RX ADMIN — FUROSEMIDE 40 MG: 10 INJECTION, SOLUTION INTRAMUSCULAR; INTRAVENOUS at 09:09

## 2024-09-24 RX ADMIN — DIGOXIN 500 MCG: 0.25 INJECTION INTRAMUSCULAR; INTRAVENOUS at 09:09

## 2024-09-24 RX ADMIN — HYDROCODONE BITARTRATE AND ACETAMINOPHEN 1 TABLET: 10; 325 TABLET ORAL at 00:10

## 2024-09-24 RX ADMIN — POVIDONE-IODINE: 10 SOLUTION TOPICAL at 14:33

## 2024-09-24 RX ADMIN — METOPROLOL SUCCINATE 50 MG: 50 TABLET, EXTENDED RELEASE ORAL at 20:12

## 2024-09-24 RX ADMIN — ZINC OXIDE 1 APPLICATION: 200 OINTMENT TOPICAL at 20:05

## 2024-09-24 RX ADMIN — HYDROCODONE BITARTRATE AND ACETAMINOPHEN 1 TABLET: 10; 325 TABLET ORAL at 20:12

## 2024-09-24 RX ADMIN — HYDROCODONE BITARTRATE AND ACETAMINOPHEN 1 TABLET: 10; 325 TABLET ORAL at 09:21

## 2024-09-24 RX ADMIN — POTASSIUM CHLORIDE 40 MEQ: 1500 TABLET, EXTENDED RELEASE ORAL at 09:11

## 2024-09-24 NOTE — PROGRESS NOTES
"Wound Initial Evaluation   SONJA     Patient Name: Ban Brennan  : 1955  MRN: [id number]  Today's Date: 2024 Room Number: 269/1      Admit Date: 2024  Attending: Rogelio Weeks MD    Consult Requested By: Dr. Gonzalez    Reason For Consult: Multiple wounds    Chief Complaint: 69-year-old female with a history of atrial fibs, CHF, CKD, COPD, diabetes, and hypertension presents to the hospital with possible cellulitis and CHF exacerbation.  Patient reports that she has a new injury within the last several days of trauma skin tear to the right lower extremity.  States that it happened when her caregiver was helping her transfer.  She is unsure about the right ulceration to the right great toe.  She is a rather poor historian.  She reports that her edema is at baseline.  She reports that she does not wear any type of compression hose or stockings.    Visit Dx:    ICD-10-CM ICD-9-CM   1. Chronic a-fib  I48.20 427.31   2. Acute on chronic congestive heart failure, unspecified heart failure type  I50.9 428.0       CHF exacerbation    Dermatitis associated with moisture    Non-pressure chronic ulcer right lower leg, limited to breakdown skin      History:   Past Medical History:   Diagnosis Date    Asthma 24    Atrial fibrillation 24    Bilateral leg edema     CHF (congestive heart failure) 24    Chronic kidney disease 24    Chronic respiratory failure with hypoxia, on home O2 therapy 2024    Congenital heart disease 24    COPD (chronic obstructive pulmonary disease)     Diabetes mellitus 24    Morbid obesity with BMI of 40.0-44.9, adult 2010    Myocardial infarction 35 yrs ago    Primary hypertension 2017    Pulmonary embolism     \"many years ago, was on warfarin\"    Sleep apnea      Past Surgical History:   Procedure Laterality Date    D & C HYSTEROSCOPY N/A 2024    Procedure: DILATATION AND CURETTAGE HYSTEROSCOPY;  Surgeon: Sosa Newell " MD Emily;  Location: [facility] MAIN OR;  Service: Gynecology;  Laterality: N/A;     Social History     Socioeconomic History    Marital status:    Tobacco Use    Smoking status: Never     Passive exposure: Never    Smokeless tobacco: Never   Vaping Use    Vaping status: Never Used   Substance and Sexual Activity    Alcohol use: Never    Drug use: Never    Sexual activity: Not Currently     Partners: Male     Birth control/protection: Tubal ligation, Hysterectomy       Allergies:  No Known Allergies    Medications:    Current Facility-Administered Medications:     sennosides-docusate (PERICOLACE) 8.6-50 MG per tablet 2 tablet, 2 tablet, Oral, BID PRN **AND** polyethylene glycol (MIRALAX) packet 17 g, 17 g, Oral, Daily PRN **AND** bisacodyl (DULCOLAX) EC tablet 5 mg, 5 mg, Oral, Daily PRN **AND** bisacodyl (DULCOLAX) suppository 10 mg, 10 mg, Rectal, Daily PRN, Jodie Shepherd APRN    Calcium Replacement - Follow Nurse / BPA Driven Protocol, , Does not apply, PRN, Jodie Shepherd APRN    digoxin (LANOXIN) tablet 125 mcg, 125 mcg, Oral, Daily, Tanvir Bird MD, 125 mcg at 09/24/24 1126    furosemide (LASIX) injection 40 mg, 40 mg, Intravenous, Q12H, Jodie Shepherd APRN, 40 mg at 09/24/24 0909    heparin 25053 units/250 mL (100 units/mL) in 0.45 % NaCl infusion, 17.1 Units/kg/hr, Intravenous, Titrated, Jodie Shepherd APRN, Last Rate: 9.71 mL/hr at 09/24/24 1301, 11.1 Units/kg/hr at 09/24/24 1301    heparin bolus from bag solution 3,500 Units, 40 Units/kg, Intravenous, Q6H PRN, Jodie Shepherd APRN    heparin bolus from bag solution 7,000 Units, 80 Units/kg, Intravenous, Q6H PRN, Jodie Shepherd, APRN    HYDROcodone-acetaminophen (NORCO)  MG per tablet 1 tablet, 1 tablet, Oral, Q8H PRN, Jagdeep Petit MD, 1 tablet at 09/24/24 0921    hydrocortisone-bacitracin-zinc oxide-nystatin (MAGIC BARRIER) ointment 1 Application, 1 Application, Topical, TID, Toya Hylton APRN    Magnesium Standard Dose Replacement - Follow  Nurse / BPA Driven Protocol, , Does not apply, PRN, Jodie Shepherd APRN    metoprolol succinate XL (TOPROL-XL) 24 hr tablet 50 mg, 50 mg, Oral, Q12H, Tanvir Bird MD, 50 mg at 09/24/24 0911    nitroglycerin (NITROSTAT) SL tablet 0.4 mg, 0.4 mg, Sublingual, Q5 Min PRN, Jodie Shepherd APRN    Phosphorus Replacement - Follow Nurse / BPA Driven Protocol, , Does not apply, PRN, Jodie Shepherd APRN    Potassium Replacement - Follow Nurse / BPA Driven Protocol, , Does not apply, PRN, Jodie Shepherd APRN    povidone-iodine (BETADINE) external solution 10%, , Topical, Once, Toya Hylton APRN    Results Review:  Lab Results (last 48 hours)       Procedure Component Value Units Date/Time    aPTT [[id number]]  (Abnormal) Collected: 09/24/24 1117    Specimen: Blood from Hand, Right Updated: 09/24/24 1157     .8 seconds     aPTT [[id number]]  (Abnormal) Collected: 09/24/24 0314    Specimen: Blood from Arm, Right Updated: 09/24/24 0356     PTT >139.0 seconds     Basic Metabolic Panel [[id number]]  (Abnormal) Collected: 09/24/24 0314    Specimen: Blood from Arm, Right Updated: 09/24/24 0353     Glucose 93 mg/dL      BUN 21 mg/dL      Creatinine 0.86 mg/dL      Sodium 146 mmol/L      Potassium 3.5 mmol/L      Chloride 105 mmol/L      CO2 35.4 mmol/L      Calcium 8.8 mg/dL      BUN/Creatinine Ratio 24.4     Anion Gap 5.6 mmol/L      eGFR 73.2 mL/min/1.73     Narrative:      GFR Normal >60  Chronic Kidney Disease <60  Kidney Failure <15      CBC & Differential [[id number]]  (Abnormal) Collected: 09/24/24 0314    Specimen: Blood from Arm, Right Updated: 09/24/24 0323    Narrative:      The following orders were created for panel order CBC & Differential.  Procedure                               Abnormality         Status                     ---------                               -----------         ------                     CBC Auto Differential[[id number]]        Abnormal            Final result                 Please view  results for these tests on the individual orders.    CBC Auto Differential [[id number]]  (Abnormal) Collected: 09/24/24 0314    Specimen: Blood from Arm, Right Updated: 09/24/24 0323     WBC 9.47 10*3/mm3      RBC 2.77 10*6/mm3      Hemoglobin 8.2 g/dL      Hematocrit 27.1 %      MCV 97.8 fL      MCH 29.6 pg      MCHC 30.3 g/dL      RDW 13.8 %      RDW-SD 49.1 fl      MPV 9.0 fL      Platelets 238 10*3/mm3      Neutrophil % 66.0 %      Lymphocyte % 22.4 %      Monocyte % 9.4 %      Eosinophil % 1.3 %      Basophil % 0.5 %      Immature Grans % 0.4 %      Neutrophils, Absolute 6.25 10*3/mm3      Lymphocytes, Absolute 2.12 10*3/mm3      Monocytes, Absolute 0.89 10*3/mm3      Eosinophils, Absolute 0.12 10*3/mm3      Basophils, Absolute 0.05 10*3/mm3      Immature Grans, Absolute 0.04 10*3/mm3      nRBC 0.0 /100 WBC     C[facility]IDA [facility]IS PCR - Swab, Axilla Right, Axilla Left and Groin [[id number]] Collected: 09/24/24 0006    Specimen: Swab from Axilla Right, Axilla Left and Groin Updated: 09/24/24 0044    Blood Culture - Blood, Arm, Left [[id number]] Collected: 09/23/24 1914    Specimen: Blood from Arm, Left Updated: 09/23/24 1917    Comprehensive Metabolic Panel [[id number]]  (Abnormal) Collected: 09/23/24 1700    Specimen: Blood Updated: 09/23/24 1741     Glucose 76 mg/dL      BUN 20 mg/dL      Creatinine 0.98 mg/dL      Sodium 142 mmol/L      Potassium 4.0 mmol/L      Comment: Slight hemolysis detected by analyzer. Result may be falsely elevated.        Chloride 102 mmol/L      CO2 31.8 mmol/L      Calcium 9.3 mg/dL      Total Protein 7.0 g/dL      Albumin 2.8 g/dL      ALT (SGPT) 13 U/L      AST (SGOT) 34 U/L      Comment: Slight hemolysis detected by analyzer. Result may be falsely elevated.        Alkaline Phosphatase 169 U/L      Total Bilirubin 0.4 mg/dL      Globulin 4.2 gm/dL      A/G Ratio 0.7 g/dL      BUN/Creatinine Ratio 20.4     Anion Gap 8.2 mmol/L      eGFR 62.6 mL/min/1.73     Narrative:      GFR Normal  >60  Chronic Kidney Disease <60  Kidney Failure <15      BNP [[id number]]  (Abnormal) Collected: 09/23/24 1700    Specimen: Blood Updated: 09/23/24 1739     proBNP 10,613.0 pg/mL     Narrative:      This assay is used as an aid in the diagnosis of individuals suspected of having heart failure. It can be used as an aid in the diagnosis of acute decompensated heart failure (ADHF) in patients presenting with signs and symptoms of ADHF to the emergency department (ED). In addition, NT-proBNP of <300 pg/mL indicates ADHF is not likely.    Age Range Result Interpretation  NT-proBNP Concentration (pg/mL:      <50             Positive            >450                   Gray                 300-450                    Negative             <300    50-75           Positive            >900                  Gray                300-900                  Negative            <300      >75             Positive            >1800                  Gray                300-1800                  Negative            <300    Protime-INR [[id number]]  (Abnormal) Collected: 09/23/24 1700    Specimen: Blood Updated: 09/23/24 1724     Protime 13.7 Seconds      INR 1.28    aPTT [[id number]]  (Abnormal) Collected: 09/23/24 1700    Specimen: Blood Updated: 09/23/24 1724     PTT 34.2 seconds     CBC & Differential [[id number]]  (Abnormal) Collected: 09/23/24 1700    Specimen: Blood Updated: 09/23/24 1711    Narrative:      The following orders were created for panel order CBC & Differential.  Procedure                               Abnormality         Status                     ---------                               -----------         ------                     CBC Auto Differential[[id number]]        Abnormal            Final result                 Please view results for these tests on the individual orders.    CBC Auto Differential [[id number]]  (Abnormal) Collected: 09/23/24 1700    Specimen: Blood Updated: 09/23/24 1711     WBC 9.26 10*3/mm3      RBC  3.33 10*6/mm3      Hemoglobin 10.1 g/dL      Hematocrit 33.2 %      MCV 99.7 fL      MCH 30.3 pg      MCHC 30.4 g/dL      RDW 13.8 %      RDW-SD 50.7 fl      MPV 9.5 fL      Platelets 293 10*3/mm3      Neutrophil % 68.2 %      Lymphocyte % 20.4 %      Monocyte % 8.9 %      Eosinophil % 1.4 %      Basophil % 0.6 %      Immature Grans % 0.5 %      Neutrophils, Absolute 6.31 10*3/mm3      Lymphocytes, Absolute 1.89 10*3/mm3      Monocytes, Absolute 0.82 10*3/mm3      Eosinophils, Absolute 0.13 10*3/mm3      Basophils, Absolute 0.06 10*3/mm3      Immature Grans, Absolute 0.05 10*3/mm3      nRBC 0.0 /100 WBC     POC Lactate [[id number]]  (Normal) Collected: 09/23/24 1703    Specimen: Blood Updated: 09/23/24 1706     Lactate 1.2 mmol/L      Comment: Serial Number: [id number]:  [id number]       Blood Culture - Blood, Arm, Left [[id number]] Collected: 09/23/24 1700    Specimen: Blood from Arm, Left Updated: 09/23/24 1703          Imaging Results (Last 72 Hours)       Procedure Component Value Units Date/Time    XR Chest 1 View [[id number]] Collected: 09/23/24 1724     Updated: 09/23/24 1727    Narrative:      XR CHEST 1 VW    Date of Exam: 9/23/2024 5:14 PM EDT    Indication: Shortness of breath    Comparison: Chest radiograph 7/19/2024.    Findings:  Enlarged cardiac silhouette, unchanged. Pulmonary vascular congestion. Diffuse interstitial opacities. Small bilateral pleural effusions, right greater than left. No pneumothorax. Osseous structures are unchanged.      Impression:      Impression:  Features of CHF with cardiomegaly and mild/moderate pulmonary edema with small bilateral pleural effusions.      Electronically Signed: Manan Jefferson MD    9/23/2024 5:25 PM EDT    Workstation ID: [id number]            Review of Systems:  Review of Systems   Constitutional: Negative.    HENT: Negative.     Eyes: Negative.    Gastrointestinal:         Incontinence   Genitourinary:         Incontinence   Musculoskeletal:  Positive  for arthralgias and gait problem.   Skin:  Positive for rash and wound.   Allergic/Immunologic: Negative.    Psychiatric/Behavioral: Negative.         Physical Assessment:  Wound 06/30/24 2054 Right anterior great toe (Active)                                                                                                                                                                                                                         Patient presenting with moisture associated dermatitis.  Patient has excoriation.  She wears briefs at home.  Multiple denuded areas are noted throughout the buttocks also presents with a yeast rash.    There is a ulceration to the right great toe likely diabetic.  The area is covered in dry eschar which appears to be stable.  There is no warmth erythema induration or drainage noted from this area.  Approximate size is 1.2 x 1.2 cm.    There is an ulceration to the right leg.  There is edema noted that is pitting and there is an area that is approximately 3 x 4 cm.  It is mid Dermol it is pink.  It is clean small amount of serous exudate noted.  There is some chronic changes to both legs related to her venous insufficiency.  I am able to palpate a pedal pulse    Final diagnosis  Diabetic ulcer right great toe  Nonpressure ulcer right lower extremity at skin level  Moisture associated dermatitis    Recommendation and Plan  Will recommend placing the patient on an agility offloading surface with low-air-loss therapy.  Will recommend treating topically to the buttocks with Leticia's Magic Butt cream.  Patient currently has a Miguel wick device in place.  Continue with incontinence protocols and pressure injury prevention strategy protocols.  Recommend painting the right great toe area with Betadine, keeping the area clean and dry and open to air.  Continue recommend offloading heels and recommend to continue the silicone border foam dressing to the right lower extremity.  Will hold on  Ace wrap's for now but once more stable.  Please consider some light compression.    Toya Hylton, APRN   9/24/2024   13:27 EDT

## 2024-09-24 NOTE — PROGRESS NOTES
Cardiology [place]      Patient Care Team:  Rut Pierce APRN as PCP - General (Nurse Practitioner)  Austin Brooks MD as Cardiologist (Cardiology)  Prachi Zepeda MD as Consulting Physician (Hematology and Oncology)  Tanvir Bird MD as Consulting Physician (Cardiology)      CONSULTATION: A-fib with RVR and volume overload with lower extremity edema    CHIEF COMPLAINT: Lower extremity edema and shortness of breath    ADMITTING DIAGNOSES: Exacerbation of heart failure with reduced EF    Subjective    Interval History and ROS:     Patient seen while she is resting in bed in no acute distress.  States she feels about the same today.  I was able to speak to patient and daughter, while daughter is on speaker phone.  Daughter does state that she has been recording patient's blood pressures at home and they have been very low, 70s over 50s.  She does state that her mother frequently has recurrent lower extremity redness, however they are worse than normal at present time.  Patient had bilateral lower extremity duplex today which was negative for DVT, only fluid collection in the right popliteal area was noted. She is unable to relay whether edema or shortness of breath is improved.  Patient reports shortness of breath and dizziness with position change.  She states she has not been out of bed ambulating, so unable to determine if she is having any dyspnea on exertion.  She denies PND, orthopnea, cough, diaphoresis, numbness tingling, unusual bleeding or bruising.    Earlier today, patient's rate was running around 120.  Her bedside RN states no adverse events, she was able to give her a dose of the digoxin about an hour ago, and patient currently running A-fib in the 90s.  She is also getting Metoprolol succinate XL 50 mg every 12 hours. Patient is receiving her Lasix 40 mg IV every 12 hours and recorded I&O does not appear to be accurate, as I visualized 800 cc of urine in her external  "catheter canister.  Patient has been able to be titrated down to her home settings of oxygen, 2 L nasal cannula with sats 99%.    History taken from: patient family    Review of Systems   Constitutional: Negative for chills, diaphoresis and fever.   Cardiovascular:  Positive for dyspnea on exertion. Negative for chest pain, irregular heartbeat, near-syncope, orthopnea, palpitations, paroxysmal nocturnal dyspnea and syncope.   Respiratory:  Positive for shortness of breath and wheezing. Negative for cough.    Hematologic/Lymphatic: Negative for bleeding problem. Does not bruise/bleed easily.   Skin:  Positive for color change.   Musculoskeletal:  Positive for arthritis and joint pain.   Neurological:  Positive for dizziness and weakness.       Objective    Vital Signs  Temp:  [98 °F (36.7 °C)-98.4 °F (36.9 °C)] 98.4 °F (36.9 °C)  Heart Rate:  [] 102  Resp:  [14-20] 20  BP: ()/(47-96) 112/50  Oxygen Therapy  SpO2: 95 %  Pulse Oximetry Type: Continuous  Device (Oxygen Therapy): nasal cannula  Flow (L/min): 2}  Flowsheet Rows      Flowsheet Row First Filed Value   Admission Height 149.9 cm (59.02\") Documented at 09/23/2024 1640   Admission Weight 87.5 kg (192 lb 14.4 oz) Documented at 09/23/2024 1640                Physical Exam:    Physical Exam  Constitutional:       General: She is in acute distress.      Appearance: Normal appearance. She is obese.   HENT:      Head: Normocephalic and atraumatic.   Neck:      Comments: No JVD noted  Cardiovascular:      Rate and Rhythm: Normal rate. Rhythm irregular.      Heart sounds: No murmur heard.     No friction rub. No gallop.      Comments: Generalized edema   Pulmonary:      Effort: Pulmonary effort is normal. No respiratory distress.      Breath sounds: Wheezing present.   Genitourinary:     Comments: External catheter in place  Musculoskeletal:      Right lower leg: Edema present.      Left lower leg: Edema present.      Comments: 2+ pitting edema of lower " extremities   Skin:     General: Skin is warm and dry.      Findings: Erythema present.      Comments: Bilateral lower extremities   Neurological:      General: No focal deficit present.      Mental Status: She is alert and oriented to person, place, and time.   Psychiatric:         Mood and Affect: Mood normal.         Behavior: Behavior normal.         Thought Content: Thought content normal.         Judgment: Judgment normal.         Results Review:     I reviewed the patient's new clinical results.    Lab Results (last 24 hours)       Procedure Component Value Units Date/Time    aPTT [[id number]]  (Abnormal) Collected: 09/24/24 1117    Specimen: Blood from Hand, Right Updated: 09/24/24 1157     .8 seconds     aPTT [[id number]]  (Abnormal) Collected: 09/24/24 0314    Specimen: Blood from Arm, Right Updated: 09/24/24 0356     PTT >139.0 seconds     Basic Metabolic Panel [[id number]]  (Abnormal) Collected: 09/24/24 0314    Specimen: Blood from Arm, Right Updated: 09/24/24 0353     Glucose 93 mg/dL      BUN 21 mg/dL      Creatinine 0.86 mg/dL      Sodium 146 mmol/L      Potassium 3.5 mmol/L      Chloride 105 mmol/L      CO2 35.4 mmol/L      Calcium 8.8 mg/dL      BUN/Creatinine Ratio 24.4     Anion Gap 5.6 mmol/L      eGFR 73.2 mL/min/1.73     Narrative:      GFR Normal >60  Chronic Kidney Disease <60  Kidney Failure <15      CBC & Differential [[id number]]  (Abnormal) Collected: 09/24/24 0314    Specimen: Blood from Arm, Right Updated: 09/24/24 0323    Narrative:      The following orders were created for panel order CBC & Differential.  Procedure                               Abnormality         Status                     ---------                               -----------         ------                     CBC Auto Differential[[id number]]        Abnormal            Final result                 Please view results for these tests on the individual orders.    CBC Auto Differential [[id number]]  (Abnormal)  Collected: 09/24/24 0314    Specimen: Blood from Arm, Right Updated: 09/24/24 0323     WBC 9.47 10*3/mm3      RBC 2.77 10*6/mm3      Hemoglobin 8.2 g/dL      Hematocrit 27.1 %      MCV 97.8 fL      MCH 29.6 pg      MCHC 30.3 g/dL      RDW 13.8 %      RDW-SD 49.1 fl      MPV 9.0 fL      Platelets 238 10*3/mm3      Neutrophil % 66.0 %      Lymphocyte % 22.4 %      Monocyte % 9.4 %      Eosinophil % 1.3 %      Basophil % 0.5 %      Immature Grans % 0.4 %      Neutrophils, Absolute 6.25 10*3/mm3      Lymphocytes, Absolute 2.12 10*3/mm3      Monocytes, Absolute 0.89 10*3/mm3      Eosinophils, Absolute 0.12 10*3/mm3      Basophils, Absolute 0.05 10*3/mm3      Immature Grans, Absolute 0.04 10*3/mm3      nRBC 0.0 /100 WBC     C[facility]IDA A[facility]IS PCR - Swab, Axilla Right, Axilla Left and Groin [[id number]] Collected: 09/24/24 0006    Specimen: Swab from Axilla Right, Axilla Left and Groin Updated: 09/24/24 0044    Blood Culture - Blood, Arm, Left [[id number]] Collected: 09/23/24 1914    Specimen: Blood from Arm, Left Updated: 09/23/24 1917    Comprehensive Metabolic Panel [[id number]]  (Abnormal) Collected: 09/23/24 1700    Specimen: Blood Updated: 09/23/24 1741     Glucose 76 mg/dL      BUN 20 mg/dL      Creatinine 0.98 mg/dL      Sodium 142 mmol/L      Potassium 4.0 mmol/L      Comment: Slight hemolysis detected by analyzer. Result may be falsely elevated.        Chloride 102 mmol/L      CO2 31.8 mmol/L      Calcium 9.3 mg/dL      Total Protein 7.0 g/dL      Albumin 2.8 g/dL      ALT (SGPT) 13 U/L      AST (SGOT) 34 U/L      Comment: Slight hemolysis detected by analyzer. Result may be falsely elevated.        Alkaline Phosphatase 169 U/L      Total Bilirubin 0.4 mg/dL      Globulin 4.2 gm/dL      A/G Ratio 0.7 g/dL      BUN/Creatinine Ratio 20.4     Anion Gap 8.2 mmol/L      eGFR 62.6 mL/min/1.73     Narrative:      GFR Normal >60  Chronic Kidney Disease <60  Kidney Failure <15      BNP [[id number]]  (Abnormal) Collected: 09/23/24  1700    Specimen: Blood Updated: 09/23/24 1739     proBNP 10,613.0 pg/mL     Narrative:      This assay is used as an aid in the diagnosis of individuals suspected of having heart failure. It can be used as an aid in the diagnosis of acute decompensated heart failure (ADHF) in patients presenting with signs and symptoms of ADHF to the emergency department (ED). In addition, NT-proBNP of <300 pg/mL indicates ADHF is not likely.    Age Range Result Interpretation  NT-proBNP Concentration (pg/mL:      <50             Positive            >450                   Gray                 300-450                    Negative             <300    50-75           Positive            >900                  Gray                300-900                  Negative            <300      >75             Positive            >1800                  Gray                300-1800                  Negative            <300    Protime-INR [[id number]]  (Abnormal) Collected: 09/23/24 1700    Specimen: Blood Updated: 09/23/24 1724     Protime 13.7 Seconds      INR 1.28    aPTT [[id number]]  (Abnormal) Collected: 09/23/24 1700    Specimen: Blood Updated: 09/23/24 1724     PTT 34.2 seconds     CBC & Differential [[id number]]  (Abnormal) Collected: 09/23/24 1700    Specimen: Blood Updated: 09/23/24 1711    Narrative:      The following orders were created for panel order CBC & Differential.  Procedure                               Abnormality         Status                     ---------                               -----------         ------                     CBC Auto Differential[[id number]]        Abnormal            Final result                 Please view results for these tests on the individual orders.    CBC Auto Differential [[id number]]  (Abnormal) Collected: 09/23/24 1700    Specimen: Blood Updated: 09/23/24 1711     WBC 9.26 10*3/mm3      RBC 3.33 10*6/mm3      Hemoglobin 10.1 g/dL      Hematocrit 33.2 %      MCV 99.7 fL      MCH 30.3 pg       MCHC 30.4 g/dL      RDW 13.8 %      RDW-SD 50.7 fl      MPV 9.5 fL      Platelets 293 10*3/mm3      Neutrophil % 68.2 %      Lymphocyte % 20.4 %      Monocyte % 8.9 %      Eosinophil % 1.4 %      Basophil % 0.6 %      Immature Grans % 0.5 %      Neutrophils, Absolute 6.31 10*3/mm3      Lymphocytes, Absolute 1.89 10*3/mm3      Monocytes, Absolute 0.82 10*3/mm3      Eosinophils, Absolute 0.13 10*3/mm3      Basophils, Absolute 0.06 10*3/mm3      Immature Grans, Absolute 0.05 10*3/mm3      nRBC 0.0 /100 WBC     POC Lactate [[id number]]  (Normal) Collected: 09/23/24 1703    Specimen: Blood Updated: 09/23/24 1706     Lactate 1.2 mmol/L      Comment: Serial Number: [id number]:  [id number]       Blood Culture - Blood, Arm, Left [[id number]] Collected: 09/23/24 1700    Specimen: Blood from Arm, Left Updated: 09/23/24 1703            Imaging Results (Last 24 Hours)       Procedure Component Value Units Date/Time    XR Chest 1 View [[id number]] Collected: 09/23/24 1724     Updated: 09/23/24 1727    Narrative:      XR CHEST 1 VW    Date of Exam: 9/23/2024 5:14 PM EDT    Indication: Shortness of breath    Comparison: Chest radiograph 7/19/2024.    Findings:  Enlarged cardiac silhouette, unchanged. Pulmonary vascular congestion. Diffuse interstitial opacities. Small bilateral pleural effusions, right greater than left. No pneumothorax. Osseous structures are unchanged.      Impression:      Impression:  Features of CHF with cardiomegaly and mild/moderate pulmonary edema with small bilateral pleural effusions.      Electronically Signed: Manan Jefferson MD    9/23/2024 5:25 PM EDT    Workstation ID: [id number]                  ECG/EMG Results (most recent)       Procedure Component Value Units Date/Time    Telemetry Scan [[id number]] Resulted: 09/23/24     Updated: 09/24/24 0550    ECG 12 Lead Dyspnea [[id number]] Collected: 09/23/24 1559     Updated: 09/24/24 0828     QT Interval 372 ms      QTC Interval 521 ms     Narrative:       HEART IIPC=916  bpm  RR Iifjwibr=952  ms  WI Interval=  ms  P Horizontal Axis=  deg  P Front Axis=  deg  QRSD Zmoeizdj=513  ms  QT Hardhpaq=864  ms  VDaP=357  ms  QRS Axis=-78  deg  T Wave Axis=-11  deg  - ABNORMAL ECG -  Atrial fibrillation  Right bundle branch block  Probable  inferior infarct, age indeterminate  Anteroseptal  infarct, age indeterminate  Electronically Signed By: Antoine Wilder (Fercho) 2024-09-24 08:27:51  Date and Time of Study:2024-09-23 15:59:52    Adult Transthoracic Echo Complete w/ Color, Spectral and Contrast if Necessary Per Protocol [[id number]] Resulted: 09/24/24 1112     Updated: 09/24/24 1113     LVIDd 4.3 cm      LVIDs 3.0 cm      IVSd 0.90 cm      LVPWd 1.00 cm      FS 30.2 %      IVS/LVPW 0.90 cm      ESV(cubed) 27.0 ml      LV Sys Vol (BSA corrected) 40.0 cm2      EDV(cubed) 79.5 ml      LV Kaufman Vol (BSA corrected) 64.5 cm2      LV mass(C)d 132.7 grams      LVOT area 2.27 cm2      LVOT diam 1.70 cm      EDV(MOD-sp4) 111.0 ml      ESV(MOD-sp4) 68.9 ml      SV(MOD-sp4) 42.1 ml      SVi(MOD-SP4) 24.5 ml/m2      SVi (LVOT) 29.9 ml/m2      EF(MOD-sp4) 37.9 %      MV E max shen 174.0 cm/sec      MV A max shen 85.2 cm/sec      MV dec time 0.21 sec      MV E/A 2.04     LA ESV Index (BP) 48.7 ml/m2      Med Peak E' Shen 8.5 cm/sec      Lat Peak E' Shen 8.9 cm/sec      TR max shen 277.0 cm/sec      Avg E/e' ratio 20.00     SV(LVOT) 51.5 ml      TAPSE (>1.6) 1.42 cm      RV S' 12.3 cm/sec      LA dimension (2D)  4.7 cm      LV V1 max 146.0 cm/sec      LV V1 max PG 8.5 mmHg      LV V1 mean PG 5.0 mmHg      LV V1 VTI 22.7 cm      Ao pk shen 286.0 cm/sec      Ao max PG 32.7 mmHg      Ao mean PG 19.0 mmHg      Ao V2 VTI 54.3 cm      MARGARITO(I,D) 0.95 cm2      MV max PG 14.4 mmHg      MV mean PG 7.0 mmHg      MV V2 VTI 47.9 cm      MV P1/2t 84.6 msec      MVA(P1/2t) 2.6 cm2      MVA(VTI) 1.08 cm2      MV dec slope 661.0 cm/sec2      MR max shen 480.0 cm/sec      MR max PG 92.2 mmHg      TR max PG 30.7 mmHg       RVSP(TR) 38.7 mmHg      RAP systole 8.0 mmHg      RV V1 max PG 1.98 mmHg      RV V1 max 70.3 cm/sec      RV V1 VTI 10.4 cm      PA V2 max 114.0 cm/sec      PA acc time 0.11 sec      Sinus 2.7 cm      STJ 2.00 cm      Dimensionless Index 0.51 (DI)     Narrative:        Estimated right ventricular systolic pressure from tricuspid   regurgitation is mildly elevated (35-45 mmHg).      Impression:          Telemetry Scan [[id number]] Resulted: 09/23/24     Updated: 09/24/24 1213            Medication Review:   I have reviewed the patient's current medication list    Current Facility-Administered Medications:     sennosides-docusate (PERICOLACE) 8.6-50 MG per tablet 2 tablet, 2 tablet, Oral, BID PRN **AND** polyethylene glycol (MIRALAX) packet 17 g, 17 g, Oral, Daily PRN **AND** bisacodyl (DULCOLAX) EC tablet 5 mg, 5 mg, Oral, Daily PRN **AND** bisacodyl (DULCOLAX) suppository 10 mg, 10 mg, Rectal, Daily PRN, Jodie Shepherd APRN    Calcium Replacement - Follow Nurse / BPA Driven Protocol, , Does not apply, PRN, Jodie Shepherd APRN    digoxin (LANOXIN) tablet 125 mcg, 125 mcg, Oral, Daily, Tanvir Bird MD, 125 mcg at 09/24/24 1126    furosemide (LASIX) injection 40 mg, 40 mg, Intravenous, Q12H, Jodie Shepherd APRN, 40 mg at 09/24/24 0909    heparin 43055 units/250 mL (100 units/mL) in 0.45 % NaCl infusion, 17.1 Units/kg/hr, Intravenous, Titrated, Jodie Shepherd APRN, Held at 09/24/24 1159    heparin bolus from bag solution 3,500 Units, 40 Units/kg, Intravenous, Q6H PRN, Jodie Shepherd APRN    heparin bolus from bag solution 7,000 Units, 80 Units/kg, Intravenous, Q6H PRN, Jodie Shepherd APRN    HYDROcodone-acetaminophen (NORCO)  MG per tablet 1 tablet, 1 tablet, Oral, Q8H PRN, Jagdeep Petit MD, 1 tablet at 09/24/24 0921    Magnesium Standard Dose Replacement - Follow Nurse / BPA Driven Protocol, , Does not apply, PRN, Jodie Shepherd, MANJU    metoprolol succinate XL (TOPROL-XL) 24 hr tablet 50 mg, 50 mg, Oral, Q12H, [place],  Tanvir Rodriguez MD, 50 mg at 09/24/24 0911    nitroglycerin (NITROSTAT) SL tablet 0.4 mg, 0.4 mg, Sublingual, Q5 Min PRN, Jodie Shepherd APRN    Phosphorus Replacement - Follow Nurse / BPA Driven Protocol, , Does not apply, PRN, Jodie Shepherd APRN    Potassium Replacement - Follow Nurse / BPA Driven Protocol, , Does not apply, PRN, Jodie Shepherd APRN        Assessment & Plan       Cardiomyopathy, reduced EF  Systolic heart failure-proBNP 10,613  A-fib with RVR  HTN  HLD  Lower extremity edema  Volume overload  Generalized edema/volume overload    PLAN:    -Currently A-fib with controlled rate in the 90s  -Currently blood pressures are soft, lisinopril is being held  -Digoxin 125 mcg  -Metoprolol XL 50 mg twice daily  -Remains on heparin drip   -Hematology consulted for recent bilateral DVTs and anemia  -Will monitor electrolytes during diuresis, cardiac dosing electrolyte replacement protocol     I discussed plan with patient and her daughter: Echo has been performed and await interpretation, continue diuresis and rate control, hematology has been consulted.  They voiced understanding with no further questions    Further orders and recommendations per Dr. Pelon Mendenhall, MANJU  09/24/24  12:20 EDT

## 2024-09-24 NOTE — CONSULTS
Cardiology consult note.       Patient Care Team:  Rut Pierce APRN as PCP - General (Nurse Practitioner)  Austin Brooks MD as Cardiologist (Cardiology)  Prachi Zepeda MD as Consulting Physician (Hematology and Oncology)  Tanvir Bird MD as Consulting Physician (Cardiology)    CHIEF COMPLAINT: CHF    HISTORY OF PRESENT ILLNESS:      I the pleasure to see this 69-year-old as a new patient in clinic today.  She presents after hospitalization or sequence of hospitalizations over the last 3 months.  She was diagnosed with DVT, A-fib RVR, respiratory failure also required mechanical ventilation and chest tube for pleural effusion, had viral respiratory infection on top of COPD.  Was found to have reduced EF at 30 to 35% at that time which was global although no ischemic evaluation was performed given systemic illness.  No evidence of PE was seen but she had bilateral lower extremity DVTs as recorded.  CT of the chest demonstrated massive cardiomegaly, coronary calcifications.  A 3 cm pancreatic mass splenic infarct and gallstones in the upper abdomen along with pleural effusions.  This was done July 14, 2024.  MRI of the abdomen was suspicious for cystic neoplasm in the pancreas.  Again she had bilateral acute right and left lower extremity DVT in the femoral and popliteals bilaterally.  2D echo demonstrated EF 30 to 35%, moderate aortic valve stenosis, mean gradient of 14 peak of 24, moderate to severe mitral valve regurgitation.  Least grade 2 diastolic dysfunction.     She is back for follow-up today.  Her provided medicine list does not match what we have.  She has bilateral lower extremity edema with redness concerning for cellulitis and firmness all the way up into the thighs.  She also has abdominal wall firmness and tenderness suspicious for either cellulitis or hematoma.  She is off her anticoagulation as she said someone told her that she did not have DVT.  She carries an  "indication for anticoagulation with atrial fibrillation alone as we discussed today.  She has a myriad of health problems, was told she had compromised renal function, presence of volume overload today, presence of atrial fibrillation today along with painful lower extremities and redness          Past Medical History:   Diagnosis Date    Asthma 7/1/24    Atrial fibrillation 7/1/24    Bilateral leg edema     CHF (congestive heart failure) 7/1/24    Chronic kidney disease 7/1/24    Chronic respiratory failure with hypoxia, on home O2 therapy 07/01/2024    Congenital heart disease 7/1/24    COPD (chronic obstructive pulmonary disease)     Diabetes mellitus 7/1/24    Morbid obesity with BMI of 40.0-44.9, adult 11/08/2010    Myocardial infarction 35 yrs ago    Primary hypertension 03/01/2017    Pulmonary embolism     \"many years ago, was on warfarin\"    Sleep apnea      Past Surgical History:   Procedure Laterality Date    D & C HYSTEROSCOPY N/A 07/22/2024    Procedure: DILATATION AND CURETTAGE HYSTEROSCOPY;  Surgeon: Sosa Newell MD;  Location: [facility] MAIN OR;  Service: Gynecology;  Laterality: N/A;     Family History   Problem Relation Age of Onset    Heart attack Mother     Hypertension Mother     Heart attack Father     Hypertension Father     Asthma Sister     Asthma Sister     Asthma Brother     Heart attack Brother     Hypertension Brother     Hypertension Brother     Heart disease Maternal Aunt     Heart failure Maternal Aunt     Heart failure Maternal Aunt      Social History     Tobacco Use    Smoking status: Never     Passive exposure: Never    Smokeless tobacco: Never   Vaping Use    Vaping status: Never Used   Substance Use Topics    Alcohol use: Never    Drug use: Never     Medications Prior to Admission   Medication Sig Dispense Refill Last Dose    bumetanide (BUMEX) 1 MG tablet Take 1 tablet by mouth 2 (Two) Times a Day.       docusate sodium (COLACE) 100 MG capsule Take 1 capsule by mouth " "Every 12 (Twelve) Hours.       FeroSul 325 (65 Fe) MG tablet Take 1 tablet by mouth Daily With Breakfast.       furosemide (LASIX) 20 MG tablet 30       GoodSense ClearLax 17 GM/SCOOP powder Take 17 g by mouth Daily.       HYDROcodone-acetaminophen (NORCO)  MG per tablet 90       levothyroxine (SYNTHROID, LEVOTHROID) 25 MCG tablet        lisinopril (PRINIVIL,ZESTRIL) 30 MG tablet 90       meloxicam (MOBIC) 7.5 MG tablet 90       metoprolol tartrate (LOPRESSOR) 50 MG tablet 180       mineral oil-hydrophilic petrolatum (AQUAPHOR) ointment Apply 1 Application topically to the appropriate area as directed As Needed.       montelukast (SINGULAIR) 10 MG tablet Take 1 tablet by mouth every night at bedtime.       omeprazole (priLOSEC) 20 MG capsule Take 1 capsule by mouth Daily.       oxybutynin XL (DITROPAN-XL) 10 MG 24 hr tablet Take 2 tablets by mouth Daily.       potassium chloride (MICRO-K) 10 MEQ CR capsule 180       vitamin D (ERGOCALCIFEROL) 1.25 MG (38016 UT) capsule capsule Take 1 capsule by mouth 2 (Two) Times a Week. Monday and Thursday.        Allergies:  Patient has no known allergies.    REVIEW OF SYSTEMS:  Please see the above history of present illness for pertinent positives and negatives.  The remainder of the patient's systems have been reviewed and are negative. ***    Vital Signs  Temp:  [98 °F (36.7 °C)-98.3 °F (36.8 °C)] 98.3 °F (36.8 °C)  Heart Rate:  [] 110  Resp:  [14-20] 18  BP: ()/(47-96) 108/72    Flowsheet Rows      Flowsheet Row First Filed Value   Admission Height 149.9 cm (59.02\") Documented at 09/23/2024 1640   Admission Weight 87.5 kg (192 lb 14.4 oz) Documented at 09/23/2024 1640             Physical Exam:  Physical Exam   Constitutional: Patient appears well-developed and well-nourished and in no acute distress   HEENT:   Head: Normocephalic and atraumatic.   Eyes:  Pupils are equal, round, and reactive to light. EOM are intact. Sclerae are anicteric and " noninjected.  Mouth and Throat: Patient has moist mucous membranes. Oropharynx is clear of any erythema or exudate.     Neck: Neck supple. No JVD present. No thyromegaly present. No lymphadenopathy present.  Cardiovascular: Regular rate, regular rhythm, S1 normal and S2 normal.  Exam reveals no gallop and no friction rub.  No murmur heard.  Pulmonary/Chest: Lungs are clear to auscultation bilaterally. No respiratory distress. No wheezes. No rhonchi. No rales.   Abdominal: Soft. Bowel sounds are normal. No distension and no mass. There is no hepatosplenomegaly. There is no tenderness.   Musculoskeletal: Normal muscle tone  Extremities: No edema. Pulses are palpable in all 4 extremities.  Neurological: Patient is alert and oriented to person, place, and time. Cranial nerves II-XII are grossly intact with no focal deficits.  Skin: Skin is warm. No rash noted. Nails show no clubbing.  No cyanosis or erythema.     Results Review:    I reviewed the patient's new clinical results.  Lab Results (most recent)       Procedure Component Value Units Date/Time    aPTT [[id number]]  (Abnormal) Collected: 09/24/24 0314    Specimen: Blood from Arm, Right Updated: 09/24/24 0356     PTT >139.0 seconds     Basic Metabolic Panel [[id number]]  (Abnormal) Collected: 09/24/24 0314    Specimen: Blood from Arm, Right Updated: 09/24/24 0353     Glucose 93 mg/dL      BUN 21 mg/dL      Creatinine 0.86 mg/dL      Sodium 146 mmol/L      Potassium 3.5 mmol/L      Chloride 105 mmol/L      CO2 35.4 mmol/L      Calcium 8.8 mg/dL      BUN/Creatinine Ratio 24.4     Anion Gap 5.6 mmol/L      eGFR 73.2 mL/min/1.73     Narrative:      GFR Normal >60  Chronic Kidney Disease <60  Kidney Failure <15      CBC & Differential [[id number]]  (Abnormal) Collected: 09/24/24 0314    Specimen: Blood from Arm, Right Updated: 09/24/24 0323    Narrative:      The following orders were created for panel order CBC & Differential.  Procedure                                Abnormality         Status                     ---------                               -----------         ------                     CBC Auto Differential[[id number]]        Abnormal            Final result                 Please view results for these tests on the individual orders.    CBC Auto Differential [[id number]]  (Abnormal) Collected: 09/24/24 0314    Specimen: Blood from Arm, Right Updated: 09/24/24 0323     WBC 9.47 10*3/mm3      RBC 2.77 10*6/mm3      Hemoglobin 8.2 g/dL      Hematocrit 27.1 %      MCV 97.8 fL      MCH 29.6 pg      MCHC 30.3 g/dL      RDW 13.8 %      RDW-SD 49.1 fl      MPV 9.0 fL      Platelets 238 10*3/mm3      Neutrophil % 66.0 %      Lymphocyte % 22.4 %      Monocyte % 9.4 %      Eosinophil % 1.3 %      Basophil % 0.5 %      Immature Grans % 0.4 %      Neutrophils, Absolute 6.25 10*3/mm3      Lymphocytes, Absolute 2.12 10*3/mm3      Monocytes, Absolute 0.89 10*3/mm3      Eosinophils, Absolute 0.12 10*3/mm3      Basophils, Absolute 0.05 10*3/mm3      Immature Grans, Absolute 0.04 10*3/mm3      nRBC 0.0 /100 WBC     CANDIDA AURIS PCR - Swab, Axilla Right, Axilla Left and Groin [[id number]] Collected: 09/24/24 0006    Specimen: Swab from Axilla Right, Axilla Left and Groin Updated: 09/24/24 0044    Blood Culture - Blood, Arm, Left [[id number]] Collected: 09/23/24 1914    Specimen: Blood from Arm, Left Updated: 09/23/24 1917    Comprehensive Metabolic Panel [[id number]]  (Abnormal) Collected: 09/23/24 1700    Specimen: Blood Updated: 09/23/24 1741     Glucose 76 mg/dL      BUN 20 mg/dL      Creatinine 0.98 mg/dL      Sodium 142 mmol/L      Potassium 4.0 mmol/L      Comment: Slight hemolysis detected by analyzer. Result may be falsely elevated.        Chloride 102 mmol/L      CO2 31.8 mmol/L      Calcium 9.3 mg/dL      Total Protein 7.0 g/dL      Albumin 2.8 g/dL      ALT (SGPT) 13 U/L      AST (SGOT) 34 U/L      Comment: Slight hemolysis detected by analyzer. Result may be falsely elevated.         Alkaline Phosphatase 169 U/L      Total Bilirubin 0.4 mg/dL      Globulin 4.2 gm/dL      A/G Ratio 0.7 g/dL      BUN/Creatinine Ratio 20.4     Anion Gap 8.2 mmol/L      eGFR 62.6 mL/min/1.73     Narrative:      GFR Normal >60  Chronic Kidney Disease <60  Kidney Failure <15      BNP [[id number]]  (Abnormal) Collected: 09/23/24 1700    Specimen: Blood Updated: 09/23/24 1739     proBNP 10,613.0 pg/mL     Narrative:      This assay is used as an aid in the diagnosis of individuals suspected of having heart failure. It can be used as an aid in the diagnosis of acute decompensated heart failure (ADHF) in patients presenting with signs and symptoms of ADHF to the emergency department (ED). In addition, NT-proBNP of <300 pg/mL indicates ADHF is not likely.    Age Range Result Interpretation  NT-proBNP Concentration (pg/mL:      <50             Positive            >450                   Gray                 300-450                    Negative             <300    50-75           Positive            >900                  Gray                300-900                  Negative            <300      >75             Positive            >1800                  Gray                300-1800                  Negative            <300    Protime-INR [[id number]]  (Abnormal) Collected: 09/23/24 1700    Specimen: Blood Updated: 09/23/24 1724     Protime 13.7 Seconds      INR 1.28    aPTT [[id number]]  (Abnormal) Collected: 09/23/24 1700    Specimen: Blood Updated: 09/23/24 1724     PTT 34.2 seconds     CBC & Differential [[id number]]  (Abnormal) Collected: 09/23/24 1700    Specimen: Blood Updated: 09/23/24 1711    Narrative:      The following orders were created for panel order CBC & Differential.  Procedure                               Abnormality         Status                     ---------                               -----------         ------                     CBC Auto Differential[[id number]]        Abnormal            Final  result                 Please view results for these tests on the individual orders.    CBC Auto Differential [[id number]]  (Abnormal) Collected: 09/23/24 1700    Specimen: Blood Updated: 09/23/24 1711     WBC 9.26 10*3/mm3      RBC 3.33 10*6/mm3      Hemoglobin 10.1 g/dL      Hematocrit 33.2 %      MCV 99.7 fL      MCH 30.3 pg      MCHC 30.4 g/dL      RDW 13.8 %      RDW-SD 50.7 fl      MPV 9.5 fL      Platelets 293 10*3/mm3      Neutrophil % 68.2 %      Lymphocyte % 20.4 %      Monocyte % 8.9 %      Eosinophil % 1.4 %      Basophil % 0.6 %      Immature Grans % 0.5 %      Neutrophils, Absolute 6.31 10*3/mm3      Lymphocytes, Absolute 1.89 10*3/mm3      Monocytes, Absolute 0.82 10*3/mm3      Eosinophils, Absolute 0.13 10*3/mm3      Basophils, Absolute 0.06 10*3/mm3      Immature Grans, Absolute 0.05 10*3/mm3      nRBC 0.0 /100 WBC     POC Lactate [[id number]]  (Normal) Collected: 09/23/24 1703    Specimen: Blood Updated: 09/23/24 1706     Lactate 1.2 mmol/L      Comment: Serial Number: [id number]:  [id number]       Blood Culture - Blood, Arm, Left [[id number]] Collected: 09/23/24 1700    Specimen: Blood from Arm, Left Updated: 09/23/24 1703            Imaging Results (Most Recent)       Procedure Component Value Units Date/Time    XR Chest 1 View [[id number]] Collected: 09/23/24 1724     Updated: 09/23/24 1727    Narrative:      XR CHEST 1 VW    Date of Exam: 9/23/2024 5:14 PM EDT    Indication: Shortness of breath    Comparison: Chest radiograph 7/19/2024.    Findings:  Enlarged cardiac silhouette, unchanged. Pulmonary vascular congestion. Diffuse interstitial opacities. Small bilateral pleural effusions, right greater than left. No pneumothorax. Osseous structures are unchanged.      Impression:      Impression:  Features of CHF with cardiomegaly and mild/moderate pulmonary edema with small bilateral pleural effusions.      Electronically Signed: Manan Jefferson MD    9/23/2024 5:25 PM EDT    Workstation ID:  [id number]          {Desc; reviewed/not reviewed:15920}    ECG/EMG Results (most recent)       Procedure Component Value Units Date/Time    ECG 12 Lead Dyspnea [[id number]] Collected: 09/23/24 1559     Updated: 09/23/24 1603     QT Interval 372 ms      QTC Interval 521 ms     Narrative:      HEART FGIL=363  bpm  RR Vvrzqphg=056  ms  MT Interval=  ms  P Horizontal Axis=  deg  P Front Axis=  deg  QRSD Wdskkzcc=296  ms  QT Adscjnof=497  ms  XTbC=157  ms  QRS Axis=-78  deg  T Wave Axis=-11  deg  - ABNORMAL ECG -  Atrial fibrillation  Right bundle branch block  Probable inferior infarct, age indeterminate  Anteroseptal infarct, age indeterminate  Date and Time of Study:2024-09-23 15:59:52    Telemetry Scan [[id number]] Resulted: 09/23/24     Updated: 09/24/24 0550          {Desc; reviewed/not reviewed:95646}    Assessment & Plan     ***    I discussed the patient's findings and my recommendations with patient and ***.     Tanvir Bird MD  09/24/24  07:38 EDT    Time: {Time spent:730332596}

## 2024-09-24 NOTE — SIGNIFICANT NOTE
09/24/24 0853   OTHER   Discipline physical therapist   Rehab Time/Intention   Session Not Performed other (see comments);unable to evaluate, medical status change  (Acute right lower extremity DVT, with heparin drip started 9/23/24 at 2052. Will f/u after at least 24 hrs.)   Therapy Assessment/Plan (PT)   Criteria for Skilled Interventions Met (PT) yes;meets criteria   Recommendation   PT - Next Appointment 09/25/24

## 2024-09-24 NOTE — CONSULTS
"Heart Failure Program  Nurse Navigator  Discharge Planning    Patient Name:Ban Brennan  :1955  Cardiologist:Pelon  Current Admission Date: 2024   Previous Admission: 2024  Admission frequency: 2 admissions in 6 months    Heart Failure history per record:    Symptoms on admission:c/o SOA, swelling lower legs. Sent from Dr. Bird office per pt. Pt request daughter called with education. Pt advises her daughter handles all of her care and medicaitons      Admission Weight:  Flowsheet Rows      Flowsheet Row First Filed Value   Admission Height 149.9 cm (59.02\") Documented at 2024 1640   Admission Weight 87.5 kg (192 lb 14.4 oz) Documented at 2024 1640              Current Home Medications:  Prior to Admission medications    Medication Sig Start Date End Date Taking? Authorizing Provider   bumetanide (BUMEX) 1 MG tablet Take 1 tablet by mouth 2 (Two) Times a Day.  Patient taking differently: Take 1 tablet by mouth 3 (Three) Times a Day. 24  Yes Librado Villagomez MD   FeroSul 325 (65 Fe) MG tablet Take 1 tablet by mouth Daily With Breakfast. 9/3/24  Yes ProviderChadwick MD   fexofenadine-pseudoephedrine (ALLEGRA-D 24) 180-240 MG per 24 hr tablet Take 1 tablet by mouth Daily. Pharmacy script says 180mg   Yes ProviderChadwick MD   furosemide (LASIX) 20 MG tablet Take 1 tablet by mouth Daily.   Yes ProviderChadwick MD   GoodSense ClearLax 17 GM/SCOOP powder Take 17 g by mouth Daily. 9/3/24  Yes Chadwick Johnson MD   HYDROcodone-acetaminophen (NORCO)  MG per tablet Take 1 tablet by mouth Every 6 (Six) Hours As Needed for Mild Pain, Moderate Pain or Severe Pain.   Yes Chadwick Johnson MD   levothyroxine (SYNTHROID, LEVOTHROID) 25 MCG tablet Take 1 tablet by mouth Every Morning. 9/3/24  Yes Chadwick Johnson MD   lisinopril (PRINIVIL,ZESTRIL) 30 MG tablet Take 20 mg by mouth Daily.   Yes Chadwick Johnson MD   meloxicam (MOBIC) 7.5 MG tablet 1 " tablet Daily.   Yes Chadwick Johnson MD   metoprolol tartrate (LOPRESSOR) 50 MG tablet Take 1 tablet by mouth Daily.   Yes Chadwick Johnson MD   mineral oil-hydrophilic petrolatum (AQUAPHOR) ointment Apply 1 Application topically to the appropriate area as directed As Needed (3 times per day). 9/3/24  Yes Chadwick Johnson MD   montelukast (SINGULAIR) 10 MG tablet Take 1 tablet by mouth every night at bedtime.   Yes Chadwick Johnson MD   omeprazole (priLOSEC) 20 MG capsule Take 1 capsule by mouth Daily. 3/18/24  Yes Chadwick Johnson MD   oxybutynin XL (DITROPAN-XL) 10 MG 24 hr tablet Take 2 tablets by mouth Daily. Pharmacy order reads Oxybutynin Cl Er 3/18/24  Yes Chadwick Johnson MD   potassium chloride (MICRO-K) 10 MEQ CR capsule 1 capsule 2 (Two) Times a Day.   Yes Chadwick Johnson MD   vitamin D (ERGOCALCIFEROL) 1.25 MG (59347 UT) capsule capsule Take 1 capsule by mouth 2 (Two) Times a Week. Monday and Thursday. 5/30/24  Yes Chadwick Johnson MD   docusate sodium (COLACE) 100 MG capsule Take 1 capsule by mouth Every 12 (Twelve) Hours. 5/30/24 9/24/24  Chadwick Johnson MD       Social history:   Pt from home, lives with daughter who manages meds. Limited transport ability per pt and daughter    Smoking status:     Diagnostics Testing:  proBNP level: 30506    Echocardiogram:Results for orders placed during the hospital encounter of 09/23/24    Adult Transthoracic Echo Complete w/ Color, Spectral and Contrast if Necessary Per Protocol    Interpretation Summary    Left ventricular systolic function is mildly decreased. Left ventricular ejection fraction appears to be 41 - 45%.    Left ventricular diastolic function was indeterminate.    Mildly reduced right ventricular systolic function noted.    The right ventricular cavity is moderate to severely dilated.    The left atrial cavity is severely dilated.    Left atrial volume is severely increased.    The right atrial cavity  is moderate to severely  dilated.    Mild aortic valve stenosis is present.    Moderate tricuspid valve regurgitation is present.    Estimated right ventricular systolic pressure from tricuspid regurgitation is mildly elevated (35-45 mmHg).    Mild pulmonary hypertension is present.        Patient Assessment:   Pt lying in bed, resp even and unlabored. No soa with conversation. Noted edema lower legs 1+ with redness lower legs    Current O2: 2L NC  Home O2: 2L NC    Education provided to patient:  yes- Heart Failure disease education  yes -Symptom identification/management  yes -Daily Weights  yes- Diet education  yes- Fluid restriction (if ordered)  yes- Activity education  yes- Medication education  na- Smoking cessation  yes- Follow-up Appointments  yes-Provided information on how to access [facility] My HF Guide/Heart Failure Interactive workbook    Acceptance of learning: acceptance, cooperative    Heart Failure education interactive teaching session time: 30 minutes    GWTG: EF 31-35%    Identified needs/barriers:   Volume status, GDMT - toprol xl. Needs 7 day follow-up and cardiology follow-up    Intervention:   Education, HF clinic information provided - decline at this time due to transport issues.

## 2024-09-24 NOTE — CASE MANAGEMENT/SOCIAL WORK
Discharge Planning Assessment   Vince     Patient Name: Ban Brennan  MRN: [id number]  Today's Date: 9/23/2024    Admit Date: 9/23/2024    Plan: From home with family, current with [facility] for O2 and [facility]. Request PT/OT demario for dc planning.   Discharge Needs Assessment       Row Name 09/23/24 1956       Living Environment    People in Home child(samreen), adult    Name(s) of People in Home Daughter-Chelsi    Current Living Arrangements home    Potentially Unsafe Housing Conditions none    In the past 12 months has the electric, gas, oil, or water company threatened to shut off services in your home? No    Primary Care Provided by child(samreen)    Provides Primary Care For no one, unable/limited ability to care for self    Family Caregiver if Needed child(samreen), adult    Family Caregiver Names Daughter-Chelsi and SonBeth    Quality of Family Relationships unable to assess    Able to Return to Prior Arrangements yes    Living Arrangement Comments Home with family       Transportation Needs    In the past 12 months, has lack of transportation kept you from medical appointments or from getting medications? no    In the past 12 months, has lack of transportation kept you from meetings, work, or from getting things needed for daily living? No       Food Insecurity    Within the past 12 months, you worried that your food would run out before you got the money to buy more. Never true    Within the past 12 months, the food you bought just didn't last and you didn't have money to get more. Never true       Transition Planning    Patient/Family Anticipates Transition to home with family;home with help/services;inpatient rehabilitation facility    Patient/Family Anticipated Services at Transition home health care    Transportation Anticipated family or friend will provide       Discharge Needs Assessment    Equipment Currently Used at Home wheelchair;walker, rolling;glucometer;oxygen    Concerns to be Addressed  discharge planning    Anticipated Changes Related to Illness inability to care for self    Equipment Needed After Discharge none    Discharge Facility/Level of Care Needs nursing facility, skilled    Provided Post Acute Provider List? N/A    Patient's Choice of Community Agency(s) Pt states she will defer decisions to her daughter-Chelsi                   Discharge Plan       Row Name 09/23/24 1958       Plan    Plan From home with family, current with [facility] for O2 and [facility]. Request PT/OT evals for dc planning.    Plan Comments Met with patient at bedside, confirmed PCP and Pharm. Current with [facility] for Home O2 and [facility] for [facility]. Pt states she is increasingly dependent on her daughter, Chelsi and her son, Jose to perform her ADL's. They are currently staying in her home to assist. Discussed need for SNF at dc and she defers that decision to her daughter Chelsi. Attempted to contact pt daughter at [phone], to discuss DC planning and provide SNF list. No answer, left VM. Pt denied financial concerns and family can provide dc transportation. SC sent to Dr. Sandoval to request PT/OT evals for dc planning. DC Barriers: Heparin gtt; Telemetry; Cardiology consult.                  Continued Care and Services - Admitted Since 9/23/2024    No active coordination exists for this encounter.       Selected Continued Care - Prior Encounters Includes continued care and service providers with selected services from prior encounters from 6/25/2024 to 9/23/2024      Discharged on 7/23/2024 Admission date: 6/30/2024 - Discharge disposition: Skilled Nursing Facility (DC - External)      Destination       Service Provider Selected Services Address Phone Fax Patient Preferred    [facility] [address], [place] [address] [phone]                              Demographic Summary       Row Name 09/1955       General Information    Admission Type inpatient    Arrived  From physician office - external    Required Notices Provided Important Message from Medicare    Referral Source emergency department    Reason for Consult discharge planning    Preferred Language English       Contact Information    Permission Granted to Share Info With                    Functional Status       Row Name 09/1955       Functional Status    Usual Activity Tolerance fair    Current Activity Tolerance poor       Physical Activity    On average, how many days per week do you engage in moderate to strenuous exercise (like a brisk walk)? 0 days    On average, how many minutes do you engage in exercise at this level? 0 min    Number of minutes of exercise per week 0       Assessment of Health Literacy    How often do you have someone help you read hospital materials? Occasionally    How often do you have problems learning about your medical condition because of difficulty understanding written information? Occasionally    How often do you have a problem understanding what is told to you about your medical condition? Occasionally    How confident are you filling out medical forms by yourself? Quite a bit    Health Literacy Moderate       Functional Status, IADL    Medications assistive person    Meal Preparation assistive person    Housekeeping assistive person    Laundry assistive person    Shopping assistive person    IADL Comments Pt states she is requiring assist with most ADL's which is provided by her daughter       Mental Status    General Appearance WDL WDL       Mental Status Summary    Recent Changes in Mental Status/Cognitive Functioning no changes              Patient Forms       Row Name 09/23/24 1954       Patient Forms    Important Message from Medicare (IMM) Delivered    Delivered to Patient  IMM per Reg                Met with patient in room wearing PPE: mask  Maintained distance greater than six feet and spent less than 15 minutes in the room  Sosa Serrano RN  Case  Manager  Phone [phone]  Fax [phone]

## 2024-09-24 NOTE — PROGRESS NOTES
[facility] MEDICINE SERVICE  DAILY PROGRESS NOTE    NAME: Ban Brennan  : 1955  MRN: [id number]      LOS: 1 day     PROVIDER OF SERVICE: Rogelio Weeks MD    Chief Complaint: CHF exacerbation    Subjective:     Interval History:  History taken from: patient      History of Present Illness: Ban Brennan is a 69 y.o. female with a CMH of atrial fibrillation, CHF, CKD, COPD, DM, HTN who presented to [facility] on 2024 from her cardiologist's office with possible cellulitis, CHF exacerbation and atrial fibrillation.      On ED evaluation, proBNP 10,613, alk phos 169, EKG shows atrial fibrillation. Lower extremities with edema and erythema. ED gave 2mg IV bumex. Cardiology consulted. Hospitalist team to admit for further medical management.      24 patient examined in bed no acute distress, blood pressure stable, heart rate 102, on 2 L.  Weakness fatigue.Remains on heparin drip        Review of Systems  Constitutional:  Positive for fatigue.   Respiratory:  Positive for shortness of breath.    Cardiovascular:  Positive for leg swelling. Negative for chest pain and palpitations.   Neurological:  Positive for weakness. Negative for syncope and headaches.   Objective:     Vital Signs  Temp:  [98 °F (36.7 °C)-98.4 °F (36.9 °C)] 98.4 °F (36.9 °C)  Heart Rate:  [] 102  Resp:  [14-20] 20  BP: ()/(47-96) 112/50  Flow (L/min):  [2-4] 2   Body mass index is 34.34 kg/m².    Physical Exam   Constitutional:       General: She is in acute distress.      Appearance: Normal appearance. She is obese.   HENT:      Head: Normocephalic and atraumatic.   Neck:      Comments: No JVD noted  Cardiovascular:      Rate and Rhythm: Normal rate. Rhythm irregular.      Heart sounds: No murmur heard.     No friction rub. No gallop.      Comments: Generalized edema   Pulmonary:      Effort: Pulmonary effort is normal. No respiratory distress.      Breath sounds: Wheezing present.    Genitourinary:     Comments: External catheter in place  Musculoskeletal:      Right lower leg: Edema present.      Left lower leg: Edema present.      Comments: 2+ pitting edema of lower extremities   Skin:     General: Skin is warm and dry.      Findings: Erythema present.      Comments: Bilateral lower extremities   Neurological:      General: No focal deficit present.      Mental Status: She is alert and oriented to person, place, and time.   Psychiatric:         Mood and Affect: Mood normal.         Behavior: Behavior normal.         Thought Content: Thought content normal.         Judgment: Judgment normal.     Diagnostic Data    Results from last 7 days   Lab Units 09/24/24  0314 09/23/24  1700   WBC 10*3/mm3 9.47 9.26   HEMOGLOBIN g/dL 8.2* 10.1*   HEMATOCRIT % 27.1* 33.2*   PLATELETS 10*3/mm3 238 293   GLUCOSE mg/dL 93 76   CREATININE mg/dL 0.86 0.98   BUN mg/dL 21 20   SODIUM mmol/L 146* 142   POTASSIUM mmol/L 3.5 4.0   AST (SGOT) U/L  --  34*   ALT (SGPT) U/L  --  13   ALK PHOS U/L  --  169*   BILIRUBIN mg/dL  --  0.4   ANION GAP mmol/L 5.6 8.2       XR Chest 1 View    Result Date: 9/23/2024  Impression: Features of CHF with cardiomegaly and mild/moderate pulmonary edema with small bilateral pleural effusions. Electronically Signed: Manan Jefferson MD  9/23/2024 5:25 PM EDT  Workstation ID: [id number]       I reviewed the patient's new clinical results.    Assessment/Plan:     Active and Resolved Problems  Active Hospital Problems    Diagnosis  POA    **CHF exacerbation [I50.9]  Yes      Resolved Hospital Problems   No resolved problems to display.      Atrial fibrillation   - JUAN JOSE?DS?-VASc Score for Atrial Fibrillation Stroke Risk - MDCalc   6 points -> a score ?2 for men or ?3 for women is “moderate-high” risk and should otherwise be an anticoagulation candidate.  - started heparin gtt  - EKG shows RVR, but ED provider reports periods of bradycardia   - admit to tele  - cardiology consulted     CHF  "exacerbation  - Echo: EF 31-35%  - BNP: 10,613  - ED gave 2mg IV bumex  - start lasix 40 mg BID  - Daily weights  - Strict I&O's  - 1.5L fluid restriction   - Cardiac diet, Sodium < 2g  - heart failure pathway initiated     Leg swelling  - venous duplex: \"Acute right lower extremity deep vein thrombosis noted in the common femoral, proximal femoral, mid femoral, distal femoral and popliteal. Acute right lower extremity superficial thrombophlebitis noted in the saphenofemoral junction and great saphenous (below knee). Acute left lower extremity deep vein thrombosis noted in the proximal femoral, mid femoral, distal femoral, popliteal and posterial tibial.Right and left popliteal fossa fluid collection.\"  - heparin gtt started  Hematology consulted for recent bilateral DVTs and anemia      COPD  - Not in acute exacerbation  - Continue home inhalers once reconciled      Hypertension   - blood pressures are soft, lisinopril is being held   - Resume home medications once bp stable      Hypothyroidism   - continue synthroid     VTE Prophylaxis:  Pharmacologic VTE prophylaxis orders are present.    Disposition Planning:     Barriers to Discharge:AF  Anticipated Date of Discharge: 9/26/24  Place of Discharge: [facility]      Time: 34 minutes     Code Status and Medical Interventions: CPR (Attempt to Resuscitate); Full Support   Ordered at: 09/23/24 1807     Code Status (Patient has no pulse and is not breathing):    CPR (Attempt to Resuscitate)     Medical Interventions (Patient has pulse or is breathing):    Full Support       Signature: Electronically signed by Rogelio Weeks MD, 09/24/24, 12:59 EDT.  [facility] Hospitalist Team  "

## 2024-09-24 NOTE — CONSULTS
Hematology/Oncology Inpatient Consultation    Patient name: Ban Brennan  : 1955  MRN: [id number]  Referring Provider: Dr. Weeks  Reason for Consultation: Pancreatic mass, DVT    Chief complaint: Leg swelling    History of present illness:    Ban Brennan is a 69 y.o. female who presented to [facility] on 2024 with complaints of leg swelling.  Past medical history significant for atrial fibrillation, CHF, CKD, COPD, diabetes mellitus, hypertension, bilateral lower extremity DVT, remote history of pulmonary embolism.  Recent hospital admission in 2024 for complaints of shortness of breath during which she was found to have a pancreatic mass.    24  Hematology/Oncology was consulted on a patient known to us and seen in the office by Dr. Zepeda.  Recently found to have a soft tissue mass involving the body of the pancreas.  In 2024 the patient had EUS with cyst aspiration and FNP impression is possible cirrhosis adenoma versus questionable IPMN.  Patient had both cystic as well as solid component with microcystic areas most likely suggestive of cirrhosis.  Cyst was aspirated.  Cytology showed hypocellular specimen containing a few benign glandular cells insufficient for further diagnosis.    2024: CA 19-9 53.2 (previously 58.5), CEA 10.70    At the last visit patient was recommended to have CT scan of the abdomen and pelvis, follow-up with GYN as well as GI.  She is admitted this time with complaints of lower extremity swelling.  She had Doppler of the lower extremity which did not show any evidence of clots  She was started on IV heparin    He/She  has a past medical history of Asthma (24), Atrial fibrillation (24), Bilateral leg edema, CHF (congestive heart failure) (24), Chronic kidney disease (24), Chronic respiratory failure with hypoxia, on home O2 therapy (2024), Congenital heart disease (24), COPD (chronic obstructive  "pulmonary disease), Diabetes mellitus (7/1/24), Morbid obesity with BMI of 40.0-44.9, adult (11/08/2010), Myocardial infarction (35 yrs ago), Primary hypertension (03/01/2017), Pulmonary embolism, and Sleep apnea.    PCP: Rut Pierce APRN    History:  Past Medical History:   Diagnosis Date    Asthma 7/1/24    Atrial fibrillation 7/1/24    Bilateral leg edema     CHF (congestive heart failure) 7/1/24    Chronic kidney disease 7/1/24    Chronic respiratory failure with hypoxia, on home O2 therapy 07/01/2024    Congenital heart disease 7/1/24    COPD (chronic obstructive pulmonary disease)     Diabetes mellitus 7/1/24    Morbid obesity with BMI of 40.0-44.9, adult 11/08/2010    Myocardial infarction 35 yrs ago    Primary hypertension 03/01/2017    Pulmonary embolism     \"many years ago, was on warfarin\"    Sleep apnea    ,   Past Surgical History:   Procedure Laterality Date    D & C HYSTEROSCOPY N/A 07/22/2024    Procedure: DILATATION AND CURETTAGE HYSTEROSCOPY;  Surgeon: Sosa Newlel MD;  Location: [facility] MAIN OR;  Service: Gynecology;  Laterality: N/A;   ,   Family History   Problem Relation Age of Onset    Heart attack Mother     Hypertension Mother     Heart attack Father     Hypertension Father     Asthma Sister     Asthma Sister     Asthma Brother     Heart attack Brother     Hypertension Brother     Hypertension Brother     Heart disease Maternal Aunt     Heart failure Maternal Aunt     Heart failure Maternal Aunt    ,   Social History     Tobacco Use    Smoking status: Never     Passive exposure: Never    Smokeless tobacco: Never   Vaping Use    Vaping status: Never Used   Substance Use Topics    Alcohol use: Never    Drug use: Never   ,   Medications Prior to Admission   Medication Sig Dispense Refill Last Dose    bumetanide (BUMEX) 1 MG tablet Take 1 tablet by mouth 2 (Two) Times a Day. (Patient taking differently: Take 1 tablet by mouth 3 (Three) Times a Day.)   Past Week    FeroSul 325 " (65 Fe) MG tablet Take 1 tablet by mouth Daily With Breakfast.   Past Week    fexofenadine-pseudoephedrine (ALLEGRA-D 24) 180-240 MG per 24 hr tablet Take 1 tablet by mouth Daily. Pharmacy script says 180mg   Past Week    furosemide (LASIX) 20 MG tablet Take 1 tablet by mouth Daily.   Past Week    GoodSense ClearLax 17 GM/SCOOP powder Take 17 g by mouth Daily.   Past Week    HYDROcodone-acetaminophen (NORCO)  MG per tablet Take 1 tablet by mouth Every 6 (Six) Hours As Needed for Mild Pain, Moderate Pain or Severe Pain.   Past Week    levothyroxine (SYNTHROID, LEVOTHROID) 25 MCG tablet Take 1 tablet by mouth Every Morning.   Past Week    lisinopril (PRINIVIL,ZESTRIL) 30 MG tablet Take 20 mg by mouth Daily.   Past Week    meloxicam (MOBIC) 7.5 MG tablet 1 tablet Daily.   Past Week    metoprolol tartrate (LOPRESSOR) 50 MG tablet Take 1 tablet by mouth Daily.   Past Week    mineral oil-hydrophilic petrolatum (AQUAPHOR) ointment Apply 1 Application topically to the appropriate area as directed As Needed (3 times per day).   Past Week    montelukast (SINGULAIR) 10 MG tablet Take 1 tablet by mouth every night at bedtime.   Past Week    omeprazole (priLOSEC) 20 MG capsule Take 1 capsule by mouth Daily.   Past Week    oxybutynin XL (DITROPAN-XL) 10 MG 24 hr tablet Take 2 tablets by mouth Daily. Pharmacy order reads Oxybutynin Cl Er   Past Week    potassium chloride (MICRO-K) 10 MEQ CR capsule 1 capsule 2 (Two) Times a Day.   Past Week    vitamin D (ERGOCALCIFEROL) 1.25 MG (71590 UT) capsule capsule Take 1 capsule by mouth 2 (Two) Times a Week. Monday and Thursday.   Past Week   , Scheduled Meds:  digoxin, 125 mcg, Oral, Daily  furosemide, 40 mg, Intravenous, Q12H  hydrocortisone-bacitracin-zinc oxide-nystatin, 1 Application, Topical, TID  metoprolol succinate XL, 50 mg, Oral, Q12H    , Continuous Infusions:  heparin, 17.1 Units/kg/hr, Last Rate: 11.1 Units/kg/hr (09/24/24 1301)    , PRN Meds:    senna-docusate sodium  "**AND** polyethylene glycol **AND** bisacodyl **AND** bisacodyl    Calcium Replacement - Follow Nurse / BPA Driven Protocol    heparin    heparin    HYDROcodone-acetaminophen    Magnesium Standard Dose Replacement - Follow Nurse / BPA Driven Protocol    nitroglycerin    Phosphorus Replacement - Follow Nurse / BPA Driven Protocol    Potassium Replacement - Follow Nurse / BPA Driven Protocol   Allergies:  Patient has no known allergies.    Subjective     ROS:  Review of Systems   Constitutional:  Positive for fatigue. Negative for chills and fever.   HENT:  Negative for congestion, drooling, ear discharge, rhinorrhea, sinus pressure and tinnitus.    Eyes:  Negative for photophobia, pain and discharge.   Respiratory:  Negative for apnea, choking and stridor.    Cardiovascular:  Negative for palpitations.   Gastrointestinal:  Negative for abdominal distention, abdominal pain and anal bleeding.   Endocrine: Negative for polydipsia and polyphagia.   Genitourinary:  Negative for decreased urine volume, flank pain and genital sores.   Musculoskeletal:  Negative for gait problem, neck pain and neck stiffness.   Skin:  Negative for color change, rash and wound.   Neurological:  Positive for weakness. Negative for tremors, seizures, syncope, facial asymmetry and speech difficulty.   Hematological:  Negative for adenopathy.   Psychiatric/Behavioral:  Negative for agitation, confusion, hallucinations and self-injury. The patient is not hyperactive.         Objective   Vital Signs:   /72 (BP Location: Right arm, Patient Position: Lying)   Pulse 95   Temp 98.2 °F (36.8 °C) (Oral)   Resp 16   Ht 149.9 cm (59\")   Wt 77.1 kg (170 lb)   SpO2 96%   BMI 34.34 kg/m²     Physical Exam: (performed by MD)  Physical Exam  Musculoskeletal:      Right lower leg: Edema present.      Left lower leg: Edema present.         Results Review:  Lab Results (last 48 hours)       Procedure Component Value Units Date/Time    Potassium " [[id number]]  (Normal) Collected: 09/24/24 1349    Specimen: Blood from Hand, Right Updated: 09/24/24 1423     Potassium 4.1 mmol/L     aPTT [[id number]]  (Abnormal) Collected: 09/24/24 1117    Specimen: Blood from Hand, Right Updated: 09/24/24 1157     .8 seconds     aPTT [[id number]]  (Abnormal) Collected: 09/24/24 0314    Specimen: Blood from Arm, Right Updated: 09/24/24 0356     PTT >139.0 seconds     Basic Metabolic Panel [[id number]]  (Abnormal) Collected: 09/24/24 0314    Specimen: Blood from Arm, Right Updated: 09/24/24 0353     Glucose 93 mg/dL      BUN 21 mg/dL      Creatinine 0.86 mg/dL      Sodium 146 mmol/L      Potassium 3.5 mmol/L      Chloride 105 mmol/L      CO2 35.4 mmol/L      Calcium 8.8 mg/dL      BUN/Creatinine Ratio 24.4     Anion Gap 5.6 mmol/L      eGFR 73.2 mL/min/1.73     Narrative:      GFR Normal >60  Chronic Kidney Disease <60  Kidney Failure <15      CBC & Differential [[id number]]  (Abnormal) Collected: 09/24/24 0314    Specimen: Blood from Arm, Right Updated: 09/24/24 0323    Narrative:      The following orders were created for panel order CBC & Differential.  Procedure                               Abnormality         Status                     ---------                               -----------         ------                     CBC Auto Differential[[id number]]        Abnormal            Final result                 Please view results for these tests on the individual orders.    CBC Auto Differential [[id number]]  (Abnormal) Collected: 09/24/24 0314    Specimen: Blood from Arm, Right Updated: 09/24/24 0323     WBC 9.47 10*3/mm3      RBC 2.77 10*6/mm3      Hemoglobin 8.2 g/dL      Hematocrit 27.1 %      MCV 97.8 fL      MCH 29.6 pg      MCHC 30.3 g/dL      RDW 13.8 %      RDW-SD 49.1 fl      MPV 9.0 fL      Platelets 238 10*3/mm3      Neutrophil % 66.0 %      Lymphocyte % 22.4 %      Monocyte % 9.4 %      Eosinophil % 1.3 %      Basophil % 0.5 %      Immature Grans % 0.4 %       Neutrophils, Absolute 6.25 10*3/mm3      Lymphocytes, Absolute 2.12 10*3/mm3      Monocytes, Absolute 0.89 10*3/mm3      Eosinophils, Absolute 0.12 10*3/mm3      Basophils, Absolute 0.05 10*3/mm3      Immature Grans, Absolute 0.04 10*3/mm3      nRBC 0.0 /100 WBC     CANDIDA AURIS PCR - Swab, Axilla Right, Axilla Left and Groin [[id number]] Collected: 09/24/24 0006    Specimen: Swab from Axilla Right, Axilla Left and Groin Updated: 09/24/24 0044    Blood Culture - Blood, Arm, Left [[id number]] Collected: 09/23/24 1914    Specimen: Blood from Arm, Left Updated: 09/23/24 1917    Comprehensive Metabolic Panel [[id number]]  (Abnormal) Collected: 09/23/24 1700    Specimen: Blood Updated: 09/23/24 1741     Glucose 76 mg/dL      BUN 20 mg/dL      Creatinine 0.98 mg/dL      Sodium 142 mmol/L      Potassium 4.0 mmol/L      Comment: Slight hemolysis detected by analyzer. Result may be falsely elevated.        Chloride 102 mmol/L      CO2 31.8 mmol/L      Calcium 9.3 mg/dL      Total Protein 7.0 g/dL      Albumin 2.8 g/dL      ALT (SGPT) 13 U/L      AST (SGOT) 34 U/L      Comment: Slight hemolysis detected by analyzer. Result may be falsely elevated.        Alkaline Phosphatase 169 U/L      Total Bilirubin 0.4 mg/dL      Globulin 4.2 gm/dL      A/G Ratio 0.7 g/dL      BUN/Creatinine Ratio 20.4     Anion Gap 8.2 mmol/L      eGFR 62.6 mL/min/1.73     Narrative:      GFR Normal >60  Chronic Kidney Disease <60  Kidney Failure <15      BNP [[id number]]  (Abnormal) Collected: 09/23/24 1700    Specimen: Blood Updated: 09/23/24 1739     proBNP 10,613.0 pg/mL     Narrative:      This assay is used as an aid in the diagnosis of individuals suspected of having heart failure. It can be used as an aid in the diagnosis of acute decompensated heart failure (ADHF) in patients presenting with signs and symptoms of ADHF to the emergency department (ED). In addition, NT-proBNP of <300 pg/mL indicates ADHF is not likely.    Age Range Result  Interpretation  NT-proBNP Concentration (pg/mL:      <50             Positive            >450                   Gray                 300-450                    Negative             <300    50-75           Positive            >900                  Gray                300-900                  Negative            <300      >75             Positive            >1800                  Gray                300-1800                  Negative            <300    Protime-INR [[id number]]  (Abnormal) Collected: 09/23/24 1700    Specimen: Blood Updated: 09/23/24 1724     Protime 13.7 Seconds      INR 1.28    aPTT [[id number]]  (Abnormal) Collected: 09/23/24 1700    Specimen: Blood Updated: 09/23/24 1724     PTT 34.2 seconds     CBC & Differential [[id number]]  (Abnormal) Collected: 09/23/24 1700    Specimen: Blood Updated: 09/23/24 1711    Narrative:      The following orders were created for panel order CBC & Differential.  Procedure                               Abnormality         Status                     ---------                               -----------         ------                     CBC Auto Differential[[id number]]        Abnormal            Final result                 Please view results for these tests on the individual orders.    CBC Auto Differential [[id number]]  (Abnormal) Collected: 09/23/24 1700    Specimen: Blood Updated: 09/23/24 1711     WBC 9.26 10*3/mm3      RBC 3.33 10*6/mm3      Hemoglobin 10.1 g/dL      Hematocrit 33.2 %      MCV 99.7 fL      MCH 30.3 pg      MCHC 30.4 g/dL      RDW 13.8 %      RDW-SD 50.7 fl      MPV 9.5 fL      Platelets 293 10*3/mm3      Neutrophil % 68.2 %      Lymphocyte % 20.4 %      Monocyte % 8.9 %      Eosinophil % 1.4 %      Basophil % 0.6 %      Immature Grans % 0.5 %      Neutrophils, Absolute 6.31 10*3/mm3      Lymphocytes, Absolute 1.89 10*3/mm3      Monocytes, Absolute 0.82 10*3/mm3      Eosinophils, Absolute 0.13 10*3/mm3      Basophils, Absolute 0.06 10*3/mm3       Immature Grans, Absolute 0.05 10*3/mm3      nRBC 0.0 /100 WBC     POC Lactate [[id number]]  (Normal) Collected: 09/23/24 1703    Specimen: Blood Updated: 09/23/24 1706     Lactate 1.2 mmol/L      Comment: Serial Number: [id number]:  [id number]       Blood Culture - Blood, Arm, Left [[id number]] Collected: 09/23/24 1700    Specimen: Blood from Arm, Left Updated: 09/23/24 1703             Pending Results: Chromogranin, CT abdomen and pelvis    Imaging Reviewed:   XR Chest 1 View    Result Date: 9/23/2024  Impression: Features of CHF with cardiomegaly and mild/moderate pulmonary edema with small bilateral pleural effusions. Electronically Signed: Manan Jefferson MD  9/23/2024 5:25 PM EDT  Workstation ID: [id number]          Assessment & Plan   ASSESSMENT  Pancreatic mass: 3 x 2.7 cm soft tissue mass involving the body of the pancreas.  Mildly elevated CA 19-9, chromogranin elevated at 1895, CEA 10.95.  MRI of the abdomen with multiseptated pancreatic lesion in the pancreatic neck concerning for malignancy.  Hypervascular lesion on the liver less likely to be metastatic.  In August 2024 underwent EUS with cyst aspiration, cytology negative for malignancy.  Bilateral lower extremity DVT/remote history of pulmonary embolism/splenic infarction: Extensive acute right and left lower DVT seen in July 2024.  Repeat venous duplex this admission negative for DVT.  Due to history of pulmonary embolism as well as extensive DVTs plan will be for lifelong anticoagulation.  Factor V Leiden and prothrombin gene mutation reported negative, remainder of thrombophilia workup deferred as it does not impact decision-making around anticoagulation.  At the last visit a CT scan of the abdomen was ordered to evaluate for abdominal hematomas.  Patient was currently off anticoagulation at the visit on 9/6/2024 for reasons that were not clear.  Currently on a heparin drip for DVT prophylaxis.  Uterine mass: Patient has underwent GYN evaluation as  an outpatient and had a D&C with no evidence of malignancy.  Normocytic anemia: Hemoglobin 8.2 g/dL and MCV 97.8.  Baseline hemoglobin around 10 g/dL.      PLAN  Monitor CBC and transfuse for hemoglobin less than 7.0 g/dL  Anemia workup  CT abdomen and pelvis to follow-up on possible hematomas that resulted in the holding of her anticoagulation as an outpatient and also cutaneous masses of the abdomen  Recheck chromogranin level  Gastroenterology consult.  Elevated chromogranin level.  May need further evaluation for malignancy.  Continue heparin drip for now and monitor for bleeding    Further recommendations per Dr. Zepeda  Electronically signed by Genie Gillespie, MANJU, 09/24/24, 4:11 PM EDT.    Thank you for this consult. We will be happy to follow along with you.     Pancreatic mass: 3 x 2.7 cm soft tissue mass involving the body of the pancreas.  Mildly elevated CA 19-9, chromogranin elevated at 1895, CEA 10.95.  MRI of the abdomen with multiseptated pancreatic lesion in the pancreatic neck concerning for malignancy.  Hypervascular lesion on the liver less likely to be metastatic.  In August 2024 underwent EUS with cyst aspiration, cytology negative for malignancy.  Bilateral lower extremity DVT/remote history of pulmonary embolism/splenic infarction: Extensive acute right and left lower DVT seen in July 2024.  Repeat venous duplex this admission negative for DVT.  Due to history of pulmonary embolism as well as extensive DVTs plan will be for lifelong anticoagulation.  Factor V Leiden and prothrombin gene mutation reported negative, remainder of thrombophilia workup deferred as it does not impact decision-making around anticoagulation.  At the last visit a CT scan of the abdomen was ordered to evaluate for abdominal hematomas.  Patient was currently off anticoagulation at the visit on 9/6/2024 for reasons that were not clear.  Currently on a heparin drip for DVT prophylaxis.  Uterine mass: Patient has  underwent GYN evaluation as an outpatient and had a D&C with no evidence of malignancy.  Normocytic anemia: Hemoglobin 8.2 g/dL and MCV 97.8.  Baseline hemoglobin around 10 g/dL.        PLAN  Monitor CBC and transfuse for hemoglobin less than 7.0 g/dL  Anemia workup  CT abdomen and pelvis to follow-up on possible hematomas that resulted in the holding of her anticoagulation as an outpatient and also cutaneous masses of the abdomen  Recheck chromogranin level  Gastroenterology consult.  Elevated chromogranin level.  May need further evaluation for malignancy.  Continue heparin drip for now and monitor for bleeding        Hematology/Oncology was consulted on a patient known to us and seen in the office by Dr. Zepeda.  Recently found to have a soft tissue mass involving the body of the pancreas.  In August 2024 the patient had EUS with cyst aspiration and FNP impression is possible cirrhosis adenoma versus questionable IPMN.  Patient had both cystic as well as solid component with microcystic areas most likely suggestive of cirrhosis.  Cyst was aspirated.  Cytology showed hypocellular specimen containing a few benign glandular cells insufficient for further diagnosis.     9/6/2024: CA 19-9 53.2 (previously 58.5), CEA 10.70    At the last visit patient was recommended to have CT scan of the abdomen and pelvis, follow-up with GYN as well as GI.  She is admitted this time with complaints of lower extremity swelling.  She had Doppler of the lower extremity which did not show any evidence of clots  She was started on IV heparin    Physical exam significant for bilateral lower extremity swelling otherwise unremarkable.  Patient also appears chronically ill    I reviewed her labs, imaging studies progress Notes  She remains on IV heparin  Will obtain CT scan of the abdomen and pelvis to evaluate subcutaneous masses that was noted on her physical exam  GI consulted for elevated chromogranin level and will repeat her  chromogranin level  Will complete anemia workup    Will continue to follow  Discussed with patient      Electronically signed by Prachi Zepead MD, 09/29/24, 11:11 AM EDT.

## 2024-09-24 NOTE — PLAN OF CARE
Goal Outcome Evaluation:  Plan of Care Reviewed With: patient        Progress: no change  Outcome Evaluation: Slept at intervals. O2 at 4L in use. Heparin drip infusing. External catheter in place for accurate I&Os and Masd due to incontinence. Skin protocols in place and WOCN consult ordered. PRN oral med given for c/o abd wall and BLE pain. Remains in Afib with -120.Cardiology to see today. Will continue to monitor.

## 2024-09-24 NOTE — SIGNIFICANT NOTE
09/24/24 0759   OTHER   Discipline occupational therapist   Rehab Time/Intention   Session Not Performed   (Pt with Acute right lower extremity DVT and on heprin drip. OT will f/u after pt is anticoagulated (24 hours))   Recommendation   OT - Next Appointment 09/25/24

## 2024-09-24 NOTE — NURSING NOTE
This nurse attempted blood draw today and sent what I could and was not enough. Redraw was done by charge earlier today. Aide attempted to get blood that is past due however could not get. Will have night shift try with aPTT draw.

## 2024-09-25 ENCOUNTER — INPATIENT HOSPITAL (OUTPATIENT)
Age: 69
End: 2024-09-25
Payer: MEDICAID

## 2024-09-25 ENCOUNTER — INPATIENT HOSPITAL (OUTPATIENT)
Dept: URBAN - METROPOLITAN AREA HOSPITAL 84 | Facility: HOSPITAL | Age: 69
End: 2024-09-25
Payer: MEDICAID

## 2024-09-25 DIAGNOSIS — K86.89 OTHER SPECIFIED DISEASES OF PANCREAS: ICD-10-CM

## 2024-09-25 DIAGNOSIS — R97.8 OTHER ABNORMAL TUMOR MARKERS: ICD-10-CM

## 2024-09-25 DIAGNOSIS — D50.9 IRON DEFICIENCY ANEMIA, UNSPECIFIED: ICD-10-CM

## 2024-09-25 LAB
ANION GAP SERPL CALCULATED.3IONS-SCNC: 3.4 MMOL/L (ref 5–15)
ANION GAP SERPL CALCULATED.3IONS-SCNC: 5.9 MMOL/L (ref 5–15)
APTT PPP: 107.2 SECONDS (ref 61–76.5)
APTT PPP: 44.7 SECONDS (ref 61–76.5)
APTT PPP: 63.1 SECONDS (ref 61–76.5)
BASOPHILS # BLD AUTO: 0.05 10*3/MM3 (ref 0–0.2)
BASOPHILS # BLD AUTO: 0.06 10*3/MM3 (ref 0–0.2)
BASOPHILS NFR BLD AUTO: 0.7 % (ref 0–1.5)
BASOPHILS NFR BLD AUTO: 0.8 % (ref 0–1.5)
BUN SERPL-MCNC: 16 MG/DL (ref 8–23)
BUN SERPL-MCNC: 17 MG/DL (ref 8–23)
BUN/CREAT SERPL: 20.5 (ref 7–25)
BUN/CREAT SERPL: 21.3 (ref 7–25)
CALCIUM SPEC-SCNC: 9 MG/DL (ref 8.6–10.5)
CALCIUM SPEC-SCNC: 9.2 MG/DL (ref 8.6–10.5)
CHLORIDE SERPL-SCNC: 102 MMOL/L (ref 98–107)
CHLORIDE SERPL-SCNC: 104 MMOL/L (ref 98–107)
CO2 SERPL-SCNC: 36.6 MMOL/L (ref 22–29)
CO2 SERPL-SCNC: 37.1 MMOL/L (ref 22–29)
CREAT SERPL-MCNC: 0.78 MG/DL (ref 0.57–1)
CREAT SERPL-MCNC: 0.8 MG/DL (ref 0.57–1)
DEPRECATED RDW RBC AUTO: 49.6 FL (ref 37–54)
DEPRECATED RDW RBC AUTO: 50.8 FL (ref 37–54)
EGFRCR SERPLBLD CKD-EPI 2021: 79.9 ML/MIN/1.73
EGFRCR SERPLBLD CKD-EPI 2021: 82.3 ML/MIN/1.73
EOSINOPHIL # BLD AUTO: 0.28 10*3/MM3 (ref 0–0.4)
EOSINOPHIL # BLD AUTO: 0.3 10*3/MM3 (ref 0–0.4)
EOSINOPHIL NFR BLD AUTO: 3.5 % (ref 0.3–6.2)
EOSINOPHIL NFR BLD AUTO: 4.7 % (ref 0.3–6.2)
ERYTHROCYTE [DISTWIDTH] IN BLOOD BY AUTOMATED COUNT: 13.6 % (ref 12.3–15.4)
ERYTHROCYTE [DISTWIDTH] IN BLOOD BY AUTOMATED COUNT: 13.6 % (ref 12.3–15.4)
FOLATE SERPL-MCNC: 18.3 NG/ML (ref 4.78–24.2)
GLUCOSE SERPL-MCNC: 119 MG/DL (ref 65–99)
GLUCOSE SERPL-MCNC: 94 MG/DL (ref 65–99)
HAPTOGLOB SERPL-MCNC: 262 MG/DL (ref 30–200)
HCT VFR BLD AUTO: 29.7 % (ref 34–46.6)
HCT VFR BLD AUTO: 30.6 % (ref 34–46.6)
HGB BLD-MCNC: 8.7 G/DL (ref 12–15.9)
HGB BLD-MCNC: 9.2 G/DL (ref 12–15.9)
IMM GRANULOCYTES # BLD AUTO: 0.03 10*3/MM3 (ref 0–0.05)
IMM GRANULOCYTES # BLD AUTO: 0.04 10*3/MM3 (ref 0–0.05)
IMM GRANULOCYTES NFR BLD AUTO: 0.5 % (ref 0–0.5)
IMM GRANULOCYTES NFR BLD AUTO: 0.5 % (ref 0–0.5)
LYMPHOCYTES # BLD AUTO: 1.54 10*3/MM3 (ref 0.7–3.1)
LYMPHOCYTES # BLD AUTO: 1.91 10*3/MM3 (ref 0.7–3.1)
LYMPHOCYTES NFR BLD AUTO: 23.6 % (ref 19.6–45.3)
LYMPHOCYTES NFR BLD AUTO: 24 % (ref 19.6–45.3)
MAGNESIUM SERPL-MCNC: 1.7 MG/DL (ref 1.6–2.4)
MCH RBC QN AUTO: 29.6 PG (ref 26.6–33)
MCH RBC QN AUTO: 30 PG (ref 26.6–33)
MCHC RBC AUTO-ENTMCNC: 29.3 G/DL (ref 31.5–35.7)
MCHC RBC AUTO-ENTMCNC: 30.1 G/DL (ref 31.5–35.7)
MCV RBC AUTO: 101 FL (ref 79–97)
MCV RBC AUTO: 99.7 FL (ref 79–97)
MONOCYTES # BLD AUTO: 0.72 10*3/MM3 (ref 0.1–0.9)
MONOCYTES # BLD AUTO: 0.86 10*3/MM3 (ref 0.1–0.9)
MONOCYTES NFR BLD AUTO: 10.6 % (ref 5–12)
MONOCYTES NFR BLD AUTO: 11.2 % (ref 5–12)
NEUTROPHILS NFR BLD AUTO: 3.77 10*3/MM3 (ref 1.7–7)
NEUTROPHILS NFR BLD AUTO: 4.95 10*3/MM3 (ref 1.7–7)
NEUTROPHILS NFR BLD AUTO: 58.8 % (ref 42.7–76)
NEUTROPHILS NFR BLD AUTO: 61.1 % (ref 42.7–76)
NRBC BLD AUTO-RTO: 0 /100 WBC (ref 0–0.2)
NRBC BLD AUTO-RTO: 0 /100 WBC (ref 0–0.2)
PLATELET # BLD AUTO: 244 10*3/MM3 (ref 140–450)
PLATELET # BLD AUTO: 279 10*3/MM3 (ref 140–450)
PMV BLD AUTO: 8.8 FL (ref 6–12)
PMV BLD AUTO: 9.2 FL (ref 6–12)
POTASSIUM SERPL-SCNC: 3.8 MMOL/L (ref 3.5–5.2)
POTASSIUM SERPL-SCNC: 3.8 MMOL/L (ref 3.5–5.2)
RBC # BLD AUTO: 2.94 10*6/MM3 (ref 3.77–5.28)
RBC # BLD AUTO: 3.07 10*6/MM3 (ref 3.77–5.28)
SODIUM SERPL-SCNC: 144 MMOL/L (ref 136–145)
SODIUM SERPL-SCNC: 145 MMOL/L (ref 136–145)
VIT B12 BLD-MCNC: 461 PG/ML (ref 211–946)
WBC NRBC COR # BLD AUTO: 6.41 10*3/MM3 (ref 3.4–10.8)
WBC NRBC COR # BLD AUTO: 8.1 10*3/MM3 (ref 3.4–10.8)

## 2024-09-25 PROCEDURE — 99222 1ST HOSP IP/OBS MODERATE 55: CPT | Performed by: NURSE PRACTITIONER

## 2024-09-25 PROCEDURE — 97166 OT EVAL MOD COMPLEX 45 MIN: CPT

## 2024-09-25 PROCEDURE — 85730 THROMBOPLASTIN TIME PARTIAL: CPT | Performed by: INTERNAL MEDICINE

## 2024-09-25 PROCEDURE — 25010000002 FUROSEMIDE PER 20 MG

## 2024-09-25 PROCEDURE — C1751 CATH, INF, PER/CENT/MIDLINE: HCPCS

## 2024-09-25 PROCEDURE — 80048 BASIC METABOLIC PNL TOTAL CA: CPT

## 2024-09-25 PROCEDURE — 97162 PT EVAL MOD COMPLEX 30 MIN: CPT

## 2024-09-25 PROCEDURE — 85025 COMPLETE CBC W/AUTO DIFF WBC: CPT

## 2024-09-25 PROCEDURE — 83735 ASSAY OF MAGNESIUM: CPT

## 2024-09-25 PROCEDURE — 25010000002 MAGNESIUM SULFATE 2 GM/50ML SOLUTION

## 2024-09-25 PROCEDURE — 25010000002 HEPARIN (PORCINE) 25000-0.45 UT/250ML-% SOLUTION

## 2024-09-25 PROCEDURE — 99232 SBSQ HOSP IP/OBS MODERATE 35: CPT | Performed by: INTERNAL MEDICINE

## 2024-09-25 RX ORDER — MAGNESIUM SULFATE HEPTAHYDRATE 40 MG/ML
2 INJECTION, SOLUTION INTRAVENOUS ONCE
Status: COMPLETED | OUTPATIENT
Start: 2024-09-25 | End: 2024-09-25

## 2024-09-25 RX ADMIN — MAGNESIUM SULFATE HEPTAHYDRATE 2 G: 40 INJECTION, SOLUTION INTRAVENOUS at 15:26

## 2024-09-25 RX ADMIN — ZINC OXIDE 1 APPLICATION: 200 OINTMENT TOPICAL at 20:56

## 2024-09-25 RX ADMIN — FUROSEMIDE 40 MG: 10 INJECTION, SOLUTION INTRAMUSCULAR; INTRAVENOUS at 08:23

## 2024-09-25 RX ADMIN — METOPROLOL SUCCINATE 50 MG: 50 TABLET, EXTENDED RELEASE ORAL at 08:23

## 2024-09-25 RX ADMIN — ZINC OXIDE 1 APPLICATION: 200 OINTMENT TOPICAL at 08:23

## 2024-09-25 RX ADMIN — DIGOXIN 125 MCG: 125 TABLET ORAL at 13:16

## 2024-09-25 RX ADMIN — ZINC OXIDE 1 APPLICATION: 200 OINTMENT TOPICAL at 15:26

## 2024-09-25 RX ADMIN — HYDROCODONE BITARTRATE AND ACETAMINOPHEN 1 TABLET: 10; 325 TABLET ORAL at 13:13

## 2024-09-25 RX ADMIN — HEPARIN SODIUM 10.1 UNITS/KG/HR: 10000 INJECTION, SOLUTION INTRAVENOUS at 13:12

## 2024-09-25 RX ADMIN — HYDROCODONE BITARTRATE AND ACETAMINOPHEN 1 TABLET: 10; 325 TABLET ORAL at 21:03

## 2024-09-25 RX ADMIN — FUROSEMIDE 40 MG: 10 INJECTION, SOLUTION INTRAMUSCULAR; INTRAVENOUS at 20:55

## 2024-09-25 NOTE — CONSULTS
"GI CONSULT  NOTE:    Referring Provider:  Dr. Weeks    Chief complaint: Pancreatic mass, elevated chromogranin A    Subjective . \"I was having worsening swelling and shortness of breath\"    History of present illness: Ban Brennan is a 69 y.o. female who has a history of pancreatic cystic lesion, CHF/cardiomyopathy, COPD, diabetes, DVT/PE and iron deficiency anemia.  She presented on the 23rd with increased shortness of breath and worsening edema.  She was admitted with A-fib with RVR, edema with volume overload and CHF exacerbation.  GI asked consult for chromogranin A elevation with known pancreatic lesion.  Patient reports \"knots in stomach\" with tenderness in the lower abdomen.  She has had some weight loss with diuresis but denies any nausea or vomiting.  She also had a uterine mass with outpatient gynecology evaluation with D&C and no evidence of malignancy per oncology note.  She has a history of bilateral lower extremity DVTs, remote history of pulmonary emboli and splenic infarct with plan for lifelong anticoagulation.    Endo History:  8/27/2024 EUS by Dr. Colunga -multi septal cystic lesion with solid component at neck of the pancreas 2.8 cm, CBD 7.3 mm, FNA with CEA 98, lipase 76/amylase 222.    Past Medical History:  Past Medical History:   Diagnosis Date    Asthma 7/1/24    Atrial fibrillation 7/1/24    Bilateral leg edema     CHF (congestive heart failure) 7/1/24    Chronic kidney disease 7/1/24    Chronic respiratory failure with hypoxia, on home O2 therapy 07/01/2024    Congenital heart disease 7/1/24    COPD (chronic obstructive pulmonary disease)     Diabetes mellitus 7/1/24    Morbid obesity with BMI of 40.0-44.9, adult 11/08/2010    Myocardial infarction 35 yrs ago    Primary hypertension 03/01/2017    Pulmonary embolism     \"many years ago, was on warfarin\"    Sleep apnea        Past Surgical History:  Past Surgical History:   Procedure Laterality Date    D & C HYSTEROSCOPY N/A " 07/22/2024    Procedure: DILATATION AND CURETTAGE HYSTEROSCOPY;  Surgeon: Sosa Newell MD;  Location: [facility] MAIN OR;  Service: Gynecology;  Laterality: N/A;       Social History:  Social History     Tobacco Use    Smoking status: Never     Passive exposure: Never    Smokeless tobacco: Never   Vaping Use    Vaping status: Never Used   Substance Use Topics    Alcohol use: Never    Drug use: Never       Family History:  Family History   Problem Relation Age of Onset    Heart attack Mother     Hypertension Mother     Heart attack Father     Hypertension Father     Asthma Sister     Asthma Sister     Asthma Brother     Heart attack Brother     Hypertension Brother     Hypertension Brother     Heart disease Maternal Aunt     Heart failure Maternal Aunt     Heart failure Maternal Aunt        Medications:  Medications Prior to Admission   Medication Sig Dispense Refill Last Dose    bumetanide (BUMEX) 1 MG tablet Take 1 tablet by mouth 2 (Two) Times a Day. (Patient taking differently: Take 1 tablet by mouth 3 (Three) Times a Day.)   Past Week    FeroSul 325 (65 Fe) MG tablet Take 1 tablet by mouth Daily With Breakfast.   Past Week    fexofenadine-pseudoephedrine (ALLEGRA-D 24) 180-240 MG per 24 hr tablet Take 1 tablet by mouth Daily. Pharmacy script says 180mg   Past Week    furosemide (LASIX) 20 MG tablet Take 1 tablet by mouth Daily.   Past Week    GoodSense ClearLax 17 GM/SCOOP powder Take 17 g by mouth Daily.   Past Week    HYDROcodone-acetaminophen (NORCO)  MG per tablet Take 1 tablet by mouth Every 6 (Six) Hours As Needed for Mild Pain, Moderate Pain or Severe Pain.   Past Week    levothyroxine (SYNTHROID, LEVOTHROID) 25 MCG tablet Take 1 tablet by mouth Every Morning.   Past Week    lisinopril (PRINIVIL,ZESTRIL) 30 MG tablet Take 20 mg by mouth Daily.   Past Week    meloxicam (MOBIC) 7.5 MG tablet 1 tablet Daily.   Past Week    metoprolol tartrate (LOPRESSOR) 50 MG tablet Take 1 tablet by mouth Daily.    Past Week    mineral oil-hydrophilic petrolatum (AQUAPHOR) ointment Apply 1 Application topically to the appropriate area as directed As Needed (3 times per day).   Past Week    montelukast (SINGULAIR) 10 MG tablet Take 1 tablet by mouth every night at bedtime.   Past Week    omeprazole (priLOSEC) 20 MG capsule Take 1 capsule by mouth Daily.   Past Week    oxybutynin XL (DITROPAN-XL) 10 MG 24 hr tablet Take 2 tablets by mouth Daily. Pharmacy order reads Oxybutynin Cl Er   Past Week    potassium chloride (MICRO-K) 10 MEQ CR capsule 1 capsule 2 (Two) Times a Day.   Past Week    vitamin D (ERGOCALCIFEROL) 1.25 MG (82743 UT) capsule capsule Take 1 capsule by mouth 2 (Two) Times a Week. Monday and Thursday.   Past Week       Scheduled Meds:digoxin, 125 mcg, Oral, Daily  furosemide, 40 mg, Intravenous, Q12H  hydrocortisone-bacitracin-zinc oxide-nystatin, 1 Application, Topical, TID  metoprolol succinate XL, 50 mg, Oral, Q12H      Continuous Infusions:heparin, 17.1 Units/kg/hr, Last Rate: 10.1 Units/kg/hr (09/25/24 0630)      PRN Meds:.  senna-docusate sodium **AND** polyethylene glycol **AND** bisacodyl **AND** bisacodyl    Calcium Replacement - Follow Nurse / BPA Driven Protocol    heparin    heparin    HYDROcodone-acetaminophen    Magnesium Standard Dose Replacement - Follow Nurse / BPA Driven Protocol    nitroglycerin    Phosphorus Replacement - Follow Nurse / BPA Driven Protocol    Potassium Replacement - Follow Nurse / BPA Driven Protocol    ALLERGIES:  Patient has no known allergies.    ROS:  The following systems were reviewed   Constitution:  No fevers, chills, no unintentional weight loss  Skin: no rash, no jaundice  Eyes:  No blurry vision, no eye pain  HENT:  No change in hearing or smell  Resp: Dyspnea  CV:  No chest pain or palpitations, worsening edema  :  No dysuria, hematuria  Musculoskeletal:  No leg cramps or arthralgias  Neuro:  No tremor, no numbness  Psych:  No depression or confusion    Objective  resting in bed, chronically ill-appearing but no acute distress, no feeling    Vital Signs:   Vitals:    09/25/24 0344 09/25/24 0420 09/25/24 0826 09/25/24 1112   BP: 135/69  117/55 132/67   BP Location: Right arm  Right arm Right arm   Patient Position: Lying  Lying Sitting   Pulse: 81 68 77 81   Resp: 21 22 20 18   Temp: 97.6 °F (36.4 °C)  97.3 °F (36.3 °C) 98 °F (36.7 °C)   TempSrc: Oral  Oral Oral   SpO2: 96% 97% 95% 96%   Weight: 81.5 kg (179 lb 9.6 oz)      Height:           Physical Exam:     General Appearance:    Awake and alert, in no acute distress, overweight   Head:    Normocephalic, without obvious abnormality, atraumatic   Throat:   No oral lesions, no thrush, oral mucosa moist   Lungs:     Respirations regular, even and unlabored   Chest Wall:    No abnormalities observed   Abdomen:     Soft, nondistended, mild lower abdominal tenderness possibly secondary to anasarca   Rectal:     Deferred   Extremities:   Moves all extremities, + peripheral edema, no cyanosis       Skin:   No rash, no jaundice, normal palpation       Neurologic:   Cranial nerves 2 - 12 grossly intact       Results Review:   I reviewed the patient's labs and imaging.  CBC    Results from last 7 days   Lab Units 09/25/24  0441 09/24/24  0314 09/23/24  1700   WBC 10*3/mm3 6.41 9.47 9.26   HEMOGLOBIN g/dL 8.7* 8.2* 10.1*   PLATELETS 10*3/mm3 244 238 293     CMP   Results from last 7 days   Lab Units 09/25/24  0441 09/24/24  1349 09/24/24  0314 09/23/24  1700   SODIUM mmol/L 144  --  146* 142   POTASSIUM mmol/L 3.8 4.1 3.5 4.0   CHLORIDE mmol/L 104  --  105 102   CO2 mmol/L 36.6*  --  35.4* 31.8*   BUN mg/dL 17  --  21 20   CREATININE mg/dL 0.80  --  0.86 0.98   GLUCOSE mg/dL 94  --  93 76   ALBUMIN g/dL  --   --   --  2.8*   BILIRUBIN mg/dL  --   --   --  0.4   ALK PHOS U/L  --   --   --  169*   AST (SGOT) U/L  --   --   --  34*   ALT (SGPT) U/L  --   --   --  13   MAGNESIUM mg/dL 1.7  --   --   --      Cr Clearance Estimated  "Creatinine Clearance: 64.9 mL/min (by C-G formula based on SCr of 0.8 mg/dL).  Coag   Results from last 7 days   Lab Units 09/25/24  0441 09/24/24  1947 09/24/24  1117 09/24/24  0314 09/23/24  1700   INR   --   --   --   --  1.28*   APTT seconds 107.2* 40.6* 130.8* >139.0* 34.2*     HbA1C   Lab Results   Component Value Date    HGBA1C 6.14 (H) 07/01/2024    HGBA1C 6.00 (H) 01/15/2024     Blood Glucose No results found for: \"POCGLU\"  Infection   Results from last 7 days   Lab Units 09/23/24  1914 09/23/24  1700   BLOODCX  No growth at 24 hours No growth at 24 hours     UA      Imaging Results (Last 72 Hours)       Procedure Component Value Units Date/Time    CT Abdomen Pancreas With & Without Contrast [[id number]] Collected: 09/24/24 2228     Updated: 09/24/24 2255    Narrative:        CT ABDOMEN PANCREAS W WO CONTRAST    Date of Exam: 9/24/2024 10:07 PM EDT    Indication: Pancreatic lesion, cutaneous abdominal nodules.    Comparison: Study was correlated with contrast-enhanced abdominal MRI performed on July 15, 2024. Additional comparison with noncontrast CT of the abdomen performed on July 1, 2024.    Technique: Axial CT images were obtained of the abdomen before and after the uneventful intravenous administration of iodinated contrast. Sagittal and coronal reconstructions were performed.  Automated exposure control and iterative reconstruction   methods were used.      Findings:    Lung Bases:  Moderate bilateral pleural effusions are visualized with adjacent compressive atelectasis. There is mild interlobular septal thickening suggestive of interstitial edema. The cardiac silhouette is markedly enlarged.    Peritoneum:  No free intraperitoneal air or fluid.     Abdominal wall:  There is mild edema of the abdominal wall, most prominent in the flanks. Finding likely secondary to CHF.    Liver:  The liver is within normal in size and contour. Again noted is an enhancing lesion in the superior anterior right hepatic " lobe, segment 4A which appears to represent a small vascular shunt between a portal venous and hepatic venous branch..    Biliary/Gallbladder:  No radiopaque gallstones are visualized. There is a small diverticulum of the fundus of the gallbladder measuring 1.3 cm. No pericholecystic inflammatory changes are visualized. The biliary tree is nondilated.    Pancreas:  There is a multiseptated cystic lesion in the neck of the pancreas measuring 2.3 x 1.6 x 2.6 cm. Peripheral wall calcifications are visualized. No peripancreatic inflammatory changes are visualized. Lesion overall appears stable in size when compared to   prior MRI.    Spleen:  The spleen is within normal in size and contour. Linear hypoenhancement is visualized in the lateral spleen compatible with splenic infarct, decreased in size when compared to prior study.    Gastrointestinal/Mesentery:   No evidence of bowel obstruction or gross inflammatory changes. The appendix appears within normal limits. There is extensive descending and sigmoid diverticulosis without evidence of diverticulitis.    Adrenals:  Again noted is a fatty lesion in the right adrenal gland measuring 2.1 cm, compatible with myelolipoma.    Kidneys:  The kidneys are in anatomic position. No evidence of nephrolithiasis. No evidence of hydronephrosis or significant perinephric fat stranding.    Bladder:   The urinary bladder is unremarkable.    Reproductive organs:    The uterus is moderately enlarged for the patient's age measuring 10.1 x 7.5 x 6 cm in maximal AP, transverse, and craniocaudal dimension. Multiple uterine fibroids are visualized. No adnexal masses are visualized.    Lymph Nodes:  No significant adenopathy is identified.     Vasculature:  Small scattered arterial calcifications are visualized. The aorta is normal in caliber.    Osseous Structures:    No acute fracture or aggressive lesions. Moderate diffuse facet osteoarthropathy is visualized.        Impression:       Impression:    Moderate bilateral pleural effusions. Findings compatible with interstitial edema. Marked cardiomegaly.    Multiseptated cystic pancreatic neck mass with peripheral wall calcifications measuring 2.3 x 1.6 x 2.6 cm. Finding compatible with mucinous cystadenoma. Cannot exclude mucinous cystadenocarcinoma. Lesion appears stable in size when compared to prior MRI   performed on July 15, 2024. Consider surgical evaluation or close follow-up to evaluate for change in size.    Previously described enhancing lesion in the superior anterior right hepatic lobe represents a small vascular shunt between a portal venous and hepatic venous branch.    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    2.1 cm right adrenal myelolipoma.    Fibroid uterus.    Additional findings per body of the report.        Electronically Signed: Gigi Beck MD    9/24/2024 10:46 PM EDT    Workstation ID: [id number]    XR Chest 1 View [[id number]] Collected: 09/23/24 1724     Updated: 09/23/24 1727    Narrative:      XR CHEST 1 VW    Date of Exam: 9/23/2024 5:14 PM EDT    Indication: Shortness of breath    Comparison: Chest radiograph 7/19/2024.    Findings:  Enlarged cardiac silhouette, unchanged. Pulmonary vascular congestion. Diffuse interstitial opacities. Small bilateral pleural effusions, right greater than left. No pneumothorax. Osseous structures are unchanged.      Impression:      Impression:  Features of CHF with cardiomegaly and mild/moderate pulmonary edema with small bilateral pleural effusions.      Electronically Signed: Manan Jefferson MD    9/23/2024 5:25 PM EDT    Workstation ID: [id number]            ASSESSMENT:  Pancreatic cystic lesion  Elevated chromogranin A  Normocytic anemia/iron deficiency  Edema/volume overload with CHF exacerbation/cardiomyopathy  A-fib with RVR  Diabetic ulcer right great toe  History of bilateral lower extremity DVT/PE/splenic  infarct  COPD  DMII    PLAN:  Patient is a 69-year-old female with multiple medical problems who presents with shortness of breath and increased edema with CHF exacerbation.  GI asked consult for known pancreatic cystic lesion and elevated chromogranin A    -Echocardiogram with left ventricular systolic function mildly decreased with EF 41 to 45% right ventricular cavity moderate to severely dilated, left atrial cavity is severely dilated with left atrial volume severely increased.  Mild aortic valve stenosis and moderate tricuspid valve regurg is present.  Mild pulmonary hypertension present.  Creatinine 0.8, BUN 17, hemoglobin 8.7, , WBC 6.4 and platelets of 244.  B12 and folate normal.  Iron 29.  BNP 10,000.  9/23/2024 total bilirubin 0.4, alk phos 169, AST 34 and ALT 13.  -CT abdomen and pelvis pancreatic protocol shows moderate bilateral pleural effusions, multiseptated cystic pancreatic neck mass with peripheral wall calcifications, old splenic infarct, small vascular shunt between portal venous and hepatic venous branch noted, 2.1 cm right adrenal myelolipoma and fibroid uterus.  -CA 19-9 53.2, CEA 10.7  -7/15/2024 chromogranin A 1895    -Oncology following with plan for anemia workup and CT evaluation to follow-up on possible hematomas.  They are rechecking chromogranin level and planning to continue heparin drip for now.  -Wound care following for diabetic ulcer right great toe, nonpressure ulcer right lower extremity and moisture associated dermatitis.  -Cardiology following with currently A-fib with controlled rate and lisinopril being held secondary to blood pressures being soft.  She is on digoxin, metoprolol and heparin drip.  They are monitoring electrolytes during diaphoresis.     Recent EUS with FNA CEA 98/lipase 76 and amylase 222.  Dr. Clounga recommends stopping PPI and rechecking chromogranin A after 5 to 7 days.  If remains elevated, consider outpatient dotatate scan.  Okay for diet as  tolerated.    I discussed the patients findings and my recommendations with the patient.    We appreciate the referral    Electronically signed by MANJU Moore, 09/25/24, 1:06 PM EDT.

## 2024-09-25 NOTE — PLAN OF CARE
Goal Outcome Evaluation:  Plan of Care Reviewed With: patient, son        Progress: improving  Outcome Evaluation: 69 y.o. female who presented to [facility] on 9/23/2024 with complaints of leg swelling.  Past medical history significant for atrial fibrillation, CHF, CKD, COPD, diabetes mellitus, hypertension, bilateral lower extremity DVT, remote history of pulmonary embolism.  Recent hospital admission in June 2024 for complaints of shortness of breath during which she was found to have a pancreatic mass which was determined ot be a cyst which has required draining during a recent admission. Pt has chroinc deficits in self care and mobility as described in detail in the OT evaluation. This date pt is A&O X4 and at her functional baseline. Family report they have been trying to get some addittional help at home for pt. Recommend home with family assist and home care agency versus ECF.      Anticipated Discharge Disposition (OT): home with assist, extended care facility

## 2024-09-25 NOTE — DISCHARGE PLACEMENT REQUEST
"Ban Jones (69 y.o. Female)       Date of Birth   1955    Social Security Number       Address   [address] [place] [address] [place] [facility]    Home Phone   [phone]    MRN   [id number]       Worship   None    Marital Status                               Admission Date   9/23/24    Admission Type   Emergency    Admitting Provider   Prem Sandoval MD    Attending Provider   Rogelio Weeks MD    Department, Room/Bed   [facility] 2D, 269/1       Discharge Date       Discharge Disposition       Discharge Destination                                 Attending Provider: Rogelio Weeks MD    Allergies: No Known Allergies    Isolation: Contact   Infection: MRSA No Isolation this Admit (07/08/24), CR Pseudomonas CRPA (07/17/24), Candida Auris (rule out) (09/24/24)   Code Status: CPR    Ht: 149.9 cm (59\")   Wt: 81.5 kg (179 lb 9.6 oz)    Admission Cmt: None   Principal Problem: CHF exacerbation [I50.9]                   Active Insurance as of 9/23/2024       Primary Coverage       Payor Plan Insurance Group Employer/Plan Group    ANTHEM MEDICARE REPLACEMENT ANTHEM MEDICARE ADVANTAGE [id number]       Payor Plan Address Payor Plan Phone Number Payor Plan Fax Number Effective Dates    [address] [phone]  5/1/2024 - None Entered    [facility] [id number]         Subscriber Name Subscriber Birth Date Member ID       BAN JONES 1955 [id number]               Secondary Coverage       Payor Plan Insurance Group Employer/Plan Group    [facility] COMMUNITY PLAN OF [place] [facility] COMMUNITY PLAN PATHWAYS IN        Payor Plan Address Payor Plan Phone Number Payor Plan Fax Number Effective Dates    [address]   7/1/2024 - None Entered    [facility] [id number]         Subscriber Name Subscriber Birth Date Member ID       BAN JONES 1955 [id number]                     Emergency Contacts        (Rel.) Home Phone Work " Phone [employer]    Chelsi Valle (Daughter) -- -- [phone]    Jose Brennan (Son) -- -- [phone]

## 2024-09-25 NOTE — PLAN OF CARE
Goal Outcome Evaluation:  Plan of Care Reviewed With: patient        Progress: improving  Outcome Evaluation: pt on 2L O2, c/o pain gave prn meds, on specialty bed and Q2 turns for skin health, CT pancrease obtained with oral and IV contrast beginning of the shift, remains on IV heparin, rested well during the night, GI consulted

## 2024-09-25 NOTE — THERAPY EVALUATION
"Patient Name: Ban Brennan  : 1955    MRN: [id number]                              Today's Date: 2024       Admit Date: 2024    Visit Dx:     ICD-10-CM ICD-9-CM   1. Chronic a-fib  I48.20 427.31   2. Acute on chronic congestive heart failure, unspecified heart failure type  I50.9 428.0     Patient Active Problem List   Diagnosis    Primary hypertension    Morbid obesity with BMI of 40.0-44.9, adult    Atrial fibrillation with RVR    Right bundle branch block    Acute deep vein thrombosis (DVT) of both lower extremities    ARABELLA (acute kidney injury)    Acute hyperkalemia    Sepsis    Acute UTI    Acute systolic congestive heart failure    Rhinovirus infection    Multifocal pneumonia    Chronic respiratory failure with hypoxia, on home O2 therapy    Skin ulcer of right great toe    Skin ulcer of left great toe    SEDRICK (obstructive sleep apnea)    Aortic stenosis    Mitral regurgitation    Acute HFrEF (heart failure with reduced ejection fraction)    COPD (chronic obstructive pulmonary disease)    Pneumonia, unspecified organism    Elevated CA 19-9 level    Pancreatic lesion    CHF exacerbation    Dermatitis associated with moisture    Non-pressure chronic ulcer right lower leg, limited to breakdown skin     Past Medical History:   Diagnosis Date    Asthma 24    Atrial fibrillation 24    Bilateral leg edema     CHF (congestive heart failure) 24    Chronic kidney disease 24    Chronic respiratory failure with hypoxia, on home O2 therapy 2024    Congenital heart disease 24    COPD (chronic obstructive pulmonary disease)     Diabetes mellitus 24    Morbid obesity with BMI of 40.0-44.9, adult 2010    Myocardial infarction 35 yrs ago    Primary hypertension 2017    Pulmonary embolism     \"many years ago, was on warfarin\"    Sleep apnea      Past Surgical History:   Procedure Laterality Date    D & C HYSTEROSCOPY N/A 2024    Procedure: DILATATION AND " CURETTAGE HYSTEROSCOPY;  Surgeon: Sosa Newell MD;  Location: [facility] MAIN OR;  Service: Gynecology;  Laterality: N/A;      General Information       Row Name 09/25/24 1330          Physical Therapy Time and Intention    Document Type evaluation  -CL     Mode of Treatment physical therapy  -CL       Row Name 09/25/24 1330          General Information    Patient Profile Reviewed yes  -CL     Prior Level of Function min assist:;gait;transfer;mod assist:;ADL's;dressing  -CL     Existing Precautions/Restrictions fall;oxygen therapy device and L/min  -CL     Barriers to Rehab previous functional deficit;medically complex  -CL       Row Name 09/25/24 1330          Living Environment    People in Home child(samreen), adult  -CL     Name(s) of People in Home Son - Jose  -CL       Row Name 09/25/24 1330          Home Main Entrance    Number of Stairs, Main Entrance none  -CL       Row Name 09/25/24 1330          Stairs Within Home, Primary    Number of Stairs, Within Home, Primary none  -CL       Row Name 09/25/24 1330          Cognition    Orientation Status (Cognition) oriented x 4  -CL       Row Name 09/25/24 1330          Safety Issues, Functional Mobility    Safety Issues Affecting Function (Mobility) judgment;problem-solving;sequencing abilities  -CL     Impairments Affecting Function (Mobility) balance;endurance/activity tolerance;strength;range of motion (ROM)  -CL               User Key  (r) = Recorded By, (t) = Taken By, (c) = Cosigned By      Initials Name Provider Type    CL Jerica Parada PT Physical Therapist                   Mobility       Row Name 09/25/24 1333          Bed Mobility    Bed Mobility supine-sit  -CL     Supine-Sit [place] (Bed Mobility) minimum assist (75% patient effort)  -CL     Assistive Device (Bed Mobility) bed rails;head of bed elevated;draw sheet  -CL       Row Name 09/25/24 1333          Bed-Chair Transfer    Bed-Chair [place] (Transfers) minimum assist (75%  patient effort)  -CL     Assistive Device (Bed-Chair Transfers) walker, front-wheeled  -CL       Row Name 09/25/24 1333          Sit-Stand Transfer    Sit-Stand [place] (Transfers) minimum assist (75% patient effort)  -CL     Assistive Device (Sit-Stand Transfers) walker, front-wheeled  -CL       Row Name 09/25/24 1333          Gait/Stairs (Locomotion)    [place] Level (Gait) minimum assist (75% patient effort)  -CL     Assistive Device (Gait) walker, front-wheeled  -CL     Patient was able to Ambulate yes  -CL     Distance in Feet (Gait) 3  -CL               User Key  (r) = Recorded By, (t) = Taken By, (c) = Cosigned By      Initials Name Provider Type    CL Jerica Parada, PT Physical Therapist                   Obj/Interventions       Row Name 09/25/24 1342          Range of Motion Comprehensive    General Range of Motion bilateral lower extremity ROM WFL  -CL       Row Name 09/25/24 1342          Strength Comprehensive (MMT)    Comment, General Manual Muscle Testing (MMT) Assessment Hip flex 3/5 bilaterally, knee ext 4/5 bilaterally  -CL       Row Name 09/25/24 1342          Balance    Balance Assessment sitting static balance;sitting dynamic balance;standing static balance;standing dynamic balance  -CL     Static Sitting Balance supervision  -CL     Dynamic Sitting Balance contact guard  -CL     Static Standing Balance contact guard  -CL     Dynamic Standing Balance minimal assist  -CL       Row Name 09/25/24 1342          Sensory Assessment (Somatosensory)    Sensory Assessment (Somatosensory) sensation intact  -CL               User Key  (r) = Recorded By, (t) = Taken By, (c) = Cosigned By      Initials Name Provider Type    Jerica Vargas, PT Physical Therapist                   Goals/Plan       Row Name 09/25/24 1353          Bed Mobility Goal 1 (PT)    Activity/Assistive Device (Bed Mobility Goal 1, PT) bed mobility activities, all  -CL     [place] Level/Cues Needed (Bed Mobility  Goal 1, PT) modified independence  -CL     Time Frame (Bed Mobility Goal 1, PT) long term goal (LTG);2 weeks  -CL       Row Name 09/25/24 1353          Transfer Goal 1 (PT)    Activity/Assistive Device (Transfer Goal 1, PT) sit-to-stand/stand-to-sit;bed-to-chair/chair-to-bed  -CL     [place] Level/Cues Needed (Transfer Goal 1, PT) supervision required  -CL     Time Frame (Transfer Goal 1, PT) long term goal (LTG);2 weeks  -CL       Row Name 09/25/24 1353          Gait Training Goal 1 (PT)    Activity/Assistive Device (Gait Training Goal 1, PT) gait (walking locomotion);assistive device use  -CL     [place] Level (Gait Training Goal 1, PT) minimum assist (75% or more patient effort)  -CL     Distance (Gait Training Goal 1, PT) 40  -CL     Time Frame (Gait Training Goal 1, PT) long term goal (LTG);2 weeks  -CL       Row Name 09/25/24 1353          Therapy Assessment/Plan (PT)    Planned Therapy Interventions (PT) balance training;bed mobility training;gait training;patient/family education;strengthening;transfer training  -CL               User Key  (r) = Recorded By, (t) = Taken By, (c) = Cosigned By      Initials Name Provider Type    Jerica Vargas, PT Physical Therapist                   Clinical Impression       Row Name 09/25/24 1343          Pain    Pretreatment Pain Rating 3/10  -CL     Posttreatment Pain Rating 3/10  -CL     Pain Intervention(s) Repositioned  -CL       Row Name 09/25/24 1343          Plan of Care Review    Plan of Care Reviewed With patient;son  -CL     Outcome Evaluation Ban Brennan is a 69 y.o. female with PMH of HTN, CHF, COPD, DM, who presents from cardiologist's office with CHF exacerbation, Afib and possible cellulitis. Now with new DVT RLE  Soc hx: lives w/ son & dtr comes to assist when son is at work. Pt requires assist from them for ADLs, has home O2 on 2L and caretenders for HH PT 2 days/week. At time of PT evaluation, pt is A&O x 4.  Pt reports that she has  hospital bed at home. Pt states that she never gets up without RW and family present to assist. Pt requires min A for bed mobility with HOB raised. Pt transfers bed to chair with RW and min A.  Pt appears close to baseline and would benefit from continued family assist and HH PT at discharge.  -CL       Row Name 09/25/24 1343          Therapy Assessment/Plan (PT)    Rehab Potential (PT) fair, will monitor progress closely  -CL     Criteria for Skilled Interventions Met (PT) yes;meets criteria  -CL     Therapy Frequency (PT) 3 times/wk  -CL     Predicted Duration of Therapy Intervention (PT) Until discharge  -CL       Row Name 09/25/24 1343          Vital Signs    Pre Systolic BP Rehab 139  -CL     Pre Treatment Diastolic BP 79  -CL     Pretreatment Heart Rate (beats/min) 85  -CL     Pretreatment Resp Rate (breaths/min) 19  -CL     Pre SpO2 (%) 96  -CL     O2 Delivery Pre Treatment supplemental O2  2L  -CL       Row Name 09/25/24 1343          Positioning and Restraints    Pre-Treatment Position in bed  -CL     Post Treatment Position chair  -CL     In Chair notified nsg;reclined;call light within reach;encouraged to call for assist;exit alarm on;with family/caregiver  -CL               User Key  (r) = Recorded By, (t) = Taken By, (c) = Cosigned By      Initials Name Provider Type    CL Jerica Parada, PT Physical Therapist                   Outcome Measures       Row Name 09/25/24 1354 09/25/24 0800       How much help from another person do you currently need...    Turning from your back to your side while in flat bed without using bedrails? 3  -CL 3  -AB    Moving from lying on back to sitting on the side of a flat bed without bedrails? 3  -CL 3  -AB    Moving to and from a bed to a chair (including a wheelchair)? 3  -CL 2  -AB    Standing up from a chair using your arms (e.g., wheelchair, bedside chair)? 3  -CL 2  -AB    Climbing 3-5 steps with a railing? 1  -CL 1  -AB    To walk in hospital room? 2  -CL 1   -AB    AM-PAC 6 Clicks Score (PT) 15  -CL 12  -AB    Highest Level of Mobility Goal 4 --> Transfer to chair/commode  -CL 4 --> Transfer to chair/commode  -AB      Row Name 09/25/24 1313          Modified Zach Scale    Pre-Stroke Modified [place] Scale 4 - Moderately severe disability.  Unable to walk without assistance, and unable to attend to own bodily needs without assistance.  -     Modified [place] Scale 4 - Moderately severe disability.  Unable to walk without assistance, and unable to attend to own bodily needs without assistance.  -       Row Name 09/25/24 1354 09/25/24 1313       Functional Assessment    Outcome Measure Options AM-PAC 6 Clicks Basic Mobility (PT)  -CL Modified [place]  -MH              User Key  (r) = Recorded By, (t) = Taken By, (c) = Cosigned By      Initials Name Provider Type     Janie Bonilla, OT Occupational Therapist    Ammy Anne, RN Registered Nurse    Jerica Vargas, PT Physical Therapist                                 Physical Therapy Education       Title: PT OT SLP Therapies (In Progress)       Topic: Physical Therapy (Done)       Point: Mobility training (Done)       Learning Progress Summary             Patient Acceptance, E,TB, VU by CL at 9/25/2024 1354   Family Acceptance, E,TB, VU by CL at 9/25/2024 1354                                         User Key       Initials Effective Dates Name Provider Type [facility] 03/02/22 -  Jerica Parada, PT Physical Therapist PT                  PT Recommendation and Plan  Planned Therapy Interventions (PT): balance training, bed mobility training, gait training, patient/family education, strengthening, transfer training  Plan of Care Reviewed With: patient, son  Outcome Evaluation: Ban Brennan is a 69 y.o. female with PMH of HTN, CHF, COPD, DM, who presents from cardiologist's office with CHF exacerbation, Afib and possible cellulitis. Now with new DVT RLE  Soc hx: lives w/ son & dtr comes to  assist when son is at work. Pt requires assist from them for ADLs, has home O2 on 2L and caretenders for HH PT 2 days/week. At time of PT evaluation, pt is A&O x 4.  Pt reports that she has hospital bed at home. Pt states that she never gets up without RW and family present to assist. Pt requires min A for bed mobility with HOB raised. Pt transfers bed to chair with RW and min A.  Pt appears close to baseline and would benefit from continued family assist and HH PT at discharge.     Time Calculation:   PT Evaluation Complexity  History, PT Evaluation Complexity: 3 or more personal factors and/or comorbidities  Examination of Body Systems (PT Eval Complexity): total of 3 or more elements  Clinical Presentation (PT Evaluation Complexity): evolving  Clinical Decision Making (PT Evaluation Complexity): moderate complexity  Overall Complexity (PT Evaluation Complexity): moderate complexity     PT Charges       Row Name 09/25/24 1355             Time Calculation    Start Time 1001  -CL      Stop Time 1024  -CL      Time Calculation (min) 23 min  -CL      PT Received On 09/25/24  -CL      PT - Next Appointment 09/27/24  -CL      PT Goal Re-Cert Due Date 10/09/24  -CL         Time Calculation- PT    Total Timed Code Minutes- PT 0 minute(s)  -CL                User Key  (r) = Recorded By, (t) = Taken By, (c) = Cosigned By      Initials Name Provider Type    CL Jerica Parada, PT Physical Therapist                  Therapy Charges for Today       Code Description Service Date Service Provider Modifiers Qty    [id number]  PT EVAL MOD COMPLEXITY 4 9/25/2024 Jerica Parada, PT GP 1            PT G-Codes  Outcome Measure Options: AM-PAC 6 Clicks Basic Mobility (PT)  AM-PAC 6 Clicks Score (PT): 15  Modified [place] Scale: 4 - Moderately severe disability.  Unable to walk without assistance, and unable to attend to own bodily needs without assistance.  PT Discharge Summary  Anticipated Discharge Disposition (PT): home  with assist, home with home health    Jerica Parada, PT  9/25/2024

## 2024-09-25 NOTE — THERAPY EVALUATION
"Patient Name: Ban Brennan  : 1955    MRN: [id number]                              Today's Date: 2024       Admit Date: 2024    Visit Dx:     ICD-10-CM ICD-9-CM   1. Chronic a-fib  I48.20 427.31   2. Acute on chronic congestive heart failure, unspecified heart failure type  I50.9 428.0     Patient Active Problem List   Diagnosis    Primary hypertension    Morbid obesity with BMI of 40.0-44.9, adult    Atrial fibrillation with RVR    Right bundle branch block    Acute deep vein thrombosis (DVT) of both lower extremities    ARABELLA (acute kidney injury)    Acute hyperkalemia    Sepsis    Acute UTI    Acute systolic congestive heart failure    Rhinovirus infection    Multifocal pneumonia    Chronic respiratory failure with hypoxia, on home O2 therapy    Skin ulcer of right great toe    Skin ulcer of left great toe    SEDRICK (obstructive sleep apnea)    Aortic stenosis    Mitral regurgitation    Acute HFrEF (heart failure with reduced ejection fraction)    COPD (chronic obstructive pulmonary disease)    Pneumonia, unspecified organism    Elevated CA 19-9 level    Pancreatic lesion    CHF exacerbation    Dermatitis associated with moisture    Non-pressure chronic ulcer right lower leg, limited to breakdown skin     Past Medical History:   Diagnosis Date    Asthma 24    Atrial fibrillation 24    Bilateral leg edema     CHF (congestive heart failure) 24    Chronic kidney disease 24    Chronic respiratory failure with hypoxia, on home O2 therapy 2024    Congenital heart disease 24    COPD (chronic obstructive pulmonary disease)     Diabetes mellitus 24    Morbid obesity with BMI of 40.0-44.9, adult 2010    Myocardial infarction 35 yrs ago    Primary hypertension 2017    Pulmonary embolism     \"many years ago, was on warfarin\"    Sleep apnea      Past Surgical History:   Procedure Laterality Date    D & C HYSTEROSCOPY N/A 2024    Procedure: DILATATION AND " CURETTAGE HYSTEROSCOPY;  Surgeon: Sosa Newell MD;  Location: [facility] MAIN OR;  Service: Gynecology;  Laterality: N/A;      General Information       Row Name 09/25/24 1151          OT Time and Intention    Document Type evaluation  -       Row Name 09/25/24 1151          General Information    Prior Level of Function mod assist:;bed mobility;min assist:;all household mobility;transfer;w/c or scooter;max assist:;ADL's;dependent:;bathing;home management;cooking;driving;cleaning;shopping  pt feeds self, family groom her hair, sponge-bathe her and change her briefs. Shower her. Pt pulls arms into shirts once placed over her head. Needs min (A) to stand up and walk short distance w/ RW. Uses w/c and hospital bed with assist.  -     Existing Precautions/Restrictions fall;oxygen therapy device and L/min  on baseline 2L  -     Barriers to Rehab medically complex;previous functional deficit  -       Row Name 09/25/24 1151          Living Environment    People in Home child(samreen), adult  has 2 adult children at home to assist her. Has been trying to get home health aides to provide asddittional care and respite for her family caregivers.  -       Row Name 09/25/24 1151          Home Main Entrance    Number of Stairs, Main Entrance none  -       Row Name 09/25/24 1151          Stairs Within Home, Primary    Number of Stairs, Within Home, Primary none  -       Row Name 09/25/24 1151          Cognition    Orientation Status (Cognition) oriented x 4  -       Row Name 09/25/24 1151          Safety Issues, Functional Mobility    Safety Issues Affecting Function (Mobility) judgment;problem-solving;sequencing abilities  -     Impairments Affecting Function (Mobility) balance;endurance/activity tolerance;strength;range of motion (ROM)  -               User Key  (r) = Recorded By, (t) = Taken By, (c) = Cosigned By      Initials Name Provider Type     Janie Bonilla, KATERINE Occupational Therapist               "       Mobility/ADL's       Row Name 09/25/24 1230          Bed Mobility    Bed Mobility supine-sit  -     Supine-Sit [place] (Bed Mobility) minimum assist (75% patient effort)  -     Assistive Device (Bed Mobility) bed rails;head of bed elevated;draw sheet  -       Row Name 09/25/24 1230          Transfers    Transfers bed-chair transfer;sit-stand transfer;stand-sit transfer  -       Row Name 09/25/24 1230          Bed-Chair Transfer    Bed-Chair [place] (Transfers) minimum assist (75% patient effort)  -     Assistive Device (Bed-Chair Transfers) walker, front-wheeled  -       Row Name 09/25/24 1230          Sit-Stand Transfer    Sit-Stand [place] (Transfers) minimum assist (75% patient effort)  -     Assistive Device (Sit-Stand Transfers) walker, front-wheeled  -       Row Name 09/25/24 1230          Stand-Sit Transfer    Stand-Sit [place] (Transfers) minimum assist (75% patient effort);verbal cues  -     Assistive Device (Stand-Sit Transfers) walker, front-wheeled  -       Row Name 09/25/24 1230          Functional Mobility    Functional Mobility- Ind. Level minimum assist (75% patient effort)  -     Functional Mobility-Distance (Feet) 3  -     Functional Mobility- Comment made ambulatory transfer to chair and encouraged pt to stand up as long as possible. Pt reported she cannot stand for very long. Family help her take steps if she needs to walk a little.  -     Patient was able to Ambulate yes  -       Row Name 09/25/24 1230          Activities of Daily Living    BADL Assessment/Intervention lower body dressing;feeding  -       Row Name 09/25/24 1230          Lower Body Dressing Assessment/Training    [place] Level (Lower Body Dressing) dependent (less than 25% patient effort)  -     Comment, (Lower Body Dressing) donned brief prior to standing as pt reports incontinence during transfers. \"They always put a towel under me when I stand up.\"  -       Row " Name 09/25/24 1230          Self-Feeding Assessment/Training    [place] Level (Feeding) feeding skills;set up  -               User Key  (r) = Recorded By, (t) = Taken By, (c) = Cosigned By      Initials Name Provider Type     Janie Bonilla OT Occupational Therapist                   Obj/Interventions       [facility] Name 09/25/24 1307          Sensory Assessment (Somatosensory)    Sensory Assessment (Somatosensory) sensation intact  -[facility] Name 09/25/24 1307          Vision Assessment/Intervention    Visual Impairment/Limitations corrective lenses for reading  -[facility] Name 09/25/24 1307          Range of Motion Comprehensive    Comment, General Range of Motion shld flex 75%, trunk flex 75%  -[facility] Name 09/25/24 1307          Strength Comprehensive (MMT)    Comment, General Manual Muscle Testing (MMT) Assessment BUE 3+/5  -               User Key  (r) = Recorded By, (t) = Taken By, (c) = Cosigned By      Initials Name Provider Type     Janie Bonilla OT Occupational Therapist                   Goals/Plan    No documentation.                  Clinical Impression       [facility] Name 09/25/24 1308          Pain Assessment    Pretreatment Pain Rating 3/10  -     Posttreatment Pain Rating 3/10  -     Pain Location - Side/Orientation Bilateral  -     Pain Location lower  -     Pain Location - extremity  -[facility] Name 09/25/24 1308          Plan of Care Review    Plan of Care Reviewed With patient;son  -     Progress improving  -     Outcome Evaluation 69 y.o. female who presented to [facility] on 9/23/2024 with complaints of leg swelling.  Past medical history significant for atrial fibrillation, CHF, CKD, COPD, diabetes mellitus, hypertension, bilateral lower extremity DVT, remote history of pulmonary embolism.  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 Pt has chroinc deficits in self care and mobility as described in detail in the OT evaluation. This date pt is A&O X4 and at her functional baseline. Family report they have been trying to get some addittional help at home for pt. Recommend home with family assist and home care agency versus ECF.  -       Row Name 09/25/24 1308          Therapy Assessment/Plan (OT)    Criteria for Skilled Therapeutic Interventions Met (OT) no problems identified which require skilled intervention  -     Therapy Frequency (OT) evaluation only  -       Row Name 09/25/24 1308          Therapy Plan Review/Discharge Plan (OT)    Anticipated Discharge Disposition (OT) home with assist;extended care facility  -       Row Name 09/25/24 1308          Vital Signs    O2 Delivery Pre Treatment supplemental O2  -     O2 Delivery Intra Treatment supplemental O2  -     O2 Delivery Post Treatment supplemental O2  -     Pre Patient Position Supine  -     Intra Patient Position Standing  -     Post Patient Position Sitting  -       Row Name 09/25/24 1308          Positioning and Restraints    Pre-Treatment Position in bed  -     Post Treatment Position chair  -     In Chair notified nsg;sitting;call light within reach;encouraged to call for assist;exit alarm on;with family/caregiver;legs elevated  -               User Key  (r) = Recorded By, (t) = Taken By, (c) = Cosigned By      Initials Name Provider Type     Janie Bonilla, OT Occupational Therapist                   Outcome Measures       Row Name 09/25/24 0800          How much help from another person do you currently need...    Turning from your back to your side while in flat bed without using bedrails? 3  -AB     Moving from lying on back to sitting on the side of a flat bed without bedrails? 3  -AB     Moving to and from a bed to a chair (including a wheelchair)? 2  -AB     Standing up from a chair using  your arms (e.g., wheelchair, bedside chair)? 2  -AB     Climbing 3-5 steps with a railing? 1  -AB     To walk in hospital room? 1  -AB     AM-PAC 6 Clicks Score (PT) 12  -AB     Highest Level of Mobility Goal 4 --> Transfer to chair/commode  -AB       Row Name 09/25/24 1313          Modified [place] Scale    Pre-Stroke Modified [place] Scale 4 - Moderately severe disability.  Unable to walk without assistance, and unable to attend to own bodily needs without assistance.  -     Modified [place] Scale 4 - Moderately severe disability.  Unable to walk without assistance, and unable to attend to own bodily needs without assistance.  -       Row Name 09/25/24 1313          Functional Assessment    Outcome Measure Options Modified [place]  -               User Key  (r) = Recorded By, (t) = Taken By, (c) = Cosigned By      Initials Name Provider Type     Janie Bonilla, OT Occupational Therapist    Ammy Anne, RN Registered Nurse                    Occupational Therapy Education       Title: PT OT SLP Therapies (In Progress)       Topic: Occupational Therapy (In Progress)       Point: ADL training (Not Started)       Description:   Instruct learner(s) on proper safety adaptation and remediation techniques during self care or transfers.   Instruct in proper use of assistive devices.                  Learner Progress:  Not documented in this visit.              Point: Home exercise program (Not Started)       Description:   Instruct learner(s) on appropriate technique for monitoring, assisting and/or progressing therapeutic exercises/activities.                  Learner Progress:  Not documented in this visit.              Point: Precautions (Done)       Description:   Instruct learner(s) on prescribed precautions during self-care and functional transfers.                  Learning Progress Summary             Patient Acceptance, E,TB,D, VU,DU by  at 9/25/2024 1314                         Point: Body mechanics  (Done)       Description:   Instruct learner(s) on proper positioning and spine alignment during self-care, functional mobility activities and/or exercises.                  Learning Progress Summary             Patient Acceptance, E,TB,D, VU,DU by  at 9/25/2024 1314                                         User Key       Initials Effective Dates Name Provider Type [facility] 06/16/21 -  Janie Bonilla OT Occupational Therapist OT                  OT Recommendation and Plan  Therapy Frequency (OT): evaluation only  Plan of Care Review  Plan of Care Reviewed With: patient, son  Progress: improving  Outcome Evaluation: 69 y.o. female who presented to [facility] on 9/23/2024 with complaints of leg swelling.  Past medical history significant for atrial fibrillation, CHF, CKD, COPD, diabetes mellitus, hypertension, bilateral lower extremity DVT, remote history of pulmonary embolism.  Recent hospital admission in June 2024 for complaints of shortness of breath during which she was found to have a pancreatic mass which was determined ot be a cyst which has required draining during a recent admission. Pt has chroinc deficits in self care and mobility as described in detail in the OT evaluation. This date pt is A&O X4 and at her functional baseline. Family report they have been trying to get some addittional help at home for pt. Recommend home with family assist and home care agency versus ECF.     Time Calculation:         Time Calculation- OT       Row Name 09/25/24 1315             Time Calculation-     OT Start Time 1005  -      OT Stop Time 1026  -      OT Time Calculation (min) 21 min  -      Total Timed Code Minutes- OT 0 minute(s)  -      OT Received On 09/25/24  -                User Key  (r) = Recorded By, (t) = Taken By, (c) = Cosigned By      Initials Name Provider Type     Janie Bonilla OT Occupational Therapist                  Therapy Charges for Today       Code Description  Service Date Service Provider Modifiers Qty    [id number] HC OT EVAL MOD COMPLEXITY 3 9/25/2024 Janie Bonilla OT GO 1                 Janie Bonilla OT  9/25/2024

## 2024-09-25 NOTE — PROGRESS NOTES
"[facility] [place]      Patient Care Team:  Rut Pierce APRN as PCP - General (Nurse Practitioner)  Austin Brooks MD as Cardiologist (Cardiology)  Prachi Zepeda MD as Consulting Physician (Hematology and Oncology)  Tanvir Bird MD as Consulting Physician (Cardiology)      Chief Complaint: Lower extremity edema and shortness of breath  Consult: A-fib with RVR and volume overload/lower extremity edema    Subjective    Interval History and ROS:     Patient seen as she is lying in bed with visitor at bedside.  She is in no acute distress, states she is feeling about the same.  She does report to her daughter that she has noticed the edema is improved, her hospital armband was tight, and now its loose.  She complains of no chest pain, palpitations, PND, orthopnea, or dizziness.     Blood pressures running 110s-130s over 60-70s, review of telemetry reveals A-fib with controlled rates 80s to 90s, O2 sats 96-98% on 2 L nasal cannula, which is her home settings.  Today's blood work revealed sodium 144, potassium 3.8, magnesium 1.7, BUN 17, creatinine 0.8, EGFR 79.9.    History taken from: patient    Review of Systems   Constitutional: Negative for chills and fever.   Cardiovascular:  Negative for chest pain, irregular heartbeat, near-syncope, orthopnea, palpitations, paroxysmal nocturnal dyspnea and syncope.        Reports improved edema   Respiratory:  Negative for shortness of breath and sleep disturbances due to breathing.    Neurological:  Negative for dizziness, light-headedness and vertigo.       Objective    Vital Signs  Temp:  [97.3 °F (36.3 °C)-98.4 °F (36.9 °C)] 98 °F (36.7 °C)  Heart Rate:  [68-96] 81  Resp:  [16-27] 18  BP: (112-135)/(55-72) 132/67  Oxygen Therapy  SpO2: 96 %  Pulse Oximetry Type: Continuous  Device (Oxygen Therapy): nasal cannula  Flow (L/min): 2}  Flowsheet Rows      Flowsheet Row First Filed Value   Admission Height 149.9 cm (59.02\") Documented at 09/23/2024 " 1640   Admission Weight 87.5 kg (192 lb 14.4 oz) Documented at 09/23/2024 1640                Physical Exam:    Physical Exam  Constitutional:       General: She is not in acute distress.     Appearance: Normal appearance. She is obese.   HENT:      Head: Normocephalic and atraumatic.   Neck:      Vascular: No carotid bruit.   Cardiovascular:      Rate and Rhythm: Normal rate. Rhythm irregular.      Heart sounds: Normal heart sounds.   Pulmonary:      Effort: Pulmonary effort is normal. No respiratory distress.      Breath sounds: Normal breath sounds. No wheezing, rhonchi or rales.   Abdominal:      Tenderness: There is abdominal tenderness.      Comments: Firm with redness   Musculoskeletal:      Right lower leg: Edema present.      Left lower leg: Edema present.      Comments: Trace   Skin:     General: Skin is warm and dry.      Comments: Bilateral lower extremities with redness and bronzing, abdomen with redness   Neurological:      Mental Status: She is alert and oriented to person, place, and time.   Psychiatric:         Mood and Affect: Mood normal.         Behavior: Behavior normal.         Thought Content: Thought content normal.         Judgment: Judgment normal.         Results Review:     I reviewed the patient's new clinical results.    Lab Results (last 24 hours)       Procedure Component Value Units Date/Time    Magnesium [[id number]]  (Normal) Collected: 09/25/24 0441    Specimen: Blood Updated: 09/25/24 0926     Magnesium 1.7 mg/dL     aPTT [[id number]]  (Abnormal) Collected: 09/25/24 0441    Specimen: Blood Updated: 09/25/24 0530     .2 seconds     Basic Metabolic Panel [[id number]]  (Abnormal) Collected: 09/25/24 0441    Specimen: Blood Updated: 09/25/24 0516     Glucose 94 mg/dL      BUN 17 mg/dL      Creatinine 0.80 mg/dL      Sodium 144 mmol/L      Potassium 3.8 mmol/L      Chloride 104 mmol/L      CO2 36.6 mmol/L      Calcium 9.0 mg/dL      BUN/Creatinine Ratio 21.3     Anion Gap 3.4  mmol/L      eGFR 79.9 mL/min/1.73     Narrative:      GFR Normal >60  Chronic Kidney Disease <60  Kidney Failure <15      CBC & Differential [[id number]]  (Abnormal) Collected: 09/25/24 0441    Specimen: Blood Updated: 09/25/24 0453    Narrative:      The following orders were created for panel order CBC & Differential.  Procedure                               Abnormality         Status                     ---------                               -----------         ------                     CBC Auto Differential[[id number]]        Abnormal            Final result                 Please view results for these tests on the individual orders.    CBC Auto Differential [[id number]]  (Abnormal) Collected: 09/25/24 0441    Specimen: Blood Updated: 09/25/24 0453     WBC 6.41 10*3/mm3      RBC 2.94 10*6/mm3      Hemoglobin 8.7 g/dL      Hematocrit 29.7 %      .0 fL      MCH 29.6 pg      MCHC 29.3 g/dL      RDW 13.6 %      RDW-SD 50.8 fl      MPV 8.8 fL      Platelets 244 10*3/mm3      Neutrophil % 58.8 %      Lymphocyte % 24.0 %      Monocyte % 11.2 %      Eosinophil % 4.7 %      Basophil % 0.8 %      Immature Grans % 0.5 %      Neutrophils, Absolute 3.77 10*3/mm3      Lymphocytes, Absolute 1.54 10*3/mm3      Monocytes, Absolute 0.72 10*3/mm3      Eosinophils, Absolute 0.30 10*3/mm3      Basophils, Absolute 0.05 10*3/mm3      Immature Grans, Absolute 0.03 10*3/mm3      nRBC 0.0 /100 WBC     Vitamin B12 [[id number]]  (Normal) Collected: 09/24/24 1947    Specimen: Blood Updated: 09/25/24 0029     Vitamin B-12 461 pg/mL     Narrative:      Results may be falsely increased if patient taking Biotin.      Folate [[id number]]  (Normal) Collected: 09/24/24 1947    Specimen: Blood Updated: 09/25/24 0029     Folate 18.30 ng/mL     Narrative:      Results may be falsely increased if patient taking Biotin.      Haptoglobin [[id number]]  (Abnormal) Collected: 09/24/24 1947    Specimen: Blood Updated: 09/25/24 0017     Haptoglobin 262  mg/dL      Comment: Specimen hemolyzed. Results may be affected.       aPTT [[id number]]  (Abnormal) Collected: 09/24/24 1947    Specimen: Blood Updated: 09/24/24 2110     PTT 40.6 seconds     Chromogranin A [[id number]] Collected: 09/24/24 1947    Specimen: Blood Updated: 09/24/24 2049    Blood Culture - Blood, Arm, Left [[id number]]  (Normal) Collected: 09/23/24 1914    Specimen: Blood from Arm, Left Updated: 09/24/24 1931     Blood Culture No growth at 24 hours    Reticulocytes [[id number]]  (Abnormal) Collected: 09/24/24 0314    Specimen: Blood from Arm, Right Updated: 09/24/24 1901     Reticulocyte % 2.10 %      Reticulocyte Absolute 0.0584 10*6/mm3     Ferritin [[id number]]  (Abnormal) Collected: 09/24/24 1349    Specimen: Blood from Hand, Right Updated: 09/24/24 1749     Ferritin 272.00 ng/mL     Narrative:      Results may be falsely decreased if patient taking Biotin.      Iron Profile [[id number]]  (Abnormal) Collected: 09/24/24 1349    Specimen: Blood from Hand, Right Updated: 09/24/24 1749     Iron 29 mcg/dL      Iron Saturation (TSAT) 16 %      Transferrin 125 mg/dL      TIBC 186 mcg/dL     Lactate Dehydrogenase [[id number]]  (Normal) Collected: 09/24/24 1349    Specimen: Blood from Hand, Right Updated: 09/24/24 1749      U/L     Blood Culture - Blood, Arm, Left [[id number]]  (Normal) Collected: 09/23/24 1700    Specimen: Blood from Arm, Left Updated: 09/24/24 1716     Blood Culture No growth at 24 hours    Potassium [[id number]]  (Normal) Collected: 09/24/24 1349    Specimen: Blood from Hand, Right Updated: 09/24/24 1423     Potassium 4.1 mmol/L             Imaging Results (Last 24 Hours)       Procedure Component Value Units Date/Time    CT Abdomen Pancreas With & Without Contrast [[id number]] Collected: 09/24/24 2228     Updated: 09/24/24 2255    Narrative:        CT ABDOMEN PANCREAS W WO CONTRAST    Date of Exam: 9/24/2024 10:07 PM EDT    Indication: Pancreatic lesion, cutaneous abdominal  nodules.    Comparison: Study was correlated with contrast-enhanced abdominal MRI performed on July 15, 2024. Additional comparison with noncontrast CT of the abdomen performed on July 1, 2024.    Technique: Axial CT images were obtained of the abdomen before and after the uneventful intravenous administration of iodinated contrast. Sagittal and coronal reconstructions were performed.  Automated exposure control and iterative reconstruction   methods were used.      Findings:    Lung Bases:  Moderate bilateral pleural effusions are visualized with adjacent compressive atelectasis. There is mild interlobular septal thickening suggestive of interstitial edema. The cardiac silhouette is markedly enlarged.    Peritoneum:  No free intraperitoneal air or fluid.     Abdominal wall:  There is mild edema of the abdominal wall, most prominent in the flanks. Finding likely secondary to CHF.    Liver:  The liver is within normal in size and contour. Again noted is an enhancing lesion in the superior anterior right hepatic lobe, segment 4A which appears to represent a small vascular shunt between a portal venous and hepatic venous branch..    Biliary/Gallbladder:  No radiopaque gallstones are visualized. There is a small diverticulum of the fundus of the gallbladder measuring 1.3 cm. No pericholecystic inflammatory changes are visualized. The biliary tree is nondilated.    Pancreas:  There is a multiseptated cystic lesion in the neck of the pancreas measuring 2.3 x 1.6 x 2.6 cm. Peripheral wall calcifications are visualized. No peripancreatic inflammatory changes are visualized. Lesion overall appears stable in size when compared to   prior MRI.    Spleen:  The spleen is within normal in size and contour. Linear hypoenhancement is visualized in the lateral spleen compatible with splenic infarct, decreased in size when compared to prior study.    Gastrointestinal/Mesentery:   No evidence of bowel obstruction or gross inflammatory  changes. The appendix appears within normal limits. There is extensive descending and sigmoid diverticulosis without evidence of diverticulitis.    Adrenals:  Again noted is a fatty lesion in the right adrenal gland measuring 2.1 cm, compatible with myelolipoma.    Kidneys:  The kidneys are in anatomic position. No evidence of nephrolithiasis. No evidence of hydronephrosis or significant perinephric fat stranding.    Bladder:   The urinary bladder is unremarkable.    Reproductive organs:    The uterus is moderately enlarged for the patient's age measuring 10.1 x 7.5 x 6 cm in maximal AP, transverse, and craniocaudal dimension. Multiple uterine fibroids are visualized. No adnexal masses are visualized.    Lymph Nodes:  No significant adenopathy is identified.     Vasculature:  Small scattered arterial calcifications are visualized. The aorta is normal in caliber.    Osseous Structures:    No acute fracture or aggressive lesions. Moderate diffuse facet osteoarthropathy is visualized.        Impression:      Impression:    Moderate bilateral pleural effusions. Findings compatible with interstitial edema. Marked cardiomegaly.    Multiseptated cystic pancreatic neck mass with peripheral wall calcifications measuring 2.3 x 1.6 x 2.6 cm. Finding compatible with mucinous cystadenoma. Cannot exclude mucinous cystadenocarcinoma. Lesion appears stable in size when compared to prior MRI   performed on July 15, 2024. Consider surgical evaluation or close follow-up to evaluate for change in size.    Previously described enhancing lesion in the superior anterior right hepatic lobe represents a small vascular shunt between a portal venous and hepatic venous branch.    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    2.1 cm right adrenal myelolipoma.    Fibroid uterus.    Additional findings per body of the report.        Electronically Signed: Gigi Beck MD    9/24/2024 10:46 PM EDT     Workstation ID: [id number]                  ECG/EMG Results (most recent)       Procedure Component Value Units Date/Time    Telemetry Scan [[id number]] Resulted: 09/23/24     Updated: 09/24/24 0550    ECG 12 Lead Dyspnea [[id number]] Collected: 09/23/24 1559     Updated: 09/24/24 0828     QT Interval 372 ms      QTC Interval 521 ms     Narrative:      HEART HLIX=535  bpm  RR Zdbpzohz=183  ms  TN Interval=  ms  P Horizontal Axis=  deg  P Front Axis=  deg  QRSD Wqjzseyz=411  ms  QT Lquhsxhx=412  ms  OShQ=622  ms  QRS Axis=-78  deg  T Wave Axis=-11  deg  - ABNORMAL ECG -  Atrial fibrillation  Right bundle branch block  Probable  inferior infarct, age indeterminate  Anteroseptal  infarct, age indeterminate  Electronically Signed By: Antoine Wilder ([facility]) 2024-09-24 08:27:51  Date and Time of Study:2024-09-23 15:59:52    Telemetry Scan [[id number]] Resulted: 09/23/24     Updated: 09/24/24 1213    Adult Transthoracic Echo Complete w/ Color, Spectral and Contrast if Necessary Per Protocol [[id number]] Resulted: 09/24/24 1253     Updated: 09/24/24 1259     LVIDd 4.3 cm      LVIDs 3.0 cm      IVSd 0.90 cm      LVPWd 1.00 cm      FS 30.2 %      IVS/LVPW 0.90 cm      ESV(cubed) 27.0 ml      LV Sys Vol (BSA corrected) 40.0 cm2      EDV(cubed) 79.5 ml      LV Kaufman Vol (BSA corrected) 64.5 cm2      LV mass(C)d 132.7 grams      LVOT area 2.27 cm2      LVOT diam 1.70 cm      EDV(MOD-sp4) 111.0 ml      ESV(MOD-sp4) 68.9 ml      SV(MOD-sp4) 42.1 ml      SVi(MOD-SP4) 24.5 ml/m2      SVi (LVOT) 29.9 ml/m2      EF(MOD-sp4) 37.9 %      MV E max shen 174.0 cm/sec      MV A max shne 85.2 cm/sec      MV dec time 0.21 sec      MV E/A 2.04     LA ESV Index (BP) 48.7 ml/m2      Med Peak E' Shen 8.5 cm/sec      Lat Peak E' Shen 8.9 cm/sec      TR max shen 277.0 cm/sec      Avg E/e' ratio 20.00     SV(LVOT) 51.5 ml      TAPSE (>1.6) 1.42 cm      RV S' 12.3 cm/sec      LA dimension (2D)  4.7 cm      LV V1 max 146.0 cm/sec      LV V1 max PG 8.5 mmHg      LV V1  mean PG 5.0 mmHg      LV V1 VTI 22.7 cm      Ao pk cammie 286.0 cm/sec      Ao max PG 32.7 mmHg      Ao mean PG 19.0 mmHg      Ao V2 VTI 54.3 cm      MARGARITO(I,D) 0.95 cm2      MV max PG 14.4 mmHg      MV mean PG 7.0 mmHg      MV V2 VTI 47.9 cm      MV P1/2t 84.6 msec      MVA(P1/2t) 2.6 cm2      MVA(VTI) 1.08 cm2      MV dec slope 661.0 cm/sec2      MR max cammie 480.0 cm/sec      MR max PG 92.2 mmHg      TR max PG 30.7 mmHg      RVSP(TR) 38.7 mmHg      RAP systole 8.0 mmHg      RV V1 max PG 1.98 mmHg      RV V1 max 70.3 cm/sec      RV V1 VTI 10.4 cm      PA V2 max 114.0 cm/sec      PA acc time 0.11 sec      Sinus 2.7 cm      STJ 2.00 cm      Dimensionless Index 0.51 (DI)      Echo EF Estimated 42.0 %     Narrative:        Left ventricular systolic function is mildly decreased. Left   ventricular ejection fraction appears to be 41 - 45%.    Left ventricular diastolic function was indeterminate.    Mildly reduced right ventricular systolic function noted.    The right ventricular cavity is moderate to severely dilated.    The left atrial cavity is severely dilated.    Left atrial volume is severely increased.    The right atrial cavity is moderate to severely  dilated.    Mild aortic valve stenosis is present.    Moderate tricuspid valve regurgitation is present.    Estimated right ventricular systolic pressure from tricuspid   regurgitation is mildly elevated (35-45 mmHg).    Mild pulmonary hypertension is present.      Telemetry Scan [[id number]] Resulted: 09/23/24     Updated: 09/24/24 1741    Telemetry Scan [[id number]] Resulted: 09/23/24     Updated: 09/24/24 2006    Telemetry Scan [[id number]] Resulted: 09/23/24     Updated: 09/24/24 8255    Telemetry Scan [[id number]] Resulted: 09/23/24     Updated: 09/25/24 0344    Telemetry Scan [[id number]] Resulted: 09/23/24     Updated: 09/25/24 0637    Telemetry Scan [[id number]] Resulted: 09/23/24     Updated: 09/25/24 1020            Medication Review:   I have reviewed the patient's  current medication list    Current Facility-Administered Medications:     sennosides-docusate (PERICOLACE) 8.6-50 MG per tablet 2 tablet, 2 tablet, Oral, BID PRN **AND** polyethylene glycol (MIRALAX) packet 17 g, 17 g, Oral, Daily PRN **AND** bisacodyl (DULCOLAX) EC tablet 5 mg, 5 mg, Oral, Daily PRN **AND** bisacodyl (DULCOLAX) suppository 10 mg, 10 mg, Rectal, Daily PRN, Jodie Shepherd APRN    Calcium Replacement - Follow Nurse / BPA Driven Protocol, , Does not apply, PRN, Jodie Shepherd APRN    digoxin (LANOXIN) tablet 125 mcg, 125 mcg, Oral, Daily, Tanvir Bird MD, 125 mcg at 09/24/24 1126    furosemide (LASIX) injection 40 mg, 40 mg, Intravenous, Q12H, Jodie Shepherd APRN, 40 mg at 09/25/24 0823    heparin 85999 units/250 mL (100 units/mL) in 0.45 % NaCl infusion, 17.1 Units/kg/hr, Intravenous, Titrated, Jodie Shepherd APRN, Last Rate: 8.83 mL/hr at 09/25/24 0630, 10.1 Units/kg/hr at 09/25/24 0630    heparin bolus from bag solution 3,500 Units, 40 Units/kg, Intravenous, Q6H PRN, Jodie Shepherd APRN, 3,500 Units at 09/24/24 2225    heparin bolus from bag solution 7,000 Units, 80 Units/kg, Intravenous, Q6H PRN, Jodie Shepherd APRN    HYDROcodone-acetaminophen (NORCO)  MG per tablet 1 tablet, 1 tablet, Oral, Q8H PRN, Jagdeep Petit MD, 1 tablet at 09/24/24 2012    hydrocortisone-bacitracin-zinc oxide-nystatin (MAGIC BARRIER) ointment 1 Application, 1 Application, Topical, TID, Toya Hylton APRN, 1 Application at 09/25/24 0823    Magnesium Standard Dose Replacement - Follow Nurse / BPA Driven Protocol, , Does not apply, PRN, Jodie Shepherd APRN    metoprolol succinate XL (TOPROL-XL) 24 hr tablet 50 mg, 50 mg, Oral, Q12H, Tanvir Bird MD, 50 mg at 09/25/24 0823    nitroglycerin (NITROSTAT) SL tablet 0.4 mg, 0.4 mg, Sublingual, Q5 Min PRN, Jodie Shepherd APRN    Phosphorus Replacement - Follow Nurse / BPA Driven Protocol, , Does not apply, PRN, Jodie Shepherd APRN    Potassium Replacement - Follow Nurse /  BPA Driven Protocol, , Does not apply, PRN, Jodie Shepherd, APRN                Assessment & Plan     Cardiomyopathy, reduced EF  Systolic heart failure-proBNP 10,613  A-fib with RVR  HTN  HLD  Lower extremity edema  Volume overload  Generalized edema/volume overload     PLAN:  -continue diuresis and rate control  -Lasix 40 mg IV every 12hr  -Currently A-fib with controlled rate in the 80-90s  -Digoxin 125 mcg daily  -Metoprolol XL 50 mg twice daily  -Currently blood pressures are improved, 110-130/60-70, lisinopril is still being held  -Remains on heparin drip   -Hematology consulted and plans for anemia workup, they also ordered CT abdomen and pelvis to evaluate masses in cutaneous abdomen  -Will monitor electrolytes during diuresis, cardiac dosing electrolyte replacement protocol, today potassium 3.8 magnesium 1.7, will give 2 g magnesium IV     I discussed plan with patient and her daughter:  They voiced understanding with no further questions     Further orders and recommendations per Dr. Pelon Mendenhall, APRN  09/25/24  12:52 EDT

## 2024-09-25 NOTE — PLAN OF CARE
Goal Outcome Evaluation:  Plan of Care Reviewed With: patient, son           Outcome Evaluation: Ban Brennan is a 69 y.o. female with PMH of HTN, CHF, COPD, DM, who presents from cardiologist's office with CHF exacerbation, Afib and possible cellulitis. Now with new DVT RLE  Soc hx: lives w/ son & dtr comes to assist when son is at work. Pt requires assist from them for ADLs, has home O2 on 2L and caretenders for HH PT 2 days/week. At time of PT evaluation, pt is A&O x 4.  Pt reports that she has hospital bed at home. Pt states that she never gets up without RW and family present to assist. Pt requires min A for bed mobility with HOB raised. Pt transfers bed to chair with RW and min A.  Pt appears close to baseline and would benefit from continued family assist and HH PT at discharge.      Anticipated Discharge Disposition (PT): home with assist, home with home health

## 2024-09-25 NOTE — PROGRESS NOTES
[facility] MEDICINE SERVICE  DAILY PROGRESS NOTE    NAME: Ban Brennan  : 1955  MRN: [id number]      LOS: 2 days     PROVIDER OF SERVICE: Rogelio Weeks MD    Chief Complaint: CHF exacerbation    Subjective:     Interval History:  History taken from: patient      History of Present Illness: Ban Brennan is a 69 y.o. female with a CMH of atrial fibrillation, CHF, CKD, COPD, DM, HTN who presented to [facility] on 2024 from her cardiologist's office with possible cellulitis, CHF exacerbation and atrial fibrillation.      On ED evaluation, proBNP 10,613, alk phos 169, EKG shows atrial fibrillation. Lower extremities with edema and erythema. ED gave 2mg IV bumex. Cardiology consulted. Hospitalist team to admit for further medical management.      24 patient examined in bed no acute distress, blood pressure stable, heart rate 102, on 2 L.  Weakness fatigue.Remains on heparin drip   2024 patient seen and examined in bed acute distress, vital signs stable, discussed with RN,  Oncology following with plan for anemia workup and CT evaluation to follow-up on possible hematomas. planning to continue heparin drip for now.        Review of Systems  Constitutional:  Positive for fatigue.   Respiratory:  Positive for shortness of breath.    Cardiovascular:  Positive for leg swelling. Negative for chest pain and palpitations.   Neurological:  Positive for weakness. Negative for syncope and headaches.   Objective:     Vital Signs  Temp:  [97.3 °F (36.3 °C)-98.4 °F (36.9 °C)] 98 °F (36.7 °C)  Heart Rate:  [68-95] 81  Resp:  [16-27] 18  BP: (112-135)/(55-72) 132/67  Flow (L/min):  [2] 2   Body mass index is 36.27 kg/m².    Physical Exam   Constitutional:       General: She is in acute distress.      Appearance: Normal appearance. She is obese.   HENT:      Head: Normocephalic and atraumatic.   Neck:      Comments: No JVD noted  Cardiovascular:      Rate and Rhythm: Normal rate.  Rhythm irregular.      Heart sounds: No murmur heard.     No friction rub. No gallop.      Comments: Generalized edema   Pulmonary:      Effort: Pulmonary effort is normal. No respiratory distress.      Breath sounds: Wheezing present.   Genitourinary:     Comments: External catheter in place  Musculoskeletal:      Right lower leg: Edema present.      Left lower leg: Edema present.      Comments: 2+ pitting edema of lower extremities   Skin:     General: Skin is warm and dry.      Findings: Erythema present.      Comments: Bilateral lower extremities   Neurological:      General: No focal deficit present.      Mental Status: She is alert and oriented to person, place, and time.   Psychiatric:         Mood and Affect: Mood normal.         Behavior: Behavior normal.         Thought Content: Thought content normal.         Judgment: Judgment normal.     Diagnostic Data    Results from last 7 days   Lab Units 09/25/24  0441 09/24/24  0314 09/23/24  1700   WBC 10*3/mm3 6.41   < > 9.26   HEMOGLOBIN g/dL 8.7*   < > 10.1*   HEMATOCRIT % 29.7*   < > 33.2*   PLATELETS 10*3/mm3 244   < > 293   GLUCOSE mg/dL 94   < > 76   CREATININE mg/dL 0.80   < > 0.98   BUN mg/dL 17   < > 20   SODIUM mmol/L 144   < > 142   POTASSIUM mmol/L 3.8   < > 4.0   AST (SGOT) U/L  --   --  34*   ALT (SGPT) U/L  --   --  13   ALK PHOS U/L  --   --  169*   BILIRUBIN mg/dL  --   --  0.4   ANION GAP mmol/L 3.4*   < > 8.2    < > = values in this interval not displayed.       CT Abdomen Pancreas With & Without Contrast    Result Date: 9/24/2024  Impression: Moderate bilateral pleural effusions. Findings compatible with interstitial edema. Marked cardiomegaly. Multiseptated cystic pancreatic neck mass with peripheral wall calcifications measuring 2.3 x 1.6 x 2.6 cm. Finding compatible with mucinous cystadenoma. Cannot exclude mucinous cystadenocarcinoma. Lesion appears stable in size when compared to prior MRI  performed on July 15, 2024. Consider surgical  "evaluation or close follow-up to evaluate for change in size. Previously described enhancing lesion in the superior anterior right hepatic lobe represents a small vascular shunt between a portal venous and hepatic venous branch. 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 2.1 cm right adrenal myelolipoma. Fibroid uterus. Additional findings per body of the report. Electronically Signed: Gigi Beck MD  9/24/2024 10:46 PM EDT  Workstation ID: [id number]    XR Chest 1 View    Result Date: 9/23/2024  Impression: Features of CHF with cardiomegaly and mild/moderate pulmonary edema with small bilateral pleural effusions. Electronically Signed: Manan Jefferson MD  9/23/2024 5:25 PM EDT  Workstation ID: [id number]       I reviewed the patient's new clinical results.    Assessment/Plan:     Active and Resolved Problems  Active Hospital Problems    Diagnosis  POA    **CHF exacerbation [I50.9]  Yes    Dermatitis associated with moisture [L30.8]  Yes    Non-pressure chronic ulcer right lower leg, limited to breakdown skin [L97.911]  Yes      Resolved Hospital Problems   No resolved problems to display.      Atrial fibrillation   - JUAN JOSE?DS?-VASc Score for Atrial Fibrillation Stroke Risk - MDCalc   6 points -> a score ?2 for men or ?3 for women is “moderate-high” risk and should otherwise be an anticoagulation candidate.  - started heparin gtt  - EKG shows RVR, but ED provider reports periods of bradycardia   - admit to tele  - cardiology consulted     CHF exacerbation  - Echo: EF 31-35%  - BNP: 10,613  - ED gave 2mg IV bumex  - start lasix 40 mg BID  - Daily weights  - Strict I&O's  - 1.5L fluid restriction   - Cardiac diet, Sodium < 2g  - heart failure pathway initiated     Leg swelling  - venous duplex: \"Acute right lower extremity deep vein thrombosis noted in the common femoral, proximal femoral, mid femoral, distal femoral and popliteal. Acute right lower extremity superficial " "thrombophlebitis noted in the saphenofemoral junction and great saphenous (below knee). Acute left lower extremity deep vein thrombosis noted in the proximal femoral, mid femoral, distal femoral, popliteal and posterial tibial.Right and left popliteal fossa fluid collection.\"  - heparin gtt started  Hematology consulted for recent bilateral DVTs and anemia   Oncology following with plan for anemia workup and CT evaluation to follow-up on possible hematomas. They are rechecking chromogranin level and planning to continue heparin drip for now.      COPD  - Not in acute exacerbation  - Continue home inhalers once reconciled      Hypertension   - blood pressures are soft, lisinopril is being held   - Resume home medications once bp stable      Hypothyroidism   - continue synthroid     VTE Prophylaxis:  Pharmacologic VTE prophylaxis orders are present.    Disposition Planning:     Barriers to Discharge:AF  Anticipated Date of Discharge: 9/26/24  Place of Discharge: [facility]      Time: 34 minutes     Code Status and Medical Interventions: CPR (Attempt to Resuscitate); Full Support   Ordered at: 09/23/24 1807     Code Status (Patient has no pulse and is not breathing):    CPR (Attempt to Resuscitate)     Medical Interventions (Patient has pulse or is breathing):    Full Support       Signature: Electronically signed by Rogelio Weeks MD, 09/25/24, 13:46 EDT.  [facility] Hospitalist Team  "

## 2024-09-25 NOTE — CASE MANAGEMENT/SOCIAL WORK
Continued Stay Note  QIAN Clarke     Patient Name: Ban Brennan  MRN: [id number]  Today's Date: 9/25/2024    Admit Date: 9/23/2024    Plan: From home with family, current with [facility] for O2 and [facility]. Request PT/OT evals for dc planning.   Discharge Plan       Row Name 09/25/24 1412       Plan    Plan Comments DC Barriers: GI, Hem/Onc, cardiology all following. Patient on IV lasix, heparin gtt, mag. Monitoring H&H.               Deneen Ventura RN     Office: [phone]

## 2024-09-25 NOTE — PLAN OF CARE
Goal Outcome Evaluation:   Pt no complaint except pain at times. Pt improving

## 2024-09-26 ENCOUNTER — INPATIENT HOSPITAL (OUTPATIENT)
Age: 69
End: 2024-09-26
Payer: MEDICAID

## 2024-09-26 ENCOUNTER — INPATIENT HOSPITAL (OUTPATIENT)
Dept: URBAN - METROPOLITAN AREA HOSPITAL 84 | Facility: HOSPITAL | Age: 69
End: 2024-09-26
Payer: MEDICAID

## 2024-09-26 ENCOUNTER — READMISSION MANAGEMENT (OUTPATIENT)
Dept: CALL CENTER | Facility: HOSPITAL | Age: 69
End: 2024-09-26
Payer: MEDICAID

## 2024-09-26 VITALS
HEART RATE: 74 BPM | OXYGEN SATURATION: 94 % | SYSTOLIC BLOOD PRESSURE: 128 MMHG | TEMPERATURE: 97.4 F | DIASTOLIC BLOOD PRESSURE: 58 MMHG | RESPIRATION RATE: 19 BRPM | WEIGHT: 177.47 LBS | BODY MASS INDEX: 35.78 KG/M2 | HEIGHT: 59 IN

## 2024-09-26 DIAGNOSIS — D50.9 IRON DEFICIENCY ANEMIA, UNSPECIFIED: ICD-10-CM

## 2024-09-26 DIAGNOSIS — R97.8 OTHER ABNORMAL TUMOR MARKERS: ICD-10-CM

## 2024-09-26 DIAGNOSIS — K86.89 OTHER SPECIFIED DISEASES OF PANCREAS: ICD-10-CM

## 2024-09-26 LAB
APTT PPP: 40.7 SECONDS (ref 61–76.5)
APTT PPP: 79.4 SECONDS (ref 61–76.5)
C AURIS DNA SPEC QL NAA+NON-PROBE: NOT DETECTED

## 2024-09-26 PROCEDURE — 25010000002 HEPARIN (PORCINE) 25000-0.45 UT/250ML-% SOLUTION

## 2024-09-26 PROCEDURE — 99232 SBSQ HOSP IP/OBS MODERATE 35: CPT | Performed by: NURSE PRACTITIONER

## 2024-09-26 PROCEDURE — 85730 THROMBOPLASTIN TIME PARTIAL: CPT | Performed by: INTERNAL MEDICINE

## 2024-09-26 PROCEDURE — 25010000002 FUROSEMIDE PER 20 MG

## 2024-09-26 RX ORDER — AMOXICILLIN 250 MG
2 CAPSULE ORAL 2 TIMES DAILY PRN
Qty: 30 TABLET | Refills: 0 | Status: SHIPPED | OUTPATIENT
Start: 2024-09-26

## 2024-09-26 RX ORDER — DIGOXIN 125 MCG
125 TABLET ORAL
Qty: 30 TABLET | Refills: 0 | Status: SHIPPED | OUTPATIENT
Start: 2024-09-27

## 2024-09-26 RX ORDER — DAPAGLIFLOZIN 5 MG/1
5 TABLET, FILM COATED ORAL DAILY
Qty: 30 TABLET | Refills: 0 | Status: SHIPPED | OUTPATIENT
Start: 2024-09-26

## 2024-09-26 RX ORDER — FUROSEMIDE 20 MG
20 TABLET ORAL 2 TIMES DAILY
Qty: 60 TABLET | Refills: 0 | Status: SHIPPED | OUTPATIENT
Start: 2024-09-26

## 2024-09-26 RX ORDER — METOPROLOL SUCCINATE 50 MG/1
50 TABLET, EXTENDED RELEASE ORAL EVERY 12 HOURS SCHEDULED
Qty: 30 TABLET | Refills: 0 | Status: SHIPPED | OUTPATIENT
Start: 2024-09-26

## 2024-09-26 RX ADMIN — DIGOXIN 125 MCG: 125 TABLET ORAL at 11:27

## 2024-09-26 RX ADMIN — HYDROCODONE BITARTRATE AND ACETAMINOPHEN 1 TABLET: 10; 325 TABLET ORAL at 11:27

## 2024-09-26 RX ADMIN — HEPARIN SODIUM 10.1 UNITS/KG/HR: 10000 INJECTION, SOLUTION INTRAVENOUS at 11:44

## 2024-09-26 RX ADMIN — ZINC OXIDE 1 APPLICATION: 200 OINTMENT TOPICAL at 08:26

## 2024-09-26 RX ADMIN — FUROSEMIDE 40 MG: 10 INJECTION, SOLUTION INTRAMUSCULAR; INTRAVENOUS at 08:23

## 2024-09-26 RX ADMIN — HYDROCODONE BITARTRATE AND ACETAMINOPHEN 1 TABLET: 10; 325 TABLET ORAL at 04:33

## 2024-09-26 RX ADMIN — METOPROLOL SUCCINATE 50 MG: 50 TABLET, EXTENDED RELEASE ORAL at 08:23

## 2024-09-26 NOTE — PROGRESS NOTES
[facility] MEDICINE SERVICE  DAILY PROGRESS NOTE    NAME: Ban Brennan  : 1955  MRN: [id number]      LOS: 3 days     PROVIDER OF SERVICE: Rogelio Weeks MD    Chief Complaint: CHF exacerbation    Subjective:     Interval History:  History taken from: patient      History of Present Illness: Ban Brennan is a 69 y.o. female with a CMH of atrial fibrillation, CHF, CKD, COPD, DM, HTN who presented to [facility] on 2024 from her cardiologist's office with possible cellulitis, CHF exacerbation and atrial fibrillation.      On ED evaluation, proBNP 10,613, alk phos 169, EKG shows atrial fibrillation. Lower extremities with edema and erythema. ED gave 2mg IV bumex. Cardiology consulted. Hospitalist team to admit for further medical management.      24 patient examined in bed no acute distress, blood pressure stable, heart rate 102, on 2 L.  Weakness fatigue.Remains on heparin drip   2024 patient seen and examined in bed acute distress, vital signs stable, discussed with RN,  Oncology following with plan for anemia workup and CT evaluation to follow-up on possible hematomas. planning to continue heparin drip for now.  24 patient seen and examined medical distress, vital signs stable, feeling better today, GI cleared patient for discharge.  Discussed with RN.     Review of Systems  Constitutional:  Positive for fatigue.   Respiratory:  Positive for shortness of breath.    Cardiovascular:  Positive for leg swelling. Negative for chest pain and palpitations.   Neurological:  Positive for weakness. Negative for syncope and headaches.   Objective:     Vital Signs  Temp:  [97.4 °F (36.3 °C)-97.8 °F (36.6 °C)] 97.4 °F (36.3 °C)  Heart Rate:  [74-98] 74  Resp:  [14-20] 19  BP: (121-136)/(58-68) 128/58  Flow (L/min):  [2] 2   Body mass index is 35.84 kg/m².    Physical Exam   Constitutional:       General: She is in acute distress.      Appearance: Normal appearance.  She is obese.   HENT:      Head: Normocephalic and atraumatic.   Neck:      Comments: No JVD noted  Cardiovascular:      Rate and Rhythm: Normal rate. Rhythm irregular.      Heart sounds: No murmur heard.     No friction rub. No gallop.      Comments: Generalized edema   Pulmonary:      Effort: Pulmonary effort is normal. No respiratory distress.      Breath sounds: Wheezing present.   Genitourinary:     Comments: External catheter in place  Musculoskeletal:      Right lower leg: Edema present.      Left lower leg: Edema present.      Comments: 2+ pitting edema of lower extremities   Skin:     General: Skin is warm and dry.      Findings: Erythema present.      Comments: Bilateral lower extremities   Neurological:      General: No focal deficit present.      Mental Status: She is alert and oriented to person, place, and time.   Psychiatric:         Mood and Affect: Mood normal.         Behavior: Behavior normal.         Thought Content: Thought content normal.         Judgment: Judgment normal.     Diagnostic Data    Results from last 7 days   Lab Units 09/25/24  2214 09/24/24  0314 09/23/24  1700   WBC 10*3/mm3 8.10   < > 9.26   HEMOGLOBIN g/dL 9.2*   < > 10.1*   HEMATOCRIT % 30.6*   < > 33.2*   PLATELETS 10*3/mm3 279   < > 293   GLUCOSE mg/dL 119*   < > 76   CREATININE mg/dL 0.78   < > 0.98   BUN mg/dL 16   < > 20   SODIUM mmol/L 145   < > 142   POTASSIUM mmol/L 3.8   < > 4.0   AST (SGOT) U/L  --   --  34*   ALT (SGPT) U/L  --   --  13   ALK PHOS U/L  --   --  169*   BILIRUBIN mg/dL  --   --  0.4   ANION GAP mmol/L 5.9   < > 8.2    < > = values in this interval not displayed.       CT Abdomen Pancreas With & Without Contrast    Result Date: 9/24/2024  Impression: Moderate bilateral pleural effusions. Findings compatible with interstitial edema. Marked cardiomegaly. Multiseptated cystic pancreatic neck mass with peripheral wall calcifications measuring 2.3 x 1.6 x 2.6 cm. Finding compatible with mucinous  "cystadenoma. Cannot exclude mucinous cystadenocarcinoma. Lesion appears stable in size when compared to prior MRI  performed on July 15, 2024. Consider surgical evaluation or close follow-up to evaluate for change in size. Previously described enhancing lesion in the superior anterior right hepatic lobe represents a small vascular shunt between a portal venous and hepatic venous branch. 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 2.1 cm right adrenal myelolipoma. Fibroid uterus. Additional findings per body of the report. Electronically Signed: Gigi Beck MD  9/24/2024 10:46 PM EDT  Workstation ID: [id number]       I reviewed the patient's new clinical results.    Assessment/Plan:     Active and Resolved Problems  Active Hospital Problems    Diagnosis  POA    **CHF exacerbation [I50.9]  Yes    Dermatitis associated with moisture [L30.8]  Yes    Non-pressure chronic ulcer right lower leg, limited to breakdown skin [L97.911]  Yes      Resolved Hospital Problems   No resolved problems to display.      Atrial fibrillation   - JUAN JOSE?DS?-VASc Score for Atrial Fibrillation Stroke Risk - MDCalc   6 points -> a score ?2 for men or ?3 for women is “moderate-high” risk and should otherwise be an anticoagulation candidate.  - started heparin gtt  - EKG shows RVR, but ED provider reports periods of bradycardia   - admit to tele  - cardiology consulted     CHF exacerbation  - Echo: EF 31-35%  - BNP: 10,613  - ED gave 2mg IV bumex  - start lasix 40 mg BID  - Daily weights  - Strict I&O's  - 1.5L fluid restriction   - Cardiac diet, Sodium < 2g  - heart failure pathway initiated     Leg swelling  - venous duplex: \"Acute right lower extremity deep vein thrombosis noted in the common femoral, proximal femoral, mid femoral, distal femoral and popliteal. Acute right lower extremity superficial thrombophlebitis noted in the saphenofemoral junction and great saphenous (below knee). Acute left lower " "extremity deep vein thrombosis noted in the proximal femoral, mid femoral, distal femoral, popliteal and posterial tibial.Right and left popliteal fossa fluid collection.\"  - heparin gtt started  Hematology consulted for recent bilateral DVTs and anemia   Oncology following with plan for anemia workup and CT evaluation to follow-up on possible hematomas. They are rechecking chromogranin level and planning to continue heparin drip for now.      COPD  - Not in acute exacerbation  - Continue home inhalers once reconciled      Hypertension   - blood pressures are soft, lisinopril is being held   - Resume home medications once bp stable      Hypothyroidism   - continue synthroid     VTE Prophylaxis:  Pharmacologic VTE prophylaxis orders are present.    Disposition Planning:     Barriers to Discharge:AF  Anticipated Date of Discharge: 9/26/24  Place of Discharge: [facility]      Time: 34 minutes     Code Status and Medical Interventions: CPR (Attempt to Resuscitate); Full Support   Ordered at: 09/23/24 1807     Code Status (Patient has no pulse and is not breathing):    CPR (Attempt to Resuscitate)     Medical Interventions (Patient has pulse or is breathing):    Full Support       Signature: Electronically signed by Rogelio Weeks MD, 09/26/24, 13:38 EDT.  [facility] Hospitalist Team  "

## 2024-09-26 NOTE — DISCHARGE SUMMARY
"             [facility] Medicine Services  Discharge Summary    Date of Service: 24  Patient Name: Ban Brennan  : 1955  MRN: [id number]    Date of Admission: 2024  Discharge Diagnosis: Atrial fibrillation   Date of Discharge:  24  Primary Care Physician: Rut Pierce APRN      Presenting Problem:   Chronic a-fib [I48.20]  CHF exacerbation [I50.9]  Acute on chronic congestive heart failure, unspecified heart failure type [I50.9]    Active and Resolved Hospital Problems:  Active Hospital Problems    Diagnosis POA    **CHF exacerbation [I50.9] Yes    Dermatitis associated with moisture [L30.8] Yes    Non-pressure chronic ulcer right lower leg, limited to breakdown skin [L97.911] Yes      Resolved Hospital Problems   No resolved problems to display.       Atrial fibrillation   - JUAN JOSE?DS?-VASc Score for Atrial Fibrillation Stroke Risk - MDCalc   6 points -> a score ?2 for men or ?3 for women is “moderate-high” risk and should otherwise be an anticoagulation candidate.  - started heparin gtt>eliquis  - EKG shows RVR, but ED provider reports periods of bradycardia   - admited to tele  - cardiology consulted     CHF exacerbation  - Echo: EF 31-35%  - BNP: 10,613  - ED gave 2mg IV bumex  - start lasix 40 mg BID  - Daily weights  - Strict I&O's  - 1.5L fluid restriction   - Cardiac diet, Sodium < 2g  - heart failure pathway initiated     Leg swelling  - venous duplex: \"Acute right lower extremity deep vein thrombosis noted in the common femoral, proximal femoral, mid femoral, distal femoral and popliteal. Acute right lower extremity superficial thrombophlebitis noted in the saphenofemoral junction and great saphenous (below knee). Acute left lower extremity deep vein thrombosis noted in the proximal femoral, mid femoral, distal femoral, popliteal and posterial tibial.Right and left popliteal fossa fluid collection.\"  - heparin gtt started  Hematology consulted for recent bilateral " DVTs and anemia   Oncology following with plan for anemia workup and CT evaluation to follow-up on possible hematomas. They are rechecking chromogranin level and planning to continue heparin drip for now.      COPD  - Not in acute exacerbation  - Continue home inhalers once reconciled      Hypertension   - blood pressures are soft, lisinopril is being held   - Resume home medications once bp stable      Hypothyroidism   - continue synthroid      Hospital Course     History of Present Illness: Ban Brennan is a 69 y.o. female with a CMH of atrial fibrillation, CHF, CKD, COPD, DM, HTN who presented to [facility] on 9/23/2024 from her cardiologist's office with possible cellulitis, CHF exacerbation and atrial fibrillation.      On ED evaluation, proBNP 10,613, alk phos 169, EKG shows atrial fibrillation. Lower extremities with edema and erythema. ED gave 2mg IV bumex. Cardiology consulted. Hospitalist team to admit for further medical management.      9/24/24 patient examined in bed no acute distress, blood pressure stable, heart rate 102, on 2 L.  Weakness fatigue.Remains on heparin drip   9/25/2024 patient seen and examined in bed acute distress, vital signs stable, discussed with RN,  Oncology following with plan for anemia workup and CT evaluation to follow-up on possible hematomas. planning to continue heparin drip for now.  9/26/24 patient seen and examined medical distress, vital signs stable, feeling better today, GI cleared patient for discharge.  Discussed with RN.      DISCHARGE Follow Up Recommendations for labs and diagnostics: follpow with pcp in one week  START taking:  apixaban (ELIQUIS)   dapagliflozin (Farxiga)   digoxin (LANOXIN)   Start taking on: September 27, 2024  hydrocortisone-bacitracin-zinc oxide-nystatin (MAGIC BARRIER)   metoprolol succinate XL (TOPROL-XL)   sennosides-docusate (PERICOLACE)    CHANGE how you take:  furosemide (LASIX) -- when to take this   STOP  taking:  bumetanide 1 MG tablet (BUMEX)   lisinopril 30 MG tablet (PRINIVIL,ZESTRIL)   meloxicam 7.5 MG tablet (MOBIC)   metoprolol tartrate 50 MG tablet (LOPRESSOR)       Day of Discharge     Vital Signs:  Temp:  [97.4 °F (36.3 °C)-97.8 °F (36.6 °C)] 97.4 °F (36.3 °C)  Heart Rate:  [74-98] 74  Resp:  [14-20] 19  BP: (121-136)/(58-68) 128/58  Flow (L/min):  [2] 2    Physical Exam      Appearance: Normal appearance. She is obese.   HENT:      Head: Normocephalic and atraumatic.   Neck:      Comments: No JVD noted  Cardiovascular:      Rate and Rhythm: Normal rate. Rhythm irregular.      Heart sounds: No murmur heard.     No friction rub. No gallop.      Comments: Generalized edema   Pulmonary:      Effort: Pulmonary effort is normal. No respiratory distress.   Genitourinary:     Comments: External catheter in place  Musculoskeletal:      Right lower leg: Edema present.      Left lower leg: Edema present.      Comments: 2+ pitting edema of lower extremities   Skin:     General: Skin is warm and dry.      Findings: Erythema present.      Comments: Bilateral lower extremities   Neurological:      General: No focal deficit present.      Mental Status: She is alert and oriented to person, place, and time.   Psychiatric:         Mood and Affect: Mood normal.         Behavior: Behavior normal.         Thought Content: Thought content normal.         Judgment: Judgment normal.     Pertinent  and/or Most Recent Results     LAB RESULTS:      Lab 09/26/24  1140 09/26/24  0442 09/25/24  2214 09/25/24  1341 09/25/24  0441 09/24/24  1947 09/24/24  1349 09/24/24  1117 09/24/24  0314 09/23/24  1703 09/23/24  1700   WBC  --   --  8.10  --  6.41  --   --   --  9.47  --  9.26   HEMOGLOBIN  --   --  9.2*  --  8.7*  --   --   --  8.2*  --  10.1*   HEMATOCRIT  --   --  30.6*  --  29.7*  --   --   --  27.1*  --  33.2*   PLATELETS  --   --  279  --  244  --   --   --  238  --  293   NEUTROS ABS  --   --  4.95  --  3.77  --   --   --  6.25   --  6.31   IMMATURE GRANS (ABS)  --   --  0.04  --  0.03  --   --   --  0.04  --  0.05   LYMPHS ABS  --   --  1.91  --  1.54  --   --   --  2.12  --  1.89   MONOS ABS  --   --  0.86  --  0.72  --   --   --  0.89  --  0.82   EOS ABS  --   --  0.28  --  0.30  --   --   --  0.12  --  0.13   MCV  --   --  99.7*  --  101.0*  --   --   --  97.8*  --  99.7*   LACTATE  --   --   --   --   --   --   --   --   --  1.2  --    LDH  --   --   --   --   --   --  189  --   --   --   --    PROTIME  --   --   --   --   --   --   --   --   --   --  13.7*   APTT 40.7* 79.4* 63.1 44.7* 107.2*   < >  --    < > >139.0*  --  34.2*    < > = values in this interval not displayed.         Lab 09/25/24  2214 09/25/24  0441 09/24/24  1349 09/24/24  0314 09/23/24  1700   SODIUM 145 144  --  146* 142   POTASSIUM 3.8 3.8 4.1 3.5 4.0   CHLORIDE 102 104  --  105 102   CO2 37.1* 36.6*  --  35.4* 31.8*   ANION GAP 5.9 3.4*  --  5.6 8.2   BUN 16 17  --  21 20   CREATININE 0.78 0.80  --  0.86 0.98   EGFR 82.3 79.9  --  73.2 62.6   GLUCOSE 119* 94  --  93 76   CALCIUM 9.2 9.0  --  8.8 9.3   MAGNESIUM  --  1.7  --   --   --          Lab 09/23/24  1700   TOTAL PROTEIN 7.0   ALBUMIN 2.8*   GLOBULIN 4.2   ALT (SGPT) 13   AST (SGOT) 34*   BILIRUBIN 0.4   ALK PHOS 169*         Lab 09/23/24  1700   PROBNP 10,613.0*   PROTIME 13.7*   INR 1.28*             Lab 09/24/24  1947 09/24/24  1349   IRON  --  29*   IRON SATURATION (TSAT)  --  16*   TIBC  --  186*   TRANSFERRIN  --  125*   FERRITIN  --  272.00*   FOLATE 18.30  --    VITAMIN B 12 461  --          Brief Urine Lab Results  (Last result in the past 365 days)        Color   Clarity   Blood   Leuk Est   Nitrite   Protein   CREAT   Urine HCG        07/04/24 1717             72.1               Microbiology Results (last 10 days)       Procedure Component Value - Date/Time    CANDIDA AURIS PCR - Swab, Axilla Right, Axilla Left and Groin [[id number]]  (Normal) Collected: 09/24/24 0006    Lab Status: Final result  Specimen: Swab from Axilla Right, Axilla Left and Groin Updated: 09/26/24 1056     CANDIDA AURIS PCR Not Detected    Blood Culture - Blood, Arm, Left [[id number]]  (Normal) Collected: 09/23/24 1914    Lab Status: Preliminary result Specimen: Blood from Arm, Left Updated: 09/25/24 1931     Blood Culture No growth at 2 days    Blood Culture - Blood, Arm, Left [[id number]]  (Normal) Collected: 09/23/24 1700    Lab Status: Preliminary result Specimen: Blood from Arm, Left Updated: 09/25/24 1716     Blood Culture No growth at 2 days            CT Abdomen Pancreas With & Without Contrast    Result Date: 9/24/2024  Impression: Impression: Moderate bilateral pleural effusions. Findings compatible with interstitial edema. Marked cardiomegaly. Multiseptated cystic pancreatic neck mass with peripheral wall calcifications measuring 2.3 x 1.6 x 2.6 cm. Finding compatible with mucinous cystadenoma. Cannot exclude mucinous cystadenocarcinoma. Lesion appears stable in size when compared to prior MRI  performed on July 15, 2024. Consider surgical evaluation or close follow-up to evaluate for change in size. Previously described enhancing lesion in the superior anterior right hepatic lobe represents a small vascular shunt between a portal venous and hepatic venous branch. 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 2.1 cm right adrenal myelolipoma. Fibroid uterus. Additional findings per body of the report. Electronically Signed: Gigi Beck MD  9/24/2024 10:46 PM EDT  Workstation ID: [id number]    XR Chest 1 View    Result Date: 9/23/2024  Impression: Impression: Features of CHF with cardiomegaly and mild/moderate pulmonary edema with small bilateral pleural effusions. Electronically Signed: Manan Jefferson MD  9/23/2024 5:25 PM EDT  Workstation ID: [id number]     Results for orders placed during the hospital encounter of 09/23/24    Duplex Venous Lower Extremity - Bilateral CAR    Interpretation  Summary    Right popliteal fossa fluid collection.    Normal bilateral lower extremity venous duplex scan.      Results for orders placed during the hospital encounter of 09/23/24    Duplex Venous Lower Extremity - Bilateral CAR    Interpretation Summary    Right popliteal fossa fluid collection.    Normal bilateral lower extremity venous duplex scan.      Results for orders placed during the hospital encounter of 09/23/24    Adult Transthoracic Echo Complete w/ Color, Spectral and Contrast if Necessary Per Protocol    Interpretation Summary    Left ventricular systolic function is mildly decreased. Left ventricular ejection fraction appears to be 41 - 45%.    Left ventricular diastolic function was indeterminate.    Mildly reduced right ventricular systolic function noted.    The right ventricular cavity is moderate to severely dilated.    The left atrial cavity is severely dilated.    Left atrial volume is severely increased.    The right atrial cavity is moderate to severely  dilated.    Mild aortic valve stenosis is present.    Moderate tricuspid valve regurgitation is present.    Estimated right ventricular systolic pressure from tricuspid regurgitation is mildly elevated (35-45 mmHg).    Mild pulmonary hypertension is present.      Labs Pending at Discharge:  Pending Labs       Order Current Status    Chromogranin A In process    Blood Culture - Blood, Arm, Left Preliminary result    Blood Culture - Blood, Arm, Left Preliminary result            Procedures Performed           Consults:   Consults       Date and Time Order Name Status Description    9/24/2024  6:14 PM Inpatient Gastroenterology Consult Completed     9/24/2024  7:44 AM Hematology & Oncology Inpatient Consult Completed     9/23/2024  6:07 PM Inpatient Cardiology Consult      9/23/2024  5:51 PM Hospitalist (on-call MD unless specified)              Assessment & Plan  ASSESSMENT  Pancreatic mass: 3 x 2.7 cm soft tissue mass involving the body of  the pancreas.  Mildly elevated CA 19-9, chromogranin elevated at 1895, CEA 10.95.  MRI of the abdomen with multiseptated pancreatic lesion in the pancreatic neck concerning for malignancy.  Hypervascular lesion on the liver less likely to be metastatic.  In August 2024 underwent EUS with cyst aspiration, cytology negative for malignancy.  Bilateral lower extremity DVT/remote history of pulmonary embolism/splenic infarction: Extensive acute right and left lower DVT seen in July 2024.  Repeat venous duplex this admission negative for DVT.  Due to history of pulmonary embolism as well as extensive DVTs plan will be for lifelong anticoagulation.  Factor V Leiden and prothrombin gene mutation reported negative, remainder of thrombophilia workup deferred as it does not impact decision-making around anticoagulation.  At the last visit a CT scan of the abdomen was ordered to evaluate for abdominal hematomas.  Patient was currently off anticoagulation at the visit on 9/6/2024 for reasons that were not clear.  Currently on a heparin drip for DVT prophylaxis.  Uterine mass: Patient has underwent GYN evaluation as an outpatient and had a D&C with no evidence of malignancy.  Normocytic anemia: Hemoglobin 8.2 g/dL and MCV 97.8.  Baseline hemoglobin around 10 g/dL.        PLAN  Monitor CBC and transfuse for hemoglobin less than 7.0 g/dL  Anemia workup  CT abdomen and pelvis to follow-up on possible hematomas that resulted in the holding of her anticoagulation as an outpatient and also cutaneous masses of the abdomen  Recheck chromogranin level  Gastroenterology consult.  Elevated chromogranin level.  May need further evaluation for malignancy.  Continue heparin drip for now and monitor for bleeding     Further recommendations per Dr. Zepeda  Electronically signed by MANJU Rivera, 09/24/24, 4:11 PM EDT.          Discharge Details        Discharge Medications        New Medications        Instructions Start Date    apixaban 2.5 MG tablet tablet  Commonly known as: ELIQUIS   2.5 mg, Oral, 2 Times Daily      dapagliflozin 5 MG tablet tablet  Commonly known as: Farxiga   5 mg, Oral, Daily      digoxin 125 MCG tablet  Commonly known as: LANOXIN   125 mcg, Oral, Daily Digoxin   Start Date: September 27, 2024     hydrocortisone-bacitracin-zinc oxide-nystatin  Commonly known as: MAGIC BARRIER   1 Application, Topical, 3 times daily      metoprolol succinate XL 50 MG 24 hr tablet  Commonly known as: TOPROL-XL   50 mg, Oral, Every 12 Hours Scheduled      sennosides-docusate 8.6-50 MG per tablet  Commonly known as: PERICOLACE   2 tablets, Oral, 2 Times Daily PRN             Changes to Medications        Instructions Start Date   furosemide 20 MG tablet  Commonly known as: LASIX  What changed: when to take this   20 mg, Oral, 2 Times Daily             Continue These Medications        Instructions Start Date   FeroSul 325 (65 Fe) MG tablet  Generic drug: ferrous sulfate   325 mg, Oral, Daily With Breakfast      fexofenadine-pseudoephedrine 180-240 MG per 24 hr tablet  Commonly known as: ALLEGRA-D 24   1 tablet, Oral, Daily, Pharmacy script says 180mg      beModelense ClearLax 17 GM/SCOOP powder  Generic drug: polyethylene glycol   17 g, Oral, Daily      HYDROcodone-acetaminophen  MG per tablet  Commonly known as: NORCO   Take 1 tablet by mouth Every 6 (Six) Hours As Needed for Mild Pain, Moderate Pain or Severe Pain.      levothyroxine 25 MCG tablet  Commonly known as: SYNTHROID, LEVOTHROID   Take 1 tablet by mouth Every Morning.      mineral oil-hydrophilic petrolatum ointment   1 Application, Topical, As Needed      montelukast 10 MG tablet  Commonly known as: SINGULAIR   10 mg, Oral, Every Night at Bedtime      omeprazole 20 MG capsule  Commonly known as: priLOSEC   1 capsule, Oral, Daily      oxybutynin XL 10 MG 24 hr tablet  Commonly known as: DITROPAN-XL   2 tablets, Oral, Daily, Pharmacy order reads Oxybutynin Cl Er       potassium chloride 10 MEQ CR capsule  Commonly known as: MICRO-K   1 capsule 2 (Two) Times a Day.      vitamin D 1.25 MG (35500 UT) capsule capsule  Commonly known as: ERGOCALCIFEROL   50,000 Units, Oral, 2 Times Weekly, Monday and Thursday.             Stop These Medications      bumetanide 1 MG tablet  Commonly known as: BUMEX     lisinopril 30 MG tablet  Commonly known as: PRINIVIL,ZESTRIL     meloxicam 7.5 MG tablet  Commonly known as: MOBIC     metoprolol tartrate 50 MG tablet  Commonly known as: LOPRESSOR              No Known Allergies      Discharge Disposition:   Home or Self Care    Diet:  Hospital:  Diet Order   Procedures    Diet: Cardiac, Fluid Restriction (240 mL/tray); Healthy Heart (2-3 Na+); 1500 mL/day; Fluid Consistency: Thin (IDDSI 0)         Discharge Activity:         CODE STATUS:  Code Status and Medical Interventions: CPR (Attempt to Resuscitate); Full Support   Ordered at: 09/23/24 1807     Code Status (Patient has no pulse and is not breathing):    CPR (Attempt to Resuscitate)     Medical Interventions (Patient has pulse or is breathing):    Full Support         Future Appointments   Date Time Provider Department Center   9/30/2024 11:30 AM [facility] CT 3  [facility]           Time spent on Discharge including face to face service:  35 minutes    Signature: Electronically signed by Rogelio Weeks MD, 09/26/24, 14:00 EDT.  [facility] Hospitalist Team

## 2024-09-26 NOTE — PROGRESS NOTES
LOS: 3 days   Patient Care Team:  Rut Pierce APRN as PCP - General (Nurse Practitioner)  Austin Brooks MD as Cardiologist (Cardiology)  Prachi Zepeda MD as Consulting Physician (Hematology and Oncology)  Tanvir Bird MD as Consulting Physician (Cardiology)      Subjective     Interval History:     Subjective: Patient with no new complaints.  Continues to have some mild generalized tenderness, but has been tolerating diet without nausea/vomiting.      ROS:   No chest pain, shortness of breath, or cough.        Medication Review:     Current Facility-Administered Medications:     sennosides-docusate (PERICOLACE) 8.6-50 MG per tablet 2 tablet, 2 tablet, Oral, BID PRN **AND** polyethylene glycol (MIRALAX) packet 17 g, 17 g, Oral, Daily PRN **AND** bisacodyl (DULCOLAX) EC tablet 5 mg, 5 mg, Oral, Daily PRN **AND** bisacodyl (DULCOLAX) suppository 10 mg, 10 mg, Rectal, Daily PRN, Jodie Shepherd APRN    Calcium Replacement - Follow Nurse / BPA Driven Protocol, , Does not apply, PRN, Jodie Shepherd, APRN    digoxin (LANOXIN) tablet 125 mcg, 125 mcg, Oral, Daily, Tanvir Bird MD, 125 mcg at 09/26/24 1127    furosemide (LASIX) injection 40 mg, 40 mg, Intravenous, Q12H, Jodie Shepherd, APRN, 40 mg at 09/26/24 0823    heparin 76641 units/250 mL (100 units/mL) in 0.45 % NaCl infusion, 17.1 Units/kg/hr, Intravenous, Titrated, Jodie Shepherd APRN, Last Rate: 8.83 mL/hr at 09/26/24 1144, 10.1 Units/kg/hr at 09/26/24 1144    heparin bolus from bag solution 3,500 Units, 40 Units/kg, Intravenous, Q6H PRN, Jodie Shepherd, APRN, 3,500 Units at 09/25/24 1609    heparin bolus from bag solution 7,000 Units, 80 Units/kg, Intravenous, Q6H PRN, Jodie Shepherd, APRN    HYDROcodone-acetaminophen (NORCO)  MG per tablet 1 tablet, 1 tablet, Oral, Q8H PRN, Jagdeep Petit MD, 1 tablet at 09/26/24 1127    hydrocortisone-bacitracin-zinc oxide-nystatin (MAGIC BARRIER) ointment 1 Application, 1 Application, Topical, TID,  Toya Hylton, MANJU, 1 Application at 09/26/24 0826    Magnesium Standard Dose Replacement - Follow Nurse / BPA Driven Protocol, , Does not apply, PRASHU, Jodie Shepherd APRN    metoprolol succinate XL (TOPROL-XL) 24 hr tablet 50 mg, 50 mg, Oral, Q12H, Tanvir Bird MD, 50 mg at 09/26/24 0823    nitroglycerin (NITROSTAT) SL tablet 0.4 mg, 0.4 mg, Sublingual, Q5 Min PRN, Jodie Shepherd APRN    Phosphorus Replacement - Follow Nurse / BPA Driven Protocol, , Does not apply, PRN, Jodie Shepherd APRN    Potassium Replacement - Follow Nurse / BPA Driven Protocol, , Does not apply, Joao RAOMS Lea, APRN      Objective     Vital Signs  Vitals:    09/26/24 0413 09/26/24 0619 09/26/24 0815 09/26/24 1113   BP: 127/62  136/68 128/58   BP Location: Right arm  Right arm Right arm   Patient Position: Lying  Lying Lying   Pulse: 98  84 74   Resp: 18  20 19   Temp: 97.6 °F (36.4 °C)  97.6 °F (36.4 °C) 97.4 °F (36.3 °C)   TempSrc: Oral  Oral Oral   SpO2: 97%  98% 94%   Weight:  80.5 kg (177 lb 7.5 oz)     Height:           Physical Exam:     General Appearance:    Awake and alert, in no acute distress   Head:    Normocephalic, without obvious abnormality   Eyes:          Conjunctivae normal, anicteric sclera   Throat:   No oral lesions, no thrush, oral mucosa moist   Neck:   No adenopathy, supple, no JVD   Lungs:     respirations regular, even and unlabored   Abdomen:     Soft, mild generalized tenderness, no rebound or guarding, non-distended   Rectal:     Deferred   Extremities:   No edema, no cyanosis   Skin:   No bruising or rash, no jaundice        Results Review:    CBC    Results from last 7 days   Lab Units 09/25/24  2214 09/25/24  0441 09/24/24  0314 09/23/24  1700   WBC 10*3/mm3 8.10 6.41 9.47 9.26   HEMOGLOBIN g/dL 9.2* 8.7* 8.2* 10.1*   PLATELETS 10*3/mm3 279 244 238 293     CMP   Results from last 7 days   Lab Units 09/25/24  2214 09/25/24  0441 09/24/24  1349 09/24/24  0314 09/23/24  1700   SODIUM mmol/L 145 144  --  146*  142   POTASSIUM mmol/L 3.8 3.8 4.1 3.5 4.0   CHLORIDE mmol/L 102 104  --  105 102   CO2 mmol/L 37.1* 36.6*  --  35.4* 31.8*   BUN mg/dL 16 17  --  21 20   CREATININE mg/dL 0.78 0.80  --  0.86 0.98   GLUCOSE mg/dL 119* 94  --  93 76   ALBUMIN g/dL  --   --   --   --  2.8*   BILIRUBIN mg/dL  --   --   --   --  0.4   ALK PHOS U/L  --   --   --   --  169*   AST (SGOT) U/L  --   --   --   --  34*   ALT (SGPT) U/L  --   --   --   --  13   MAGNESIUM mg/dL  --  1.7  --   --   --      Cr Clearance Estimated Creatinine Clearance: 66.1 mL/min (by C-G formula based on SCr of 0.78 mg/dL).  Coag   Results from last 7 days   Lab Units 09/26/24  0442 09/25/24  2214 09/25/24  1341 09/25/24  0441 09/24/24  1947 09/24/24  1117 09/24/24  0314 09/23/24  1700   INR   --   --   --   --   --   --   --  1.28*   APTT seconds 79.4* 63.1 44.7* 107.2* 40.6* 130.8* >139.0* 34.2*     HbA1C   Lab Results   Component Value Date    HGBA1C 6.14 (H) 07/01/2024    HGBA1C 6.00 (H) 01/15/2024         Infection   Results from last 7 days   Lab Units 09/23/24  1914 09/23/24  1700   BLOODCX  No growth at 2 days No growth at 2 days     UA      Microbiology Results (last 10 days)       Procedure Component Value - Date/Time    CANDIDA AURIS PCR - Swab, Axilla Right, Axilla Left and Groin [[id number]]  (Normal) Collected: 09/24/24 0006    Lab Status: Final result Specimen: Swab from Axilla Right, Axilla Left and Groin Updated: 09/26/24 1056     CANDIDA AURIS PCR Not Detected    Blood Culture - Blood, Arm, Left [[id number]]  (Normal) Collected: 09/23/24 1914    Lab Status: Preliminary result Specimen: Blood from Arm, Left Updated: 09/25/24 1931     Blood Culture No growth at 2 days    Blood Culture - Blood, Arm, Left [[id number]]  (Normal) Collected: 09/23/24 1700    Lab Status: Preliminary result Specimen: Blood from Arm, Left Updated: 09/25/24 1716     Blood Culture No growth at 2 days          Imaging Results (Last 72 Hours)       Procedure Component Value Units  Date/Time    CT Abdomen Pancreas With & Without Contrast [[id number]] Collected: 09/24/24 2228     Updated: 09/24/24 2255    Narrative:        CT ABDOMEN PANCREAS W WO CONTRAST    Date of Exam: 9/24/2024 10:07 PM EDT    Indication: Pancreatic lesion, cutaneous abdominal nodules.    Comparison: Study was correlated with contrast-enhanced abdominal MRI performed on July 15, 2024. Additional comparison with noncontrast CT of the abdomen performed on July 1, 2024.    Technique: Axial CT images were obtained of the abdomen before and after the uneventful intravenous administration of iodinated contrast. Sagittal and coronal reconstructions were performed.  Automated exposure control and iterative reconstruction   methods were used.      Findings:    Lung Bases:  Moderate bilateral pleural effusions are visualized with adjacent compressive atelectasis. There is mild interlobular septal thickening suggestive of interstitial edema. The cardiac silhouette is markedly enlarged.    Peritoneum:  No free intraperitoneal air or fluid.     Abdominal wall:  There is mild edema of the abdominal wall, most prominent in the flanks. Finding likely secondary to CHF.    Liver:  The liver is within normal in size and contour. Again noted is an enhancing lesion in the superior anterior right hepatic lobe, segment 4A which appears to represent a small vascular shunt between a portal venous and hepatic venous branch..    Biliary/Gallbladder:  No radiopaque gallstones are visualized. There is a small diverticulum of the fundus of the gallbladder measuring 1.3 cm. No pericholecystic inflammatory changes are visualized. The biliary tree is nondilated.    Pancreas:  There is a multiseptated cystic lesion in the neck of the pancreas measuring 2.3 x 1.6 x 2.6 cm. Peripheral wall calcifications are visualized. No peripancreatic inflammatory changes are visualized. Lesion overall appears stable in size when compared to   prior MRI.    Spleen:  The  spleen is within normal in size and contour. Linear hypoenhancement is visualized in the lateral spleen compatible with splenic infarct, decreased in size when compared to prior study.    Gastrointestinal/Mesentery:   No evidence of bowel obstruction or gross inflammatory changes. The appendix appears within normal limits. There is extensive descending and sigmoid diverticulosis without evidence of diverticulitis.    Adrenals:  Again noted is a fatty lesion in the right adrenal gland measuring 2.1 cm, compatible with myelolipoma.    Kidneys:  The kidneys are in anatomic position. No evidence of nephrolithiasis. No evidence of hydronephrosis or significant perinephric fat stranding.    Bladder:   The urinary bladder is unremarkable.    Reproductive organs:    The uterus is moderately enlarged for the patient's age measuring 10.1 x 7.5 x 6 cm in maximal AP, transverse, and craniocaudal dimension. Multiple uterine fibroids are visualized. No adnexal masses are visualized.    Lymph Nodes:  No significant adenopathy is identified.     Vasculature:  Small scattered arterial calcifications are visualized. The aorta is normal in caliber.    Osseous Structures:    No acute fracture or aggressive lesions. Moderate diffuse facet osteoarthropathy is visualized.        Impression:      Impression:    Moderate bilateral pleural effusions. Findings compatible with interstitial edema. Marked cardiomegaly.    Multiseptated cystic pancreatic neck mass with peripheral wall calcifications measuring 2.3 x 1.6 x 2.6 cm. Finding compatible with mucinous cystadenoma. Cannot exclude mucinous cystadenocarcinoma. Lesion appears stable in size when compared to prior MRI   performed on July 15, 2024. Consider surgical evaluation or close follow-up to evaluate for change in size.    Previously described enhancing lesion in the superior anterior right hepatic lobe represents a small vascular shunt between a portal venous and hepatic venous  branch.    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    2.1 cm right adrenal myelolipoma.    Fibroid uterus.    Additional findings per body of the report.        Electronically Signed: Gigi Beck MD    9/24/2024 10:46 PM EDT    Workstation ID: [id number]    XR Chest 1 View [[id number]] Collected: 09/23/24 1724     Updated: 09/23/24 1727    Narrative:      XR CHEST 1 VW    Date of Exam: 9/23/2024 5:14 PM EDT    Indication: Shortness of breath    Comparison: Chest radiograph 7/19/2024.    Findings:  Enlarged cardiac silhouette, unchanged. Pulmonary vascular congestion. Diffuse interstitial opacities. Small bilateral pleural effusions, right greater than left. No pneumothorax. Osseous structures are unchanged.      Impression:      Impression:  Features of CHF with cardiomegaly and mild/moderate pulmonary edema with small bilateral pleural effusions.      Electronically Signed: Manan Jefferson MD    9/23/2024 5:25 PM EDT    Workstation ID: [id number]            Assessment & Plan     ASSESSMENT:  -Pancreatic cystic lesion  -Elevated chromogranin A  -Normocytic anemia/iron deficiency  -Edema/volume overload with CHF exacerbation/cardiomyopathy  -A-fib with RVR  -Diabetic ulcer right great toe  -History of bilateral lower extremity DVT/PE/splenic infarct  -COPD  -DMII    PLAN:  Patient is a 69-year-old female with multiple medical problems who presents with shortness of breath and increased edema with CHF exacerbation. GI asked consult for known pancreatic cystic lesion and elevated chromogranin A.  CT abdomen/pelvis pancreatic protocol shows multiseptated cystic pancreatic neck mass with peripheral wall calcifications, old splenic infarct, and small vascular shunt between portal venous and hepatic venous branch noted.  CA .2, CEA 10.7, chromogranin A (from 7/2024) 1895.    Patient reports that she is feeling some better today.  Tolerating diet without nausea/vomiting.  Does  report some mild generalized tenderness.  WBC count normal.  Hemoglobin 9.2 from 8.7.  Dr. Colunga recommends discontinuing PPI and rechecking chromogranin A in 5 to 7 days as outpatient.  If remains elevated, proceed with dotatate scan.  Patient had recent EUS with FNA (CEA 98).   Okay for diet as tolerated from GI standpoint.  Oncology, wound care, and cardiology also following.  Not much else to add from a GI standpoint as an inpatient at this time.  Follow-up with GI in 1 to 2 months.  Will be available as needed.      Electronically signed by MANJU Askew, 09/26/24, 12:44 PM EDT.

## 2024-09-26 NOTE — CASE MANAGEMENT/SOCIAL WORK
Continued Stay Note  QIAN Clarke     Patient Name: Ban Brennan  MRN: [id number]  Today's Date: 9/26/2024    Admit Date: 9/23/2024    Plan: Returnhome with family. Current with [facility] . Home O2 2L per [place].   Discharge Plan       Row Name 09/26/24 1527       Plan    Plan Returnhome with family. Current with [facility] . Home O2 2L per [place].    Plan Comments CM made aware patient needs O2 for DC home as her tanks are too low for transport at home. CM placed referral in Epic basket and messaged liarick Orr. Patient family to provide DC transport.           Evert Miles RN     Cell number [phone]  Office number [phone]

## 2024-09-26 NOTE — PLAN OF CARE
Goal Outcome Evaluation:  Plan of Care Reviewed With: patient        Progress: no change  Outcome Evaluation: pt c/o pain, gave prn meds, did not rest much during the night, remained on heparin drip, Q2 turn and speciatly bed, betadine and, magic barrier used for skin health

## 2024-09-26 NOTE — PROGRESS NOTES
Hematology/Oncology Inpatient Progress Note    PATIENT NAME: Ban Brennan  : 1955  MRN: [id number]    CHIEF COMPLAINT: Pancreatic mass    HISTORY OF PRESENT ILLNESS:      Ban Brennan is a 69 y.o. female who presented to [facility] on 2024 with complaints of leg swelling.  Past medical history significant for atrial fibrillation, CHF, CKD, COPD, diabetes mellitus, hypertension, bilateral lower extremity DVT, remote history of pulmonary embolism.  Recent hospital admission in 2024 for complaints of shortness of breath during which she was found to have a pancreatic mass.     24  Hematology/Oncology was consulted on a patient known to us and seen in the office by Dr. Zepeda.  Recently found to have a soft tissue mass involving the body of the pancreas.  In 2024 the patient had EUS with cyst aspiration and FNP impression is possible cirrhosis adenoma versus questionable IPMN.  Patient had both cystic as well as solid component with microcystic areas most likely suggestive of cirrhosis.  Cyst was aspirated.  Cytology showed hypocellular specimen containing a few benign glandular cells insufficient for further diagnosis.     2024: CA 19-9 53.2 (previously 58.5), CEA 10.70    At the last visit patient was recommended to have CT scan of the abdomen and pelvis, follow-up with GYN as well as GI.  She is admitted this time with complaints of lower extremity swelling.  She had Doppler of the lower extremity which did not show any evidence of clots  She was started on IV heparin     He/She  has a past medical history of Asthma (24), Atrial fibrillation (24), Bilateral leg edema, CHF (congestive heart failure) (24), Chronic kidney disease (24), Chronic respiratory failure with hypoxia, on home O2 therapy (2024), Congenital heart disease (24), COPD (chronic obstructive pulmonary disease), Diabetes mellitus (24), Morbid obesity with BMI of  "40.0-44.9, adult (11/08/2010), Myocardial infarction (35 yrs ago), Primary hypertension (03/01/2017), Pulmonary embolism, and Sleep apnea.     PCP: Rut Pierce APRN  Subjective     Patient does not have any new issues    ROS:  Review of Systems   Constitutional:  Positive for fatigue.   Neurological:  Positive for weakness.        MEDICATIONS:    Scheduled Meds:  digoxin, 125 mcg, Oral, Daily  furosemide, 40 mg, Intravenous, Q12H  hydrocortisone-bacitracin-zinc oxide-nystatin, 1 Application, Topical, TID  metoprolol succinate XL, 50 mg, Oral, Q12H       Continuous Infusions:  heparin, 17.1 Units/kg/hr, Last Rate: 10.1 Units/kg/hr (09/26/24 0548)       PRN Meds:    senna-docusate sodium **AND** polyethylene glycol **AND** bisacodyl **AND** bisacodyl    Calcium Replacement - Follow Nurse / BPA Driven Protocol    heparin    heparin    HYDROcodone-acetaminophen    Magnesium Standard Dose Replacement - Follow Nurse / BPA Driven Protocol    nitroglycerin    Phosphorus Replacement - Follow Nurse / BPA Driven Protocol    Potassium Replacement - Follow Nurse / BPA Driven Protocol     ALLERGIES:  No Known Allergies    Objective    VITALS:   /62 (BP Location: Right arm, Patient Position: Lying)   Pulse 98   Temp 97.6 °F (36.4 °C) (Oral)   Resp 18   Ht 149.9 cm (59\")   Wt 80.5 kg (177 lb 7.5 oz)   SpO2 97%   BMI 35.84 kg/m²     PHYSICAL EXAM: (performed by MD)  Physical Exam  Vitals and nursing note reviewed.   Constitutional:       General: She is not in acute distress.     Appearance: She is not diaphoretic.   HENT:      Head: Normocephalic and atraumatic.   Eyes:      General: No scleral icterus.        Right eye: No discharge.         Left eye: No discharge.      Conjunctiva/sclera: Conjunctivae normal.   Neck:      Thyroid: No thyromegaly.   Cardiovascular:      Rate and Rhythm: Normal rate and regular rhythm.      Heart sounds: Normal heart sounds.      No friction rub. No gallop.   Pulmonary:      " Effort: Pulmonary effort is normal. No respiratory distress.      Breath sounds: No stridor. No wheezing.   Abdominal:      General: Bowel sounds are normal.      Palpations: Abdomen is soft. There is no mass.      Tenderness: There is no abdominal tenderness. There is no guarding or rebound.   Musculoskeletal:         General: No tenderness. Normal range of motion.      Cervical back: Normal range of motion and neck supple.      Right lower leg: Edema present.      Left lower leg: Edema present.   Lymphadenopathy:      Cervical: No cervical adenopathy.   Skin:     General: Skin is warm.      Findings: No erythema or rash.   Neurological:      Mental Status: She is alert and oriented to person, place, and time.      Motor: No abnormal muscle tone.   Psychiatric:         Behavior: Behavior normal.           RECENT LABS:  Lab Results (last 24 hours)       Procedure Component Value Units Date/Time    aPTT [[id number]]  (Abnormal) Collected: 09/26/24 0442    Specimen: Blood Updated: 09/26/24 0542     PTT 79.4 seconds     Basic Metabolic Panel [[id number]]  (Abnormal) Collected: 09/25/24 2214    Specimen: Blood Updated: 09/25/24 2302     Glucose 119 mg/dL      BUN 16 mg/dL      Creatinine 0.78 mg/dL      Sodium 145 mmol/L      Potassium 3.8 mmol/L      Comment: Specimen hemolyzed.  Result may be falsely elevated.        Chloride 102 mmol/L      CO2 37.1 mmol/L      Calcium 9.2 mg/dL      BUN/Creatinine Ratio 20.5     Anion Gap 5.9 mmol/L      eGFR 82.3 mL/min/1.73     Narrative:      GFR Normal >60  Chronic Kidney Disease <60  Kidney Failure <15      aPTT [[id number]]  (Normal) Collected: 09/25/24 2214    Specimen: Blood Updated: 09/25/24 2245     PTT 63.1 seconds     CBC & Differential [[id number]]  (Abnormal) Collected: 09/25/24 2214    Specimen: Blood Updated: 09/25/24 2233    Narrative:      The following orders were created for panel order CBC & Differential.  Procedure                               Abnormality          Status                     ---------                               -----------         ------                     CBC Auto Differential[[id number]]        Abnormal            Final result                 Please view results for these tests on the individual orders.    CBC Auto Differential [[id number]]  (Abnormal) Collected: 09/25/24 2214    Specimen: Blood Updated: 09/25/24 2233     WBC 8.10 10*3/mm3      RBC 3.07 10*6/mm3      Hemoglobin 9.2 g/dL      Hematocrit 30.6 %      MCV 99.7 fL      MCH 30.0 pg      MCHC 30.1 g/dL      RDW 13.6 %      RDW-SD 49.6 fl      MPV 9.2 fL      Platelets 279 10*3/mm3      Neutrophil % 61.1 %      Lymphocyte % 23.6 %      Monocyte % 10.6 %      Eosinophil % 3.5 %      Basophil % 0.7 %      Immature Grans % 0.5 %      Neutrophils, Absolute 4.95 10*3/mm3      Lymphocytes, Absolute 1.91 10*3/mm3      Monocytes, Absolute 0.86 10*3/mm3      Eosinophils, Absolute 0.28 10*3/mm3      Basophils, Absolute 0.06 10*3/mm3      Immature Grans, Absolute 0.04 10*3/mm3      nRBC 0.0 /100 WBC     Blood Culture - Blood, Arm, Left [[id number]]  (Normal) Collected: 09/23/24 1914    Specimen: Blood from Arm, Left Updated: 09/25/24 1931     Blood Culture No growth at 2 days    Blood Culture - Blood, Arm, Left [[id number]]  (Normal) Collected: 09/23/24 1700    Specimen: Blood from Arm, Left Updated: 09/25/24 1716     Blood Culture No growth at 2 days    aPTT [[id number]]  (Abnormal) Collected: 09/25/24 1341    Specimen: Blood Updated: 09/25/24 1414     PTT 44.7 seconds     Magnesium [[id number]]  (Normal) Collected: 09/25/24 0441    Specimen: Blood Updated: 09/25/24 0926     Magnesium 1.7 mg/dL             PENDING RESULTS:     IMAGING REVIEWED:  CT Abdomen Pancreas With & Without Contrast    Result Date: 9/24/2024  Impression: Moderate bilateral pleural effusions. Findings compatible with interstitial edema. Marked cardiomegaly. Multiseptated cystic pancreatic neck mass with peripheral wall calcifications  measuring 2.3 x 1.6 x 2.6 cm. Finding compatible with mucinous cystadenoma. Cannot exclude mucinous cystadenocarcinoma. Lesion appears stable in size when compared to prior MRI  performed on July 15, 2024. Consider surgical evaluation or close follow-up to evaluate for change in size. Previously described enhancing lesion in the superior anterior right hepatic lobe represents a small vascular shunt between a portal venous and hepatic venous branch. 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 2.1 cm right adrenal myelolipoma. Fibroid uterus. Additional findings per body of the report. Electronically Signed: Gigi Beck MD  9/24/2024 10:46 PM EDT  Workstation ID: [id number]     Assessment & Plan   ASSESSMENT:    Pancreatic mass: 3 x 2.7 cm soft tissue mass involving the body of the pancreas.  Mildly elevated CA 19-9, chromogranin elevated at 1895, CEA 10.95.  MRI of the abdomen with multiseptated pancreatic lesion in the pancreatic neck concerning for malignancy.  Hypervascular lesion on the liver less likely to be metastatic.  In August 2024 underwent EUS with cyst aspiration, cytology negative for malignancy.  Bilateral lower extremity DVT/remote history of pulmonary embolism/splenic infarction: Extensive acute right and left lower DVT seen in July 2024.  Repeat venous duplex this admission negative for DVT.  Due to history of pulmonary embolism as well as extensive DVTs plan will be for lifelong anticoagulation.  Factor V Leiden and prothrombin gene mutation reported negative, remainder of thrombophilia workup deferred as it does not impact decision-making around anticoagulation.  At the last visit a CT scan of the abdomen was ordered to evaluate for abdominal hematomas.  Patient was currently off anticoagulation at the visit on 9/6/2024 for reasons that were not clear.  Currently on a heparin drip for DVT prophylaxis.  Uterine mass: Patient has underwent GYN evaluation as an  outpatient and had a D&C with no evidence of malignancy.  Normocytic anemia: Hemoglobin 8.2 g/dL and MCV 97.8.  Baseline hemoglobin around 10 g/dL.        PLAN  Monitor CBC and transfuse for hemoglobin less than 7.0 g/dL  Anemia workup  CT abdomen and pelvis to follow-up on possible hematomas that resulted in the holding of her anticoagulation as an outpatient and also cutaneous masses of the abdomen  Recheck chromogranin level  Gastroenterology consult.  Elevated chromogranin level.  May need further evaluation for malignancy.  Continue heparin drip for now and monitor for bleeding        Hematology/Oncology was consulted on a patient known to us and seen in the office by Dr. Zepeda.  Recently found to have a soft tissue mass involving the body of the pancreas.  In August 2024 the patient had EUS with cyst aspiration and FNP impression is possible cirrhosis adenoma versus questionable IPMN.  Patient had both cystic as well as solid component with microcystic areas most likely suggestive of cirrhosis.  Cyst was aspirated.  Cytology showed hypocellular specimen containing a few benign glandular cells insufficient for further diagnosis.     9/6/2024: CA 19-9 53.2 (previously 58.5), CEA 10.70    At the last visit patient was recommended to have CT scan of the abdomen and pelvis, follow-up with GYN as well as GI.  She is admitted this time with complaints of lower extremity swelling.  She had Doppler of the lower extremity which did not show any evidence of clots  She was started on IV heparin    Physical exam significant for bilateral lower extremity swelling otherwise unremarkable.  Patient also appears chronically ill    I reviewed her labs, imaging studies progress Notes  She remains on IV heparin  CT scan of the abdomen shows multiseptated cystic pancreatic neck mass with peripheral wall calcifications measuring up to 2.6 cm consistent with mucinous cystadenoma no change when compared to prior MRI from July 15,  2024  GI consulted for elevated chromogranin level, pancreatic cystic lesion and will repeat her chromogranin level.  GI has recommended to periodically follow the pancreatic cystic lesion with MRI    For the elevated chromogranin level they recommended holding off of PPI for 7 to 10 days with plans to follow-up with GI after discharge      Will complete anemia workup    Will continue to follow    Discussed with patient      Electronically signed by Prachi Zepeda MD, 09/29/24, 11:09 AM EDT.

## 2024-09-27 LAB — CGA SERPL-MCNC: 1095 NG/ML (ref 0–101.8)

## 2024-09-27 NOTE — CASE MANAGEMENT/SOCIAL WORK
Case Management Discharge Note      Final Note: Home w. [facility] and [place] O[facility].    Provided Post Acute Provider List?: N/A    Selected Continued Care - Discharged on 9/26/2024 Admission date: 9/23/2024 - Discharge disposition: Home or Self Care        Durable Medical Equipment Coordination complete.      Service Provider Selected Services Address Phone Fax Patient Preferred    [facility] [address], [place] [address] [phone] --                Home Medical Care Coordination complete.      Service Provider Selected Services Address Phone Fax Patient Preferred    [facility] [address] [place] [address] [phone] -- --               Transportation Services  Private: Car    Final Discharge Disposition Code: 06 - home with home health care   Patient requesting RX Lidocaine 1.8 % PTCH to [employer] Patient also wants to know if she can have injections in her back and Botox for her head.  Pl's advise

## 2024-09-28 LAB
BACTERIA SPEC AEROBE CULT: NORMAL
BACTERIA SPEC AEROBE CULT: NORMAL

## 2024-09-30 ENCOUNTER — READMISSION MANAGEMENT (OUTPATIENT)
Dept: CALL CENTER | Facility: HOSPITAL | Age: 69
End: 2024-09-30
Payer: MEDICAID

## 2024-09-30 ENCOUNTER — HOSPITAL ENCOUNTER (OUTPATIENT)
Dept: CT IMAGING | Facility: HOSPITAL | Age: 69
Discharge: HOME | End: 2024-09-30
Payer: MEDICARE

## 2024-09-30 DIAGNOSIS — K86.89 PANCREATIC MASS: ICD-10-CM

## 2024-09-30 NOTE — OUTREACH NOTE
CHF Week 1 Survey      Flowsheet Row Responses   [facility] facility patient discharged from? [facility]   Does the patient have one of the following disease processes/diagnoses(primary or secondary)? CHF   CHF Week 1 attempt successful? No   Unsuccessful attempts Attempt 1            Uma CAVANAUGH - Registered Nurse

## 2024-10-03 ENCOUNTER — READMISSION MANAGEMENT (OUTPATIENT)
Dept: CALL CENTER | Facility: HOSPITAL | Age: 69
End: 2024-10-03
Payer: MEDICAID

## 2024-10-03 NOTE — OUTREACH NOTE
CHF Week 1 Survey      Flowsheet Row Responses   [facility] patient discharged from? [facility]   Does the patient have one of the following disease processes/diagnoses(primary or secondary)? CHF   CHF Week 1 attempt successful? Yes   Call start time 1819   Call end time 1856   Discharge diagnosis CHF exacerbation, Atrial fibrillation   Person spoke with today (if not patient) and relationship pt   Meds reviewed with patient/caregiver? Yes   Is the patient having any side effects they believe may be caused by any medication additions or changes? No   Does the patient have all medications ordered at discharge? Yes   Is the patient taking all medications as directed (includes completed medication regime)? Yes   Does the patient have a primary care provider?  Yes   Does the patient have an appointment with their PCP within 7 days of discharge? Yes   Has the patient kept scheduled appointments due by today? Yes   What is the Home health agency?  [facility]    Has home health visited the patient within 72 hours of discharge? Yes   Psychosocial issues? No   Did the patient receive a copy of their discharge instructions? Yes   Nursing interventions Reviewed instructions with patient   What is the patient's perception of their health status since discharge? Same   Nursing interventions Nurse provided patient education   Is the patient able to teach back signs and symptoms of worsening condition? (i.e. weight gain, shortness of air, etc.) Yes   Is the patient/caregiver able to teach back the hierarchy of who to call/visit for symptoms/problems? PCP, Specialist, Home health nurse, Urgent Care, ED, 911 Yes   Is the patient able to teach back Heart Failure Zones? Yes   CHF Nursing Interventions Education provided on various zones   CHF Zone this Call Green Zone   Green Zone Patient reports doing well, No new swelling -  feet, ankles and legs look normal for you, No chest pain, Weight check stable, No new or worsening  shortness of breath   Green Zone Interventions Daily weight check, Meds as directed, Low sodium diet, Follow up visits planned    CHF Week 1 call completed? Yes   Is the patient interested in additional calls from an ambulatory ? No   Would this patient benefit from a Referral to [facility] Social Work? No   Wrap up additional comments Dtr states pt is having some bleeding from open wounds ln buttocks. Dtr thinks pt was thinking blood was coming from vagina. Dtr will speak with HH about wounds. Pt will see GI Oncologist r/t stomach pain.   Call end time [id number]            Uma CAVANAUGH - Registered Nurse

## 2024-10-15 ENCOUNTER — READMISSION MANAGEMENT (OUTPATIENT)
Dept: CALL CENTER | Facility: HOSPITAL | Age: 69
End: 2024-10-15
Payer: MEDICAID

## 2024-10-15 NOTE — OUTREACH NOTE
CHF Week 3 Survey      Flowsheet Row Responses   [facility] patient discharged from? [facility]   Does the patient have one of the following disease processes/diagnoses(primary or secondary)? CHF   Week 3 attempt successful? Yes   Call start time 1027   Call end time 1033   Discharge diagnosis CHF exacerbation, Atrial fibrillation   Person spoke with today (if not patient) and relationship Chelsi, daughter   Meds reviewed with patient/caregiver? Yes   Is the patient having any side effects they believe may be caused by any medication additions or changes? No   Does the patient have all medications ordered at discharge? Yes   Is the patient taking all medications as directed (includes completed medication regime)? Yes   Does the patient have a primary care provider?  Yes   Does the patient have an appointment with their PCP within 7 days of discharge? Yes   Has the patient kept scheduled appointments due by today? Yes   What is the Home health agency?  [facility]   Has home health visited the patient within 72 hours of discharge? Yes   Pulse Ox monitoring Intermittent   Pulse Ox device source Patient   O2 Sat comments 94-98% on 2L O2   O2 Sat: education provided Sat levels   Psychosocial issues? No   Did the patient receive a copy of their discharge instructions? Yes   Nursing interventions Reviewed instructions with patient   What is the patient's perception of their health status since discharge? Improving   Nursing interventions Nurse provided patient education   Is the patient able to teach back signs and symptoms of worsening condition? (i.e. weight gain, shortness of air, etc.) Yes   If the patient is a current smoker, are they able to teach back resources for cessation? Not a smoker   Is the patient/caregiver able to teach back the hierarchy of who to call/visit for symptoms/problems? PCP, Specialist, Home health nurse, Urgent Care, ED, 911 Yes   Is the patient able to teach back Heart Failure Zones? Yes    CHF Zone this Call Green Zone   Green Zone Patient reports doing well, No new or worsening shortness of breath, Physical activity level is normal for you, No new swelling -  feet, ankles and legs look normal for you, Weight check stable, No chest pain   Green Zone Interventions Daily weight check, Low sodium diet, Follow up visits planned, Meds as directed  [weighs as needed]   CHF Week 3 call completed? Yes   Graduated Yes   Is the patient interested in additional calls from an ambulatory ? No   Would this patient benefit from a Referral to [facility] Social Work? No   Call end time [phone]            Little CAVANAUGH - Registered Nurse

## 2024-10-16 ENCOUNTER — LAB (OUTPATIENT)
Dept: LAB | Facility: HOSPITAL | Age: 69
End: 2024-10-16
Payer: MEDICARE

## 2024-10-16 DIAGNOSIS — K86.89 PANCREATIC MASS: Primary | ICD-10-CM

## 2024-10-16 LAB
BASOPHILS # BLD AUTO: 0.05 10*3/MM3 (ref 0–0.2)
BASOPHILS NFR BLD AUTO: 0.6 % (ref 0–1.5)
DEPRECATED RDW RBC AUTO: 45.9 FL (ref 37–54)
EOSINOPHIL # BLD AUTO: 0.14 10*3/MM3 (ref 0–0.4)
EOSINOPHIL NFR BLD AUTO: 1.8 % (ref 0.3–6.2)
ERYTHROCYTE [DISTWIDTH] IN BLOOD BY AUTOMATED COUNT: 13.4 % (ref 12.3–15.4)
HCT VFR BLD AUTO: 31.9 % (ref 34–46.6)
HGB BLD-MCNC: 9.9 G/DL (ref 12–15.9)
LYMPHOCYTES # BLD AUTO: 1.6 10*3/MM3 (ref 0.7–3.1)
LYMPHOCYTES NFR BLD AUTO: 20.7 % (ref 19.6–45.3)
MCH RBC QN AUTO: 30.4 PG (ref 26.6–33)
MCHC RBC AUTO-ENTMCNC: 31 G/DL (ref 31.5–35.7)
MCV RBC AUTO: 97.9 FL (ref 79–97)
MONOCYTES # BLD AUTO: 1.01 10*3/MM3 (ref 0.1–0.9)
MONOCYTES NFR BLD AUTO: 13 % (ref 5–12)
NEUTROPHILS NFR BLD AUTO: 4.94 10*3/MM3 (ref 1.7–7)
NEUTROPHILS NFR BLD AUTO: 63.9 % (ref 42.7–76)
PLATELET # BLD AUTO: 289 10*3/MM3 (ref 140–450)
PMV BLD AUTO: 8.4 FL (ref 6–12)
RBC # BLD AUTO: 3.26 10*6/MM3 (ref 3.77–5.28)
WBC NRBC COR # BLD AUTO: 7.74 10*3/MM3 (ref 3.4–10.8)

## 2024-10-16 PROCEDURE — 36415 COLL VENOUS BLD VENIPUNCTURE: CPT

## 2024-10-16 PROCEDURE — 85025 COMPLETE CBC W/AUTO DIFF WBC: CPT

## 2024-10-23 ENCOUNTER — OFFICE (OUTPATIENT)
Dept: URBAN - METROPOLITAN AREA CLINIC 64 | Facility: CLINIC | Age: 69
End: 2024-10-23
Payer: MEDICAID

## 2024-10-23 ENCOUNTER — OFFICE (OUTPATIENT)
Age: 69
End: 2024-10-23
Payer: MEDICAID

## 2024-10-23 VITALS
DIASTOLIC BLOOD PRESSURE: 75 MMHG | WEIGHT: 179 LBS | SYSTOLIC BLOOD PRESSURE: 111 MMHG | HEART RATE: 107 BPM | HEART RATE: 107 BPM | DIASTOLIC BLOOD PRESSURE: 75 MMHG | WEIGHT: 179 LBS | HEIGHT: 59 IN | SYSTOLIC BLOOD PRESSURE: 111 MMHG | HEIGHT: 59 IN | HEART RATE: 107 BPM | DIASTOLIC BLOOD PRESSURE: 75 MMHG | HEIGHT: 59 IN | DIASTOLIC BLOOD PRESSURE: 75 MMHG | HEIGHT: 59 IN | SYSTOLIC BLOOD PRESSURE: 111 MMHG | WEIGHT: 179 LBS | SYSTOLIC BLOOD PRESSURE: 111 MMHG | HEIGHT: 59 IN | DIASTOLIC BLOOD PRESSURE: 75 MMHG | WEIGHT: 179 LBS | HEART RATE: 107 BPM | SYSTOLIC BLOOD PRESSURE: 111 MMHG | HEART RATE: 107 BPM | WEIGHT: 179 LBS | SYSTOLIC BLOOD PRESSURE: 111 MMHG | HEIGHT: 59 IN | SYSTOLIC BLOOD PRESSURE: 111 MMHG | WEIGHT: 179 LBS | HEART RATE: 107 BPM | WEIGHT: 179 LBS | DIASTOLIC BLOOD PRESSURE: 75 MMHG | DIASTOLIC BLOOD PRESSURE: 75 MMHG | HEIGHT: 59 IN | HEART RATE: 107 BPM

## 2024-10-23 DIAGNOSIS — K86.9 DISEASE OF PANCREAS, UNSPECIFIED: ICD-10-CM

## 2024-10-23 DIAGNOSIS — K30 FUNCTIONAL DYSPEPSIA: ICD-10-CM

## 2024-10-23 DIAGNOSIS — R97.8 OTHER ABNORMAL TUMOR MARKERS: ICD-10-CM

## 2024-10-23 PROCEDURE — 99214 OFFICE O/P EST MOD 30 MIN: CPT | Performed by: INTERNAL MEDICINE

## 2024-10-24 ENCOUNTER — OFFICE VISIT (OUTPATIENT)
Dept: ONCOLOGY | Facility: CLINIC | Age: 69
End: 2024-10-24
Payer: MEDICARE

## 2024-10-24 ENCOUNTER — LAB (OUTPATIENT)
Dept: LAB | Facility: HOSPITAL | Age: 69
End: 2024-10-24
Payer: MEDICARE

## 2024-10-24 VITALS
OXYGEN SATURATION: 94 % | BODY MASS INDEX: 35.68 KG/M2 | HEIGHT: 59 IN | SYSTOLIC BLOOD PRESSURE: 118 MMHG | TEMPERATURE: 98.4 F | RESPIRATION RATE: 20 BRPM | WEIGHT: 177 LBS | HEART RATE: 96 BPM | DIASTOLIC BLOOD PRESSURE: 64 MMHG

## 2024-10-24 DIAGNOSIS — K86.89 PANCREATIC MASS: Primary | ICD-10-CM

## 2024-10-24 DIAGNOSIS — D64.9 ANEMIA, UNSPECIFIED TYPE: ICD-10-CM

## 2024-10-24 PROBLEM — I21.3 ST ELEVATION (STEMI) MYOCARDIAL INFARCTION OF UNSPECIFIED SITE: Status: ACTIVE | Noted: 2024-10-24

## 2024-10-24 PROBLEM — I26.99 PULMONARY EMBOLISM: Status: ACTIVE | Noted: 2024-10-24

## 2024-10-24 PROBLEM — J96.90 RESPIRATORY FAILURE: Status: ACTIVE | Noted: 2024-10-24

## 2024-10-24 PROBLEM — K30 FUNCTIONAL DYSPEPSIA: Status: ACTIVE | Noted: 2024-10-24

## 2024-10-24 LAB
BASOPHILS # BLD AUTO: 0.04 10*3/MM3 (ref 0–0.2)
BASOPHILS NFR BLD AUTO: 0.7 % (ref 0–1.5)
DEPRECATED RDW RBC AUTO: 46.1 FL (ref 37–54)
EOSINOPHIL # BLD AUTO: 0.2 10*3/MM3 (ref 0–0.4)
EOSINOPHIL NFR BLD AUTO: 3.4 % (ref 0.3–6.2)
ERYTHROCYTE [DISTWIDTH] IN BLOOD BY AUTOMATED COUNT: 13.6 % (ref 12.3–15.4)
FERRITIN SERPL-MCNC: 197 NG/ML (ref 13–150)
FOLATE SERPL-MCNC: >20 NG/ML (ref 4.78–24.2)
HAPTOGLOB SERPL-MCNC: 237 MG/DL (ref 30–200)
HCT VFR BLD AUTO: 32.6 % (ref 34–46.6)
HGB BLD-MCNC: 9.9 G/DL (ref 12–15.9)
HOLD SPECIMEN: NORMAL
HOLD SPECIMEN: NORMAL
IRON 24H UR-MRATE: 43 MCG/DL (ref 37–145)
IRON SATN MFR SERPL: 17 % (ref 20–50)
LDH SERPL-CCNC: 239 U/L (ref 135–214)
LYMPHOCYTES # BLD AUTO: 1.4 10*3/MM3 (ref 0.7–3.1)
LYMPHOCYTES NFR BLD AUTO: 24.1 % (ref 19.6–45.3)
MCH RBC QN AUTO: 29.7 PG (ref 26.6–33)
MCHC RBC AUTO-ENTMCNC: 30.4 G/DL (ref 31.5–35.7)
MCV RBC AUTO: 97.9 FL (ref 79–97)
MONOCYTES # BLD AUTO: 0.85 10*3/MM3 (ref 0.1–0.9)
MONOCYTES NFR BLD AUTO: 14.6 % (ref 5–12)
NEUTROPHILS NFR BLD AUTO: 3.32 10*3/MM3 (ref 1.7–7)
NEUTROPHILS NFR BLD AUTO: 57.2 % (ref 42.7–76)
PLATELET # BLD AUTO: 305 10*3/MM3 (ref 140–450)
PMV BLD AUTO: 8.2 FL (ref 6–12)
RBC # BLD AUTO: 3.33 10*6/MM3 (ref 3.77–5.28)
RETICS # AUTO: 0.09 10*6/MM3 (ref 0.02–0.13)
RETICS/RBC NFR AUTO: 2.9 % (ref 0.7–1.9)
TIBC SERPL-MCNC: 247 MCG/DL (ref 298–536)
TRANSFERRIN SERPL-MCNC: 166 MG/DL (ref 200–360)
VIT B12 BLD-MCNC: 409 PG/ML (ref 211–946)
WBC NRBC COR # BLD AUTO: 5.81 10*3/MM3 (ref 3.4–10.8)

## 2024-10-24 PROCEDURE — 82607 VITAMIN B-12: CPT | Performed by: INTERNAL MEDICINE

## 2024-10-24 PROCEDURE — 84466 ASSAY OF TRANSFERRIN: CPT | Performed by: INTERNAL MEDICINE

## 2024-10-24 PROCEDURE — 36415 COLL VENOUS BLD VENIPUNCTURE: CPT

## 2024-10-24 PROCEDURE — 85045 AUTOMATED RETICULOCYTE COUNT: CPT | Performed by: INTERNAL MEDICINE

## 2024-10-24 PROCEDURE — 82728 ASSAY OF FERRITIN: CPT | Performed by: INTERNAL MEDICINE

## 2024-10-24 PROCEDURE — 83540 ASSAY OF IRON: CPT | Performed by: INTERNAL MEDICINE

## 2024-10-24 PROCEDURE — 83615 LACTATE (LD) (LDH) ENZYME: CPT | Performed by: INTERNAL MEDICINE

## 2024-10-24 PROCEDURE — 85025 COMPLETE CBC W/AUTO DIFF WBC: CPT

## 2024-10-24 PROCEDURE — 83010 ASSAY OF HAPTOGLOBIN QUANT: CPT | Performed by: INTERNAL MEDICINE

## 2024-10-24 PROCEDURE — 82746 ASSAY OF FOLIC ACID SERUM: CPT | Performed by: INTERNAL MEDICINE

## 2024-10-24 RX ORDER — METOPROLOL TARTRATE 50 MG
1 TABLET ORAL DAILY
COMMUNITY
Start: 2024-10-14

## 2024-10-24 RX ORDER — OXYCODONE HYDROCHLORIDE 10 MG/1
TABLET ORAL
COMMUNITY

## 2024-10-24 RX ORDER — MELOXICAM 7.5 MG/1
TABLET ORAL
COMMUNITY

## 2024-10-24 RX ORDER — NITROGLYCERIN 0.1MG/HR
PATCH, TRANSDERMAL 24 HOURS TRANSDERMAL
COMMUNITY

## 2024-10-24 RX ORDER — LISINOPRIL 20 MG/1
TABLET ORAL
COMMUNITY

## 2024-10-24 RX ORDER — FEXOFENADINE HCL 180 MG/1
TABLET ORAL
COMMUNITY
Start: 2024-10-14

## 2024-10-24 RX ORDER — CALCIUM CARBONATE 750 MG/1
TABLET, CHEWABLE ORAL
COMMUNITY

## 2024-10-24 NOTE — PROGRESS NOTES
Hematology/Oncology Outpatient Follow Up    PATIENT NAME:Ban Brennan  :1955  MRN: [id number]  PRIMARY CARE PHYSICIAN: Rut Pierce APRN  REFERRING PHYSICIAN: Rut Pierce AP*    Chief Complaint   Patient presents with    Follow-up     Pancreatic mass        HISTORY OF PRESENT ILLNESS:     Ban Brennan is a 69 y.o. female who presented to [facility] on 2024 with complaints of shortness of breath.  Past medical history of hypertension, heart failure, chronic bilateral lower extremity edema, remote history of pulmonary embolism treated with warfarin.  The patient was brought to the ED via EMS.  The patient's daughter reported that the patient does not normally go to the doctor and had not been in the hospital for years.  She was unsure of her full medical history but stated that her legs have been swollen for a long time and that she had not been mobile for approximately 1 year.  She reported that a home health nurse comes to the house and wraps her mother's legs twice a week.  EMS was called due to the worsening shortness of breath and the patient was found to have an EKG showing a wide-complex tachycardia with a heart rate in the 190s.  She was given amiodarone by EMS with rate improvement to the 160s.  Follow-up EKG in the ED at [facility] showed atrial fibrillation with RVR with a heart rate of 145.  Chest x-ray showed fluid overload with bilateral effusions.  The patient had a elevated white blood cell count of 16.8 and 4+ bacteria in her urine.  She was also noted to be hyperkalemic with a potassium of 6.5.  She was diagnosed with cellulitis and a urinary tract infection and initiated on IV antibiotics with cefepime and vancomycin.  She was admitted for further evaluation and treatment.     2024 bilateral venous Doppler ultrasound  -Acute right lower extremity DVT in the common femoral, proximal femoral, mid femoral, distal femoral, and popliteal  -Acute right  lower extremity superficial thrombophlebitis in the saphenofemoral junction and great saphenous  -Acute left lower extremity DVT in the proximal femoral, mid femoral, distal femoral, popliteal, and posterior tibial     7/1/2020 four 2D echocardiogram  -Left ventricular ejection fraction 31-35%  -Left ventricular diastolic dysfunction  -Left atrial cavity dilated  -Moderate aortic valve stenosis  -Moderate to severe mitral valve regurgitation  -Estimated right ventricular systolic pressure from tricuspid regurgitation is normal     7/1/2024 CT chest PE protocol  -No evidence for pulmonary embolus  -Bibasilar airspace disease with right middle lung opacity compatible likely multifocal pneumonia with bilateral pleural effusions  -Cardiomegaly     7/1/2024 CT of the abdomen and pelvis without contrast  -Moderately large right pleural effusion and small left pleural effusion; bibasilar infiltrates suggest atelectasis although associated pneumonia not excluded  -Severe cardiomegaly  -Bilateral flank edema  -Probable fibroid uterus     07/01/24  Hematology/Oncology was consulted for bilateral lower extremity DVT.     7/13/2024 CT of the abdomen and pelvis without contrast  -3 cm x 2.7 cm soft tissue mass involving the body of the pancreas  -Colonic diverticulosis without evidence of diverticulitis  -Focal splenic infarct  -Small bilateral pleural effusions with trace right sided pneumothorax  -Cholelithiasis without evidence of cholecystitis     7/14/2024 CT of the chest without contrast  -Persistent moderate-sized right pleural effusion despite indwelling pleural catheter.  Tiny right pneumothorax.  Improved aeration of the right lung with persistent right middle/lower lobe volume loss and consolidation  -Stable small left pleural effusion with increased left lower lobe volume loss and consolidation  -Stable massive cardiomegaly        8/27/2024 patient had EUS with cyst aspiration and FNP impression is possible cirrhosis  "adenoma versus questionable IPMN patient had both the cystic as well as solid component with microcystic areas most likely suggestive of cirrhosis.  Cyst was aspirated was sent for CEA.  Solid component of the cyst was also separately sampled, mild prominent common bile duct  Cytology showed hypocellular specimen containing a few benign glandular cells insufficient for further diagnosis.  The smears and cell block show very low cellularity.  Patient has not followed up with Dr. Colunga  Past Medical History:   Diagnosis Date    Asthma 7/1/24    Atrial fibrillation 7/1/24    Bilateral leg edema     CHF (congestive heart failure) 7/1/24    Chronic kidney disease 7/1/24    Chronic respiratory failure with hypoxia, on home O2 therapy 07/01/2024    Congenital heart disease 7/1/24    COPD (chronic obstructive pulmonary disease)     Diabetes mellitus 7/1/24    Morbid obesity with BMI of 40.0-44.9, adult 11/08/2010    Myocardial infarction 35 yrs ago    Primary hypertension 03/01/2017    Pulmonary embolism     \"many years ago, was on warfarin\"    Sleep apnea        Past Surgical History:   Procedure Laterality Date    D & C HYSTEROSCOPY N/A 07/22/2024    Procedure: DILATATION AND CURETTAGE HYSTEROSCOPY;  Surgeon: Sosa Newell MD;  Location: [facility] MAIN OR;  Service: Gynecology;  Laterality: N/A;         Current Outpatient Medications:     fexofenadine (ALLEGRA) 180 MG tablet, TAKE 1 TABLET BY MOUTH DAILY. swallow whole with water. DO not take with fruit juices, Disp: , Rfl:     metoprolol tartrate (LOPRESSOR) 50 MG tablet, Take 1 tablet by mouth Daily., Disp: , Rfl:     omeprazole (priLOSEC) 20 MG capsule, Take 1 capsule by mouth Daily., Disp: , Rfl:     apixaban (ELIQUIS) 2.5 MG tablet tablet, Take 1 tablet by mouth 2 (Two) Times a Day., Disp: 60 tablet, Rfl: 0    calcium carbonate EX (TUMS EX) 750 MG chewable tablet, 0, Disp: , Rfl:     dapagliflozin (Farxiga) 5 MG tablet tablet, Take 1 tablet by mouth Daily., " Disp: 30 tablet, Rfl: 0    digoxin (LANOXIN) 125 MCG tablet, Take 1 tablet by mouth Daily., Disp: 30 tablet, Rfl: 0    FeroSul 325 (65 Fe) MG tablet, Take 1 tablet by mouth Daily With Breakfast., Disp: , Rfl:     fexofenadine-pseudoephedrine (ALLEGRA-D 24) 180-240 MG per 24 hr tablet, Take 1 tablet by mouth Daily. Pharmacy script says 180mg, Disp: , Rfl:     furosemide (LASIX) 20 MG tablet, Take 1 tablet by mouth 2 (Two) Times a Day., Disp: 60 tablet, Rfl: 0    GoodSense ClearLax 17 GM/SCOOP powder, Take 17 g by mouth Daily., Disp: , Rfl:     HYDROcodone-acetaminophen (NORCO)  MG per tablet, Take 1 tablet by mouth Every 6 (Six) Hours As Needed for Mild Pain, Moderate Pain or Severe Pain., Disp: , Rfl:     hydrocortisone-bacitracin-zinc oxide-nystatin (MAGIC BARRIER), Apply 1 Application topically to the appropriate area as directed 3 times a day., Disp: 40 g, Rfl: 0    levothyroxine (SYNTHROID, LEVOTHROID) 25 MCG tablet, Take 1 tablet by mouth Every Morning., Disp: , Rfl:     lisinopril (PRINIVIL,ZESTRIL) 20 MG tablet, 30, Disp: , Rfl:     meloxicam (MOBIC) 7.5 MG tablet, 30, Disp: , Rfl:     metoprolol succinate XL (TOPROL-XL) 50 MG 24 hr tablet, Take 1 tablet by mouth Every 12 (Twelve) Hours., Disp: 30 tablet, Rfl: 0    mineral oil-hydrophilic petrolatum (AQUAPHOR) ointment, Apply 1 Application topically to the appropriate area as directed As Needed (3 times per day)., Disp: , Rfl:     montelukast (SINGULAIR) 10 MG tablet, Take 1 tablet by mouth every night at bedtime., Disp: , Rfl:     nitroglycerin (NITRODUR) 0.1 MG/HR patch, 0, Disp: , Rfl:     oxybutynin XL (DITROPAN-XL) 10 MG 24 hr tablet, Take 2 tablets by mouth Daily. Pharmacy order reads Oxybutynin Cl Er, Disp: , Rfl:     oxyCODONE (ROXICODONE) 10 MG tablet, 90, Disp: , Rfl:     potassium chloride (MICRO-K) 10 MEQ CR capsule, 1 capsule 2 (Two) Times a Day., Disp: , Rfl:     sennosides-docusate (PERICOLACE) 8.6-50 MG per tablet, Take 2 tablets by  mouth 2 (Two) Times a Day As Needed for Constipation., Disp: 30 tablet, Rfl: 0    vitamin D (ERGOCALCIFEROL) 1.25 MG (03369 UT) capsule capsule, Take 1 capsule by mouth 2 (Two) Times a Week. Monday and Thursday., Disp: , Rfl:     No Known Allergies    Family History   Problem Relation Age of Onset    Heart attack Mother     Hypertension Mother     Heart attack Father     Hypertension Father     Asthma Sister     Asthma Sister     Asthma Brother     Heart attack Brother     Hypertension Brother     Hypertension Brother     Heart disease Maternal Aunt     Heart failure Maternal Aunt     Heart failure Maternal Aunt        Cancer-related family history is not on file.    Social History     Tobacco Use    Smoking status: Never     Passive exposure: Never    Smokeless tobacco: Never   Vaping Use    Vaping status: Never Used   Substance Use Topics    Alcohol use: Never    Drug use: Never       I have reviewed and confirmed the accuracy of the patient's history: Chief complaint, HPI, ROS, and Subjective as entered by the MA/LPN/RN. Prachi Zepeda MD 10/24/24       SUBJECTIVE:     Patient is here today for follow-up    REVIEW OF SYSTEMS:  Review of Systems   Constitutional:  Positive for fatigue. Negative for chills and fever.   HENT:  Negative for congestion, drooling, ear discharge, rhinorrhea, sinus pressure and tinnitus.    Eyes:  Negative for photophobia, pain and discharge.   Respiratory:  Negative for apnea, choking and stridor.    Cardiovascular:  Negative for palpitations.   Gastrointestinal:  Negative for abdominal distention, abdominal pain and anal bleeding.   Endocrine: Negative for polydipsia and polyphagia.   Genitourinary:  Negative for decreased urine volume, flank pain and genital sores.   Musculoskeletal:  Negative for gait problem, neck pain and neck stiffness.   Skin:  Negative for color change, rash and wound.   Neurological:  Negative for tremors, seizures, syncope, facial asymmetry and speech  "difficulty.   Hematological:  Negative for adenopathy.   Psychiatric/Behavioral:  Negative for agitation, confusion, hallucinations and self-injury. The patient is not hyperactive.        OBJECTIVE:    Vitals:    10/24/24 1401   BP: 118/64   Pulse: 96   Resp: 20   Temp: 98.4 °F (36.9 °C)   TempSrc: Infrared   SpO2: 94%   Weight: 80.3 kg (177 lb)   Height: 149.9 cm (59\")   PainSc:   9   PainLoc: Abdomen     Body mass index is 35.75 kg/m².    ECOG  (1) Restricted in physically strenuous activity, ambulatory and able to do work of light nature    Physical Exam  Vitals and nursing note reviewed.   Constitutional:       General: She is not in acute distress.     Appearance: She is not diaphoretic.   HENT:      Head: Normocephalic and atraumatic.   Eyes:      General: No scleral icterus.        Right eye: No discharge.         Left eye: No discharge.      Conjunctiva/sclera: Conjunctivae normal.   Neck:      Thyroid: No thyromegaly.   Cardiovascular:      Rate and Rhythm: Normal rate and regular rhythm.      Heart sounds: Normal heart sounds.      No friction rub. No gallop.   Pulmonary:      Effort: Pulmonary effort is normal. No respiratory distress.      Breath sounds: No stridor. No wheezing.   Abdominal:      General: Bowel sounds are normal.      Palpations: Abdomen is soft. There is no mass.      Tenderness: There is no abdominal tenderness. There is no guarding or rebound.   Musculoskeletal:         General: No tenderness. Normal range of motion.      Cervical back: Normal range of motion and neck supple.   Lymphadenopathy:      Cervical: No cervical adenopathy.   Skin:     General: Skin is warm.      Findings: No erythema or rash.   Neurological:      Mental Status: She is alert and oriented to person, place, and time.      Motor: No abnormal muscle tone.   Psychiatric:         Behavior: Behavior normal.         RECENT LABS  WBC   Date Value Ref Range Status   10/24/2024 5.81 3.40 - 10.80 10*3/mm3 Final     RBC "   Date Value Ref Range Status   10/24/2024 3.33 (L) 3.77 - 5.28 10*6/mm3 Final     Hemoglobin   Date Value Ref Range Status   10/24/2024 9.9 (L) 12.0 - 15.9 g/dL Final     Hematocrit   Date Value Ref Range Status   10/24/2024 32.6 (L) 34.0 - 46.6 % Final     MCV   Date Value Ref Range Status   10/24/2024 97.9 (H) 79.0 - 97.0 fL Final     MCH   Date Value Ref Range Status   10/24/2024 29.7 26.6 - 33.0 pg Final     MCHC   Date Value Ref Range Status   10/24/2024 30.4 (L) 31.5 - 35.7 g/dL Final     RDW   Date Value Ref Range Status   10/24/2024 13.6 12.3 - 15.4 % Final     RDW-SD   Date Value Ref Range Status   10/24/2024 46.1 37.0 - 54.0 fl Final     MPV   Date Value Ref Range Status   10/24/2024 8.2 6.0 - 12.0 fL Final     Platelets   Date Value Ref Range Status   10/24/2024 305 140 - 450 10*3/mm3 Final     Neutrophil %   Date Value Ref Range Status   10/24/2024 57.2 42.7 - 76.0 % Final     Lymphocyte %   Date Value Ref Range Status   10/24/2024 24.1 19.6 - 45.3 % Final     Monocyte %   Date Value Ref Range Status   10/24/2024 14.6 (H) 5.0 - 12.0 % Final     Eosinophil %   Date Value Ref Range Status   10/24/2024 3.4 0.3 - 6.2 % Final     Basophil %   Date Value Ref Range Status   10/24/2024 0.7 0.0 - 1.5 % Final     Immature Grans %   Date Value Ref Range Status   09/25/2024 0.5 0.0 - 0.5 % Final     Neutrophils, Absolute   Date Value Ref Range Status   10/24/2024 3.32 1.70 - 7.00 10*3/mm3 Final     Lymphocytes, Absolute   Date Value Ref Range Status   10/24/2024 1.40 0.70 - 3.10 10*3/mm3 Final     Monocytes, Absolute   Date Value Ref Range Status   10/24/2024 0.85 0.10 - 0.90 10*3/mm3 Final     Eosinophils, Absolute   Date Value Ref Range Status   10/24/2024 0.20 0.00 - 0.40 10*3/mm3 Final     Basophils, Absolute   Date Value Ref Range Status   10/24/2024 0.04 0.00 - 0.20 10*3/mm3 Final     Immature Grans, Absolute   Date Value Ref Range Status   09/25/2024 0.04 0.00 - 0.05 10*3/mm3 Final     nRBC   Date Value Ref  Range Status   09/25/2024 0.0 0.0 - 0.2 /100 WBC Final       Lab Results   Component Value Date    GLUCOSE 119 (H) 09/25/2024    BUN 16 09/25/2024    CREATININE 0.78 09/25/2024    BCR 20.5 09/25/2024    K 3.8 09/25/2024    CO2 37.1 (H) 09/25/2024    CALCIUM 9.2 09/25/2024    ALBUMIN 2.8 (L) 09/23/2024    LABIL2 1.1 06/19/2018    AST 34 (H) 09/23/2024    ALT 13 09/23/2024         Assessment & Plan     Pancreatic mass  - CBC & Differential      ASSESSMENT:    Pancreatic mass:  -3 x 2.7 cm soft tissue mass involving the body of the pancreas, This was not reported on prior CT scan from 7/1/24.  -CA 19-9 levels mildly elevated, chromogranin levels pending.  -MRI abdomen reviewed: Multiseptated pancreatic lesion in the pancreatic neck measuring 2.7 x 2.6 cm, concerning for malignancy. Hypervascular lesion on Liver is less likely to be metastatic.  -GI team on board, awaiting further recommendations. She will likely benefit from ERCP/EUS with FNA for tissue diagnosis. Patient is agreeable to it.  -per GI team, likely plan for outpatient ERCP/EUS.  -August 2024: EUS cyst aspiration: Cytology was negative for malignancy: Per Dr. Colunga.  Patient to follow-up with him     Bilateral lower extremity DVT:  Remote history of pulmonary embolism  Splenic Infraction:  -Extensive acute right and left lower extremity DVT noted on presentation  -patient's chronic deconditioning and immobility may have been risk factor for DVT.   -Negative CTA Chest for PE.  -Reportedly treated with warfarin for prior history of PE  -patient was initially started on Lovenox and later transitioned to Eliquis.  She completed Eliquis 10mg BID X 1 week followed by 5mg BID thereafter.  -Will need lifelong anticoagulation in my view due to persistent risk factors and extensive DVT as well as history of prior pulmonary embolism.  -CT Abdomen/Pelvis on 7/13 showed splenic infract, likely provoked by pancreatic pathology vs acute illness.   -factor V Leiden and  prothrombin gene mutation reported negative. Will defer remainder of thrombophilia workup as it does not impact the decision making around anticoagulation.     -Patient is back on Eliquis 2.5 mg twice a day    -8/24/2024: Patient had CT scan of the abdomen and pelvis without contrast no abdominal wall mass was noted edema was noted, vascular shunt noted between the portal venous and hepatic venous branches.  There is multiseptated cystic lesion in the neck of the pancreas measuring 2.3 x 2.6 cm.  Overall the lesion appears stable    She followed up with Dr. Colunga October 2024 she was asked to come back in 3 months        Uterine mass     -Transvaginal ultrasound suggestive of presence of a large subserosal uterine fundal fibroid.   -CT abdomen/pelvis showed presence of a lobulated mass protruding from the uterine fundus measuring approximately 8.4 x 5.9 cm  -this is concerning for fibroid vs malignancy.  Will recommend GYN Evaluation for the same as outpatient.  -No vaginal bleeding reported.  -She saw gynecologist Dr Sosa Newell and had  D/C no evidence of malignancy.  Will follow-up with Dr. Sosa Newell     Thrombocytopenia  - 119,000 at admission, jcaqueline at 85K, back to normal now. Baseline WNL  -Acute drop likely secondary to sepsis, rhinovirus  -Hemolysis labs reported negative.   This has resolved        Anemia    Will work this up further 10/24/2024     Sepsis  -UA suggestive of Acute cystitis, Urine culture consistent with GNR UTI.  -Respiratory panel positive for Rhinovirus.  -patient has completed antibiotic therapy.     Resolved     Atrial fibrillation with RVR/paroxysmal atrial fibrillation  Acute on chronic systolic heart failure with diastolic dysfunction,   moderate aortic stenosis, severe mitral valve regurgitation  -On amiodarone drip, diuresis.  Management per cardiology.   Likely candidate for lifelong anticoagulation given Afib and extensive DVT.  Patient is now on Eliquis 2.5 mg twice a day   10/24/24     Acute kidney injury/hyperkalemia  Resolved.        Pleural Effusion:  Right sided hydropneumothorax:  Likely secondary to CHF and suspected pneumonia.  -patient is status post Chest tube placement. Ongoing drainage.  -management per primary.        Follow-up 4 weeks

## 2024-12-02 NOTE — PROGRESS NOTES
Hematology/Oncology Outpatient Follow Up    PATIENT NAME:Ban Brennan  :1955  MRN: [id number]  PRIMARY CARE PHYSICIAN: Rut Pierce APRN  REFERRING PHYSICIAN: Rut Pierce AP*    Chief Complaint   Patient presents with    Follow-up     Pancreatic mass          HISTORY OF PRESENT ILLNESS:     Ban Brennan is a 69 y.o. female who presented to [facility] on 2024 with complaints of shortness of breath.  Past medical history of hypertension, heart failure, chronic bilateral lower extremity edema, remote history of pulmonary embolism treated with warfarin.  The patient was brought to the ED via EMS.  The patient's daughter reported that the patient does not normally go to the doctor and had not been in the hospital for years.  She was unsure of her full medical history but stated that her legs have been swollen for a long time and that she had not been mobile for approximately 1 year.  She reported that a home health nurse comes to the house and wraps her mother's legs twice a week.  EMS was called due to the worsening shortness of breath and the patient was found to have an EKG showing a wide-complex tachycardia with a heart rate in the 190s.  She was given amiodarone by EMS with rate improvement to the 160s.  Follow-up EKG in the ED at [facility] showed atrial fibrillation with RVR with a heart rate of 145.  Chest x-ray showed fluid overload with bilateral effusions.  The patient had a elevated white blood cell count of 16.8 and 4+ bacteria in her urine.  She was also noted to be hyperkalemic with a potassium of 6.5.  She was diagnosed with cellulitis and a urinary tract infection and initiated on IV antibiotics with cefepime and vancomycin.  She was admitted for further evaluation and treatment.     2024 bilateral venous Doppler ultrasound  -Acute right lower extremity DVT in the common femoral, proximal femoral, mid femoral, distal femoral, and popliteal  -Acute right  lower extremity superficial thrombophlebitis in the saphenofemoral junction and great saphenous  -Acute left lower extremity DVT in the proximal femoral, mid femoral, distal femoral, popliteal, and posterior tibial     7/1/2020 four 2D echocardiogram  -Left ventricular ejection fraction 31-35%  -Left ventricular diastolic dysfunction  -Left atrial cavity dilated  -Moderate aortic valve stenosis  -Moderate to severe mitral valve regurgitation  -Estimated right ventricular systolic pressure from tricuspid regurgitation is normal     7/1/2024 CT chest PE protocol  -No evidence for pulmonary embolus  -Bibasilar airspace disease with right middle lung opacity compatible likely multifocal pneumonia with bilateral pleural effusions  -Cardiomegaly     7/1/2024 CT of the abdomen and pelvis without contrast  -Moderately large right pleural effusion and small left pleural effusion; bibasilar infiltrates suggest atelectasis although associated pneumonia not excluded  -Severe cardiomegaly  -Bilateral flank edema  -Probable fibroid uterus     07/01/24  Hematology/Oncology was consulted for bilateral lower extremity DVT.     7/13/2024 CT of the abdomen and pelvis without contrast  -3 cm x 2.7 cm soft tissue mass involving the body of the pancreas  -Colonic diverticulosis without evidence of diverticulitis  -Focal splenic infarct  -Small bilateral pleural effusions with trace right sided pneumothorax  -Cholelithiasis without evidence of cholecystitis     7/14/2024 CT of the chest without contrast  -Persistent moderate-sized right pleural effusion despite indwelling pleural catheter.  Tiny right pneumothorax.  Improved aeration of the right lung with persistent right middle/lower lobe volume loss and consolidation  -Stable small left pleural effusion with increased left lower lobe volume loss and consolidation  -Stable massive cardiomegaly        8/27/2024 patient had EUS with cyst aspiration and FNP impression is possible cirrhosis  "adenoma versus questionable IPMN patient had both the cystic as well as solid component with microcystic areas most likely suggestive of cirrhosis.  Cyst was aspirated was sent for CEA.  Solid component of the cyst was also separately sampled, mild prominent common bile duct  Cytology showed hypocellular specimen containing a few benign glandular cells insufficient for further diagnosis.  The smears and cell block show very low cellularity.  Patient has not followed up with Dr. Colunga  Past Medical History:   Diagnosis Date    Asthma 7/1/24    Atrial fibrillation 7/1/24    Bilateral leg edema     CHF (congestive heart failure) 7/1/24    Chronic kidney disease 7/1/24    Chronic respiratory failure with hypoxia, on home O2 therapy 07/01/2024    Congenital heart disease 7/1/24    COPD (chronic obstructive pulmonary disease)     Diabetes mellitus 7/1/24    Morbid obesity with BMI of 40.0-44.9, adult 11/08/2010    Myocardial infarction 35 yrs ago    Primary hypertension 03/01/2017    Pulmonary embolism     \"many years ago, was on warfarin\"    Sleep apnea        Past Surgical History:   Procedure Laterality Date    D & C HYSTEROSCOPY N/A 07/22/2024    Procedure: DILATATION AND CURETTAGE HYSTEROSCOPY;  Surgeon: Sosa Newell MD;  Location: [facility] MAIN OR;  Service: Gynecology;  Laterality: N/A;         Current Outpatient Medications:     furosemide (LASIX) 20 MG tablet, Take 1 tablet by mouth 2 (Two) Times a Day., Disp: 60 tablet, Rfl: 0    Morphine (MS CONTIN) 15 MG 12 hr tablet, Take 1 tablet by mouth Every 12 (Twelve) Hours., Disp: , Rfl:     apixaban (ELIQUIS) 2.5 MG tablet tablet, Take 1 tablet by mouth 2 (Two) Times a Day., Disp: 60 tablet, Rfl: 0    calcium carbonate EX (TUMS EX) 750 MG chewable tablet, 0, Disp: , Rfl:     clindamycin (CLEOCIN) 300 MG capsule, TAKE ONE CAPSULE BY MOUTH EVERY 8 HOURS FOR 14 DAYS, Disp: , Rfl:     dapagliflozin (Farxiga) 5 MG tablet tablet, Take 1 tablet by mouth Daily., " Disp: 30 tablet, Rfl: 0    digoxin (LANOXIN) 125 MCG tablet, Take 1 tablet by mouth Daily., Disp: 30 tablet, Rfl: 0    FeroSul 325 (65 Fe) MG tablet, Take 1 tablet by mouth Daily With Breakfast., Disp: , Rfl:     fexofenadine (ALLEGRA) 180 MG tablet, TAKE 1 TABLET BY MOUTH DAILY. swallow whole with water. DO not take with fruit juices, Disp: , Rfl:     fexofenadine-pseudoephedrine (ALLEGRA-D 24) 180-240 MG per 24 hr tablet, Take 1 tablet by mouth Daily. Pharmacy script says 180mg, Disp: , Rfl:     GoodSense ClearLax 17 GM/SCOOP powder, Take 17 g by mouth Daily., Disp: , Rfl:     HYDROcodone-acetaminophen (NORCO)  MG per tablet, Take 1 tablet by mouth Every 6 (Six) Hours As Needed for Mild Pain, Moderate Pain or Severe Pain., Disp: , Rfl:     hydrocortisone-bacitracin-zinc oxide-nystatin (MAGIC BARRIER), Apply 1 Application topically to the appropriate area as directed 3 times a day., Disp: 40 g, Rfl: 0    levothyroxine (SYNTHROID, LEVOTHROID) 25 MCG tablet, Take 1 tablet by mouth Every Morning., Disp: , Rfl:     lisinopril (PRINIVIL,ZESTRIL) 20 MG tablet, 30, Disp: , Rfl:     meloxicam (MOBIC) 7.5 MG tablet, 30, Disp: , Rfl:     metoprolol succinate XL (TOPROL-XL) 50 MG 24 hr tablet, Take 1 tablet by mouth Every 12 (Twelve) Hours., Disp: 30 tablet, Rfl: 0    metoprolol tartrate (LOPRESSOR) 50 MG tablet, Take 1 tablet by mouth Daily., Disp: , Rfl:     mineral oil-hydrophilic petrolatum (AQUAPHOR) ointment, Apply 1 Application topically to the appropriate area as directed As Needed (3 times per day)., Disp: , Rfl:     montelukast (SINGULAIR) 10 MG tablet, Take 1 tablet by mouth every night at bedtime., Disp: , Rfl:     nitroglycerin (NITRODUR) 0.1 MG/HR patch, 0, Disp: , Rfl:     omeprazole (priLOSEC) 20 MG capsule, Take 1 capsule by mouth Daily., Disp: , Rfl:     oxybutynin XL (DITROPAN-XL) 10 MG 24 hr tablet, Take 2 tablets by mouth Daily. Pharmacy order reads Oxybutynin Cl Er, Disp: , Rfl:     oxyCODONE  (ROXICODONE) 10 MG tablet, 90, Disp: , Rfl:     potassium chloride (MICRO-K) 10 MEQ CR capsule, 1 capsule 2 (Two) Times a Day., Disp: , Rfl:     sennosides-docusate (PERICOLACE) 8.6-50 MG per tablet, Take 2 tablets by mouth 2 (Two) Times a Day As Needed for Constipation., Disp: 30 tablet, Rfl: 0    sulfamethoxazole-trimethoprim (BACTRIM DS,SEPTRA DS) 800-160 MG per tablet, Take 1 tablet by mouth Every 12 (Twelve) Hours., Disp: , Rfl:     vitamin D (ERGOCALCIFEROL) 1.25 MG (71371 UT) capsule capsule, Take 1 capsule by mouth 2 (Two) Times a Week. Monday and Thursday., Disp: , Rfl:     No Known Allergies    Family History   Problem Relation Age of Onset    Heart attack Mother     Hypertension Mother     Heart attack Father     Hypertension Father     Asthma Sister     Asthma Sister     Asthma Brother     Heart attack Brother     Hypertension Brother     Hypertension Brother     Heart disease Maternal Aunt     Heart failure Maternal Aunt     Heart failure Maternal Aunt        Cancer-related family history is not on file.    Social History     Tobacco Use    Smoking status: Never     Passive exposure: Never    Smokeless tobacco: Never   Vaping Use    Vaping status: Never Used   Substance Use Topics    Alcohol use: Never    Drug use: Never         I have reviewed and confirmed the accuracy of the patient's history: Chief complaint, HPI, ROS, and Subjective as entered by the MA/LPN/RN. Prachi Zepeda MD 12/06/24          SUBJECTIVE:     Patient is here today for follow-up.  Denies any new issues today    REVIEW OF SYSTEMS:  Review of Systems   Constitutional:  Positive for fatigue. Negative for chills and fever.   HENT:  Negative for congestion, drooling, ear discharge, rhinorrhea, sinus pressure and tinnitus.    Eyes:  Negative for photophobia, pain and discharge.   Respiratory:  Negative for apnea, choking and stridor.    Cardiovascular:  Negative for palpitations.   Gastrointestinal:  Negative for abdominal  "distention, abdominal pain and anal bleeding.   Endocrine: Negative for polydipsia and polyphagia.   Genitourinary:  Negative for decreased urine volume, flank pain and genital sores.   Musculoskeletal:  Negative for gait problem, neck pain and neck stiffness.   Skin:  Negative for color change, rash and wound.   Neurological:  Negative for tremors, seizures, syncope, facial asymmetry and speech difficulty.   Hematological:  Negative for adenopathy.   Psychiatric/Behavioral:  Negative for agitation, confusion, hallucinations and self-injury. The patient is not hyperactive.        OBJECTIVE:    Vitals:    12/06/24 1325   BP: 149/85   Pulse: 69   SpO2: 99%   Weight: 70.8 kg (156 lb)   Height: 149.9 cm (59\")   PainSc:   8   PainLoc: Leg       Body mass index is 31.51 kg/m².    ECOG  (1) Restricted in physically strenuous activity, ambulatory and able to do work of light nature    Physical Exam  Vitals and nursing note reviewed.   Constitutional:       General: She is not in acute distress.     Appearance: She is not diaphoretic.   HENT:      Head: Normocephalic and atraumatic.   Eyes:      General: No scleral icterus.        Right eye: No discharge.         Left eye: No discharge.      Conjunctiva/sclera: Conjunctivae normal.   Neck:      Thyroid: No thyromegaly.   Cardiovascular:      Rate and Rhythm: Normal rate and regular rhythm.      Heart sounds: Normal heart sounds.      No friction rub. No gallop.   Pulmonary:      Effort: Pulmonary effort is normal. No respiratory distress.      Breath sounds: No stridor. No wheezing.   Abdominal:      General: Bowel sounds are normal.      Palpations: Abdomen is soft. There is no mass.      Tenderness: There is no abdominal tenderness. There is no guarding or rebound.   Musculoskeletal:         General: No tenderness. Normal range of motion.      Cervical back: Normal range of motion and neck supple.   Lymphadenopathy:      Cervical: No cervical adenopathy.   Skin:     " General: Skin is warm.      Findings: No erythema or rash.   Neurological:      Mental Status: She is alert and oriented to person, place, and time.      Motor: No abnormal muscle tone.   Psychiatric:         Behavior: Behavior normal.     I have reexamined the patient and the results are consistent with the previously documented exam. Prachi Zepeda MD      RECENT LABS  WBC   Date Value Ref Range Status   12/06/2024 5.38 3.40 - 10.80 10*3/mm3 Final     RBC   Date Value Ref Range Status   12/06/2024 3.94 3.77 - 5.28 10*6/mm3 Final     Hemoglobin   Date Value Ref Range Status   12/06/2024 11.5 (L) 12.0 - 15.9 g/dL Final     Hematocrit   Date Value Ref Range Status   12/06/2024 37.7 34.0 - 46.6 % Final     MCV   Date Value Ref Range Status   12/06/2024 95.7 79.0 - 97.0 fL Final     MCH   Date Value Ref Range Status   12/06/2024 29.2 26.6 - 33.0 pg Final     MCHC   Date Value Ref Range Status   12/06/2024 30.5 (L) 31.5 - 35.7 g/dL Final     RDW   Date Value Ref Range Status   12/06/2024 13.1 12.3 - 15.4 % Final     RDW-SD   Date Value Ref Range Status   12/06/2024 43.8 37.0 - 54.0 fl Final     MPV   Date Value Ref Range Status   12/06/2024 9.4 6.0 - 12.0 fL Final     Platelets   Date Value Ref Range Status   12/06/2024 210 140 - 450 10*3/mm3 Final     Neutrophil %   Date Value Ref Range Status   12/06/2024 56.0 42.7 - 76.0 % Final     Lymphocyte %   Date Value Ref Range Status   12/06/2024 28.4 19.6 - 45.3 % Final     Monocyte %   Date Value Ref Range Status   12/06/2024 11.3 5.0 - 12.0 % Final     Eosinophil %   Date Value Ref Range Status   12/06/2024 3.7 0.3 - 6.2 % Final     Basophil %   Date Value Ref Range Status   12/06/2024 0.6 0.0 - 1.5 % Final     Immature Grans %   Date Value Ref Range Status   12/05/2024 0.2 0.0 - 0.5 % Final     Neutrophils, Absolute   Date Value Ref Range Status   12/06/2024 3.01 1.70 - 7.00 10*3/mm3 Final     Lymphocytes, Absolute   Date Value Ref Range Status   12/06/2024 1.53  0.70 - 3.10 10*3/mm3 Final     Monocytes, Absolute   Date Value Ref Range Status   12/06/2024 0.61 0.10 - 0.90 10*3/mm3 Final     Eosinophils, Absolute   Date Value Ref Range Status   12/06/2024 0.20 0.00 - 0.40 10*3/mm3 Final     Basophils, Absolute   Date Value Ref Range Status   12/06/2024 0.03 0.00 - 0.20 10*3/mm3 Final     Immature Grans, Absolute   Date Value Ref Range Status   12/05/2024 0.01 0.00 - 0.05 10*3/mm3 Final     nRBC   Date Value Ref Range Status   12/05/2024 0.0 0.0 - 0.2 /100 WBC Final       Lab Results   Component Value Date    GLUCOSE 119 (H) 09/25/2024    BUN 16 09/25/2024    CREATININE 0.78 09/25/2024    BCR 20.5 09/25/2024    K 3.8 09/25/2024    CO2 37.1 (H) 09/25/2024    CALCIUM 9.2 09/25/2024    ALBUMIN 2.8 (L) 09/23/2024    LABIL2 1.1 06/19/2018    AST 34 (H) 09/23/2024    ALT 13 09/23/2024         Assessment & Plan     Pancreatic mass  - CBC & Differential    Anemia, unspecified type  - CBC & Differential        ASSESSMENT:    Pancreatic mass:  -3 x 2.7 cm soft tissue mass involving the body of the pancreas, This was not reported on prior CT scan from 7/1/24.  -CA 19-9 levels mildly elevated, chromogranin levels pending.  -MRI abdomen reviewed: Multiseptated pancreatic lesion in the pancreatic neck measuring 2.7 x 2.6 cm, concerning for malignancy. Hypervascular lesion on Liver is less likely to be metastatic.  -GI team on board, awaiting further recommendations. She will likely benefit from ERCP/EUS with FNA for tissue diagnosis. Patient is agreeable to it.  -per GI team, likely plan for outpatient ERCP/EUS.  -August 2024: EUS cyst aspiration: Cytology was negative for malignancy: Per Dr. Colunga.  Patient to follow-up with him     Bilateral lower extremity DVT:  Remote history of pulmonary embolism  Splenic Infraction:  -Extensive acute right and left lower extremity DVT noted on presentation  -patient's chronic deconditioning and immobility may have been risk factor for DVT.    -Negative CTA Chest for PE.  -Reportedly treated with warfarin for prior history of PE  -patient was initially started on Lovenox and later transitioned to Eliquis.  She completed Eliquis 10mg BID X 1 week followed by 5mg BID thereafter.  -Will need lifelong anticoagulation in my view due to persistent risk factors and extensive DVT as well as history of prior pulmonary embolism.  -CT Abdomen/Pelvis on 7/13 showed splenic infract, likely provoked by pancreatic pathology vs acute illness.   -factor V Leiden and prothrombin gene mutation reported negative. Will defer remainder of thrombophilia workup as it does not impact the decision making around anticoagulation.     -Patient is back on Eliquis 2.5 mg twice a day.  Reviewed with patient and caregiver who is present today    -8/24/2024: Patient had CT scan of the abdomen and pelvis without contrast no abdominal wall mass was noted edema was noted, vascular shunt noted between the portal venous and hepatic venous branches.  There is multiseptated cystic lesion in the neck of the pancreas measuring 2.3 x 2.6 cm.  Overall the lesion appears stable    She followed up with Dr. Colunga October 2024 she was asked to come back in 3 months.  She had an appointment in January 2025 that was recently rescheduled I have asked patient to follow-up with I office        Uterine mass     -Transvaginal ultrasound suggestive of presence of a large subserosal uterine fundal fibroid.   -CT abdomen/pelvis showed presence of a lobulated mass protruding from the uterine fundus measuring approximately 8.4 x 5.9 cm  -this is concerning for fibroid vs malignancy.  Will recommend GYN Evaluation for the same as outpatient.  -No vaginal bleeding reported.  -She saw gynecologist Dr Sosa Newell and had  D/C no evidence of malignancy.  Will follow-up with Dr. Sosa Newell.  Patient will schedule follow-up     Thrombocytopenia  - 119,000 at admission, jacqueline at 85K, back to normal now. Baseline  WNL  -Acute drop likely secondary to sepsis, rhinovirus  -Hemolysis labs reported negative.   This has resolved        Anemia    Will work this up further 10/24/2024  Anemia workup reticulocyte count was 2.9, ferritin was 197, iron saturation was low at 17, , folate was more than 20, B12 was 409 normal and haptoglobin was elevated to 237    She will continue iron with multivitamins and repeat CBC when she comes back in 3 months which will be due March 2025     Sepsis    -UA suggestive of Acute cystitis, Urine culture consistent with GNR UTI.  -Respiratory panel positive for Rhinovirus.  -patient has completed antibiotic therapy.     Resolved     Atrial fibrillation with RVR/paroxysmal atrial fibrillation  Acute on chronic systolic heart failure with diastolic dysfunction,   moderate aortic stenosis, severe mitral valve regurgitation  -On amiodarone drip, diuresis.  Management per cardiology.   Likely candidate for lifelong anticoagulation given Afib and extensive DVT.  Patient is now on Eliquis 2.5 mg twice a day  10/24/24     Acute kidney injury/hyperkalemia  Resolved.        Pleural Effusion:  Right sided hydropneumothorax:  Likely secondary to CHF and suspected pneumonia.  -patient is status post Chest tube placement. Ongoing drainage.  -management per primary.        Follow-up March 2025              Electronically signed by Prachi Zepeda MD, 12/06/24, 2:27 PM EST.

## 2024-12-05 ENCOUNTER — LAB REQUISITION (OUTPATIENT)
Dept: LAB | Facility: HOSPITAL | Age: 69
End: 2024-12-05
Payer: MEDICARE

## 2024-12-05 DIAGNOSIS — L03.90 CELLULITIS, UNSPECIFIED: ICD-10-CM

## 2024-12-05 LAB
BASOPHILS # BLD AUTO: 0.05 10*3/MM3 (ref 0–0.2)
BASOPHILS NFR BLD AUTO: 0.9 % (ref 0–1.5)
DEPRECATED RDW RBC AUTO: 42.4 FL (ref 37–54)
DIGOXIN SERPL-MCNC: 0.8 NG/ML (ref 0.6–1.2)
EOSINOPHIL # BLD AUTO: 0.18 10*3/MM3 (ref 0–0.4)
EOSINOPHIL NFR BLD AUTO: 3.3 % (ref 0.3–6.2)
ERYTHROCYTE [DISTWIDTH] IN BLOOD BY AUTOMATED COUNT: 13 % (ref 12.3–15.4)
HCT VFR BLD AUTO: 31.4 % (ref 34–46.6)
HGB BLD-MCNC: 10.5 G/DL (ref 12–15.9)
IMM GRANULOCYTES # BLD AUTO: 0.01 10*3/MM3 (ref 0–0.05)
IMM GRANULOCYTES NFR BLD AUTO: 0.2 % (ref 0–0.5)
LYMPHOCYTES # BLD AUTO: 1.87 10*3/MM3 (ref 0.7–3.1)
LYMPHOCYTES NFR BLD AUTO: 34.8 % (ref 19.6–45.3)
MCH RBC QN AUTO: 29.5 PG (ref 26.6–33)
MCHC RBC AUTO-ENTMCNC: 33.4 G/DL (ref 31.5–35.7)
MCV RBC AUTO: 88.2 FL (ref 79–97)
MONOCYTES # BLD AUTO: 0.58 10*3/MM3 (ref 0.1–0.9)
MONOCYTES NFR BLD AUTO: 10.8 % (ref 5–12)
NEUTROPHILS NFR BLD AUTO: 2.69 10*3/MM3 (ref 1.7–7)
NEUTROPHILS NFR BLD AUTO: 50 % (ref 42.7–76)
NRBC BLD AUTO-RTO: 0 /100 WBC (ref 0–0.2)
PLATELET # BLD AUTO: 198 10*3/MM3 (ref 140–450)
PMV BLD AUTO: 10.3 FL (ref 6–12)
RBC # BLD AUTO: 3.56 10*6/MM3 (ref 3.77–5.28)
WBC NRBC COR # BLD AUTO: 5.38 10*3/MM3 (ref 3.4–10.8)

## 2024-12-05 PROCEDURE — 80162 ASSAY OF DIGOXIN TOTAL: CPT | Performed by: NURSE PRACTITIONER

## 2024-12-05 PROCEDURE — 85025 COMPLETE CBC W/AUTO DIFF WBC: CPT | Performed by: NURSE PRACTITIONER

## 2024-12-06 ENCOUNTER — LAB (OUTPATIENT)
Dept: LAB | Facility: HOSPITAL | Age: 69
End: 2024-12-06
Payer: MEDICARE

## 2024-12-06 ENCOUNTER — OFFICE VISIT (OUTPATIENT)
Dept: ONCOLOGY | Facility: CLINIC | Age: 69
End: 2024-12-06
Payer: MEDICARE

## 2024-12-06 VITALS
BODY MASS INDEX: 31.45 KG/M2 | DIASTOLIC BLOOD PRESSURE: 85 MMHG | WEIGHT: 156 LBS | OXYGEN SATURATION: 99 % | HEIGHT: 59 IN | HEART RATE: 69 BPM | SYSTOLIC BLOOD PRESSURE: 149 MMHG

## 2024-12-06 DIAGNOSIS — D64.9 ANEMIA, UNSPECIFIED TYPE: ICD-10-CM

## 2024-12-06 DIAGNOSIS — K86.89 PANCREATIC MASS: Primary | ICD-10-CM

## 2024-12-06 LAB
BASOPHILS # BLD AUTO: 0.03 10*3/MM3 (ref 0–0.2)
BASOPHILS NFR BLD AUTO: 0.6 % (ref 0–1.5)
DEPRECATED RDW RBC AUTO: 43.8 FL (ref 37–54)
EOSINOPHIL # BLD AUTO: 0.2 10*3/MM3 (ref 0–0.4)
EOSINOPHIL NFR BLD AUTO: 3.7 % (ref 0.3–6.2)
ERYTHROCYTE [DISTWIDTH] IN BLOOD BY AUTOMATED COUNT: 13.1 % (ref 12.3–15.4)
HCT VFR BLD AUTO: 37.7 % (ref 34–46.6)
HGB BLD-MCNC: 11.5 G/DL (ref 12–15.9)
HOLD SPECIMEN: NORMAL
LYMPHOCYTES # BLD AUTO: 1.53 10*3/MM3 (ref 0.7–3.1)
LYMPHOCYTES NFR BLD AUTO: 28.4 % (ref 19.6–45.3)
MCH RBC QN AUTO: 29.2 PG (ref 26.6–33)
MCHC RBC AUTO-ENTMCNC: 30.5 G/DL (ref 31.5–35.7)
MCV RBC AUTO: 95.7 FL (ref 79–97)
MONOCYTES # BLD AUTO: 0.61 10*3/MM3 (ref 0.1–0.9)
MONOCYTES NFR BLD AUTO: 11.3 % (ref 5–12)
NEUTROPHILS NFR BLD AUTO: 3.01 10*3/MM3 (ref 1.7–7)
NEUTROPHILS NFR BLD AUTO: 56 % (ref 42.7–76)
PLATELET # BLD AUTO: 210 10*3/MM3 (ref 140–450)
PMV BLD AUTO: 9.4 FL (ref 6–12)
RBC # BLD AUTO: 3.94 10*6/MM3 (ref 3.77–5.28)
WBC NRBC COR # BLD AUTO: 5.38 10*3/MM3 (ref 3.4–10.8)

## 2024-12-06 PROCEDURE — 85025 COMPLETE CBC W/AUTO DIFF WBC: CPT

## 2024-12-06 PROCEDURE — 36415 COLL VENOUS BLD VENIPUNCTURE: CPT

## 2024-12-06 RX ORDER — MORPHINE SULFATE 15 MG/1
1 TABLET, FILM COATED, EXTENDED RELEASE ORAL EVERY 12 HOURS SCHEDULED
COMMUNITY
Start: 2024-11-13

## 2024-12-06 RX ORDER — SULFAMETHOXAZOLE AND TRIMETHOPRIM 800; 160 MG/1; MG/1
1 TABLET ORAL EVERY 12 HOURS SCHEDULED
COMMUNITY
Start: 2024-11-08

## 2024-12-06 RX ORDER — CLINDAMYCIN HYDROCHLORIDE 300 MG/1
CAPSULE ORAL
COMMUNITY
Start: 2024-12-03

## 2025-02-14 ENCOUNTER — TELEPHONE (OUTPATIENT)
Dept: ONCOLOGY | Facility: CLINIC | Age: 70
End: 2025-02-14
Payer: MEDICARE

## 2025-02-14 NOTE — TELEPHONE ENCOUNTER
LVM on pt phone requesting call back to r/s MD appt on 3/6 due to MD being out of the office.  Pt can see MUKUND if she agrees to that.

## 2025-03-12 NOTE — PROGRESS NOTES
Hematology/Oncology Outpatient Follow Up    PATIENT NAME:Ban Brennan  :1955  MRN: [id number]  PRIMARY CARE PHYSICIAN: Rut Pierce APRN  REFERRING PHYSICIAN: Rut Pierce AP*    Chief Complaint   Patient presents with    Follow-up     Pancreatic mass        HISTORY OF PRESENT ILLNESS:     Ban Brennan is a 69 y.o. female who presented to [facility] on 2024 with complaints of shortness of breath.  Past medical history of hypertension, heart failure, chronic bilateral lower extremity edema, remote history of pulmonary embolism treated with warfarin.  The patient was brought to the ED via EMS.  The patient's daughter reported that the patient does not normally go to the doctor and had not been in the hospital for years.  She was unsure of her full medical history but stated that her legs have been swollen for a long time and that she had not been mobile for approximately 1 year.  She reported that a home health nurse comes to the house and wraps her mother's legs twice a week.  EMS was called due to the worsening shortness of breath and the patient was found to have an EKG showing a wide-complex tachycardia with a heart rate in the 190s.  She was given amiodarone by EMS with rate improvement to the 160s.  Follow-up EKG in the ED at [facility] showed atrial fibrillation with RVR with a heart rate of 145.  Chest x-ray showed fluid overload with bilateral effusions.  The patient had a elevated white blood cell count of 16.8 and 4+ bacteria in her urine.  She was also noted to be hyperkalemic with a potassium of 6.5.  She was diagnosed with cellulitis and a urinary tract infection and initiated on IV antibiotics with cefepime and vancomycin.  She was admitted for further evaluation and treatment.     2024 bilateral venous Doppler ultrasound  -Acute right lower extremity DVT in the common femoral, proximal femoral, mid femoral, distal femoral, and popliteal  -Acute right  lower extremity superficial thrombophlebitis in the saphenofemoral junction and great saphenous  -Acute left lower extremity DVT in the proximal femoral, mid femoral, distal femoral, popliteal, and posterior tibial     7/1/2020 four 2D echocardiogram  -Left ventricular ejection fraction 31-35%  -Left ventricular diastolic dysfunction  -Left atrial cavity dilated  -Moderate aortic valve stenosis  -Moderate to severe mitral valve regurgitation  -Estimated right ventricular systolic pressure from tricuspid regurgitation is normal     7/1/2024 CT chest PE protocol  -No evidence for pulmonary embolus  -Bibasilar airspace disease with right middle lung opacity compatible likely multifocal pneumonia with bilateral pleural effusions  -Cardiomegaly     7/1/2024 CT of the abdomen and pelvis without contrast  -Moderately large right pleural effusion and small left pleural effusion; bibasilar infiltrates suggest atelectasis although associated pneumonia not excluded  -Severe cardiomegaly  -Bilateral flank edema  -Probable fibroid uterus     07/01/24  Hematology/Oncology was consulted for bilateral lower extremity DVT.     7/13/2024 CT of the abdomen and pelvis without contrast  -3 cm x 2.7 cm soft tissue mass involving the body of the pancreas  -Colonic diverticulosis without evidence of diverticulitis  -Focal splenic infarct  -Small bilateral pleural effusions with trace right sided pneumothorax  -Cholelithiasis without evidence of cholecystitis     7/14/2024 CT of the chest without contrast  -Persistent moderate-sized right pleural effusion despite indwelling pleural catheter.  Tiny right pneumothorax.  Improved aeration of the right lung with persistent right middle/lower lobe volume loss and consolidation  -Stable small left pleural effusion with increased left lower lobe volume loss and consolidation  -Stable massive cardiomegaly        8/27/2024 patient had EUS with cyst aspiration and FNP impression is possible cirrhosis  "adenoma versus questionable IPMN patient had both the cystic as well as solid component with microcystic areas most likely suggestive of cirrhosis.  Cyst was aspirated was sent for CEA.  Solid component of the cyst was also separately sampled, mild prominent common bile duct  Cytology showed hypocellular specimen containing a few benign glandular cells insufficient for further diagnosis.  The smears and cell block show very low cellularity.  Patient has not followed up with Dr. 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/24    COPD (chronic obstructive pulmonary disease)     Diabetes mellitus 7/1/24    Morbid obesity with BMI of 40.0-44.9, adult 11/08/2010    Myocardial infarction 35 yrs ago    Primary hypertension 03/01/2017    Pulmonary embolism     \"many years ago, was on warfarin\"    Sleep apnea        Past Surgical History:   Procedure Laterality Date    D & C HYSTEROSCOPY N/A 07/22/2024    Procedure: DILATATION AND CURETTAGE HYSTEROSCOPY;  Surgeon: Sosa Newell MD;  Location: [facility] MAIN OR;  Service: Gynecology;  Laterality: N/A;         Current Outpatient Medications:     apixaban (ELIQUIS) 2.5 MG tablet tablet, Take 1 tablet by mouth 2 (Two) Times a Day., Disp: 60 tablet, Rfl: 0    dapagliflozin (Farxiga) 5 MG tablet tablet, Take 1 tablet by mouth Daily., Disp: 30 tablet, Rfl: 0    docusate sodium (COLACE) 100 MG capsule, , Disp: , Rfl:     FeroSul 325 (65 Fe) MG tablet, Take 1 tablet by mouth Daily With Breakfast., Disp: , Rfl:     fexofenadine-pseudoephedrine (ALLEGRA-D 24) 180-240 MG per 24 hr tablet, Take 1 tablet by mouth Daily. Pharmacy script says 180mg, Disp: , Rfl:     levothyroxine (SYNTHROID, LEVOTHROID) 25 MCG tablet, Take 1 tablet " by mouth Every Morning., Disp: , Rfl:     metoprolol succinate XL (TOPROL-XL) 50 MG 24 hr tablet, Take 1 tablet by mouth Every 12 (Twelve) Hours., Disp: 30 tablet, Rfl: 0    metoprolol tartrate (LOPRESSOR) 50 MG tablet, Take 1 tablet by mouth Daily., Disp: , Rfl:     montelukast (SINGULAIR) 10 MG tablet, Take 1 tablet by mouth every night at bedtime., Disp: , Rfl:     oxyCODONE (ROXICODONE) 10 MG tablet, 90, Disp: , Rfl:     potassium chloride (KLOR-CON M20) 20 MEQ CR tablet, , Disp: , Rfl:     potassium chloride (MICRO-K) 10 MEQ CR capsule, 1 capsule 2 (Two) Times a Day., Disp: , Rfl:     vitamin D (ERGOCALCIFEROL) 1.25 MG (16614 UT) capsule capsule, Take 1 capsule by mouth 2 (Two) Times a Week. Monday and Thursday., Disp: , Rfl:     calcium carbonate EX (TUMS EX) 750 MG chewable tablet, 0, Disp: , Rfl:     clindamycin (CLEOCIN) 300 MG capsule, TAKE ONE CAPSULE BY MOUTH EVERY 8 HOURS FOR 14 DAYS (Patient not taking: Reported on 3/13/2025), Disp: , Rfl:     digoxin (LANOXIN) 125 MCG tablet, Take 1 tablet by mouth Daily., Disp: 30 tablet, Rfl: 0    fexofenadine (ALLEGRA) 180 MG tablet, TAKE 1 TABLET BY MOUTH DAILY. swallow whole with water. DO not take with fruit juices (Patient not taking: Reported on 3/13/2025), Disp: , Rfl:     furosemide (LASIX) 20 MG tablet, Take 1 tablet by mouth 2 (Two) Times a Day. (Patient not taking: Reported on 3/13/2025), Disp: 60 tablet, Rfl: 0    GoodSense ClearLax 17 GM/SCOOP powder, Take 17 g by mouth Daily., Disp: , Rfl:     HYDROcodone-acetaminophen (NORCO)  MG per tablet, Take 1 tablet by mouth Every 6 (Six) Hours As Needed for Mild Pain, Moderate Pain or Severe Pain. (Patient not taking: Reported on 3/13/2025), Disp: , Rfl:     hydrocortisone-bacitracin-zinc oxide-nystatin (MAGIC BARRIER), Apply 1 Application topically to the appropriate area as directed 3 times a day. (Patient not taking: Reported on 3/13/2025), Disp: 40 g, Rfl: 0    lisinopril (PRINIVIL,ZESTRIL) 20 MG  tablet, 30, Disp: , Rfl:     meloxicam (MOBIC) 7.5 MG tablet, 30, Disp: , Rfl:     mineral oil-hydrophilic petrolatum (AQUAPHOR) ointment, Apply 1 Application topically to the appropriate area as directed As Needed (3 times per day). (Patient not taking: Reported on 3/13/2025), Disp: , Rfl:     Morphine (MS CONTIN) 15 MG 12 hr tablet, Take 1 tablet by mouth Every 12 (Twelve) Hours., Disp: , Rfl:     nitroglycerin (NITRODUR) 0.1 MG/HR patch, 0 (Patient not taking: Reported on 3/13/2025), Disp: , Rfl:     omeprazole (priLOSEC) 20 MG capsule, Take 1 capsule by mouth Daily., Disp: , Rfl:     oxybutynin XL (DITROPAN-XL) 10 MG 24 hr tablet, Take 2 tablets by mouth Daily. Pharmacy order reads Oxybutynin Cl Er, Disp: , Rfl:     sennosides-docusate (PERICOLACE) 8.6-50 MG per tablet, Take 2 tablets by mouth 2 (Two) Times a Day As Needed for Constipation. (Patient not taking: Reported on 3/13/2025), Disp: 30 tablet, Rfl: 0    sulfamethoxazole-trimethoprim (BACTRIM DS,SEPTRA DS) 800-160 MG per tablet, Take 1 tablet by mouth Every 12 (Twelve) Hours. (Patient not taking: Reported on 3/13/2025), Disp: , Rfl:     No Known Allergies    Family History   Problem Relation Age of Onset    Heart attack Mother     Hypertension Mother     Heart attack Father     Hypertension Father     Asthma Sister     Asthma Sister     Asthma Brother     Heart attack Brother     Hypertension Brother     Hypertension Brother     Heart disease Maternal Aunt     Heart failure Maternal Aunt     Heart failure Maternal Aunt        Cancer-related family history is not on file.    Social History     Tobacco Use    Smoking status: Never     Passive exposure: Never    Smokeless tobacco: Never   Vaping Use    Vaping status: Never Used   Substance Use Topics    Alcohol use: Never    Drug use: Never         I have reviewed and confirmed the accuracy of the patient's history: Chief complaint, HPI, ROS, and Subjective as entered by the MA/LPN/RN.  03/13/25   "        SUBJECTIVE:  3/13/2025: Ban is here today for her routine follow-up.  She is accompanied to the visit by her daughter and her caregiver.  She reports that she is not been doing very well due to pain and what she describes as nodules in her lower abdomen.  She states they have been there for some time and that no one has been able to tell her why they are there.  She reports that she is compliant on her Eliquis 2.5 mg p.o. twice a day and denies any significant bruising or bleeding.  She is up-to-date with seeing Dr. Colunga but states that she cannot get a appointment for any follow-up with Dr. Newell due to her insurance not being accepted at their office.       REVIEW OF SYSTEMS:  Review of Systems   Constitutional:  Positive for fatigue. Negative for chills and fever.   HENT:  Negative for congestion, drooling, ear discharge, rhinorrhea, sinus pressure and tinnitus.    Eyes:  Negative for photophobia, pain and discharge.   Respiratory:  Negative for apnea, choking and stridor.    Cardiovascular:  Negative for palpitations.   Gastrointestinal:  Positive for abdominal pain. Negative for abdominal distention and anal bleeding.   Endocrine: Negative for polydipsia and polyphagia.   Genitourinary:  Negative for decreased urine volume, flank pain and genital sores.   Musculoskeletal:  Negative for gait problem, neck pain and neck stiffness.   Skin:  Negative for color change, rash and wound.   Neurological:  Negative for tremors, seizures, syncope, facial asymmetry and speech difficulty.   Hematological:  Negative for adenopathy.   Psychiatric/Behavioral:  Negative for agitation, confusion, hallucinations and self-injury. The patient is not hyperactive.        OBJECTIVE:    Vitals:    03/13/25 1046   BP: 137/85   Pulse: 81   Temp: 98.7 °F (37.1 °C)   SpO2: 97%   Weight: 66.7 kg (147 lb)   Height: 149.9 cm (59.02\")   PainSc: 0-No pain         Body mass index is 29.67 kg/m².    ECOG  (1) Restricted in physically " strenuous activity, ambulatory and able to do work of light nature    Physical Exam  Vitals and nursing note reviewed.   Constitutional:       General: She is not in acute distress.     Appearance: She is not diaphoretic.   HENT:      Head: Normocephalic and atraumatic.   Eyes:      General: No scleral icterus.        Right eye: No discharge.         Left eye: No discharge.      Conjunctiva/sclera: Conjunctivae normal.   Neck:      Thyroid: No thyromegaly.   Cardiovascular:      Rate and Rhythm: Normal rate and regular rhythm.      Heart sounds: Normal heart sounds.      No friction rub. No gallop.   Pulmonary:      Effort: Pulmonary effort is normal. No respiratory distress.      Breath sounds: No stridor. No wheezing.   Abdominal:      General: Bowel sounds are normal.      Palpations: There is no mass.      Tenderness: There is abdominal tenderness. There is no guarding or rebound.      Comments: Patient with body wall edema in the lower pendulous abdomen and the posterior thighs   Musculoskeletal:         General: No tenderness. Normal range of motion.      Cervical back: Normal range of motion and neck supple.   Lymphadenopathy:      Cervical: No cervical adenopathy.   Skin:     General: Skin is warm.      Findings: No erythema or rash.   Neurological:      Mental Status: She is alert and oriented to person, place, and time.      Motor: No abnormal muscle tone.   Psychiatric:         Behavior: Behavior normal.     I have reexamined the patient and the results are consistent with the previously documented exam.       RECENT LABS  WBC   Date Value Ref Range Status   03/13/2025 6.26 3.40 - 10.80 10*3/mm3 Final     RBC   Date Value Ref Range Status   03/13/2025 3.81 3.77 - 5.28 10*6/mm3 Final     Hemoglobin   Date Value Ref Range Status   03/13/2025 11.5 (L) 12.0 - 15.9 g/dL Final     Hematocrit   Date Value Ref Range Status   03/13/2025 36.0 34.0 - 46.6 % Final     MCV   Date Value Ref Range Status   03/13/2025  94.5 79.0 - 97.0 fL Final     MCH   Date Value Ref Range Status   03/13/2025 30.2 26.6 - 33.0 pg Final     MCHC   Date Value Ref Range Status   03/13/2025 31.9 31.5 - 35.7 g/dL Final     RDW   Date Value Ref Range Status   03/13/2025 13.2 12.3 - 15.4 % Final     RDW-SD   Date Value Ref Range Status   03/13/2025 44.3 37.0 - 54.0 fl Final     MPV   Date Value Ref Range Status   03/13/2025 10.3 6.0 - 12.0 fL Final     Platelets   Date Value Ref Range Status   03/13/2025 181 140 - 450 10*3/mm3 Final     Neutrophil %   Date Value Ref Range Status   03/13/2025 57.3 42.7 - 76.0 % Final     Lymphocyte %   Date Value Ref Range Status   03/13/2025 31.0 19.6 - 45.3 % Final     Monocyte %   Date Value Ref Range Status   03/13/2025 7.8 5.0 - 12.0 % Final     Eosinophil %   Date Value Ref Range Status   03/13/2025 3.4 0.3 - 6.2 % Final     Basophil %   Date Value Ref Range Status   03/13/2025 0.5 0.0 - 1.5 % Final     Immature Grans %   Date Value Ref Range Status   12/05/2024 0.2 0.0 - 0.5 % Final     Neutrophils, Absolute   Date Value Ref Range Status   03/13/2025 3.59 1.70 - 7.00 10*3/mm3 Final     Lymphocytes, Absolute   Date Value Ref Range Status   03/13/2025 1.94 0.70 - 3.10 10*3/mm3 Final     Monocytes, Absolute   Date Value Ref Range Status   03/13/2025 0.49 0.10 - 0.90 10*3/mm3 Final     Eosinophils, Absolute   Date Value Ref Range Status   03/13/2025 0.21 0.00 - 0.40 10*3/mm3 Final     Basophils, Absolute   Date Value Ref Range Status   03/13/2025 0.03 0.00 - 0.20 10*3/mm3 Final     Immature Grans, Absolute   Date Value Ref Range Status   12/05/2024 0.01 0.00 - 0.05 10*3/mm3 Final     nRBC   Date Value Ref Range Status   12/05/2024 0.0 0.0 - 0.2 /100 WBC Final       Lab Results   Component Value Date    GLUCOSE 119 (H) 09/25/2024    BUN 16 09/25/2024    CREATININE 0.78 09/25/2024    BCR 20.5 09/25/2024    K 3.8 09/25/2024    CO2 37.1 (H) 09/25/2024    CALCIUM 9.2 09/25/2024    ALBUMIN 2.8 (L) 09/23/2024    AST 34 (H)  09/23/2024    ALT 13 09/23/2024         Assessment & Plan     Pancreatic mass  - CBC & Differential  - Comprehensive Metabolic Panel    Edema of abdominal wall  - US Abdomen Limited      ASSESSMENT:    Pancreatic mass:  -3 x 2.7 cm soft tissue mass involving the body of the pancreas, This was not reported on prior CT scan from 7/1/24.  -CA 19-9 levels mildly elevated, chromogranin levels pending.  -MRI abdomen reviewed: Multiseptated pancreatic lesion in the pancreatic neck measuring 2.7 x 2.6 cm, concerning for malignancy. Hypervascular lesion on Liver is less likely to be metastatic.  -GI team on board, awaiting further recommendations. She will likely benefit from ERCP/EUS with FNA for tissue diagnosis. Patient is agreeable to it.  -per GI team, likely plan for outpatient ERCP/EUS.  -August 2024: EUS cyst aspiration: Cytology was negative for malignancy: Per Dr. Colunga.    -Patient to continue to follow-up with gastroenterology for surveillance     Bilateral lower extremity DVT:  Remote history of pulmonary embolism  Splenic Infraction:  -Extensive acute right and left lower extremity DVT noted on presentation  -patient's chronic deconditioning and immobility may have been risk factor for DVT.   -Negative CTA Chest for PE.  -Reportedly treated with warfarin for prior history of PE  -patient was initially started on Lovenox and later transitioned to Eliquis.  She completed Eliquis 10mg BID X 1 week followed by 5mg BID thereafter.  -Will need lifelong anticoagulation in my view due to persistent risk factors and extensive DVT as well as history of prior pulmonary embolism.  -CT Abdomen/Pelvis on 7/13 showed splenic infract, likely provoked by pancreatic pathology vs acute illness.   -factor V Leiden and prothrombin gene mutation reported negative. Will defer remainder of thrombophilia workup as it does not impact the decision making around anticoagulation.   -Patient is back on Eliquis 2.5 mg twice a day.  Reviewed  with patient and caregiver who is present today    -8/24/2024: Patient had CT scan of the abdomen and pelvis without contrast no abdominal wall mass was noted edema was noted, vascular shunt noted between the portal venous and hepatic venous branches.  There is multiseptated cystic lesion in the neck of the pancreas measuring 2.3 x 2.6 cm.  Overall the lesion appears stable    She followed up with Dr. Colunga October 2024 she was asked to come back in 3 months.  She had an appointment in January 2025 that was recently rescheduled I have asked patient to follow-up with I office        Uterine mass     -Transvaginal ultrasound suggestive of presence of a large subserosal uterine fundal fibroid.   -CT abdomen/pelvis showed presence of a lobulated mass protruding from the uterine fundus measuring approximately 8.4 x 5.9 cm  -this is concerning for fibroid vs malignancy.  Will recommend GYN Evaluation for the same as outpatient.  -No vaginal bleeding reported.  -She saw gynecologist Dr Sosa Newell and had  D/C no evidence of malignancy.  Will follow-up with Dr. Sosa Newell.  Dr. Newell no longer accepts the patient's insurance.  -GYN referral replaced today     Thrombocytopenia  - 119,000 at admission, jacqueline at 85K, back to normal now. Baseline WNL  -Acute drop likely secondary to sepsis, rhinovirus  -Hemolysis labs reported negative.   This has resolved      Anemia  Will work this up further 10/24/2024  Anemia workup reticulocyte count was 2.9, ferritin was 197, iron saturation was low at 17, , folate was more than 20, B12 was 409 normal and haptoglobin was elevated to 237  -CBC today reviewed showing a hemoglobin that is stable at 11.5 g/dL.  Recheck iron profile and ferritin today.     Sepsis    -UA suggestive of Acute cystitis, Urine culture consistent with GNR UTI.  -Respiratory panel positive for Rhinovirus.  -patient has completed antibiotic therapy.   Resolved     Atrial fibrillation with  RVR/paroxysmal atrial fibrillation  Acute on chronic systolic heart failure with diastolic dysfunction,   moderate aortic stenosis, severe mitral valve regurgitation  -On amiodarone drip, diuresis.  Management per cardiology.   Likely candidate for lifelong anticoagulation given Afib and extensive DVT.  Patient is now on Eliquis 2.5 mg twice a day  10/24/24     Acute kidney injury/hyperkalemia  Resolved.        Pleural Effusion:  Right sided hydropneumothorax:  Likely secondary to CHF and suspected pneumonia.  -patient is status post Chest tube placement. Ongoing drainage.  -management per primary.  Resolved     Abdominal wall edema  -Check abdominal ultrasound  -Advised follow-up with cardiology who manages her diuretics    Follow-up in 3 months with Dr. Zepeda or sooner if needed

## 2025-03-13 ENCOUNTER — OFFICE VISIT (OUTPATIENT)
Dept: ONCOLOGY | Facility: CLINIC | Age: 70
End: 2025-03-13
Payer: MEDICARE

## 2025-03-13 ENCOUNTER — LAB (OUTPATIENT)
Dept: LAB | Facility: HOSPITAL | Age: 70
End: 2025-03-13
Payer: MEDICARE

## 2025-03-13 VITALS
HEIGHT: 59 IN | DIASTOLIC BLOOD PRESSURE: 85 MMHG | WEIGHT: 147 LBS | TEMPERATURE: 98.7 F | OXYGEN SATURATION: 97 % | HEART RATE: 81 BPM | BODY MASS INDEX: 29.64 KG/M2 | SYSTOLIC BLOOD PRESSURE: 137 MMHG

## 2025-03-13 DIAGNOSIS — D64.9 ANEMIA, UNSPECIFIED TYPE: ICD-10-CM

## 2025-03-13 DIAGNOSIS — K86.89 PANCREATIC MASS: Primary | ICD-10-CM

## 2025-03-13 DIAGNOSIS — R60.0 EDEMA OF ABDOMINAL WALL: ICD-10-CM

## 2025-03-13 DIAGNOSIS — D21.9 FIBROIDS: ICD-10-CM

## 2025-03-13 LAB
ALBUMIN SERPL-MCNC: 3.6 G/DL (ref 3.5–5.2)
ALBUMIN/GLOB SERPL: 0.9 G/DL
ALP SERPL-CCNC: 110 U/L (ref 39–117)
ALT SERPL W P-5'-P-CCNC: 27 U/L (ref 1–33)
ANION GAP SERPL CALCULATED.3IONS-SCNC: 8.3 MMOL/L (ref 5–15)
AST SERPL-CCNC: 33 U/L (ref 1–32)
BASOPHILS # BLD AUTO: 0.03 10*3/MM3 (ref 0–0.2)
BASOPHILS NFR BLD AUTO: 0.5 % (ref 0–1.5)
BILIRUB SERPL-MCNC: 0.4 MG/DL (ref 0–1.2)
BUN SERPL-MCNC: 20 MG/DL (ref 8–23)
BUN/CREAT SERPL: 23.5 (ref 7–25)
CALCIUM SPEC-SCNC: 10.7 MG/DL (ref 8.6–10.5)
CHLORIDE SERPL-SCNC: 105 MMOL/L (ref 98–107)
CO2 SERPL-SCNC: 29.7 MMOL/L (ref 22–29)
CREAT SERPL-MCNC: 0.85 MG/DL (ref 0.57–1)
DEPRECATED RDW RBC AUTO: 44.3 FL (ref 37–54)
EGFRCR SERPLBLD CKD-EPI 2021: 74.3 ML/MIN/1.73
EOSINOPHIL # BLD AUTO: 0.21 10*3/MM3 (ref 0–0.4)
EOSINOPHIL NFR BLD AUTO: 3.4 % (ref 0.3–6.2)
ERYTHROCYTE [DISTWIDTH] IN BLOOD BY AUTOMATED COUNT: 13.2 % (ref 12.3–15.4)
FERRITIN SERPL-MCNC: 153 NG/ML (ref 13–150)
GLOBULIN UR ELPH-MCNC: 3.9 GM/DL
GLUCOSE SERPL-MCNC: 93 MG/DL (ref 65–99)
HCT VFR BLD AUTO: 36 % (ref 34–46.6)
HGB BLD-MCNC: 11.5 G/DL (ref 12–15.9)
HOLD SPECIMEN: NORMAL
IRON 24H UR-MRATE: 49 MCG/DL (ref 37–145)
IRON SATN MFR SERPL: 15 % (ref 20–50)
LYMPHOCYTES # BLD AUTO: 1.94 10*3/MM3 (ref 0.7–3.1)
LYMPHOCYTES NFR BLD AUTO: 31 % (ref 19.6–45.3)
MCH RBC QN AUTO: 30.2 PG (ref 26.6–33)
MCHC RBC AUTO-ENTMCNC: 31.9 G/DL (ref 31.5–35.7)
MCV RBC AUTO: 94.5 FL (ref 79–97)
MONOCYTES # BLD AUTO: 0.49 10*3/MM3 (ref 0.1–0.9)
MONOCYTES NFR BLD AUTO: 7.8 % (ref 5–12)
NEUTROPHILS NFR BLD AUTO: 3.59 10*3/MM3 (ref 1.7–7)
NEUTROPHILS NFR BLD AUTO: 57.3 % (ref 42.7–76)
PLATELET # BLD AUTO: 181 10*3/MM3 (ref 140–450)
PMV BLD AUTO: 10.3 FL (ref 6–12)
POTASSIUM SERPL-SCNC: 3.8 MMOL/L (ref 3.5–5.2)
PROT SERPL-MCNC: 7.5 G/DL (ref 6–8.5)
RBC # BLD AUTO: 3.81 10*6/MM3 (ref 3.77–5.28)
SODIUM SERPL-SCNC: 143 MMOL/L (ref 136–145)
TIBC SERPL-MCNC: 331 MCG/DL (ref 298–536)
TRANSFERRIN SERPL-MCNC: 222 MG/DL (ref 200–360)
WBC NRBC COR # BLD AUTO: 6.26 10*3/MM3 (ref 3.4–10.8)

## 2025-03-13 PROCEDURE — 83540 ASSAY OF IRON: CPT | Performed by: NURSE PRACTITIONER

## 2025-03-13 PROCEDURE — 80053 COMPREHEN METABOLIC PANEL: CPT | Performed by: NURSE PRACTITIONER

## 2025-03-13 PROCEDURE — 82728 ASSAY OF FERRITIN: CPT | Performed by: NURSE PRACTITIONER

## 2025-03-13 PROCEDURE — 84466 ASSAY OF TRANSFERRIN: CPT | Performed by: NURSE PRACTITIONER

## 2025-03-13 PROCEDURE — 36415 COLL VENOUS BLD VENIPUNCTURE: CPT

## 2025-03-13 PROCEDURE — 85025 COMPLETE CBC W/AUTO DIFF WBC: CPT

## 2025-03-13 RX ORDER — POTASSIUM CHLORIDE 1500 MG/1
TABLET, EXTENDED RELEASE ORAL
COMMUNITY
Start: 2025-03-10

## 2025-03-13 RX ORDER — DOCUSATE SODIUM 100 MG/1
CAPSULE, LIQUID FILLED ORAL
COMMUNITY
Start: 2025-03-10

## 2025-03-27 ENCOUNTER — TRANSCRIBE ORDERS (OUTPATIENT)
Dept: ADMINISTRATIVE | Facility: HOSPITAL | Age: 70
End: 2025-03-27
Payer: MEDICARE

## 2025-03-27 ENCOUNTER — OFFICE (OUTPATIENT)
Dept: URBAN - METROPOLITAN AREA CLINIC 64 | Facility: CLINIC | Age: 70
End: 2025-03-27

## 2025-03-27 VITALS
HEART RATE: 100 BPM | WEIGHT: 145 LBS | HEIGHT: 59 IN | SYSTOLIC BLOOD PRESSURE: 115 MMHG | DIASTOLIC BLOOD PRESSURE: 70 MMHG

## 2025-03-27 DIAGNOSIS — K86.9 DISEASE OF PANCREAS, UNSPECIFIED: ICD-10-CM

## 2025-03-27 DIAGNOSIS — R97.8 ELEVATED CA 19-9 LEVEL: ICD-10-CM

## 2025-03-27 DIAGNOSIS — K86.9 PANCREATIC LESION: Primary | ICD-10-CM

## 2025-03-27 DIAGNOSIS — R10.13 DYSPEPSIA: ICD-10-CM

## 2025-03-27 DIAGNOSIS — R13.10 DYSPHAGIA, UNSPECIFIED: ICD-10-CM

## 2025-03-27 DIAGNOSIS — R10.84 GENERALIZED ABDOMINAL PAIN: ICD-10-CM

## 2025-03-27 DIAGNOSIS — R97.8 OTHER ABNORMAL TUMOR MARKERS: ICD-10-CM

## 2025-03-27 DIAGNOSIS — K30 FUNCTIONAL DYSPEPSIA: ICD-10-CM

## 2025-03-27 DIAGNOSIS — R13.10 DYSPHAGIA, UNSPECIFIED TYPE: ICD-10-CM

## 2025-03-27 PROCEDURE — 99214 OFFICE O/P EST MOD 30 MIN: CPT

## 2025-03-27 RX ORDER — CETIRIZINE HYDROCHLORIDE 10 MG/1
TABLET, FILM COATED ORAL
Qty: 30 | Refills: 11 | Status: ACTIVE
Start: 2025-03-27

## 2025-03-28 ENCOUNTER — HOSPITAL ENCOUNTER (OUTPATIENT)
Dept: ULTRASOUND IMAGING | Facility: HOSPITAL | Age: 70
Discharge: HOME | End: 2025-03-28
Payer: MEDICARE

## 2025-03-28 DIAGNOSIS — R60.0 EDEMA OF ABDOMINAL WALL: ICD-10-CM

## 2025-03-28 PROCEDURE — 76705 ECHO EXAM OF ABDOMEN: CPT

## 2025-04-02 LAB
CA 19-9: 20 U/ML (ref 0–35)
CHROMOGRANIN A: 49.6 NG/ML (ref 0–101.8)
SEROTONIN, SERUM: 35 NG/ML (ref 8–217)

## 2025-04-09 ENCOUNTER — TELEPHONE (OUTPATIENT)
Dept: ONCOLOGY | Facility: CLINIC | Age: 70
End: 2025-04-09
Payer: MEDICARE

## 2025-04-09 PROBLEM — K90.9 MALABSORPTION OF IRON: Status: ACTIVE | Noted: 2025-04-09

## 2025-04-09 PROBLEM — D50.9 IRON DEFICIENCY ANEMIA: Status: ACTIVE | Noted: 2025-04-09

## 2025-04-09 NOTE — TELEPHONE ENCOUNTER
Spoke to pt's daughter about labs and need for IV iron. She stated that the pt would likely not want to come in for IV iron, but she would talk to her and encourage her to receive the iron.

## 2025-04-11 ENCOUNTER — OFFICE VISIT (OUTPATIENT)
Dept: CARDIOLOGY | Facility: CLINIC | Age: 70
End: 2025-04-11
Payer: MEDICARE

## 2025-04-11 VITALS
SYSTOLIC BLOOD PRESSURE: 145 MMHG | HEIGHT: 59 IN | HEART RATE: 69 BPM | WEIGHT: 143 LBS | DIASTOLIC BLOOD PRESSURE: 72 MMHG | RESPIRATION RATE: 16 BRPM | OXYGEN SATURATION: 97 % | BODY MASS INDEX: 28.83 KG/M2

## 2025-04-11 DIAGNOSIS — I45.10 RIGHT BUNDLE BRANCH BLOCK: ICD-10-CM

## 2025-04-11 DIAGNOSIS — I82.413 ACUTE DEEP VEIN THROMBOSIS (DVT) OF FEMORAL VEIN OF BOTH LOWER EXTREMITIES: ICD-10-CM

## 2025-04-11 DIAGNOSIS — I26.99 PULMONARY EMBOLISM, UNSPECIFIED CHRONICITY, UNSPECIFIED PULMONARY EMBOLISM TYPE, UNSPECIFIED WHETHER ACUTE COR PULMONALE PRESENT: ICD-10-CM

## 2025-04-11 DIAGNOSIS — I50.21 ACUTE HFREF (HEART FAILURE WITH REDUCED EJECTION FRACTION): Primary | ICD-10-CM

## 2025-04-11 DIAGNOSIS — I34.0 MITRAL VALVE INSUFFICIENCY, UNSPECIFIED ETIOLOGY: ICD-10-CM

## 2025-04-11 DIAGNOSIS — I10 PRIMARY HYPERTENSION: Chronic | ICD-10-CM

## 2025-04-11 DIAGNOSIS — I48.91 ATRIAL FIBRILLATION WITH RVR: ICD-10-CM

## 2025-04-11 DIAGNOSIS — I35.0 AORTIC VALVE STENOSIS, ETIOLOGY OF CARDIAC VALVE DISEASE UNSPECIFIED: ICD-10-CM

## 2025-04-11 RX ORDER — LISINOPRIL 20 MG/1
10 TABLET ORAL DAILY
Start: 2025-04-11

## 2025-04-11 RX ORDER — METOPROLOL SUCCINATE 50 MG/1
25 TABLET, EXTENDED RELEASE ORAL EVERY 12 HOURS SCHEDULED
Start: 2025-04-11

## 2025-04-11 NOTE — PROGRESS NOTES
Cardiology Office Follow Up Visit      Primary Care Provider:  Rut Pierce APRN    Reason for f/u:     Routine F/U      Subjective     CC:    F/U Afib, CAD    History of Present Illness       Ban Brennan is a 70 y.o. female patient of Dr Bird. She has history of atrial fibrillation, DVT, CAD (no previous cath, last stress test 2017), respiratory failure with previous mechanical ventilation, COPD, anemia, cardiomegaly, cardiomyopathy with previous EF 30-35%    Echocardiogram was done September 2024.  Systolic function mildly decreased with EF 41 to 45%.  Diastolic function indeterminate.  Severe dilation of right ventricular and bilateral atrial cavities.  Mild aortic valve stenosis and moderate tricuspid valve regurgitation, mild pulmonary hypertension estimated RVSP 35-45 mmHg.  Abnormal septal wall motion consistent with bundle branch block noted, mild global LV hypokinesis, dyssynchrony with bundle branch block, [id number] variability with A-fib and basal inferior wall akinetic, mildly aneurysmal.    Previous visit in September with Dr. Bird, on exam in clinic he found her to have multiple medical issues going on and referred her to be admitted through the ED, which she did.  She was discharged on Eliquis 2.5 mg twice daily, Farxiga 5 mg daily, digoxin 125 mcg daily, Toprol-XL 50 mg twice daily.  Lasix was changed to 20 mg twice daily, she has potassium 10 mEq twice daily supplementation.  Bumex, lisinopril, Lopressor were stopped.  Family states her edema has been well-controlled, however she does have frequent urination and they were inquiring about changes in diuresis and potassium supplementation.  Did discuss with patient and POA that per their reports of her lower extremity edema being much improved, only trace ankle edema noted on exam today, history of pleural effusion, would continue regimen to prevent further exacerbations and hospitalizations.  I stated that her electrolytes and  kidney function on labs drawn a couple weeks ago were within normal limits she seems to be tolerating.  POA asks since her potassium is normal, can we discontinue potassium, explained that it is within normal limits due to her taking supplementation.  Reports abdominal pain which they feel is related to metoprolol and would like a substitution of this medication.  Patient has been under the care of of gastroenterology, gynecology and heme-onc for diffuse abdominal pain and masses on pancreas liver and uterus. Doubt this has anything to do with metoprolol, but can try to decrease dose, and see if that helps (HR 69). Resume lisinopril at 10mg BP today 145/72. Advised them to take BP/HR log for 1 week and report back to office. EKG Afib controlled rate Qtc 411 ms.  Patient states her shortness of breath and fatigue are at baseline.  No cough plaints of chest pain, orthopnea, PND or near syncope.    ASSESSMENT/PLAN:      Diagnoses and all orders for this visit:    1. Acute HFrEF (heart failure with reduced ejection fraction) (Primary)    2. Aortic valve stenosis, etiology of cardiac valve disease unspecified    3. Mitral valve insufficiency, unspecified etiology    4. Right bundle branch block    5. Atrial fibrillation with RVR    6. Primary hypertension    7. Acute deep vein thrombosis (DVT) of femoral vein of both lower extremities    8. Pulmonary embolism, unspecified chronicity, unspecified pulmonary embolism type, unspecified whether acute cor pulmonale present    9. Pulmonary hypertension    MEDICAL DECISION MAKING:  Echocardiogram September 2024 EF 41-45%.  Diastolic function indeterminate, previously at least grade 2.  Reports abdominal pain they feel related to Toprol, heart rate 69.  Will decrease Toprol dose temporarily to see if there is any improvement.  Patient/family to report back blood pressure heart rate log in 1 week  Echocardiogram September 2024-Mild aortic valve stenosis  Echocardiogram July 2024-  "Moderate to severe mitral valve regurgitation .    Echocardiogram September 2024-Abnormal septal wall motion consistent with bundle branch block noted, mild global LV hypokinesis, dyssynchrony with bundle branch block, [id number] variability with A-fib  Patient remains in A-fib today with controlled rate,anticoagulation on Eliquis, rate controlled with Toprol.  Dilation of bilateral atrial cavities  Blood pressure today 145/72, lisinopril resumed at 10 mg.  Patient/family to report back blood pressure heart rate log in 1 week  7./8.Followed by hematology as well, likely will need lifelong anticoagulation     9.  Echo September 2024-moderate tricuspid valve regurgitation, mild pulmonary Htn eRVSP 35-45 mmHg.        GDMT:  Beta-blocker: Toprol  ACEi/ARB: Lisinopril  ARNI: consider Entresto in future  Mineralcorticoid: Consider Spironolactone in future  SGLT: Farxiga  Vasodilator: n/a  Ivabradine: n/a      Past Medical History:   Diagnosis Date    Asthma 7/1/24    Atrial fibrillation 7/1/24    Bilateral leg edema     CHF (congestive heart failure) 7/1/24    Chronic kidney disease 7/1/24    Chronic respiratory failure with hypoxia, on home O2 therapy 07/01/2024    Congenital heart disease 7/1/24    COPD (chronic obstructive pulmonary disease)     Deep vein thrombosis     Diabetes mellitus 7/1/24    Morbid obesity with BMI of 40.0-44.9, adult 11/08/2010    Myocardial infarction 35 yrs ago    Primary hypertension 03/01/2017    Pulmonary embolism     \"many years ago, was on warfarin\"    Sleep apnea        Past Surgical History:   Procedure Laterality Date    D & C HYSTEROSCOPY N/A 07/22/2024    Procedure: DILATATION AND CURETTAGE HYSTEROSCOPY;  Surgeon: Sosa Newell MD;  Location: [facility] MAIN OR;  Service: Gynecology;  Laterality: N/A;         Current Outpatient Medications:     apixaban (ELIQUIS) 2.5 MG tablet tablet, Take 1 tablet by mouth 2 (Two) Times a Day., Disp: 60 tablet, Rfl: 0    dapagliflozin " (Farxiga) 5 MG tablet tablet, Take 1 tablet by mouth Daily., Disp: 30 tablet, Rfl: 0    digoxin (LANOXIN) 125 MCG tablet, Take 1 tablet by mouth Daily., Disp: 30 tablet, Rfl: 0    docusate sodium (COLACE) 100 MG capsule, , Disp: , Rfl:     FeroSul 325 (65 Fe) MG tablet, Take 1 tablet by mouth Daily With Breakfast., Disp: , Rfl:     fexofenadine (ALLEGRA) 180 MG tablet, , Disp: , Rfl:     fexofenadine-pseudoephedrine (ALLEGRA-D 24) 180-240 MG per 24 hr tablet, Take 1 tablet by mouth Daily. Pharmacy script says 180mg, Disp: , Rfl:     furosemide (LASIX) 20 MG tablet, Take 1 tablet by mouth 2 (Two) Times a Day., Disp: 60 tablet, Rfl: 0    levothyroxine (SYNTHROID, LEVOTHROID) 25 MCG tablet, Take 1 tablet by mouth Every Morning., Disp: , Rfl:     metoprolol succinate XL (TOPROL-XL) 50 MG 24 hr tablet, Take 1 tablet by mouth Every 12 (Twelve) Hours., Disp: 30 tablet, Rfl: 0    montelukast (SINGULAIR) 10 MG tablet, Take 1 tablet by mouth every night at bedtime., Disp: , Rfl:     oxyCODONE (ROXICODONE) 10 MG tablet, 90, Disp: , Rfl:     potassium chloride (KLOR-CON M20) 20 MEQ CR tablet, , Disp: , Rfl:     vitamin D (ERGOCALCIFEROL) 1.25 MG (89434 UT) capsule capsule, Take 1 capsule by mouth 2 (Two) Times a Week. Monday and Thursday., Disp: , Rfl:     Social History     Socioeconomic History    Marital status:    Tobacco Use    Smoking status: Never     Passive exposure: Never    Smokeless tobacco: Never   Vaping Use    Vaping status: Never Used   Substance and Sexual Activity    Alcohol use: Never    Drug use: Never    Sexual activity: Not Currently     Partners: Male     Birth control/protection: Abstinence, Post-menopausal, Tubal ligation     Comment: Havent been active in 30 years or so!       Family History   Problem Relation Age of Onset    Heart attack Mother     Hypertension Mother     Heart attack Father     Hypertension Father     Asthma Sister     Asthma Sister     Asthma Brother     Heart attack Brother  "    Hypertension Brother     Hypertension Brother     Heart disease Maternal Aunt     Heart failure Maternal Aunt     Heart failure Maternal Aunt        The following portions of the patient's history were reviewed and updated as appropriate: allergies, current medications, past family history, past medical history, past social history, past surgical history and problem list.    ROS    Pertinent items are noted in HPI, all other systems reviewed and negative    /72 (BP Location: Right arm, Patient Position: Sitting, Cuff Size: Large Adult)   Pulse 69   Resp 16   Ht 149.9 cm (59\")   Wt 64.9 kg (143 lb)   SpO2 97%   BMI 28.88 kg/m² .  Objective     Physical Exam  General Appearance: Alert, in no acute distress, chronically ill appearing  Head:   Normocephalic, atraumatic  Eyes:    PERRLA, EOM intact  Throat: Oral mucosa moist  Neck:No carotid bruit or JVD  Lungs:  No wheezes rhonchi or rales, respirations regular, even and unlabored   Heart:  Regular rhythm and normal rate, normal S1 and S2, no   murmur, no gallop, no rub, no click  Chest Wall:  No abnormalities observed  Abdomen:  Soft, obese, tender to palpation  Extremities:trace ankle edema, no cyanosis or redness  Pulses:Pulses palpable and equal bilaterally in all extremities  Skin:  No bleeding or rash   Neurologic:  Normal mood, thought content and behavior      ECG 12 Lead    Date/Time: 4/11/2025 1:53 PM  Performed by: Mayra Mendenhall APRN    Authorized by: Mayra Mendenhall APRN  Comparison: compared with previous ECG from 9/23/2024  Rhythm: atrial fibrillation  Rate: normal  BPM: 69  Conduction: right bundle branch block  QRS axis: left          EKG ordered by and reviewed by me in office       proBNP   Date Value Ref Range Status   09/23/2024 10,613.0 (H) 0.0 - 900.0 pg/mL Final     CMP          9/24/2024    03:14 9/24/2024    13:49 9/25/2024    04:41 9/25/2024    22:14 3/13/2025    10:45   CMP   Glucose 93   94  119  93    BUN 21   17  " "16  20    Creatinine 0.86   0.80  0.78  0.85    EGFR 73.2   79.9  82.3  74.3    Sodium 146   144  145  143    Potassium 3.5  4.1  3.8  3.8  3.8    Chloride 105   104  102  105    Calcium 8.8   9.0  9.2  10.7    Total Protein     7.5    Albumin     3.6    Globulin     3.9    Total Bilirubin     0.4    Alkaline Phosphatase     110    AST (SGOT)     33    ALT (SGPT)     27    Albumin/Globulin Ratio     0.9    BUN/Creatinine Ratio 24.4   21.3  20.5  23.5    Anion Gap 5.6   3.4  5.9  8.3      CBC w/diff          12/5/2024    15:00 12/6/2024    12:13 3/13/2025    10:45   CBC w/Diff   WBC 5.38  5.38  6.26    RBC 3.56  3.94  3.81    Hemoglobin 10.5  11.5  11.5    Hematocrit 31.4  37.7  36.0    MCV 88.2  95.7  94.5    MCH 29.5  29.2  30.2    MCHC 33.4  30.5  31.9    RDW 13.0  13.1  13.2    Platelets 198  210  181    Neutrophil Rel % 50.0  56.0  57.3    Immature Granulocyte Rel % 0.2      Lymphocyte Rel % 34.8  28.4  31.0    Monocyte Rel % 10.8  11.3  7.8    Eosinophil Rel % 3.3  3.7  3.4    Basophil Rel % 0.9  0.6  0.5       Lab Results   Component Value Date    TSH 2.080 07/01/2024      No results found for: \"FREET4\"   No results found for: \"DDIMERQUANT\"  Magnesium   Date Value Ref Range Status   09/25/2024 1.7 1.6 - 2.4 mg/dL Final      Digoxin   Date Value Ref Range Status   12/05/2024 0.80 0.60 - 1.20 ng/mL Final      Lab Results   Component Value Date    TROPONINT 36 (H) 07/01/2024           Lipid Panel          7/1/2024    09:10   Lipid Panel   Total Cholesterol 58    Triglycerides 86    HDL Cholesterol 13    VLDL Cholesterol 18    LDL Cholesterol  27    LDL/HDL Ratio 2.14      No results found for: \"POCTROP\"    Results for orders placed during the hospital encounter of 09/23/24    Adult Transthoracic Echo Complete w/ Color, Spectral and Contrast if Necessary Per Protocol    Interpretation Summary    Left ventricular systolic function is mildly decreased. Left ventricular ejection fraction appears to be 41 - 45%.    " Left ventricular diastolic function was indeterminate.    Mildly reduced right ventricular systolic function noted.    The right ventricular cavity is moderate to severely dilated.    The left atrial cavity is severely dilated.    Left atrial volume is severely increased.    The right atrial cavity is moderate to severely  dilated.    Mild aortic valve stenosis is present.    Moderate tricuspid valve regurgitation is present.    Estimated right ventricular systolic pressure from tricuspid regurgitation is mildly elevated (35-45 mmHg).    Mild pulmonary hypertension is present.     No results found for this or any previous visit.    Ban Brennan  reports that she has never smoked. She has never been exposed to tobacco smoke. She has never used smokeless tobacco.

## 2025-04-22 ENCOUNTER — TELEPHONE (OUTPATIENT)
Dept: ONCOLOGY | Facility: CLINIC | Age: 70
End: 2025-04-22
Payer: MEDICARE

## 2025-04-23 ENCOUNTER — OFFICE VISIT (OUTPATIENT)
Dept: OBSTETRICS AND GYNECOLOGY | Age: 70
End: 2025-04-23
Payer: MEDICARE

## 2025-04-23 VITALS
SYSTOLIC BLOOD PRESSURE: 132 MMHG | WEIGHT: 154 LBS | DIASTOLIC BLOOD PRESSURE: 80 MMHG | HEIGHT: 59 IN | BODY MASS INDEX: 31.04 KG/M2

## 2025-04-23 DIAGNOSIS — D25.9 UTERINE LEIOMYOMA, UNSPECIFIED LOCATION: Primary | ICD-10-CM

## 2025-04-23 PROCEDURE — 3075F SYST BP GE 130 - 139MM HG: CPT | Performed by: PHYSICIAN ASSISTANT

## 2025-04-23 PROCEDURE — 99203 OFFICE O/P NEW LOW 30 MIN: CPT | Performed by: PHYSICIAN ASSISTANT

## 2025-04-23 PROCEDURE — 3079F DIAST BP 80-89 MM HG: CPT | Performed by: PHYSICIAN ASSISTANT

## 2025-04-23 PROCEDURE — 1159F MED LIST DOCD IN RCRD: CPT | Performed by: PHYSICIAN ASSISTANT

## 2025-04-23 PROCEDURE — 1160F RVW MEDS BY RX/DR IN RCRD: CPT | Performed by: PHYSICIAN ASSISTANT

## 2025-04-23 NOTE — Clinical Note
HI. Please review her u/s and lmk what you think. She has a complicated med hx but her u/s looks relatively benign.

## 2025-04-23 NOTE — PROGRESS NOTES
"Subjective     Chief Complaint   Patient presents with    Gynecologic Exam     New pt c/o fibroids, ultrasound done, causing her lots of pain.       Ban Brennan is a 70 y.o.  whose LMP is No LMP recorded. Patient is postmenopausal. presents for pelvic u/s    She is here for f/u of possible uterine fibroids  These were seen on previous CT imaging  CT Abdomen Pancreas With & Without Contrast (2024 22:08)   CT Abdomen Pelvis Without Contrast (2024 13:12)   There were some masses noted that were concerning     She did have f/u while in the hospital and the pathology returned back as benign/wnl  Found \"Eosinophilic metaplasia \"  Tissue Pathology Exam (2024 10:36)    She denies PMB  She does have pelvic/abdominal pain that they believe is being caused by \"pockets of fluid\"    She has not had a pap since her daughter was born approx 48 ya  She has no desire to have this done    She has been MP since mid 50's  Notes heavier menses that lasted longer when she was younger    She has a complicated medical history and is being managed by multiple specialist    Here with her daughter, Chelsi and her dog  Also accompanied by her aid, Tori      No Additional Complaints Reported    The following portions of the patient's history were reviewed and updated as appropriate:no additional history reviewed, vital signs, allergies, current medications, past medical history, past social history, past surgical history, and problem list      Review of Systems   Genitourinary:positive for uterine fibroid     Objective      /80   Ht 149.9 cm (59\")   Wt 69.9 kg (154 lb)   BMI 31.10 kg/m²     Physical Exam    General:   alert, comfortable, and no distress   Heart: Not performed today   Lungs: Not performed today.   Breast: Not performed today   Neck: Not performed today   Abdomen: Not performed today   CVA: Not performed today   Pelvis: Not performed today   Extremities: In wheelchair   Neurologic: " Not performed today   Psychiatric: Normal affect, judgement, and mood       Lab Review   Labs: reviewed recent labs    Imaging   Ultrasound - Pelvic Vaginal  Impression: ** Preliminary Reading ** This report has not been finalized by a physician      1.  Uterus: Normal size, with uterine volume of 102 ml, and with uterine dimensions 7 x 5 x 5 cm     2.  Endometrium:  ? mm, Suspected impingement on lining from submucosal fibroid, and heterogenous      3.  Myometrium: Heterogenous texture and Multiple fibroids, 4 in number, ranging in size 1.4 x 1.3, 2 x 1, 2.3 x 2.4 to 1.7 x 1.4 cm     4.  Ovaries  Left:    Not visualized  Right:  Not visualized     Relevant comparison data: Comparison is made with study dated: 9/24/2024  Assessment & Plan     ASSESSMENT  1. Uterine leiomyoma, unspecified location          PLAN  1. Her pelvic u/s today is reassuring. I see 4 fibroids that are relatively small. I do not see any large masses. There is some fluid noted in what appears to be the endometrial canal. She denies any vaginal bleeding and did not tolerate the pelvic u/s and would likely not tolerate the pelvic exam and/or biopsy. There is no obvious indication for endometrial biopsy, however, I will have Dr Grady review and I advised the family and aid that we would call when she has had a chance to review the scan  Follow up: DESTINEY Bain  4/23/2025

## 2025-04-24 ENCOUNTER — HOSPITAL ENCOUNTER (OUTPATIENT)
Dept: GENERAL RADIOLOGY | Facility: HOSPITAL | Age: 70
Discharge: HOME | End: 2025-04-24
Payer: MEDICARE

## 2025-04-24 DIAGNOSIS — R97.8 ELEVATED CA 19-9 LEVEL: ICD-10-CM

## 2025-04-24 DIAGNOSIS — R10.13 DYSPEPSIA: ICD-10-CM

## 2025-04-24 DIAGNOSIS — R13.10 DYSPHAGIA, UNSPECIFIED TYPE: ICD-10-CM

## 2025-04-24 DIAGNOSIS — K86.9 PANCREATIC LESION: ICD-10-CM

## 2025-04-24 PROCEDURE — 74230 X-RAY XM SWLNG FUNCJ C+: CPT

## 2025-04-24 PROCEDURE — 92611 MOTION FLUOROSCOPY/SWALLOW: CPT

## 2025-04-24 PROCEDURE — A9270 NON-COVERED ITEM OR SERVICE: HCPCS

## 2025-04-24 PROCEDURE — 63710000001 BARIUM SULFATE 40 % SUSPENSION

## 2025-04-24 RX ADMIN — BARIUM SULFATE 50 ML: 400 SUSPENSION ORAL at 09:19

## 2025-04-24 NOTE — THERAPY EVALUATION
"Outpatient Speech Language Pathology   Adult Swallow Initial Evaluation   [facility]     Patient Name: Ban Brennan  : 1955  MRN: [id number]  Today's Date: 2025         Visit Date: 2025   Patient Active Problem List   Diagnosis    Primary hypertension    Morbid obesity with BMI of 40.0-44.9, adult    Atrial fibrillation with RVR    Right bundle branch block    Acute deep vein thrombosis (DVT) of both lower extremities    ARABELLA (acute kidney injury)    Acute hyperkalemia    Sepsis    Acute UTI    Acute systolic congestive heart failure    Rhinovirus infection    Multifocal pneumonia    Chronic respiratory failure with hypoxia, on home O2 therapy    Skin ulcer of right great toe    Skin ulcer of left great toe    SEDRICK (obstructive sleep apnea)    Aortic stenosis    Mitral regurgitation    Acute HFrEF (heart failure with reduced ejection fraction)    COPD (chronic obstructive pulmonary disease)    Pneumonia, unspecified organism    Elevated CA 19-9 level    Pancreatic lesion    CHF exacerbation    Dermatitis associated with moisture    Non-pressure chronic ulcer right lower leg, limited to breakdown skin    Functional dyspepsia    Pulmonary embolism    Respiratory failure    ST elevation (STEMI) myocardial infarction of unspecified site    Iron deficiency anemia    Malabsorption of iron        Past Medical History:   Diagnosis Date    Asthma 24    Atrial fibrillation 24    Bilateral leg edema     CHF (congestive heart failure) 24    Chronic kidney disease 24    Chronic respiratory failure with hypoxia, on home O2 therapy 2024    Congenital heart disease 24    COPD (chronic obstructive pulmonary disease)     Deep vein thrombosis     Diabetes mellitus 24    Morbid obesity with BMI of 40.0-44.9, adult 2010    Myocardial infarction 35 yrs ago    Primary hypertension 2017    Pulmonary embolism     \"many years ago, was on warfarin\"    Sleep apnea         Past " Surgical History:   Procedure Laterality Date    D & C HYSTEROSCOPY N/A 07/22/2024    Procedure: DILATATION AND CURETTAGE HYSTEROSCOPY;  Surgeon: Sosa Newell MD;  Location: [facility] MAIN OR;  Service: Gynecology;  Laterality: N/A;         Visit Dx:     ICD-10-CM ICD-9-CM   1. Pancreatic lesion  K86.9 577.9   2. Dyspepsia  R10.13 536.8   3. Elevated CA 19-9 level  R97.8 795.89   4. Dysphagia, unspecified type  R13.10 787.20     Subjective: Pt reports intermittent choking episodes, occurs randomly with solids and liquids. Pt reports food sometimes feels stuck in her chest. Pt caretaker reports she will sometimes cough/choke which prompted them to get referral. Pt lydia hx of swallowing difficulty or modified diets.  Previous Diet: Reg/thin.  Oral Motor Examination: Unremarkable ; edentulous  Patient Positioning: Seated 90 degrees  Viewing Plane: Lateral   Contrast: Barium used across trial textures; administered via spoon, cup, and straw as tolerated. The patient was assessed with the following consistencies: NTL by cup x2, Thins by cup x4 (rapid, consecutive), thins by straw x3 (rapid consecutive), puree (applesauce) x2, soft mixed consistency (peaches in juice) x2, Regular (cracker with paste) x1. Pt able to hold cup and take drinks independently.     Overall Impressions: VFSS completed on this date to address pt reported concerns. Pt oral-pharyngeal swallow found WFL. It is recommended pt continue with Regular/Thin diet. Pt encouraged to take small sips/bites, sit up for all meals, and alternate sips and bites. Pt has follow-up appt with GI to address globus sensation and visualized retention.    Oral Phase found WFL - Pt lip closure and bolus transport WNL. Rotary chew and mastication reduced but functional - pt edentulous and does not wear dentures. Pt had trace lingual residues that clear with double swallow.     Pharyngeal phase found WFL - initiation of swallow intermittently delayed - bolus in  vallecula. Soft palate and laryngeal elevation, anterior hyoid excursion, tongue base retraction, epiglottic deflection, pharyngeal clearance WFL. Reduced pharyngeal contraction. PES clearance intermittently slowed d/t retention (Picture 1). Pt scored a 2 on the Penetration Aspiration Scale (PAS), details below.     Penetration Aspiration Scale (PAS) is an eight-point scale used to assess the severity of penetration or aspiration observed during a VFSS/MBSS. The scores are as follows:   1. No Penetration or Aspiration: Material does not enter the airway.  2. Penetration, Contrast Remains Above Vocal Folds, Subsequently Ejected: Material enters the larynx but stays above the vocal folds and is later ejected.   3. Penetration, Contrast Remains Above Vocal folds, Not Ejected: Material enters the larynx but remains above the vocal folds without being ejected.   4. Penetration, Contrast Contacts Vocal Folds, Subsequently Ejected: Material contacts the vocal folds and is later ejected.  5. Penetration, Contrast Contacts Vocal Folds, Not Ejected: Material contacts the vocal folds but is not ejected.  6. Aspiration (Contrast Below Vocal Folds), Subsequently Ejected (At Least into Larynx): Material passes below the vocal folds and is later ejected.  7. Aspiration (Contrast Below Vocal Folds), Not Ejected Despite Effort: Material passes below the vocal folds but is not ejected despite effort.  8. Aspiration (Contrast Below Vocal Folds), No Effort Made to Eject: Material passes below the vocal folds, and no effort is made to eject it.       SLP Recommendation and Plan  -Regular / Thin   -Small sips/bites, alternating sips/bites, sitting up for all meals  -Follow up with GI to address globus sensation, retention.   -No further ST services at this time.     (Picture 1: Retention following 2 swallows ; applesauce)       SLP Adult Swallow Evaluation       Row Name 04/24/25 0900       Rehab Evaluation    Document Type evaluation   -AA    Subjective Information no complaints  -AA    Patient Observations alert;cooperative;agree to therapy  -AA    Patient/Family/Caregiver Comments/Observations --  family present.  -AA    Patient Effort good  -AA    Symptoms Noted During/After Treatment none  -AA       General Information    Patient Profile Reviewed yes  -AA    Pertinent History Of Current Problem Pt reports intermittent choking episodes, occurs randomly with solids and liquids. Pt reports food sometimes feels stuck in her chest. Pt caretaker reports she will sometimes cough/choke which prompted them to get referral. Pt lydia hx of swallowing difficulty or modified diets.  -AA    Current Method of Nutrition regular textures;thin liquids  -AA    Precautions/Limitations, Vision WFL;for purposes of eval  -AA    Precautions/Limitations, Hearing WFL;for purposes of eval  -AA    Prior Level of Function-Communication WFL  -AA    Prior Level of Function-Swallowing no diet consistency restrictions;safe, efficient swallowing in all situations  -AA    Plans/Goals Discussed with patient;family;agreed upon  -AA    Barriers to Rehab none identified  -AA       Oral Motor Structure and Function    Dentition Assessment edentulous  -AA    Secretion Management WNL/WFL  -AA    Mucosal Quality moist, healthy  -AA    Volitional Swallow WFL  -AA    Volitional Cough WFL  -AA       Oral Musculature and Cranial Nerve Assessment    Oral Motor General Assessment WFL  -AA       General Eating/Swallowing Observations    Respiratory Support Currently in Use room air  -AA    Eating/Swallowing Skills self-fed  -AA    Positioning During Eating upright 90 degree;upright in chair  -AA    Utensils Used spoon;cup;straw  -AA       MBS/VFSS    Utensils Used spoon;cup;straw  -AA    Consistencies Trialed nectar/syrup-thick liquids;thin liquids;pureed;mixed consistency;mechanical ground textures;regular textures  -AA       MBS/VFSS Interpretation    Oral Prep Phase WFL  -AA    Oral  Transit Phase WFL  -AA    Oral Residue WFL  -AA       Initiation of Pharyngeal Swallow    Pharyngeal Phase functional pharyngeal phase of swallowing  -AA       SLP Evaluation Clinical Impression    SLP Swallowing Diagnosis functional oral phase;functional pharyngeal phase  -AA    Functional Impact no impact on function  -AA    Swallow Criteria for Skilled Therapeutic Interventions Met no problems identified which require skilled intervention  -AA       Recommendations    Therapy Frequency (Swallow) evaluation only  -AA    SLP Diet Recommendation regular textures;thin liquids  -AA    Recommended Precautions and Strategies upright posture during/after eating;small bites of food and sips of liquid;alternate between small bites of food and sips of liquid  -AA    Oral Care Recommendations Oral Care BID/PRN  -AA    SLP Rec. for Method of Medication Administration as tolerated  -AA              User Key  (r) = Recorded By, (t) = Taken By, (c) = Cosigned By      Initials Name Provider Type    AA Tanisha Estevez SLP Speech and Language Pathologist                    ROBBIE Weaver  4/24/2025

## 2025-04-28 ENCOUNTER — TELEPHONE (OUTPATIENT)
Dept: ONCOLOGY | Facility: CLINIC | Age: 70
End: 2025-04-28
Payer: MEDICARE

## 2025-05-29 RX ORDER — METOPROLOL SUCCINATE 50 MG/1
25 TABLET, EXTENDED RELEASE ORAL EVERY 12 HOURS SCHEDULED
Start: 2025-05-29

## 2025-05-29 RX ORDER — LISINOPRIL 20 MG/1
10 TABLET ORAL DAILY
Start: 2025-05-29

## 2025-05-29 NOTE — TELEPHONE ENCOUNTER
Caller: Chelsi Valle    Relationship: Emergency Contact    Best call back number: [phone]     Requested Prescriptions:   Requested Prescriptions     Pending Prescriptions Disp Refills    lisinopril (PRINIVIL,ZESTRIL) 20 MG tablet       Sig: Take 0.5 tablets by mouth Daily.    metoprolol succinate XL (TOPROL-XL) 50 MG 24 hr tablet       Sig: Take 0.5 tablets by mouth Every 12 (Twelve) Hours.        Pharmacy where request should be sent: [address] [phone] [facility] [phone]      Last office visit with prescribing clinician: 4/11/2025   Last telemedicine visit with prescribing clinician: Visit date not found   Next office visit with prescribing clinician: Visit date not found     Additional details provided by patient:REQUESTING THESE TO BE SENT IN BEFORE WED 6.4.25    Does the patient have less than a 3 day supply:  [] Yes  [x] No    Would you like a call back once the refill request has been completed: [] Yes [] No    If the office needs to give you a call back, can they leave a voicemail: [] Yes [] No    Erik Feldman Rep   05/29/25 15:35 EDT

## 2025-06-13 NOTE — PROGRESS NOTES
Hematology/Oncology Outpatient Follow Up    PATIENT NAME:Ban Brennan  :1955  MRN: [id number]  PRIMARY CARE PHYSICIAN: Rut Pierce APRN  REFERRING PHYSICIAN: No ref. provider found    Chief Complaint   Patient presents with    Follow-up     Pancreatic mass            HISTORY OF PRESENT ILLNESS:     Ban Brennan is a 69 y.o. female who presented to [facility] on 2024 with complaints of shortness of breath.  Past medical history of hypertension, heart failure, chronic bilateral lower extremity edema, remote history of pulmonary embolism treated with warfarin.  The patient was brought to the ED via EMS.  The patient's daughter reported that the patient does not normally go to the doctor and had not been in the hospital for years.  She was unsure of her full medical history but stated that her legs have been swollen for a long time and that she had not been mobile for approximately 1 year.  She reported that a home health nurse comes to the house and wraps her mother's legs twice a week.  EMS was called due to the worsening shortness of breath and the patient was found to have an EKG showing a wide-complex tachycardia with a heart rate in the 190s.  She was given amiodarone by EMS with rate improvement to the 160s.  Follow-up EKG in the ED at [facility] showed atrial fibrillation with RVR with a heart rate of 145.  Chest x-ray showed fluid overload with bilateral effusions.  The patient had a elevated white blood cell count of 16.8 and 4+ bacteria in her urine.  She was also noted to be hyperkalemic with a potassium of 6.5.  She was diagnosed with cellulitis and a urinary tract infection and initiated on IV antibiotics with cefepime and vancomycin.  She was admitted for further evaluation and treatment.     2024 bilateral venous Doppler ultrasound  -Acute right lower extremity DVT in the common femoral, proximal femoral, mid femoral, distal femoral, and popliteal  -Acute right  lower extremity superficial thrombophlebitis in the saphenofemoral junction and great saphenous  -Acute left lower extremity DVT in the proximal femoral, mid femoral, distal femoral, popliteal, and posterior tibial     7/1/2020 four 2D echocardiogram  -Left ventricular ejection fraction 31-35%  -Left ventricular diastolic dysfunction  -Left atrial cavity dilated  -Moderate aortic valve stenosis  -Moderate to severe mitral valve regurgitation  -Estimated right ventricular systolic pressure from tricuspid regurgitation is normal     7/1/2024 CT chest PE protocol  -No evidence for pulmonary embolus  -Bibasilar airspace disease with right middle lung opacity compatible likely multifocal pneumonia with bilateral pleural effusions  -Cardiomegaly     7/1/2024 CT of the abdomen and pelvis without contrast  -Moderately large right pleural effusion and small left pleural effusion; bibasilar infiltrates suggest atelectasis although associated pneumonia not excluded  -Severe cardiomegaly  -Bilateral flank edema  -Probable fibroid uterus     07/01/24  Hematology/Oncology was consulted for bilateral lower extremity DVT.     7/13/2024 CT of the abdomen and pelvis without contrast  -3 cm x 2.7 cm soft tissue mass involving the body of the pancreas  -Colonic diverticulosis without evidence of diverticulitis  -Focal splenic infarct  -Small bilateral pleural effusions with trace right sided pneumothorax  -Cholelithiasis without evidence of cholecystitis     7/14/2024 CT of the chest without contrast  -Persistent moderate-sized right pleural effusion despite indwelling pleural catheter.  Tiny right pneumothorax.  Improved aeration of the right lung with persistent right middle/lower lobe volume loss and consolidation  -Stable small left pleural effusion with increased left lower lobe volume loss and consolidation  -Stable massive cardiomegaly        8/27/2024 patient had EUS with cyst aspiration and FNP impression is possible cirrhosis  "adenoma versus questionable IPMN patient had both the cystic as well as solid component with microcystic areas most likely suggestive of cirrhosis.  Cyst was aspirated was sent for CEA.  Solid component of the cyst was also separately sampled, mild prominent common bile duct  Cytology showed hypocellular specimen containing a few benign glandular cells insufficient for further diagnosis.  The smears and cell block show very low cellularity.  Patient has not followed up with Dr. 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/24    COPD (chronic obstructive pulmonary disease)     Deep vein thrombosis     Diabetes mellitus 7/1/24    Morbid obesity with BMI of 40.0-44.9, adult 11/08/2010    Myocardial infarction 35 yrs ago    Primary hypertension 03/01/2017    Pulmonary embolism     \"many years ago, was on warfarin\"    Sleep apnea        Past Surgical History:   Procedure Laterality Date    D & C HYSTEROSCOPY N/A 07/22/2024    Procedure: DILATATION AND CURETTAGE HYSTEROSCOPY;  Surgeon: Sosa Newell MD;  Location: [facility] MAIN OR;  Service: Gynecology;  Laterality: N/A;         Current Outpatient Medications:     potassium chloride (MICRO-K) 10 MEQ CR capsule, TAKE 2 CAPSULES BY MOUTH DAILY WITH FOOD, Disp: , Rfl:     Allergy Relief Cetirizine 10 MG tablet, Take 1 tablet by mouth Daily., Disp: , Rfl:     apixaban (ELIQUIS) 2.5 MG tablet tablet, Take 1 tablet by mouth 2 (Two) Times a Day., Disp: 60 tablet, Rfl: 0    dapagliflozin (Farxiga) 5 MG tablet tablet, Take 1 tablet by mouth Daily., Disp: 30 tablet, Rfl: 0    digoxin (LANOXIN) 125 MCG tablet, Take 1 tablet by mouth Daily., Disp: 30 tablet, Rfl: 0    docusate sodium (COLACE) 100 MG capsule, , Disp: , Rfl:    "  FeroSul 325 (65 Fe) MG tablet, Take 1 tablet by mouth Daily With Breakfast., Disp: , Rfl:     fexofenadine (ALLEGRA) 180 MG tablet, , Disp: , Rfl:     fexofenadine-pseudoephedrine (ALLEGRA-D 24) 180-240 MG per 24 hr tablet, Take 1 tablet by mouth Daily. Pharmacy script says 180mg, Disp: , Rfl:     furosemide (LASIX) 20 MG tablet, Take 1 tablet by mouth 2 (Two) Times a Day., Disp: 60 tablet, Rfl: 0    levothyroxine (SYNTHROID, LEVOTHROID) 25 MCG tablet, Take 1 tablet by mouth Every Morning., Disp: , Rfl:     lisinopril (PRINIVIL,ZESTRIL) 20 MG tablet, Take 0.5 tablets by mouth Daily., Disp: , Rfl:     metoprolol succinate XL (TOPROL-XL) 50 MG 24 hr tablet, Take 0.5 tablets by mouth Every 12 (Twelve) Hours., Disp: , Rfl:     montelukast (SINGULAIR) 10 MG tablet, Take 1 tablet by mouth every night at bedtime., Disp: , Rfl:     oxyCODONE (ROXICODONE) 10 MG tablet, 90, Disp: , Rfl:     potassium chloride (KLOR-CON M20) 20 MEQ CR tablet, , Disp: , Rfl:     vitamin D (ERGOCALCIFEROL) 1.25 MG (32929 UT) capsule capsule, Take 1 capsule by mouth 2 (Two) Times a Week. Monday and Thursday., Disp: , Rfl:     No Known Allergies    Family History   Problem Relation Age of Onset    Heart attack Mother     Hypertension Mother     Heart attack Father     Hypertension Father     Asthma Sister     Asthma Sister     Asthma Brother     Heart attack Brother     Hypertension Brother     Hypertension Brother     Heart disease Maternal Aunt     Heart failure Maternal Aunt     Heart failure Maternal Aunt        Cancer-related family history is not on file.    Social History     Tobacco Use    Smoking status: Never     Passive exposure: Never    Smokeless tobacco: Never   Vaping Use    Vaping status: Never Used   Substance Use Topics    Alcohol use: Never    Drug use: Never       I have reviewed and confirmed the accuracy of the patient's history: Chief complaint, HPI, ROS, and Subjective as entered by the MA/LPN/RN. Prachi Zepeda MD  06/17/25        SUBJECTIVE:    3/13/2025: Ban is here today for her routine follow-up.  She is accompanied to the visit by her daughter and her caregiver.  She reports that she is not been doing very well due to pain and what she describes as nodules in her lower abdomen.  She states they have been there for some time and that no one has been able to tell her why they are there.  She reports that she is compliant on her Eliquis 2.5 mg p.o. twice a day and denies any significant bruising or bleeding.  She is up-to-date with seeing Dr. Colunga but states that she cannot get a appointment for any follow-up with Dr. Newell due to her insurance not being accepted at their office.      6/17/2025: Family history of RAD 51C mutation.  She will be referred to the genetic counselor.  She has seen Dr. Colunga about a month ago has a follow-up appointment in July 2025.  She also followed up with gynecologist no additional workup recommended at this time.  Her daughter is available today for this appointment.       REVIEW OF SYSTEMS:    Review of Systems   Constitutional:  Positive for fatigue. Negative for chills and fever.   HENT:  Negative for congestion, drooling, ear discharge, rhinorrhea, sinus pressure and tinnitus.    Eyes:  Negative for photophobia, pain and discharge.   Respiratory:  Negative for apnea, choking and stridor.    Cardiovascular:  Negative for palpitations.   Gastrointestinal:  Positive for abdominal pain. Negative for abdominal distention and anal bleeding.   Endocrine: Negative for polydipsia and polyphagia.   Genitourinary:  Negative for decreased urine volume, flank pain and genital sores.   Musculoskeletal:  Negative for gait problem, neck pain and neck stiffness.   Skin:  Negative for color change, rash and wound.   Neurological:  Negative for tremors, seizures, syncope, facial asymmetry and speech difficulty.   Hematological:  Negative for adenopathy.   Psychiatric/Behavioral:  Negative for  "agitation, confusion, hallucinations and self-injury. The patient is not hyperactive.        OBJECTIVE:    Vitals:    06/17/25 1104   BP: 127/78   Pulse: 74   Resp: 18   Temp: 97.4 °F (36.3 °C)   TempSrc: Temporal   SpO2: 97%   Weight: 61.2 kg (135 lb)   Height: 149.9 cm (59\")   PainSc: 9    PainLoc: Generalized           Body mass index is 27.27 kg/m².    ECOG  (1) Restricted in physically strenuous activity, ambulatory and able to do work of light nature    Physical Exam  Vitals and nursing note reviewed.   Constitutional:       General: She is not in acute distress.     Appearance: She is not diaphoretic.   HENT:      Head: Normocephalic and atraumatic.   Eyes:      General: No scleral icterus.        Right eye: No discharge.         Left eye: No discharge.      Conjunctiva/sclera: Conjunctivae normal.   Neck:      Thyroid: No thyromegaly.   Cardiovascular:      Rate and Rhythm: Normal rate and regular rhythm.      Heart sounds: Normal heart sounds.      No friction rub. No gallop.   Pulmonary:      Effort: Pulmonary effort is normal. No respiratory distress.      Breath sounds: No stridor. No wheezing.   Abdominal:      General: Bowel sounds are normal.      Palpations: There is no mass.      Tenderness: There is abdominal tenderness. There is no guarding or rebound.      Comments: Patient with body wall edema in the lower pendulous abdomen and the posterior thighs   Musculoskeletal:         General: No tenderness. Normal range of motion.      Cervical back: Normal range of motion and neck supple.   Lymphadenopathy:      Cervical: No cervical adenopathy.   Skin:     General: Skin is warm.      Findings: No erythema or rash.   Neurological:      Mental Status: She is alert and oriented to person, place, and time.      Motor: No abnormal muscle tone.   Psychiatric:         Behavior: Behavior normal.       I have reexamined the patient and the results are consistent with the previously documented exam. Prachi " Gaye Zepeda MD   6/17/25     RECENT LABS  WBC   Date Value Ref Range Status   06/17/2025 5.85 3.40 - 10.80 10*3/mm3 Final     RBC   Date Value Ref Range Status   06/17/2025 4.11 3.77 - 5.28 10*6/mm3 Final     Hemoglobin   Date Value Ref Range Status   06/17/2025 12.1 12.0 - 15.9 g/dL Final     Hematocrit   Date Value Ref Range Status   06/17/2025 39.0 34.0 - 46.6 % Final     MCV   Date Value Ref Range Status   06/17/2025 94.9 79.0 - 97.0 fL Final     MCH   Date Value Ref Range Status   06/17/2025 29.4 26.6 - 33.0 pg Final     MCHC   Date Value Ref Range Status   06/17/2025 31.0 (L) 31.5 - 35.7 g/dL Final     RDW   Date Value Ref Range Status   06/17/2025 13.1 12.3 - 15.4 % Final     RDW-SD   Date Value Ref Range Status   06/17/2025 43.7 37.0 - 54.0 fl Final     MPV   Date Value Ref Range Status   06/17/2025 10.2 6.0 - 12.0 fL Final     Platelets   Date Value Ref Range Status   06/17/2025 182 140 - 450 10*3/mm3 Final     Neutrophil %   Date Value Ref Range Status   06/17/2025 55.2 42.7 - 76.0 % Final     Lymphocyte %   Date Value Ref Range Status   06/17/2025 32.0 19.6 - 45.3 % Final     Monocyte %   Date Value Ref Range Status   06/17/2025 9.4 5.0 - 12.0 % Final     Eosinophil %   Date Value Ref Range Status   06/17/2025 2.9 0.3 - 6.2 % Final     Basophil %   Date Value Ref Range Status   06/17/2025 0.5 0.0 - 1.5 % Final     Immature Grans %   Date Value Ref Range Status   12/05/2024 0.2 0.0 - 0.5 % Final     Neutrophils, Absolute   Date Value Ref Range Status   06/17/2025 3.23 1.70 - 7.00 10*3/mm3 Final     Lymphocytes, Absolute   Date Value Ref Range Status   06/17/2025 1.87 0.70 - 3.10 10*3/mm3 Final     Monocytes, Absolute   Date Value Ref Range Status   06/17/2025 0.55 0.10 - 0.90 10*3/mm3 Final     Eosinophils, Absolute   Date Value Ref Range Status   06/17/2025 0.17 0.00 - 0.40 10*3/mm3 Final     Basophils, Absolute   Date Value Ref Range Status   06/17/2025 0.03 0.00 - 0.20 10*3/mm3 Final     Immature  Grans, Absolute   Date Value Ref Range Status   12/05/2024 0.01 0.00 - 0.05 10*3/mm3 Final     nRBC   Date Value Ref Range Status   12/05/2024 0.0 0.0 - 0.2 /100 WBC Final       Lab Results   Component Value Date    GLUCOSE 93 03/13/2025    BUN 20 03/13/2025    CREATININE 0.85 03/13/2025    BCR 23.5 03/13/2025    K 3.8 03/13/2025    CO2 29.7 (H) 03/13/2025    CALCIUM 10.7 (H) 03/13/2025    ALBUMIN 3.6 03/13/2025    AST 33 (H) 03/13/2025    ALT 27 03/13/2025         Assessment & Plan     Pancreatic mass    Anemia, unspecified type  - CBC & Differential        ASSESSMENT:    Pancreatic mass:  -3 x 2.7 cm soft tissue mass involving the body of the pancreas, This was not reported on prior CT scan from 7/1/24.  -CA 19-9 levels mildly elevated, chromogranin levels pending.  -MRI abdomen reviewed: Multiseptated pancreatic lesion in the pancreatic neck measuring 2.7 x 2.6 cm, concerning for malignancy. Hypervascular lesion on Liver is less likely to be metastatic.  -GI team on board, awaiting further recommendations. She will likely benefit from ERCP/EUS with FNA for tissue diagnosis. Patient is agreeable to it.  -per GI team, likely plan for outpatient ERCP/EUS.  -August 2024: EUS cyst aspiration: Cytology was negative for malignancy: Per Dr. Colunga.    -Patient to continue to follow-up with gastroenterology for surveillance    Follow-up appointment with Dr. Colunga in July 2025.  She denies any new abdominal symptoms     Bilateral lower extremity DVT:  Remote history of pulmonary embolism  Splenic Infraction:  -Extensive acute right and left lower extremity DVT noted on presentation  -patient's chronic deconditioning and immobility may have been risk factor for DVT.   -Negative CTA Chest for PE.  -Reportedly treated with warfarin for prior history of PE  -patient was initially started on Lovenox and later transitioned to Eliquis.  She completed Eliquis 10mg BID X 1 week followed by 5mg BID thereafter.  -Will need lifelong  anticoagulation in my view due to persistent risk factors and extensive DVT as well as history of prior pulmonary embolism.  -CT Abdomen/Pelvis on 7/13 showed splenic infract, likely provoked by pancreatic pathology vs acute illness.   -factor V Leiden and prothrombin gene mutation reported negative. Will defer remainder of thrombophilia workup as it does not impact the decision making around anticoagulation.   -Patient is back on Eliquis 2.5 mg twice a day.  Reviewed with patient and caregiver who is present today    She will continue Eliquis at current doses    -8/24/2024: Patient had CT scan of the abdomen and pelvis without contrast no abdominal wall mass was noted edema was noted, vascular shunt noted between the portal venous and hepatic venous branches.  There is multiseptated cystic lesion in the neck of the pancreas measuring 2.3 x 2.6 cm.  Overall the lesion appears stable    She followed up with Dr. Colunga October 2024 she was asked to come back in 3 months.  She had an appointment in January 2025 that was recently rescheduled I have asked patient to follow-up with [facility] office        Uterine mass     -Transvaginal ultrasound suggestive of presence of a large subserosal uterine fundal fibroid.   -CT abdomen/pelvis showed presence of a lobulated mass protruding from the uterine fundus measuring approximately 8.4 x 5.9 cm  -this is concerning for fibroid vs malignancy.  Will recommend GYN Evaluation for the same as outpatient.  -No vaginal bleeding reported.  -She saw gynecologist Dr Sosa Newell and had  D/C no evidence of malignancy.  Will follow-up with Dr. Sosa Newell.  Dr. Newell no longer accepts the patient's insurance.  -GYN referral replaced today    Additional workup has not been recommended for her uterine mass.  For now observation per her daughter     Thrombocytopenia  - 119,000 at admission, jacqueline at 85K, back to normal now. Baseline WNL  -Acute drop likely secondary to sepsis,  rhinovirus  -Hemolysis labs reported negative.   This has resolved      Anemia  Will work this up further 10/24/2024  Anemia workup reticulocyte count was 2.9, ferritin was 197, iron saturation was low at 17, , folate was more than 20, B12 was 409 normal and haptoglobin was elevated to 237  -CBC today reviewed showing a hemoglobin that is stable at 11.5 g/dL.  Recheck iron profile and ferritin today.     Sepsis    -UA suggestive of Acute cystitis, Urine culture consistent with GNR UTI.  -Respiratory panel positive for Rhinovirus.  -patient has completed antibiotic therapy.   Resolved     Atrial fibrillation with RVR/paroxysmal atrial fibrillation  Acute on chronic systolic heart failure with diastolic dysfunction,   moderate aortic stenosis, severe mitral valve regurgitation  -On amiodarone drip, diuresis.  Management per cardiology.   Likely candidate for lifelong anticoagulation given Afib and extensive DVT.  Patient is now on Eliquis 2.5 mg twice a day  10/24/24     Acute kidney injury/hyperkalemia  Resolved.        Pleural Effusion:  Right sided hydropneumothorax:  Likely secondary to CHF and suspected pneumonia.  -patient is status post Chest tube placement. Ongoing drainage.  -management per primary.  Resolved     Abdominal wall edema  -Check abdominal ultrasound  -Advised follow-up with cardiology who manages her diuretics    Family history of RAD 51C mutation    Per patient, I have  advised patient to bring in copies of this report and also given her referral to see the genetic counselor      Follow-up 4 months      All questions answered      Electronically signed by Prachi Zepeda MD, 06/17/25, 3:39 PM EDT.

## 2025-06-17 ENCOUNTER — LAB (OUTPATIENT)
Dept: LAB | Facility: HOSPITAL | Age: 70
End: 2025-06-17
Payer: MEDICARE

## 2025-06-17 ENCOUNTER — OFFICE VISIT (OUTPATIENT)
Dept: ONCOLOGY | Facility: CLINIC | Age: 70
End: 2025-06-17
Payer: MEDICARE

## 2025-06-17 VITALS
RESPIRATION RATE: 18 BRPM | HEIGHT: 59 IN | TEMPERATURE: 97.4 F | OXYGEN SATURATION: 97 % | BODY MASS INDEX: 27.21 KG/M2 | WEIGHT: 135 LBS | HEART RATE: 74 BPM | SYSTOLIC BLOOD PRESSURE: 127 MMHG | DIASTOLIC BLOOD PRESSURE: 78 MMHG

## 2025-06-17 DIAGNOSIS — K86.89 PANCREATIC MASS: ICD-10-CM

## 2025-06-17 DIAGNOSIS — Z84.81 FAMILY HISTORY OF GENETIC MUTATION FOR HEREDITARY NONPOLYPOSIS COLORECTAL CANCER (HNPCC): ICD-10-CM

## 2025-06-17 DIAGNOSIS — K86.89 PANCREATIC MASS: Primary | ICD-10-CM

## 2025-06-17 DIAGNOSIS — D64.9 ANEMIA, UNSPECIFIED TYPE: ICD-10-CM

## 2025-06-17 DIAGNOSIS — D64.9 ANEMIA, UNSPECIFIED TYPE: Primary | ICD-10-CM

## 2025-06-17 DIAGNOSIS — Z84.81 FAMILY HISTORY OF CARRIER OF GENETIC DISEASE: ICD-10-CM

## 2025-06-17 PROBLEM — R13.10 DYSPHAGIA: Status: ACTIVE | Noted: 2025-06-17

## 2025-06-17 PROBLEM — R10.84 DIFFUSE ABDOMINAL PAIN: Status: ACTIVE | Noted: 2025-06-17

## 2025-06-17 LAB
BASOPHILS # BLD AUTO: 0.03 10*3/MM3 (ref 0–0.2)
BASOPHILS NFR BLD AUTO: 0.5 % (ref 0–1.5)
DEPRECATED RDW RBC AUTO: 43.7 FL (ref 37–54)
EOSINOPHIL # BLD AUTO: 0.17 10*3/MM3 (ref 0–0.4)
EOSINOPHIL NFR BLD AUTO: 2.9 % (ref 0.3–6.2)
ERYTHROCYTE [DISTWIDTH] IN BLOOD BY AUTOMATED COUNT: 13.1 % (ref 12.3–15.4)
HCT VFR BLD AUTO: 39 % (ref 34–46.6)
HGB BLD-MCNC: 12.1 G/DL (ref 12–15.9)
HOLD SPECIMEN: NORMAL
LYMPHOCYTES # BLD AUTO: 1.87 10*3/MM3 (ref 0.7–3.1)
LYMPHOCYTES NFR BLD AUTO: 32 % (ref 19.6–45.3)
MCH RBC QN AUTO: 29.4 PG (ref 26.6–33)
MCHC RBC AUTO-ENTMCNC: 31 G/DL (ref 31.5–35.7)
MCV RBC AUTO: 94.9 FL (ref 79–97)
MONOCYTES # BLD AUTO: 0.55 10*3/MM3 (ref 0.1–0.9)
MONOCYTES NFR BLD AUTO: 9.4 % (ref 5–12)
NEUTROPHILS NFR BLD AUTO: 3.23 10*3/MM3 (ref 1.7–7)
NEUTROPHILS NFR BLD AUTO: 55.2 % (ref 42.7–76)
PLATELET # BLD AUTO: 182 10*3/MM3 (ref 140–450)
PMV BLD AUTO: 10.2 FL (ref 6–12)
RBC # BLD AUTO: 4.11 10*6/MM3 (ref 3.77–5.28)
WBC NRBC COR # BLD AUTO: 5.85 10*3/MM3 (ref 3.4–10.8)

## 2025-06-17 PROCEDURE — 36415 COLL VENOUS BLD VENIPUNCTURE: CPT

## 2025-06-17 PROCEDURE — 85025 COMPLETE CBC W/AUTO DIFF WBC: CPT

## 2025-06-17 RX ORDER — CETIRIZINE HYDROCHLORIDE 10 MG/1
10 TABLET ORAL DAILY
COMMUNITY

## 2025-06-17 RX ORDER — POTASSIUM CHLORIDE 750 MG/1
CAPSULE, EXTENDED RELEASE ORAL
COMMUNITY
Start: 2025-06-02

## 2025-07-16 ENCOUNTER — OFFICE VISIT (OUTPATIENT)
Dept: CARDIOLOGY | Facility: CLINIC | Age: 70
End: 2025-07-16
Payer: MEDICARE

## 2025-07-16 VITALS
HEIGHT: 59 IN | BODY MASS INDEX: 30.84 KG/M2 | RESPIRATION RATE: 16 BRPM | HEART RATE: 94 BPM | DIASTOLIC BLOOD PRESSURE: 85 MMHG | SYSTOLIC BLOOD PRESSURE: 145 MMHG | OXYGEN SATURATION: 92 % | WEIGHT: 153 LBS

## 2025-07-16 DIAGNOSIS — I35.0 AORTIC VALVE STENOSIS, ETIOLOGY OF CARDIAC VALVE DISEASE UNSPECIFIED: ICD-10-CM

## 2025-07-16 DIAGNOSIS — I48.91 ATRIAL FIBRILLATION, CONTROLLED: Primary | ICD-10-CM

## 2025-07-16 DIAGNOSIS — I10 PRIMARY HYPERTENSION: Chronic | ICD-10-CM

## 2025-07-16 DIAGNOSIS — I34.0 MITRAL VALVE INSUFFICIENCY, UNSPECIFIED ETIOLOGY: ICD-10-CM

## 2025-07-16 PROCEDURE — 1160F RVW MEDS BY RX/DR IN RCRD: CPT | Performed by: NURSE PRACTITIONER

## 2025-07-16 PROCEDURE — 93000 ELECTROCARDIOGRAM COMPLETE: CPT | Performed by: NURSE PRACTITIONER

## 2025-07-16 PROCEDURE — 3077F SYST BP >= 140 MM HG: CPT | Performed by: NURSE PRACTITIONER

## 2025-07-16 PROCEDURE — 99213 OFFICE O/P EST LOW 20 MIN: CPT | Performed by: NURSE PRACTITIONER

## 2025-07-16 PROCEDURE — 1159F MED LIST DOCD IN RCRD: CPT | Performed by: NURSE PRACTITIONER

## 2025-07-16 PROCEDURE — 3079F DIAST BP 80-89 MM HG: CPT | Performed by: NURSE PRACTITIONER

## 2025-07-16 NOTE — PROGRESS NOTES
Cardiology Office Follow Up Visit      Primary Care Provider:  Rut Pierce APRN    Reason for f/u:     Atrial fibrillation  Dilated cardiomyopathy  Aortic stenosis  Mitral regurgitation      Subjective     CC:    Denies chest pain or worsening dyspnea    History of Present Illness       Ban Brennan is a 70 y.o. female.  Patient is a 70-year-old female who is followed by Dr. Bird.    She is known to have a history of atrial fibrillation, previous DVT, respiratory failure requiring previous mechanical ventilation, pleural effusion requiring chest tube, COPD, cardiomyopathy with reduced ejection fraction to 30 to 35%.    Previous CT of the chest demonstrated cardiomegaly with coronary artery calcifications.    Patient was noted to have pancreatic mass, splenic infarct and gallstones in the upper abdomen as well as pleural effusions during her hospitalization in July 2024.  MRI of the abdomen was suspicious for cystic neoplasm of the pancreas.    She had acute bilateral lower extremity DVTs.  Echocardiogram was completed which showed her ejection fraction to be 30 to 35%, moderate AAS, moderate to severe MR.    September 2024 echo was repeated and EF had improved to 40 to 45%.  There was moderate to severe RV dilatation, mildly reduced RV systolic function, mild AS, moderate TR RVSP 35 to 45 mmHg mild to moderate MR    Patient is followed by oncology.    Patient denies any current or recent chest pain, worsening dyspnea, PND, orthopnea, worsening lower extremity edema or feelings of her heart racing.  She reports compliance with her prescribed medical therapy      ASSESSMENT/PLAN:      Diagnoses and all orders for this visit:    1. Atrial fibrillation, controlled (Primary)    2. Aortic valve stenosis, etiology of cardiac valve disease unspecified    3. Mitral valve insufficiency, unspecified etiology    4. Primary hypertension            MEDICAL DECISION MAKING:      EKG today shows atrial fibrillation  "with right bundle branch block.    Volume status appears stable for the patient her blood pressure is mildly elevated today but they report at home it is typically in the 120s to 130s systolic.    She is anticoagulated with Eliquis.    She follows routinely with gastroenterology, oncology and gynecology.    Have made no changes in her medication.  Will repeat an echocardiogram at 1 year which will be in September if she develops any new symptoms of asked her to contact the office sooner.  We will see her back for scheduled follow-up with Dr. Bird in 6 months      Past Medical History:   Diagnosis Date    Asthma 7/1/24    Atrial fibrillation 7/1/24    Bilateral leg edema     CHF (congestive heart failure) 7/1/24    Chronic kidney disease 7/1/24    Chronic respiratory failure with hypoxia, on home O2 therapy 07/01/2024    Congenital heart disease 7/1/24    COPD (chronic obstructive pulmonary disease)     Deep vein thrombosis     Diabetes mellitus 7/1/24    Morbid obesity with BMI of 40.0-44.9, adult 11/08/2010    Myocardial infarction 35 yrs ago    Primary hypertension 03/01/2017    Pulmonary embolism     \"many years ago, was on warfarin\"    Sleep apnea        Past Surgical History:   Procedure Laterality Date    D & C HYSTEROSCOPY N/A 07/22/2024    Procedure: DILATATION AND CURETTAGE HYSTEROSCOPY;  Surgeon: Sosa Newell MD;  Location: [facility];  Service: Gynecology;  Laterality: N/A;         Current Outpatient Medications:     Allergy Relief Cetirizine 10 MG tablet, Take 1 tablet by mouth Daily., Disp: , Rfl:     apixaban (ELIQUIS) 2.5 MG tablet tablet, Take 1 tablet by mouth 2 (Two) Times a Day., Disp: 60 tablet, Rfl: 0    dapagliflozin (Farxiga) 5 MG tablet tablet, Take 1 tablet by mouth Daily., Disp: 30 tablet, Rfl: 0    digoxin (LANOXIN) 125 MCG tablet, Take 1 tablet by mouth Daily., Disp: 30 tablet, Rfl: 0    docusate sodium (COLACE) 100 MG capsule, , Disp: , Rfl:     FeroSul 325 (65 Fe) MG " tablet, Take 1 tablet by mouth Daily With Breakfast., Disp: , Rfl:     fexofenadine (ALLEGRA) 180 MG tablet, , Disp: , Rfl:     fexofenadine-pseudoephedrine (ALLEGRA-D 24) 180-240 MG per 24 hr tablet, Take 1 tablet by mouth Daily. Pharmacy script says 180mg, Disp: , Rfl:     furosemide (LASIX) 20 MG tablet, Take 1 tablet by mouth 2 (Two) Times a Day., Disp: 60 tablet, Rfl: 0    levothyroxine (SYNTHROID, LEVOTHROID) 25 MCG tablet, Take 1 tablet by mouth Every Morning., Disp: , Rfl:     lisinopril (PRINIVIL,ZESTRIL) 20 MG tablet, Take 0.5 tablets by mouth Daily., Disp: , Rfl:     metoprolol succinate XL (TOPROL-XL) 50 MG 24 hr tablet, Take 0.5 tablets by mouth Every 12 (Twelve) Hours., Disp: , Rfl:     montelukast (SINGULAIR) 10 MG tablet, Take 1 tablet by mouth every night at bedtime., Disp: , Rfl:     oxyCODONE (ROXICODONE) 10 MG tablet, 90, Disp: , Rfl:     potassium chloride (KLOR-CON M20) 20 MEQ CR tablet, , Disp: , Rfl:     potassium chloride (MICRO-K) 10 MEQ CR capsule, TAKE 2 CAPSULES BY MOUTH DAILY WITH FOOD, Disp: , Rfl:     vitamin D (ERGOCALCIFEROL) 1.25 MG (00919 UT) capsule capsule, Take 1 capsule by mouth 2 (Two) Times a Week. Monday and Thursday., Disp: , Rfl:     Social History     Socioeconomic History    Marital status:    Tobacco Use    Smoking status: Never     Passive exposure: Never    Smokeless tobacco: Never   Vaping Use    Vaping status: Never Used   Substance and Sexual Activity    Alcohol use: Never    Drug use: Never    Sexual activity: Not Currently     Partners: Male     Birth control/protection: Abstinence, Post-menopausal, Tubal ligation     Comment: Havent been active in 30 years!       Family History   Problem Relation Age of Onset    Heart attack Mother     Hypertension Mother     Heart attack Father     Hypertension Father     Asthma Sister     Asthma Sister     Asthma Brother     Heart attack Brother     Hypertension Brother     Hypertension Brother     Heart disease  "Maternal Aunt     Heart failure Maternal Aunt     Heart failure Maternal Aunt        The following portions of the patient's history were reviewed and updated as appropriate: allergies, current medications, past family history, past medical history, past social history, past surgical history and problem list.    Review of Systems   Constitutional: Positive for malaise/fatigue.   Cardiovascular:  Positive for dyspnea on exertion and leg swelling.   Neurological:  Positive for weakness.       Pertinent items are noted in HPI, all other systems reviewed and negative    /85 (BP Location: Right arm, Patient Position: Sitting, Cuff Size: Large Adult)   Pulse 94   Resp 16   Ht 149.9 cm (59\")   Wt 69.4 kg (153 lb)   SpO2 92%   BMI 30.90 kg/m² .  Objective     Vitals reviewed.   Constitutional:       General: Not in acute distress.     Appearance: Normal appearance. Well-developed.   Eyes:      Pupils: Pupils are equal, round, and reactive to light.   HENT:      Head: Normocephalic and atraumatic.   Neck:      Vascular: No JVD.   Pulmonary:      Effort: Pulmonary effort is normal.      Breath sounds: Normal breath sounds.   Cardiovascular:      Normal rate. Irregularly irregular rhythm.      Murmurs: There is a systolic murmur.   Edema:     Pretibial: bilateral trace edema of the pretibial area.  Abdominal:      General: There is no distension.      Palpations: Abdomen is soft.      Tenderness: There is no abdominal tenderness.   Musculoskeletal: Normal range of motion.      Cervical back: Normal range of motion and neck supple. Skin:     General: Skin is warm and dry.   Neurological:      Mental Status: Alert and oriented to person, place, and time.             ECG 12 Lead    Date/Time: 7/16/2025 1:51 PM  Performed by: Agnieszka Noe APRN    Authorized by: Agnieszka Noe APRN  Comparison: compared with previous ECG   Similar to previous ECG  Rhythm: atrial fibrillation  BPM: 94  Conduction: right bundle " branch block  Other findings: left ventricular hypertrophy    Clinical impression: abnormal EKG          EKG ordered by and reviewed by me in office

## 2025-08-01 ENCOUNTER — TELEPHONE (OUTPATIENT)
Dept: GENETICS | Facility: HOSPITAL | Age: 70
End: 2025-08-01
Payer: MEDICARE

## (undated) DEVICE — KT SURG TURNOVER 050

## (undated) DEVICE — GLV SURG SIGNATURE ESSENTIAL PF LTX SZ6.5

## (undated) DEVICE — SOL IRRIG NACL 1000ML

## (undated) DEVICE — SOLUTION,WATER,IRRIGATION,1000ML,STERILE: Brand: MEDLINE

## (undated) DEVICE — CUFF SCD HEMOFORCE SEQ CALF STD MD

## (undated) DEVICE — CONTAINER,SPECIMEN,OR STERILE,4OZ: Brand: MEDLINE

## (undated) DEVICE — PK MINOR LITHOTOMY 50

## (undated) DEVICE — TBG IRRI TUR Y/TYP NONVENT 98IN LF